# Patient Record
Sex: FEMALE | Race: WHITE | HISPANIC OR LATINO | Employment: OTHER | URBAN - METROPOLITAN AREA
[De-identification: names, ages, dates, MRNs, and addresses within clinical notes are randomized per-mention and may not be internally consistent; named-entity substitution may affect disease eponyms.]

---

## 2017-01-06 ENCOUNTER — TELEPHONE (OUTPATIENT)
Dept: TRANSPLANT | Facility: CLINIC | Age: 77
End: 2017-01-06

## 2017-01-06 DIAGNOSIS — I27.9 CHRONIC PULMONARY HEART DISEASE: ICD-10-CM

## 2017-01-06 DIAGNOSIS — Z79.899 POLYPHARMACY: Primary | ICD-10-CM

## 2017-01-06 DIAGNOSIS — R06.82 TACHYPNEA: ICD-10-CM

## 2017-01-13 ENCOUNTER — HOSPITAL ENCOUNTER (OUTPATIENT)
Dept: PULMONOLOGY | Facility: CLINIC | Age: 77
Discharge: HOME | End: 2017-01-13
Payer: MEDICARE

## 2017-01-13 ENCOUNTER — INITIAL CONSULT (OUTPATIENT)
Dept: TRANSPLANT | Facility: CLINIC | Age: 77
End: 2017-01-13
Attending: INTERNAL MEDICINE
Payer: MEDICARE

## 2017-01-13 VITALS
HEART RATE: 96 BPM | DIASTOLIC BLOOD PRESSURE: 77 MMHG | HEIGHT: 61 IN | SYSTOLIC BLOOD PRESSURE: 148 MMHG | WEIGHT: 212.94 LBS | BODY MASS INDEX: 40.2 KG/M2

## 2017-01-13 VITALS — BODY MASS INDEX: 36.99 KG/M2 | HEIGHT: 62 IN | WEIGHT: 201 LBS

## 2017-01-13 DIAGNOSIS — G62.9 PERIPHERAL POLYNEUROPATHY: ICD-10-CM

## 2017-01-13 DIAGNOSIS — Z79.01 LONG TERM (CURRENT) USE OF ANTICOAGULANTS: ICD-10-CM

## 2017-01-13 DIAGNOSIS — I27.20 PULMONARY HYPERTENSION: ICD-10-CM

## 2017-01-13 DIAGNOSIS — E66.09 NON MORBID OBESITY DUE TO EXCESS CALORIES: ICD-10-CM

## 2017-01-13 DIAGNOSIS — I10 BENIGN HYPERTENSION: ICD-10-CM

## 2017-01-13 DIAGNOSIS — I27.9 CHRONIC PULMONARY HEART DISEASE: ICD-10-CM

## 2017-01-13 DIAGNOSIS — Z79.4 LONG-TERM INSULIN USE: ICD-10-CM

## 2017-01-13 DIAGNOSIS — G47.33 OSA (OBSTRUCTIVE SLEEP APNEA): ICD-10-CM

## 2017-01-13 DIAGNOSIS — I48.20 CHRONIC ATRIAL FIBRILLATION: ICD-10-CM

## 2017-01-13 DIAGNOSIS — R06.02 SHORTNESS OF BREATH: Primary | ICD-10-CM

## 2017-01-13 PROCEDURE — 3078F DIAST BP <80 MM HG: CPT | Mod: S$GLB,,, | Performed by: INTERNAL MEDICINE

## 2017-01-13 PROCEDURE — 1159F MED LIST DOCD IN RCRD: CPT | Mod: S$GLB,,, | Performed by: INTERNAL MEDICINE

## 2017-01-13 PROCEDURE — 99999 PR PBB SHADOW E&M-EST. PATIENT-LVL III: CPT | Mod: PBBFAC,,, | Performed by: INTERNAL MEDICINE

## 2017-01-13 PROCEDURE — 1125F AMNT PAIN NOTED PAIN PRSNT: CPT | Mod: S$GLB,,, | Performed by: INTERNAL MEDICINE

## 2017-01-13 PROCEDURE — 4010F ACE/ARB THERAPY RXD/TAKEN: CPT | Mod: S$GLB,,, | Performed by: INTERNAL MEDICINE

## 2017-01-13 PROCEDURE — 1157F ADVNC CARE PLAN IN RCRD: CPT | Mod: S$GLB,,, | Performed by: INTERNAL MEDICINE

## 2017-01-13 PROCEDURE — 99499 UNLISTED E&M SERVICE: CPT | Mod: S$GLB,,, | Performed by: INTERNAL MEDICINE

## 2017-01-13 PROCEDURE — 1160F RVW MEDS BY RX/DR IN RCRD: CPT | Mod: S$GLB,,, | Performed by: INTERNAL MEDICINE

## 2017-01-13 PROCEDURE — 94620 PR PULMONARY STRESS TESTING,SIMPLE: CPT | Mod: S$GLB,,, | Performed by: INTERNAL MEDICINE

## 2017-01-13 PROCEDURE — 3077F SYST BP >= 140 MM HG: CPT | Mod: S$GLB,,, | Performed by: INTERNAL MEDICINE

## 2017-01-13 PROCEDURE — 99204 OFFICE O/P NEW MOD 45 MIN: CPT | Mod: S$GLB,,, | Performed by: INTERNAL MEDICINE

## 2017-01-13 NOTE — MR AVS SNAPSHOT
[facility]  [address]  [facility] [address]  Phone: [phone]                  Kaci Whalen   2017 3:00 PM   Initial consult    Description:  Female : 1940   Provider:  Vel Arellano Jr., MD   Department:  [facility]           Reason for Visit     Pulmonary Hypertension           Diagnoses this Visit        Comments    Shortness of breath    -  Primary     Pulmonary hypertension                To Do List           Future Appointments        Provider Department Dept Phone    2017 10:40 AM Toby Hung MD [facility] [phone]    3/28/2017 1:00 PM Driss Dixon MD [facility] - Cardiology [phone]    3/31/2017 1:00 PM Jennifer Cardoza DPM [facility] - Podiatry [phone]      Goals (5 Years of Data)              12/28/16    9/2/16    12/11/15    HEMOGLOBIN A1C < 7.0   6.9  7.1  6.9    Related Problems    Type 2 diabetes, uncontrolled, with retinopathy      [facility]     [facility] -  Assistance  Registered nurses in the [facility] provide clinical advisement, health education, appointment booking, and other advisory services.  Call for this free service at [phone].             Medications           Message regarding Medications     Verify the changes and/or additions to your medication regime listed below are the same as discussed with your clinician today.  If any of these changes or additions are incorrect, please notify your healthcare provider.        STOP taking these medications     zolpidem (AMBIEN) 5 MG Tab Take 1 tablet (5 mg total) by mouth nightly as needed.           Verify that the below list of medications is an accurate representation of the medications you are currently taking.  If none reported, the list may be blank. If incorrect, please contact your healthcare provider. Carry this list with you in case of emergency.           Current Medications      "blood sugar diagnostic (ONETOUCH ULTRA TEST) Strp 1 strip by Misc.(Non-Drug; Combo Route) route 4 (four) times daily.    clotrimazole (LOTRIMIN) 1 % cream Apply topically 2 (two) times daily.    furosemide (LASIX) 20 MG tablet Take 1 tablet (20 mg total) by mouth once daily.    insulin aspart (NOVOLOG FLEXPEN) 100 unit/mL InPn pen Take 10 units with breakfast, 15 units with lunch and 17 with supper; with additional sliding scale; max dose 75 units per day    insulin glargine (LANTUS SOLOSTAR) 100 unit/mL (3 mL) InPn pen Inject 40 Units into the skin every evening. Max 50 units nightly    losartan-hydrochlorothiazide 100-25 mg (HYZAAR) 100-25 mg per tablet Take 1 tablet by mouth once daily.    metformin (GLUCOPHAGE) 1000 MG tablet Take 1 tablet (1,000 mg total) by mouth 2 (two) times daily with meals.    metoprolol succinate (TOPROL-XL) 100 MG 24 hr tablet Take 1 tablet (100 mg total) by mouth once daily.    ONETOUCH ULTRA TEST Strp USE ONE STRIP TO CHECK GLUCOSE 4 TIMES DAILY    pen needle, diabetic (BD ULTRA-FINE SANDEEP PEN NEEDLES) 32 gauge x 5/32" Ndle Take 1 each by mouth 4 (four) times daily.    rivaroxaban (XARELTO) 20 mg Tab Take 1 tablet (20 mg total) by mouth daily with dinner or evening meal.    trazodone (DESYREL) 50 MG tablet Take 2 tablets (100 mg total) by mouth nightly as needed for Insomnia.           Clinical Reference Information           Vital Signs - Last Recorded  Most recent update: 1/13/2017  3:13 PM by Jose Ramon Barker    BP Pulse Ht Wt BMI    (!) 148/77 96 5' 1" (1.549 m) 96.6 kg (212 lb 15.4 oz) 40.24 kg/m2      Blood Pressure          Most Recent Value    BP  (!)  148/77      Allergies as of 1/13/2017     Ace Inhibitors    Fluorescein    Iodine    Pravastatin    Simvastatin      Immunizations Administered on Date of Encounter - 1/13/2017     None      Orders Placed During Today's Visit     Future Labs/Procedures Expected by Expires    Cath lab procedure  As directed 1/13/2018    "   Instructions    Hold Xarelto 2 days before the procedure

## 2017-01-14 NOTE — PROCEDURES
Kaci Whalen is a 76 y.o.  female patient, who presents for a 6 minute walk test ordered by Sol Waller MD.  The diagnosis is Exertional Dyspnea.  The patient's BMI is 36.8 kg/m2.  Predicted distance (lower limit of normal) is 216.95 meters.      Test Results:    The test was not completed.  The patient stopped 5 times for a total of 254 seconds.  The total time walked was 106 seconds.  During walking, the patient reported:  Dyspnea. The patient used a wheelchair for assistance during testing.     01/13/2017---------Distance: 97.54 meters (320 feet)     O2 Sat % Supplemental Oxygen Heart Rate Blood Pressure Lizbet Scale   Pre-exercise  (Resting) 96 % Room Air 81 bpm 152/66 mmHg 4   During Exercise 96 % Room Air 69 bpm 152/68 mmHg 7-8   Post-exercise  (Recovery) 98 % Room Air  75 bpm       Recovery Time: 194 seconds    Performing nurse/tech: JARRED Pagan      PREVIOUS STUDY:   10/12/2016---------Distance: 76.2 meters (250 feet)       O2 Sat % Supplemental Oxygen Heart Rate Blood Pressure Lizbet Scale   Pre-exercise  (Resting) 93 % Room Air 87 bpm 111/55 mmHg 0   During Exercise 91 % Room Air 99 bpm 101/61 mmHg 7-8   Post-exercise  (Recovery) 98 % Room Air  83 bpm           CLINICAL INTERPRETATION:  Six minute walk distance is 97.54 meters (320 feet) with very heavy dyspnea.  During exercise, there was no desaturation while breathing room air.  Blood pressure remained stable and Heart rate decreased significantly with walking.  Hypertension was present prior to exercise.  The patient did not report non-pulmonary symptoms during exercise.  Severe exercise impairment is likely due to cardiovascular causes and subjective symptoms.  The patient did not complete the study, walking 254 seconds of the 360 second test.  Since the previous study in October 2016, exercise capacity may be somewhat improved.  Based upon age and body mass index, exercise capacity is less than predicted.

## 2017-01-15 DIAGNOSIS — I48.20 CHRONIC ATRIAL FIBRILLATION: ICD-10-CM

## 2017-01-16 ENCOUNTER — PATIENT MESSAGE (OUTPATIENT)
Dept: FAMILY MEDICINE | Facility: CLINIC | Age: 77
End: 2017-01-16

## 2017-01-16 RX ORDER — METOPROLOL SUCCINATE 100 MG/1
TABLET, EXTENDED RELEASE ORAL
Qty: 30 TABLET | Refills: 0 | Status: SHIPPED | OUTPATIENT
Start: 2017-01-16 | End: 2017-02-20 | Stop reason: SDUPTHER

## 2017-01-17 DIAGNOSIS — I48.20 CHRONIC ATRIAL FIBRILLATION: ICD-10-CM

## 2017-01-17 DIAGNOSIS — E11.3593 PROLIFERATIVE DIABETIC RETINOPATHY OF BOTH EYES ASSOCIATED WITH TYPE 2 DIABETES MELLITUS, UNSPECIFIED PROLIFERATIVE RETINOPATHY TYPE: ICD-10-CM

## 2017-01-17 DIAGNOSIS — I10 BENIGN HYPERTENSION: ICD-10-CM

## 2017-01-17 RX ORDER — LOSARTAN POTASSIUM AND HYDROCHLOROTHIAZIDE 25; 100 MG/1; MG/1
1 TABLET ORAL DAILY
Qty: 90 TABLET | Refills: 1 | Status: SHIPPED | OUTPATIENT
Start: 2017-01-17 | End: 2017-07-17 | Stop reason: SDUPTHER

## 2017-01-17 NOTE — TELEPHONE ENCOUNTER
----- Message from Rosangela Cesar sent at 1/17/2017 10:57 AM CST -----  Contact: Liliam/Anish/[phone]  Refill:  metoprolol succinate (TOPROL-XL) 100 MG 24 hr tablet  Refill:  losartan-hydrochlorothiazide 100-25 mg (HYZAAR) 100-25 mg per tablet    Thank you.

## 2017-01-18 ENCOUNTER — TELEPHONE (OUTPATIENT)
Dept: CARDIOLOGY | Facility: CLINIC | Age: 77
End: 2017-01-18

## 2017-01-18 ENCOUNTER — TELEPHONE (OUTPATIENT)
Dept: INTERNAL MEDICINE | Facility: CLINIC | Age: 77
End: 2017-01-18

## 2017-01-18 ENCOUNTER — HOSPITAL ENCOUNTER (OUTPATIENT)
Facility: HOSPITAL | Age: 77
Discharge: HOME | End: 2017-01-18
Attending: INTERNAL MEDICINE | Admitting: INTERNAL MEDICINE
Payer: MEDICARE

## 2017-01-18 DIAGNOSIS — I27.9 CHRONIC PULMONARY HEART DISEASE: Primary | ICD-10-CM

## 2017-01-18 DIAGNOSIS — I27.29 OTHER SECONDARY PULMONARY HYPERTENSION: ICD-10-CM

## 2017-01-18 PROBLEM — I27.20 PULMONARY HYPERTENSION: Status: ACTIVE | Noted: 2017-01-18

## 2017-01-18 PROCEDURE — 25000003 PHARM REV CODE 250

## 2017-01-18 PROCEDURE — C1894 INTRO/SHEATH, NON-LASER: HCPCS

## 2017-01-18 PROCEDURE — 93451 RIGHT HEART CATH: CPT | Mod: 26,,, | Performed by: INTERNAL MEDICINE

## 2017-01-18 NOTE — H&P
ADVANCED HEART FAILURE AND TRANSPLANT  PRE-PROCEDURE NOTE    01/18/2017    HPI:     Kaci Whalen is a 76 y.o. female patient with a history of AF, HFpEF, DM, HTN, MIGUEL, obesity who presents to our procedure room for RHC to evaluate hemodynamics. The test was requested because the CT chest was suggestive of pulmonary HTN but her echo revealed normal PA pressure.      Meds:     Scheduled Meds:  PRN Meds:  Continuous Infusions:      Labs:     Recent Results (from the past 336 hour(s))   CBC auto differential    Collection Time: 01/18/17  7:30 AM   Result Value Ref Range    WBC 7.19 3.90 - 12.70 K/uL    Hemoglobin 11.1 (L) 12.0 - 16.0 g/dL    Hematocrit 34.1 (L) 37.0 - 48.5 %    Platelets 273 150 - 350 K/uL   CBC auto differential    Collection Time: 01/13/17  1:25 PM   Result Value Ref Range    WBC 6.45 3.90 - 12.70 K/uL    Hemoglobin 11.4 (L) 12.0 - 16.0 g/dL    Hematocrit 35.6 (L) 37.0 - 48.5 %    Platelets 225 150 - 350 K/uL       Recent Results (from the past 336 hour(s))   Basic metabolic panel    Collection Time: 01/18/17  7:30 AM   Result Value Ref Range    Sodium 133 (L) 136 - 145 mmol/L    Potassium 3.8 3.5 - 5.1 mmol/L    Chloride 90 (L) 95 - 110 mmol/L    CO2 33 (H) 23 - 29 mmol/L    BUN, Bld 22 8 - 23 mg/dL    Creatinine 1.1 0.5 - 1.4 mg/dL    Calcium 9.5 8.7 - 10.5 mg/dL    Anion Gap 10 8 - 16 mmol/L       Assessment & Plan:     76 y.o. female patient with a history of AF, HFpEF, DM, HTN, MIGUEL, obesity who presents to our procedure room for RHC to evaluate hemodynamics.      PLAN:  1. Will proceed with the procedure as planned.     - The risks and benefits of the procedure were discussed extensively with the patient and consent was obtained from the patient.    I will discuss with the attending physician. Attending addendum is to follow.    Signed:  Humberto Koehler MD  Cardiology Fellow, PGY-VI  Pager: [phone]  1/18/2017 8:52 AM    Attending Addendum:

## 2017-01-18 NOTE — DISCHARGE INSTRUCTIONS
AFTER THE PROCEDURE:   -DO NOT DRINK OR EAT ANYTHING UNTIL NO LONGER HOARSE   -You may remove the bandage in 24 hours and wash with soap and water.   -You may shower, but do not soak in a tub for three days.   PRECAUTIONS FOR THE NEXT 24 HOURS:   -If you need to cough, sneeze, have a bowel movement, or bear down, hold pressure over your bandage.   -Do not  anything heavier than a gallon of milk(about 5 pounds)   -Avoid excessive bending over.   SYMPTOMS TO WATCH FOR AND REPORT TO YOUR DOCTOR:   -BLEEDING: hold pressure over the site until bleeding stops. Proceed to Emergency Room by ambulance (do not drive yourself) if unable to stop bleeding. Notify your doctor.   -HEMATOMA(hard bruise under the skin): Low around the bruise if one develops. Call your doctor if it increases in size or if you have difficulty talking, swallowing, breathing or anything unusual.   SIGNS OF INFECTION:Fever (temperature over 100.5 F), pus or redness   -RASH   -CHEST PAIN OR SHORTNESS OF BREATH   You may call you coordinator in the Heart Failure/Heart Transplant/Pulmonary Hypertension Clinic at [phone] during normal business hours(Monday through Friday from 8 A.M. to 5 P.M.) After hours, call the Heart Transplant Service doctor on call at [phone].    Please call Dr. White for recommendations of diuretic dosing tomorrow as they will receive report today and Dr. Arellano will call them as well  OK to resume Xarelto tonight if no bleeding noted  No follow-up needed with our service

## 2017-01-18 NOTE — DISCHARGE SUMMARY
[facility]  Discharge Note  Short Stay    Admit Date: 1/18/2017    Discharge Date and Time: 1/18/2017    Attending Physician: No att. providers found     Discharge Provider: Vel Arellano Jr    Diagnoses:  Active Hospital Problems    Diagnosis  POA    *Pulmonary hypertension mixed group 2 and 3 [I27.2]  Yes    Long term (current) use of anticoagulants [Z79.01]  Not Applicable    Chronic atrial fibrillation [I48.2]  Yes    MIGUEL (obstructive sleep apnea) unable to afford CPAP [G47.33]  Yes      Resolved Hospital Problems    Diagnosis Date Resolved POA   No resolved problems to display.       Discharged Condition: stable    Hospital Course: Patient was admitted for an outpatient procedure and tolerated the procedure well with no complications.    Final Diagnoses: Same as principal problem.    Disposition: Home or Self Care    Follow up/Patient Instructions:    Medications:  Reconciled Home Medications:   Discharge Medication List as of 1/18/2017 10:06 AM      CONTINUE these medications which have NOT CHANGED    Details   !! blood sugar diagnostic (ONETOUCH ULTRA TEST) Strp 1 strip by Misc.(Non-Drug; Combo Route) route 4 (four) times daily., Starting 6/28/2016, Until Discontinued, Normal      clotrimazole (LOTRIMIN) 1 % cream Apply topically 2 (two) times daily., Starting 7/19/2016, Until Discontinued, Normal      furosemide (LASIX) 20 MG tablet Take 1 tablet (20 mg total) by mouth once daily., Starting 6/10/2016, Until Discontinued, Normal      insulin aspart (NOVOLOG FLEXPEN) 100 unit/mL InPn pen Take 10 units with breakfast, 15 units with lunch and 17 with supper; with additional sliding scale; max dose 75 units per day, Normal      insulin glargine (LANTUS SOLOSTAR) 100 unit/mL (3 mL) InPn pen Inject 40 Units into the skin every evening. Max 50 units nightly, Starting 12/18/2015, Until Discontinued, Print      losartan-hydrochlorothiazide 100-25 mg (HYZAAR) 100-25 mg per tablet Take 1 tablet by  "mouth once daily., Starting 1/17/2017, Until Discontinued, Normal      metformin (GLUCOPHAGE) 1000 MG tablet Take 1 tablet (1,000 mg total) by mouth 2 (two) times daily with meals., Starting 9/9/2016, Until Discontinued, Normal      metoprolol succinate (TOPROL-XL) 100 MG 24 hr tablet TAKE ONE TABLET BY MOUTH ONCE DAILY, Normal      !! ONETOUCH ULTRA TEST Strp USE ONE STRIP TO CHECK GLUCOSE 4 TIMES DAILY, Normal      pen needle, diabetic (BD ULTRA-FINE SANDEEP PEN NEEDLES) 32 gauge x 5/32" Ndle Take 1 each by mouth 4 (four) times daily., Starting 9/9/2016, Until Discontinued, Normal      rivaroxaban (XARELTO) 20 mg Tab Take 1 tablet (20 mg total) by mouth daily with dinner or evening meal., Starting 12/1/2016, Until Discontinued, Print      trazodone (DESYREL) 50 MG tablet Take 2 tablets (100 mg total) by mouth nightly as needed for Insomnia., Starting 12/28/2016, Until Thu 12/28/17, Normal       !! - Potential duplicate medications found. Please discuss with provider.        No discharge procedures on file.  Follow-up Information     Follow up with [facility].    Specialty:  Transplant    Why:  As needed    Contact information:    [address]  [facility] [address] [phone]    Additional information:    3rd floor        Follow up with Toby Hung MD. Call in 1 day.    Specialty:  Internal Medicine    Contact information:    [address]  [address]  [facility] [id number] [phone]          Follow up with Driss Dixon MD. Call in 1 day.    Specialties:  Cardiology, INTERVENTIONAL CARDIOLOGY    Contact information:    [address]  [address]  [facility] [id number] [phone]          Follow up: Dr Dixon or Abhilash will adjust diuretics and follow--she will contact them tomorrow for instructions to give time for them to review cath and for Dr. Arellano to contact them to review    Resume previous diet and activity    "

## 2017-01-18 NOTE — IP AVS SNAPSHOT
[facility]  [address]  [facility] [address]  Phone: [phone]           Patient Discharge Instructions     Our goal is to set you up for success. This packet includes information on your condition, medications, and your home care. It will help you to care for yourself so you don't get sicker and need to go back to the hospital.     Please ask your nurse if you have any questions.        There are many details to remember when preparing to leave the hospital. Here is what you will need to do:    1. Take your medicine. If you are prescribed medications, review your Medication List in the following pages. You may have new medications to  at the pharmacy and others that you'll need to stop taking. Review the instructions for how and when to take your medications. Talk with your doctor or nurses if you are unsure of what to do.     2. Go to your follow-up appointments. Specific follow-up information is listed in the following pages. Your may be contacted by a transition nurse or clinical provider about future appointments. Be sure we have all of the phone numbers to reach you, if needed. Please contact your provider's office if you are unable to make an appointment.     3. Watch for warning signs. Your doctor or nurse will give you detailed warning signs to watch for and when to call for assistance. These instructions may also include educational information about your condition. If you experience any of warning signs to your health, call your doctor.               [facility]  Unless otherwise directed by your provider, please contact [facility], our nurse care line that is available for 24/7 assistance.     [phone] (toll-free)    Registered nurses in the [facility] provide clinical advisement, health education, appointment booking, and other advisory services.                    ** Verify the list of medication(s) below is accurate and up  to date. Carry this with you in case of emergency. If your medications have changed, please notify your healthcare provider.             Medication List      CONTINUE taking these medications        Additional Info                      * blood sugar diagnostic Strp   Commonly known as:  ONETOUCH ULTRA TEST   Quantity:  400 strip   Refills:  3   Dose:  1 strip    Instructions:  1 strip by Misc.(Non-Drug; Combo Route) route 4 (four) times daily.     Begin Date    AM    Noon    PM    Bedtime       * ONETOUCH ULTRA TEST Strp   Quantity:  400 strip   Refills:  0   Generic drug:  blood sugar diagnostic    Instructions:  USE ONE STRIP TO CHECK GLUCOSE 4 TIMES DAILY     Begin Date    AM    Noon    PM    Bedtime       clotrimazole 1 % cream   Commonly known as:  LOTRIMIN   Quantity:  45 g   Refills:  3    Instructions:  Apply topically 2 (two) times daily.     Begin Date    AM    Noon    PM    Bedtime       furosemide 20 MG tablet   Commonly known as:  LASIX   Quantity:  90 tablet   Refills:  3   Dose:  20 mg    Instructions:  Take 1 tablet (20 mg total) by mouth once daily.     Begin Date    AM    Noon    PM    Bedtime       insulin aspart 100 unit/mL Inpn pen   Commonly known as:  NOVOLOG FLEXPEN   Quantity:  3 Box   Refills:  5   Comments:  Please dispense quantity sufficient    Instructions:  Take 10 units with breakfast, 15 units with lunch and 17 with supper; with additional sliding scale; max dose 75 units per day     Begin Date    AM    Noon    PM    Bedtime       insulin glargine 100 unit/mL (3 mL) Inpn pen   Commonly known as:  LANTUS SOLOSTAR   Quantity:  1 Box   Refills:  6   Dose:  40 Units   Comments:  Please dispense quantity sufficient    Instructions:  Inject 40 Units into the skin every evening. Max 50 units nightly     Begin Date    AM    Noon    PM    Bedtime       losartan-hydrochlorothiazide 100-25 mg 100-25 mg per tablet   Commonly known as:  HYZAAR   Quantity:  90 tablet   Refills:  1   Dose:  1  "tablet    Instructions:  Take 1 tablet by mouth once daily.     Begin Date    AM    Noon    PM    Bedtime       metformin 1000 MG tablet   Commonly known as:  GLUCOPHAGE   Quantity:  60 tablet   Refills:  5   Dose:  1000 mg    Instructions:  Take 1 tablet (1,000 mg total) by mouth 2 (two) times daily with meals.     Begin Date    AM    Noon    PM    Bedtime       metoprolol succinate 100 MG 24 hr tablet   Commonly known as:  TOPROL-XL   Quantity:  30 tablet   Refills:  0    Instructions:  TAKE ONE TABLET BY MOUTH ONCE DAILY     Begin Date    AM    Noon    PM    Bedtime       pen needle, diabetic 32 gauge x 5/32" Ndle   Commonly known as:  BD ULTRA-FINE SANDEEP PEN NEEDLES   Quantity:  120 each   Refills:  2   Dose:  1 each    Instructions:  Take 1 each by mouth 4 (four) times daily.     Begin Date    AM    Noon    PM    Bedtime       rivaroxaban 20 mg Tab   Commonly known as:  XARELTO   Quantity:  90 tablet   Refills:  3   Dose:  20 mg    Instructions:  Take 1 tablet (20 mg total) by mouth daily with dinner or evening meal.     Begin Date    AM    Noon    PM    Bedtime       trazodone 50 MG tablet   Commonly known as:  DESYREL   Quantity:  60 tablet   Refills:  2   Dose:  100 mg    Instructions:  Take 2 tablets (100 mg total) by mouth nightly as needed for Insomnia.     Begin Date    AM    Noon    PM    Bedtime       * Notice:  This list has 2 medication(s) that are the same as other medications prescribed for you. Read the directions carefully, and ask your doctor or other care provider to review them with you.               Please bring to all follow up appointments:    1. A copy of your discharge instructions.  2. All medicines you are currently taking in their original bottles.  3. Identification and insurance card.    Please arrive 15 minutes ahead of scheduled appointment time.    Please call 24 hours in advance if you must reschedule your appointment and/or time.        Your Scheduled Appointments     Jan 25, " 2017 10:40 AM CST   Established Patient Visit with Toby Hung MD   [facility] ([facility] [place] [place])    [address]  [place] [address]   [phone]            Mar 28, 2017  1:00 PM CDT   Established Patient Visit with Driss Dixon MD   [facility] ([place])    [address]  [place] [address] [phone]            Mar 31, 2017  1:00 PM CDT   Established Patient Visit with Jennifer Cardoza DPM   [facility] ([place])    [address]  [place] [address] [phone]              Follow-up Information     Follow up with [facility].    Specialty:  Transplant    Why:  As needed    Contact information:    [address]  [facility] [address] [phone]    Additional information:    3rd floor        Follow up with Toby Hung MD. Call in 1 day.    Specialty:  Internal Medicine    Contact information:    [address]  [address]  [place] [address] [phone]          Follow up with Driss Dixon MD. Call in 1 day.    Specialties:  Cardiology, INTERVENTIONAL CARDIOLOGY    Contact information:    [address]  [address]  [place] [id number] [phone]            Discharge Instructions       AFTER THE PROCEDURE:   -DO NOT DRINK OR EAT ANYTHING UNTIL NO LONGER HOARSE   -You may remove the bandage in 24 hours and wash with soap and water.   -You may shower, but do not soak in a tub for three days.   PRECAUTIONS FOR THE NEXT 24 HOURS:   -If you need to cough, sneeze, have a bowel movement, or bear down, hold pressure over your bandage.   -Do not  anything heavier than a gallon of milk(about 5 pounds)   -Avoid excessive bending over.   SYMPTOMS TO WATCH FOR AND REPORT TO YOUR DOCTOR:   -BLEEDING: hold pressure over the site until bleeding stops. Proceed to Emergency Room by ambulance (do not drive yourself) if unable to stop bleeding. Notify your doctor.   -HEMATOMA(hard bruise under the skin):  Low around the bruise if one develops. Call your doctor if it increases in size or if you have difficulty talking, swallowing, breathing or anything unusual.   SIGNS OF INFECTION:Fever (temperature over 100.5 F), pus or redness   -RASH   -CHEST PAIN OR SHORTNESS OF BREATH   You may call you coordinator in the Heart Failure/Heart Transplant/Pulmonary Hypertension Clinic at [phone] during normal business hours(Monday through Friday from 8 A.M. to 5 P.M.) After hours, call the Heart Transplant Service doctor on call at [phone].    Please call Dr. White for recommendations of diuretic dosing tomorrow as they will receive report today and Dr. Arellano will call them as well  OK to resume Xarelto tonight if no bleeding noted  No follow-up needed with our service      Admission Information     Date & Time Provider Department CSN    1/18/2017  8:20 AM Vel Arellano Jr., MD [facility] [id number]      Care Providers     Provider Role Specialty Primary office phone    Vel Arellano Jr., MD Attending Provider Cardiology [phone]      Recent Lab Values        9/6/2013 12/13/2013 12/17/2014 4/2/2015 6/30/2015 12/11/2015 9/2/2016 12/28/2016      8:10 AM  8:25 AM  8:58 AM  8:07 AM  8:30 AM  8:30 AM  8:28 AM 11:55 AM    A1C 7.3 (H) 7.7 (H) 7.7 (H) 7.2 (H) 7.8 (H) 6.9 (H) 7.1 (H) 6.9 (H)        7.2 (H)        Comment for A1C at  8:28 AM on 9/2/2016:  According to ADA guidelines, hemoglobin A1C <7.0% represents  optimal control in non-pregnant diabetic patients.  Different  metrics may apply to specific populations.   Standards of Medical Care in Diabetes - 2016.  For the purpose of screening for the presence of diabetes:  <5.7%     Consistent with the absence of diabetes  5.7-6.4%  Consistent with increasing risk for diabetes   (prediabetes)  >or=6.5%  Consistent with diabetes  Currently no consensus exists for use of hemoglobin A1C  for diagnosis of diabetes for  children.      Comment for A1C at 11:55 AM on 12/28/2016:  According to ADA guidelines, hemoglobin A1C <7.0% represents  optimal control in non-pregnant diabetic patients.  Different  metrics may apply to specific populations.   Standards of Medical Care in Diabetes - 2016.  For the purpose of screening for the presence of diabetes:  <5.7%     Consistent with the absence of diabetes  5.7-6.4%  Consistent with increasing risk for diabetes   (prediabetes)  >or=6.5%  Consistent with diabetes  Currently no consensus exists for use of hemoglobin A1C  for diagnosis of diabetes for children.        Allergies as of 1/18/2017        Reactions    Ace Inhibitors     Other reaction(s): cough    Fluorescein Itching    Other reaction(s): Itching    Iodine     Other reaction(s): Itching    Pravastatin Other (See Comments)    Other reaction(s): mouth tingling/numbness    Simvastatin     Other reaction(s): foot swelling      Advance Directives     An advance directive is a document which, in the event you are no longer able to make decisions for yourself, tells your healthcare team what kind of treatment you do or do not want to receive, or who you would like to make those decisions for you.  If you do not currently have an advance directive, [facility] encourages you to create one.  For more information call:  [phone]), [phone] ([phone]),  or log on to [email or web address].        Language Assistance Services     ATTENTION: Language assistance services are available, free of charge. Please call [phone].      ATENCIÓN: Si habla español, tiene a cantor disposición servicios gratuitos de asistencia lingüística. Llame al [phone].     CHÚ Ý: N?u b?n nói Ti?ng Vi?t, có các d?ch v? h? tr? ngôn ng? mi?n phí dành cho b?n. G?i s? [phone].        Chronic Kindey Disease Education             Diabetes Discharge Instructions                                   Xalelto Information     Rivaroxaban Oral  tablet  What is this medicine?  RIVAROXABAN (ri va SAL a ban) is an anticoagulant (blood thinner). It is used to treat blood clots in the lungs or in the veins. It is also used after knee or hip surgeries to prevent blood clots. It is also used to lower the chance of stroke in people with a medical condition called atrial fibrillation.  This medicine may be used for other purposes; ask your health care provider or pharmacist if you have questions.  What should I tell my health care provider before I take this medicine?  They need to know if you have any of these conditions:  · bleeding disorders  · bleeding in the brain  · blood in your stools (black or tarry stools) or if you have blood in your vomit  · history of stomach bleeding  · kidney disease  · liver disease  · low blood counts, like low white cell, platelet, or red cell counts  · recent or planned spinal or epidural procedure  · take medicines that treat or prevent blood clots  · an unusual or allergic reaction to rivaroxaban, other medicines, foods, dyes, or preservatives  · pregnant or trying to get pregnant  · breast-feeding  How should I use this medicine?  Take this medicine by mouth with a glass of water. Follow the directions on the prescription label. Take your medicine at regular intervals. Do not take it more often than directed. Do not stop taking except on your doctor's advice. Stopping this medicine may increase your risk of a blood clot. Be sure to refill your prescription before you run out of medicine.  If you are taking this medicine after hip or knee replacement surgery, take it with or without food. If you are taking this medicine for atrial fibrillation, take it with your evening meal. If you are taking this medicine to treat blood clots, take it with food at the same time each day. If you are unable to swallow your tablet, you may crush the tablet and mix it in applesauce. Then, immediately eat the applesauce. You should eat more food  right after you eat the applesauce containing the crushed tablet.  Talk to your pediatrician regarding the use of this medicine in children. Special care may be needed.  Overdosage: If you think you have taken too much of this medicine contact a poison control center or emergency room at once.  NOTE: This medicine is only for you. Do not share this medicine with others.  What if I miss a dose?  If you take your medicine once a day and miss a dose, take the missed dose as soon as you remember. If you take your medicine twice a day and miss a dose, take the missed dose immediately. In this instance, 2 tablets may be taken at the same time. The next day you should take 1 tablet twice a day as directed.  What may interact with this medicine?  · aspirin and aspirin-like medicines  · certain antibiotics like erythromycin, azithromycin, and clarithromycin  · certain medicines for fungal infections like ketoconazole and itraconazole  · certain medicines for irregular heart beat like amiodarone, quinidine, dronedarone  · certain medicines for seizures like carbamazepine, phenytoin  · certain medicines that treat or prevent blood clots like warfarin, enoxaparin, and dalteparin  · conivaptan  · diltiazem  · felodipine  · indinavir  · lopinavir; ritonavir  · NSAIDS, medicines for pain and inflammation, like ibuprofen or naproxen  · ranolazine  · rifampin  · ritonavir  · [place]'s wort  · verapamil  This list may not describe all possible interactions. Give your health care provider a list of all the medicines, herbs, non-prescription drugs, or dietary supplements you use. Also tell them if you smoke, drink alcohol, or use illegal drugs. Some items may interact with your medicine.  What should I watch for while using this medicine?  Visit your doctor or health care professional for regular checks on your progress. Your condition will be monitored carefully while you are receiving this medicine.  Notify your doctor or health  care professional and seek emergency treatment if you develop breathing problems; changes in vision; chest pain; severe, sudden headache; pain, swelling, warmth in the leg; trouble speaking; sudden numbness or weakness of the face, arm, or leg. These can be signs that your condition has gotten worse.  If you are going to have surgery, tell your doctor or health care professional that you are taking this medicine.  Tell your health care professional that you use this medicine before you have a spinal or epidural procedure. Sometimes people who take this medicine have bleeding problems around the spine when they have a spinal or epidural procedure. This bleeding is very rare. If you have a spinal or epidural procedure while on this medicine, call your health care professional immediately if you have back pain, numbness or tingling (especially in your legs and feet), muscle weakness, paralysis, or loss of bladder or bowel control.  Avoid sports and activities that might cause injury while you are using this medicine. Severe falls or injuries can cause unseen bleeding. Be careful when using sharp tools or knives. Consider using an electric razor. Take special care brushing or flossing your teeth. Report any injuries, bruising, or red spots on the skin to your doctor or health care professional.  What side effects may I notice from receiving this medicine?  Side effects that you should report to your doctor or health care professional as soon as possible:  · allergic reactions like skin rash, itching or hives, swelling of the face, lips, or tongue  · back pain  · redness, blistering, peeling or loosening of the skin, including inside the mouth  · signs and symptoms of bleeding such as bloody or black, tarry stools; red or dark-brown urine; spitting up blood or brown material that looks like coffee grounds; red spots on the skin; unusual bruising or bleeding from the eye, gums, or nose  Side effects that usually do not  require medical attention (Report these to your doctor or health care professional if they continue or are bothersome.):  · dizziness  · muscle pain  This list may not describe all possible side effects. Call your doctor for medical advice about side effects. You may report side effects to FDA at 4-710-HEQ-6129.  Where should I keep my medicine?  Keep out of the reach of children.  Store at room temperature between 15 and 30 degrees C (59 and 86 degrees F). Throw away any unused medicine after the expiration date.  NOTE: This sheet is a summary. It may not cover all possible information. If you have questions about this medicine, talk to your doctor, pharmacist, or health care provider.  NOTE:This sheet is a summary. It may not cover all possible information. If you have questions about this medicine, talk to your doctor, pharmacist, or health care provider. Copyright© 2016 [employer]               [facility] complies with applicable Federal civil rights laws and does not discriminate on the basis of race, color, national origin, age, disability, or sex.

## 2017-01-18 NOTE — PROGRESS NOTES
Patient, Kaci Whalen (MRN #[id number]), presented with a recorded BMI of 40.24 kg/m^2 consistent with the definition of morbid obesity (ICD-10 E66.01). The patient's morbid obesity was monitored, evaluated, addressed and/or treated. This addendum to the medical record is made on 01/18/2017.

## 2017-01-18 NOTE — OP NOTE
[facility] Lab Post Procedure Note    Patient tolerated the procedure well.   Group 2 PH noted  There were no complications.   Please see full report in [facility] for details.

## 2017-01-18 NOTE — IP AVS SNAPSHOT
[address]  [place] [address]  Phone: [phone]           I have received a copy of my After Visit Summary and discharge instructions from [facility].    INSTRUCTIONS RECEIVED AND UNDERSTOOD BY:                     Patient/Patient Representative: ________________________________________________________________     Date/Time: ________________________________________________________________                     Instructions Given By: ________________________________________________________________     Date/Time: ________________________________________________________________

## 2017-01-18 NOTE — TELEPHONE ENCOUNTER
----- Message from Nidia Gasca sent at 1/18/2017 11:59 AM CST -----  Contact: Dr Olman velásquez si requesting a call back. [phone]

## 2017-01-18 NOTE — PROGRESS NOTES
Subjective:    Patient ID:  Kaci Whalen is a 76 y.o. female who presents for evaluation of Pulmonary Hypertension.    HPI  This 75 yo obese WF with hx of HBP, DM, untreated MIGUEL (cannot afford CPAP), CHFpEF, permanent AF was referred for RHC to assess PA pressure by Dr. Dixon as well as left sided filling pressure.      Six Minute Walk Test:   97.6 m she stopped 5 times during 6 MWT                                           O2 sat  96-96-98%                                                           HR 81-69-75                                                               BP  152//68                                                    Lizbet   4 to 7-8    Echo        Results for orders placed or performed during the hospital encounter of 05/28/16   2D echo with color flow doppler   Result Value Ref Range    EF 65 55 - 65    Mitral Valve Regurgitation TRIVIAL     Est. PA Systolic Pressure 17.99     Pericardial Effusion NONE     Mitral Valve Mobility NORMAL     Tricuspid Valve Regurgitation TRIVIAL        EKG   AF with right axis on some but not all EKG's, low septal forces cannot exclude infarct (not on echo so feel breast artifact or size) and NST-T changes    CT CHEST 8/19/16  Impression          Mosaic perfusion in the lungs, which can be seen with constrictive bronchiolitis or chronic pulmonary embolus.       Nonspecific heterogeneous attenuation of the thoracic spine, similar to the 6/4/16 CT abdomen pelvis.    Cholelithiasis.     RHC  Requested by care team she does not have documented PH at this time    Review of Systems   Constitution: Positive for chills, fever, weakness and malaise/fatigue.   Cardiovascular: Positive for dyspnea on exertion and orthopnea. Negative for chest pain, palpitations, paroxysmal nocturnal dyspnea and syncope.   Respiratory: Positive for shortness of breath and sleep disturbances due to breathing. Negative for wheezing. Sputum production: sometimes.    Hematologic/Lymphatic:  "Does not bruise/bleed easily (on Xarelto).   Musculoskeletal: Positive for arthritis, falls (3 since Thanksgiving) and joint pain (knees stone bad).   Gastrointestinal: Negative for melena.   Genitourinary: Negative for hematuria.   Neurological: Positive for loss of balance, numbness, paresthesias and sensory change. Negative for brief paralysis, focal weakness and seizures.        Objective:    Visit Vitals    BP (!) 148/77    Pulse 96    Ht 5' 1" (1.549 m)    Wt 96.6 kg (212 lb 15.4 oz)    BMI 40.24 kg/m2         Physical Exam   Constitutional: No distress.   BP (!) 148/77  Pulse 96  Ht 5' 1" (1.549 m)  Wt 96.6 kg (212 lb 15.4 oz)  BMI 40.24 kg/m2  Obese, friendly and cooperative female in NAD but dyspneic with moving around exam room   HENT:   Head: Normocephalic and atraumatic.   Eyes: Conjunctivae are normal. Right eye exhibits no discharge. Left eye exhibits no discharge. No scleral icterus.   Neck: JVD (mid neck) present. No tracheal deviation present. No thyromegaly present.   Cardiovascular: Normal rate, regular rhythm and normal heart sounds.  Exam reveals no gallop.    No murmur heard.  Pulmonary/Chest: Breath sounds normal. She is in respiratory distress (with activity).   Abdominal: Soft. Bowel sounds are normal. She exhibits distension (obese and hard to determine liver span or fluid--she could not get onto table for exam). There is no tenderness. There is no rebound and no guarding.   Musculoskeletal: She exhibits no edema (old skin changes legs but no significant edema today) or tenderness.   Neurological: She is alert.   Skin: Skin is warm and dry. She is not diaphoretic.   Psychiatric: She has a normal mood and affect. Her behavior is normal. Thought content normal.         Lab Results   Component Value Date     (H) 01/13/2017     (L) 01/18/2017    K 3.8 01/18/2017    MG 1.8 01/13/2017    CL 90 (L) 01/18/2017    CO2 33 (H) 01/18/2017    BUN 22 01/18/2017    CREATININE 1.1 " 01/18/2017     (H) 01/18/2017    HGBA1C 6.9 (H) 12/28/2016    AST 12 01/13/2017    ALT 6 (L) 01/13/2017    ALBUMIN 3.4 (L) 01/13/2017    PROT 6.7 01/13/2017    BILITOT 0.4 01/13/2017    WBC 7.19 01/18/2017    HGB 11.1 (L) 01/18/2017    HCT 34.1 (L) 01/18/2017     01/18/2017    INR 0.9 07/29/2011    TSH 3.229 05/28/2016    CHOL 155 12/11/2015    HDL 44 12/11/2015    LDLCALC 87.4 12/11/2015    TRIG 118 12/11/2015           Assessment:       1. Shortness of breath    2. Pulmonary hypertension    3. Chronic atrial fibrillation    4. Benign hypertension    5. Long term (current) use of anticoagulants    6. Long-term insulin use    7. Peripheral polyneuropathy    8. Uncontrolled diabetes mellitus of other type with stable proliferative retinopathy, with long-term current use of insulin    9. MIGUEL (obstructive sleep apnea) unable to afford CPAP    10. Non morbid obesity due to excess calories      WHO Group: most likely mixed 2 and 3  Functional Class IV  Plan:   I agree with performance of RHC to assist her physicians in her management.  She has elevated JVP and generally edema though today just chronic skin changes.  She falls a lot but this appears related to bad knees and not orthostasis.  She may benefit from increasing diuretics but prefer to obtain RHC next week instead of empiric treatment today.  She and son are agreeable to RHC and accept risks of the procedure.  She will hold xarelto 2 days before the procedure.    Thank you for allowing me to see this nice woman in consultation.    Atul Arellano MD  [facility] [address]  Cell [phone]

## 2017-01-25 ENCOUNTER — OFFICE VISIT (OUTPATIENT)
Dept: FAMILY MEDICINE | Facility: CLINIC | Age: 77
End: 2017-01-25
Payer: MEDICARE

## 2017-01-25 VITALS
SYSTOLIC BLOOD PRESSURE: 132 MMHG | DIASTOLIC BLOOD PRESSURE: 78 MMHG | RESPIRATION RATE: 18 BRPM | HEART RATE: 80 BPM | OXYGEN SATURATION: 97 % | HEIGHT: 61 IN | WEIGHT: 209.44 LBS | TEMPERATURE: 98 F | BODY MASS INDEX: 39.54 KG/M2

## 2017-01-25 DIAGNOSIS — M62.838 MUSCLE SPASM: ICD-10-CM

## 2017-01-25 DIAGNOSIS — I50.32 CHRONIC DIASTOLIC CONGESTIVE HEART FAILURE: Primary | ICD-10-CM

## 2017-01-25 PROCEDURE — 1159F MED LIST DOCD IN RCRD: CPT | Mod: S$GLB,,, | Performed by: INTERNAL MEDICINE

## 2017-01-25 PROCEDURE — 99999 PR PBB SHADOW E&M-EST. PATIENT-LVL III: CPT | Mod: PBBFAC,,, | Performed by: INTERNAL MEDICINE

## 2017-01-25 PROCEDURE — 99214 OFFICE O/P EST MOD 30 MIN: CPT | Mod: S$GLB,,, | Performed by: INTERNAL MEDICINE

## 2017-01-25 PROCEDURE — 3078F DIAST BP <80 MM HG: CPT | Mod: S$GLB,,, | Performed by: INTERNAL MEDICINE

## 2017-01-25 PROCEDURE — 3075F SYST BP GE 130 - 139MM HG: CPT | Mod: S$GLB,,, | Performed by: INTERNAL MEDICINE

## 2017-01-25 PROCEDURE — 99499 UNLISTED E&M SERVICE: CPT | Mod: S$GLB,,, | Performed by: INTERNAL MEDICINE

## 2017-01-25 PROCEDURE — 1157F ADVNC CARE PLAN IN RCRD: CPT | Mod: S$GLB,,, | Performed by: INTERNAL MEDICINE

## 2017-01-25 PROCEDURE — 1160F RVW MEDS BY RX/DR IN RCRD: CPT | Mod: S$GLB,,, | Performed by: INTERNAL MEDICINE

## 2017-01-25 PROCEDURE — 1125F AMNT PAIN NOTED PAIN PRSNT: CPT | Mod: S$GLB,,, | Performed by: INTERNAL MEDICINE

## 2017-01-25 RX ORDER — FUROSEMIDE 40 MG/1
40 TABLET ORAL 2 TIMES DAILY
Qty: 60 TABLET | Refills: 5 | Status: SHIPPED | OUTPATIENT
Start: 2017-01-25 | End: 2017-02-27

## 2017-01-25 RX ORDER — TIZANIDINE 4 MG/1
4 TABLET ORAL EVERY 8 HOURS PRN
Qty: 30 TABLET | Refills: 0 | Status: SHIPPED | OUTPATIENT
Start: 2017-01-25 | End: 2017-02-04

## 2017-01-25 NOTE — PROGRESS NOTES
"Subjective:       Patient ID: Kaci Whalen is a 76 y.o. female.    Chief Complaint: Diabetes and Follow-up (4 week )    HPI Comments: F/u dypsnea    HPI: 75 y/o with HTN, DM diastolic dysfunction, MIGUEL (not able to afford CPAP) here for follow up after recent right heart cath. No evidence of nitro responsive pulmonary artery but did have elevated wedge pressure (PCWP 27) currently using lasix 20mg once daily denies LE edema (obese) using wheelchair outside of house on continuous O2    Review of Systems   Constitutional: Negative for activity change, appetite change, fatigue, fever and unexpected weight change.   HENT: Negative for ear pain, rhinorrhea and sore throat.    Eyes: Negative for discharge and visual disturbance.   Respiratory: Negative for chest tightness, shortness of breath and wheezing.    Cardiovascular: Negative for chest pain, palpitations and leg swelling.   Gastrointestinal: Negative for abdominal pain, constipation and diarrhea.   Endocrine: Negative for cold intolerance and heat intolerance.   Genitourinary: Negative for dysuria and hematuria.   Musculoskeletal: Negative for joint swelling and neck stiffness.   Skin: Negative for rash.   Neurological: Negative for dizziness, syncope, weakness and headaches.   Psychiatric/Behavioral: Negative for suicidal ideas.       Objective:     Vitals:    01/25/17 1008   BP: 132/78   BP Location: Left arm   Patient Position: Sitting   BP Method: Manual   Pulse: 80   Resp: 18   Temp: 98.3 °F (36.8 °C)   TempSrc: Oral   SpO2: 97%   Weight: 95 kg (209 lb 7 oz)   Height: 5' 1" (1.549 m)          Physical Exam   Constitutional: She is oriented to person, place, and time. She appears well-developed and well-nourished.   HENT:   Head: Normocephalic and atraumatic.   Eyes: Conjunctivae are normal. Pupils are equal, round, and reactive to light.   Neck: Normal range of motion.   Cardiovascular: Normal rate and regular rhythm.  Exam reveals no gallop and no " friction rub.    No murmur heard.  Pulmonary/Chest: Effort normal. She has no wheezes. She has no rales.   Decrease movement to bases bilaterally   Abdominal: Soft. Bowel sounds are normal. There is no tenderness. There is no rebound and no guarding.   Musculoskeletal: Normal range of motion. She exhibits no edema or tenderness.   Neurological: She is alert and oriented to person, place, and time. No cranial nerve deficit.   Skin: Skin is warm and dry.   Psychiatric: She has a normal mood and affect.       Assessment:       1. Chronic diastolic congestive heart failure    2. Muscle spasm        Plan:    1. With evidence by RHC of hypervolemia increase lasix to 40mg bid x 14 days then decrease to 40mg daily will see back in three week with BMP at that time    2. Prn muscle relaxer

## 2017-01-30 ENCOUNTER — TELEPHONE (OUTPATIENT)
Dept: FAMILY MEDICINE | Facility: CLINIC | Age: 77
End: 2017-01-30

## 2017-01-30 ENCOUNTER — PATIENT MESSAGE (OUTPATIENT)
Dept: FAMILY MEDICINE | Facility: CLINIC | Age: 77
End: 2017-01-30

## 2017-01-30 DIAGNOSIS — I27.20 PULMONARY HYPERTENSION: Primary | ICD-10-CM

## 2017-01-30 NOTE — TELEPHONE ENCOUNTER
Please contact patient's daughter to schedule no fasting lab for week of 2/6/17, with follow up with me  after lab work

## 2017-02-01 NOTE — TELEPHONE ENCOUNTER
Left message to [facility] [phone] to return call to clinic re: need labs prior to appointment with MD    My chart message sent re: above

## 2017-02-03 ENCOUNTER — PATIENT MESSAGE (OUTPATIENT)
Dept: FAMILY MEDICINE | Facility: CLINIC | Age: 77
End: 2017-02-03

## 2017-02-07 ENCOUNTER — PATIENT MESSAGE (OUTPATIENT)
Dept: FAMILY MEDICINE | Facility: CLINIC | Age: 77
End: 2017-02-07

## 2017-02-07 DIAGNOSIS — E11.29 TYPE 2 DIABETES MELLITUS WITH MICROALBUMINURIA, WITH LONG-TERM CURRENT USE OF INSULIN: Primary | ICD-10-CM

## 2017-02-07 DIAGNOSIS — R80.9 TYPE 2 DIABETES MELLITUS WITH MICROALBUMINURIA, WITH LONG-TERM CURRENT USE OF INSULIN: Primary | ICD-10-CM

## 2017-02-07 DIAGNOSIS — Z79.4 TYPE 2 DIABETES MELLITUS WITH MICROALBUMINURIA, WITH LONG-TERM CURRENT USE OF INSULIN: Primary | ICD-10-CM

## 2017-02-08 RX ORDER — INSULIN PUMP SYRINGE, 3 ML
EACH MISCELLANEOUS
Qty: 1 EACH | Refills: 0 | Status: SHIPPED | OUTPATIENT
Start: 2017-02-08 | End: 2018-05-09 | Stop reason: CLARIF

## 2017-02-08 NOTE — TELEPHONE ENCOUNTER
Testing supplies and meter script sent to [facility] order pharmacy electronically

## 2017-02-09 DIAGNOSIS — R80.9 TYPE 2 DIABETES MELLITUS WITH MICROALBUMINURIA, WITH LONG-TERM CURRENT USE OF INSULIN: Primary | ICD-10-CM

## 2017-02-09 DIAGNOSIS — E11.29 TYPE 2 DIABETES MELLITUS WITH MICROALBUMINURIA, WITH LONG-TERM CURRENT USE OF INSULIN: Primary | ICD-10-CM

## 2017-02-09 DIAGNOSIS — Z79.4 TYPE 2 DIABETES MELLITUS WITH MICROALBUMINURIA, WITH LONG-TERM CURRENT USE OF INSULIN: Primary | ICD-10-CM

## 2017-02-09 RX ORDER — LANCETS 28 GAUGE
1 EACH MISCELLANEOUS
Qty: 100 EACH | Refills: 2 | Status: ON HOLD | OUTPATIENT
Start: 2017-02-09 | End: 2019-11-21 | Stop reason: HOSPADM

## 2017-02-09 RX ORDER — LANCETS 28 GAUGE
EACH MISCELLANEOUS
Status: CANCELLED | OUTPATIENT
Start: 2017-02-09

## 2017-02-09 RX ORDER — ISOPROPYL ALCOHOL 70 ML/100ML
1 SWAB TOPICAL
Refills: 0 | Status: ON HOLD | COMMUNITY
Start: 2017-02-09 | End: 2019-11-21 | Stop reason: HOSPADM

## 2017-02-10 ENCOUNTER — LAB VISIT (OUTPATIENT)
Dept: LAB | Facility: HOSPITAL | Age: 77
End: 2017-02-10
Attending: INTERNAL MEDICINE
Payer: MEDICARE

## 2017-02-10 DIAGNOSIS — I27.20 PULMONARY HYPERTENSION: ICD-10-CM

## 2017-02-10 LAB
ANION GAP SERPL CALC-SCNC: 10 MMOL/L
BUN SERPL-MCNC: 30 MG/DL
CALCIUM SERPL-MCNC: 9.8 MG/DL
CHLORIDE SERPL-SCNC: 87 MMOL/L
CO2 SERPL-SCNC: 29 MMOL/L
CREAT SERPL-MCNC: 1.5 MG/DL
EST. GFR  (AFRICAN AMERICAN): 39.3 ML/MIN/1.73 M^2
EST. GFR  (NON AFRICAN AMERICAN): 34.1 ML/MIN/1.73 M^2
GLUCOSE SERPL-MCNC: 134 MG/DL
MAGNESIUM SERPL-MCNC: 2 MG/DL
POTASSIUM SERPL-SCNC: 4.1 MMOL/L
SODIUM SERPL-SCNC: 126 MMOL/L

## 2017-02-10 PROCEDURE — 36415 COLL VENOUS BLD VENIPUNCTURE: CPT | Mod: PO

## 2017-02-10 PROCEDURE — 80048 BASIC METABOLIC PNL TOTAL CA: CPT

## 2017-02-10 PROCEDURE — 83735 ASSAY OF MAGNESIUM: CPT

## 2017-02-13 ENCOUNTER — TELEPHONE (OUTPATIENT)
Dept: FAMILY MEDICINE | Facility: CLINIC | Age: 77
End: 2017-02-13

## 2017-02-13 DIAGNOSIS — K29.70 GASTRITIS, PRESENCE OF BLEEDING UNSPECIFIED, UNSPECIFIED CHRONICITY, UNSPECIFIED GASTRITIS TYPE: Primary | ICD-10-CM

## 2017-02-13 RX ORDER — PROMETHAZINE HYDROCHLORIDE 25 MG/1
25 TABLET ORAL EVERY 6 HOURS PRN
Qty: 20 TABLET | Refills: 0 | Status: ON HOLD | OUTPATIENT
Start: 2017-02-13 | End: 2017-06-08

## 2017-02-13 NOTE — TELEPHONE ENCOUNTER
Patient's daughter, Liliam, contacted regarding labs. She reports patient has been nausea with decreased PO intake over last week. Breathing did improve with higher dose lasix (now taking 40mg once daily since 2/8) creatinine elevated with low total sodium suspect some component of dehydration, encourage increase po supplement water decrease lasxi to 20mg daily when not tolerating po and add prn phenergan

## 2017-02-20 ENCOUNTER — PATIENT MESSAGE (OUTPATIENT)
Dept: FAMILY MEDICINE | Facility: CLINIC | Age: 77
End: 2017-02-20

## 2017-02-20 DIAGNOSIS — I48.20 CHRONIC ATRIAL FIBRILLATION: Primary | ICD-10-CM

## 2017-02-20 DIAGNOSIS — I48.20 CHRONIC ATRIAL FIBRILLATION: ICD-10-CM

## 2017-02-20 RX ORDER — METOPROLOL SUCCINATE 100 MG/1
TABLET, EXTENDED RELEASE ORAL
Qty: 90 TABLET | Refills: 0 | Status: SHIPPED | OUTPATIENT
Start: 2017-02-20 | End: 2017-02-20 | Stop reason: SDUPTHER

## 2017-02-20 RX ORDER — METOPROLOL SUCCINATE 100 MG/1
100 TABLET, EXTENDED RELEASE ORAL DAILY
Qty: 90 TABLET | Refills: 2 | Status: SHIPPED | OUTPATIENT
Start: 2017-02-20 | End: 2017-07-17 | Stop reason: SDUPTHER

## 2017-02-27 ENCOUNTER — LAB VISIT (OUTPATIENT)
Dept: LAB | Facility: HOSPITAL | Age: 77
End: 2017-02-27
Attending: INTERNAL MEDICINE
Payer: MEDICARE

## 2017-02-27 ENCOUNTER — OFFICE VISIT (OUTPATIENT)
Dept: FAMILY MEDICINE | Facility: CLINIC | Age: 77
End: 2017-02-27
Payer: MEDICARE

## 2017-02-27 VITALS
HEIGHT: 61 IN | WEIGHT: 198.44 LBS | DIASTOLIC BLOOD PRESSURE: 82 MMHG | RESPIRATION RATE: 18 BRPM | OXYGEN SATURATION: 95 % | HEART RATE: 80 BPM | TEMPERATURE: 98 F | SYSTOLIC BLOOD PRESSURE: 134 MMHG | BODY MASS INDEX: 37.47 KG/M2

## 2017-02-27 DIAGNOSIS — I50.32 CHRONIC DIASTOLIC HEART FAILURE: ICD-10-CM

## 2017-02-27 DIAGNOSIS — R11.0 NAUSEA: ICD-10-CM

## 2017-02-27 LAB
ANION GAP SERPL CALC-SCNC: 8 MMOL/L
BUN SERPL-MCNC: 24 MG/DL
CALCIUM SERPL-MCNC: 9.8 MG/DL
CHLORIDE SERPL-SCNC: 97 MMOL/L
CO2 SERPL-SCNC: 29 MMOL/L
CREAT SERPL-MCNC: 1.3 MG/DL
EST. GFR  (AFRICAN AMERICAN): 47 ML/MIN/1.73 M^2
EST. GFR  (NON AFRICAN AMERICAN): 41 ML/MIN/1.73 M^2
GLUCOSE SERPL-MCNC: 143 MG/DL
POTASSIUM SERPL-SCNC: 5.5 MMOL/L
SODIUM SERPL-SCNC: 134 MMOL/L

## 2017-02-27 PROCEDURE — 3075F SYST BP GE 130 - 139MM HG: CPT | Mod: S$GLB,,, | Performed by: INTERNAL MEDICINE

## 2017-02-27 PROCEDURE — 3079F DIAST BP 80-89 MM HG: CPT | Mod: S$GLB,,, | Performed by: INTERNAL MEDICINE

## 2017-02-27 PROCEDURE — 1160F RVW MEDS BY RX/DR IN RCRD: CPT | Mod: S$GLB,,, | Performed by: INTERNAL MEDICINE

## 2017-02-27 PROCEDURE — 99999 PR PBB SHADOW E&M-EST. PATIENT-LVL III: CPT | Mod: PBBFAC,,, | Performed by: INTERNAL MEDICINE

## 2017-02-27 PROCEDURE — 4010F ACE/ARB THERAPY RXD/TAKEN: CPT | Mod: S$GLB,,, | Performed by: INTERNAL MEDICINE

## 2017-02-27 PROCEDURE — 1125F AMNT PAIN NOTED PAIN PRSNT: CPT | Mod: S$GLB,,, | Performed by: INTERNAL MEDICINE

## 2017-02-27 PROCEDURE — 1157F ADVNC CARE PLAN IN RCRD: CPT | Mod: S$GLB,,, | Performed by: INTERNAL MEDICINE

## 2017-02-27 PROCEDURE — 99214 OFFICE O/P EST MOD 30 MIN: CPT | Mod: S$GLB,,, | Performed by: INTERNAL MEDICINE

## 2017-02-27 PROCEDURE — 1159F MED LIST DOCD IN RCRD: CPT | Mod: S$GLB,,, | Performed by: INTERNAL MEDICINE

## 2017-02-27 PROCEDURE — 80048 BASIC METABOLIC PNL TOTAL CA: CPT

## 2017-02-27 PROCEDURE — 99499 UNLISTED E&M SERVICE: CPT | Mod: S$GLB,,, | Performed by: INTERNAL MEDICINE

## 2017-02-27 PROCEDURE — 3044F HG A1C LEVEL LT 7.0%: CPT | Mod: S$GLB,,, | Performed by: INTERNAL MEDICINE

## 2017-02-27 PROCEDURE — 36415 COLL VENOUS BLD VENIPUNCTURE: CPT

## 2017-02-27 RX ORDER — MELATONIN 5 MG
1 TABLET, SUBLINGUAL SUBLINGUAL NIGHTLY
COMMUNITY
End: 2018-01-29

## 2017-02-27 NOTE — PROGRESS NOTES
"Subjective:       Patient ID: Kaci Whalen is a 76 y.o. female.    Chief Complaint: Muscle Pain and Follow-up (4 week )    HPI Comments: F/u chronic conditions    HPI: 77 y/o w/ diastolic HF, DM presents for follow up. Last visit lasix was increased from 20mg daily to 40mg bid x 7 days then 40mg daily after five days of bid dosing patient reported nausea and stating "everything tasted salty" repeat bmp showed creatinine up to 1.5, recommended decrease to 40mg daily. Patient stopped lasix completely three weeks ago due to nausea. Nausea has been unchanged worse after eating "feeling full" no vomitting no abdominal pain. Having once to twice daily  Smooth bowel movements. Breathing still labored with minimal exertion no LE edema     Review of Systems   Constitutional: Negative for activity change, appetite change, fatigue, fever and unexpected weight change.   HENT: Negative for ear pain, rhinorrhea and sore throat.    Eyes: Negative for discharge and visual disturbance.   Respiratory: Positive for shortness of breath. Negative for chest tightness and wheezing.    Cardiovascular: Negative for chest pain, palpitations and leg swelling.   Gastrointestinal: Positive for nausea. Negative for abdominal distention, abdominal pain, constipation, diarrhea and vomiting.   Endocrine: Negative for cold intolerance and heat intolerance.   Genitourinary: Negative for dysuria and hematuria.   Musculoskeletal: Negative for joint swelling and neck stiffness.   Skin: Negative for rash.   Neurological: Negative for dizziness, syncope, weakness and headaches.   Psychiatric/Behavioral: Negative for suicidal ideas.       Objective:     Vitals:    02/27/17 0848 02/27/17 0856   BP: 134/82    BP Location: Left arm    Patient Position: Sitting    BP Method: Manual    Pulse: 80    Resp: 18    Temp: 97.9 °F (36.6 °C)    TempSrc: Oral    SpO2: 98% 95%   Weight: 90 kg (198 lb 6.6 oz)    Height: 5' 1" (1.549 m)           Physical Exam "   Constitutional: She is oriented to person, place, and time. She appears well-developed and well-nourished.   HENT:   Head: Normocephalic and atraumatic.   Eyes: Conjunctivae are normal. Pupils are equal, round, and reactive to light.   Neck: Normal range of motion.   Cardiovascular: Normal rate and regular rhythm.  Exam reveals no gallop and no friction rub.    No murmur heard.  No le edema   Pulmonary/Chest: Effort normal and breath sounds normal. She has no wheezes. She has no rales.   Abdominal: Soft. Bowel sounds are normal. There is no tenderness. There is no rebound and no guarding.   Musculoskeletal: Normal range of motion. She exhibits no edema or tenderness.   Neurological: She is alert and oriented to person, place, and time. No cranial nerve deficit.   Skin: Skin is warm and dry.   Psychiatric: She has a normal mood and affect.       Assessment:       1. 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 Type 2 diabetes, uncontrolled, with neuropathy    3. Nausea    4. Chronic diastolic heart failure        Plan:    1/2./3. Suspect GI symptoms could be related to gastroparesis versus reflux. referal to GI for consideration of gastric emptying    4. Weight down clinically euvolemic but is off all diuretic. Check bmp today    phone call to daughter after BMP results show elevated potassium creatinine back to baseline, resume lasix 40mg daily for volume maintenance

## 2017-03-02 DIAGNOSIS — E11.9 TYPE 2 DIABETES MELLITUS WITHOUT COMPLICATION: ICD-10-CM

## 2017-03-28 ENCOUNTER — OFFICE VISIT (OUTPATIENT)
Dept: CARDIOLOGY | Facility: CLINIC | Age: 77
End: 2017-03-28
Payer: MEDICARE

## 2017-03-28 VITALS
RESPIRATION RATE: 15 BRPM | OXYGEN SATURATION: 97 % | HEIGHT: 57 IN | BODY MASS INDEX: 41.21 KG/M2 | HEART RATE: 65 BPM | WEIGHT: 191 LBS | DIASTOLIC BLOOD PRESSURE: 64 MMHG | SYSTOLIC BLOOD PRESSURE: 105 MMHG

## 2017-03-28 DIAGNOSIS — Z79.4 TYPE 2 DIABETES MELLITUS WITH MICROALBUMINURIA, WITH LONG-TERM CURRENT USE OF INSULIN: ICD-10-CM

## 2017-03-28 DIAGNOSIS — R80.9 TYPE 2 DIABETES MELLITUS WITH MICROALBUMINURIA, WITH LONG-TERM CURRENT USE OF INSULIN: ICD-10-CM

## 2017-03-28 DIAGNOSIS — I10 BENIGN HYPERTENSION: ICD-10-CM

## 2017-03-28 DIAGNOSIS — E11.29 TYPE 2 DIABETES MELLITUS WITH MICROALBUMINURIA, WITH LONG-TERM CURRENT USE OF INSULIN: ICD-10-CM

## 2017-03-28 DIAGNOSIS — I48.20 CHRONIC ATRIAL FIBRILLATION: Primary | ICD-10-CM

## 2017-03-28 DIAGNOSIS — G47.33 OSA (OBSTRUCTIVE SLEEP APNEA): ICD-10-CM

## 2017-03-28 DIAGNOSIS — E66.01 MORBID OBESITY, UNSPECIFIED OBESITY TYPE: ICD-10-CM

## 2017-03-28 PROCEDURE — 3078F DIAST BP <80 MM HG: CPT | Mod: S$GLB,,, | Performed by: INTERNAL MEDICINE

## 2017-03-28 PROCEDURE — 1160F RVW MEDS BY RX/DR IN RCRD: CPT | Mod: S$GLB,,, | Performed by: INTERNAL MEDICINE

## 2017-03-28 PROCEDURE — 1159F MED LIST DOCD IN RCRD: CPT | Mod: S$GLB,,, | Performed by: INTERNAL MEDICINE

## 2017-03-28 PROCEDURE — 3044F HG A1C LEVEL LT 7.0%: CPT | Mod: S$GLB,,, | Performed by: INTERNAL MEDICINE

## 2017-03-28 PROCEDURE — 1126F AMNT PAIN NOTED NONE PRSNT: CPT | Mod: S$GLB,,, | Performed by: INTERNAL MEDICINE

## 2017-03-28 PROCEDURE — 99999 PR PBB SHADOW E&M-EST. PATIENT-LVL III: CPT | Mod: PBBFAC,,, | Performed by: INTERNAL MEDICINE

## 2017-03-28 PROCEDURE — 1157F ADVNC CARE PLAN IN RCRD: CPT | Mod: S$GLB,,, | Performed by: INTERNAL MEDICINE

## 2017-03-28 PROCEDURE — 99214 OFFICE O/P EST MOD 30 MIN: CPT | Mod: S$GLB,,, | Performed by: INTERNAL MEDICINE

## 2017-03-28 PROCEDURE — 3074F SYST BP LT 130 MM HG: CPT | Mod: S$GLB,,, | Performed by: INTERNAL MEDICINE

## 2017-03-28 PROCEDURE — 93000 ELECTROCARDIOGRAM COMPLETE: CPT | Mod: S$GLB,,, | Performed by: INTERNAL MEDICINE

## 2017-03-28 PROCEDURE — 4010F ACE/ARB THERAPY RXD/TAKEN: CPT | Mod: S$GLB,,, | Performed by: INTERNAL MEDICINE

## 2017-03-28 NOTE — PROGRESS NOTES
"CARDIOVASCULAR PROGRESS NOTE    REASON FOR CONSULT:   Kaci Whalen is a 76 y.o. female who presents for follow up of AF, HFpEF.    PCP: Abhilash  HISTORY OF PRESENT ILLNESS:   The patient returns for follow-up.  She is accompanied by her daughter to today's visit.  The patient was generally asymptomatic status without angina or dyspnea.  She had her Lasix pulsed by her primary care physician several weeks ago, this apparently resulted in the patient developing a "salty" taste all of her food.  She since stopped taking her Lasix.  However, she is not complaining of any volume overload symptoms, or edema.  She's had no palpitations, lightheadedness, dizziness, or syncope is been no PND, orthopnea, or lower extremity edema.  She denies melena, hematuria, or claudicant symptoms.  She apparently is out of her wheelchair now walking with an assist device.  She appears to be tolerating her Xarelto anticoagulation without failure.  The patient's main complaint today appears to be related to abdominal discomfort, and evaluation with GI as planned.    Of note, the patient appears to be noncompliant with her CPAP, as well as occasionally noncompliant with her medications.  However, her daughter confirms that she's been taking her Xarelto religiously.    CARDIOVASCULAR HISTORY:   AF, CHADS 4 on Xarelto  CHF, diastolic  MIGUEL, pt noncompliant with CPAP    PAST MEDICAL HISTORY:     Past Medical History:   Diagnosis Date    Atherosclerosis of aortic arch     noted on CXR 7/1/2015    Atrial fibrillation 05/2016    CHADS 4, on Xarelto    Benign hypertension     Chronic edema     Dercum disease     Multiple painful lipomas    Glaucoma of both eyes     Hyperlipidemia with target LDL less than 100     Unable to tolerate statins    Immature cataract     Morbid obesity     Primary osteoarthritis of both knees     Proteinuria     Pulmonary hypertension mixed group 2 and 3 1/18/2017    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, uncontrolled     Unspecified vitamin D deficiency        PAST SURGICAL HISTORY:     Past Surgical History:   Procedure Laterality Date     tenotomy      flexors 2,3 L toes    CATARACT EXTRACTION      diabetic retinopathy      right    HYSTERECTOMY      knee surgery x2      Left ankle and leg surgery secondary to a fall      left arm fracture      Left cataract      PARTIAL HYSTERECTOMY      Secondary to bleeding    TOE FUSION      2,3 R       ALLERGIES AND MEDICATION:     Review of patient's allergies indicates:   Allergen Reactions    Ace inhibitors      Other reaction(s): cough      Fluorescein Itching     Other reaction(s): Itching    Iodine      Other reaction(s): Itching    Pravastatin Other (See Comments)     Other reaction(s): mouth tingling/numbness    Simvastatin      Other reaction(s): foot swelling       Previous Medications    ALCOHOL SWABS PADM    Apply 1 each topically as needed.    BLOOD GLUCOSE CONTROL, LOW (TRUE METRIX LEVEL 1) SOLN    1 Bottle by Misc.(Non-Drug; Combo Route) route once daily.    BLOOD SUGAR DIAGNOSTIC STRP    1 strip by Misc.(Non-Drug; Combo Route) route 4 (four) times daily before meals and nightly.    BLOOD-GLUCOSE METER KIT    Use as instructed    CLOTRIMAZOLE (LOTRIMIN) 1 % CREAM    Apply topically 2 (two) times daily.    INSULIN ASPART (NOVOLOG FLEXPEN) 100 UNIT/ML INPN PEN    Take 10 units with breakfast, 15 units with lunch and 17 with supper; with additional sliding scale; max dose 75 units per day    INSULIN GLARGINE (LANTUS SOLOSTAR) 100 UNIT/ML (3 ML) INPN PEN    Inject 40 Units into the skin every evening. Max 50 units nightly    LANCETS (LANCETS,THIN) 28 GAUGE MISC    1 lancet by Misc.(Non-Drug; Combo Route) route 4 (four) times daily before meals and nightly.    LOSARTAN-HYDROCHLOROTHIAZIDE 100-25 MG (HYZAAR) 100-25 MG PER TABLET    Take 1 tablet by mouth once daily.    MELATONIN 5 MG SUBL    Place under the  "tongue.    METFORMIN (GLUCOPHAGE) 1000 MG TABLET    Take 1 tablet (1,000 mg total) by mouth 2 (two) times daily with meals.    METOPROLOL SUCCINATE (TOPROL-XL) 100 MG 24 HR TABLET    Take 1 tablet (100 mg total) by mouth once daily.    PEN NEEDLE, DIABETIC (BD ULTRA-FINE SANDEEP PEN NEEDLES) 32 GAUGE X 5/32" NDLE    Take 1 each by mouth 4 (four) times daily.    PROMETHAZINE (PHENERGAN) 25 MG TABLET    Take 1 tablet (25 mg total) by mouth every 6 (six) hours as needed for Nausea.    RIVAROXABAN (XARELTO) 20 MG TAB    Take 1 tablet (20 mg total) by mouth daily with dinner or evening meal.    TRAZODONE (DESYREL) 50 MG TABLET    Take 2 tablets (100 mg total) by mouth nightly as needed for Insomnia.       SOCIAL HISTORY:     Social History     Social History    Marital status:      Spouse name: N/A    Number of children: 3    Years of education: N/A     Occupational History    housewife      Social History Main Topics    Smoking status: Never Smoker    Smokeless tobacco: Never Used    Alcohol use No    Drug use: No    Sexual activity: Yes     Partners: Male     Other Topics Concern    Not on file     Social History Narrative    One daughter is  from bone cancer.    One son  2014 after surgery for MIGUEL       FAMILY HISTORY:     Family History   Problem Relation Age of Onset    No Known Problems Daughter     No Known Problems Father     Liver cancer Mother     Bone cancer Daughter     No Known Problems Sister     No Known Problems Brother     No Known Problems Maternal Aunt     No Known Problems Maternal Uncle     No Known Problems Paternal Aunt     No Known Problems Paternal Uncle     No Known Problems Maternal Grandmother     No Known Problems Maternal Grandfather     No Known Problems Paternal Grandmother     No Known Problems Paternal Grandfather     Amblyopia Neg Hx     Blindness Neg Hx     Cancer Neg Hx     Cataracts Neg Hx     Diabetes Neg Hx     Glaucoma Neg Hx  " "   Hypertension Neg Hx     Macular degeneration Neg Hx     Retinal detachment Neg Hx     Strabismus Neg Hx     Stroke Neg Hx     Thyroid disease Neg Hx        REVIEW OF SYSTEMS:   Review of Systems   Constitutional: Negative for chills, diaphoresis and fever.   HENT: Negative for nosebleeds.    Eyes: Negative for blurred vision, double vision and photophobia.   Respiratory: Negative for hemoptysis, shortness of breath and wheezing.    Cardiovascular: Negative for chest pain, palpitations, orthopnea, claudication, leg swelling and PND.   Gastrointestinal: Positive for abdominal pain. Negative for blood in stool, heartburn, melena, nausea and vomiting.   Genitourinary: Negative for flank pain and hematuria.   Musculoskeletal: Negative for falls, myalgias and neck pain.   Skin: Negative for rash.   Neurological: Negative for dizziness, seizures, loss of consciousness, weakness and headaches.   Endo/Heme/Allergies: Negative for polydipsia. Does not bruise/bleed easily.   Psychiatric/Behavioral: Negative for depression and memory loss. The patient is not nervous/anxious.        PHYSICAL EXAM:     Vitals:    03/28/17 1250   BP: 105/64   Pulse: 65   Resp: 15    Body mass index is 41.33 kg/(m^2).  Weight: 86.6 kg (191 lb)   Height: 4' 9" (144.8 cm)     Physical Exam   Constitutional: She is oriented to person, place, and time. She appears well-developed and well-nourished. She is cooperative.  Non-toxic appearance. No distress.   HENT:   Head: Normocephalic and atraumatic.   Eyes: Conjunctivae and EOM are normal. Pupils are equal, round, and reactive to light. No scleral icterus.   Neck: Trachea normal and normal range of motion. Neck supple. Normal carotid pulses and no JVD present. Carotid bruit is not present. No rigidity. No edema present. No thyromegaly present.   Cardiovascular: Normal rate, S1 normal and S2 normal.  An irregularly irregular rhythm present. PMI is not displaced.  Exam reveals distant heart " sounds. Exam reveals no gallop and no friction rub.    No murmur heard.  Pulses:       Carotid pulses are 2+ on the right side, and 2+ on the left side.  Pulmonary/Chest: Effort normal and breath sounds normal. No respiratory distress. She has no wheezes. She has no rales. She exhibits no tenderness.   Abdominal: Soft. Bowel sounds are normal. She exhibits no distension and no mass. There is no hepatosplenomegaly. There is no tenderness.   obese   Musculoskeletal: She exhibits no edema or tenderness.   Feet:   Right Foot:   Skin Integrity: Negative for ulcer.   Left Foot:   Skin Integrity: Negative for ulcer.   Neurological: She is alert and oriented to person, place, and time. No cranial nerve deficit.   Skin: Skin is warm and dry. No rash noted. No erythema.   Psychiatric: She has a normal mood and affect. Her speech is normal and behavior is normal.   Vitals reviewed.      DATA:   EKG: (personally reviewed tracing)  3/28/17 AF VR 65    Laboratory:  CBC:    Recent Labs  Lab 12/28/16  1156 01/13/17  1325 01/18/17  0730   WHITE BLOOD CELL COUNT 9.78 6.45 7.19   HEMOGLOBIN 10.8 L 11.4 L 11.1 L   HEMATOCRIT 34.6 L 35.6 L 34.1 L   PLATELETS 257 225 273       CHEMISTRIES:    Recent Labs  Lab 01/13/17  1325 01/18/17  0730 02/10/17  0810 02/27/17  0940   GLUCOSE 144 H 141 H 134 H 143 H   SODIUM 139 133 L 126 L 134 L   POTASSIUM 4.0 3.8 4.1 5.5 H   BUN BLD 24 H 22 30 H 24 H   CREATININE 1.0 1.1 1.5 H 1.3   EGFR IF  >60.0 57.2 A 39.3 A 47 A   EGFR IF NON- 55.6 A 49.6 A 34.1 A 41 A   CALCIUM 9.7 9.5 9.8 9.8   MAGNESIUM 1.8  --  2.0  --        CARDIAC BIOMARKERS:    Recent Labs  Lab 05/28/16  1031 05/28/16  1744 05/29/16  0145   CPK  --   --  49  49   CPK MB  --   --  2.2   TROPONIN I <0.006 0.009 <0.006       COAGS:        LIPIDS/LFTS:    Recent Labs  Lab 04/02/15  0807  12/11/15  0830  06/04/16  1042 12/28/16  1155 01/13/17  1325   CHOLESTEROL 203 H  203 H  203 H  --  155  --   --   --    --    TRIGLYCERIDES 118  118  118  --  118  --   --   --   --    HDL 40  40  40  --  44  --   --   --   --    LDL CHOLESTEROL 139.4  139.4  139.4  --  87.4  --   --   --   --    NON-HDL CHOLESTEROL 163  163  163  --  111  --   --   --   --    AST 18  18  < > 19  < > 14 18 12   ALT 14  14  < > 13  < > 16 21 6 L   < > = values in this interval not displayed.    Lab Results   Component Value Date    TSH 3.229 05/28/2016         Cardiovascular Testing:  RHC 1/18/17  B. Summary/Post-Operative Diagnosis   1.   Moderately elevated right atrial pressure.   2.   Moderately elevated mean PCWP pressure.   3.   Moderately elevated mean PA pressure with normal PVR.   4.   Normal CO/CI.   5.   Systemic vascular resistance 1192.  D. Hemodynamic Results  AOPRES: 135/68 (91)  AOSAT: 100  FICKCI: 2.76  FICKCO: 5.18  PAPRES: 60/25 (37)  PASAT: 68  PVR: 1.93  PWPRES: 23/42 (27)  Unable to draw blood for saturation but confirmed on fluoroscopy  RAPRES: 16/16 (14)  RVPRES: 60/15, 15  SVR: 14.88/1192    Echo: 5/28/16     1 - Normal left ventricular systolic function (EF 60-65%).  No diagnostic regional wall motion abnormalities.     2 - Biatrial enlargement.     3 - Trivial mitral regurgitation.     4 - Trivial tricuspid regurgitation.     5 - The estimated PA systolic pressure is 18 mmHg.    ASSESSMENT:   # AF, CHADS 4, HR controlled, currently on Xarelto 20mg qd.  # DM  # HTN, controlled  # HFpEF.  Pt appears euvolemic off lasix at present.  # BMI 41, up 1 unti from last OV.  # MIGUEL, pt noncompliant with CPAP  # HLP    PLAN:   Continue strategy of rate control and anticoag   Cont med rx  RTC 6 months  Resume lasix prn weight gain/LE edema/dyspnea    Driss Dixon MD, FACC

## 2017-03-28 NOTE — MR AVS SNAPSHOT
[facility]  [address]  [place] [address]  Phone: [phone]                  Kaci Whalen   3/28/2017 1:00 PM   Office Visit    Description:  Female : 1940   Provider:  Driss Dixon MD   Department:  [facility]           Reason for Visit     Atrial Fibrillation     Follow-up                To Do List           Future Appointments        Provider Department Dept Phone    2017 2:45 PM Jennifer Cardoza DPM [facility] [phone]      Goals (5 Years of Data)              12/28/16    9/2/16    12/11/15    HEMOGLOBIN A1C < 7.0   6.9  7.1  6.9    Related Problems    Type 2 diabetes, uncontrolled, with retinopathy      [facility]     [facility] On Call Nurse Care Line -  Assistance  Registered nurses in the [facility] On Call Center provide clinical advisement, health education, appointment booking, and other advisory services.  Call for this free service at [phone].             Medications           Message regarding Medications     Verify the changes and/or additions to your medication regime listed below are the same as discussed with your clinician today.  If any of these changes or additions are incorrect, please notify your healthcare provider.             Verify that the below list of medications is an accurate representation of the medications you are currently taking.  If none reported, the list may be blank. If incorrect, please contact your healthcare provider. Carry this list with you in case of emergency.           Current Medications     alcohol swabs PadM Apply 1 each topically as needed.    blood glucose control, low (TRUE METRIX LEVEL 1) Soln 1 Bottle by Misc.(Non-Drug; Combo Route) route once daily.    blood sugar diagnostic Strp 1 strip by Misc.(Non-Drug; Combo Route) route 4 (four) times daily before meals and nightly.    blood-glucose meter kit Use as instructed    clotrimazole (LOTRIMIN) 1 % cream Apply topically 2 (two) times  "daily.    insulin aspart (NOVOLOG FLEXPEN) 100 unit/mL InPn pen Take 10 units with breakfast, 15 units with lunch and 17 with supper; with additional sliding scale; max dose 75 units per day    insulin glargine (LANTUS SOLOSTAR) 100 unit/mL (3 mL) InPn pen Inject 40 Units into the skin every evening. Max 50 units nightly    lancets (LANCETS,THIN) 28 gauge Misc 1 lancet by Misc.(Non-Drug; Combo Route) route 4 (four) times daily before meals and nightly.    losartan-hydrochlorothiazide 100-25 mg (HYZAAR) 100-25 mg per tablet Take 1 tablet by mouth once daily.    melatonin 5 mg Subl Place under the tongue.    metformin (GLUCOPHAGE) 1000 MG tablet Take 1 tablet (1,000 mg total) by mouth 2 (two) times daily with meals.    metoprolol succinate (TOPROL-XL) 100 MG 24 hr tablet Take 1 tablet (100 mg total) by mouth once daily.    pen needle, diabetic (BD ULTRA-FINE SANDEEP PEN NEEDLES) 32 gauge x 5/32" Ndle Take 1 each by mouth 4 (four) times daily.    promethazine (PHENERGAN) 25 MG tablet Take 1 tablet (25 mg total) by mouth every 6 (six) hours as needed for Nausea.    rivaroxaban (XARELTO) 20 mg Tab Take 1 tablet (20 mg total) by mouth daily with dinner or evening meal.    trazodone (DESYREL) 50 MG tablet Take 2 tablets (100 mg total) by mouth nightly as needed for Insomnia.           Clinical Reference Information           Your Vitals Were     BP Pulse Resp Height Weight SpO2    105/64 65 15 4' 9" (1.448 m) 86.6 kg (191 lb) 97%    BMI                41.33 kg/m2          Blood Pressure          Most Recent Value    BP  105/64      Allergies as of 3/28/2017     Ace Inhibitors    Fluorescein    Iodine    Pravastatin    Simvastatin      Immunizations Administered on Date of Encounter - 3/28/2017     None      Language Assistance Services     ATTENTION: Language assistance services are available, free of charge. Please call [phone].      ATENCIÓN: Si habla español, tiene a cantor disposición servicios gratuitos de asistencia " lingüística. Radha al [phone].     CULLEN Ý: N?u b?n nói Ti?ng Vi?t, có các d?ch v? h? tr? ngôn ng? mi?n phí dành cho b?n. G?i s? [phone].         [facility] complies with applicable Federal civil rights laws and does not discriminate on the basis of race, color, national origin, age, disability, or sex.

## 2017-03-31 ENCOUNTER — PATIENT MESSAGE (OUTPATIENT)
Dept: FAMILY MEDICINE | Facility: CLINIC | Age: 77
End: 2017-03-31

## 2017-03-31 DIAGNOSIS — G47.33 OSA (OBSTRUCTIVE SLEEP APNEA): Primary | ICD-10-CM

## 2017-03-31 DIAGNOSIS — R80.9 TYPE 2 DIABETES MELLITUS WITH MICROALBUMINURIA, WITH LONG-TERM CURRENT USE OF INSULIN: ICD-10-CM

## 2017-03-31 DIAGNOSIS — E11.29 TYPE 2 DIABETES MELLITUS WITH MICROALBUMINURIA, WITH LONG-TERM CURRENT USE OF INSULIN: ICD-10-CM

## 2017-03-31 DIAGNOSIS — Z79.4 TYPE 2 DIABETES MELLITUS WITH MICROALBUMINURIA, WITH LONG-TERM CURRENT USE OF INSULIN: ICD-10-CM

## 2017-04-03 RX ORDER — CALCIUM CITRATE/VITAMIN D3 200MG-6.25
TABLET ORAL
Qty: 300 STRIP | Refills: 2 | Status: SHIPPED | OUTPATIENT
Start: 2017-04-03 | End: 2017-10-04 | Stop reason: SDUPTHER

## 2017-04-03 NOTE — TELEPHONE ENCOUNTER
Spoke with Juanita at [facility] and she states that she needs order to D/C Oxygen note faxed to [phone]    Order faxed

## 2017-04-05 ENCOUNTER — TELEPHONE (OUTPATIENT)
Dept: GASTROENTEROLOGY | Facility: CLINIC | Age: 77
End: 2017-04-05

## 2017-04-05 ENCOUNTER — PATIENT MESSAGE (OUTPATIENT)
Dept: FAMILY MEDICINE | Facility: CLINIC | Age: 77
End: 2017-04-05

## 2017-04-07 ENCOUNTER — OFFICE VISIT (OUTPATIENT)
Dept: PODIATRY | Facility: CLINIC | Age: 77
End: 2017-04-07
Payer: MEDICARE

## 2017-04-07 ENCOUNTER — TELEPHONE (OUTPATIENT)
Dept: GASTROENTEROLOGY | Facility: CLINIC | Age: 77
End: 2017-04-07

## 2017-04-07 VITALS — WEIGHT: 191 LBS | BODY MASS INDEX: 41.21 KG/M2 | HEIGHT: 57 IN

## 2017-04-07 DIAGNOSIS — M21.42 PES PLANUS OF BOTH FEET: ICD-10-CM

## 2017-04-07 DIAGNOSIS — M20.41 HAMMER TOES OF BOTH FEET: ICD-10-CM

## 2017-04-07 DIAGNOSIS — M20.10 HALLUX ABDUCTO VALGUS, UNSPECIFIED LATERALITY: ICD-10-CM

## 2017-04-07 DIAGNOSIS — E11.49 TYPE II DIABETES MELLITUS WITH NEUROLOGICAL MANIFESTATIONS: Primary | ICD-10-CM

## 2017-04-07 DIAGNOSIS — B35.1 ONYCHOMYCOSIS DUE TO DERMATOPHYTE: ICD-10-CM

## 2017-04-07 DIAGNOSIS — M21.41 PES PLANUS OF BOTH FEET: ICD-10-CM

## 2017-04-07 DIAGNOSIS — M20.42 HAMMER TOES OF BOTH FEET: ICD-10-CM

## 2017-04-07 DIAGNOSIS — L84 CORN OR CALLUS: ICD-10-CM

## 2017-04-07 PROCEDURE — 11056 PARNG/CUTG B9 HYPRKR LES 2-4: CPT | Mod: Q9,S$GLB,, | Performed by: PODIATRIST

## 2017-04-07 PROCEDURE — 99499 UNLISTED E&M SERVICE: CPT | Mod: S$GLB,,, | Performed by: PODIATRIST

## 2017-04-07 PROCEDURE — 11721 DEBRIDE NAIL 6 OR MORE: CPT | Mod: 59,Q9,S$GLB, | Performed by: PODIATRIST

## 2017-04-07 PROCEDURE — 99999 PR PBB SHADOW E&M-EST. PATIENT-LVL II: CPT | Mod: PBBFAC,,, | Performed by: PODIATRIST

## 2017-04-07 NOTE — PROGRESS NOTES
Subjective:      Patient ID: Kaci Whalen is a 76 y.o. female.    Chief Complaint: Diabetes Mellitus (Pcp Dr. Hung 02/27/2017); Diabetic Foot Exam; and Nail Care    Kaci is a 76 y.o. female who presents to the clinic for evaluation and treatment of diabetic feet. Kaci has a past medical history of Atherosclerosis of aortic arch; Atrial fibrillation (05/2016); Benign hypertension; Chronic edema; Dercum disease; Glaucoma of both eyes; Hyperlipidemia with target LDL less than 100; Immature cataract; Morbid obesity; Primary osteoarthritis of both knees; Proteinuria; Pulmonary hypertension mixed group 2 and 3 (1/18/2017); Type II or unspecified type diabetes mellitus with neurological manifestations, uncontrolled; and Unspecified vitamin D deficiency. Patient relates no major problem with feet. Only complaints today consist of toenails in need of trimming. Calluses on bottom of feet are doing much better with moisturizer daily. No other complaints today. Previously saw Dr. Batista and routine trimming of these helped her symptoms. .    PCP: Toby Hung MD    Date Last Seen by PCP:   Chief Complaint   Patient presents with    Diabetes Mellitus     Pcp Dr. Hung 02/27/2017    Diabetic Foot Exam    Nail Care         Current shoe gear: Extra depth shoes    Hemoglobin A1C   Date Value Ref Range Status   12/28/2016 6.9 (H) 4.5 - 6.2 % Final     Comment:     According to ADA guidelines, hemoglobin A1C <7.0% represents  optimal control in non-pregnant diabetic patients.  Different  metrics may apply to specific populations.   Standards of Medical Care in Diabetes - 2016.  For the purpose of screening for the presence of diabetes:  <5.7%     Consistent with the absence of diabetes  5.7-6.4%  Consistent with increasing risk for diabetes   (prediabetes)  >or=6.5%  Consistent with diabetes  Currently no consensus exists for use of hemoglobin A1C  for diagnosis of diabetes for children.     09/02/2016 7.1 (H) 4.5 - 6.2  % Final     Comment:     According to ADA guidelines, hemoglobin A1C <7.0% represents  optimal control in non-pregnant diabetic patients.  Different  metrics may apply to specific populations.   Standards of Medical Care in Diabetes - 2016.  For the purpose of screening for the presence of diabetes:  <5.7%     Consistent with the absence of diabetes  5.7-6.4%  Consistent with increasing risk for diabetes   (prediabetes)  >or=6.5%  Consistent with diabetes  Currently no consensus exists for use of hemoglobin A1C  for diagnosis of diabetes for children.     12/11/2015 6.9 (H) 4.5 - 6.2 % Final           Review of Systems   Constitution: Negative for chills and fever.   Cardiovascular: Negative for chest pain, claudication and leg swelling.   Respiratory: Negative for cough and shortness of breath.    Skin: Positive for dry skin (with multiple calluses) and nail changes.   Musculoskeletal:        Pain along calluses both feet   Gastrointestinal: Negative for nausea and vomiting.   Neurological: Negative for numbness and paresthesias.   Psychiatric/Behavioral: Negative for altered mental status.           Objective:      Physical Exam  DP 1/4 bl, PT1 /4 bl  Sensation to the 10 g sensory filament is not felt consistently on the feet toes and ball (5/10)  Nails are elongated and mycotic with thickened, discolored, onycholytic and subungual debris changes X10 Nails areapproximately 2   mm thick and    4 mm long.    Callus sub 3 b/l, R hallux IPJ (3 lesions total)    Mild pitting edema, skin is atrophic, decreased digital hair, feet slightly cool, nails thickened, mild plantar rubor, Q9 modifier  Skin is intact without wounds, rash, or infection.  Web spaces are clear without maceration    Equinus noted b/l ankles with < 10 deg DF noted. MMT 5/5 in DF/PF/Inv/Ev resistance with no reproduction of pain in any direction. Passive range of motion of ankle and pedal joints is painless b/l. Semi-reducible hammertoe contractures  noted to toes 2-4 b/l-asymptomatic. HAV, mild, non trackbound noted b/l with mild medial bony prominence at 1st met head--asymptomatic.   Hypermobility noted to 1st ray b/l with near complete collapse of medial longitudinal arch b/l with loading.           Assessment:       Encounter Diagnoses   Name Primary?    Type II diabetes mellitus with neurological manifestations Yes    Hallux abducto valgus, unspecified laterality     Hammer toes of both feet     Corn or callus     Pes planus of both feet     Onychomycosis due to dermatophyte          Plan:       Kaci was seen today for diabetes mellitus, diabetic foot exam and nail care.    Diagnoses and all orders for this visit:    Type II diabetes mellitus with neurological manifestations  -     DIABETIC SHOES FOR HOME USE    Hallux abducto valgus, unspecified laterality  -     DIABETIC SHOES FOR HOME USE    Hammer toes of both feet  -     DIABETIC SHOES FOR HOME USE    Corn or callus  -     DIABETIC SHOES FOR HOME USE    Pes planus of both feet  -     DIABETIC SHOES FOR HOME USE    Onychomycosis due to dermatophyte      I counseled the patient on her conditions, their implications and medical management.        - Shoe inspection. Diabetic Foot Education. Patient reminded of the importance of good nutrition and blood sugar control to help prevent podiatric complications of diabetes. Patient instructed on proper foot hygeine. We discussed wearing proper shoe gear, daily foot inspections, never walking without protective shoe gear, never putting sharp instruments to feet, routine podiatric nail visits every 2-3 months.      With patient's permission, nails were aggressively reduced and debrided 1,2,3,4, 5 R and 1,2, 3,4,5 L and filed to their soft tissue attachment mechanically and with electric , removing all offending nail and debris. Utilizing a #15 scalpel, I trimmed the corns and calluses at the plantar 3rd met head b/l, right medial hallux IPJ (total 3  lesions)    Patient tolerated this well and no blood was drawn. Patient reports relief following the procedure.     Discussed regular and routine moisturizer to skin of both feet to help improve dry skin. Advised to apply twice daily until resolution of symptoms. Avoid between toes.     Continue with comfortable shoes.     RTC 3 months, sooner PRN

## 2017-04-07 NOTE — MR AVS SNAPSHOT
[facility]  [address] [facility]  [place] [address]  Phone: [phone]                  Kaci Whalen   2017 2:45 PM   Office Visit    Description:  Female : 1940   Provider:  Jennifer Cardoza DPM   Department:  [facility]           Reason for Visit     Diabetes Mellitus     Diabetic Foot Exam     Nail Care                To Do List           Future Appointments        Provider Department Dept Phone    4/10/2017 8:00 AM Suellen Wolf MD [facility] [phone]    2017 1:15 PM Jennifer Cardoza DPM [facility] [phone]      Goals (5 Years of Data)              12/28/16    9/2/16    12/11/15    HEMOGLOBIN A1C < 7.0   6.9  7.1  6.9    Related Problems    Type 2 diabetes, uncontrolled, with retinopathy      [facility]     [facility] -  Assistance  Unless otherwise directed by your provider, please contact [facility], our nurse care line that is available for  assistance.     Registered nurses in the [facility] provide: appointment scheduling, clinical advisement, health education, and other advisory services.  Call: [phone] (toll free)               Medications           Message regarding Medications     Verify the changes and/or additions to your medication regime listed below are the same as discussed with your clinician today.  If any of these changes or additions are incorrect, please notify your healthcare provider.             Verify that the below list of medications is an accurate representation of the medications you are currently taking.  If none reported, the list may be blank. If incorrect, please contact your healthcare provider. Carry this list with you in case of emergency.           Current Medications     alcohol swabs PadM Apply 1 each topically as needed.    blood glucose control, low (TRUE METRIX LEVEL 1) Soln 1 Bottle by Misc.(Non-Drug; Combo Route) route once daily.     "blood-glucose meter kit Use as instructed    clotrimazole (LOTRIMIN) 1 % cream Apply topically 2 (two) times daily.    insulin aspart (NOVOLOG FLEXPEN) 100 unit/mL InPn pen Take 10 units with breakfast, 15 units with lunch and 17 with supper; with additional sliding scale; max dose 75 units per day    insulin glargine (LANTUS SOLOSTAR) 100 unit/mL (3 mL) InPn pen Inject 40 Units into the skin every evening. Max 50 units nightly    lancets (LANCETS,THIN) 28 gauge Misc 1 lancet by Misc.(Non-Drug; Combo Route) route 4 (four) times daily before meals and nightly.    losartan-hydrochlorothiazide 100-25 mg (HYZAAR) 100-25 mg per tablet Take 1 tablet by mouth once daily.    melatonin 5 mg Subl Place under the tongue.    metformin (GLUCOPHAGE) 1000 MG tablet Take 1 tablet (1,000 mg total) by mouth 2 (two) times daily with meals.    metoprolol succinate (TOPROL-XL) 100 MG 24 hr tablet Take 1 tablet (100 mg total) by mouth once daily.    pen needle, diabetic (BD ULTRA-FINE SANDEEP PEN NEEDLES) 32 gauge x 5/32" Ndle Take 1 each by mouth 4 (four) times daily.    promethazine (PHENERGAN) 25 MG tablet Take 1 tablet (25 mg total) by mouth every 6 (six) hours as needed for Nausea.    rivaroxaban (XARELTO) 20 mg Tab Take 1 tablet (20 mg total) by mouth daily with dinner or evening meal.    trazodone (DESYREL) 50 MG tablet Take 2 tablets (100 mg total) by mouth nightly as needed for Insomnia.    TRUE METRIX GLUCOSE TEST STRIP Strp TEST FOUR TIMES DAILY BEFORE MEALS  AND EVERY NIGHT           Clinical Reference Information           Your Vitals Were     Height Weight BMI          4' 9" (1.448 m) 86.6 kg (191 lb) 41.33 kg/m2        Allergies as of 4/7/2017     Ace Inhibitors    Fluorescein    Iodine    Pravastatin    Simvastatin      Immunizations Administered on Date of Encounter - 4/7/2017     None      Language Assistance Services     ATTENTION: Language assistance services are available, free of charge. Please call [phone].  "     ATENCIÓN: Si habla español, tiene a cantor disposición servicios gratuitos de asistencia lingüística. Radha al [phone].     CHÚ Ý: N?u b?n nói Ti?ng Vi?t, có các d?ch v? h? tr? ngôn ng? mi?n phí dành cho b?n. G?i s? [phone].         [facility] complies with applicable Federal civil rights laws and does not discriminate on the basis of race, color, national origin, age, disability, or sex.

## 2017-04-10 ENCOUNTER — TELEPHONE (OUTPATIENT)
Dept: ENDOSCOPY | Facility: HOSPITAL | Age: 77
End: 2017-04-10

## 2017-04-10 ENCOUNTER — OFFICE VISIT (OUTPATIENT)
Dept: GASTROENTEROLOGY | Facility: CLINIC | Age: 77
End: 2017-04-10
Payer: MEDICARE

## 2017-04-10 VITALS
DIASTOLIC BLOOD PRESSURE: 60 MMHG | SYSTOLIC BLOOD PRESSURE: 123 MMHG | WEIGHT: 187.38 LBS | HEIGHT: 62 IN | BODY MASS INDEX: 34.48 KG/M2 | HEART RATE: 72 BPM

## 2017-04-10 DIAGNOSIS — R14.0 BLOATING: ICD-10-CM

## 2017-04-10 DIAGNOSIS — K59.00 CONSTIPATION, UNSPECIFIED CONSTIPATION TYPE: ICD-10-CM

## 2017-04-10 DIAGNOSIS — R11.2 NAUSEA AND VOMITING, INTRACTABILITY OF VOMITING NOT SPECIFIED, UNSPECIFIED VOMITING TYPE: ICD-10-CM

## 2017-04-10 DIAGNOSIS — R10.9 ABDOMINAL PAIN, UNSPECIFIED LOCATION: ICD-10-CM

## 2017-04-10 DIAGNOSIS — R19.7 DIARRHEA, UNSPECIFIED TYPE: ICD-10-CM

## 2017-04-10 DIAGNOSIS — R68.81 EARLY SATIETY: ICD-10-CM

## 2017-04-10 DIAGNOSIS — D64.9 ANEMIA, UNSPECIFIED TYPE: ICD-10-CM

## 2017-04-10 DIAGNOSIS — R13.10 DYSPHAGIA, UNSPECIFIED TYPE: Primary | ICD-10-CM

## 2017-04-10 PROCEDURE — 1157F ADVNC CARE PLAN IN RCRD: CPT | Mod: S$GLB,,, | Performed by: INTERNAL MEDICINE

## 2017-04-10 PROCEDURE — 99999 PR PBB SHADOW E&M-EST. PATIENT-LVL III: CPT | Mod: PBBFAC,,, | Performed by: INTERNAL MEDICINE

## 2017-04-10 PROCEDURE — 1159F MED LIST DOCD IN RCRD: CPT | Mod: S$GLB,,, | Performed by: INTERNAL MEDICINE

## 2017-04-10 PROCEDURE — 1160F RVW MEDS BY RX/DR IN RCRD: CPT | Mod: S$GLB,,, | Performed by: INTERNAL MEDICINE

## 2017-04-10 PROCEDURE — 1126F AMNT PAIN NOTED NONE PRSNT: CPT | Mod: S$GLB,,, | Performed by: INTERNAL MEDICINE

## 2017-04-10 PROCEDURE — 99205 OFFICE O/P NEW HI 60 MIN: CPT | Mod: S$GLB,,, | Performed by: INTERNAL MEDICINE

## 2017-04-10 PROCEDURE — 3078F DIAST BP <80 MM HG: CPT | Mod: S$GLB,,, | Performed by: INTERNAL MEDICINE

## 2017-04-10 PROCEDURE — 3074F SYST BP LT 130 MM HG: CPT | Mod: S$GLB,,, | Performed by: INTERNAL MEDICINE

## 2017-04-10 RX ORDER — ONDANSETRON 4 MG/1
8 TABLET, ORALLY DISINTEGRATING ORAL EVERY 8 HOURS PRN
Qty: 60 TABLET | Refills: 0 | Status: ON HOLD | OUTPATIENT
Start: 2017-04-10 | End: 2017-06-08

## 2017-04-10 RX ORDER — ONDANSETRON HYDROCHLORIDE 8 MG/1
8 TABLET, FILM COATED ORAL EVERY 8 HOURS PRN
Qty: 270 TABLET | Refills: 3 | Status: SHIPPED | OUTPATIENT
Start: 2017-04-10 | End: 2017-07-09

## 2017-04-10 NOTE — TELEPHONE ENCOUNTER
Patient has an order for an EGD and colonoscopy.  Is it ok to hold Xarelto 2 days prior to procedure?  Please advise.  Thank you.  Yany

## 2017-04-10 NOTE — PROGRESS NOTES
[facility] Consultation Note    Reason for Consult:    Chief Complaint   Patient presents with    Dysphagia    Nausea    Emesis    Abdominal Pain    Gas    Bloated    Diarrhea    Constipation    Encopresis         PCP:   Toby Hung       Referring MD:  Dr. Hung       HPI:  Kaci Whalen is a 76 y.o. female here for evaluation of the following GI problems:    Dysphagia.  Patient reports difficulty swallowing solids, no problems with liquids. Worse w bread, noodles, potatoes.  Feels that food gets lodged in cervical esophagus.    Symptoms started 5 weeks. Occur almost daily.     Nausea.  Patient 3-4 times per week. Started 4-5 weeks ago.  Improve phenergan.  Zantac helps as well.   Emesis.  2 episodes of vomiting in two months.        Early satiety, poor appetite.   Has DM diagnosed 50 years ago, started insulin 30 years ago.   No known neurological disorders.    Abdominal pain.  Patient reports LUQ/LLQ pain and pressure. Started 1-2 months ago.  Occurs twice per day.  Worse with eating. Improves with BM.   No narcotic pain medication. Takes trazadone at night.    Gas and bloating. Patient reports recent gas and bloating with abdominal distension.  Consumes milk products and artificial sugars.     Diarrhea.  Reports loose to watery stools. Arp 4.  BM 1-3 times per day.  Better when not eating.  Imodium helps.   Symptoms started few months ago.   Nocturnal symptoms: no    Fecal incontinence: yes     Constipation.  Diarrhea alternates w constipation. Many years of constipation. Previously tool magnesium.     Gi symptoms started after placed on lasix which led to nausea, followe dby all GI symptoms.     Weight loss. Lost 220ln in November to 184lb.     Denies GERD, BRBPR, melena.       Previous Studies:   Colonoscopy 10-20 years ago.  Report not available.  No EGDs.   CT 6/4/2016: Cholelithiasis.  Calcified coronary artery disease and calcified atheromatous disease the  aorta. Diverticulosis coli without diverticulitis.  Slightly heterogenous appearance of the bone marrow.      ROS:  ROS   Constitutional: No fevers, + chills, no night sweats, + weight loss  ENT: + congestion, + rhinorrhea, + chronic sinus problems  CV: No chest pain, no palpitations  Pulm: + cough, no shortness of breath  Ophtho: No blurry vision, + eye redness  GI: see HPI  Derm: + rash  Heme: no  lymphadenopathy, no bruising  MSK: + arthritis, + joint swelling, no Raynauds  : No dysuria, no frequent urination, no blood in urine  Endo: + hot or cold intolerance  Neuro: + dizziness, no syncope, no seizure  Psych: + anxiety, + depression        Medical History:   Past Medical History:   Diagnosis Date    Abdominal pain     Atherosclerosis of aortic arch     noted on CXR 7/1/2015    Atrial fibrillation 05/2016    CHADS 4, on Xarelto    Benign hypertension     Chronic constipation     Chronic diarrhea     Chronic edema     Dercum disease     Multiple painful lipomas    Dysphagia     Gas pain     Glaucoma of both eyes     Hyperlipidemia with target LDL less than 100     Unable to tolerate statins    Immature cataract     Morbid obesity     Nausea & vomiting     Primary osteoarthritis of both knees     Proteinuria     Pulmonary hypertension mixed group 2 and 3 1/18/2017    Type II or unspecified type diabetes mellitus with neurological manifestations, uncontrolled     Unspecified vitamin D deficiency         Surgical History:   Past Surgical History:   Procedure Laterality Date     tenotomy      flexors 2,3 L toes    CATARACT EXTRACTION      diabetic retinopathy      right    HYSTERECTOMY      knee surgery x2      Left ankle and leg surgery secondary to a fall      left arm fracture      Left cataract      PARTIAL HYSTERECTOMY      Secondary to bleeding    TOE FUSION      2,3 R        Family History:   Family History   Problem Relation Age of Onset    No Known Problems Daughter     No  Known Problems Father     Liver cancer Mother     Bone cancer Daughter     No Known Problems Sister     No Known Problems Brother     No Known Problems Maternal Aunt     No Known Problems Maternal Uncle     No Known Problems Paternal Aunt     No Known Problems Paternal Uncle     No Known Problems Maternal Grandmother     No Known Problems Maternal Grandfather     No Known Problems Paternal Grandmother     No Known Problems Paternal Grandfather     Amblyopia Neg Hx     Blindness Neg Hx     Cancer Neg Hx     Cataracts Neg Hx     Diabetes Neg Hx     Glaucoma Neg Hx     Hypertension Neg Hx     Macular degeneration Neg Hx     Retinal detachment Neg Hx     Strabismus Neg Hx     Stroke Neg Hx     Thyroid disease Neg Hx     Celiac disease Neg Hx     Cirrhosis Neg Hx     Colon cancer Neg Hx     Colon polyps Neg Hx     Crohn's disease Neg Hx     Cystic fibrosis Neg Hx     Esophageal cancer Neg Hx     Hemochromatosis Neg Hx     Inflammatory bowel disease Neg Hx     Irritable bowel syndrome Neg Hx     Liver disease Neg Hx     Rectal cancer Neg Hx     Stomach cancer Neg Hx     Ulcerative colitis Neg Hx     Montez's disease Neg Hx         Social History:   Social History     Social History    Marital status:      Spouse name: N/A    Number of children: 3    Years of education: N/A     Occupational History    housewife      Social History Main Topics    Smoking status: Never Smoker    Smokeless tobacco: Never Used    Alcohol use No    Drug use: No    Sexual activity: Yes     Partners: Male     Other Topics Concern    None     Social History Narrative    One daughter is  from bone cancer.    One son  2014 after surgery for MIGUEL        Review of patient's allergies indicates:   Allergen Reactions    Ace inhibitors      Other reaction(s): cough      Fluorescein Itching     Other reaction(s): Itching    Iodine      Other reaction(s): Itching    Pravastatin  "Other (See Comments)     Other reaction(s): mouth tingling/numbness    Simvastatin      Other reaction(s): foot swelling         Current Outpatient Prescriptions   Medication Sig Dispense Refill    alcohol swabs PadM Apply 1 each topically as needed.  0    blood glucose control, low (TRUE METRIX LEVEL 1) Soln 1 Bottle by Misc.(Non-Drug; Combo Route) route once daily. 1 each 2    blood-glucose meter kit Use as instructed 1 each 0    clotrimazole (LOTRIMIN) 1 % cream Apply topically 2 (two) times daily. 45 g 3    insulin aspart (NOVOLOG FLEXPEN) 100 unit/mL InPn pen Take 10 units with breakfast, 15 units with lunch and 17 with supper; with additional sliding scale; max dose 75 units per day 3 Box 5    insulin glargine (LANTUS SOLOSTAR) 100 unit/mL (3 mL) InPn pen Inject 40 Units into the skin every evening. Max 50 units nightly 1 Box 6    lancets (LANCETS,THIN) 28 gauge Misc 1 lancet by Misc.(Non-Drug; Combo Route) route 4 (four) times daily before meals and nightly. 100 each 2    losartan-hydrochlorothiazide 100-25 mg (HYZAAR) 100-25 mg per tablet Take 1 tablet by mouth once daily. 90 tablet 1    melatonin 5 mg Subl Place under the tongue.      metformin (GLUCOPHAGE) 1000 MG tablet Take 1 tablet (1,000 mg total) by mouth 2 (two) times daily with meals. 60 tablet 5    metoprolol succinate (TOPROL-XL) 100 MG 24 hr tablet Take 1 tablet (100 mg total) by mouth once daily. 90 tablet 2    pen needle, diabetic (BD ULTRA-FINE SANDEEP PEN NEEDLES) 32 gauge x 5/32" Ndle Take 1 each by mouth 4 (four) times daily. 120 each 2    promethazine (PHENERGAN) 25 MG tablet Take 1 tablet (25 mg total) by mouth every 6 (six) hours as needed for Nausea. 20 tablet 0    rivaroxaban (XARELTO) 20 mg Tab Take 1 tablet (20 mg total) by mouth daily with dinner or evening meal. 90 tablet 3    trazodone (DESYREL) 50 MG tablet Take 2 tablets (100 mg total) by mouth nightly as needed for Insomnia. 60 tablet 2    TRUE METRIX GLUCOSE TEST " "STRIP Strp TEST FOUR TIMES DAILY BEFORE MEALS  AND EVERY NIGHT 300 strip 2    ondansetron (ZOFRAN) 8 MG tablet Take 1 tablet (8 mg total) by mouth every 8 (eight) hours as needed for Nausea. 270 tablet 3    ondansetron (ZOFRAN-ODT) 4 MG TbDL Take 2 tablets (8 mg total) by mouth every 8 (eight) hours as needed. 60 tablet 0     No current facility-administered medications for this visit.         Objective Findings:  Vital Signs:  /60  Pulse 72  Ht 5' 2" (1.575 m)  Wt 85 kg (187 lb 6.3 oz)  BMI 34.27 kg/m2  Body mass index is 34.27 kg/(m^2).    Physical Exam:  General appearance: alert, cooperative, no distress  HENT: Normocephalic, atraumatic, neck symmetrical, no nasal discharge  Eyes: conjunctivae/corneas clear, PERRL, EOM's intact  Lungs: clear to auscultation bilaterally, no dullness to percussion bilaterally  Heart: regular rate and rhythm without rub; no displacement of the PMI  Abdomen: soft, non-tender; bowel sounds normoactive; no organomegaly  Extremities: extremities symmetric; no clubbing, cyanosis, or edema  Integument: Skin color, texture, turgor normal; no rashes; hair distrubution normal  Neurologic: Alert and oriented X 3, normal strength, normal coordination and gait  Psychiatric: no pressured speech; normal affect; no evidence of impaired cognition    Labs:  Lab Results   Component Value Date    WBC 7.19 01/18/2017    HGB 11.1 (L) 01/18/2017    HCT 34.1 (L) 01/18/2017    MCV 91 01/18/2017     01/18/2017     Lab Results   Component Value Date     (L) 04/10/2017    K 3.5 04/10/2017    CL 92 (L) 04/10/2017    CO2 31 (H) 04/10/2017     (H) 04/10/2017    BUN 28 (H) 04/10/2017    CREATININE 1.4 04/10/2017    CALCIUM 9.9 04/10/2017    PROT 6.9 04/10/2017    ALBUMIN 3.6 04/10/2017    BILITOT 0.6 04/10/2017    ALKPHOS 59 04/10/2017    AST 13 04/10/2017    ALT 7 (L) 04/10/2017         Assessment and Plan:  Kaci Whalen is a 76 y.o. female with:  Dysphagia to solids, no " problems with liquids.  -EGD w EoE bx and dilation, gastric and SB biopsies.  High risk procedure.     Nausea.  Emesis.  Early satiety. Poor appetite.  DM for years.    -cont phenergan prn   -Start zofran prn   -EGD   -Check GES if EGD negative   -Discussed gastroparesis diet, provided handout.      Abdominal pain.  LUQ/LLQ.   Gas and bloating   -Takes trazadone at night for sleep.  -Trial of lactose and artificial sugar elimination  -US of abd     Diarrhea and constiaption.    -colonoscopy w r/l bx   -Will consider fiber, imodium     Fecal incontinence  -Will add fiber pending above    Anemia  -iron studies     Weight loss. Lost 35 lb since November  -EGD/colon     Bipolar disorder w psychosis requiring inpt admit 6/11/2017.     Return in about 3 months (around 7/10/2017).    1. Dysphagia, unspecified type    2. Nausea and vomiting, intractability of vomiting not specified, unspecified vomiting type    3. Early satiety    4. Bloating    5. Diarrhea, unspecified type    6. Constipation, unspecified constipation type    7. Abdominal pain, unspecified location    8. Anemia, unspecified type          Order summary:  Orders Placed This Encounter    US Abdomen Complete    Iron and TIBC    Ferritin    Comprehensive metabolic panel    ondansetron (ZOFRAN-ODT) 4 MG TbDL    ondansetron (ZOFRAN) 8 MG tablet    Case request GI: ESOPHAGOGASTRODUODENOSCOPY (EGD), COLONOSCOPY         Thank you so much for allowing me to participate in the care of Kaci Wolf MD

## 2017-04-11 ENCOUNTER — TELEPHONE (OUTPATIENT)
Dept: ENDOSCOPY | Facility: HOSPITAL | Age: 77
End: 2017-04-11

## 2017-04-11 DIAGNOSIS — Z12.11 SPECIAL SCREENING FOR MALIGNANT NEOPLASMS, COLON: Primary | ICD-10-CM

## 2017-04-11 RX ORDER — POLYETHYLENE GLYCOL 3350, SODIUM SULFATE ANHYDROUS, SODIUM BICARBONATE, SODIUM CHLORIDE, POTASSIUM CHLORIDE 236; 22.74; 6.74; 5.86; 2.97 G/4L; G/4L; G/4L; G/4L; G/4L
4 POWDER, FOR SOLUTION ORAL ONCE
Qty: 4000 ML | Refills: 0 | Status: SHIPPED | OUTPATIENT
Start: 2017-04-11 | End: 2017-04-11

## 2017-04-11 NOTE — TELEPHONE ENCOUNTER
Called to schedule EGD and colonoscopy and inform of receiving approval from Dr Driss Dixon to hold Xarelto.  Spoke to daughter,Liliam, with patient's permission.  Scheduled for 5/4/17 at 8:00 am with Dr. Wolf.   Went over prep instructions and informed to hold Xarelto 2 days prior to procedure. Verbally stated understanding.  Instructions mailed to address in chart.  No further questions at this time.

## 2017-04-17 ENCOUNTER — PATIENT MESSAGE (OUTPATIENT)
Dept: FAMILY MEDICINE | Facility: CLINIC | Age: 77
End: 2017-04-17

## 2017-04-17 DIAGNOSIS — F32.A DEPRESSION, UNSPECIFIED DEPRESSION TYPE: ICD-10-CM

## 2017-04-17 NOTE — TELEPHONE ENCOUNTER
Spoke with Juanita with [employer] and she states that she does have order to D/C patient's oxygen. Order was faxed 4-3-2017    Will advised daughter of above

## 2017-04-18 RX ORDER — TRAZODONE HYDROCHLORIDE 50 MG/1
TABLET ORAL
Qty: 60 TABLET | Refills: 0 | Status: ON HOLD | OUTPATIENT
Start: 2017-04-18 | End: 2017-06-08 | Stop reason: CLARIF

## 2017-04-24 DIAGNOSIS — F32.A DEPRESSION, UNSPECIFIED DEPRESSION TYPE: ICD-10-CM

## 2017-04-25 RX ORDER — TRAZODONE HYDROCHLORIDE 100 MG/1
100 TABLET ORAL NIGHTLY PRN
Qty: 30 TABLET | Refills: 2 | Status: ON HOLD | OUTPATIENT
Start: 2017-04-25 | End: 2017-06-10 | Stop reason: HOSPADM

## 2017-04-28 ENCOUNTER — HOSPITAL ENCOUNTER (OUTPATIENT)
Dept: RADIOLOGY | Facility: HOSPITAL | Age: 77
Discharge: HOME | End: 2017-04-28
Attending: INTERNAL MEDICINE
Payer: MEDICARE

## 2017-04-28 DIAGNOSIS — R11.2 NAUSEA AND VOMITING, INTRACTABILITY OF VOMITING NOT SPECIFIED, UNSPECIFIED VOMITING TYPE: ICD-10-CM

## 2017-04-28 PROCEDURE — 76700 US EXAM ABDOM COMPLETE: CPT | Mod: TC

## 2017-04-28 PROCEDURE — 76700 US EXAM ABDOM COMPLETE: CPT | Mod: 26,,, | Performed by: RADIOLOGY

## 2017-05-03 ENCOUNTER — ANESTHESIA EVENT (OUTPATIENT)
Dept: ENDOSCOPY | Facility: HOSPITAL | Age: 77
End: 2017-05-03
Payer: MEDICARE

## 2017-05-04 ENCOUNTER — ANESTHESIA (OUTPATIENT)
Dept: ENDOSCOPY | Facility: HOSPITAL | Age: 77
End: 2017-05-04
Payer: MEDICARE

## 2017-05-04 ENCOUNTER — HOSPITAL ENCOUNTER (OUTPATIENT)
Facility: HOSPITAL | Age: 77
Discharge: HOME | End: 2017-05-04
Attending: INTERNAL MEDICINE | Admitting: INTERNAL MEDICINE
Payer: MEDICARE

## 2017-05-04 ENCOUNTER — PATIENT MESSAGE (OUTPATIENT)
Dept: FAMILY MEDICINE | Facility: CLINIC | Age: 77
End: 2017-05-04

## 2017-05-04 VITALS
SYSTOLIC BLOOD PRESSURE: 136 MMHG | TEMPERATURE: 98 F | HEIGHT: 62 IN | RESPIRATION RATE: 16 BRPM | WEIGHT: 186 LBS | BODY MASS INDEX: 34.23 KG/M2 | DIASTOLIC BLOOD PRESSURE: 74 MMHG | OXYGEN SATURATION: 99 % | HEART RATE: 76 BPM

## 2017-05-04 DIAGNOSIS — R19.7 DIARRHEA: ICD-10-CM

## 2017-05-04 DIAGNOSIS — R19.7 DIARRHEA, UNSPECIFIED TYPE: Primary | ICD-10-CM

## 2017-05-04 LAB
GLUCOSE SERPL-MCNC: 174 MG/DL (ref 70–110)
POCT GLUCOSE: 143 MG/DL (ref 70–110)
POCT GLUCOSE: 174 MG/DL (ref 70–110)

## 2017-05-04 PROCEDURE — 25000003 PHARM REV CODE 250: Performed by: INTERNAL MEDICINE

## 2017-05-04 PROCEDURE — 44364 SMALL BOWEL ENDOSCOPY: CPT | Mod: 51,,, | Performed by: INTERNAL MEDICINE

## 2017-05-04 PROCEDURE — 27201028 HC NEEDLE, SCLERO: Performed by: INTERNAL MEDICINE

## 2017-05-04 PROCEDURE — 63600175 PHARM REV CODE 636 W HCPCS: Performed by: NURSE ANESTHETIST, CERTIFIED REGISTERED

## 2017-05-04 PROCEDURE — 44361 SMALL BOWEL ENDOSCOPY/BIOPSY: CPT | Performed by: INTERNAL MEDICINE

## 2017-05-04 PROCEDURE — 45380 COLONOSCOPY AND BIOPSY: CPT | Mod: ,,, | Performed by: INTERNAL MEDICINE

## 2017-05-04 PROCEDURE — 27201042 HC RETRIEVAL NET: Performed by: INTERNAL MEDICINE

## 2017-05-04 PROCEDURE — D9220A PRA ANESTHESIA: Mod: ANES,,, | Performed by: ANESTHESIOLOGY

## 2017-05-04 PROCEDURE — 27201012 HC FORCEPS, HOT/COLD, DISP: Performed by: INTERNAL MEDICINE

## 2017-05-04 PROCEDURE — 88305 TISSUE EXAM BY PATHOLOGIST: CPT | Mod: 26,,, | Performed by: PATHOLOGY

## 2017-05-04 PROCEDURE — D9220A PRA ANESTHESIA: Mod: CRNA,,, | Performed by: NURSE ANESTHETIST, CERTIFIED REGISTERED

## 2017-05-04 PROCEDURE — 44364 SMALL BOWEL ENDOSCOPY: CPT | Performed by: INTERNAL MEDICINE

## 2017-05-04 PROCEDURE — 37000008 HC ANESTHESIA 1ST 15 MINUTES: Performed by: INTERNAL MEDICINE

## 2017-05-04 PROCEDURE — 88305 TISSUE EXAM BY PATHOLOGIST: CPT | Performed by: PATHOLOGY

## 2017-05-04 PROCEDURE — 37000009 HC ANESTHESIA EA ADD 15 MINS: Performed by: INTERNAL MEDICINE

## 2017-05-04 PROCEDURE — 27201089 HC SNARE, DISP (ANY): Performed by: INTERNAL MEDICINE

## 2017-05-04 PROCEDURE — 44361 SMALL BOWEL ENDOSCOPY/BIOPSY: CPT | Mod: 59,,, | Performed by: INTERNAL MEDICINE

## 2017-05-04 PROCEDURE — 25000003 PHARM REV CODE 250: Performed by: NURSE ANESTHETIST, CERTIFIED REGISTERED

## 2017-05-04 PROCEDURE — 45380 COLONOSCOPY AND BIOPSY: CPT | Performed by: INTERNAL MEDICINE

## 2017-05-04 RX ORDER — ONDANSETRON 2 MG/ML
INJECTION INTRAMUSCULAR; INTRAVENOUS
Status: DISCONTINUED | OUTPATIENT
Start: 2017-05-04 | End: 2017-05-04

## 2017-05-04 RX ORDER — FENTANYL CITRATE 50 UG/ML
INJECTION, SOLUTION INTRAMUSCULAR; INTRAVENOUS
Status: DISCONTINUED | OUTPATIENT
Start: 2017-05-04 | End: 2017-05-04

## 2017-05-04 RX ORDER — SODIUM CHLORIDE 9 MG/ML
INJECTION, SOLUTION INTRAVENOUS CONTINUOUS
Status: DISCONTINUED | OUTPATIENT
Start: 2017-05-04 | End: 2017-05-04 | Stop reason: HOSPADM

## 2017-05-04 RX ORDER — MIDAZOLAM HYDROCHLORIDE 1 MG/ML
INJECTION, SOLUTION INTRAMUSCULAR; INTRAVENOUS
Status: DISCONTINUED | OUTPATIENT
Start: 2017-05-04 | End: 2017-05-04

## 2017-05-04 RX ORDER — ROCURONIUM BROMIDE 10 MG/ML
INJECTION, SOLUTION INTRAVENOUS
Status: DISCONTINUED | OUTPATIENT
Start: 2017-05-04 | End: 2017-05-04

## 2017-05-04 RX ORDER — SUCCINYLCHOLINE CHLORIDE 20 MG/ML
INJECTION INTRAMUSCULAR; INTRAVENOUS
Status: DISCONTINUED | OUTPATIENT
Start: 2017-05-04 | End: 2017-05-04

## 2017-05-04 RX ORDER — PROPOFOL 10 MG/ML
VIAL (ML) INTRAVENOUS
Status: DISCONTINUED | OUTPATIENT
Start: 2017-05-04 | End: 2017-05-04

## 2017-05-04 RX ORDER — LIDOCAINE HCL/PF 100 MG/5ML
SYRINGE (ML) INTRAVENOUS
Status: DISCONTINUED | OUTPATIENT
Start: 2017-05-04 | End: 2017-05-04

## 2017-05-04 RX ADMIN — PROPOFOL 110 MG: 10 INJECTION, EMULSION INTRAVENOUS at 08:05

## 2017-05-04 RX ADMIN — PROPOFOL 30 MG: 10 INJECTION, EMULSION INTRAVENOUS at 08:05

## 2017-05-04 RX ADMIN — MIDAZOLAM HYDROCHLORIDE 1 MG: 1 INJECTION, SOLUTION INTRAMUSCULAR; INTRAVENOUS at 08:05

## 2017-05-04 RX ADMIN — FENTANYL CITRATE 50 MCG: 50 INJECTION, SOLUTION INTRAMUSCULAR; INTRAVENOUS at 08:05

## 2017-05-04 RX ADMIN — ONDANSETRON 4 MG: 2 INJECTION INTRAMUSCULAR; INTRAVENOUS at 09:05

## 2017-05-04 RX ADMIN — LIDOCAINE HYDROCHLORIDE 100 MG: 20 INJECTION, SOLUTION INTRAVENOUS at 08:05

## 2017-05-04 RX ADMIN — SUCCINYLCHOLINE CHLORIDE 160 MG: 20 INJECTION, SOLUTION INTRAMUSCULAR; INTRAVENOUS at 08:05

## 2017-05-04 RX ADMIN — SODIUM CHLORIDE: 0.9 INJECTION, SOLUTION INTRAVENOUS at 08:05

## 2017-05-04 RX ADMIN — PROPOFOL 40 MG: 10 INJECTION, EMULSION INTRAVENOUS at 08:05

## 2017-05-04 RX ADMIN — ROCURONIUM BROMIDE 10 MG: 10 INJECTION, SOLUTION INTRAVENOUS at 08:05

## 2017-05-04 NOTE — ANESTHESIA RELEASE NOTE
Anesthesia Release from PACU Note    Patient: Kaci Whalen    Procedure(s) Performed: Procedure(s) (LRB):  ESOPHAGOGASTRODUODENOSCOPY (EGD) (N/A)  COLONOSCOPY (N/A)    Anesthesia type: General    Post pain: Adequate analgesia    Post assessment: no apparent anesthetic complications    Last Vitals:   Vitals:    05/04/17 1030   BP: (!) 125/48   Pulse: 71   Resp: 18   Temp:    SpO2: 99%       Post vital signs: stable    Level of consciousness: awake    Complications: none    Airway Patency: patent    Respiratory: spontaneous    Cardiovascular: stable    Hydration: euvolemic

## 2017-05-04 NOTE — ANESTHESIA POSTPROCEDURE EVALUATION
"Anesthesia Post Evaluation    Patient: Kaci Whalen    Procedure(s) Performed: Procedure(s) (LRB):  ESOPHAGOGASTRODUODENOSCOPY (EGD) (N/A)  COLONOSCOPY (N/A)    Final Anesthesia Type: general  Patient location during evaluation: PACU  Patient participation: Yes- Able to Participate  Level of consciousness: awake and alert  Post-procedure vital signs: reviewed and stable  Pain management: adequate  Airway patency: patent  PONV status at discharge: No PONV  Anesthetic complications: no      Cardiovascular status: blood pressure returned to baseline and stable  Respiratory status: unassisted and nasal cannula  Hydration status: euvolemic  Follow-up not needed.        Visit Vitals    BP (!) 125/48    Pulse 71    Temp 36.2 °C (97.2 °F) (Oral)    Resp 18    Ht 5' 2" (1.575 m)    Wt 84.4 kg (186 lb)    SpO2 99%    Breastfeeding No    BMI 34.02 kg/m2       Pain/Juanjo Score: Pain Assessment Performed: Yes (5/4/2017 10:37 AM)  Presence of Pain: complains of pain/discomfort (5/4/2017 10:37 AM)      "

## 2017-05-04 NOTE — IP AVS SNAPSHOT
[facility]  [address]  [facility] [address]  Phone: [phone]           Patient Discharge Instructions   Our goal is to set you up for success. This packet includes information on your condition, medications, and your home care.  It will help you care for yourself to prevent having to return to the hospital.     Please ask your nurse if you have any questions.      There are many details to remember when preparing to leave the hospital. Here is what you will need to do:    1. Take your medicine. If you are prescribed medications, review your Medication List on the following pages. You may have new medications to  at the pharmacy and others that you'll need to stop taking. Review the instructions for how and when to take your medications. Talk with your doctor or nurses if you are unsure of what to do.     2. Go to your follow-up appointments. Specific follow-up information is listed in the following pages. Your may be contacted by a nurse or clinical provider about future appointments. Be sure we have all of the phone numbers to reach you. Please contact your provider's office if you are unable to make an appointment.     3. Watch for warning signs. Your doctor or nurse will give you detailed warning signs to watch for and when to call for assistance. These instructions may also include educational information about your condition. If you experience any of warning signs to your health, call your doctor.           [facility]  Unless otherwise directed by your provider, please   contact [facility], our nurse care line   that is available for 24/7 assistance.     [phone] (toll-free)     Registered nurses in the [facility]   provide: appointment scheduling, clinical advisement, health education, and other advisory services.                  ** Verify the list of medication(s) below is accurate and up to date. Carry this with you in case of  emergency. If your medications have changed, please notify your healthcare provider.             Medication List      CONTINUE taking these medications        Additional Info                      alcohol swabs Padm   Refills:  0   Dose:  1 each    Instructions:  Apply 1 each topically as needed.     Begin Date    AM    Noon    PM    Bedtime       blood glucose control, low Soln   Commonly known as:  TRUE METRIX LEVEL 1   Quantity:  1 each   Refills:  2   Dose:  1 Bottle    Instructions:  1 Bottle by Misc.(Non-Drug; Combo Route) route once daily.     Begin Date    AM    Noon    PM    Bedtime       blood-glucose meter kit   Quantity:  1 each   Refills:  0    Instructions:  Use as instructed     Begin Date    AM    Noon    PM    Bedtime       clotrimazole 1 % cream   Commonly known as:  LOTRIMIN   Quantity:  45 g   Refills:  3    Instructions:  Apply topically 2 (two) times daily.     Begin Date    AM    Noon    PM    Bedtime       insulin aspart 100 unit/mL Inpn pen   Commonly known as:  NOVOLOG FLEXPEN   Quantity:  3 Box   Refills:  5   Comments:  Please dispense quantity sufficient    Instructions:  Take 10 units with breakfast, 15 units with lunch and 17 with supper; with additional sliding scale; max dose 75 units per day     Begin Date    AM    Noon    PM    Bedtime       insulin glargine 100 unit/mL (3 mL) Inpn pen   Commonly known as:  LANTUS SOLOSTAR   Quantity:  1 Box   Refills:  6   Dose:  40 Units   Comments:  Please dispense quantity sufficient    Instructions:  Inject 40 Units into the skin every evening. Max 50 units nightly     Begin Date    AM    Noon    PM    Bedtime       lancets 28 gauge Misc   Commonly known as:  LANCETS,THIN   Quantity:  100 each   Refills:  2   Dose:  1 lancet    Instructions:  1 lancet by Misc.(Non-Drug; Combo Route) route 4 (four) times daily before meals and nightly.     Begin Date    AM    Noon    PM    Bedtime       losartan-hydrochlorothiazide 100-25 mg 100-25 mg per tablet  "  Commonly known as:  HYZAAR   Quantity:  90 tablet   Refills:  1   Dose:  1 tablet    Instructions:  Take 1 tablet by mouth once daily.     Begin Date    AM    Noon    PM    Bedtime       melatonin 5 mg Subl   Refills:  0    Instructions:  Place under the tongue.     Begin Date    AM    Noon    PM    Bedtime       metformin 1000 MG tablet   Commonly known as:  GLUCOPHAGE   Quantity:  60 tablet   Refills:  5   Dose:  1000 mg    Instructions:  Take 1 tablet (1,000 mg total) by mouth 2 (two) times daily with meals.     Begin Date    AM    Noon    PM    Bedtime       metoprolol succinate 100 MG 24 hr tablet   Commonly known as:  TOPROL-XL   Quantity:  90 tablet   Refills:  2   Dose:  100 mg    Instructions:  Take 1 tablet (100 mg total) by mouth once daily.     Begin Date    AM    Noon    PM    Bedtime       ondansetron 4 MG Tbdl   Commonly known as:  ZOFRAN-ODT   Quantity:  60 tablet   Refills:  0   Dose:  8 mg    Instructions:  Take 2 tablets (8 mg total) by mouth every 8 (eight) hours as needed.     Begin Date    AM    Noon    PM    Bedtime       ondansetron 8 MG tablet   Commonly known as:  ZOFRAN   Quantity:  270 tablet   Refills:  3   Dose:  8 mg    Instructions:  Take 1 tablet (8 mg total) by mouth every 8 (eight) hours as needed for Nausea.     Begin Date    AM    Noon    PM    Bedtime       pen needle, diabetic 32 gauge x 5/32" Ndle   Commonly known as:  BD ULTRA-FINE SANDEEP PEN NEEDLES   Quantity:  120 each   Refills:  2   Dose:  1 each    Instructions:  Take 1 each by mouth 4 (four) times daily.     Begin Date    AM    Noon    PM    Bedtime       promethazine 25 MG tablet   Commonly known as:  PHENERGAN   Quantity:  20 tablet   Refills:  0   Dose:  25 mg    Instructions:  Take 1 tablet (25 mg total) by mouth every 6 (six) hours as needed for Nausea.     Begin Date    AM    Noon    PM    Bedtime       rivaroxaban 20 mg Tab   Commonly known as:  XARELTO   Quantity:  90 tablet   Refills:  3   Dose:  20 mg    " Instructions:  Take 1 tablet (20 mg total) by mouth daily with dinner or evening meal.     Begin Date    AM    Noon    PM    Bedtime       * trazodone 50 MG tablet   Commonly known as:  DESYREL   Quantity:  60 tablet   Refills:  0    Instructions:  TAKE TWO TABLETS BY MOUTH ONCE DAILY AT  NIGHT  AS  NEEDED  FOR  INSOMNIA     Begin Date    AM    Noon    PM    Bedtime       * trazodone 100 MG tablet   Commonly known as:  DESYREL   Quantity:  30 tablet   Refills:  2   Dose:  100 mg    Instructions:  Take 1 tablet (100 mg total) by mouth nightly as needed for Insomnia.     Begin Date    AM    Noon    PM    Bedtime       TRUE METRIX GLUCOSE TEST STRIP Strp   Quantity:  300 strip   Refills:  2   Generic drug:  blood sugar diagnostic    Instructions:  TEST FOUR TIMES DAILY BEFORE MEALS  AND EVERY NIGHT     Begin Date    AM    Noon    PM    Bedtime       * Notice:  This list has 2 medication(s) that are the same as other medications prescribed for you. Read the directions carefully, and ask your doctor or other care provider to review them with you.               Please bring to all follow up appointments:    1. A copy of your discharge instructions.  2. All medicines you are currently taking in their original bottles.  3. Identification and insurance card.    Please arrive 15 minutes ahead of scheduled appointment time.    Please call 24 hours in advance if you must reschedule your appointment and/or time.        Your Scheduled Appointments     Jun 16, 2017  1:00 PM CDT   GASTROENTEROLOGY ESTABLISHED PATIENT with MD Eric [facility] - Gastroenterology ([facility] )    [address]  [place] [address]   [phone]            Jul 07, 2017  1:15 PM CDT   Established Patient Visit with Jennifer Cardoza DPM   [facility] ([facility])    [address] [facility]  [place] [address] [phone]              Follow-up Information     Schedule an appointment as soon as possible for a  visit with Suellen Wolf MD.    Specialty:  Gastroenterology    Contact information:    [address]  [facility] [address]  [phone]            Discharge Instructions         Treating Dysphagia     Talk to your healthcare provider before you take any medicines.    A medical evaluation helps your healthcare provider find the cause of your trouble swallowing, or dysphagia. Your evaluation may include a medical history and some special tests. Your provider will make a treatment plan based on the results of your evaluation. You may need to take medicines. In some cases, your provider may suggest a procedure to stretch or widen your esophagus (esophageal dilation). Or your provider may suggest surgery.  What you can do  To help control dysphagia, follow your treatment plan. Take all medicines as directed. You also can help lessen your dysphagia symptoms by being careful about what and how you eat.  Medicines  You may need medicines, such as those that:  · Reduce or neutralize stomach acids  · Control esophagus muscle spasms  · Treat an allergic disorder of the esophagus that is causing the problem  · Are injected into the esophagus to help symptoms  Esophageal dilation  Dilation is a procedure that your provider can use to widen your esophagus. It is most often done when a narrowing (stricture) of the esophagus is causing the problem. There are many ways your provider can widen your esophagus. He or she can discuss them with you.  Eating tips  · Eat slowly in a relaxed setting.  · Dont talk while you eat.  · Take small bites and chew slowly and thoroughly  · Sit in an upright position during and after meals.  · Ask your provider about any special diets that may help, such as liquid diets.  · If you have trouble swallowing solid foods, you can use a  to mash or purée them.  · Thicken liquids with milk, juice, broth, gravy, or starch to make swallowing easier.  Occupational or speech therapy  Your  healthcare provider may recommend that you have an evaluation or sessions with a speech or occupational therapist. These specialists in dysphagia may give you exercises and instructions to help you eat safely.   Date Last Reviewed: 10/1/2016  © 0643-7398 [employer]. [address] [place] [address], [place] [address]. All rights reserved. This information is not intended as a substitute for professional medical care. Always follow your healthcare professional's instructions.        Colorectal Cancer Screening    Colorectal cancer (cancer in the colon or rectum) is a leading cause of cancer deaths in the [place] But it doesnt have to be. When this cancer is found and removed early, the chances of a full recovery are very good. Because colorectal cancer rarely causes symptoms in its early stages, screening for the disease is important. Its even more crucial if you have risk factors for the disease. Learn more about colorectal cancer and its risk factors. Then talk to your healthcare provider about being screened. You could be saving your own life.  Risk factors for colorectal cancer  Your risk of having colorectal cancer increases if you:  · Are 50 years of age or older  · Have a family history or personal history of colorectal cancer or polyps  · Have a personal history of type 2 diabetes, Crohns disease, or ulcerative colitis  · Have an inherited genetic syndrome like Olvera syndrome (also known as HNPCC) or familial adenomatous polyposis (FAP)  · Are very overweight  · Are not physically active  · Smoke  · Drink a lot of alcohol  · Eat a lot of red or processed meat  The colon and rectum  Waste from food you eat enters the colon from the small intestine. As it travels through the colon, the waste (stool) loses water and becomes more solid. Intestinal muscles push it toward the sigmoid--the last section of the colon. Stool then moves into the rectum, where its stored until its ready to leave the body during a  bowel movement.  How cancer develops  Polyps are growths that form on the inner lining of the colon or rectum. Most are benign, which means they arent cancerous. But over time, some polyps can become cancer (malignant). This happens when cells in these polyps begin growing abnormally. In time, malignant cells invade more and more of the colon and rectum. The cancer may also spread to nearby organs or lymph nodes or to other parts of the body. Finding and removing polyps can help prevent cancer from ever forming.  Your screening  Screening means looking for a health problem before you have symptoms. During screening for colorectal cancer, your healthcare provider will ask about your health history, examine you, and do one or more tests.  History and exam  The history and exam involve the following:  · Health history. Your healthcare provider will ask about your health history. Mention if a family member has had colon cancer or polyps. Also mention any health problems you have had in the past.  · Digital rectal exam (NOE). During a NOE, the healthcare provider inserts a lubricated gloved finger into the rectum. The test is painless and takes less than a minute. Healthcare providers agree that this test alone is not enough to screen for colorectal cancer.  Screening test choices  Fecal occult blood test (FOBT) or fecal immunochemical test (FIT)  These tests check for occult blood in stool (blood you cant see). Hidden blood may be a sign of colon polyps or cancer. A small sample of stool is tested for blood in a laboratory. Most often, you collect this sample at home using a kit your healthcare provider gives you. Follow the instructions carefully for using this kit. You might need to avoid certain foods and medicines before the test, as directed.  Barium enema with contrast (double-contrast barium enema)  This test uses X-rays to provide images of the entire colon and rectum. The day before this test, you will need  to do a bowel prep to clean out the colon and rectum. A bowel prep is a liquid diet plus strong laxatives or enemas. You will be awake for the test, but you may be given medicine to help you relax. At the start of the test, a radiologist (a healthcare provider who specializes in imaging tests) places a soft tube into the rectum. The tube is used to fill the colon with a contrast liquid (barium) and air. This can be uncomfortable for some people. The liquid helps the colon show up clearly on the X-rays. Because the test uses X-rays, it exposes you to a small amount of radiation.  Virtual colonoscopy  This exam is also called a CT colonography. It uses a series of X-ray photographs to create a 3-D view of the colon and rectum. The day before the test, you will need to do a bowel prep to clean out your colon. Your healthcare provider will give you instructions on how to do this. During the procedure, you will lie on a table that is part of a special X-ray machine called a CT scanner. A small tube will be placed into your rectum to fill the colon and rectum with air. This can be uncomfortable for some people. Then, the table will move into the machine and pictures will be taken of your colon and rectum. A computer will combine these photos to create a 3-D picture. Because the test uses X-rays, it exposes you to a small amount of radiation.  Scope exams  Here are two types of scope exams:  · Colonoscopy. This test can be used to find and remove polyps anywhere in the colon or rectum. The day before the test, you will do a bowel prep. This is a liquid diet plus a strong laxative solution or an enema. The bowel prep will cleanse your colon. You will be given instructions for this. Just before the test, you are given a medicine to make you sleepy. Then, a long, flexible, lighted tube called a colonoscope is gently inserted into the rectum and guided through the entire colon. Images of the colon are viewed on a video screen.  Any polyps that are found are removed and sent to a lab for testing. If a polyp cant be removed, a sample of tissue is taken and the polyp might be removed later during surgery. You will need to bring someone with you to drive you home after this test.   · Sigmoidoscopy. This test is similar to colonoscopy, but focuses only on the sigmoid colon and rectum. As with colonoscopy, bowel prep must be done the day before this test. It might not need to be as complete as the bowel prep for a colonoscopy. You are awake during the procedure, but you may be given medicine to help you relax. During the test, the healthcare provider guides a thin, flexible, lighted tube called a sigmoidoscope through your rectum and lower colon. The images are displayed on a video screen. Polyps are removed, if possible, and sent to a lab for testing.  Colonoscopy is the only screening test that lets your healthcare provider see the entire colon and rectum. This test also lets your healthcare provider remove any pieces of tissue that need to be looked at by a lab. If something suspicious is found using any other tests, you will likely need a colonoscopy.     When to call your healthcare provider after a test  Call your healthcare provider if you have any of the following after any screening test:  · Bleeding  · Fever of 100.4°F (38°C) or higher, or as directed by your healthcare provider  · Abdominal pain  · Vomiting   Date Last Reviewed: 11/4/2015 © 2000-2016 [employer]. [address] [place] [address]. All rights reserved. This information is not intended as a substitute for professional medical care. Always follow your healthcare professional's instructions.        Upper GI Endoscopy     During endoscopy, a long, flexible tube is used to view the inside of your upper GI tract.      Upper GI endoscopy allows your healthcare provider to look directly into the beginning of your gastrointestinal (GI) tract. The  esophagus, stomach, and duodenum (the first part of the small intestine) make up the upper GI tract.   Before the exam  Follow these and any other instructions you are given before your endoscopy. If you dont follow the healthcare providers instructions carefully, the test may need to be canceled or done over:  · Don't eat or drink anything after midnight the night before your exam. If your exam is in the afternoon, drink only clear liquids in the morning. Don't eat or drink anything for 8 hours before the exam. In some cases, you may be able to take medicines with sips of water until 2 hours before the procedure. Speak with your healthcare provider about this.   · Bring your X-rays and any other test results you have.  · Because you will be sedated, arrange for an adult to drive you home after the exam.  · Tell your healthcare provider before the exam if you are taking any medicines or have any medical problems.  The procedure  Here is what to expect:  · You will lie on the endoscopy table. Usually patients lie on the left side.  · You will be monitored and given oxygen.  · Your throat may be numbed with a spray or gargle. You are given medicine through an intravenous (IV) line that will help you relax and remain comfortable. You may be awake or asleep during the procedure.  · The healthcare provider will put the endoscope in your mouth and down your esophagus. It is thinner than most pieces of food that you swallow. It will not affect your breathing. The medicine helps keep you from gagging.  · Air is put into your GI tract to expand it. It can make you burp.  · During the procedure, the healthcare provider can take biopsies (tissue samples), remove abnormalities, such as polyps, or treat abnormalities through a variety of devices placed through the endoscope. You will not feel this.   · The endoscope carries images of your upper GI tract to a video screen. If you are awake, you may be able to look at the  images.  · After the procedure is done, you will rest for a time. An adult must drive you home.  When to call your healthcare provider  Contact your healthcare provider if you have:  · Black or tarry stools, or blood in your stool  · Fever  · Pain in your belly that does not go away  · Nausea and vomiting, or vomiting blood   Date Last Reviewed: 7/1/2016 © 2000-2016 [employer]. [address] [place] [address], [place] [address]. All rights reserved. This information is not intended as a substitute for professional medical care. Always follow your healthcare professional's instructions.          Instructions    Discharge Summary/Instructions for after Small Bowel Enteroscopy  Patient Name: Kaci Whalen  Patient MRN: [id number]  Patient YOB: 1940  Thursday, May 04, 2017    Alona Wolf MD  1.  Do Not eat or drink anything for 1 hour.  Try sips of water first.  If   tolerated, resume your regular diet or one recommended by your physician.  2.  Do not drive, operate machinery, make critical decisions, or do   activities that require coordination or balance for 24 hours.  3.   You may experience a sore throat for 24 to 48 hours.  You may use   throat lozenges or gargle with warm salt water to relieve the discomfort.  4.  Because air was put into your stomach during the procedure, you may   experience some belching.  5.  Do not use any medication containing aspirin for 10 days.  6.  Go directly to the emergency room if you notice any of the following:   Chills and/or fever over 101   Persistent vomiting or vomiting with blood/nasal regurgitation   Severe abdominal pain, other than gas cramps   Severe chest pain   Black, tarry stools     Your doctor recommends these additional instructions:  - Discharge patient to home (ambulatory).   - Resume previous diet.   - Perform a colonoscopy today.   - Await pathology results.   - Repeat the small bowel enteroscopy in 3 months for surveillance of    "polypectomy site.    - Return to my office at appointment to be scheduled.   - Resume Xarelto (rivaroxaban) at prior dose in 3 days.  Refer to primary   physician for further adjustment of therapy.   You are being discharged to home.   Resume your previous diet.   Your physician has recommended a colonoscopy today.   We are waiting for your pathology results.   Your physician has recommended a repeat small bowel enteroscopy in three   months for surveillance.   Return to my office at appointment to be scheduled.  Discharge patient to home (ambulatory).   Resume previous diet.   Perform a colonoscopy today.   Await pathology results.   Repeat the small bowel enteroscopy in 3 months for surveillance.   Return to my office at appointment to be scheduled.  If you have any questions or problems, please call your physician.  EMERGENCY PHONE NUMBER: [phone]  LAB RESULTS: [phone]  Alona Wolf MD  5/4/2017 10:02:48 AM  This report has been verified and signed electronically.         Admission Information     Date & Time Provider Department [facility]    5/4/2017  7:01 AM Suellen Wolf MD [facility] [phone]      Care Providers     Provider Role Specialty Primary office phone    Suellen Wolf MD Attending Provider Gastroenterology [phone]    Suellen Wolf MD Surgeon  Gastroenterology [phone]      Your Vitals Were     BP Pulse Temp Resp Height Weight    144/77 69 97.2 °F (36.2 °C) (Oral) 18 5' 2" (1.575 m) 84.4 kg (186 lb)    SpO2 BMI             100% 34.02 kg/m2         Recent Lab Values        9/6/2013 12/13/2013 12/17/2014 4/2/2015 6/30/2015 12/11/2015 9/2/2016 12/28/2016      8:10 AM  8:25 AM  8:58 AM  8:07 AM  8:30 AM  8:30 AM  8:28 AM 11:55 AM    A1C 7.3 (H) 7.7 (H) 7.7 (H) 7.2 (H) 7.8 (H) 6.9 (H) 7.1 (H) 6.9 (H)        7.2 (H)        Comment for A1C at  8:28 AM on 9/2/2016:  According to ADA guidelines, hemoglobin A1C <7.0% represents  optimal control in non-pregnant " diabetic patients.  Different  metrics may apply to specific populations.   Standards of Medical Care in Diabetes - 2016.  For the purpose of screening for the presence of diabetes:  <5.7%     Consistent with the absence of diabetes  5.7-6.4%  Consistent with increasing risk for diabetes   (prediabetes)  >or=6.5%  Consistent with diabetes  Currently no consensus exists for use of hemoglobin A1C  for diagnosis of diabetes for children.      Comment for A1C at 11:55 AM on 12/28/2016:  According to ADA guidelines, hemoglobin A1C <7.0% represents  optimal control in non-pregnant diabetic patients.  Different  metrics may apply to specific populations.   Standards of Medical Care in Diabetes - 2016.  For the purpose of screening for the presence of diabetes:  <5.7%     Consistent with the absence of diabetes  5.7-6.4%  Consistent with increasing risk for diabetes   (prediabetes)  >or=6.5%  Consistent with diabetes  Currently no consensus exists for use of hemoglobin A1C  for diagnosis of diabetes for children.        Pending Labs     Order Current Status    Specimen to Pathology - Surgery Collected (05/04/17 0945)      Allergies as of 5/4/2017        Reactions    Ace Inhibitors     Other reaction(s): cough    Fluorescein Itching    Other reaction(s): Itching    Iodine     Other reaction(s): Itching    Pravastatin Other (See Comments)    Other reaction(s): mouth tingling/numbness    Simvastatin     Other reaction(s): foot swelling      Advance Directives     An advance directive is a document which, in the event you are no longer able to make decisions for yourself, tells your healthcare team what kind of treatment you do or do not want to receive, or who you would like to make those decisions for you.  If you do not currently have an advance directive, [facility] encourages you to create one.  For more information call:  [phone]), [phone] ([phone]),  or log on to [email or web address].         Language Assistance Services     ATTENTION: Language assistance services are available, free of charge. Please call [phone].      ATENCIÓN: Si habla davian, tiene a cantor disposición servicios gratuitos de asistencia lingüística. Llame al [phone].     CHÚ Ý: N?u b?n nói Ti?ng Vi?t, có các d?ch v? h? tr? ngôn ng? mi?n phí dành cho b?n. G?i s? [phone].        Diabetes Discharge Instructions                                   Pradeep Information [facility] complies with applicable Federal civil rights laws and does not discriminate on the basis of race, color, national origin, age, disability, or sex.

## 2017-05-04 NOTE — PLAN OF CARE
Discharge instructions reviewed with patient and daughter. Verbalizes understanding. Copies of instructions given to patient. Tolerating PO fluids. Denies nausea. States throat pain 3/10 and tolerable. Safety maintained.

## 2017-05-04 NOTE — INTERVAL H&P NOTE
The patient has been examined and the H&P has been reviewed:    I concur with the findings and no changes have occurred since H&P was written.    Anesthesia/Surgery risks, benefits and alternative options discussed and understood by patient/family.    Pre-Procedure H and P Addendum    Patient seen and examined.  History and exam unchanged from prior history and physical.      Procedure: EGD/colon  Indication: dysphagia, diarrhea, weight loss, abd pain  ASA Class: per anesthesiology  Airway: per anesthesia  Neck Mobility: full range of motion  Mallampatti score: per anesthesia  History of anesthesia problems: no  Family history of anesthesia problems: no  Anesthesia Plan: per anesthesia        There are no hospital problems to display for this patient.

## 2017-05-04 NOTE — PATIENT INSTRUCTIONS
Discharge Summary/Instructions for after Small Bowel Enteroscopy  Patient Name: Kaci Whalen  Patient MRN: [id number]  Patient YOB: 1940  Thursday, May 04, 2017    Alona Wolf MD  1.  Do Not eat or drink anything for 1 hour.  Try sips of water first.  If   tolerated, resume your regular diet or one recommended by your physician.  2.  Do not drive, operate machinery, make critical decisions, or do   activities that require coordination or balance for 24 hours.  3.   You may experience a sore throat for 24 to 48 hours.  You may use   throat lozenges or gargle with warm salt water to relieve the discomfort.  4.  Because air was put into your stomach during the procedure, you may   experience some belching.  5.  Do not use any medication containing aspirin for 10 days.  6.  Go directly to the emergency room if you notice any of the following:   Chills and/or fever over 101   Persistent vomiting or vomiting with blood/nasal regurgitation   Severe abdominal pain, other than gas cramps   Severe chest pain   Black, tarry stools     Your doctor recommends these additional instructions:  - Discharge patient to home (ambulatory).   - Resume previous diet.   - Perform a colonoscopy today.   - Await pathology results.   - Repeat the small bowel enteroscopy in 3 months for surveillance of   polypectomy site.    - Return to my office at appointment to be scheduled.   - Resume Xarelto (rivaroxaban) at prior dose in 3 days.  Refer to primary   physician for further adjustment of therapy.   You are being discharged to home.   Resume your previous diet.   Your physician has recommended a colonoscopy today.   We are waiting for your pathology results.   Your physician has recommended a repeat small bowel enteroscopy in three   months for surveillance.   Return to my office at appointment to be scheduled.  Discharge patient to home (ambulatory).   Resume previous diet.   Perform a colonoscopy today.   Await pathology  results.   Repeat the small bowel enteroscopy in 3 months for surveillance.   Return to my office at appointment to be scheduled.  If you have any questions or problems, please call your physician.  EMERGENCY PHONE NUMBER: [phone]  LAB RESULTS: [phone]  Alona Wolf MD  5/4/2017 10:02:48 AM  This report has been verified and signed electronically.

## 2017-05-04 NOTE — H&P (VIEW-ONLY)
[facility] Consultation Note    Reason for Consult:    Chief Complaint   Patient presents with    Dysphagia    Nausea    Emesis    Abdominal Pain    Gas    Bloated    Diarrhea    Constipation    Encopresis         PCP:   Toby Hung       Referring MD:  Dr. Hung       HPI:  Kaci Whalen is a 76 y.o. female here for evaluation of the following GI problems:    Dysphagia.  Patient reports difficulty swallowing solids, no problems with liquids. Worse w bread, noodles, potatoes.  Feels that food gets lodged in cervical esophagus.    Symptoms started 5 weeks. Occur almost daily.     Nausea.  Patient 3-4 times per week. Started 4-5 weeks ago.  Improve phenergan.  Zantac helps as well.   Emesis.  2 episodes of vomiting in two months.        Early satiety, poor appetite.   Has DM diagnosed 50 years ago, started insulin 30 years ago.   No known neurological disorders.    Abdominal pain.  Patient reports LUQ/LLQ pain and pressure. Started 1-2 months ago.  Occurs twice per day.  Worse with eating. Improves with BM.   No narcotic pain medication. Takes trazadone at night.    Gas and bloating. Patient reports recent gas and bloating with abdominal distension.  Consumes milk products and artificial sugars.     Diarrhea.  Reports loose to watery stools. [place] 4.  BM 1-3 times per day.  Better when not eating.  Imodium helps.   Symptoms started few months ago.   Nocturnal symptoms: no    Fecal incontinence: yes     Constipation.  Diarrhea alternates w constipation. Many years of constipation. Previously tool magnesium.     Gi symptoms started after placed on lasix which led to nausea, followe dby all GI symptoms.     Weight loss. Lost 220ln in November to 184lb.     Denies GERD, BRBPR, melena.       Previous Studies:   Colonoscopy 10-20 years ago.  Report not available.  No EGDs.   CT 6/4/2016: Cholelithiasis.  Calcified coronary artery disease and calcified atheromatous disease the  aorta. Diverticulosis coli without diverticulitis.  Slightly heterogenous appearance of the bone marrow.      ROS:  ROS   Constitutional: No fevers, + chills, no night sweats, + weight loss  ENT: + congestion, + rhinorrhea, + chronic sinus problems  CV: No chest pain, no palpitations  Pulm: + cough, no shortness of breath  Ophtho: No blurry vision, + eye redness  GI: see HPI  Derm: + rash  Heme: no  lymphadenopathy, no bruising  MSK: + arthritis, + joint swelling, no Raynauds  : No dysuria, no frequent urination, no blood in urine  Endo: + hot or cold intolerance  Neuro: + dizziness, no syncope, no seizure  Psych: + anxiety, + depression        Medical History:   Past Medical History:   Diagnosis Date    Abdominal pain     Atherosclerosis of aortic arch     noted on CXR 7/1/2015    Atrial fibrillation 05/2016    CHADS 4, on Xarelto    Benign hypertension     Chronic constipation     Chronic diarrhea     Chronic edema     Dercum disease     Multiple painful lipomas    Dysphagia     Gas pain     Glaucoma of both eyes     Hyperlipidemia with target LDL less than 100     Unable to tolerate statins    Immature cataract     Morbid obesity     Nausea & vomiting     Primary osteoarthritis of both knees     Proteinuria     Pulmonary hypertension mixed group 2 and 3 1/18/2017    Type II or unspecified type diabetes mellitus with neurological manifestations, uncontrolled     Unspecified vitamin D deficiency         Surgical History:   Past Surgical History:   Procedure Laterality Date     tenotomy      flexors 2,3 L toes    CATARACT EXTRACTION      diabetic retinopathy      right    HYSTERECTOMY      knee surgery x2      Left ankle and leg surgery secondary to a fall      left arm fracture      Left cataract      PARTIAL HYSTERECTOMY      Secondary to bleeding    TOE FUSION      2,3 R        Family History:   Family History   Problem Relation Age of Onset    No Known Problems Daughter     No  Known Problems Father     Liver cancer Mother     Bone cancer Daughter     No Known Problems Sister     No Known Problems Brother     No Known Problems Maternal Aunt     No Known Problems Maternal Uncle     No Known Problems Paternal Aunt     No Known Problems Paternal Uncle     No Known Problems Maternal Grandmother     No Known Problems Maternal Grandfather     No Known Problems Paternal Grandmother     No Known Problems Paternal Grandfather     Amblyopia Neg Hx     Blindness Neg Hx     Cancer Neg Hx     Cataracts Neg Hx     Diabetes Neg Hx     Glaucoma Neg Hx     Hypertension Neg Hx     Macular degeneration Neg Hx     Retinal detachment Neg Hx     Strabismus Neg Hx     Stroke Neg Hx     Thyroid disease Neg Hx     Celiac disease Neg Hx     Cirrhosis Neg Hx     Colon cancer Neg Hx     Colon polyps Neg Hx     Crohn's disease Neg Hx     Cystic fibrosis Neg Hx     Esophageal cancer Neg Hx     Hemochromatosis Neg Hx     Inflammatory bowel disease Neg Hx     Irritable bowel syndrome Neg Hx     Liver disease Neg Hx     Rectal cancer Neg Hx     Stomach cancer Neg Hx     Ulcerative colitis Neg Hx     Montez's disease Neg Hx         Social History:   Social History     Social History    Marital status:      Spouse name: N/A    Number of children: 3    Years of education: N/A     Occupational History    housewife      Social History Main Topics    Smoking status: Never Smoker    Smokeless tobacco: Never Used    Alcohol use No    Drug use: No    Sexual activity: Yes     Partners: Male     Other Topics Concern    None     Social History Narrative    One daughter is  from bone cancer.    One son  2014 after surgery for MIGUEL        Review of patient's allergies indicates:   Allergen Reactions    Ace inhibitors      Other reaction(s): cough      Fluorescein Itching     Other reaction(s): Itching    Iodine      Other reaction(s): Itching    Pravastatin  "Other (See Comments)     Other reaction(s): mouth tingling/numbness    Simvastatin      Other reaction(s): foot swelling         Current Outpatient Prescriptions   Medication Sig Dispense Refill    alcohol swabs PadM Apply 1 each topically as needed.  0    blood glucose control, low (TRUE METRIX LEVEL 1) Soln 1 Bottle by Misc.(Non-Drug; Combo Route) route once daily. 1 each 2    blood-glucose meter kit Use as instructed 1 each 0    clotrimazole (LOTRIMIN) 1 % cream Apply topically 2 (two) times daily. 45 g 3    insulin aspart (NOVOLOG FLEXPEN) 100 unit/mL InPn pen Take 10 units with breakfast, 15 units with lunch and 17 with supper; with additional sliding scale; max dose 75 units per day 3 Box 5    insulin glargine (LANTUS SOLOSTAR) 100 unit/mL (3 mL) InPn pen Inject 40 Units into the skin every evening. Max 50 units nightly 1 Box 6    lancets (LANCETS,THIN) 28 gauge Misc 1 lancet by Misc.(Non-Drug; Combo Route) route 4 (four) times daily before meals and nightly. 100 each 2    losartan-hydrochlorothiazide 100-25 mg (HYZAAR) 100-25 mg per tablet Take 1 tablet by mouth once daily. 90 tablet 1    melatonin 5 mg Subl Place under the tongue.      metformin (GLUCOPHAGE) 1000 MG tablet Take 1 tablet (1,000 mg total) by mouth 2 (two) times daily with meals. 60 tablet 5    metoprolol succinate (TOPROL-XL) 100 MG 24 hr tablet Take 1 tablet (100 mg total) by mouth once daily. 90 tablet 2    pen needle, diabetic (BD ULTRA-FINE SANDEEP PEN NEEDLES) 32 gauge x 5/32" Ndle Take 1 each by mouth 4 (four) times daily. 120 each 2    promethazine (PHENERGAN) 25 MG tablet Take 1 tablet (25 mg total) by mouth every 6 (six) hours as needed for Nausea. 20 tablet 0    rivaroxaban (XARELTO) 20 mg Tab Take 1 tablet (20 mg total) by mouth daily with dinner or evening meal. 90 tablet 3    trazodone (DESYREL) 50 MG tablet Take 2 tablets (100 mg total) by mouth nightly as needed for Insomnia. 60 tablet 2    TRUE METRIX GLUCOSE TEST " "STRIP Strp TEST FOUR TIMES DAILY BEFORE MEALS  AND EVERY NIGHT 300 strip 2    ondansetron (ZOFRAN) 8 MG tablet Take 1 tablet (8 mg total) by mouth every 8 (eight) hours as needed for Nausea. 270 tablet 3    ondansetron (ZOFRAN-ODT) 4 MG TbDL Take 2 tablets (8 mg total) by mouth every 8 (eight) hours as needed. 60 tablet 0     No current facility-administered medications for this visit.         Objective Findings:  Vital Signs:  /60  Pulse 72  Ht 5' 2" (1.575 m)  Wt 85 kg (187 lb 6.3 oz)  BMI 34.27 kg/m2  Body mass index is 34.27 kg/(m^2).    Physical Exam:  General appearance: alert, cooperative, no distress  HENT: Normocephalic, atraumatic, neck symmetrical, no nasal discharge  Eyes: conjunctivae/corneas clear, PERRL, EOM's intact  Lungs: clear to auscultation bilaterally, no dullness to percussion bilaterally  Heart: regular rate and rhythm without rub; no displacement of the PMI  Abdomen: soft, non-tender; bowel sounds normoactive; no organomegaly  Extremities: extremities symmetric; no clubbing, cyanosis, or edema  Integument: Skin color, texture, turgor normal; no rashes; hair distrubution normal  Neurologic: Alert and oriented X 3, normal strength, normal coordination and gait  Psychiatric: no pressured speech; normal affect; no evidence of impaired cognition    Labs:  Lab Results   Component Value Date    WBC 7.19 01/18/2017    HGB 11.1 (L) 01/18/2017    HCT 34.1 (L) 01/18/2017    MCV 91 01/18/2017     01/18/2017     Lab Results   Component Value Date     (L) 04/10/2017    K 3.5 04/10/2017    CL 92 (L) 04/10/2017    CO2 31 (H) 04/10/2017     (H) 04/10/2017    BUN 28 (H) 04/10/2017    CREATININE 1.4 04/10/2017    CALCIUM 9.9 04/10/2017    PROT 6.9 04/10/2017    ALBUMIN 3.6 04/10/2017    BILITOT 0.6 04/10/2017    ALKPHOS 59 04/10/2017    AST 13 04/10/2017    ALT 7 (L) 04/10/2017         Assessment and Plan:  Kaci Whalen is a 76 y.o. female with:  Dysphagia to solids, no " problems with liquids.  -EGD w EoE bx and dilation, gastric and SB biopsies.  High risk procedure.     Nausea.  Emesis.  Early satiety. Poor appetite.  DM for years.    -cont phenergan prn   -Start zofran prn   -EGD   -Check GES if EGD negative   -Discussed gastroparesis diet, provided handout.      Abdominal pain.  LUQ/LLQ.   Gas and bloating   -Takes trazadone at night for sleep.  -Trial of lactose and artificial sugar elimination  -US of abd     Diarrhea and constiaption.    -colonoscopy w r/l bx   -Will consider fiber, imodium     Fecal incontinence  -Will add fiber pending above    Anemia  -iron studies     Weight loss. Lost 35 lb since November  -EGD/colon     Return in about 3 months (around 7/10/2017).    1. Dysphagia, unspecified type    2. Nausea and vomiting, intractability of vomiting not specified, unspecified vomiting type    3. Early satiety    4. Bloating    5. Diarrhea, unspecified type    6. Constipation, unspecified constipation type    7. Abdominal pain, unspecified location    8. Anemia, unspecified type          Order summary:  Orders Placed This Encounter    US Abdomen Complete    Iron and TIBC    Ferritin    Comprehensive metabolic panel    ondansetron (ZOFRAN-ODT) 4 MG TbDL    ondansetron (ZOFRAN) 8 MG tablet    Case request GI: ESOPHAGOGASTRODUODENOSCOPY (EGD), COLONOSCOPY         Thank you so much for allowing me to participate in the care of Kaci Wolf MD

## 2017-05-04 NOTE — TRANSFER OF CARE
"Anesthesia Transfer of Care Note    Patient: Kaci Whalen    Procedure(s) Performed: Procedure(s) (LRB):  ESOPHAGOGASTRODUODENOSCOPY (EGD) (N/A)  COLONOSCOPY (N/A)    Patient location: PACU    Anesthesia Type: general    Transport from OR: Transported from OR on 6-10 L/min O2 by face mask with adequate spontaneous ventilation    Post pain: adequate analgesia    Post assessment: no apparent anesthetic complications and tolerated procedure well    Post vital signs: stable    Level of consciousness: awake, alert and oriented    Nausea/Vomiting: no nausea/vomiting    Complications: none          Last vitals:   Visit Vitals    BP (!) 144/77    Pulse 69    Temp 36.2 °C (97.2 °F) (Oral)    Resp 18    Ht 5' 2" (1.575 m)    Wt 84.4 kg (186 lb)    SpO2 100%    Breastfeeding No    BMI 34.02 kg/m2     "

## 2017-05-04 NOTE — PATIENT INSTRUCTIONS
Discharge Summary/Instructions for after Colonoscopy with   Biopsy/Polypectomy  Patient Name: Kaci Whalen  Patient MRN: [id number]  Patient YOB: 1940  Thursday, May 04, 2017    Alona Wolf MD  RESTRICTIONS ON ACTIVITY:  - Do not drive a car or operate machinery until the day after the procedure.      - The following day: return to full activity including work.  - For  3 days: No heavy lifting, straining or running.  - Diet: Eat and drink normally unless instructed otherwise.  TREATMENT FOR COMMON SIDE EFFECTS:  - Mild abdominal pain and bloating or excessive gas: rest, eat lightly and   use a heating pad.  SYMPTOMS TO WATCH FOR AND REPORT TO YOUR PHYSICIAN:  1. Severe abdominal pain.  2. Fever within 24 hours after a procedure.  3. A large amount of rectal bleeding. (A small amount of blood from the   rectum is not serious, especially if hemorrhoids are present.  4. Because air was put into your colon during the procedure, expelling large   amounts of air from your rectum is normal.  5. You may not have a bowel movement for 1-3 days because of the colonoscopy   prep.  This is normal.  6. Go directly to the emergency room if you notice any of the following:   Chills and/or fever over 101   Persistent vomiting   Severe abdominal pain, other than gas cramps   Severe chest pain   Black, tarry stools   Any bleeding - exceeding one tablespoon  Your doctor recommends these additional instructions:  If any biopsies were performed, my office will call you in 5 to 6 business   days with any results.  - Discharge patient to home (ambulatory).   - Resume previous diet.   - Continue present medications.   - Await pathology results.   - Repeat colonoscopy in 5 years for screening purposes.   - Return to my office at appointment to be scheduled.   - The findings and recommendations were discussed with the patient.   - Resume Xarelto (rivaroxaban) at prior dose in 3 days.  Refer to primary   physician for further  adjustment of therapy.   You are being discharged to home.   Resume your previous diet.   Continue your present medications.   We are waiting for your pathology results.   Your physician has recommended a repeat colonoscopy in five years for   screening purposes.   Return to my office at appointment to be scheduled.   The findings and recommendations have been discussed with you.   Resume taking Xarelto (rivaroxaban) at your prior dose in 3 days.  Follow up   with your primary physician for further adjustment of your therapy.  Discharge patient to home (ambulatory).   Resume previous diet.   Continue present medications.   Await pathology results.   Repeat colonoscopy in 5 years for screening purposes.   Return to my office at appointment to be scheduled.   The findings and recommendations were discussed with the patient.   Resume Xarelto (rivaroxaban) at prior dose in 3 days.  Refer to primary   physician for further adjustment of therapy.  If you have any questions or problems, please call your physician - Alona Wolf MD at Work:  [phone].    LAB RESULTS: [phone]  [facility], EMERGENCY ROOM PHONE NUMBER: [phone]  IF A COMPLICATION OR EMERGENCY SITUATION ARISES AND YOU ARE UNABLE TO REACH   YOUR PHYSICIAN - GO TO THE EMERGENCY ROOM.  Alona Wolf MD  5/4/2017 9:49:44 AM  This report has been verified and signed electronically.

## 2017-05-04 NOTE — DISCHARGE INSTRUCTIONS
Treating Dysphagia     Talk to your healthcare provider before you take any medicines.    A medical evaluation helps your healthcare provider find the cause of your trouble swallowing, or dysphagia. Your evaluation may include a medical history and some special tests. Your provider will make a treatment plan based on the results of your evaluation. You may need to take medicines. In some cases, your provider may suggest a procedure to stretch or widen your esophagus (esophageal dilation). Or your provider may suggest surgery.  What you can do  To help control dysphagia, follow your treatment plan. Take all medicines as directed. You also can help lessen your dysphagia symptoms by being careful about what and how you eat.  Medicines  You may need medicines, such as those that:  · Reduce or neutralize stomach acids  · Control esophagus muscle spasms  · Treat an allergic disorder of the esophagus that is causing the problem  · Are injected into the esophagus to help symptoms  Esophageal dilation  Dilation is a procedure that your provider can use to widen your esophagus. It is most often done when a narrowing (stricture) of the esophagus is causing the problem. There are many ways your provider can widen your esophagus. He or she can discuss them with you.  Eating tips  · Eat slowly in a relaxed setting.  · Dont talk while you eat.  · Take small bites and chew slowly and thoroughly  · Sit in an upright position during and after meals.  · Ask your provider about any special diets that may help, such as liquid diets.  · If you have trouble swallowing solid foods, you can use a  to mash or purée them.  · Thicken liquids with milk, juice, broth, gravy, or starch to make swallowing easier.  Occupational or speech therapy  Your healthcare provider may recommend that you have an evaluation or sessions with a speech or occupational therapist. These specialists in dysphagia may give you exercises and instructions to  help you eat safely.   Date Last Reviewed: 10/1/2016  © 3563-0848 [employer] [address] [place] [address], [place] [address]. All rights reserved. This information is not intended as a substitute for professional medical care. Always follow your healthcare professional's instructions.        Colorectal Cancer Screening    Colorectal cancer (cancer in the colon or rectum) is a leading cause of cancer deaths in the [place] But it doesnt have to be. When this cancer is found and removed early, the chances of a full recovery are very good. Because colorectal cancer rarely causes symptoms in its early stages, screening for the disease is important. Its even more crucial if you have risk factors for the disease. Learn more about colorectal cancer and its risk factors. Then talk to your healthcare provider about being screened. You could be saving your own life.  Risk factors for colorectal cancer  Your risk of having colorectal cancer increases if you:  · Are 50 years of age or older  · Have a family history or personal history of colorectal cancer or polyps  · Have a personal history of type 2 diabetes, Crohns disease, or ulcerative colitis  · Have an inherited genetic syndrome like Olvera syndrome (also known as HNPCC) or familial adenomatous polyposis (FAP)  · Are very overweight  · Are not physically active  · Smoke  · Drink a lot of alcohol  · Eat a lot of red or processed meat  The colon and rectum  Waste from food you eat enters the colon from the small intestine. As it travels through the colon, the waste (stool) loses water and becomes more solid. Intestinal muscles push it toward the sigmoid--the last section of the colon. Stool then moves into the rectum, where its stored until its ready to leave the body during a bowel movement.  How cancer develops  Polyps are growths that form on the inner lining of the colon or rectum. Most are benign, which means they arent cancerous. But over time, some polyps  can become cancer (malignant). This happens when cells in these polyps begin growing abnormally. In time, malignant cells invade more and more of the colon and rectum. The cancer may also spread to nearby organs or lymph nodes or to other parts of the body. Finding and removing polyps can help prevent cancer from ever forming.  Your screening  Screening means looking for a health problem before you have symptoms. During screening for colorectal cancer, your healthcare provider will ask about your health history, examine you, and do one or more tests.  History and exam  The history and exam involve the following:  · Health history. Your healthcare provider will ask about your health history. Mention if a family member has had colon cancer or polyps. Also mention any health problems you have had in the past.  · Digital rectal exam (NOE). During a NOE, the healthcare provider inserts a lubricated gloved finger into the rectum. The test is painless and takes less than a minute. Healthcare providers agree that this test alone is not enough to screen for colorectal cancer.  Screening test choices  Fecal occult blood test (FOBT) or fecal immunochemical test (FIT)  These tests check for occult blood in stool (blood you cant see). Hidden blood may be a sign of colon polyps or cancer. A small sample of stool is tested for blood in a laboratory. Most often, you collect this sample at home using a kit your healthcare provider gives you. Follow the instructions carefully for using this kit. You might need to avoid certain foods and medicines before the test, as directed.  Barium enema with contrast (double-contrast barium enema)  This test uses X-rays to provide images of the entire colon and rectum. The day before this test, you will need to do a bowel prep to clean out the colon and rectum. A bowel prep is a liquid diet plus strong laxatives or enemas. You will be awake for the test, but you may be given medicine to help you  relax. At the start of the test, a radiologist (a healthcare provider who specializes in imaging tests) places a soft tube into the rectum. The tube is used to fill the colon with a contrast liquid (barium) and air. This can be uncomfortable for some people. The liquid helps the colon show up clearly on the X-rays. Because the test uses X-rays, it exposes you to a small amount of radiation.  Virtual colonoscopy  This exam is also called a CT colonography. It uses a series of X-ray photographs to create a 3-D view of the colon and rectum. The day before the test, you will need to do a bowel prep to clean out your colon. Your healthcare provider will give you instructions on how to do this. During the procedure, you will lie on a table that is part of a special X-ray machine called a CT scanner. A small tube will be placed into your rectum to fill the colon and rectum with air. This can be uncomfortable for some people. Then, the table will move into the machine and pictures will be taken of your colon and rectum. A computer will combine these photos to create a 3-D picture. Because the test uses X-rays, it exposes you to a small amount of radiation.  Scope exams  Here are two types of scope exams:  · Colonoscopy. This test can be used to find and remove polyps anywhere in the colon or rectum. The day before the test, you will do a bowel prep. This is a liquid diet plus a strong laxative solution or an enema. The bowel prep will cleanse your colon. You will be given instructions for this. Just before the test, you are given a medicine to make you sleepy. Then, a long, flexible, lighted tube called a colonoscope is gently inserted into the rectum and guided through the entire colon. Images of the colon are viewed on a video screen. Any polyps that are found are removed and sent to a lab for testing. If a polyp cant be removed, a sample of tissue is taken and the polyp might be removed later during surgery. You will  need to bring someone with you to drive you home after this test.   · Sigmoidoscopy. This test is similar to colonoscopy, but focuses only on the sigmoid colon and rectum. As with colonoscopy, bowel prep must be done the day before this test. It might not need to be as complete as the bowel prep for a colonoscopy. You are awake during the procedure, but you may be given medicine to help you relax. During the test, the healthcare provider guides a thin, flexible, lighted tube called a sigmoidoscope through your rectum and lower colon. The images are displayed on a video screen. Polyps are removed, if possible, and sent to a lab for testing.  Colonoscopy is the only screening test that lets your healthcare provider see the entire colon and rectum. This test also lets your healthcare provider remove any pieces of tissue that need to be looked at by a lab. If something suspicious is found using any other tests, you will likely need a colonoscopy.     When to call your healthcare provider after a test  Call your healthcare provider if you have any of the following after any screening test:  · Bleeding  · Fever of 100.4°F (38°C) or higher, or as directed by your healthcare provider  · Abdominal pain  · Vomiting   Date Last Reviewed: 11/4/2015 © 2000-2016 [employer]. [address] [place] [address], [place] [address]. All rights reserved. This information is not intended as a substitute for professional medical care. Always follow your healthcare professional's instructions.        Upper GI Endoscopy     During endoscopy, a long, flexible tube is used to view the inside of your upper GI tract.      Upper GI endoscopy allows your healthcare provider to look directly into the beginning of your gastrointestinal (GI) tract. The esophagus, stomach, and duodenum (the first part of the small intestine) make up the upper GI tract.   Before the exam  Follow these and any other instructions you are given before your  endoscopy. If you dont follow the healthcare providers instructions carefully, the test may need to be canceled or done over:  · Don't eat or drink anything after midnight the night before your exam. If your exam is in the afternoon, drink only clear liquids in the morning. Don't eat or drink anything for 8 hours before the exam. In some cases, you may be able to take medicines with sips of water until 2 hours before the procedure. Speak with your healthcare provider about this.   · Bring your X-rays and any other test results you have.  · Because you will be sedated, arrange for an adult to drive you home after the exam.  · Tell your healthcare provider before the exam if you are taking any medicines or have any medical problems.  The procedure  Here is what to expect:  · You will lie on the endoscopy table. Usually patients lie on the left side.  · You will be monitored and given oxygen.  · Your throat may be numbed with a spray or gargle. You are given medicine through an intravenous (IV) line that will help you relax and remain comfortable. You may be awake or asleep during the procedure.  · The healthcare provider will put the endoscope in your mouth and down your esophagus. It is thinner than most pieces of food that you swallow. It will not affect your breathing. The medicine helps keep you from gagging.  · Air is put into your GI tract to expand it. It can make you burp.  · During the procedure, the healthcare provider can take biopsies (tissue samples), remove abnormalities, such as polyps, or treat abnormalities through a variety of devices placed through the endoscope. You will not feel this.   · The endoscope carries images of your upper GI tract to a video screen. If you are awake, you may be able to look at the images.  · After the procedure is done, you will rest for a time. An adult must drive you home.  When to call your healthcare provider  Contact your healthcare provider if you have:  · Black  or tarry stools, or blood in your stool  · Fever  · Pain in your belly that does not go away  · Nausea and vomiting, or vomiting blood   Date Last Reviewed: 7/1/2016  © 3265-8688 [employer]. [address] [place] [address], [place] [address]. All rights reserved. This information is not intended as a substitute for professional medical care. Always follow your healthcare professional's instructions.

## 2017-05-04 NOTE — ANESTHESIA PREPROCEDURE EVALUATION
05/04/2017  Kaci Whalen is a 76 y.o., female.    Anesthesia Evaluation    I have reviewed the Patient Summary Reports.    I have reviewed the Nursing Notes.   I have reviewed the Medications.     Review of Systems  Anesthesia Hx:  No problems with previous Anesthesia    Social:  Non-Smoker  Denies Tobacco Use.   EENT/Dental:   Eyes: Visual Impairment Eye Disease: Diabetic Retinopathy Glaucoma:      Cardiovascular:   Exercise tolerance: good Hypertension, well controlled  Functional Capacity 3.5 METS  Hypertension    Pulmonary:   Shortness of breath Sleep Apnea    Renal/:   Chronic Renal Disease (ho proteinuria) proteinuria   Musculoskeletal:  Musculoskeletal General/Symptoms:  Joint Disease:  Arthritis Old injury to left ankle with pins and screws in place   Neurological:   Neuromuscular Disease,    Endocrine:   Diabetes, type 2  Diabetes, Type 2 Diabetes , controlled by insulin, oral hypoglycemics.  Metabolic Disorders, Hyperlipoproteinemia, mixed hyperlipidemia  Dermatological:  Skin Symptoms/Problems: Abrasion Abrasion to left arm due to fall      Physical Exam  General:  Well nourished    Airway/Jaw/Neck:  Airway Findings: Mouth Opening: Normal General Airway Assessment: Adult  Mallampati: II  TM Distance: 4-6 cm  Jaw/Neck Findings:  Limited Ability to Prognath  Neck ROM: Normal ROM     Eyes/Ears/Nose:  Eyes/Ears/Nose Findings:    Dental:  Dental Findings:   Chest/Lungs:  Chest/Lungs Findings: Clear to auscultation, Normal Respiratory Rate     Heart/Vascular:  Heart Findings: Rate: Normal  Rhythm: Regular Rhythm     Abdomen:  Abdomen Findings:  Normal     Musculoskeletal:  Musculoskeletal Findings:    Skin:  Skin Findings:     Mental Status:  Mental Status Findings:  Cooperative, Alert and Oriented         Anesthesia Plan  Type of Anesthesia, risks & benefits discussed:  Anesthesia Type:   general  Patient's Preference:   Intra-op Monitoring Plan: standard ASA monitors  Intra-op Monitoring Plan Comments:   Post Op Pain Control Plan:   Post Op Pain Control Plan Comments:   Induction:   IV  Beta Blocker:  Patient is on a Beta-Blocker and has received one dose within the past 24 hours (No further documentation required).       Informed Consent: Patient understands risks and agrees with Anesthesia plan.  Questions answered. Anesthesia consent signed with patient.  ASA Score: 3     Day of Surgery Review of History & Physical:            Ready For Surgery From Anesthesia Perspective.

## 2017-05-18 ENCOUNTER — TELEPHONE (OUTPATIENT)
Dept: GASTROENTEROLOGY | Facility: CLINIC | Age: 77
End: 2017-05-18

## 2017-05-18 DIAGNOSIS — A04.8 H. PYLORI INFECTION: Primary | ICD-10-CM

## 2017-05-18 RX ORDER — AMOXICILLIN 500 MG/1
1000 CAPSULE ORAL 2 TIMES DAILY
Qty: 28 CAPSULE | Refills: 0 | Status: SHIPPED | OUTPATIENT
Start: 2017-05-18 | End: 2017-06-01

## 2017-05-18 RX ORDER — OMEPRAZOLE 40 MG/1
40 CAPSULE, DELAYED RELEASE ORAL
Qty: 28 CAPSULE | Refills: 0 | Status: ON HOLD | OUTPATIENT
Start: 2017-05-18 | End: 2017-06-08

## 2017-05-18 RX ORDER — CLARITHROMYCIN 500 MG/1
500 TABLET, FILM COATED ORAL 2 TIMES DAILY
Qty: 28 TABLET | Refills: 0 | Status: SHIPPED | OUTPATIENT
Start: 2017-05-18 | End: 2017-06-01

## 2017-05-19 NOTE — TELEPHONE ENCOUNTER
"PATH:    Please let the pt know that pathology from recent procedure shows:    One pre-cancerous polyp in colon   One benign polyp in colon   The polyp in small bowel was not pre-cancerous  H. Pylori infection    No need to repeat enteroscopy   Repeat colonoscopy not indicated due to age    H. pylori infection occurs when a type of bacteria called "H. pylori" infects a person's stomach.  Many people have H. pylori infection. Most of the time, H. pylori infection does not lead to any problems or cause any symptoms. But in some people, H. pylori infection leads to problems that can cause symptoms. These problems can include: ulcers (open sores on the lining of a person's stomach or small intestine) and stomach cancer.  These conditions can sometimes cause pain in the upper belly, bloating, nausea, or vomiting. Doctors do not know why H. pylori infection leads to problems in some people and not others.      It is important that we eliminate this infection and I would like to start     The following treatment for 14 days:  Omeprazole 40 mg twice daily  Clarithromycin 500 mg twice daily  Amoxicillin 1 g twice daily    We will need to check stool H. Pylori to assure the infection has resolved.      Follow up with Dr. Wolf next available   Sincerely,   Dr. Wolf             "

## 2017-05-21 ENCOUNTER — HOSPITAL ENCOUNTER (EMERGENCY)
Facility: OTHER | Age: 77
Discharge: HOME | End: 2017-05-21
Attending: EMERGENCY MEDICINE
Payer: MEDICARE

## 2017-05-21 VITALS
DIASTOLIC BLOOD PRESSURE: 66 MMHG | HEART RATE: 83 BPM | WEIGHT: 165 LBS | TEMPERATURE: 97 F | RESPIRATION RATE: 18 BRPM | OXYGEN SATURATION: 98 % | BODY MASS INDEX: 30.18 KG/M2 | SYSTOLIC BLOOD PRESSURE: 142 MMHG
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
## 2017-05-21 PROCEDURE — 99284 EMERGENCY DEPT VISIT MOD MDM: CPT

## 2017-05-21 PROCEDURE — 25000003 PHARM REV CODE 250: Performed by: EMERGENCY MEDICINE

## 2017-05-21 RX ORDER — TRAMADOL HYDROCHLORIDE 50 MG/1
50 TABLET ORAL EVERY 6 HOURS PRN
Qty: 12 TABLET | Refills: 0 | Status: SHIPPED | OUTPATIENT
Start: 2017-05-21 | End: 2017-05-31

## 2017-05-21 RX ORDER — TRAMADOL HYDROCHLORIDE 50 MG/1
50 TABLET ORAL
Status: COMPLETED | OUTPATIENT
Start: 2017-05-21 | End: 2017-05-21

## 2017-05-21 RX ADMIN — TRAMADOL HYDROCHLORIDE 50 MG: 50 TABLET, COATED ORAL at 05:05

## 2017-05-21 NOTE — ED PROVIDER NOTES
Encounter Date: 5/21/2017       History     Chief Complaint   Patient presents with    Chest Pain     + CP that started about an hour that started while he was in the kitchen cleaning. + sweating + HA .Took ibuprofen with no relief. patient reports SOB with the CP. pain yesterday went away.patient tearful with talking. describes as a something in there won't go down. + nausea.      Review of patient's allergies indicates:   Allergen Reactions    Ace inhibitors      Other reaction(s): cough      Fluorescein Itching     Other reaction(s): Itching    Iodine      Other reaction(s): Itching    Pravastatin Other (See Comments)     Other reaction(s): mouth tingling/numbness    Simvastatin      Other reaction(s): foot swelling       The history is provided by the patient.   Fall   The accident occurred just prior to arrival (The patient does not have chest pain she is here for a fall). The fall occurred while walking. She fell from a height of 1 to 2 ft. She landed on concrete. There was no blood loss. The point of impact was the head. The pain is present in the back and head. The pain is at a severity of 4/10. She was ambulatory at the scene. There was no entrapment after the fall. There was no drug use involved in the accident. There was no alcohol use involved in the accident. Associated symptoms include back pain and headaches.     Past Medical History:   Diagnosis Date    Abdominal pain     Atherosclerosis of aortic arch     noted on CXR 7/1/2015    Atrial fibrillation 05/2016    CHADS 4, on Xarelto    Benign hypertension     Chronic constipation     Chronic diarrhea     Chronic edema     Dercum disease     Multiple painful lipomas    Dysphagia     Gas pain     Glaucoma of both eyes     Hyperlipidemia with target LDL less than 100     Unable to tolerate statins    Immature cataract     Morbid obesity     Nausea & vomiting     Primary osteoarthritis of both knees     Proteinuria     Pulmonary  hypertension mixed group 2 and 3 1/18/2017    Type II or unspecified type diabetes mellitus with neurological manifestations, uncontrolled     Unspecified vitamin D deficiency      Past Surgical History:   Procedure Laterality Date     tenotomy      flexors 2,3 L toes    CATARACT EXTRACTION      COLONOSCOPY N/A 5/4/2017    Procedure: COLONOSCOPY;  Surgeon: Suellen Wolf MD;  Location: [facility] ([address] [place] [address]);  Service: Endoscopy;  Laterality: N/A;  2nd floor due to pulm htn, possible gastroparesis. per Dr Wolf      ok to hold Xarelto 2 days prior to procedure per Dr Driss Dixon    diabetic retinopathy      right    HYSTERECTOMY      knee surgery x2      Left ankle and leg surgery secondary to a fall      left arm fracture      Left cataract      PARTIAL HYSTERECTOMY      Secondary to bleeding    TOE FUSION      2,3 R     Family History   Problem Relation Age of Onset    No Known Problems Daughter     No Known Problems Father     Liver cancer Mother     Bone cancer Daughter     No Known Problems Sister     No Known Problems Brother     No Known Problems Maternal Aunt     No Known Problems Maternal Uncle     No Known Problems Paternal Aunt     No Known Problems Paternal Uncle     No Known Problems Maternal Grandmother     No Known Problems Maternal Grandfather     No Known Problems Paternal Grandmother     No Known Problems Paternal Grandfather     Amblyopia Neg Hx     Blindness Neg Hx     Cancer Neg Hx     Cataracts Neg Hx     Diabetes Neg Hx     Glaucoma Neg Hx     Hypertension Neg Hx     Macular degeneration Neg Hx     Retinal detachment Neg Hx     Strabismus Neg Hx     Stroke Neg Hx     Thyroid disease Neg Hx     Celiac disease Neg Hx     Cirrhosis Neg Hx     Colon cancer Neg Hx     Colon polyps Neg Hx     Crohn's disease Neg Hx     Cystic fibrosis Neg Hx     Esophageal cancer Neg Hx     Hemochromatosis Neg Hx     Inflammatory bowel disease Neg Hx     Irritable  bowel syndrome Neg Hx     Liver disease Neg Hx     Rectal cancer Neg Hx     Stomach cancer Neg Hx     Ulcerative colitis Neg Hx     Montez's disease Neg Hx      Social History   Substance Use Topics    Smoking status: Never Smoker    Smokeless tobacco: Never Used    Alcohol use No     Review of Systems   Constitutional: Negative.    Eyes: Negative.    Respiratory: Negative.    Cardiovascular: Negative.    Gastrointestinal: Negative.    Endocrine: Negative.    Genitourinary: Negative.    Musculoskeletal: Positive for back pain.   Skin: Negative.    Allergic/Immunologic: Negative.    Neurological: Positive for headaches.   Hematological: Negative.    Psychiatric/Behavioral: Negative.    All other systems reviewed and are negative.      Physical Exam     Initial Vitals   BP Pulse Resp Temp SpO2   -- -- -- -- --     Physical Exam    Nursing note and vitals reviewed.  HENT:   Head:       Musculoskeletal:        Lumbar back: She exhibits decreased range of motion.        Back:          ED Course   Procedures  Labs Reviewed - No data to display                 Imaging Results          CT Head Without Contrast (Final result)  Result time 05/21/17 17:30:08    Final result by Freeman Sweeney MD (05/21/17 17:30:08)                 Narrative:    Study Desc:   CT HEAD WITHOUT CONTRAST  Reason: DX:  W19.XXXA Unspecified fall, initial encounter;  Clinical History: Status post fall.     Comparison: None.     TECHNIQUE: CT images were obtained from the foramen magnum to the vertex without the use   of intravenous contrast on a multidetector CT. Coronal and sagittal reconstructions were   obtained.     CT radiation dose: The total exam DLP is 646.25 mGy-cm     FINDINGS:  There is mild periventricular white matter hypodensity, likely related to chronic medical   a ischemic changes.  The brain parenchyma otherwise appears unremarkable. There is no   mass effect, midline shift or edema. There are no intra-axial or  extra-axial fluid   collections, intraventricular or intraparenchymal hemorrhage. The ventricles and cisterns   are normal.     The visualized orbits and paranasal sinuses are unremarkable. The mastoid air cells are   clear.     There is soft tissue swelling and subcutaneous hematoma identified in the left posterior   parietal scalp.  No skull fractures are seen.  Incidental note is made of a 3.2 x 2.6 x 2.2 cm pedunculated osseous excrescence arising   from the outer table of the posterior occipital bone.     If there is further concern for intracranial pathology or acute stroke, MRI of the brain   may be performed for complete assessment.     IMPRESSION:  1.  Soft tissue swelling and subcutaneous hematoma in the left posterior parietal scalp.  2.  No noncontrast CT evidence of acute intracranial hemorrhage, mass effect, or midline   shift.  3.  Mild periventricular white matter hypodensity likely related to chronic microvascular   ischemic changes.  4.  3.2 x 2.6 x 2.2 cm pedunculated osseous lesion arising from the posterior occipital   bone, likely an osteoma.     SL: 24 Signed by: Freeman Sweeney MD  2017-05-21 17:30:07 [CDT]                             X-Ray Lumbar Spine Ap And Lateral (Final result)  Result time 05/21/17 17:24:28    Final result by Dashawn Jin MD (05/21/17 17:24:28)                 Narrative:    Study Desc:   XR LUMBAR SPINE AP AND LATERAL  Clinical History: Fall  Comparison: None     FINDINGS:  AP, lateral, and coned-down lateral views of the lumbar spine are performed.     There are 5 nonrib-bearing lumbar type vertebral bodies identified with mild dextro   scoliotic curvature centered around L3. No significant spondylolisthesis or evidence of   spondylolysis.     Vertebral body heights are maintained. No acute compression fracture identified.   Posterior elements, spinous processes and transverse processes are unremarkable.     Moderate degenerative disc disease throughout the  lumbar spine with slight disc space   narrowing and bulky marginal endplate osteophyte formation asymmetrically more prominent   on the left side.     The paraspinal soft tissues are unremarkable.     The visualized sacroiliac joints are unremarkable.     If there is further concern or neurological abnormalities on clinical exam, MRI or CT of   the lumbar spine may be performed for complete assessment.     IMPRESSION:  1.  No acute radiographic abnormality of the lumbar spine.  2.  Moderate multilevel degenerative disc disease of the lumbar spine.      24: Signed by: Dashawn Jin MD  2017-05-21 17:24:26[-0500]                                          ED Course     Clinical Impression:   The primary encounter diagnosis was Fall, accidental, initial encounter. A diagnosis of Fall was also pertinent to this visit.          Driss Alcantara MD  05/21/17 [id number]

## 2017-05-22 ENCOUNTER — TELEPHONE (OUTPATIENT)
Dept: FAMILY MEDICINE | Facility: CLINIC | Age: 77
End: 2017-05-22

## 2017-05-22 ENCOUNTER — PATIENT MESSAGE (OUTPATIENT)
Dept: FAMILY MEDICINE | Facility: CLINIC | Age: 77
End: 2017-05-22

## 2017-05-22 DIAGNOSIS — I48.20 CHRONIC ATRIAL FIBRILLATION: ICD-10-CM

## 2017-05-22 NOTE — TELEPHONE ENCOUNTER
----- Message from Sandyrhett Bah sent at 5/22/2017 12:12 PM CDT -----  Contact: Liliam - Daughter   Request to speak to the nurse regarding about the pt's recent visit to the ER. Please contact Liliam at [phone]. Thanks!

## 2017-05-22 NOTE — TELEPHONE ENCOUNTER
Left message to voicemail [phone] to return call to clinic re: patient's most recent ED visit

## 2017-05-23 NOTE — TELEPHONE ENCOUNTER
Spoke with patient's daughter. She states that her mother is doing well. No fractures from fall.     Patient scheduled for follow up 6-16-17    She would like to have labs 6-9-17 at [facility] lab.     Please order labs to link to lab appointment

## 2017-05-25 ENCOUNTER — TELEPHONE (OUTPATIENT)
Dept: GASTROENTEROLOGY | Facility: CLINIC | Age: 77
End: 2017-05-25

## 2017-05-25 NOTE — TELEPHONE ENCOUNTER
Spoke with patient daughter. Results was given and patient daughter expressed that she understood and that she will make sure her mom follow the instruction per medication. Appointment was already made.

## 2017-05-28 ENCOUNTER — NURSE TRIAGE (OUTPATIENT)
Dept: ADMINISTRATIVE | Facility: CLINIC | Age: 77
End: 2017-05-28

## 2017-05-28 NOTE — TELEPHONE ENCOUNTER
"Daughter called re fall last week. Seen in ED. CT done - fine. PCP appt 6/16. Pt states that she is has HA. bruise larger on head. Pt falls a lot. Pt on xaralto. On tramadol.     Reason for Disposition   [1] Bruise on head/face, chest, or abdomen AND [2]  taking Coumadin (warfarin) or other strong blood thinner, or known bleeding disorder (e.g., thrombocytopenia)    Answer Assessment - Initial Assessment Questions  1. APPEARANCE of BRUISE: "Describe the bruise."       Bruise on left back of head   2. SIZE: "How large is the bruise?"      Half dollar or size of thumb.   3. NUMBER: "How many bruises are there?"       One bruise on head -  4. LOCATION: "Where is the bruise located?"      As above   5. ONSET: "How long ago did the bruise occur?"      5/21  6. CAUSE: "Tell me how it happened."     Fell and hit tile   7. MEDICAL HISTORY: "Do you have any medical problems that can cause easy bruising or bleeding?" (e.g., leukemia, liver disease, recent chemotherapy)     No   8. MEDICATIONS : "Do you take any medications which thin the blood such as: aspirin, heparin, ibuprofen (NSAIDS), Plavix, or Coumadin?"     xaralto , on abx for stomach infection. Scopes done recently - infection found and on abx   9. OTHER SYMPTOMS: "Do you have any other symptoms?"  (e.g., weakness, dizziness, pain, fever, nosebleed, blood in urine/stool)    Nauseous, eating and drinking - good clear uop -  Not too weak to stand    Protocols used: [facility]-  rec ED per protocol. Pt refuses ED. Daughter states she will take her to ED if sx worsen & will call for appt in am. Call back with questions.     "

## 2017-05-29 ENCOUNTER — TELEPHONE (OUTPATIENT)
Dept: FAMILY MEDICINE | Facility: CLINIC | Age: 77
End: 2017-05-29

## 2017-05-29 NOTE — TELEPHONE ENCOUNTER
Spoke with patient's daughter. Follow up triage call from 5-28-17. She states that patient was not taking Tramadol for pain that was given by the ED MD because it wasn't given by Dr. Hung. And wanted to be sure that there was no interference with other medications. She convinced patient to take medication as instructed for pain. Advised to come in sooner for follow up on fall.     Patient refused and wanted to wait until already scheduled appointment 6-16-17. Advised that if patient begins to feel bad, come in for sooner visit.     Appointment scheduled 6-2-2017, if patient feels fine, she will cancel appointment

## 2017-05-30 DIAGNOSIS — E11.40 TYPE 2 DIABETES, CONTROLLED, WITH NEUROPATHY: ICD-10-CM

## 2017-05-30 RX ORDER — METFORMIN HYDROCHLORIDE 1000 MG/1
1000 TABLET ORAL
COMMUNITY
End: 2017-05-30 | Stop reason: SDUPTHER

## 2017-05-30 RX ORDER — METFORMIN HYDROCHLORIDE 1000 MG/1
TABLET ORAL
Qty: 270 TABLET | Refills: 0 | Status: ON HOLD | OUTPATIENT
Start: 2017-05-30 | End: 2017-06-10 | Stop reason: HOSPADM

## 2017-06-01 ENCOUNTER — PATIENT MESSAGE (OUTPATIENT)
Dept: FAMILY MEDICINE | Facility: CLINIC | Age: 77
End: 2017-06-01

## 2017-06-01 NOTE — TELEPHONE ENCOUNTER
Spoke with patient's daughter this morning. She had already cancelled patient's 8:20 am appointment for tomorrow. She took patient to the ED because she was being very combative. She did not know if this was related to her fall or not. 911 was called and patient was taken to the ED. Advised that I would attempt to reschedule 8:20 am appointment for tomorrow. When I attempted to reschedule,  the max spots for appointments was already filled, and I could to reschedule the 8:20 am. So, I scheduled the 2 pm appointment to give patient an appointment spot for tomorrow pending ED visit results.    The above information was e mailed to Liliam at 8:44 am.     Received return phone call from Liliam stating that she could not bring patient in at that time and to cancel the 2 pm appointment

## 2017-06-01 NOTE — TELEPHONE ENCOUNTER
----- Message from Sana Edwards sent at 6/1/2017  3:25 PM CDT -----  Contact: Daughter-Liliam  Pt's daughter states she received reminder call notifying of her of pt's apt tomorrow @ 2:00 but she was told that pt can come in for 8:00 am. Liliam can be reached @ [phone].

## 2017-06-04 ENCOUNTER — PATIENT MESSAGE (OUTPATIENT)
Dept: FAMILY MEDICINE | Facility: CLINIC | Age: 77
End: 2017-06-04

## 2017-06-05 NOTE — TELEPHONE ENCOUNTER
----- Message from Ana Slaughter sent at 6/5/2017 11:03 AM CDT -----  Contact: daughter  Pt calling to get a f/u on [facility] message. Please call Liliam to discuss to [phone].

## 2017-06-05 NOTE — TELEPHONE ENCOUNTER
Spoke with patient's daughter Liliam.     Patient not sleeping after taking Valium. Went to sleep at 2am and was back up at 5am.     Been having loose to watery stools. From 8 pm yesterday to 2 am when patient went to sleep, she had 7 stools.     Talking about  family members as if they are present.     Advised office visit    Daughter agreed    Appointment scheduled  17

## 2017-06-06 ENCOUNTER — OFFICE VISIT (OUTPATIENT)
Dept: FAMILY MEDICINE | Facility: CLINIC | Age: 77
DRG: 683 | End: 2017-06-06
Payer: MEDICARE

## 2017-06-06 ENCOUNTER — HOSPITAL ENCOUNTER (INPATIENT)
Facility: HOSPITAL | Age: 77
LOS: 4 days | Discharge: PSYCHIATRIC OR SUBSTANCE USE FACILITY | DRG: 683 | End: 2017-06-10
Attending: EMERGENCY MEDICINE | Admitting: HOSPITALIST
Payer: MEDICARE

## 2017-06-06 VITALS
SYSTOLIC BLOOD PRESSURE: 144 MMHG | BODY MASS INDEX: 29.57 KG/M2 | TEMPERATURE: 99 F | WEIGHT: 160.69 LBS | OXYGEN SATURATION: 97 % | RESPIRATION RATE: 20 BRPM | HEIGHT: 62 IN | DIASTOLIC BLOOD PRESSURE: 66 MMHG | HEART RATE: 81 BPM

## 2017-06-06 DIAGNOSIS — N17.9 AKI (ACUTE KIDNEY INJURY): Primary | ICD-10-CM

## 2017-06-06 DIAGNOSIS — R41.82 ALTERED MENTAL STATUS, UNSPECIFIED: ICD-10-CM

## 2017-06-06 DIAGNOSIS — F30.9 MANIA: ICD-10-CM

## 2017-06-06 DIAGNOSIS — F30.10 MANIC BEHAVIOR: Primary | ICD-10-CM

## 2017-06-06 DIAGNOSIS — F05 ACUTE HYPERACTIVE DELIRIUM DUE TO ANOTHER MEDICAL CONDITION: ICD-10-CM

## 2017-06-06 PROBLEM — I10 ESSENTIAL HYPERTENSION: Status: ACTIVE | Noted: 2017-06-06

## 2017-06-06 LAB
ALBUMIN SERPL BCP-MCNC: 4 G/DL
ALP SERPL-CCNC: 61 U/L
ALT SERPL W/O P-5'-P-CCNC: 12 U/L
AMPHET+METHAMPHET UR QL: NEGATIVE
ANION GAP SERPL CALC-SCNC: 13 MMOL/L
APAP SERPL-MCNC: <3 UG/ML
AST SERPL-CCNC: 25 U/L
BACTERIA #/AREA URNS AUTO: NORMAL /HPF
BARBITURATES UR QL SCN>200 NG/ML: NEGATIVE
BASOPHILS # BLD AUTO: 0.01 K/UL
BASOPHILS NFR BLD: 0.2 %
BENZODIAZ UR QL SCN>200 NG/ML: NEGATIVE
BILIRUB SERPL-MCNC: 0.9 MG/DL
BILIRUB UR QL STRIP: NEGATIVE
BUN SERPL-MCNC: 29 MG/DL
BZE UR QL SCN: NEGATIVE
CALCIUM SERPL-MCNC: 10.1 MG/DL
CANNABINOIDS UR QL SCN: NEGATIVE
CAOX CRY UR QL COMP ASSIST: NORMAL
CHLORIDE SERPL-SCNC: 94 MMOL/L
CLARITY UR REFRACT.AUTO: ABNORMAL
CO2 SERPL-SCNC: 25 MMOL/L
COLOR UR AUTO: ABNORMAL
CREAT SERPL-MCNC: 2.3 MG/DL
CREAT UR-MCNC: 50 MG/DL
DIFFERENTIAL METHOD: ABNORMAL
EOSINOPHIL # BLD AUTO: 0.1 K/UL
EOSINOPHIL NFR BLD: 1.8 %
ERYTHROCYTE [DISTWIDTH] IN BLOOD BY AUTOMATED COUNT: 13.4 %
EST. GFR  (AFRICAN AMERICAN): 23.4 ML/MIN/1.73 M^2
EST. GFR  (NON AFRICAN AMERICAN): 20.3 ML/MIN/1.73 M^2
ETHANOL SERPL-MCNC: <10 MG/DL
GLUCOSE SERPL-MCNC: 121 MG/DL
GLUCOSE UR QL STRIP: NEGATIVE
HCT VFR BLD AUTO: 32.5 %
HGB BLD-MCNC: 11.2 G/DL
HGB UR QL STRIP: NEGATIVE
KETONES UR QL STRIP: NEGATIVE
LEUKOCYTE ESTERASE UR QL STRIP: ABNORMAL
LYMPHOCYTES # BLD AUTO: 1.3 K/UL
LYMPHOCYTES NFR BLD: 19.8 %
MCH RBC QN AUTO: 30.3 PG
MCHC RBC AUTO-ENTMCNC: 34.5 %
MCV RBC AUTO: 88 FL
METHADONE UR QL SCN>300 NG/ML: NEGATIVE
MICROSCOPIC COMMENT: NORMAL
MONOCYTES # BLD AUTO: 0.5 K/UL
MONOCYTES NFR BLD: 7.9 %
NEUTROPHILS # BLD AUTO: 4.6 K/UL
NEUTROPHILS NFR BLD: 70.1 %
NITRITE UR QL STRIP: NEGATIVE
OPIATES UR QL SCN: NEGATIVE
PCP UR QL SCN>25 NG/ML: NEGATIVE
PH UR STRIP: 7 [PH] (ref 5–8)
PLATELET # BLD AUTO: 244 K/UL
PMV BLD AUTO: 8.7 FL
POCT GLUCOSE: 128 MG/DL (ref 70–110)
POTASSIUM SERPL-SCNC: 4.1 MMOL/L
PROT SERPL-MCNC: 7.1 G/DL
PROT UR QL STRIP: NEGATIVE
RBC # BLD AUTO: 3.7 M/UL
RBC #/AREA URNS AUTO: 1 /HPF (ref 0–4)
SALICYLATES SERPL-MCNC: <5 MG/DL
SODIUM SERPL-SCNC: 132 MMOL/L
SP GR UR STRIP: 1.01 (ref 1–1.03)
SQUAMOUS #/AREA URNS AUTO: 1 /HPF
TOXICOLOGY INFORMATION: NORMAL
TSH SERPL DL<=0.005 MIU/L-ACNC: 2.26 UIU/ML
URN SPEC COLLECT METH UR: ABNORMAL
UROBILINOGEN UR STRIP-ACNC: NEGATIVE EU/DL
WBC # BLD AUTO: 6.6 K/UL
WBC #/AREA URNS AUTO: 1 /HPF (ref 0–5)

## 2017-06-06 PROCEDURE — 80329 ANALGESICS NON-OPIOID 1 OR 2: CPT

## 2017-06-06 PROCEDURE — 99285 EMERGENCY DEPT VISIT HI MDM: CPT | Mod: 25

## 2017-06-06 PROCEDURE — 93005 ELECTROCARDIOGRAM TRACING: CPT

## 2017-06-06 PROCEDURE — 99999 PR PBB SHADOW E&M-EST. PATIENT-LVL III: CPT | Mod: PBBFAC,,, | Performed by: INTERNAL MEDICINE

## 2017-06-06 PROCEDURE — 80320 DRUG SCREEN QUANTALCOHOLS: CPT

## 2017-06-06 PROCEDURE — 84443 ASSAY THYROID STIM HORMONE: CPT

## 2017-06-06 PROCEDURE — 99215 OFFICE O/P EST HI 40 MIN: CPT | Mod: S$GLB,,, | Performed by: INTERNAL MEDICINE

## 2017-06-06 PROCEDURE — 11000001 HC ACUTE MED/SURG PRIVATE ROOM

## 2017-06-06 PROCEDURE — 25000003 PHARM REV CODE 250: Performed by: EMERGENCY MEDICINE

## 2017-06-06 PROCEDURE — 81003 URINALYSIS AUTO W/O SCOPE: CPT

## 2017-06-06 PROCEDURE — 99223 1ST HOSP IP/OBS HIGH 75: CPT | Mod: AI,GC,, | Performed by: HOSPITALIST

## 2017-06-06 PROCEDURE — 80053 COMPREHEN METABOLIC PANEL: CPT

## 2017-06-06 PROCEDURE — 96360 HYDRATION IV INFUSION INIT: CPT

## 2017-06-06 PROCEDURE — 85025 COMPLETE CBC W/AUTO DIFF WBC: CPT

## 2017-06-06 PROCEDURE — 81001 URINALYSIS AUTO W/SCOPE: CPT

## 2017-06-06 PROCEDURE — 80307 DRUG TEST PRSMV CHEM ANLYZR: CPT

## 2017-06-06 PROCEDURE — 80307 DRUG TEST PRSMV CHEM ANLYZR: CPT | Mod: 59

## 2017-06-06 PROCEDURE — 99285 EMERGENCY DEPT VISIT HI MDM: CPT | Mod: ,,, | Performed by: EMERGENCY MEDICINE

## 2017-06-06 PROCEDURE — 1159F MED LIST DOCD IN RCRD: CPT | Mod: S$GLB,,, | Performed by: INTERNAL MEDICINE

## 2017-06-06 PROCEDURE — 1126F AMNT PAIN NOTED NONE PRSNT: CPT | Mod: S$GLB,,, | Performed by: INTERNAL MEDICINE

## 2017-06-06 PROCEDURE — 82962 GLUCOSE BLOOD TEST: CPT

## 2017-06-06 PROCEDURE — 93010 ELECTROCARDIOGRAM REPORT: CPT | Mod: S$GLB,,, | Performed by: INTERNAL MEDICINE

## 2017-06-06 RX ORDER — INSULIN ASPART 100 [IU]/ML
10 INJECTION, SOLUTION INTRAVENOUS; SUBCUTANEOUS
Status: DISCONTINUED | OUTPATIENT
Start: 2017-06-07 | End: 2017-06-07

## 2017-06-06 RX ORDER — METOPROLOL SUCCINATE 50 MG/1
100 TABLET, EXTENDED RELEASE ORAL DAILY
Status: DISCONTINUED | OUTPATIENT
Start: 2017-06-07 | End: 2017-06-10 | Stop reason: HOSPADM

## 2017-06-06 RX ORDER — IBUPROFEN 200 MG
24 TABLET ORAL
Status: DISCONTINUED | OUTPATIENT
Start: 2017-06-06 | End: 2017-06-10 | Stop reason: HOSPADM

## 2017-06-06 RX ORDER — LOSARTAN POTASSIUM AND HYDROCHLOROTHIAZIDE 25; 100 MG/1; MG/1
1 TABLET ORAL DAILY
Status: DISCONTINUED | OUTPATIENT
Start: 2017-06-07 | End: 2017-06-06

## 2017-06-06 RX ORDER — IBUPROFEN 200 MG
16 TABLET ORAL
Status: DISCONTINUED | OUTPATIENT
Start: 2017-06-06 | End: 2017-06-10 | Stop reason: HOSPADM

## 2017-06-06 RX ORDER — FUROSEMIDE 20 MG/1
20 TABLET ORAL DAILY
Status: ON HOLD | COMMUNITY
Start: 2017-05-23 | End: 2017-06-10 | Stop reason: HOSPADM

## 2017-06-06 RX ORDER — SODIUM CHLORIDE 9 MG/ML
INJECTION, SOLUTION INTRAVENOUS CONTINUOUS
Status: DISCONTINUED | OUTPATIENT
Start: 2017-06-06 | End: 2017-06-07

## 2017-06-06 RX ORDER — ACETAMINOPHEN 325 MG/1
650 TABLET ORAL EVERY 6 HOURS PRN
Status: DISCONTINUED | OUTPATIENT
Start: 2017-06-07 | End: 2017-06-10 | Stop reason: HOSPADM

## 2017-06-06 RX ORDER — SODIUM CHLORIDE, SODIUM LACTATE, POTASSIUM CHLORIDE, CALCIUM CHLORIDE 600; 310; 30; 20 MG/100ML; MG/100ML; MG/100ML; MG/100ML
INJECTION, SOLUTION INTRAVENOUS CONTINUOUS
Status: DISCONTINUED | OUTPATIENT
Start: 2017-06-06 | End: 2017-06-06

## 2017-06-06 RX ORDER — GLUCAGON 1 MG
1 KIT INJECTION
Status: DISCONTINUED | OUTPATIENT
Start: 2017-06-06 | End: 2017-06-10 | Stop reason: HOSPADM

## 2017-06-06 RX ADMIN — SODIUM CHLORIDE, SODIUM LACTATE, POTASSIUM CHLORIDE, AND CALCIUM CHLORIDE 500 ML: .6; .31; .03; .02 INJECTION, SOLUTION INTRAVENOUS at 09:06

## 2017-06-06 NOTE — ED TRIAGE NOTES
Pt comes to ER with PEC from clinic.  She is AAOx3.  Continuous chatter and talking with flight of ideas.  Pleasant and cooperative

## 2017-06-06 NOTE — PROGRESS NOTES
Subjective:       Patient ID: Kaci Whalen is a 76 y.o. female.    Chief Complaint: Altered Mental Status    CC: not sleeping    HPI: 77 y/o w/ h/o DM, CHF MIGUEL on cpap presents with daughter for five days of unusual behavior. Daughter reports for last five days michaeltent has not been sleeping is at times combative and will fight her from assistance with bath. Frequently discussing God and speaking to  family members. Recent history is only significant for treatment with amoxicillin, clarithromycin and omeprazole for H.pylori infection (EGD biopsy proven). Daughter also reports more frequent loose stool during this time. There has been no falls during this time.  She has no prior history of psychiatric hospitalization. Does have history of depression treated in past with SSRI and most recently trazodone.       Review of Systems   Constitutional: Negative for activity change, appetite change, fatigue, fever and unexpected weight change.   HENT: Negative for ear pain, rhinorrhea and sore throat.    Eyes: Negative for discharge and visual disturbance.   Respiratory: Negative for chest tightness, shortness of breath and wheezing.    Cardiovascular: Negative for chest pain, palpitations and leg swelling.   Gastrointestinal: Negative for abdominal pain, constipation and diarrhea.   Endocrine: Negative for cold intolerance and heat intolerance.   Genitourinary: Negative for dysuria and hematuria.   Musculoskeletal: Negative for joint swelling and neck stiffness.   Skin: Negative for rash.   Neurological: Negative for dizziness, syncope, weakness and headaches.   Psychiatric/Behavioral: Positive for agitation and confusion. Negative for suicidal ideas. The patient is nervous/anxious and is hyperactive.        Objective:     Vitals:    17 1502   BP: (!) 144/66   BP Location: Left arm   Patient Position: Sitting   BP Method: Manual   Pulse: 81   Resp: 20   Temp: 98.7 °F (37.1 °C)   TempSrc: Oral   SpO2: 97%  "  Weight: 72.9 kg (160 lb 11.5 oz)   Height: 5' 2" (1.575 m)          Physical Exam   Eyes: No scleral icterus.   Cardiovascular:   Regular radial pulse no LE edema   Abdominal:   No tenderness to deep palpation of obese belly   Neurological:   No appreciable astrexisis no cogwheeling of bilateral wrists   Psychiatric:   Pressured speech switching between Swazi and english, with periods of referencing family members not in room.        Assessment:       1. Manic behavior        Plan:    discussed with transfer center to ED at [facility] for medical evaluation and psych placement. Case jj with Dr. Figueroa in ED agrees to evaluate. PEC initiated and called to  (to go with transport via ambulance)    > 20 minutes spent in coordination of care and review of medical records      "

## 2017-06-07 ENCOUNTER — PATIENT MESSAGE (OUTPATIENT)
Dept: FAMILY MEDICINE | Facility: CLINIC | Age: 77
End: 2017-06-07

## 2017-06-07 PROBLEM — N18.30 CKD (CHRONIC KIDNEY DISEASE), STAGE III: Status: ACTIVE | Noted: 2017-06-07

## 2017-06-07 PROBLEM — F30.9 MANIA: Status: ACTIVE | Noted: 2017-06-07

## 2017-06-07 LAB
ALBUMIN SERPL BCP-MCNC: 3.7 G/DL
ALBUMIN SERPL BCP-MCNC: 3.7 G/DL
ALP SERPL-CCNC: 59 U/L
ALP SERPL-CCNC: 59 U/L
ALT SERPL W/O P-5'-P-CCNC: 11 U/L
ALT SERPL W/O P-5'-P-CCNC: 11 U/L
ANION GAP SERPL CALC-SCNC: 10 MMOL/L
ANION GAP SERPL CALC-SCNC: 10 MMOL/L
AST SERPL-CCNC: 21 U/L
AST SERPL-CCNC: 21 U/L
BASOPHILS # BLD AUTO: 0.01 K/UL
BASOPHILS NFR BLD: 0.1 %
BILIRUB SERPL-MCNC: 0.8 MG/DL
BILIRUB SERPL-MCNC: 0.8 MG/DL
BUN SERPL-MCNC: 24 MG/DL
BUN SERPL-MCNC: 24 MG/DL
CALCIUM SERPL-MCNC: 9.8 MG/DL
CALCIUM SERPL-MCNC: 9.8 MG/DL
CHLORIDE SERPL-SCNC: 101 MMOL/L
CHLORIDE SERPL-SCNC: 101 MMOL/L
CO2 SERPL-SCNC: 26 MMOL/L
CO2 SERPL-SCNC: 26 MMOL/L
CREAT SERPL-MCNC: 1.8 MG/DL
CREAT SERPL-MCNC: 1.8 MG/DL
CREAT UR-MCNC: 35 MG/DL
DIFFERENTIAL METHOD: ABNORMAL
EOSINOPHIL # BLD AUTO: 0.1 K/UL
EOSINOPHIL NFR BLD: 1.7 %
ERYTHROCYTE [DISTWIDTH] IN BLOOD BY AUTOMATED COUNT: 13.5 %
EST. GFR  (AFRICAN AMERICAN): 31.5 ML/MIN/1.73 M^2
EST. GFR  (AFRICAN AMERICAN): 31.5 ML/MIN/1.73 M^2
EST. GFR  (NON AFRICAN AMERICAN): 27.3 ML/MIN/1.73 M^2
EST. GFR  (NON AFRICAN AMERICAN): 27.3 ML/MIN/1.73 M^2
ESTIMATED AVG GLUCOSE: 131 MG/DL
GLUCOSE SERPL-MCNC: 44 MG/DL
GLUCOSE SERPL-MCNC: 44 MG/DL
HBA1C MFR BLD HPLC: 6.2 %
HCT VFR BLD AUTO: 34 %
HGB BLD-MCNC: 11.6 G/DL
LYMPHOCYTES # BLD AUTO: 1.7 K/UL
LYMPHOCYTES NFR BLD: 24.9 %
MAGNESIUM SERPL-MCNC: 1.8 MG/DL
MCH RBC QN AUTO: 30.1 PG
MCHC RBC AUTO-ENTMCNC: 34.1 %
MCV RBC AUTO: 88 FL
MONOCYTES # BLD AUTO: 0.5 K/UL
MONOCYTES NFR BLD: 7 %
NEUTROPHILS # BLD AUTO: 4.6 K/UL
NEUTROPHILS NFR BLD: 66.2 %
PHOSPHATE SERPL-MCNC: 3 MG/DL
PLATELET # BLD AUTO: 260 K/UL
PMV BLD AUTO: 9.3 FL
POCT GLUCOSE: 109 MG/DL (ref 70–110)
POCT GLUCOSE: 109 MG/DL (ref 70–110)
POCT GLUCOSE: 118 MG/DL (ref 70–110)
POCT GLUCOSE: 121 MG/DL (ref 70–110)
POCT GLUCOSE: 183 MG/DL (ref 70–110)
POCT GLUCOSE: 29 MG/DL (ref 70–110)
POCT GLUCOSE: 38 MG/DL (ref 70–110)
POCT GLUCOSE: 42 MG/DL (ref 70–110)
POCT GLUCOSE: 43 MG/DL (ref 70–110)
POCT GLUCOSE: 50 MG/DL (ref 70–110)
POCT GLUCOSE: 53 MG/DL (ref 70–110)
POCT GLUCOSE: 60 MG/DL (ref 70–110)
POTASSIUM SERPL-SCNC: 3.6 MMOL/L
POTASSIUM SERPL-SCNC: 3.6 MMOL/L
PROT SERPL-MCNC: 6.6 G/DL
PROT SERPL-MCNC: 6.6 G/DL
RBC # BLD AUTO: 3.86 M/UL
SODIUM SERPL-SCNC: 137 MMOL/L
SODIUM SERPL-SCNC: 137 MMOL/L
SODIUM UR-SCNC: 84 MMOL/L
UUN UR-MCNC: 235 MG/DL
WBC # BLD AUTO: 6.88 K/UL

## 2017-06-07 PROCEDURE — 25000003 PHARM REV CODE 250: Performed by: HOSPITALIST

## 2017-06-07 PROCEDURE — 84100 ASSAY OF PHOSPHORUS: CPT

## 2017-06-07 PROCEDURE — 80053 COMPREHEN METABOLIC PANEL: CPT

## 2017-06-07 PROCEDURE — 25000003 PHARM REV CODE 250: Performed by: STUDENT IN AN ORGANIZED HEALTH CARE EDUCATION/TRAINING PROGRAM

## 2017-06-07 PROCEDURE — 86592 SYPHILIS TEST NON-TREP QUAL: CPT

## 2017-06-07 PROCEDURE — 25000003 PHARM REV CODE 250: Performed by: PSYCHIATRY & NEUROLOGY

## 2017-06-07 PROCEDURE — 83036 HEMOGLOBIN GLYCOSYLATED A1C: CPT

## 2017-06-07 PROCEDURE — 63600175 PHARM REV CODE 636 W HCPCS: Performed by: STUDENT IN AN ORGANIZED HEALTH CARE EDUCATION/TRAINING PROGRAM

## 2017-06-07 PROCEDURE — 90792 PSYCH DIAG EVAL W/MED SRVCS: CPT | Mod: ,,, | Performed by: PSYCHIATRY & NEUROLOGY

## 2017-06-07 PROCEDURE — 11000001 HC ACUTE MED/SURG PRIVATE ROOM

## 2017-06-07 PROCEDURE — 85025 COMPLETE CBC W/AUTO DIFF WBC: CPT

## 2017-06-07 PROCEDURE — 83735 ASSAY OF MAGNESIUM: CPT

## 2017-06-07 PROCEDURE — 82570 ASSAY OF URINE CREATININE: CPT

## 2017-06-07 PROCEDURE — 36415 COLL VENOUS BLD VENIPUNCTURE: CPT

## 2017-06-07 PROCEDURE — 99233 SBSQ HOSP IP/OBS HIGH 50: CPT | Mod: GC,,, | Performed by: HOSPITALIST

## 2017-06-07 PROCEDURE — 97161 PT EVAL LOW COMPLEX 20 MIN: CPT

## 2017-06-07 PROCEDURE — 84540 ASSAY OF URINE/UREA-N: CPT

## 2017-06-07 PROCEDURE — 63600175 PHARM REV CODE 636 W HCPCS: Performed by: HOSPITALIST

## 2017-06-07 PROCEDURE — 84300 ASSAY OF URINE SODIUM: CPT

## 2017-06-07 PROCEDURE — 97166 OT EVAL MOD COMPLEX 45 MIN: CPT

## 2017-06-07 PROCEDURE — 97535 SELF CARE MNGMENT TRAINING: CPT

## 2017-06-07 PROCEDURE — 86703 HIV-1/HIV-2 1 RESULT ANTBDY: CPT

## 2017-06-07 RX ORDER — SODIUM CHLORIDE 9 MG/ML
INJECTION, SOLUTION INTRAVENOUS CONTINUOUS
Status: DISCONTINUED | OUTPATIENT
Start: 2017-06-07 | End: 2017-06-10

## 2017-06-07 RX ORDER — INSULIN ASPART 100 [IU]/ML
0-5 INJECTION, SOLUTION INTRAVENOUS; SUBCUTANEOUS
Status: DISCONTINUED | OUTPATIENT
Start: 2017-06-07 | End: 2017-06-10 | Stop reason: HOSPADM

## 2017-06-07 RX ORDER — RAMELTEON 8 MG/1
8 TABLET ORAL NIGHTLY PRN
Status: DISCONTINUED | OUTPATIENT
Start: 2017-06-07 | End: 2017-06-10 | Stop reason: HOSPADM

## 2017-06-07 RX ORDER — MIDAZOLAM HYDROCHLORIDE 1 MG/ML
1 INJECTION INTRAMUSCULAR; INTRAVENOUS
Status: DISCONTINUED | OUTPATIENT
Start: 2017-06-07 | End: 2017-06-10 | Stop reason: HOSPADM

## 2017-06-07 RX ADMIN — SODIUM CHLORIDE 500 ML: 0.9 INJECTION, SOLUTION INTRAVENOUS at 08:06

## 2017-06-07 RX ADMIN — RIVAROXABAN 15 MG: 15 TABLET, FILM COATED ORAL at 05:06

## 2017-06-07 RX ADMIN — METOPROLOL SUCCINATE 100 MG: 50 TABLET, EXTENDED RELEASE ORAL at 08:06

## 2017-06-07 RX ADMIN — MIDAZOLAM HYDROCHLORIDE 1 MG: 1 INJECTION, SOLUTION INTRAMUSCULAR; INTRAVENOUS at 08:06

## 2017-06-07 RX ADMIN — VALPROATE SODIUM 250 MG: 100 INJECTION, SOLUTION INTRAVENOUS at 09:06

## 2017-06-07 RX ADMIN — Medication 24 G: at 10:06

## 2017-06-07 RX ADMIN — INSULIN DETEMIR 30 UNITS: 100 INJECTION, SOLUTION SUBCUTANEOUS at 12:06

## 2017-06-07 RX ADMIN — Medication 16 G: at 05:06

## 2017-06-07 RX ADMIN — SODIUM CHLORIDE: 0.9 INJECTION, SOLUTION INTRAVENOUS at 12:06

## 2017-06-07 RX ADMIN — Medication 24 G: at 08:06

## 2017-06-07 RX ADMIN — DEXTROSE MONOHYDRATE 12.5 G: 25 INJECTION, SOLUTION INTRAVENOUS at 10:06

## 2017-06-07 RX ADMIN — SODIUM CHLORIDE: 0.9 INJECTION, SOLUTION INTRAVENOUS at 08:06

## 2017-06-07 RX ADMIN — SODIUM CHLORIDE: 0.9 INJECTION, SOLUTION INTRAVENOUS at 02:06

## 2017-06-07 RX ADMIN — VALPROATE SODIUM 250 MG: 100 INJECTION, SOLUTION INTRAVENOUS at 03:06

## 2017-06-07 NOTE — HPI
Ms. Kaci Whalen is a 76 y.o. female w/ significant PMHx of DM, HTN, HLD, AFib on Xarelto was sent from her PCP's office to the ED under PEC due to concerns for a manic episode. Pt has not psych hx in the past and was not able to assess any acute stressors in life by talking to her but she does exhibit logorrhea and changes topics of conversation rapidly. She otherwise appears very pleasant and no aggressive behaviour noted during my ~30 mins interview. The patient denies having any cheat pain, abd pain or any other complains. In the ED she was found to have an LAUREN with increased Cr levels and the pt reports to have decreased PO intake. No family members around so most of the information was obtained from the patient.

## 2017-06-07 NOTE — H&P
HISTORY & PHYSICAL  Hospital Medicine     Team: [facility]    Patient Name: Kaci Whalen  YOB: 1940    Admit Date: 6/6/2017                   LOS: 0    PRESENTING HISTORY     Chief Complaint/Reason for Admission: LAUREN (acute kidney injury)    History of Present Illness:  Ms. Kaci Whalen is a 76 y.o. female w/ significant PMHx of DM, HTN, HLD, AFib on Xarelto was sent from her PCP's office to the ED under [facility] due to concerns for a manic episode. Pt has not psych hx in the past and was not able to assess any acute stressors in life by talking to her but she does exhibit logorrhea and changes topics of conversation rapidly. She otherwise appears very pleasant and no aggressive behaviour noted during my ~30 mins interview. The patient denies having any cheat pain, abd pain or any other complains. In the ED she was found to have an LAUREN with increased Cr levels and the pt reports to have decreased PO intake. No family members around so most of the information was obtained from the pt.     Review of Systems:  Constitutional: no fever or chills  Eyes: no visual changes  ENT: no nasal congestion or sore throat  Respiratory: no cough or shortness of breath  Cardiovascular: no chest pain or palpitations  Gastrointestinal: no nausea or vomiting, no abdominal pain or change in bowel habits  Genitourinary: no hematuria or dysuria  Musculoskeletal: no arthralgias or myalgias  Neurological: no seizures or tremors      PAST HISTORY:     Past Medical History:   Diagnosis Date    Abdominal pain     Atherosclerosis of aortic arch     noted on CXR 7/1/2015    Atrial fibrillation 05/2016    CHADS 4, on Xarelto    Benign hypertension     Chronic constipation     Chronic diarrhea     Chronic edema     Dercum disease     Multiple painful lipomas    Dysphagia     Gas pain     Glaucoma of both eyes     Hyperlipidemia with target LDL less than 100     Unable to tolerate statins    Immature cataract      Morbid obesity     Nausea & vomiting     Primary osteoarthritis of both knees     Proteinuria     Pulmonary hypertension mixed group 2 and 3 1/18/2017    Type II or unspecified type diabetes mellitus with neurological manifestations, uncontrolled     Unspecified vitamin D deficiency        Past Surgical History:   Procedure Laterality Date     tenotomy      flexors 2,3 L toes    CATARACT EXTRACTION      COLONOSCOPY N/A 5/4/2017    Procedure: COLONOSCOPY;  Surgeon: Suellen Wolf MD;  Location: [facility] ([address] [place] [address]);  Service: Endoscopy;  Laterality: N/A;  2nd floor due to pulm htn, possible gastroparesis. per Dr Wolf      ok to hold Xarelto 2 days prior to procedure per Dr Driss Dixon    diabetic retinopathy      right    HYSTERECTOMY      knee surgery x2      Left ankle and leg surgery secondary to a fall      left arm fracture      Left cataract      PARTIAL HYSTERECTOMY      Secondary to bleeding    TOE FUSION      2,3 R       Family History   Problem Relation Age of Onset    No Known Problems Daughter     No Known Problems Father     Liver cancer Mother     Bone cancer Daughter     No Known Problems Sister     No Known Problems Brother     No Known Problems Maternal Aunt     No Known Problems Maternal Uncle     No Known Problems Paternal Aunt     No Known Problems Paternal Uncle     No Known Problems Maternal Grandmother     No Known Problems Maternal Grandfather     No Known Problems Paternal Grandmother     No Known Problems Paternal Grandfather     Amblyopia Neg Hx     Blindness Neg Hx     Cancer Neg Hx     Cataracts Neg Hx     Diabetes Neg Hx     Glaucoma Neg Hx     Hypertension Neg Hx     Macular degeneration Neg Hx     Retinal detachment Neg Hx     Strabismus Neg Hx     Stroke Neg Hx     Thyroid disease Neg Hx     Celiac disease Neg Hx     Cirrhosis Neg Hx     Colon cancer Neg Hx     Colon polyps Neg Hx     Crohn's disease Neg Hx     Cystic fibrosis  Neg Hx     Esophageal cancer Neg Hx     Hemochromatosis Neg Hx     Inflammatory bowel disease Neg Hx     Irritable bowel syndrome Neg Hx     Liver disease Neg Hx     Rectal cancer Neg Hx     Stomach cancer Neg Hx     Ulcerative colitis Neg Hx     Montez's disease Neg Hx        Social History     Social History    Marital status:      Spouse name: N/A    Number of children: 3    Years of education: N/A     Occupational History    housewife      Social History Main Topics    Smoking status: Never Smoker    Smokeless tobacco: Never Used    Alcohol use No    Drug use: No    Sexual activity: Yes     Partners: Male     Other Topics Concern    Not on file     Social History Narrative    One daughter is  from bone cancer.    One son  2014 after surgery for MIGUEL       MEDICATIONS & ALLERGIES:      [START ON 2017] insulin aspart  10 Units Subcutaneous TIDWM    insulin detemir  30 Units Subcutaneous QHS    [START ON 2017] losartan-hydrochlorothiazide 100-25 mg  1 tablet Oral Daily    [START ON 2017] metoprolol succinate  100 mg Oral Daily    [START ON 2017] rivaroxaban  20 mg Oral Daily with dinner        sodium chloride 0.9%      lactated ringers         dextrose 50%, dextrose 50%, glucagon (human recombinant), glucose, glucose    No current facility-administered medications on file prior to encounter.      Current Outpatient Prescriptions on File Prior to Encounter   Medication Sig    alcohol swabs PadM Apply 1 each topically as needed.    blood glucose control, low (TRUE METRIX LEVEL 1) Soln 1 Bottle by Misc.(Non-Drug; Combo Route) route once daily.    blood-glucose meter kit Use as instructed    clotrimazole (LOTRIMIN) 1 % cream Apply topically 2 (two) times daily.    furosemide (LASIX) 20 MG tablet     insulin aspart (NOVOLOG FLEXPEN) 100 unit/mL InPn pen Take 10 units with breakfast, 15 units with lunch and 17 with supper; with additional sliding  "scale; max dose 75 units per day    insulin glargine (LANTUS SOLOSTAR) 100 unit/mL (3 mL) InPn pen Inject 40 Units into the skin every evening. Max 50 units nightly    lancets (LANCETS,THIN) 28 gauge Misc 1 lancet by Misc.(Non-Drug; Combo Route) route 4 (four) times daily before meals and nightly.    losartan-hydrochlorothiazide 100-25 mg (HYZAAR) 100-25 mg per tablet Take 1 tablet by mouth once daily.    melatonin 5 mg Subl Place under the tongue.    metformin (GLUCOPHAGE) 1000 MG tablet Take 1/2 tablet by mouth once daily with breakfast, one tablet with lunch, and one tablet with dinner.    metoprolol succinate (TOPROL-XL) 100 MG 24 hr tablet Take 1 tablet (100 mg total) by mouth once daily.    omeprazole (PRILOSEC) 40 MG capsule Take 1 capsule (40 mg total) by mouth 2 (two) times daily before meals.    ondansetron (ZOFRAN) 8 MG tablet Take 1 tablet (8 mg total) by mouth every 8 (eight) hours as needed for Nausea.    ondansetron (ZOFRAN-ODT) 4 MG TbDL Take 2 tablets (8 mg total) by mouth every 8 (eight) hours as needed.    pen needle, diabetic (BD ULTRA-FINE SANDEEP PEN NEEDLES) 32 gauge x 5/32" Ndle Take 1 each by mouth 4 (four) times daily.    polyethylene glycol (COLYTE) 240-22.72-6.72 -5.84 gram SolR     promethazine (PHENERGAN) 25 MG tablet Take 1 tablet (25 mg total) by mouth every 6 (six) hours as needed for Nausea.    rivaroxaban (XARELTO) 20 mg Tab Take 1 tablet (20 mg total) by mouth daily with dinner or evening meal.    trazodone (DESYREL) 100 MG tablet Take 1 tablet (100 mg total) by mouth nightly as needed for Insomnia.    trazodone (DESYREL) 50 MG tablet TAKE TWO TABLETS BY MOUTH ONCE DAILY AT  NIGHT  AS  NEEDED  FOR  INSOMNIA    TRUE METRIX GLUCOSE TEST STRIP Strp TEST FOUR TIMES DAILY BEFORE MEALS  AND EVERY NIGHT       Review of patient's allergies indicates:   Allergen Reactions    Ace inhibitors      Other reaction(s): cough      Fluorescein Itching     Other reaction(s): Itching "    Iodine      Other reaction(s): Itching    Pravastatin Other (See Comments)     Other reaction(s): mouth tingling/numbness    Simvastatin      Other reaction(s): foot swelling         OBJECTIVE:     Vital Signs:  Recent Results (from the past 24 hour(s))   Drug screen panel, emergency    Collection Time: 06/06/17  7:16 PM   Result Value Ref Range    Benzodiazepines Negative     Methadone metabolites Negative     Cocaine (Metab.) Negative     Opiate Scrn, Ur Negative     Barbiturate Screen, Ur Negative     Amphetamine Screen, Ur Negative     THC Negative     Phencyclidine Negative     Creatinine, Random Ur 50.0 15.0 - 325.0 mg/dL    Toxicology Information SEE COMMENT    Urinalysis, Reflex to Urine Culture    Collection Time: 06/06/17  7:16 PM   Result Value Ref Range    Specimen UA Urine, Clean Catch     Color, UA Straw Yellow, Straw, Aida    Appearance, UA Hazy (A) Clear    pH, UA 7.0 5.0 - 8.0    Specific Gravity, UA 1.010 1.005 - 1.030    Protein, UA Negative Negative    Glucose, UA Negative Negative    Ketones, UA Negative Negative    Bilirubin (UA) Negative Negative    Occult Blood UA Negative Negative    Nitrite, UA Negative Negative    Urobilinogen, UA Negative <2.0 EU/dL    Leukocytes, UA Trace (A) Negative   Urinalysis Microscopic    Collection Time: 06/06/17  7:16 PM   Result Value Ref Range    RBC, UA 1 0 - 4 /hpf    WBC, UA 1 0 - 5 /hpf    Bacteria, UA Rare None-Occ /hpf    Squam Epithel, UA 1 /hpf    Ca Oxalate Karen, UA Rare None-Moderate    Microscopic Comment SEE COMMENT    CBC auto differential    Collection Time: 06/06/17  7:43 PM   Result Value Ref Range    WBC 6.60 3.90 - 12.70 K/uL    RBC 3.70 (L) 4.00 - 5.40 M/uL    Hemoglobin 11.2 (L) 12.0 - 16.0 g/dL    Hematocrit 32.5 (L) 37.0 - 48.5 %    MCV 88 82 - 98 fL    MCH 30.3 27.0 - 31.0 pg    MCHC 34.5 32.0 - 36.0 %    RDW 13.4 11.5 - 14.5 %    Platelets 244 150 - 350 K/uL    MPV 8.7 (L) 9.2 - 12.9 fL    Gran # 4.6 1.8 - 7.7 K/uL    Lymph # 1.3  1.0 - 4.8 K/uL    Mono # 0.5 0.3 - 1.0 K/uL    Eos # 0.1 0.0 - 0.5 K/uL    Baso # 0.01 0.00 - 0.20 K/uL    Gran% 70.1 38.0 - 73.0 %    Lymph% 19.8 18.0 - 48.0 %    Mono% 7.9 4.0 - 15.0 %    Eosinophil% 1.8 0.0 - 8.0 %    Basophil% 0.2 0.0 - 1.9 %    Differential Method Automated    Comprehensive metabolic panel    Collection Time: 06/06/17  7:43 PM   Result Value Ref Range    Sodium 132 (L) 136 - 145 mmol/L    Potassium 4.1 3.5 - 5.1 mmol/L    Chloride 94 (L) 95 - 110 mmol/L    CO2 25 23 - 29 mmol/L    Glucose 121 (H) 70 - 110 mg/dL    BUN, Bld 29 (H) 8 - 23 mg/dL    Creatinine 2.3 (H) 0.5 - 1.4 mg/dL    Calcium 10.1 8.7 - 10.5 mg/dL    Total Protein 7.1 6.0 - 8.4 g/dL    Albumin 4.0 3.5 - 5.2 g/dL    Total Bilirubin 0.9 0.1 - 1.0 mg/dL    Alkaline Phosphatase 61 55 - 135 U/L    AST 25 10 - 40 U/L    ALT 12 10 - 44 U/L    Anion Gap 13 8 - 16 mmol/L    eGFR if African American 23.4 (A) >60 mL/min/1.73 m^2    eGFR if non  20.3 (A) >60 mL/min/1.73 m^2   TSH    Collection Time: 06/06/17  7:43 PM   Result Value Ref Range    TSH 2.263 0.400 - 4.000 uIU/mL   Ethanol    Collection Time: 06/06/17  7:43 PM   Result Value Ref Range    Alcohol, Medical, Serum <10 <10 mg/dL   Acetaminophen level    Collection Time: 06/06/17  7:43 PM   Result Value Ref Range    Acetaminophen (Tylenol), Serum <3.0 (L) 10.0 - 20.0 ug/mL   Salicylate level    Collection Time: 06/06/17  7:43 PM   Result Value Ref Range    Salicylate Lvl <5.0 (L) 15.0 - 30.0 mg/dL   POCT glucose    Collection Time: 06/06/17  7:43 PM   Result Value Ref Range    POCT Glucose 128 (H) 70 - 110 mg/dL        Physical Exam:  General:  Well developed, well nourished, no acute distress  HEENT:  Normocephalic, atraumatic, PERRL, EOMI, clear sclera, ears normal, throat clear without erythema or exudates  CVS:  RRR, S1 and S2 normal, no murmurs, rubs, gallops  Resp:  Lungs clear to auscultation, no wheezes, rales, rhonchi, cough  GI:  Abdomen soft, non-tender,  non-distended, normoactive bowel sounds, no masses  MSK:  No muscle atrophy, cyanosis, peripheral edema, full range of motion  Skin:  No rashes, ulcers, erythema  Neuro:  AOX2 (not oriented to date)  Psych:  Awake, alert, excessive talking, no wishes to hurt herself or others      IMAGING:   X-ray Lumbar Spine Ap And Lateral    Result Date: 5/21/2017  Study Desc:   XR LUMBAR SPINE AP AND LATERAL Clinical History: Fall Comparison: None  FINDINGS: AP, lateral, and coned-down lateral views of the lumbar spine are performed.  There are 5 nonrib-bearing lumbar type vertebral bodies identified with mild dextro scoliotic curvature centered around L3. No significant spondylolisthesis or evidence of spondylolysis.  Vertebral body heights are maintained. No acute compression fracture identified. Posterior elements, spinous processes and transverse processes are unremarkable.  Moderate degenerative disc disease throughout the lumbar spine with slight disc space narrowing and bulky marginal endplate osteophyte formation asymmetrically more prominent on the left side.  The paraspinal soft tissues are unremarkable.  The visualized sacroiliac joints are unremarkable.  If there is further concern or neurological abnormalities on clinical exam, MRI or CT of the lumbar spine may be performed for complete assessment.  IMPRESSION: 1.  No acute radiographic abnormality of the lumbar spine. 2.  Moderate multilevel degenerative disc disease of the lumbar spine.  SL 24: Signed by: Dashawn Jin MD  2017-05-21 17:24:26[-0500]    Ct Head Without Contrast    Result Date: 6/6/2017  Clinical indication: Altered mental status. Comparison: CT head 5/21/17. Technique: 5-mm axial images of the head were performed without the administration of contrast.  Sagittal and coronal reformatted images were also obtained. Findings: Generalized cerebral volume loss with compensatory enlargement of the ventricles appropriate for the patient's age. Mild  degree of patchy decreased attenuation in the supratentorial white matter compatible with chronic microvascular ischemic change. No evidence of acute major territorial infarction or hemorrhage. No abnormal extra-axial fluid collection is identified. No hydrocephalus or midline shift. Visualized paranasal sinuses demonstrate mucous retention cyst or polyp in the left maxillary sinus. The mastoid air cells are essentially clear. The osseous structures are without evidence for acute fracture. Stable pedunculated osseous lesion arising from the occipital bone. There are postsurgical changes of the bilateral orbits. The surrounding soft tissues demonstrate stable mild soft tissue swelling overlying the left parietal calvarium.     No acute intracranial abnormality detected. Senescent changes. Stable mild left parietal soft tissue swelling. Stable pedunculated occipital osseous lesion. ______________________________________ Electronically signed by resident: LISA GÓMEZ MD Date:     06/06/17 Time:    21:17 As the supervising and teaching physician, I personally reviewed the images and resident's interpretation and I agree with the findings. Electronically signed by: MARIA ANTONIA WALKER MD Date:     06/06/17 Time:    21:31     Ct Head Without Contrast    Result Date: 5/21/2017  Study Desc:   CT HEAD WITHOUT CONTRAST Reason: DX:  W19.XXXA Unspecified fall, initial encounter; Clinical History: Status post fall.  Comparison: None.  TECHNIQUE: CT images were obtained from the foramen magnum to the vertex without the use of intravenous contrast on a multidetector CT. Coronal and sagittal reconstructions were obtained.  CT radiation dose: The total exam DLP is 646.25 mGy-cm  FINDINGS: There is mild periventricular white matter hypodensity, likely related to chronic medical a ischemic changes.  The brain parenchyma otherwise appears unremarkable. There is no mass effect, midline shift or edema. There are no intra-axial or extra-axial  fluid collections, intraventricular or intraparenchymal hemorrhage. The ventricles and cisterns are normal.  The visualized orbits and paranasal sinuses are unremarkable. The mastoid air cells are clear.  There is soft tissue swelling and subcutaneous hematoma identified in the left posterior parietal scalp.  No skull fractures are seen. Incidental note is made of a 3.2 x 2.6 x 2.2 cm pedunculated osseous excrescence arising from the outer table of the posterior occipital bone.  If there is further concern for intracranial pathology or acute stroke, MRI of the brain may be performed for complete assessment.  IMPRESSION: 1.  Soft tissue swelling and subcutaneous hematoma in the left posterior parietal scalp. 2.  No noncontrast CT evidence of acute intracranial hemorrhage, mass effect, or midline shift. 3.  Mild periventricular white matter hypodensity likely related to chronic microvascular ischemic changes. 4.  3.2 x 2.6 x 2.2 cm pedunculated osseous lesion arising from the posterior occipital bone, likely an osteoma.  SL: 24 Signed by: Freeman Sweeney MD  2017-05-21 17:30:07 [CDT]         ASSESSMENT & PLAN:     Ms. Kaci Whalen is a 76 y.o. female    Current Problems List:  Active Hospital Problems    Diagnosis  POA    *LAUREN (acute kidney injury) [N17.9]  Yes    Essential hypertension [I10]  Unknown    Atrial fibrillation [I48.91]  Yes    Type 2 diabetes mellitus with microalbuminuria, with long-term current use of insulin [E11.29, R80.9, Z79.4]  Not Applicable     Chronic kidney disease calculated using CKD-EPI Creatinine 2009 Equation.        Hyperlipidemia with target LDL less than 100 [E78.5]  Yes     Unable to tolerate some statins        Resolved Hospital Problems    Diagnosis Date Resolved POA   No resolved problems to display.       Problem Assessment & Treatment Plan:  LAUREN  Most likely pre-renal from decreased PO intake as per pt  Will stop losartan-HCTZ in the setting of LAUREN   Will send out  urine lytes to check cause  Will provide IVF support  Will hold lasix    Manic Behavior  No prev psych hx, no reported life stressors or changes in meds  Does have trazodone for insomnia and phenergan in home meds  No idea to hurt herself or others  Psych consulted; apprec recs     Afib  CAD  EF 65% 1 year ago  Continue Toprol XL  Continue Xarelto    DM  Takes Aspart 10 AM/15 Afternoon/17PM with meals => will keep 10 units TIDWM at hospital   Detemir 30 units qhs (takes 40 units at home)      Diet: Renal  DVTPx: Xarelto  PUPx: None  Code: Full  Dispo: PEC/ admission for psych and LAUREN management       Jennifer Hallman MD  Internal Medicine PGY-2  #[phone]

## 2017-06-07 NOTE — SUBJECTIVE & OBJECTIVE
Interval History: NAEON; VSS. Patient less pressured this morning but had no slept at all overnight per sitter. Fluctuating speech, tangential, and multi-lingual. Hard to follow in either language. Otherwise denies any pain, able to answer questions and was otherwise in NAD. Episode of hypoglycemia this morning; patient asymptomatic. Changes to insulin regimen made.    Review of Systems   Constitutional: Negative for chills, fatigue and fever.   HENT: Negative for sinus pressure, sneezing and sore throat.    Eyes: Negative for visual disturbance.   Respiratory: Negative for cough, chest tightness, shortness of breath and wheezing.    Cardiovascular: Negative for chest pain, palpitations and leg swelling.   Gastrointestinal: Negative for abdominal distention, abdominal pain, blood in stool, constipation, diarrhea, nausea and vomiting.   Endocrine: Negative for polyuria.   Genitourinary: Negative for dysuria and hematuria.   Musculoskeletal: Negative for back pain, gait problem, neck pain and neck stiffness.   Skin: Negative for pallor and rash.   Neurological: Negative for seizures, weakness, light-headedness, numbness and headaches.   Psychiatric/Behavioral: Positive for behavioral problems and sleep disturbance. Negative for agitation, decreased concentration, hallucinations and suicidal ideas. The patient is hyperactive.      Objective:     Vital Signs (Most Recent):  Temp: 97.5 °F (36.4 °C) (06/07/17 0759)  Pulse: 80 (06/07/17 0759)  Resp: 15 (06/07/17 0759)  BP: (!) 155/72 (06/07/17 0759)  SpO2: 96 % (06/07/17 0759) Vital Signs (24h Range):  Temp:  [97.5 °F (36.4 °C)-98.7 °F (37.1 °C)] 97.5 °F (36.4 °C)  Pulse:  [70-81] 80  Resp:  [15-20] 15  SpO2:  [95 %-100 %] 96 %  BP: (138-165)/(65-72) 155/72     Weight: 72 kg (158 lb 11.7 oz)  Body mass index is 29.03 kg/m².  No intake or output data in the 24 hours ending 06/07/17 0843   Physical Exam   Constitutional: She is oriented to person, place, and time. She  appears well-developed and well-nourished. No distress.   HENT:   Head: Normocephalic and atraumatic.   Eyes: EOM are normal.   Neck: Normal range of motion. Neck supple. No JVD present.   Cardiovascular: Normal rate and regular rhythm.  Exam reveals no gallop and no friction rub.    Murmur heard.  Pulmonary/Chest: Effort normal and breath sounds normal. No respiratory distress. She has no wheezes. She has no rales.   Abdominal: Soft. Bowel sounds are normal. She exhibits no distension and no mass. There is no tenderness. There is no guarding.   Musculoskeletal: Normal range of motion. She exhibits no edema or tenderness.   Neurological: She is alert and oriented to person, place, and time.   Skin: Skin is warm and dry. No rash noted.   Psychiatric:   Minimally pressured speech; flight of ideas with tangential pattern. No suicidal or homicidal intentions.        Significant Labs:   CBC:     Recent Labs  Lab 06/06/17 1943 06/07/17  0637   WBC 6.60 6.88   HGB 11.2* 11.6*   HCT 32.5* 34.0*    260     CMP:     Recent Labs  Lab 06/06/17 1943 06/07/17  0637   * 137  137   K 4.1 3.6  3.6   CL 94* 101  101   CO2 25 26  26   * 44*  44*   BUN 29* 24*  24*   CREATININE 2.3* 1.8*  1.8*   CALCIUM 10.1 9.8  9.8   PROT 7.1 6.6  6.6   ALBUMIN 4.0 3.7  3.7   BILITOT 0.9 0.8  0.8   ALKPHOS 61 59  59   AST 25 21  21   ALT 12 11  11   ANIONGAP 13 10  10   EGFRNONAA 20.3* 27.3*  27.3*       Significant Imaging: I have reviewed all pertinent imaging results/findings within the past 24 hours.

## 2017-06-07 NOTE — PLAN OF CARE
Problem: Occupational Therapy Goal  Goal: Occupational Therapy Goal  Goals to be met by: 6-17-17    Patient will increase functional independence with ADLs by performing:    LE Dressing with Stand-by Assistance.  Grooming while standing at sink with Minimal Assistance.  Toileting from toilet with Minimal Assistance for hygiene and clothing management.   Toilet transfer to toilet with Supervision.   Pt will complete at least 2 functional tasks with min VCs and min redirection  Outcome: Ongoing (interventions implemented as appropriate)  [facility]  6/7/2017

## 2017-06-07 NOTE — PT/OT/SLP EVAL
Occupational Therapy  Evaluation    Kaci Whalen   MRN: [id number]   Admitting Diagnosis: LAUREN (acute kidney injury)    OT Date of Treatment: 06/07/17   OT Start Time: 1017  OT Stop Time: 1053  OT Total Time (min): 36 min    Billable Minutes:  Evaluation 20  Self Care/Home Management 16    Diagnosis: LAUREN (acute kidney injury)     Past Medical History:   Diagnosis Date    Abdominal pain     Atherosclerosis of aortic arch     noted on CXR 7/1/2015    Atrial fibrillation 05/2016    CHADS 4, on Xarelto    Benign hypertension     Chronic constipation     Chronic diarrhea     Chronic edema     Depression     Dercum disease     Multiple painful lipomas    Dysphagia     Gas pain     Glaucoma of both eyes     Hx of psychiatric care     previously amitriptyline, now jut on Trazodone 100mg QHS PRN insomnia    Hyperlipidemia with target LDL less than 100     Unable to tolerate statins    Immature cataract     Tasia     no symptoms prior to this presentation    Morbid obesity     Nausea & vomiting     Primary osteoarthritis of both knees     Proteinuria     Psychiatric problem     history of depression    Pulmonary hypertension mixed group 2 and 3 1/18/2017    Therapy     no history of therapy    Type II or unspecified type diabetes mellitus with neurological manifestations, uncontrolled     Unspecified vitamin D deficiency       Past Surgical History:   Procedure Laterality Date     tenotomy      flexors 2,3 L toes    CATARACT EXTRACTION      COLONOSCOPY N/A 5/4/2017    Procedure: COLONOSCOPY;  Surgeon: Suellen Wolf MD;  Location: [facility] ([address] [place] [address]);  Service: Endoscopy;  Laterality: N/A;  2nd floor due to pulm htn, possible gastroparesis. per Dr Wolf      ok to hold Xarelto 2 days prior to procedure per Dr Driss Dixon    diabetic retinopathy      right    HYSTERECTOMY      knee surgery x2      Left ankle and leg surgery secondary to a fall      left arm fracture      Left cataract    "   PARTIAL HYSTERECTOMY      Secondary to bleeding    TOE FUSION      2,3 R       Referring physician: Jennifer PERALTA  Date referred to OT: 6-6-17    General Precautions: Standard, fall (PEC, Cardiac)  Orthopedic Precautions: N/A  Braces: N/A    Do you have any cultural, spiritual, Anglican conflicts, given your current situation?: none     Patient History:  Living Environment  Lives With: spouse, other (see comments) ("family")  Living Arrangements: house  Home Accessibility: stairs within home  Home Layout: Able to live on 1st floor  Number of Stairs Within Home: 1 (to enter laundry room)  Stair Railings at Home: none  Transportation Available: family or friend will provide  Living Environment Comment: Pt is a questionable historian at this time but reports living with spouse and "family" in a [facility] with 0 BILLY and 1 step to enter into the laundry room. PTA pt states (I) with mobility and ADLs.  Equipment Currently Used at Home: other (see comments) (Pt unable to report about DME)    Prior level of function:   Bed Mobility/Transfers: independent  Grooming: independent  Bathing: independent  Upper Body Dressing: independent  Lower Body Dressing: needs assist  Toileting: independent  Home Management Skills: independent  Homemaking Responsibilities: Yes (shared with spouse and family)  Mode of Transportation: Car, Family, Friends     Dominant hand: right    Subjective:  Communicated with RN prior to session.  "i can do things by myself but my family is always helping me, they don't let me do it alone."  Chief Complaint: none  Patient/Family stated goals: return to home    Pain/Comfort  Pain Rating 1: 0/10    Objective:  Patient found with: peripheral IV, bed alarm (sitter; IV not connected)    Cognitive Exam:  Oriented to: Person, Place and Situation  Follows Commands/attention: Follows two-step commands  Communication: clear/fluent, flighty speech and thought, going off on tangents  Memory:  No Deficits " noted  Safety awareness/insight to disability: impaired  Coping skills/emotional control: Labile    Visual/perceptual:  Intact    Physical Exam:  Postural examination/scapula alignment: Rounded shoulder  Skin integrity: Visible skin intact  Edema: None noted     Sensation:   Intact    Upper Extremity Range of Motion:  Right Upper Extremity: WFL  Left Upper Extremity: WFL    Upper Extremity Strength:  Right Upper Extremity: WFL  Left Upper Extremity: WFL   Strength: WLF    Fine motor coordination: grossly intact    Gross motor coordination: Grossly WFL    Functional Mobility:  Bed Mobility:  Rolling/Turning to Left: Modified independent (using bed rails)  Scooting/Bridging: Modified Independent (using bed rails)  Supine to Sit: Modified Independent (using bed rails)  Sit to Supine: Modified Independent (using bed rails)    Transfers:  Sit <> Stand Assistance: Contact Guard Assistance  Sit <> Stand Assistive Device: No Assistive Device    Functional Ambulation: Pt ambulated aproximately 150 ft with HHA and no SOB.     Activities of Daily Living:  UE Dressing Level of Assistance: Set-up Assistance (donning gown as robe)  LE Dressing Level of Assistance: Minimum assistance (SBA  to doff socks. Min A to thread socks over distal foot. Max VCs and Redirection for task completion. Increased time.)  Grooming Position: Seated, EOB (wiping hands and washing face)   Grooming Level of Assistance:  (Set-up assistance)  Toileting Level of Assistance: Activity did not occur  Bathing Level of Assistance: Activity did not occur    Balance:   Static Sit: GOOD: Takes MODERATE challenges from all directions  Dynamic Sit: GOOD: Maintains balance through MODERATE excursions of active trunk movement  Static Stand: GOOD: Takes MODERATE challenges from all directions  Dynamic stand: FAIR: Needs CONTACT GUARD during gait    Therapeutic Activities and Exercises:  OT evaluation performed.  White board updated.  Pt educated on role of OT,  "safety with functional mobility and ADLs.  Pt requiring Max VCs, redirection, and time to complete ADL activities.    AM-PAC 6 CLICK ADL  How much help from another person does this patient currently need?  1 = Unable, Total/Dependent Assistance  2 = A lot, Maximum/Moderate Assistance  3 = A little, Minimum/Contact Guard/Supervision  4 = None, Modified [place]/Independent    Putting on and taking off regular lower body clothing? : 3  Bathing (including washing, rinsing, drying)?: 3  Toileting, which includes using toilet, bedpan, or urinal? : 3  Putting on and taking off regular upper body clothing?: 3  Taking care of personal grooming such as brushing teeth?: 3  Eating meals?: 4  Total Score: 19    AM-PAC Raw Score CMS "G-Code Modifier Level of Impairment Assistance   6 % Total / Unable   7 - 9 CM 80 - 100% Maximal Assist   10-14 CL 60 - 80% Moderate Assist   15 - 19 CK 40 - 60% Moderate Assist   20 - 22 CJ 20 - 40% Minimal Assist   23 CI 1-20% SBA / CGA   24 CH 0% Independent/ Mod I       Patient left supine with all lines intact, call button in reach and Sitter present    Assessment:  Kaci Whalen is a 76 y.o. female with a medical diagnosis of LAUREN (acute kidney injury) and presents with impaired balance, cognition, safety awareness, and functional mobility limiting pt's safety and ability to care for self. Pt open to OT eval and cooperated but required MAX VCs, time and redirection throughout evaluation to complete ADL tasks.  Pt with labile and manic behavior throughout entire session. Pt also displayed poor emotional control with multiple moments of crying. Pt would continue to benefit from skilled OT services to work on the impairments listed below in order to increase (I) with self care, decrease caregiver/family burden, and increase safety upon return to home.    Pt presented with a moderate complexity OT evaluation.  Pt required an expanded an expanded review of medical history and " occupational profile.  Pt demod 3-5 performance deficits (physical, cognitive, or psychosocial) resulting in limitations and engagement restrictions.  Clinical decision making required moderate analytical complexity with several treatment options.  Pt with possible comorbidities and required minimal to moderate modification of task/assistance with assessment.      Physical- skills refer to impairments of body structure or functions, balance, mobility; strength, endurance, FMC, GMC, sensation, dexterity, and posture.  Cognitive- skills refer to ability to attend, communicate, perceive, think, understand, problem solve, mentally sequence, learn, and remember resulting in ability to organize occupational performance in timely and safe manner.    Psychosocial- skills refer to interpersonal interactions, habits, routines, and behaviors, active use of coping strategies, and environmental adaptations to appropriately participate in everyday tasks and situations.       Rehab identified problem list/impairments: Rehab identified problem list/impairments: impaired self care skills, gait instability, impaired functional mobilty, impaired balance, impaired cognition, decreased safety awareness, impaired coordination    Rehab potential is good.    Activity tolerance: Good    Discharge recommendations: Discharge Facility/Level Of Care Needs: home health OT     Barriers to discharge: Barriers to Discharge: Other (Comment) (amount of caregiver support unknown at this time)    Equipment recommendations: none     GOALS:    Occupational Therapy Goals        Problem: Occupational Therapy Goal    Goal Priority Disciplines Outcome Interventions   Occupational Therapy Goal     OT, PT/OT Ongoing (interventions implemented as appropriate)    Description:  Goals to be met by: 6-17-17    Patient will increase functional independence with ADLs by performing:    LE Dressing with Stand-by Assistance.  Grooming while standing at sink with  Minimal Assistance.  Toileting from toilet with Minimal Assistance for hygiene and clothing management.   Toilet transfer to toilet with Supervision.   Pt will complete at least 2 functional tasks with min VCs and min redirection                    PLAN:  Patient to be seen 3 x/week to address the above listed problems via self-care/home management, therapeutic activities, therapeutic exercises  Plan of Care expires: 07/07/17  Plan of Care reviewed with: patient         [facility]  06/07/2017

## 2017-06-07 NOTE — HPI
"Per Dr. Hung, [facility] clinic: "77 y/o w/ h/o DM, CHF MIGUEL on cpap presents with daughter for five days of unusual behavior. Daughter reports for last five days vishalt has not been sleeping is at times combative and will fight her from assistance with bath. Frequently discussing God and speaking to  family members. Recent history is only significant for treatment with amoxicillin, clarithromycin and omeprazole for H.pylori infection (EGD biopsy proven). Daughter also reports more frequent loose stool during this time. There has been no falls during this time.  She has no prior history of psychiatric hospitalization. Does have history of depression treated in past with SSRI and most recently trazodone. " ~ Dr. Hung 17    Patient was PEC'd by Dr. Hung for suspected estrellita and sent to ED.    Per ED MD: "The patient is a 76 y.o. female with hx of: DM, HTN, obesity, HLD, and A-fib that presents to the ED from PCP's office under a PEC due to concern for acute estrellita. Patient has no complaints at this time and is thankful for this medical evaluation. Patient denies auditory/visual hallucinations or suicidal/homicidal ideation. She also denies any pain, headache, chest pain, shortness of breath, abdominal pain, constipation, diarrhea, urinary problems, or difficulty ambulating" ~ Dr. Dewey 17      On my interview today, patient is talkative and loud but pleasant and confused. She is oriented to place, and month, but disoriented to day of week and states that the year is . She is also unable to specify why she is in the hospital specifically. She is able to relay that she was at Dr. Hung's office and that he told her she had to go to the hospital, but she cannot specify what concern made it so urgent for her to come. She displays significant tangentiality throughout the interaction, and her history is limited, thus collateral was contacted (see below).  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 She endorses feeling confused and repeatedly makes hand gestures to signify confusion (waving them in the air). She endorses having had significant diarrhea recently, but she is unable to specify a timeline.    Collateral: Daughter (Liliam) [phone]  Ms. Ricketts is 50 years old and reports that she has never seen her mother act like this. When we spoke, she put me on speakerphone so that the patient's  could add to the conversation as well. Patient's  reports that patient has been having confusion for approximately 1 month. He states that she has been falling more often since she has been confused. They both relay that the patient had been to a GI appointment for an endoscopy and a colonoscopy. She was then treated with antibiotics, and they mention that her watery stools have been getting worse. They describe them as "mostly watery, but sometimes chunky". They endorse that the patient has been speaking about family members that have been dead as thought they are alive. Her friend, who was bed-bound  last Saturday, and when they were discussing going to the , the patient stated that she wanted her other daughter to bring her. Ms. Ricketts reminded her mother that the other daughter had passed away, and the patient did not appear to remember that at the time. The patient has been talking to herself in the last month, and when confronted about it, often attributes her behavior to praying. They mention that the patient has not been sleeping, and she has been through a sleep study in the past. After the sleep study, the doctors recommended that she wear a CPAP machine; " however, the patient has declined to do so. They state that the patient has had 2 significant episodes of depression when her 2 children , but she has never had anything like her behavior recently. They state that she has never been to see a psychiatrist, has never been hospitalized for psychiatric reasons, and her only psychiatric medications that she has been on in the past were (previously) amitriptyline and in the last month, Trazodone for sleep. They are concerned that the patient's behavior may be related to her multiple recent falls because they note that her confusion has become worse since her last fall where she hit her head ( per chart).

## 2017-06-07 NOTE — ASSESSMENT & PLAN NOTE
- admission Cr 2.3; baseline 1.3-1.4  - likely pre-renal from decreased PO intake as per patient and family  - holding losartan-HCTZ, lasix in the setting of LAUREN   - urine electrolytes ordered   - avoid NSAIDs, contrast, nephrotoxins    - renally dose medications to current GFR  - interval improvement with IVF

## 2017-06-07 NOTE — PT/OT/SLP EVAL
Physical Therapy  Evaluation/Discharge     Kaci Whalen   MRN: [id number]   Admitting Diagnosis: LAUREN (acute kidney injury)    PT Received On: 06/07/17  PT Start Time: 1018     PT Stop Time: 1046    PT Total Time (min): 28 min       Billable Minutes:  Evaluation  28 minutes    Diagnosis: LAUREN (acute kidney injury)    Past Medical History:   Diagnosis Date    Abdominal pain     Atherosclerosis of aortic arch     noted on CXR 7/1/2015    Atrial fibrillation 05/2016    CHADS 4, on Xarelto    Benign hypertension     Chronic constipation     Chronic diarrhea     Chronic edema     Depression     Dercum disease     Multiple painful lipomas    Dysphagia     Gas pain     Glaucoma of both eyes     Hx of psychiatric care     previously amitriptyline, now jut on Trazodone 100mg QHS PRN insomnia    Hyperlipidemia with target LDL less than 100     Unable to tolerate statins    Immature cataract     Tasia     no symptoms prior to this presentation    Morbid obesity     Nausea & vomiting     Primary osteoarthritis of both knees     Proteinuria     Psychiatric problem     history of depression    Pulmonary hypertension mixed group 2 and 3 1/18/2017    Therapy     no history of therapy    Type II or unspecified type diabetes mellitus with neurological manifestations, uncontrolled     Unspecified vitamin D deficiency       Past Surgical History:   Procedure Laterality Date     tenotomy      flexors 2,3 L toes    CATARACT EXTRACTION      COLONOSCOPY N/A 5/4/2017    Procedure: COLONOSCOPY;  Surgeon: Suellen Wolf MD;  Location: [facility] ([address] [place] [address]);  Service: Endoscopy;  Laterality: N/A;  2nd floor due to pulm htn, possible gastroparesis. per Dr Wolf      ok to hold Xarelto 2 days prior to procedure per Dr Driss Dixon    diabetic retinopathy      right    HYSTERECTOMY      knee surgery x2      Left ankle and leg surgery secondary to a fall      left arm fracture      Left cataract      PARTIAL  "HYSTERECTOMY      Secondary to bleeding    TOE FUSION      2,3 R       Referring physician: FABIAN Bell  Date referred to PT: 6/6/17    General Precautions: Standard, fall  Orthopedic Precautions: N/A   Braces: N/A       Do you have any cultural, spiritual, Roman Catholic conflicts, given your current situation?: None stated     Patient History:  Lives With:  ("family")  Living Arrangements: house  Living Environment Comment: Pt is questionable historian. Pt reports living with family in [facility]. Pt has TH from kitchen to laundry room. PTA, pt was (I) with functional mobility, ADLs, and driving. Unknown if patient will have family support upon DC.   Equipment Currently Used at Home:  (Unknown- patient unable to answer questions regarding equipment )    Previous Level of Function:  Ambulation Skills: independent  Transfer Skills: independent  ADL Skills: independent  Work/Leisure Activity: independent    Subjective:  Communicated with RN prior to session.  Pt agreeable to PT/OT evaluation   Chief Complaint: "I'm not crazy. Everyone thinks I am"  Patient goals: none stated     Pain/Comfort  Pain Rating 1: 0/10  Pain Rating Post-Intervention 1: 0/10      Objective:   Patient found with: bed alarm (sitter)     Cognitive Exam:  Oriented to: Person and Place    Follows Commands/attention: Inattentive, Easily distracted and Follows 25% of one-step commands  Communication: Fluctuating speech, tangential, and multi-lingual. Hard to follow in either language.  Safety awareness/insight to disability: impaired    Physical Exam:  Postural examination/scapula alignment: Rounded shoulder and Head forward    Skin integrity: Visible skin intact  Edema: None noted in BLE    Sensation:   Intact    Lower Extremity Range of Motion: Did not assess- poor command following    Lower Extremity Strength: did not assess- poor command following       Functional Mobility:  Bed Mobility:  Rolling/Turning to Left: Stand by assistance, With " side rail  Scooting/Bridging: Stand by Assistance (anterior scoot towards EOB)  Supine to Sit: Stand by Assistance, With side rail (from L SL; HOB flat)  Sit to Supine:  (Activity did not occur )    Transfers:  Sit <> Stand Assistance: Contact Guard Assistance  Sit <> Stand Assistive Device:  (HHA)    Gait:   Gait Distance: 100' with min A to CGA for balance. Pt with unsteady gait with poor safety awareness. Pt followed 0 commands for safety.   Assistance 1: Minimum assistance, Contact Guard Assistance  Gait Assistive Device: Hand held assist  Gait Pattern: reciprocal  Gait Deviation(s): decreased bryce, increased time in double stance, decreased velocity of limb motion, decreased step length, decreased stride length    Balance:   Static Sit: GOOD-: Takes MODERATE challenges from all directions but inconsistently  Dynamic Sit: FAIR+: Maintains balance through MINIMAL excursions of active trunk motion  Static Stand: FAIR: Maintains without assist but unable to take challenges  Dynamic stand: POOR: N/A    Therapeutic Activities and Exercises:  Pt educated on role of PT and POC/goals for therapy as well as safety with mobility. Pt did not verbalized understanding. Pt expressed no further concerns/questions.     AM-PAC 6 CLICK MOBILITY  How much help from another person does this patient currently need?   1 = Unable, Total/Dependent Assistance  2 = A lot, Maximum/Moderate Assistance  3 = A little, Minimum/Contact Guard/Supervision  4 = None, Modified [place]/Independent    Turning over in bed (including adjusting bedclothes, sheets and blankets)?: 3  Sitting down on and standing up from a chair with arms (e.g., wheelchair, bedside commode, etc.): 3  Moving from lying on back to sitting on the side of the bed?: 3  Moving to and from a bed to a chair (including a wheelchair)?: 3  Need to walk in hospital room?: 3  Climbing 3-5 steps with a railing?: 3  Total Score: 18     AM-PAC Raw Score CMS G-Code Modifier  Level of Impairment Assistance   6 % Total / Unable   7 - 9 CM 80 - 100% Maximal Assist   10 - 14 CL 60 - 80% Moderate Assist   15 - 19 CK 40 - 60% Moderate Assist   20 - 22 CJ 20 - 40% Minimal Assist   23 CI 1-20% SBA / CGA   24 CH 0% Independent/ Mod I     Patient left sitting EOB with OT and OT student with all lines intact, call button in reach and sitter present.    Assessment:   Kaci Whalen is a 76 y.o. female with a medical diagnosis of LAUREN (acute kidney injury) and presents with problems listed below. PT evaluation complete and no goals established secondary to patient not appropriate for acute care physical therapy services at this time. Pt is impulsive with poor command following. Pt unable to have carry-over effect for safety awareness and improve functional mobility at this time. Please re-consult if medically appropriate.     Rehab identified problem list/impairments: Rehab identified problem list/impairments: weakness, impaired endurance, impaired self care skills, impaired functional mobilty, gait instability, impaired balance, impaired cognition, decreased safety awareness, decreased coordination    Rehab potential is poor.    Activity tolerance: Poor    Discharge recommendations: Discharge Facility/Level Of Care Needs: home     Barriers to discharge: Barriers to Discharge:  (Unknown )    Equipment recommendations: Equipment Needed After Discharge: none     GOALS: No goals established     PLAN:  Discharge   Plan of Care reviewed with: patient      Virgie REAVES Fred, PT  06/07/2017

## 2017-06-07 NOTE — PLAN OF CARE
Problem: Physical Therapy Goal  Goal: Physical Therapy Goal  Outcome: Outcome(s) achieved Date Met: 06/07/17  PT evaluation complete and no goals established secondary to patient not appropriate for acute care physical therapy services at this time. Pt is impulsive with poor command following. Pt unable to have carry-over effect for safety awareness and improve functional mobility at this time. Please re-consult if medically appropriate.     Virgie Ibarra DPT, PT  6/7/2017

## 2017-06-07 NOTE — ASSESSMENT & PLAN NOTE
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 Multiple other falls recently per family (possible post-concussive syndrome? Possible diffuse axonal injury?)   - Recent triple therapy antibiotics for H. Pylori   - Recent valium administration for insomnia per telephone notes   - Recent tramadol (which has a small serotonergic component) use per chart   - Recent watery "impressive" diarrhea per family for 5 days   - LAUREN of unknown origin at the moment, resolving with IVF    Hyperactive delirium can share multiple symptoms with Tasia, including hyperreligiosity, irritability, agitation, hallucinations(tasia can also present with psychosis), racing thoughts, pressured speech and distractibility    While bipolar tasia is possible, recommend the following to help rule out organic causes for patient's change in behavior/mentation:  - Brain MRI with or without contrast for evaluation of possible underlying CNS changes  - Stool sample for C.Diff(unlikely but certainly possible given recent watery diarrhea and antibiotic use)  - RPR for r/o of neurosyphilis (which can present with these symptoms)  - Rapid HIV  - Recommend continue IVF for LAUREN, continue medical management    Regarding treatment of current symptoms  - spoke with primary team regarding medication recommendation  - Depacon 250mg BID IV, first dose now   - Depacon is the IV version of depakote, which provides mood stabilization in both hyperactive delirium and tasia.    Thank you for this consult. Will continue to follow    "

## 2017-06-07 NOTE — PROGRESS NOTES
[facility] Hospital Medicine  Progress Note    Patient Name: Kaci Whalen  MRN: [id number]  Patient Class: IP- Inpatient   Admission Date: 6/6/2017  Length of Stay: 1 days  Attending Physician: Artie Moore MD  Primary Care Provider: Toby Hung MD    [facility] Medicine Team: [facility] MED 1 Driss Hurtado MD    Subjective:     Principal Problem:LAUREN (acute kidney injury)    HPI:  Ms. Kaci Whalen is a 76 y.o. female w/ significant PMHx of DM, HTN, HLD, AFib on Xarelto was sent from her PCP's office to the ED under PEC due to concerns for a manic episode. Pt has not psych hx in the past and was not able to assess any acute stressors in life by talking to her but she does exhibit logorrhea and changes topics of conversation rapidly. She otherwise appears very pleasant and no aggressive behaviour noted during my ~30 mins interview. The patient denies having any cheat pain, abd pain or any other complains. In the ED she was found to have an LAUREN with increased Cr levels and the pt reports to have decreased PO intake. No family members around so most of the information was obtained from the patient.     Hospital Course:  6/7/17: NAEON; VSS. Patient less pressured this morning but had no slept at all overnight per sitter. Fluctuating speech, tangential, and multi-lingual. Hard to follow in either language. Otherwise denies any pain, able to answer questions and was otherwise in NAD. Episode of hypoglycemia this morning; patient asymptomatic. Changes to insulin regimen made.      Interval History: NAEON; VSS. Patient less pressured this morning but had no slept at all overnight per sitter. Fluctuating speech, tangential, and multi-lingual. Hard to follow in either language. Otherwise denies any pain, able to answer questions and was otherwise in NAD. Episode of hypoglycemia this morning; patient asymptomatic. Changes to insulin regimen made.    Review of Systems   Constitutional:  Negative for chills, fatigue and fever.   HENT: Negative for sinus pressure, sneezing and sore throat.    Eyes: Negative for visual disturbance.   Respiratory: Negative for cough, chest tightness, shortness of breath and wheezing.    Cardiovascular: Negative for chest pain, palpitations and leg swelling.   Gastrointestinal: Negative for abdominal distention, abdominal pain, blood in stool, constipation, diarrhea, nausea and vomiting.   Endocrine: Negative for polyuria.   Genitourinary: Negative for dysuria and hematuria.   Musculoskeletal: Negative for back pain, gait problem, neck pain and neck stiffness.   Skin: Negative for pallor and rash.   Neurological: Negative for seizures, weakness, light-headedness, numbness and headaches.   Psychiatric/Behavioral: Positive for behavioral problems and sleep disturbance. Negative for agitation, decreased concentration, hallucinations and suicidal ideas. The patient is hyperactive.      Objective:     Vital Signs (Most Recent):  Temp: 97.5 °F (36.4 °C) (06/07/17 0759)  Pulse: 80 (06/07/17 0759)  Resp: 15 (06/07/17 0759)  BP: (!) 155/72 (06/07/17 0759)  SpO2: 96 % (06/07/17 0759) Vital Signs (24h Range):  Temp:  [97.5 °F (36.4 °C)-98.7 °F (37.1 °C)] 97.5 °F (36.4 °C)  Pulse:  [70-81] 80  Resp:  [15-20] 15  SpO2:  [95 %-100 %] 96 %  BP: (138-165)/(65-72) 155/72     Weight: 72 kg (158 lb 11.7 oz)  Body mass index is 29.03 kg/m².  No intake or output data in the 24 hours ending 06/07/17 0843   Physical Exam   Constitutional: She is oriented to person, place, and time. She appears well-developed and well-nourished. No distress.   HENT:   Head: Normocephalic and atraumatic.   Eyes: EOM are normal.   Neck: Normal range of motion. Neck supple. No JVD present.   Cardiovascular: Normal rate and regular rhythm.  Exam reveals no gallop and no friction rub.    Murmur heard.  Pulmonary/Chest: Effort normal and breath sounds normal. No respiratory distress. She has no wheezes. She has no  rales.   Abdominal: Soft. Bowel sounds are normal. She exhibits no distension and no mass. There is no tenderness. There is no guarding.   Musculoskeletal: Normal range of motion. She exhibits no edema or tenderness.   Neurological: She is alert and oriented to person, place, and time.   Skin: Skin is warm and dry. No rash noted.   Psychiatric:   Minimally pressured speech; flight of ideas with tangential pattern. No suicidal or homicidal intentions.        Significant Labs:   CBC:     Recent Labs  Lab 06/06/17 1943 06/07/17  0637   WBC 6.60 6.88   HGB 11.2* 11.6*   HCT 32.5* 34.0*    260     CMP:     Recent Labs  Lab 06/06/17 1943 06/07/17  0637   * 137  137   K 4.1 3.6  3.6   CL 94* 101  101   CO2 25 26  26   * 44*  44*   BUN 29* 24*  24*   CREATININE 2.3* 1.8*  1.8*   CALCIUM 10.1 9.8  9.8   PROT 7.1 6.6  6.6   ALBUMIN 4.0 3.7  3.7   BILITOT 0.9 0.8  0.8   ALKPHOS 61 59  59   AST 25 21  21   ALT 12 11  11   ANIONGAP 13 10  10   EGFRNONAA 20.3* 27.3*  27.3*       Significant Imaging: I have reviewed all pertinent imaging results/findings within the past 24 hours.    Assessment/Plan:      * LAUREN (acute kidney injury)    - admission Cr 2.3; baseline 1.3-1.4  - likely pre-renal from decreased PO intake as per patient and family  - holding losartan-HCTZ, lasix in the setting of LAUREN   - urine electrolytes ordered   - avoid NSAIDs, contrast, nephrotoxins    - renally dose medications to current GFR  - interval improvement with IVF        Tasia    - no previous psychiatric history; no reported life stressors or changes in medications  - seen in clinic 6/6 and PEC'd by PCP after pressured speech, hyperactivity, and insomnia for last 5 days  - does take trazodone for insomnia and phenergan for nausea at home  - no ideations of self-harm or homicidal behavior, thoughts  - Psych consulted; appreciate recs         Essential hypertension    - normotensive on admission; now with increase in  SBP  - holding home HTN medication 2/2 LAUREN; will restart once Cr improves          Atrial fibrillation    - 2D echo 5/28/16: EF 65; PA 18; trivial TR/MR  - continue home Toprol XL  - continue home Xarelto        Type 2 diabetes mellitus with microalbuminuria, with long-term current use of insulin    - on MDI at home; Aspart 10 AM/15 Afternoon/17PM with meals; Detemir 40U QHS   - patient hypoglycemic but asmyptomatic morning of 6/7; aspart held and patient placed on low-dose SSI  - detemir ordered as 30U QHS; will reduce to 20U  - will continue to monitor blood glucose   - last HgbA1c 6.9 12/28/16; ordered repeat          VTE Risk Mitigation         Ordered     rivaroxaban tablet 15 mg  With dinner     Route:  Oral        06/07/17 0833     Medium Risk of VTE  Once      06/06/17 2247     Place sequential compression device  Until discontinued      06/06/17 2247     Place EDE hose  Until discontinued      06/06/17 2247          Driss Hurtado MD  Department of Hospital Medicine   [facility]

## 2017-06-07 NOTE — ASSESSMENT & PLAN NOTE
- normotensive on admission; now with increase in SBP  - holding home HTN medication 2/2 LAUREN; will restart once Cr improves

## 2017-06-07 NOTE — HOSPITAL COURSE
6/6/17  Patient admitted to internal medicine for LAUREN  6/7/17 Psychiatry consulted for evaluation of estrellita

## 2017-06-07 NOTE — PHYSICIAN QUERY
PT Name: Kaci Whalen  MR #: [id number]    Physician Query Form - Atrial Fibrillation Specificity     CDS/: Delores Herring               Contact information: [phone]   This form is a permanent document in the medical record.     Query Date: June 7, 2017    By submitting this query, we are merely seeking further clarification of documentation. Please utilize your independent clinical judgment when addressing the question(s) below.    The medical record contains the following:   Indicators     Supporting Clinical Findings Location in Medical Record   x Atrial Fibrillation AFib H/P 6/6    EKG results     x Medication on Xarelto  H/P 6/6    Treatment      Other         Provider, please further specify the Atrial Fibrillation diagnosis.    [ x ] Chronic  [  ] Paroxysmal  [  ] Permanent  [  ] Persistent  [  ] Other (please specify): ____________________________  [  ] Clinically Undetermined    Please document in your progress notes daily for the duration of treatment until resolved, and include in your discharge summary.

## 2017-06-07 NOTE — CONSULTS
"[facility]  Psychiatry  Consult Note    Patient Name: Kaci Whalen  MRN: [id number]   Code Status: Full Code  Admission Date: 2017  Hospital Length of Stay: 1 days  Attending Physician: Artie Moore MD  Primary Care Provider: Toby Hung MD    Current Legal Status: PEC    Patient information was obtained from patient, spouse/SO, relative(s), past medical records and ER records.   Inpatient consult to Psychiatry  Consult performed by: LUPE MARTE  Consult ordered by: LUPE CAMACHO        Subjective:     Principal Problem:LAUREN (acute kidney injury)    Chief Complaint:  Estrellita    HPI: Per Dr. Hung, [facility] clinic: "77 y/o w/ h/o DM, CHF MIGUEL on cpap presents with daughter for five days of unusual behavior. Daughter reports for last five days paitent has not been sleeping is at times combative and will fight her from assistance with bath. Frequently discussing God and speaking to  family members. Recent history is only significant for treatment with amoxicillin, clarithromycin and omeprazole for H.pylori infection (EGD biopsy proven). Daughter also reports more frequent loose stool during this time. There has been no falls during this time.  She has no prior history of psychiatric hospitalization. Does have history of depression treated in past with SSRI and most recently trazodone. " ~ Dr. Hung 17    Patient was PEC'd by Dr. Hung for suspected estrellita and sent to ED.    Per ED MD: "The patient is a 76 y.o. female with hx of: DM, HTN, obesity, HLD, and A-fib that presents to the ED from PCP's office under a PEC due to concern for acute estrlelita. Patient has no complaints at this time and is thankful for this medical evaluation. Patient denies auditory/visual hallucinations or suicidal/homicidal ideation. She also denies any pain, headache, chest pain, shortness of breath, abdominal pain, constipation, diarrhea, urinary problems, or difficulty " "ambulating" ~ Dr. Dewey 6/6/17      On my interview today, patient is talkative and loud but pleasant and confused. She is oriented to place, and month, but disoriented to day of week and states that the year is 2016. She is also unable to specify why she is in the hospital specifically. She is able to relay that she was at Dr. Hung's office and that he told her she had to go to the hospital, but she cannot specify what concern made it so urgent for her to come. She displays significant tangentiality throughout the interaction, and her history is limited, thus collateral was contacted (see below).  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 She endorses feeling confused and repeatedly makes hand gestures to signify confusion (waving them in the air). She endorses having had significant diarrhea recently, but she is unable to specify a timeline.    Collateral: Daughter (Liliam) [phone]  Ms. Ricketts is 50 years old and reports that she has never seen her mother act like this. When we spoke, she put me on speakerphone so that the patient's  could add to the conversation as well. Patient's  reports that patient has been having confusion for approximately 1 month. He states that she has been falling more often since she has been confused. They both relay that the patient had been to a GI appointment for an endoscopy and a colonoscopy. She was then treated with antibiotics, and they mention that her watery stools have been getting worse. They describe them as "mostly watery, but sometimes chunky". They endorse that the patient has been speaking about family members that have been " dead as thought they are alive. Her friend, who was bed-bound  last Saturday, and when they were discussing going to the , the patient stated that she wanted her other daughter to bring her. Ms. Ricketts reminded her mother that the other daughter had passed away, and the patient did not appear to remember that at the time. The patient has been talking to herself in the last month, and when confronted about it, often attributes her behavior to praying. They mention that the patient has not been sleeping, and she has been through a sleep study in the past. After the sleep study, the doctors recommended that she wear a CPAP machine; however, the patient has declined to do so. They state that the patient has had 2 significant episodes of depression when her 2 children , but she has never had anything like her behavior recently. They state that she has never been to see a psychiatrist, has never been hospitalized for psychiatric reasons, and her only psychiatric medications that she has been on in the past were (previously) amitriptyline and in the last month, Trazodone for sleep. They are concerned that the patient's behavior may be related to her multiple recent falls because they note that her confusion has become worse since her last fall where she hit her head ( per chart).    Hospital Course: 17  Patient admitted to internal medicine for LAUREN  17 Psychiatry consulted for evaluation of estrellita         Patient History           Medical as of 2017     Past Medical History Date Comments Source    Abdominal pain -- -- Provider    Atherosclerosis of aortic arch -- noted on CXR 2015 Provider    Atrial fibrillation 2016 CHADS 4, on Xarelto Provider    Benign hypertension -- -- Provider    Chronic constipation -- -- Provider    Chronic diarrhea -- -- Provider    Chronic edema -- -- Provider    Depression -- -- Provider    Dercum disease -- Multiple painful lipomas Provider    Dysphagia -- --  Provider    Gas pain -- -- Provider    Glaucoma of both eyes -- -- Provider    Hx of psychiatric care -- previously amitriptyline, now jut on Trazodone 100mg QHS PRN insomnia Provider    Hyperlipidemia with target LDL less than 100 -- Unable to tolerate statins Provider    Immature cataract -- -- Provider    Tasia -- no symptoms prior to this presentation Provider    Morbid obesity -- -- Provider    Nausea & vomiting -- -- Provider    Primary osteoarthritis of both knees -- -- Provider    Proteinuria -- -- Provider    Psychiatric problem -- history of depression Provider    Pulmonary hypertension mixed group 2 and 3 1/18/2017 -- Provider    Therapy -- no history of therapy Provider    Type II or unspecified type diabetes mellitus with neurological manifestations, uncontrolled -- -- Provider    Unspecified vitamin D deficiency -- -- Provider    Pertinent Negatives Date Noted Comments Source    Bipolar disorder 6/7/2017 -- Provider    History of psychiatric hospitalization 6/7/2017 -- Provider    Psychiatric exam requested by authority 6/7/2017 -- Provider    Psychosis 6/7/2017 -- Provider    Suicide attempt 6/7/2017 -- Provider            Surgical as of 6/7/2017     Past Surgical History Laterality Date Comments Source     tenotomy [Other] -- -- flexors 2,3 L toes Provider    TOE FUSION -- -- 2,3 R Provider    Left cataract [Other] -- -- -- Provider    PARTIAL HYSTERECTOMY -- -- Secondary to bleeding Provider    Left ankle and leg surgery secondary to a fall [Other] -- -- -- Provider    diabetic retinopathy [Other] -- -- right Provider    CATARACT EXTRACTION -- -- -- Provider    left arm fracture [Other] -- -- -- Provider    knee surgery x2 [Other] -- -- -- Provider    HYSTERECTOMY -- -- -- Provider    COLONOSCOPY N/A 5/4/2017 Procedure: COLONOSCOPY;  Surgeon: Suellen Wolf MD;  Location: [facility] ([address] [place] [address]);  Service: Endoscopy;  Laterality: N/A;  2nd floor due to pulm htn, possible gastroparesis. per Dr Wolf       ok to hold Xarelto 2 days prior to procedure per Dr Driss Dixon Provider            Family as of 6/7/2017     Problem Relation Name Age of Onset Comments Source    No Known Problems Daughter -- -- -- Provider    No Known Problems Father -- -- -- Provider    Liver cancer Mother -- -- -- Provider    Bone cancer Daughter -- -- -- Provider    No Known Problems Sister -- -- -- Provider    No Known Problems Brother -- -- -- Provider    No Known Problems Maternal Aunt -- -- -- Provider    No Known Problems Maternal Uncle -- -- -- Provider    No Known Problems Paternal Aunt -- -- -- Provider    No Known Problems Paternal Uncle -- -- -- Provider    No Known Problems Maternal Grandmother -- -- -- Provider    No Known Problems Maternal Grandfather -- -- -- Provider    No Known Problems Paternal Grandmother -- -- -- Provider    No Known Problems Paternal Grandfather -- -- -- Provider    Amblyopia Neg Hx -- -- -- Provider    Blindness Neg Hx -- -- -- Provider    Cancer Neg Hx -- -- -- Provider    Cataracts Neg Hx -- -- -- Provider    Diabetes Neg Hx -- -- -- Provider    Glaucoma Neg Hx -- -- -- Provider    Hypertension Neg Hx -- -- -- Provider    Macular degeneration Neg Hx -- -- -- Provider    Retinal detachment Neg Hx -- -- -- Provider    Strabismus Neg Hx -- -- -- Provider    Stroke Neg Hx -- -- -- Provider    Thyroid disease Neg Hx -- -- -- Provider    Celiac disease Neg Hx -- -- -- Provider    Cirrhosis Neg Hx -- -- -- Provider    Colon cancer Neg Hx -- -- -- Provider    Colon polyps Neg Hx -- -- -- Provider    Crohn's disease Neg Hx -- -- -- Provider    Cystic fibrosis Neg Hx -- -- -- Provider    Esophageal cancer Neg Hx -- -- -- Provider    Hemochromatosis Neg Hx -- -- -- Provider    Inflammatory bowel disease Neg Hx -- -- -- Provider    Irritable bowel syndrome Neg Hx -- -- -- Provider    Liver disease Neg Hx -- -- -- Provider    Rectal cancer Neg Hx -- -- -- Provider    Stomach cancer Neg Hx -- -- -- Provider     Ulcerative colitis Neg Hx -- -- -- Provider    Montez's disease Neg Hx -- -- -- Provider            Tobacco Use as of 2017     Smoking Status Smoking Start Date Smoking Quit Date Packs/day Years Used    Never Smoker -- -- -- --    Types Comments Smokeless Tobacco Status Smokeless Tobacco Quit Date Source    -- -- Never Used -- Provider            Alcohol Use as of 2017     Alcohol Use Drinks/Week Alcohol/Week Comments Source    No -- -- -- Provider            Drug Use as of 2017     Drug Use Types Frequency Comments Source    No -- -- -- Provider            Sexual Activity as of 2017     Sexually Active Birth Control Partners Comments Source    Yes -- Male -- Provider            Activities of Daily Living as of 2017     Activities of Daily Living Question Response Comments Source    Patient feels they ought to cut down on drinking/drug use Not Asked -- Provider    Patient annoyed by others criticizing their drinking/drug use Not Asked -- Provider    Patient has felt bad or guilty about drinking/drug use Not Asked -- Provider    Patient has had a drink/used drugs as an eye opener in the AM Not Asked -- Provider            Social Documentation as of 2017    One daughter is  from bone cancer.  One son  2014 after surgery for MIGUEL  Source: Provider           Occupational as of 2017     Occupation Employer Comments Source    housewife -- -- Provider            Socioeconomic as of 2017     Marital Status Spouse Name Number of Children Years Education Preferred Language Ethnicity Race Source     -- 3 -- English // White Provider         Additional Pertinent History Q A Comments    as of 2017 Place in Birth Order      Number of Siblings      Raised by biological parents     Childhood Trauma      Financial Status  retired teacher    Highest Level of Education      Lives with family     Lives in      Criminal History of none     Legal Involvement       Does patient have access to a firearm? No      Service      Primary Leisure Activity time with family     Spirituality      Caffeine Use         Review of patient's allergies indicates:   Allergen Reactions    Ace inhibitors      Other reaction(s): cough      Fluorescein Itching     Other reaction(s): Itching    Iodine      Other reaction(s): Itching    Pravastatin Other (See Comments)     Other reaction(s): mouth tingling/numbness    Simvastatin      Other reaction(s): foot swelling         No current facility-administered medications on file prior to encounter.      Current Outpatient Prescriptions on File Prior to Encounter   Medication Sig    alcohol swabs PadM Apply 1 each topically as needed.    blood glucose control, low (TRUE METRIX LEVEL 1) Soln 1 Bottle by Misc.(Non-Drug; Combo Route) route once daily.    blood-glucose meter kit Use as instructed    clotrimazole (LOTRIMIN) 1 % cream Apply topically 2 (two) times daily.    furosemide (LASIX) 20 MG tablet     insulin aspart (NOVOLOG FLEXPEN) 100 unit/mL InPn pen Take 10 units with breakfast, 15 units with lunch and 17 with supper; with additional sliding scale; max dose 75 units per day    insulin glargine (LANTUS SOLOSTAR) 100 unit/mL (3 mL) InPn pen Inject 40 Units into the skin every evening. Max 50 units nightly    lancets (LANCETS,THIN) 28 gauge Misc 1 lancet by Misc.(Non-Drug; Combo Route) route 4 (four) times daily before meals and nightly.    losartan-hydrochlorothiazide 100-25 mg (HYZAAR) 100-25 mg per tablet Take 1 tablet by mouth once daily.    melatonin 5 mg Subl Place under the tongue.    metformin (GLUCOPHAGE) 1000 MG tablet Take 1/2 tablet by mouth once daily with breakfast, one tablet with lunch, and one tablet with dinner.    metoprolol succinate (TOPROL-XL) 100 MG 24 hr tablet Take 1 tablet (100 mg total) by mouth once daily.    omeprazole (PRILOSEC) 40 MG capsule Take 1 capsule (40 mg total) by mouth 2 (two)  "times daily before meals.    ondansetron (ZOFRAN) 8 MG tablet Take 1 tablet (8 mg total) by mouth every 8 (eight) hours as needed for Nausea.    ondansetron (ZOFRAN-ODT) 4 MG TbDL Take 2 tablets (8 mg total) by mouth every 8 (eight) hours as needed.    pen needle, diabetic (BD ULTRA-FINE SANDEEP PEN NEEDLES) 32 gauge x 5/32" Ndle Take 1 each by mouth 4 (four) times daily.    polyethylene glycol (COLYTE) 240-22.72-6.72 -5.84 gram SolR     promethazine (PHENERGAN) 25 MG tablet Take 1 tablet (25 mg total) by mouth every 6 (six) hours as needed for Nausea.    rivaroxaban (XARELTO) 20 mg Tab Take 1 tablet (20 mg total) by mouth daily with dinner or evening meal.    trazodone (DESYREL) 100 MG tablet Take 1 tablet (100 mg total) by mouth nightly as needed for Insomnia.    trazodone (DESYREL) 50 MG tablet TAKE TWO TABLETS BY MOUTH ONCE DAILY AT  NIGHT  AS  NEEDED  FOR  INSOMNIA    TRUE METRIX GLUCOSE TEST STRIP Strp TEST FOUR TIMES DAILY BEFORE MEALS  AND EVERY NIGHT     Psychotherapeutics     None        Review of Systems   Constitutional: Negative for chills and fever.   Gastrointestinal: Positive for diarrhea.   Psychiatric/Behavioral: Positive for confusion, decreased concentration and sleep disturbance.     Objective:     Vital Signs (Most Recent):  Temp: 97.5 °F (36.4 °C) (06/07/17 0759)  Pulse: 80 (06/07/17 0759)  Resp: 15 (06/07/17 0759)  BP: (!) 155/72 (06/07/17 0759)  SpO2: 96 % (06/07/17 0759) Vital Signs (24h Range):  Temp:  [97.5 °F (36.4 °C)-98.7 °F (37.1 °C)] 97.5 °F (36.4 °C)  Pulse:  [70-81] 80  Resp:  [15-20] 15  SpO2:  [95 %-100 %] 96 %  BP: (138-165)/(65-72) 155/72     Height: 5' 2" (157.5 cm)  Weight: 72 kg (158 lb 11.7 oz)  Body mass index is 29.03 kg/m².    No intake or output data in the 24 hours ending 06/07/17 1143    Physical Exam  Appearance: no apparent distress, dressed in hospital attire  Behavior/Cooperation/Attitude: limited cooperation Speech: rapid speech, hyperverbal, loud " "volume  Mood: "good"  Affect: expansive  Thought Process: tangential  Thought Content: delusions about dead children being alive, reported hallucinations at home per family, no RIS on interview  Sensorium: awake/alert  Orientation: oriented to person/place/month. Disoriented to day of week/year/reason for hospitalization  Attention/Memory: attention impaired  Calculation/Concentration: impaired, easily distracted   Fund of Knowledge: intact to conversation  Abstraction: unable to assess  Insight: poor  Judgment: appropriate for setting  Impulse Control: limited  Reliability: limited         Significant Labs:   Last 24 Hours:   Recent Lab Results       06/07/17  0830 06/07/17  0804 06/07/17  0802 06/07/17  0637 06/07/17  0034      Benzodiazepines          Methadone metabolites          Phencyclidine          Acetaminophen (Tylenol), Serum          Albumin    3.7          3.7      Alcohol, Medical, Serum          Alkaline Phosphatase    59          59      ALT    11          11      Amphetamine Screen, Ur          Anion Gap    10          10      Appearance, UA          AST    21          21      Bacteria, UA          Barbiturate Screen, Ur          Baso #    0.01      Basophil%    0.1      Bilirubin (UA)          Total Bilirubin    0.8  Comment:  For infants and newborns, interpretation of results should be based  on gestational age, weight and in agreement with clinical  observations.  Premature Infant recommended reference ranges:  Up to 24 hours.............<8.0 mg/dL  Up to 48 hours............<12.0 mg/dL  3-5 days..................<15.0 mg/dL  6-29 days.................<15.0 mg/dL            0.8  Comment:  For infants and newborns, interpretation of results should be based  on gestational age, weight and in agreement with clinical  observations.  Premature Infant recommended reference ranges:  Up to 24 hours.............<8.0 mg/dL  Up to 48 hours............<12.0 mg/dL  3-5 days..................<15.0 " mg/dL  6-29 days.................<15.0 mg/dL        BUN, Bld    24(H)          24(H)      Ca Oxalate Karen, UA          Calcium    9.8          9.8      Chloride    101          101      CO2    26          26      Cocaine (Metab.)          Color, UA          Creatinine    1.8(H)          1.8(H)      Creatinine, Random Ur          Differential Method    Automated      eGFR if     31.5(A)          31.5(A)      eGFR if non     27.3  Comment:  Calculation used to obtain the estimated glomerular filtration  rate (eGFR) is the CKD-EPI equation. Since race is unknown   in our information system, the eGFR values for   -American and Non--American patients are given   for each creatinine result.  (A)          27.3  Comment:  Calculation used to obtain the estimated glomerular filtration  rate (eGFR) is the CKD-EPI equation. Since race is unknown   in our information system, the eGFR values for   -American and Non--American patients are given   for each creatinine result.  (A)      Eos #    0.1      Eosinophil%    1.7      Glucose    44  Comment:  *Critical value -  Results called to and read back by:FER WALLS RN(LL)          44  Comment:  *Critical value -  Results called to and read back by:FER WALLS RN(LL)      Glucose, UA          Gran #    4.6      Gran%    66.2      Hematocrit    34.0(L)      Hemoglobin    11.6(L)      Ketones, UA          Leukocytes, UA          Lymph #    1.7      Lymph%    24.9      Magnesium    1.8      MCH    30.1      MCHC    34.1      MCV    88      Microscopic Comment          Mono #    0.5      Mono%    7.0      MPV    9.3      Nitrite, UA          Occult Blood UA          Opiate Scrn, Ur          pH, UA          Phosphorus    3.0      Platelets    260      POCT Glucose 109 43(LL) 42(LL)  118(H)     Potassium    3.6          3.6      Total Protein    6.6          6.6      Protein, UA          RBC    3.86(L)      RBC, UA           RDW    13.5      Salicylate Lvl          Sodium    137          137      Specific Gravity, UA          Specimen UA          Squam Epithel, UA          Marijuana (THC) Metabolite          Toxicology Information          TSH          Urobilinogen, UA          WBC, UA          WBC    6.88                  06/06/17 1943 06/06/17 1943 06/06/17  1916      Benzodiazepines   Negative     Methadone metabolites   Negative     Phencyclidine   Negative     Acetaminophen (Tylenol), Serum  <3.0  Comment:  Toxic Levels:  Adults (4 hr post-ingestion).........>150 ug/mL  Adults (12 hr post-ingestion)........>40 ug/mL  Peds (2 hr post-ingestion, liquid)...>225 ug/mL  (L)      Albumin  4.0      Alcohol, Medical, Serum  <10      Alkaline Phosphatase  61      ALT  12      Amphetamine Screen, Ur   Negative     Anion Gap  13      Appearance, UA   Hazy(A)     AST  25      Bacteria, UA   Rare     Barbiturate Screen, Ur   Negative     Baso #  0.01      Basophil%  0.2      Bilirubin (UA)   Negative     Total Bilirubin  0.9  Comment:  For infants and newborns, interpretation of results should be based  on gestational age, weight and in agreement with clinical  observations.  Premature Infant recommended reference ranges:  Up to 24 hours.............<8.0 mg/dL  Up to 48 hours............<12.0 mg/dL  3-5 days..................<15.0 mg/dL  6-29 days.................<15.0 mg/dL        BUN, Bld  29(H)      Ca Oxalate Karen, UA   Rare     Calcium  10.1      Chloride  94(L)      CO2  25      Cocaine (Metab.)   Negative     Color, UA   Straw     Creatinine  2.3(H)      Creatinine, Random Ur   50.0  Comment:  The random urine reference ranges provided were established   for 24 hour urine collections.  No reference ranges exist for  random urine specimens.  Correlate clinically.       Differential Method  Automated      eGFR if   23.4(A)      eGFR if non   20.3  Comment:  Calculation used to obtain the estimated  glomerular filtration  rate (eGFR) is the CKD-EPI equation. Since race is unknown   in our information system, the eGFR values for   -American and Non--American patients are given   for each creatinine result.  (A)      Eos #  0.1      Eosinophil%  1.8      Glucose  121(H)      Glucose, UA   Negative     Gran #  4.6      Gran%  70.1      Hematocrit  32.5(L)      Hemoglobin  11.2(L)      Ketones, UA   Negative     Leukocytes, UA   Trace(A)     Lymph #  1.3      Lymph%  19.8      Magnesium        MCH  30.3      MCHC  34.5      MCV  88      Microscopic Comment   SEE COMMENT  Comment:  Other formed elements not mentioned in the report are not   present in the microscopic examination.        Mono #  0.5      Mono%  7.9      MPV  8.7(L)      Nitrite, UA   Negative     Occult Blood UA   Negative     Opiate Scrn, Ur   Negative     pH, UA   7.0     Phosphorus        Platelets  244      POCT Glucose 128(H)       Potassium  4.1      Total Protein  7.1      Protein, UA   Negative  Comment:  Recommend a 24 hour urine protein or a urine   protein/creatinine ratio if globulin induced proteinuria is  clinically suspected.       RBC  3.70(L)      RBC, UA   1     RDW  13.4      Salicylate Lvl  <5.0  Comment:  Toxic:  30.0 - 70.0 mg/dl  Lethal: >70.0 mg/dl  (L)      Sodium  132(L)      Specific Gravity, UA   1.010     Specimen UA   Urine, Clean Catch     Squam Epithel, UA   1     Marijuana (THC) Metabolite   Negative     Toxicology Information   SEE COMMENT  Comment:  This screen includes the following classes of drugs at the   listed cut-off:  Benzodiazepines                  200 ng/ml  Methadone                        300 ng/ml  Cocaine metabolite               300 ng/ml  Opiates                          300 ng/ml  Barbiturates                     200 ng/ml  Amphetamines                    1000 ng/ml  Marijuana metabs (THC)            50 ng/ml  Phencyclidine (PCP)               25 ng/ml  High concentrations of  "Diphenhydramine may cross-react with  Phencyclidine PCP screening immunoassay giving a false   positive result.  High concentrations of Methylenedioxymethamphetamine (MDMA aka  Ectasy) and other structurally similar compounds may cross-   react with the Amphetamine/Methamphetamine screening   immunoassay giving a false positive result.  A metabolite of the anti-HIV drug Sustiva () may cause  false positive results in the Marijuana metabolite (THC)   screening assay.  Note: This exception list includes only more common   interferants in toxicology screen testing.  Because of many   cross-reactantspositive results on toxicology drug screens   should be confirmed whenever results do not correlate with   clinical presentation.  This report is intended for use in clinical monitoring and  management of patients. It is not intended for use in   employment related drug testing.  Because of any cross-reactants, positive results on toxicology  drug screens should be confirmed whenever results do not  correlate with clinical presentation.  Presumptive positive results are unconfirmed and may be used   only for medical purposes.       TSH  2.263      Urobilinogen, UA   Negative     WBC, UA   1     WBC  6.60            Significant Imaging: CT without contast 6/6/17  "No acute intracranial abnormality detected.    Senescent changes.    Stable mild left parietal soft tissue swelling.    Stable pedunculated occipital osseous lesion." ~ Dr. Rangel    Assessment/Plan:     Tasia    Hyperactive Delirium vs Tasia  - No previous manic episodes per family   - Development of first manic episode at age 76 is rare and must be a diagnosis of exclusion.    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 Multiple other falls recently per family (possible post-concussive syndrome? Possible " "diffuse axonal injury?)   - Recent triple therapy antibiotics for H. Pylori   - Recent valium administration for insomnia per telephone notes   - Recent tramadol (which has a small serotonergic component) use per chart   - Recent watery "impressive" diarrhea per family for 5 days   - LAUREN of unknown origin at the moment, resolving with IVF    Hyperactive delirium can share multiple symptoms with Estrellita, including hyperreligiosity, irritability, agitation, hallucinations(estrellita can also present with psychosis), racing thoughts, pressured speech and distractibility    While bipolar estrellita is possible, recommend the following to help rule out organic causes for patient's change in behavior/mentation:  - Brain MRI with or without contrast for evaluation of possible underlying CNS changes  - Stool sample for C.Diff(unlikely but certainly possible given recent watery diarrhea and antibiotic use)  - RPR for r/o of neurosyphilis (which can present with these symptoms)  - Rapid HIV  - Recommend continue IVF for LAUREN, continue medical management    Regarding treatment of current symptoms  - spoke with primary team regarding medication recommendation  - Depacon 250mg BID IV, first dose now   - Depacon is the IV version of depakote, which provides mood stabilization in both hyperactive delirium and estrellita.    Thank you for this consult. Will continue to follow               Driss Alcala MD   Psychiatry  [facility]  "

## 2017-06-07 NOTE — ASSESSMENT & PLAN NOTE
- 2D echo 5/28/16: EF 65; PA 18; trivial TR/MR  - continue home Toprol XL  - continue home Xarelto

## 2017-06-07 NOTE — PLAN OF CARE
Extended Emergency Contact Information  Primary Emergency Contact: Bruno Whalen  Address: [address] [place] [address] [address] [place] [address] [facility]  Home Phone: [phone]  Mobile Phone: [phone]  Relation: Spouse  Secondary Emergency Contact: Liliam Whalen   [facility]  Home Phone: [phone]  Mobile Phone: [phone]  Relation: Daughter    Toby Hung MD  [address] / [place] [address]    Future Appointments  Date Time Provider Department Center   6/9/2017 8:00 AM LAB, [facility] [place]   6/16/2017 9:40 AM Toby Hung MD [facility] FM IM [facility] - [facility]   6/16/2017 1:00 PM Suellen Wolf MD [facility]   7/5/2017 1:00 PM CRISTINA YoungW [facility] SOCL [address]   7/7/2017 1:15 PM Jennifer Cardoza DPM [facility] POD [place]   10/10/2017 1:00 PM Driss Kenny MD [facility] PSYCH [facility]     Payor: HU[employer] MANAGED MEDICARE / Plan: HU[employer] MEDICARE HMO / Product Type: Capitation /       [facility] [address] [place] [address] [place] - [id number] ISAI LUTHER  [facility] [address]  Phone: [phone] Fax: [phone]    [facility] [address] [facility]  [address] [facility] [address]  Phone: [phone] Fax: [phone]       06/07/17 1642   Discharge Assessment   Assessment Type Discharge Planning Assessment   Confirmed/corrected address and phone number on facesheet? Yes   Assessment information obtained from? Caregiver;Patient;Medical Record   Expected Length of Stay (days) 4   Communicated expected length of stay with patient/caregiver yes   Prior to hospitilization cognitive status: Alert/Oriented   Prior to hospitalization functional status: Assistive Equipment   Current cognitive status: Inappropriate Behavior   Current Functional Status: Needs Assistance;Assistive Equipment   Arrived From home or self-care   Lives With spouse;child(gold), adult   Able to Return to Prior Arrangements yes   Is patient able to  care for self after discharge? Unable to determine at this time (comments)   Patient's perception of discharge disposition home or selfcare   Readmission Within The Last 30 Days no previous admission in last 30 days   Patient currently being followed by outpatient case management? No   Patient currently receives home health services? No   Does the patient currently use HME? Yes   Patient currently receives private duty nursing? N/A   Patient currently receives any other outside agency services? No   Equipment Currently Used at Home rollator   Do you have any problems affording any of your prescribed medications? No   Is the patient taking medications as prescribed? yes   Do you have any financial concerns preventing you from receiving the healthcare you need? No   Does the patient have transportation to healthcare appointments? Yes   Transportation Available family or friend will provide   On Dialysis? No   Does the patient receive outpatient dialysis? No   Does the patient receive services at the [facility]? No   Are there any open cases? No   Discharge Plan A Skilled Nursing Facility   Discharge Plan B Psychiatric hospital   Patient/Family In Agreement With Plan yes

## 2017-06-07 NOTE — SUBJECTIVE & OBJECTIVE
Patient History           Medical as of 6/7/2017     Past Medical History Date Comments Source    Abdominal pain -- -- Provider    Atherosclerosis of aortic arch -- noted on CXR 7/1/2015 Provider    Atrial fibrillation 05/2016 CHADS 4, on Xarelto Provider    Benign hypertension -- -- Provider    Chronic constipation -- -- Provider    Chronic diarrhea -- -- Provider    Chronic edema -- -- Provider    Depression -- -- Provider    Dercum disease -- Multiple painful lipomas Provider    Dysphagia -- -- Provider    Gas pain -- -- Provider    Glaucoma of both eyes -- -- Provider    Hx of psychiatric care -- previously amitriptyline, now jut on Trazodone 100mg QHS PRN insomnia Provider    Hyperlipidemia with target LDL less than 100 -- Unable to tolerate statins Provider    Immature cataract -- -- Provider    Tasia -- no symptoms prior to this presentation Provider    Morbid obesity -- -- Provider    Nausea & vomiting -- -- Provider    Primary osteoarthritis of both knees -- -- Provider    Proteinuria -- -- Provider    Psychiatric problem -- history of depression Provider    Pulmonary hypertension mixed group 2 and 3 1/18/2017 -- Provider    Therapy -- no history of therapy Provider    Type II or unspecified type diabetes mellitus with neurological manifestations, uncontrolled -- -- Provider    Unspecified vitamin D deficiency -- -- Provider    Pertinent Negatives Date Noted Comments Source    Bipolar disorder 6/7/2017 -- Provider    History of psychiatric hospitalization 6/7/2017 -- Provider    Psychiatric exam requested by authority 6/7/2017 -- Provider    Psychosis 6/7/2017 -- Provider    Suicide attempt 6/7/2017 -- Provider            Surgical as of 6/7/2017     Past Surgical History Laterality Date Comments Source     tenotomy [Other] -- -- flexors 2,3 L toes Provider    TOE FUSION -- -- 2,3 R Provider    Left cataract [Other] -- -- -- Provider    PARTIAL HYSTERECTOMY -- -- Secondary to bleeding Provider    Left  ankle and leg surgery secondary to a fall [Other] -- -- -- Provider    diabetic retinopathy [Other] -- -- right Provider    CATARACT EXTRACTION -- -- -- Provider    left arm fracture [Other] -- -- -- Provider    knee surgery x2 [Other] -- -- -- Provider    HYSTERECTOMY -- -- -- Provider    COLONOSCOPY N/A 5/4/2017 Procedure: COLONOSCOPY;  Surgeon: Suellen Wolf MD;  Location: [facility] ([address] [place] [address]);  Service: Endoscopy;  Laterality: N/A;  2nd floor due to pulm htn, possible gastroparesis. per Dr Wolf      ok to hold Xarelto 2 days prior to procedure per Dr Driss Dixon Provider            Family as of 6/7/2017     Problem Relation Name Age of Onset Comments Source    No Known Problems Daughter -- -- -- Provider    No Known Problems Father -- -- -- Provider    Liver cancer Mother -- -- -- Provider    Bone cancer Daughter -- -- -- Provider    No Known Problems Sister -- -- -- Provider    No Known Problems Brother -- -- -- Provider    No Known Problems Maternal Aunt -- -- -- Provider    No Known Problems Maternal Uncle -- -- -- Provider    No Known Problems Paternal Aunt -- -- -- Provider    No Known Problems Paternal Uncle -- -- -- Provider    No Known Problems Maternal Grandmother -- -- -- Provider    No Known Problems Maternal Grandfather -- -- -- Provider    No Known Problems Paternal Grandmother -- -- -- Provider    No Known Problems Paternal Grandfather -- -- -- Provider    Amblyopia Neg Hx -- -- -- Provider    Blindness Neg Hx -- -- -- Provider    Cancer Neg Hx -- -- -- Provider    Cataracts Neg Hx -- -- -- Provider    Diabetes Neg Hx -- -- -- Provider    Glaucoma Neg Hx -- -- -- Provider    Hypertension Neg Hx -- -- -- Provider    Macular degeneration Neg Hx -- -- -- Provider    Retinal detachment Neg Hx -- -- -- Provider    Strabismus Neg Hx -- -- -- Provider    Stroke Neg Hx -- -- -- Provider    Thyroid disease Neg Hx -- -- -- Provider    Celiac disease Neg Hx -- -- -- Provider    Cirrhosis Neg Hx -- --  -- Provider    Colon cancer Neg Hx -- -- -- Provider    Colon polyps Neg Hx -- -- -- Provider    Crohn's disease Neg Hx -- -- -- Provider    Cystic fibrosis Neg Hx -- -- -- Provider    Esophageal cancer Neg Hx -- -- -- Provider    Hemochromatosis Neg Hx -- -- -- Provider    Inflammatory bowel disease Neg Hx -- -- -- Provider    Irritable bowel syndrome Neg Hx -- -- -- Provider    Liver disease Neg Hx -- -- -- Provider    Rectal cancer Neg Hx -- -- -- Provider    Stomach cancer Neg Hx -- -- -- Provider    Ulcerative colitis Neg Hx -- -- -- Provider    Montez's disease Neg Hx -- -- -- Provider            Tobacco Use as of 2017     Smoking Status Smoking Start Date Smoking Quit Date Packs/day Years Used    Never Smoker -- -- -- --    Types Comments Smokeless Tobacco Status Smokeless Tobacco Quit Date Source    -- -- Never Used -- Provider            Alcohol Use as of 2017     Alcohol Use Drinks/Week Alcohol/Week Comments Source    No -- -- -- Provider            Drug Use as of 2017     Drug Use Types Frequency Comments Source    No -- -- -- Provider            Sexual Activity as of 2017     Sexually Active Birth Control Partners Comments Source    Yes -- Male -- Provider            Activities of Daily Living as of 2017     Activities of Daily Living Question Response Comments Source    Patient feels they ought to cut down on drinking/drug use Not Asked -- Provider    Patient annoyed by others criticizing their drinking/drug use Not Asked -- Provider    Patient has felt bad or guilty about drinking/drug use Not Asked -- Provider    Patient has had a drink/used drugs as an eye opener in the AM Not Asked -- Provider            Social Documentation as of 2017    One daughter is  from bone cancer.  One son  2014 after surgery for MIGUEL  Source: Provider           Occupational as of 2017     Occupation Employer Comments Source    housewife -- -- Provider            Socioeconomic as  of 6/7/2017     Marital Status Spouse Name Number of Children Years Education Preferred Language Ethnicity Race Source     -- 3 -- English // White Provider         Additional Pertinent History Q A Comments    as of 6/7/2017 Place in Birth Order      Number of Siblings      Raised by biological parents     Childhood Trauma      Financial Status  retired teacher    Highest Level of Education      Lives with family     Lives in      Criminal History of none     Legal Involvement      Does patient have access to a firearm? No      Service      Primary Leisure Activity time with family     Spirituality      Caffeine Use         Review of patient's allergies indicates:   Allergen Reactions    Ace inhibitors      Other reaction(s): cough      Fluorescein Itching     Other reaction(s): Itching    Iodine      Other reaction(s): Itching    Pravastatin Other (See Comments)     Other reaction(s): mouth tingling/numbness    Simvastatin      Other reaction(s): foot swelling         No current facility-administered medications on file prior to encounter.      Current Outpatient Prescriptions on File Prior to Encounter   Medication Sig    alcohol swabs PadM Apply 1 each topically as needed.    blood glucose control, low (TRUE METRIX LEVEL 1) Soln 1 Bottle by Misc.(Non-Drug; Combo Route) route once daily.    blood-glucose meter kit Use as instructed    clotrimazole (LOTRIMIN) 1 % cream Apply topically 2 (two) times daily.    furosemide (LASIX) 20 MG tablet     insulin aspart (NOVOLOG FLEXPEN) 100 unit/mL InPn pen Take 10 units with breakfast, 15 units with lunch and 17 with supper; with additional sliding scale; max dose 75 units per day    insulin glargine (LANTUS SOLOSTAR) 100 unit/mL (3 mL) InPn pen Inject 40 Units into the skin every evening. Max 50 units nightly    lancets (LANCETS,THIN) 28 gauge Misc 1 lancet by Misc.(Non-Drug; Combo Route) route 4 (four) times daily before  "meals and nightly.    losartan-hydrochlorothiazide 100-25 mg (HYZAAR) 100-25 mg per tablet Take 1 tablet by mouth once daily.    melatonin 5 mg Subl Place under the tongue.    metformin (GLUCOPHAGE) 1000 MG tablet Take 1/2 tablet by mouth once daily with breakfast, one tablet with lunch, and one tablet with dinner.    metoprolol succinate (TOPROL-XL) 100 MG 24 hr tablet Take 1 tablet (100 mg total) by mouth once daily.    omeprazole (PRILOSEC) 40 MG capsule Take 1 capsule (40 mg total) by mouth 2 (two) times daily before meals.    ondansetron (ZOFRAN) 8 MG tablet Take 1 tablet (8 mg total) by mouth every 8 (eight) hours as needed for Nausea.    ondansetron (ZOFRAN-ODT) 4 MG TbDL Take 2 tablets (8 mg total) by mouth every 8 (eight) hours as needed.    pen needle, diabetic (BD ULTRA-FINE SANDEEP PEN NEEDLES) 32 gauge x 5/32" Ndle Take 1 each by mouth 4 (four) times daily.    polyethylene glycol (COLYTE) 240-22.72-6.72 -5.84 gram SolR     promethazine (PHENERGAN) 25 MG tablet Take 1 tablet (25 mg total) by mouth every 6 (six) hours as needed for Nausea.    rivaroxaban (XARELTO) 20 mg Tab Take 1 tablet (20 mg total) by mouth daily with dinner or evening meal.    trazodone (DESYREL) 100 MG tablet Take 1 tablet (100 mg total) by mouth nightly as needed for Insomnia.    trazodone (DESYREL) 50 MG tablet TAKE TWO TABLETS BY MOUTH ONCE DAILY AT  NIGHT  AS  NEEDED  FOR  INSOMNIA    TRUE METRIX GLUCOSE TEST STRIP Strp TEST FOUR TIMES DAILY BEFORE MEALS  AND EVERY NIGHT     Psychotherapeutics     None        Review of Systems   Constitutional: Negative for chills and fever.   Gastrointestinal: Positive for diarrhea.   Psychiatric/Behavioral: Positive for confusion, decreased concentration and sleep disturbance.     Objective:     Vital Signs (Most Recent):  Temp: 97.5 °F (36.4 °C) (06/07/17 0759)  Pulse: 80 (06/07/17 0759)  Resp: 15 (06/07/17 0759)  BP: (!) 155/72 (06/07/17 0759)  SpO2: 96 % (06/07/17 0759) Vital " "Signs (24h Range):  Temp:  [97.5 °F (36.4 °C)-98.7 °F (37.1 °C)] 97.5 °F (36.4 °C)  Pulse:  [70-81] 80  Resp:  [15-20] 15  SpO2:  [95 %-100 %] 96 %  BP: (138-165)/(65-72) 155/72     Height: 5' 2" (157.5 cm)  Weight: 72 kg (158 lb 11.7 oz)  Body mass index is 29.03 kg/m².    No intake or output data in the 24 hours ending 06/07/17 1143    Physical Exam  Appearance: no apparent distress, dressed in hospital attire  Behavior/Cooperation/Attitude: limited cooperation Speech: rapid speech, hyperverbal, loud volume  Mood: "good"  Affect: expansive  Thought Process: tangential  Thought Content: delusions about dead children being alive, reported hallucinations at home per family, no RIS on interview  Sensorium: awake/alert  Orientation: oriented to person/place/month. Disoriented to day of week/year/reason for hospitalization  Attention/Memory: attention impaired  Calculation/Concentration: impaired, easily distracted   Fund of Knowledge: intact to conversation  Abstraction: unable to assess  Insight: poor  Judgment: appropriate for setting  Impulse Control: limited  Reliability: limited         Significant Labs:   Last 24 Hours:   Recent Lab Results       06/07/17  0830 06/07/17  0804 06/07/17  0802 06/07/17  0637 06/07/17  0034      Benzodiazepines          Methadone metabolites          Phencyclidine          Acetaminophen (Tylenol), Serum          Albumin    3.7          3.7      Alcohol, Medical, Serum          Alkaline Phosphatase    59          59      ALT    11          11      Amphetamine Screen, Ur          Anion Gap    10          10      Appearance, UA          AST    21          21      Bacteria, UA          Barbiturate Screen, Ur          Baso #    0.01      Basophil%    0.1      Bilirubin (UA)          Total Bilirubin    0.8  Comment:  For infants and newborns, interpretation of results should be based  on gestational age, weight and in agreement with clinical  observations.  Premature Infant recommended " reference ranges:  Up to 24 hours.............<8.0 mg/dL  Up to 48 hours............<12.0 mg/dL  3-5 days..................<15.0 mg/dL  6-29 days.................<15.0 mg/dL            0.8  Comment:  For infants and newborns, interpretation of results should be based  on gestational age, weight and in agreement with clinical  observations.  Premature Infant recommended reference ranges:  Up to 24 hours.............<8.0 mg/dL  Up to 48 hours............<12.0 mg/dL  3-5 days..................<15.0 mg/dL  6-29 days.................<15.0 mg/dL        BUN, Bld    24(H)          24(H)      Ca Oxalate Karen, UA          Calcium    9.8          9.8      Chloride    101          101      CO2    26          26      Cocaine (Metab.)          Color, UA          Creatinine    1.8(H)          1.8(H)      Creatinine, Random Ur          Differential Method    Automated      eGFR if     31.5(A)          31.5(A)      eGFR if non     27.3  Comment:  Calculation used to obtain the estimated glomerular filtration  rate (eGFR) is the CKD-EPI equation. Since race is unknown   in our information system, the eGFR values for   -American and Non--American patients are given   for each creatinine result.  (A)          27.3  Comment:  Calculation used to obtain the estimated glomerular filtration  rate (eGFR) is the CKD-EPI equation. Since race is unknown   in our information system, the eGFR values for   -American and Non--American patients are given   for each creatinine result.  (A)      Eos #    0.1      Eosinophil%    1.7      Glucose    44  Comment:  *Critical value -  Results called to and read back by:FER WALLS RN()          44  Comment:  *Critical value -  Results called to and read back by:FER WALLS RN()      Glucose, UA          Gran #    4.6      Gran%    66.2      Hematocrit    34.0(L)      Hemoglobin    11.6(L)      Ketones, UA          Leukocytes, UA           Lymph #    1.7      Lymph%    24.9      Magnesium    1.8      MCH    30.1      MCHC    34.1      MCV    88      Microscopic Comment          Mono #    0.5      Mono%    7.0      MPV    9.3      Nitrite, UA          Occult Blood UA          Opiate Scrn, Ur          pH, UA          Phosphorus    3.0      Platelets    260      POCT Glucose 109 43(LL) 42(LL)  118(H)     Potassium    3.6          3.6      Total Protein    6.6          6.6      Protein, UA          RBC    3.86(L)      RBC, UA          RDW    13.5      Salicylate Lvl          Sodium    137          137      Specific Gravity, UA          Specimen UA          Squam Epithel, UA          Marijuana (THC) Metabolite          Toxicology Information          TSH          Urobilinogen, UA          WBC, UA          WBC    6.88                  06/06/17 1943 06/06/17 1943 06/06/17  1916      Benzodiazepines   Negative     Methadone metabolites   Negative     Phencyclidine   Negative     Acetaminophen (Tylenol), Serum  <3.0  Comment:  Toxic Levels:  Adults (4 hr post-ingestion).........>150 ug/mL  Adults (12 hr post-ingestion)........>40 ug/mL  Peds (2 hr post-ingestion, liquid)...>225 ug/mL  (L)      Albumin  4.0      Alcohol, Medical, Serum  <10      Alkaline Phosphatase  61      ALT  12      Amphetamine Screen, Ur   Negative     Anion Gap  13      Appearance, UA   Hazy(A)     AST  25      Bacteria, UA   Rare     Barbiturate Screen, Ur   Negative     Baso #  0.01      Basophil%  0.2      Bilirubin (UA)   Negative     Total Bilirubin  0.9  Comment:  For infants and newborns, interpretation of results should be based  on gestational age, weight and in agreement with clinical  observations.  Premature Infant recommended reference ranges:  Up to 24 hours.............<8.0 mg/dL  Up to 48 hours............<12.0 mg/dL  3-5 days..................<15.0 mg/dL  6-29 days.................<15.0 mg/dL        BUN, Bld  29(H)      Ca Oxalate Karen, UA   Rare     Calcium  10.1       Chloride  94(L)      CO2  25      Cocaine (Metab.)   Negative     Color, UA   Straw     Creatinine  2.3(H)      Creatinine, Random Ur   50.0  Comment:  The random urine reference ranges provided were established   for 24 hour urine collections.  No reference ranges exist for  random urine specimens.  Correlate clinically.       Differential Method  Automated      eGFR if   23.4(A)      eGFR if non   20.3  Comment:  Calculation used to obtain the estimated glomerular filtration  rate (eGFR) is the CKD-EPI equation. Since race is unknown   in our information system, the eGFR values for   -American and Non--American patients are given   for each creatinine result.  (A)      Eos #  0.1      Eosinophil%  1.8      Glucose  121(H)      Glucose, UA   Negative     Gran #  4.6      Gran%  70.1      Hematocrit  32.5(L)      Hemoglobin  11.2(L)      Ketones, UA   Negative     Leukocytes, UA   Trace(A)     Lymph #  1.3      Lymph%  19.8      Magnesium        MCH  30.3      MCHC  34.5      MCV  88      Microscopic Comment   SEE COMMENT  Comment:  Other formed elements not mentioned in the report are not   present in the microscopic examination.        Mono #  0.5      Mono%  7.9      MPV  8.7(L)      Nitrite, UA   Negative     Occult Blood UA   Negative     Opiate Scrn, Ur   Negative     pH, UA   7.0     Phosphorus        Platelets  244      POCT Glucose 128(H)       Potassium  4.1      Total Protein  7.1      Protein, UA   Negative  Comment:  Recommend a 24 hour urine protein or a urine   protein/creatinine ratio if globulin induced proteinuria is  clinically suspected.       RBC  3.70(L)      RBC, UA   1     RDW  13.4      Salicylate Lvl  <5.0  Comment:  Toxic:  30.0 - 70.0 mg/dl  Lethal: >70.0 mg/dl  (L)      Sodium  132(L)      Specific Gravity, UA   1.010     Specimen UA   Urine, Clean Catch     Squam Epithel, UA   1     Marijuana (THC) Metabolite   Negative     Toxicology  "Information   SEE COMMENT  Comment:  This screen includes the following classes of drugs at the   listed cut-off:  Benzodiazepines                  200 ng/ml  Methadone                        300 ng/ml  Cocaine metabolite               300 ng/ml  Opiates                          300 ng/ml  Barbiturates                     200 ng/ml  Amphetamines                    1000 ng/ml  Marijuana metabs (THC)            50 ng/ml  Phencyclidine (PCP)               25 ng/ml  High concentrations of Diphenhydramine may cross-react with  Phencyclidine PCP screening immunoassay giving a false   positive result.  High concentrations of Methylenedioxymethamphetamine (MDMA aka  Ectasy) and other structurally similar compounds may cross-   react with the Amphetamine/Methamphetamine screening   immunoassay giving a false positive result.  A metabolite of the anti-HIV drug Sustiva () may cause  false positive results in the Marijuana metabolite (THC)   screening assay.  Note: This exception list includes only more common   interferants in toxicology screen testing.  Because of many   cross-reactantspositive results on toxicology drug screens   should be confirmed whenever results do not correlate with   clinical presentation.  This report is intended for use in clinical monitoring and  management of patients. It is not intended for use in   employment related drug testing.  Because of any cross-reactants, positive results on toxicology  drug screens should be confirmed whenever results do not  correlate with clinical presentation.  Presumptive positive results are unconfirmed and may be used   only for medical purposes.       TSH  2.263      Urobilinogen, UA   Negative     WBC, UA   1     WBC  6.60            Significant Imaging: CT without contast 6/6/17  "No acute intracranial abnormality detected.    Senescent changes.    Stable mild left parietal soft tissue swelling.    Stable pedunculated occipital osseous lesion." ~  " Juan Carlos

## 2017-06-07 NOTE — ASSESSMENT & PLAN NOTE
- no previous psychiatric history; no reported life stressors or changes in medications  - seen in clinic 6/6 and PEC'd by PCP after pressured speech, hyperactivity, and insomnia for last 5 days  - does take trazodone for insomnia and phenergan for nausea at home  - no ideations of self-harm or homicidal behavior, thoughts  - Psych consulted; appreciate recs

## 2017-06-07 NOTE — MEDICAL/APP STUDENT
"[facility] Hospital Medicine  Progress Note    Patient Name: Kaci Whalen  MRN: [id number]  Patient Class: IP- Inpatient   Admission Date: 6/6/2017  Length of Stay: 1 days  Attending Physician: Artie Moore MD  Primary Care Provider: Toby Hung MD    [facility] Medicine Team: [facility] 1 Ryan Quiroz    Subjective:     Principal Problem:LAUREN (acute kidney injury)    HPI: Ms. Whalen is a 75yo female with PMHx of DM, HTN, Afib treated with Xarelto, who was PEC'ed by her PCP because of concern for manic episiode. In the ED she was found to have an LAUREN, increased creatinine from baseline. Patient is a poor historian, history obtained from ED notes: family says that patient has not slept in 5 days. No family members around currently to provide history.     Interval History: Ms. Whalen is orientedx2 this morning (oriented to person and time, not place), but confused. Sitter says that patient fell asleep around 4 AM, and was fairly calm as long as the room was quiet. Pt does not have any physical complaints, ROS is negative for fever, HA, N/V, chest pain, palpitations, SOB, abdominal pain. She says that she is under a lot of stress at home, "my family is broken."     Mental Status Examination  Appearance- appears tired, appropriately dressed in hospital gown. Poor personal care, hair disheveled. Good eye contact and rapport.   Speech- Normal volume, rate rhythm, spontaneous. Pressured speech. Fluent in Azeri, good English.  Mood- "Stressed"  Affect- Full  Behavior- psychomotor agitation  Thought process- Tangential  Thought content-No SI/HI   - Perseveration- "1, 2, 3, 1, 2, 3," "My family is broken... My home is broken," "I have to keep moving, or I'll die."   Cognition- Oriented to person and time, not place. Poor concentration.  Insight- poor  Judgment- poor    Collateral History obtained from daughterLiliam  ([phone]):  Prior to admission, Ms. Whalen has had 1 hour of " sleep over the last 5 days. She has been hyperaroused most of this time, and her affect has been labile, ranging from extreme anger to happiness. Increased rate of speech and disorganized thought with flight of ideas. While her mobility has been limited since her fall, she still has psychomotor agitation, and has been restless. She has not had any HI, but has passive SI. Her behavior has prompted the family to see the PCP, who issued a PEC.    Ms. Whalen has a history of of anxiety and depression, treated with amitryptiline and trazodone. Currently she is on trazodone 100mg qHs (increased from 50mg qHs by PCP a few months ago). Also taking melatonin for sleep. On Sunday evening (6/4), daughter gave diazepam to help with sleep without success.     5/21/17- patient had fall, hit her head Taken to [place] ED.    - CT head- subcutaneous hematoma in L posterior scalp.    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 Lives at home with . 2 of her 8 children have passed away. Best friend passed away 2 weeks ago.    Review of Systems   Constitutional: Negative for activity change, appetite change, chills and fever.   Respiratory: Negative for shortness of breath.    Cardiovascular: Negative for chest pain, palpitations and leg swelling.   Gastrointestinal: Negative for abdominal pain.   Musculoskeletal: Negative.      Objective:     Vital Signs (Most Recent):  Temp: 98.4 °F (36.9 °C) (06/06/17 2300)  Pulse: 74 (06/06/17 2300)  Resp: 16 (06/06/17 2300)  BP: (!) 153/68 (06/06/17 2300)  SpO2: 100 % (06/06/17 2300) Vital Signs (24h Range):  Temp:  [98.4 °F (36.9 °C)-98.7 °F (37.1 °C)] 98.4 °F (36.9 °C)  Pulse:  [70-81] 74  Resp:  [16-20] 16  SpO2:  [95 %-100 %] 100 %  BP: (138-165)/(65-70) 153/68     Weight: 72 kg (158 lb 11.7  oz)  Body mass index is 29.03 kg/m².  No intake or output data in the 24 hours ending 06/07/17 0756   Physical Exam    Significant Labs:   CBC:   Recent Labs  Lab 06/06/17 1943 06/07/17  0637   WBC 6.60 6.88   HGB 11.2* 11.6*   HCT 32.5* 34.0*    260     CMP:   Recent Labs  Lab 06/06/17 1943   *   K 4.1   CL 94*   CO2 25   *   BUN 29*   CREATININE 2.3*   CALCIUM 10.1   PROT 7.1   ALBUMIN 4.0   BILITOT 0.9   ALKPHOS 61   AST 25   ALT 12   ANIONGAP 13   EGFRNONAA 20.3*     Imaging- CT Head, 6/6- Senescent changes present but no acute intracranial abnormalities.   EKG- Sinus rhythm, 1st degree AV block.       Assessment/Plan:     Ms. Whalen is a 76 yo female with PMHx of HTN, T2DM, Afib PEC'ed by PCP for concern of manic episode. Upon admission found to have LAUREN (CR- 2.3). Responding well to IVF, morning labs showing Cr 1.8. Patient slept 4 hours overnight. Still confused, oriented x2.      Active Diagnoses:    Diagnosis Date Noted POA    PRINCIPAL PROBLEM:  LAUREN (acute kidney injury) [N17.9] 06/06/2017 Yes    Essential hypertension [I10] 06/06/2017 Unknown    Atrial fibrillation [I48.91] 06/10/2016 Yes    Type 2 diabetes mellitus with microalbuminuria, with long-term current use of insulin [E11.29, R80.9, Z79.4] 04/10/2015 Not Applicable    Hyperlipidemia with target LDL less than 100 [E78.5]  Yes      Problems Resolved During this Admission:    Diagnosis Date Noted Date Resolved POA     VTE Risk Mitigation         Ordered     rivaroxaban tablet 20 mg  With dinner     Route:  Oral        06/06/17 2247     Medium Risk of VTE  Once      06/06/17 2247     Place sequential compression device  Until discontinued      06/06/17 2247     Place EDE hose  Until discontinued      06/06/17 2247        Plan    LAUREN  - Cr upon admission- 2.3 (baseline ~1-1.3)  - Decreased PO intake per pt's family in ED, most likely pre-renal, still waiting on urine lytes  - Home losartan-HCTZ stopped  - 500 mL of LR, 500  mL of NS received over night. Cr down to 1.8  - Continuous IV NS    Manic Episode  - Per family, pt has not slept in five days. PEC by PCP   - R/o organic causes- Utox negative, TSH normal, CT-head shows soft tissue swelling  - No Hx of bipolar disorder per daughter, but current episode suggests estrellita, also has Hx of anxiety and depression, recent stressors  - Passive SI, but no active, no HI  - Waiting for psych consult, possible transfer to IP    T2DM  - Home meds- Novolog 10U with breakfast, 15 U with lunch, 17 U with dinner... Lantus 40 U qHs  - POCT this morning 44, adjusted Lantus to 20 U qHs    A-fib, CAD  - Continuing home metoprolol and Xarelto      Ryan Quiroz MS3  Department of Hospital Medicine   [facility]

## 2017-06-07 NOTE — HOSPITAL COURSE
6/7/17: NAEON; VSS. Patient less pressured this morning but had no slept at all overnight per sitter. Fluctuating speech, tangential, and multi-lingual. Hard to follow in either language. Otherwise denies any pain, able to answer questions and was otherwise in NAD. Episode of hypoglycemia this morning; patient asymptomatic. Changes to insulin regimen made.  6/8/17: Doing better today, still lacking insight, family at the bedside. LAUREN improving. MRI brain show subcutaneous hematoma (previous fall) with no intracranial acute pathology or mass lesions.  6/9/17: NAEON; patient hypertensive overnight (still holding lasix, losartan from LAUREN). Patient still with pressured speech this morning but less Advent. LAUREN resolved s/p IVF.    6/10/17: NAEON; VSS. Patient slept 4-5 hours overnight. [facility] unit currently full; referral made for patient to be placed in geriatric psychiatry facility. Still with pressured speech this morning.

## 2017-06-07 NOTE — PHYSICIAN QUERY
PT Name: Kaci Whalen  MR #: [id number]  Physician Query Form - CKD Clarification     CDS/: Delores Herring               Contact information: [phone]  This form is a permanent document in the medical record.     Query Date: June 7, 2017    By submitting this query, we are merely seeking further clarification of documentation. Please utilize your independent clinical judgment when addressing the question(s) below.    The Medical record contains the following:     Indicators   Supporting Clinical Findings   Location in Medical Record   x CKD or Chronic Kidney (Renal) Failure / Disease Chronic kidney disease calculated using CKD-EPI Creatinine 2009 Equation.  H/P 6/6   x BUN/Creatinine                          GFR Bun 29..>24    Creatine 2.3..>1.8    GFR 20.3...>27.3 Labs 6/6...>6/7    Dehydration      Nausea / Vomiting      Dialysis / CRRT      Medication      Treatment      Other Chronic Conditions      Other       Provider, please further specify the stage of CKD.      [  ] Chronic Kidney Disease (CKD) (please specify stage* below)       [employer] Definitions  Stage Description  eGFR (mL/min)   [x  ]     III Moderately reduced kidney function 30-59   [  ]     IV Severely reduced kidney function 15-29     [  ] Other (please specify): ________________________  [  ] Clinically Undetermined    Please document in your progress notes daily for the duration of treatment until resolved and include in your discharge summary.

## 2017-06-07 NOTE — ASSESSMENT & PLAN NOTE
- on MDI at home; Aspart 10 AM/15 Afternoon/17PM with meals; Detemir 40U QHS   - patient hypoglycemic but asmyptomatic morning of 6/7; aspart held and patient placed on low-dose SSI  - detemir ordered as 30U QHS; will reduce to 20U  - will continue to monitor blood glucose   - last HgbA1c 6.9 12/28/16; ordered repeat

## 2017-06-07 NOTE — PLAN OF CARE
Pt remain free of falls/injury, VSS. Alert and orientedx4, bed in lowest position, side rails up x2. Will continue fluids and monitoring.

## 2017-06-08 LAB
ALBUMIN SERPL BCP-MCNC: 3.2 G/DL
ALP SERPL-CCNC: 54 U/L
ALT SERPL W/O P-5'-P-CCNC: 10 U/L
ANION GAP SERPL CALC-SCNC: 12 MMOL/L
AST SERPL-CCNC: 19 U/L
BILIRUB SERPL-MCNC: 0.6 MG/DL
BUN SERPL-MCNC: 19 MG/DL
CALCIUM SERPL-MCNC: 9.3 MG/DL
CHLORIDE SERPL-SCNC: 101 MMOL/L
CO2 SERPL-SCNC: 23 MMOL/L
CREAT SERPL-MCNC: 1.5 MG/DL
EST. GFR  (AFRICAN AMERICAN): 39.3 ML/MIN/1.73 M^2
EST. GFR  (NON AFRICAN AMERICAN): 34.1 ML/MIN/1.73 M^2
GLUCOSE SERPL-MCNC: 76 MG/DL
HIV 1+2 AB+HIV1 P24 AG SERPL QL IA: NEGATIVE
POCT GLUCOSE: 105 MG/DL (ref 70–110)
POCT GLUCOSE: 151 MG/DL (ref 70–110)
POCT GLUCOSE: 191 MG/DL (ref 70–110)
POCT GLUCOSE: 85 MG/DL (ref 70–110)
POCT GLUCOSE: 87 MG/DL (ref 70–110)
POCT GLUCOSE: 93 MG/DL (ref 70–110)
POTASSIUM SERPL-SCNC: 4 MMOL/L
PROT SERPL-MCNC: 6.1 G/DL
RPR SER QL: NORMAL
SODIUM SERPL-SCNC: 136 MMOL/L

## 2017-06-08 PROCEDURE — 25000003 PHARM REV CODE 250: Performed by: STUDENT IN AN ORGANIZED HEALTH CARE EDUCATION/TRAINING PROGRAM

## 2017-06-08 PROCEDURE — 99232 SBSQ HOSP IP/OBS MODERATE 35: CPT | Mod: GC,,, | Performed by: HOSPITALIST

## 2017-06-08 PROCEDURE — 25000003 PHARM REV CODE 250: Performed by: PSYCHIATRY & NEUROLOGY

## 2017-06-08 PROCEDURE — 99232 SBSQ HOSP IP/OBS MODERATE 35: CPT | Mod: ,,, | Performed by: PSYCHIATRY & NEUROLOGY

## 2017-06-08 PROCEDURE — 11000001 HC ACUTE MED/SURG PRIVATE ROOM

## 2017-06-08 PROCEDURE — 80053 COMPREHEN METABOLIC PANEL: CPT

## 2017-06-08 PROCEDURE — 25000003 PHARM REV CODE 250: Performed by: HOSPITALIST

## 2017-06-08 PROCEDURE — 36415 COLL VENOUS BLD VENIPUNCTURE: CPT

## 2017-06-08 RX ORDER — HYDROCORTISONE 1 %
CREAM (GRAM) TOPICAL DAILY PRN
COMMUNITY
End: 2017-08-22

## 2017-06-08 RX ORDER — OMEPRAZOLE 40 MG/1
40 CAPSULE, DELAYED RELEASE ORAL
COMMUNITY
End: 2017-07-26

## 2017-06-08 RX ORDER — TRAZODONE HYDROCHLORIDE 50 MG/1
100 TABLET ORAL NIGHTLY PRN
Status: DISCONTINUED | OUTPATIENT
Start: 2017-06-08 | End: 2017-06-08

## 2017-06-08 RX ADMIN — METOPROLOL SUCCINATE 100 MG: 50 TABLET, EXTENDED RELEASE ORAL at 08:06

## 2017-06-08 RX ADMIN — RAMELTEON 8 MG: 8 TABLET, FILM COATED ORAL at 10:06

## 2017-06-08 RX ADMIN — RIVAROXABAN 15 MG: 15 TABLET, FILM COATED ORAL at 04:06

## 2017-06-08 RX ADMIN — VALPROATE SODIUM 250 MG: 100 INJECTION, SOLUTION INTRAVENOUS at 08:06

## 2017-06-08 RX ADMIN — DEXTROSE 500 MG: 50 INJECTION, SOLUTION INTRAVENOUS at 10:06

## 2017-06-08 RX ADMIN — SODIUM CHLORIDE: 0.9 INJECTION, SOLUTION INTRAVENOUS at 11:06

## 2017-06-08 RX ADMIN — SODIUM CHLORIDE: 0.9 INJECTION, SOLUTION INTRAVENOUS at 10:06

## 2017-06-08 NOTE — PROGRESS NOTES
[facility] Hospital Medicine  Progress Note    Patient Name: Kaci Whalen  MRN: [id number]  Patient Class: IP- Inpatient   Admission Date: 6/6/2017  Length of Stay: 2 days  Attending Physician: Artie Moore MD  Primary Care Provider: Toby Hung MD    [facility] Medicine Team: [facility] MED 1 FABIAN Menon    Subjective:     Principal Problem:LAUREN (acute kidney injury)    HPI:  Ms. Kaci Whalen is a 76 y.o. female w/ significant PMHx of DM, HTN, HLD, AFib on Xarelto was sent from her PCP's office to the ED under PEC due to concerns for a manic episode. Pt has not psych hx in the past and was not able to assess any acute stressors in life by talking to her but she does exhibit logorrhea and changes topics of conversation rapidly. She otherwise appears very pleasant and no aggressive behaviour noted during my ~30 mins interview. The patient denies having any cheat pain, abd pain or any other complains. In the ED she was found to have an LAUREN with increased Cr levels and the pt reports to have decreased PO intake. No family members around so most of the information was obtained from the patient.     Hospital Course:  6/7/17: NAEON; VSS. Patient less pressured this morning but had no slept at all overnight per sitter. Fluctuating speech, tangential, and multi-lingual. Hard to follow in either language. Otherwise denies any pain, able to answer questions and was otherwise in NAD. Episode of hypoglycemia this morning; patient asymptomatic. Changes to insulin regimen made.  6/8: doing better today, still lacking insight, family at the bedside. LAUREN improving. MRI brain show subcutaneous hematoma with no intracranial acute pathology or mass lesions.     Interval History:     Review of Systems   Constitutional: Negative for chills, fatigue and fever.   HENT: Negative for sinus pressure, sneezing and sore throat.    Eyes: Negative for visual disturbance.   Respiratory: Negative for  cough, chest tightness, shortness of breath and wheezing.    Cardiovascular: Negative for chest pain, palpitations and leg swelling.   Gastrointestinal: Negative for abdominal distention, abdominal pain, blood in stool, constipation, diarrhea, nausea and vomiting.   Endocrine: Negative for polyuria.   Genitourinary: Negative for dysuria and hematuria.   Musculoskeletal: Negative for back pain, gait problem, neck pain and neck stiffness.   Skin: Negative for pallor and rash.   Neurological: Negative for seizures, weakness, light-headedness, numbness and headaches.   Psychiatric/Behavioral: Positive for behavioral problems and sleep disturbance. Negative for agitation, decreased concentration, hallucinations and suicidal ideas. The patient is hyperactive.      Objective:     Vital Signs (Most Recent):  Temp: 98.5 °F (36.9 °C) (06/08/17 1100)  Pulse: 67 (06/08/17 1100)  Resp: 16 (06/08/17 1100)  BP: (!) 165/72 (06/08/17 1100)  SpO2: 97 % (06/08/17 1100) Vital Signs (24h Range):  Temp:  [97.4 °F (36.3 °C)-99.2 °F (37.3 °C)] 98.5 °F (36.9 °C)  Pulse:  [50-71] 67  Resp:  [12-18] 16  SpO2:  [93 %-98 %] 97 %  BP: (146-174)/(66-88) 165/72     Weight: 72 kg (158 lb 11.7 oz)  Body mass index is 29.03 kg/m².  No intake or output data in the 24 hours ending 06/08/17 1415   Physical Exam   Constitutional: She is oriented to person, place, and time. She appears well-developed and well-nourished. No distress.   HENT:   Head: Normocephalic and atraumatic.   Eyes: EOM are normal.   Neck: Normal range of motion. Neck supple. No JVD present.   Cardiovascular: Normal rate and regular rhythm.  Exam reveals no gallop and no friction rub.    Murmur heard.  Pulmonary/Chest: Effort normal and breath sounds normal. No respiratory distress. She has no wheezes. She has no rales.   Abdominal: Soft. Bowel sounds are normal. She exhibits no distension and no mass. There is no tenderness. There is no guarding.   Musculoskeletal: Normal range of  motion. She exhibits no edema or tenderness.   Neurological: She is alert and oriented to person, place, and time.   Skin: Skin is warm and dry. No rash noted.   Psychiatric:   Minimally pressured speech; flight of ideas with tangential pattern. No suicidal or homicidal intentions.        Significant Labs:   BMP:   Recent Labs  Lab 06/07/17  0637 06/08/17  0353   GLU 44*  44* 76     137 136   K 3.6  3.6 4.0     101 101   CO2 26  26 23   BUN 24*  24* 19   CREATININE 1.8*  1.8* 1.5*   CALCIUM 9.8  9.8 9.3   MG 1.8  --      CMP:   Recent Labs  Lab 06/06/17  1943 06/07/17  0637 06/08/17  0353   * 137  137 136   K 4.1 3.6  3.6 4.0   CL 94* 101  101 101   CO2 25 26  26 23   * 44*  44* 76   BUN 29* 24*  24* 19   CREATININE 2.3* 1.8*  1.8* 1.5*   CALCIUM 10.1 9.8  9.8 9.3   PROT 7.1 6.6  6.6 6.1   ALBUMIN 4.0 3.7  3.7 3.2*   BILITOT 0.9 0.8  0.8 0.6   ALKPHOS 61 59  59 54*   AST 25 21  21 19   ALT 12 11  11 10   ANIONGAP 13 10  10 12   EGFRNONAA 20.3* 27.3*  27.3* 34.1*       Magnesium:    Recent Labs  Lab 06/07/17  0637   MG 1.8       Significant Imaging:     Narrative     Technique: Routine MRI brain performed without contrast.    Comparison: CT 06/06/2017.    Findings:    No acute infarction.  No intra-or extra-axial hemorrhage.  No hydrocephalus.  No midline shift or mass effect.  Scattered foci of T2/FLAIR signal hyperintensity noted throughout the supratentorial periventricular white matter, likely representing chronic microvascular ischemic changes.  There is mild generalized cerebral volume loss.  Intracranial T2 flow voids are present. Sellar regions is unremarkable. Cerebellar tonsils are in their expected location. Cervicomedullary junctions is normal.     Paranasal sinuses and mastoid air cells are clear.  Orbits are normal.  Evolving subcutaneous hematoma overlies the left posterior parietal calvarium.  Low signal intensity mass arises from the posterior-inferior  calvarium, corresponding with the calcified lesion noted on recent CT and likely benign.  No marrow replacement process.   Impression         No acute intracranial abnormalities.  Specifically, no acute infarction or space-occupying mass lesion.    Evolving subcutaneous hematoma within the left posterior parietal calvarium.    Chronic microvascular ischemic changes and generalized cerebral volume loss.      Electronically signed by: BURT BARRY MD  Date: 06/07/17  Time: 22:43          Assessment/Plan:      CKD (chronic kidney disease), stage III              Tasia    - no previous psychiatric history; no reported life stressors or changes in medications  - seen in clinic 6/6 and PEC'd by PCP after pressured speech, hyperactivity, and insomnia for last 5 days  - does take trazodone for insomnia and phenergan for nausea at home  - no ideations of self-harm or homicidal behavior, thoughts  - Psych consulted; appreciate recs         Essential hypertension    - normotensive on admission; now with increase in SBP  - holding home HTN medication 2/2 LAUREN; will restart once Cr improves          Chronic atrial fibrillation    - 2D echo 5/28/16: EF 65; PA 18; trivial TR/MR  - continue home Toprol XL  - continue home Xarelto        Type 2 diabetes mellitus with microalbuminuria, with long-term current use of insulin    - on MDI at home; Aspart 10 AM/15 Afternoon/17PM with meals; Detemir 40U QHS   - patient hypoglycemic but asmyptomatic morning of 6/7; aspart held and patient placed on low-dose SSI  - detemir reduced to 20U yesterday, still low glucose, will hold long acting insulin with low HgA1c  - will continue to monitor blood glucose   - last HgbA1c 6.9 12/28/16; ordered repeat        Allergic conjunctivitis              * LAUREN (acute kidney injury)    - admission Cr 2.3; baseline 1.3-1.4  - likely pre-renal from decreased PO intake as per patient and family  - holding losartan-HCTZ, lasix in the setting of LAUREN    - avoid  NSAIDs, contrast, nephrotoxins    - renally dose medications to current GFR  - interval improvement with IVF          VTE Risk Mitigation         Ordered     rivaroxaban tablet 15 mg  With dinner     Route:  Oral        06/07/17 0833     Medium Risk of VTE  Once      06/06/17 2247     Place sequential compression device  Until discontinued      06/06/17 2247     Place EDE hose  Until discontinued      06/06/17 2247          FABIAN Menon  Department of Hospital Medicine   [facility]

## 2017-06-08 NOTE — PROGRESS NOTES
Coroners office was called to verify that PEC was received. Patient's room number and time of arrival on the unit was also verified with the coroners office.

## 2017-06-08 NOTE — PROGRESS NOTES
Pt arrived to MRI on continuous tele with sitter, HR 70, pt on RA, pt alert but confused, MD ordered versed 1mg iv to be given to pt in MRI as anxiolytic, versed given & pt tolerated well, tele room notified that pt will be off of portable tele box & placed on MRI compatible monitor, will place pt back on portable tele post MRI

## 2017-06-08 NOTE — ASSESSMENT & PLAN NOTE
- on MDI at home; Aspart 10 AM/15 Afternoon/17PM with meals; Detemir 40U QHS   - patient hypoglycemic but asmyptomatic morning of 6/7; aspart held and patient placed on low-dose SSI  - detemir reduced to 20U yesterday, still low glucose, will hold long acting insulin with low HgA1c  - will continue to monitor blood glucose   - last HgbA1c 6.9 12/28/16; ordered repeat

## 2017-06-08 NOTE — SUBJECTIVE & OBJECTIVE
"Interval History: Ms. Whalen was able to sleep approximately 3 hours last night, which is an improvement from the previous night. MRI was performed without any significant abnormalities. Overnight, patient's blood glucose dropped to 38 and 29 and patient was started on D5. This mornign she is more interruptable but still talkative. Less lability of mood noted. Denies SI/HI/AVH overnight. Slight less religiosity this morning.     Psychotherapeutics     Start     Stop Route Frequency Ordered    06/08/17 1050  trazodone tablet 100 mg      -- Oral Nightly PRN 06/08/17 0950    06/07/17 1429  ramelteon tablet 8 mg      -- Oral Nightly PRN 06/07/17 1329           Review of Systems   Constitutional: Negative for chills and fever.   Psychiatric/Behavioral: Negative for suicidal ideas. The patient is not nervous/anxious.      Objective:     Vital Signs (Most Recent):  Temp: 97.6 °F (36.4 °C) (06/08/17 0759)  Pulse: 71 (06/08/17 0759)  Resp: 16 (06/08/17 0759)  BP: (!) 168/88 (06/08/17 0759)  SpO2: 97 % (06/08/17 0759) Vital Signs (24h Range):  Temp:  [97.4 °F (36.3 °C)-99.2 °F (37.3 °C)] 97.6 °F (36.4 °C)  Pulse:  [50-71] 71  Resp:  [12-18] 16  SpO2:  [93 %-98 %] 97 %  BP: (146-174)/(66-88) 168/88     Height: 5' 2" (157.5 cm)  Weight: 72 kg (158 lb 11.7 oz)  Body mass index is 29.03 kg/m².    No intake or output data in the 24 hours ending 06/08/17 1151    Physical Exam   Appearance: no apparent distress, dressed in hospital   Behavior/Cooperation/Attitude: friendly, cooperative  Speech: conversational tone/volume today. Decreased volume from yesterday. Still with rapid speech but now interruptable  Mood: "good"  Affect: less lability noted today  Thought Process: tangential, but more redirectable today  Thought Content: denies SI/HI/AVH  Sensorium: awake/alert  Orientation: oriented to person/place. Still somewhat disoriented to reason for hospitalization. Oriented to month and year.   Attention/Memory: Attention impaired, " memory: recent and remote intact  Calculation/Concentration: impaired  Fund of Knowledge: intact to conversation  Abstraction: intact to conversation  Insight: limited  Judgment: appropriate for setting  Impulse Control: limited  Reliability: limited    Significant Labs:   Last 24 Hours:   Recent Lab Results       06/08/17  0758 06/08/17  0519 06/08/17  0353 06/08/17  0309 06/08/17  0052      Albumin   3.2(L)       Alkaline Phosphatase   54(L)       ALT   10       Anion Gap   12       AST   19       Total Bilirubin   0.6  Comment:  For infants and newborns, interpretation of results should be based  on gestational age, weight and in agreement with clinical  observations.  Premature Infant recommended reference ranges:  Up to 24 hours.............<8.0 mg/dL  Up to 48 hours............<12.0 mg/dL  3-5 days..................<15.0 mg/dL  6-29 days.................<15.0 mg/dL         BUN, Bld   19       Calcium   9.3       Chloride   101       CO2   23       Creatinine   1.5(H)       eGFR if    39.3(A)       eGFR if non    34.1  Comment:  Calculation used to obtain the estimated glomerular filtration  rate (eGFR) is the CKD-EPI equation. Since race is unknown   in our information system, the eGFR values for   -American and Non--American patients are given   for each creatinine result.  (A)       Glucose   76       POCT Glucose 87 85  93 105     Potassium   4.0       Total Protein   6.1       Sodium   136                   06/07/17  2302 06/07/17  2236 06/07/17  2202 06/07/17  2200 06/07/17  1735      Albumin          Alkaline Phosphatase          ALT          Anion Gap          AST          Total Bilirubin          BUN, Bld          Calcium          Chloride          CO2          Creatinine          eGFR if           eGFR if non African American          Glucose          POCT Glucose 121(H) 53(L) 29(LL) 38(LL) 109     Potassium          Total Protein           Sodium                      06/07/17  1729 06/07/17  1216 06/07/17  1211      Albumin        Alkaline Phosphatase        ALT        Anion Gap        AST        Total Bilirubin        BUN, Bld        Calcium        Chloride        CO2        Creatinine        eGFR if         eGFR if non African American        Glucose        POCT Glucose 60(L) 183(H) 50(LL)     Potassium        Total Protein        Sodium              Significant Imaging: MRI: I have reviewed all pertinent results/findings within the past 24 hours.  No acute changes, evolving subcutaneous hematoma, chronic microvascular changes

## 2017-06-08 NOTE — SUBJECTIVE & OBJECTIVE
Interval History:     Review of Systems   Constitutional: Negative for chills, fatigue and fever.   HENT: Negative for sinus pressure, sneezing and sore throat.    Eyes: Negative for visual disturbance.   Respiratory: Negative for cough, chest tightness, shortness of breath and wheezing.    Cardiovascular: Negative for chest pain, palpitations and leg swelling.   Gastrointestinal: Negative for abdominal distention, abdominal pain, blood in stool, constipation, diarrhea, nausea and vomiting.   Endocrine: Negative for polyuria.   Genitourinary: Negative for dysuria and hematuria.   Musculoskeletal: Negative for back pain, gait problem, neck pain and neck stiffness.   Skin: Negative for pallor and rash.   Neurological: Negative for seizures, weakness, light-headedness, numbness and headaches.   Psychiatric/Behavioral: Positive for behavioral problems and sleep disturbance. Negative for agitation, decreased concentration, hallucinations and suicidal ideas. The patient is hyperactive.      Objective:     Vital Signs (Most Recent):  Temp: 98.5 °F (36.9 °C) (06/08/17 1100)  Pulse: 67 (06/08/17 1100)  Resp: 16 (06/08/17 1100)  BP: (!) 165/72 (06/08/17 1100)  SpO2: 97 % (06/08/17 1100) Vital Signs (24h Range):  Temp:  [97.4 °F (36.3 °C)-99.2 °F (37.3 °C)] 98.5 °F (36.9 °C)  Pulse:  [50-71] 67  Resp:  [12-18] 16  SpO2:  [93 %-98 %] 97 %  BP: (146-174)/(66-88) 165/72     Weight: 72 kg (158 lb 11.7 oz)  Body mass index is 29.03 kg/m².  No intake or output data in the 24 hours ending 06/08/17 1415   Physical Exam   Constitutional: She is oriented to person, place, and time. She appears well-developed and well-nourished. No distress.   HENT:   Head: Normocephalic and atraumatic.   Eyes: EOM are normal.   Neck: Normal range of motion. Neck supple. No JVD present.   Cardiovascular: Normal rate and regular rhythm.  Exam reveals no gallop and no friction rub.    Murmur heard.  Pulmonary/Chest: Effort normal and breath sounds normal.  No respiratory distress. She has no wheezes. She has no rales.   Abdominal: Soft. Bowel sounds are normal. She exhibits no distension and no mass. There is no tenderness. There is no guarding.   Musculoskeletal: Normal range of motion. She exhibits no edema or tenderness.   Neurological: She is alert and oriented to person, place, and time.   Skin: Skin is warm and dry. No rash noted.   Psychiatric:   Minimally pressured speech; flight of ideas with tangential pattern. No suicidal or homicidal intentions.        Significant Labs:   BMP:   Recent Labs  Lab 06/07/17  0637 06/08/17  0353   GLU 44*  44* 76     137 136   K 3.6  3.6 4.0     101 101   CO2 26  26 23   BUN 24*  24* 19   CREATININE 1.8*  1.8* 1.5*   CALCIUM 9.8  9.8 9.3   MG 1.8  --      CMP:   Recent Labs  Lab 06/06/17  1943 06/07/17 0637 06/08/17  0353   * 137  137 136   K 4.1 3.6  3.6 4.0   CL 94* 101  101 101   CO2 25 26  26 23   * 44*  44* 76   BUN 29* 24*  24* 19   CREATININE 2.3* 1.8*  1.8* 1.5*   CALCIUM 10.1 9.8  9.8 9.3   PROT 7.1 6.6  6.6 6.1   ALBUMIN 4.0 3.7  3.7 3.2*   BILITOT 0.9 0.8  0.8 0.6   ALKPHOS 61 59  59 54*   AST 25 21  21 19   ALT 12 11  11 10   ANIONGAP 13 10  10 12   EGFRNONAA 20.3* 27.3*  27.3* 34.1*       Magnesium:    Recent Labs  Lab 06/07/17  0637   MG 1.8       Significant Imaging:     Narrative     Technique: Routine MRI brain performed without contrast.    Comparison: CT 06/06/2017.    Findings:    No acute infarction.  No intra-or extra-axial hemorrhage.  No hydrocephalus.  No midline shift or mass effect.  Scattered foci of T2/FLAIR signal hyperintensity noted throughout the supratentorial periventricular white matter, likely representing chronic microvascular ischemic changes.  There is mild generalized cerebral volume loss.  Intracranial T2 flow voids are present. Sellar regions is unremarkable. Cerebellar tonsils are in their expected location. Cervicomedullary junctions  is normal.     Paranasal sinuses and mastoid air cells are clear.  Orbits are normal.  Evolving subcutaneous hematoma overlies the left posterior parietal calvarium.  Low signal intensity mass arises from the posterior-inferior calvarium, corresponding with the calcified lesion noted on recent CT and likely benign.  No marrow replacement process.   Impression         No acute intracranial abnormalities.  Specifically, no acute infarction or space-occupying mass lesion.    Evolving subcutaneous hematoma within the left posterior parietal calvarium.    Chronic microvascular ischemic changes and generalized cerebral volume loss.      Electronically signed by: BURT BARRY MD  Date: 06/07/17  Time: 22:43

## 2017-06-08 NOTE — PLAN OF CARE
Problem: Patient Care Overview  Goal: Plan of Care Review  Outcome: Ongoing (interventions implemented as appropriate)  Pt in room alert and talkative. Pt bg checked Q2H this shift due to multiple hypoglycemic episodes. Pt asymptomatic during episodes during PM shift. Pt slept well overnight. Pt given sedation for MRI overnight.

## 2017-06-08 NOTE — PROGRESS NOTES
"[facility]  Psychiatry  Progress Note    Patient Name: Kaci Whalen  MRN: [id number]   Code Status: Full Code  Admission Date: 2017  Hospital Length of Stay: 2 days  Expected Discharge Date: 2017  Attending Physician: Artie Moore MD  Primary Care Provider: Toby Hung MD    Current Legal Status: PEC    Patient information was obtained from patient, spouse/SO, relative(s), past medical records and ER records.     Subjective:     Principal Problem:LAUREN (acute kidney injury)    HPI: Per Dr. Hung, [facility] clinic: "77 y/o w/ h/o DM, CHF MIGUEL on cpap presents with daughter for five days of unusual behavior. Daughter reports for last five days paitent has not been sleeping is at times combative and will fight her from assistance with bath. Frequently discussing God and speaking to  family members. Recent history is only significant for treatment with amoxicillin, clarithromycin and omeprazole for H.pylori infection (EGD biopsy proven). Daughter also reports more frequent loose stool during this time. There has been no falls during this time.  She has no prior history of psychiatric hospitalization. Does have history of depression treated in past with SSRI and most recently trazodone. " ~ Dr. Hung 17    Patient was PEC'd by Dr. Hung for suspected estrellita and sent to ED.    Per ED MD: "The patient is a 76 y.o. female with hx of: DM, HTN, obesity, HLD, and A-fib that presents to the ED from PCP's office under a PEC due to concern for acute estrellita. Patient has no complaints at this time and is thankful for this medical evaluation. Patient denies auditory/visual hallucinations or suicidal/homicidal ideation. She also denies any pain, headache, chest pain, shortness of breath, abdominal pain, constipation, diarrhea, urinary problems, or difficulty ambulating" ~ Dr. Dewey 17      On my interview today, patient is talkative and loud but pleasant and confused. She is " "oriented to place, and month, but disoriented to day of week and states that the year is 2016. She is also unable to specify why she is in the hospital specifically. She is able to relay that she was at Dr. Hung's office and that he told her she had to go to the hospital, but she cannot specify what concern made it so urgent for her to come. She displays significant tangentiality throughout the interaction, and her history is limited, thus collateral was contacted (see below).  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 She endorses feeling confused and repeatedly makes hand gestures to signify confusion (waving them in the air). She endorses having had significant diarrhea recently, but she is unable to specify a timeline.    Collateral: Daughter (Liliam) [phone]  Ms. Ricketts is 50 years old and reports that she has never seen her mother act like this. When we spoke, she put me on speakerphone so that the patient's  could add to the conversation as well. Patient's  reports that patient has been having confusion for approximately 1 month. He states that she has been falling more often since she has been confused. They both relay that the patient had been to a GI appointment for an endoscopy and a colonoscopy. She was then treated with antibiotics, and they mention that her watery stools have been getting worse. They describe them as "mostly watery, but sometimes chunky". They endorse that the patient has been speaking about family members that have been dead as thought they are alive. Her friend, who was bed-bound  last Saturday, and when they were discussing going to the " , the patient stated that she wanted her other daughter to bring her. Ms. Ricketts reminded her mother that the other daughter had passed away, and the patient did not appear to remember that at the time. The patient has been talking to herself in the last month, and when confronted about it, often attributes her behavior to praying. They mention that the patient has not been sleeping, and she has been through a sleep study in the past. After the sleep study, the doctors recommended that she wear a CPAP machine; however, the patient has declined to do so. They state that the patient has had 2 significant episodes of depression when her 2 children , but she has never had anything like her behavior recently. They state that she has never been to see a psychiatrist, has never been hospitalized for psychiatric reasons, and her only psychiatric medications that she has been on in the past were (previously) amitriptyline and in the last month, Trazodone for sleep. They are concerned that the patient's behavior may be related to her multiple recent falls because they note that her confusion has become worse since her last fall where she hit her head ( per chart).    Hospital Course: 17  Patient admitted to internal medicine for LAUREN  17 Psychiatry consulted for evaluation of estrellita    Interval History: Ms. Whaeln was able to sleep approximately 3 hours last night, which is an improvement from the previous night. MRI was performed without any significant abnormalities. Overnight, patient's blood glucose dropped to 38 and 29 and patient was started on D5. This mornign she is more interruptable but still talkative. Less lability of mood noted. Denies SI/HI/AVH overnight. Slight less religiosity this morning.     Psychotherapeutics     Start     Stop Route Frequency Ordered    17 1050  trazodone tablet 100 mg      -- Oral Nightly PRN 17 0950    17 1429  ramelteon tablet 8 mg      -- Oral  "Nightly PRN 06/07/17 1329           Review of Systems   Constitutional: Negative for chills and fever.   Psychiatric/Behavioral: Negative for suicidal ideas. The patient is not nervous/anxious.      Objective:     Vital Signs (Most Recent):  Temp: 97.6 °F (36.4 °C) (06/08/17 0759)  Pulse: 71 (06/08/17 0759)  Resp: 16 (06/08/17 0759)  BP: (!) 168/88 (06/08/17 0759)  SpO2: 97 % (06/08/17 0759) Vital Signs (24h Range):  Temp:  [97.4 °F (36.3 °C)-99.2 °F (37.3 °C)] 97.6 °F (36.4 °C)  Pulse:  [50-71] 71  Resp:  [12-18] 16  SpO2:  [93 %-98 %] 97 %  BP: (146-174)/(66-88) 168/88     Height: 5' 2" (157.5 cm)  Weight: 72 kg (158 lb 11.7 oz)  Body mass index is 29.03 kg/m².    No intake or output data in the 24 hours ending 06/08/17 1151    Physical Exam   Appearance: no apparent distress, dressed in hospital   Behavior/Cooperation/Attitude: friendly, cooperative  Speech: conversational tone/volume today. Decreased volume from yesterday. Still with rapid speech but now interruptable  Mood: "good"  Affect: less lability noted today  Thought Process: tangential, but more redirectable today  Thought Content: denies SI/HI/AVH  Sensorium: awake/alert  Orientation: oriented to person/place. Still somewhat disoriented to reason for hospitalization. Oriented to month and year.   Attention/Memory: Attention impaired, memory: recent and remote intact  Calculation/Concentration: impaired  Fund of Knowledge: intact to conversation  Abstraction: intact to conversation  Insight: limited  Judgment: appropriate for setting  Impulse Control: limited  Reliability: limited    Significant Labs:   Last 24 Hours:   Recent Lab Results       06/08/17  0758 06/08/17  0519 06/08/17  0353 06/08/17  0309 06/08/17  0052      Albumin   3.2(L)       Alkaline Phosphatase   54(L)       ALT   10       Anion Gap   12       AST   19       Total Bilirubin   0.6  Comment:  For infants and newborns, interpretation of results should be based  on gestational age, " weight and in agreement with clinical  observations.  Premature Infant recommended reference ranges:  Up to 24 hours.............<8.0 mg/dL  Up to 48 hours............<12.0 mg/dL  3-5 days..................<15.0 mg/dL  6-29 days.................<15.0 mg/dL         BUN, Bld   19       Calcium   9.3       Chloride   101       CO2   23       Creatinine   1.5(H)       eGFR if    39.3(A)       eGFR if non    34.1  Comment:  Calculation used to obtain the estimated glomerular filtration  rate (eGFR) is the CKD-EPI equation. Since race is unknown   in our information system, the eGFR values for   -American and Non--American patients are given   for each creatinine result.  (A)       Glucose   76       POCT Glucose 87 85  93 105     Potassium   4.0       Total Protein   6.1       Sodium   136                   06/07/17  2302 06/07/17  2236 06/07/17  2202 06/07/17  2200 06/07/17  1735      Albumin          Alkaline Phosphatase          ALT          Anion Gap          AST          Total Bilirubin          BUN, Bld          Calcium          Chloride          CO2          Creatinine          eGFR if           eGFR if non African American          Glucose          POCT Glucose 121(H) 53(L) 29(LL) 38(LL) 109     Potassium          Total Protein          Sodium                      06/07/17  1729 06/07/17  1216 06/07/17  1211      Albumin        Alkaline Phosphatase        ALT        Anion Gap        AST        Total Bilirubin        BUN, Bld        Calcium        Chloride        CO2        Creatinine        eGFR if         eGFR if non African American        Glucose        POCT Glucose 60(L) 183(H) 50(LL)     Potassium        Total Protein        Sodium              Significant Imaging: MRI: I have reviewed all pertinent results/findings within the past 24 hours.  No acute changes, evolving subcutaneous hematoma, chronic microvascular  "changes    Assessment/Plan:     Tasia    Hyperactive Delirium vs Tasia  - No previous manic episodes per family   - Development of first manic episode at age 76 is rare and must be a diagnosis of exclusion.    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 Multiple other falls recently per family (possible post-concussive syndrome? Possible diffuse axonal injury?)   - Recent triple therapy antibiotics for H. Pylori   - Recent valium administration for insomnia per telephone notes   - Recent tramadol (which has a small serotonergic component) use per chart   - Recent watery "impressive" diarrhea per family for 5 days   - LAUREN of unknown origin at the moment, resolving with IVF    Hyperactive delirium can share multiple symptoms with Tasia, including hyperreligiosity, irritability, agitation, hallucinations(tasia can also present with psychosis), racing thoughts, pressured speech and distractibility    While bipolar tasia is possible, recommend continuing to rule out organic causes for patient's change in behavior/mentation:  - Brain MRI performed, no acute changes  - Stool sample for C.Diff(unlikely but certainly possible given recent watery diarrhea and antibiotic use). Per primary team, low suspicion.  - RPR for r/o of neurosyphilis (which can present with these symptoms) in process  - HIV in process  - Recommend continue IVF for LAUREN, resolving, continue medical management      Regarding treatment of current symptoms  - Increase Depacon to 250mg QAM, 500mg QHS   - Depacon is the IV version of depakote, which provides mood stabilization in both hyperactive delirium and tasia.    Discontinue Trazodone that was started   - Trazodone is serotonergic and can provide additional activation in patients displaying this behavior. While generally it is used as a sleep aid, in this " patient, it would likely make her behavior worse.    Will continue to follow               Need for Continued Hospitalization:   Psychiatric illness continues to pose a potential threat to life or bodily function, of self or others, thereby requiring the need for continued inpatient psychiatric hospitalization.    Anticipated Disposition: if no medical cause of altered mental status is found, patient will require psychiatric hospitalization for stabilization of symptoms.    Driss Alcala MD   Psychiatry  [facility]

## 2017-06-08 NOTE — ASSESSMENT & PLAN NOTE
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 Multiple other falls recently per family (possible post-concussive syndrome? Possible diffuse axonal injury?)   - Recent triple therapy antibiotics for H. Pylori   - Recent valium administration for insomnia per telephone notes   - Recent tramadol (which has a small serotonergic component) use per chart   - Recent watery "impressive" diarrhea per family for 5 days   - LAUREN of unknown origin at the moment, resolving with IVF    Hyperactive delirium can share multiple symptoms with Tasia, including hyperreligiosity, irritability, agitation, hallucinations(tasia can also present with psychosis), racing thoughts, pressured speech and distractibility    While bipolar tasia is possible, recommend continuing to rule out organic causes for patient's change in behavior/mentation:  - Brain MRI performed, no acute changes  - Stool sample for C.Diff(unlikely but certainly possible given recent watery diarrhea and antibiotic use). Per primary team, low suspicion.  - RPR for r/o of neurosyphilis (which can present with these symptoms) in process  - HIV in process  - Recommend continue IVF for LAUREN, resolving, continue medical management      Regarding treatment of current symptoms  - Increase Depacon to 250mg QAM, 500mg QHS   - Depacon is the IV version of depakote, which provides mood stabilization in both hyperactive delirium and tasia.    Discontinue Trazodone that was started   - Trazodone is serotonergic and can provide additional activation in patients displaying this behavior. While generally it is used as a sleep aid, in this patient, it would likely make her " behavior worse.    Will continue to follow

## 2017-06-08 NOTE — ASSESSMENT & PLAN NOTE
- admission Cr 2.3; baseline 1.3-1.4  - likely pre-renal from decreased PO intake as per patient and family  - holding losartan-HCTZ, lasix in the setting of LAUREN    - avoid NSAIDs, contrast, nephrotoxins    - renally dose medications to current GFR  - interval improvement with IVF

## 2017-06-08 NOTE — MEDICAL/APP STUDENT
[facility] Hospital Medicine  Progress Note    Patient Name: Kaci Whalen  MRN: [id number]  Patient Class: IP- Inpatient   Admission Date: 6/6/2017  Length of Stay: 2 days  Attending Physician: Artie Moore MD  Primary Care Provider: Toby Hung MD    [facility] Medicine Team: [facility] [place] [facility] MED 1 Ryan Quiroz    Subjective:     Principal Problem:LAUREN (acute kidney injury)    HPI: Ms. Whalen is a 75yo female with PMHx of DM, HTN, Afib treated with Xarelto, who was PEC'ed by her PCP because of concern for manic episiode. In the ED she was found to have an LAUREN, increased creatinine from baseline. Patient is a poor historian, history obtained from ED notes: family says that patient has not slept in 5 days. No family members around currently to provide history.     Collateral History obtained from daughter, Liliam  ([phone]):  Prior to admission, Ms. Whalen has had 1 hour of sleep over the last 5 days. She has been hyperaroused most of this time, and her affect has been labile, ranging from extreme anger to happiness. Increased rate of speech and disorganized thought with flight of ideas. While her mobility has been limited since her fall, she still has psychomotor agitation, and has been restless. She has not had any HI, but has passive SI. Her behavior has prompted the family to see the PCP, who issued a PEC.     Ms. Whalen has a history of of anxiety and depression, treated with amitryptiline and trazodone. Currently she is on trazodone 100mg qHs (increased from 50mg qHs by PCP a few months ago). Also taking melatonin for sleep. On Sunday evening (6/4), daughter gave diazepam to help with sleep without success.      5/21/17- patient had fall, hit her head Taken to [place] ED.                         - CT head- subcutaneous hematoma in L posterior scalp.     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 Lives at home with . 2 of her 8 children have passed away. Best friend passed away 2 weeks ago.    Interval History: Patient says she feels better than yesterday. Per sitter, patient received 3-4 hours of sleep overnight, but still hyperactive when awake.    MSE:  Appearance- appears less tired, appropriately dressed in hospital gown. Poor personal care, hair disheveled. Good eye contact and rapport.   Speech- Normal volume, rate rhythm, spontaneous. Pressured speech. Fluent in Irish and English.  Mood- "Good"  Affect- Full  Behavior- psychomotor agitation (but improving)  Thought process- Tangential (but improving)  Thought content-No SI/HI   Cognition- Oriented x3. Poor concentration.  Insight- improving, understands why she is in the hospital  Judgment- poor     Review of Systems   Constitutional: Negative for appetite change, chills, fatigue and fever.   Respiratory: Negative for shortness of breath.    Cardiovascular: Negative for chest pain, palpitations and leg swelling.   Gastrointestinal: Negative for abdominal distention, abdominal pain and constipation.     Objective:     Vital Signs (Most Recent):  Temp: 97.6 °F (36.4 °C) (06/08/17 0759)  Pulse: 71 (06/08/17 0759)  Resp: 16 (06/08/17 0759)  BP: (!) 168/88 (06/08/17 0759)  SpO2: 97 % (06/08/17 0759) Vital Signs (24h Range):  Temp:  [97.4 °F (36.3 °C)-99.2 °F (37.3 °C)] 97.6 °F (36.4 °C)  Pulse:  [50-75] 71  Resp:  [12-18] 16  SpO2:  [93 %-98 %] 97 %  BP: (146-174)/(66-88) 168/88     Weight: 72 kg (158 lb 11.7 oz)  Body mass index is 29.03 kg/m².  No intake or output data in the 24 hours ending 06/08/17 0908   Physical Exam    Significant Labs:   CBC:   Recent Labs  Lab 06/06/17  1943 06/07/17  0637   WBC 6.60 6.88   HGB 11.2* 11.6*   HCT 32.5* 34.0*    260     CMP:   Recent Labs  Lab 06/06/17 1943 " 06/07/17  0637 06/08/17  0353   * 137  137 136   K 4.1 3.6  3.6 4.0   CL 94* 101  101 101   CO2 25 26  26 23   * 44*  44* 76   BUN 29* 24*  24* 19   CREATININE 2.3* 1.8*  1.8* 1.5*   CALCIUM 10.1 9.8  9.8 9.3   PROT 7.1 6.6  6.6 6.1   ALBUMIN 4.0 3.7  3.7 3.2*   BILITOT 0.9 0.8  0.8 0.6   ALKPHOS 61 59  59 54*   AST 25 21  21 19   ALT 12 11  11 10   ANIONGAP 13 10  10 12   EGFRNONAA 20.3* 27.3*  27.3* 34.1*       Significant Imaging: MRI, 6/7- evolving subcutaneous hematoma    Assessment/Plan:      Active Diagnoses:    Diagnosis Date Noted POA    PRINCIPAL PROBLEM:  LAUREN (acute kidney injury) [N17.9] 06/06/2017 Yes    Tasia [F30.9] 06/07/2017 Yes    CKD (chronic kidney disease), stage III [N18.3] 06/07/2017 Yes    Essential hypertension [I10] 06/06/2017 Yes    Chronic atrial fibrillation [I48.2] 06/10/2016 Yes    Type 2 diabetes mellitus with microalbuminuria, with long-term current use of insulin [E11.29, R80.9, Z79.4] 04/10/2015 Not Applicable      Problems Resolved During this Admission:    Diagnosis Date Noted Date Resolved POA     VTE Risk Mitigation         Ordered     rivaroxaban tablet 15 mg  With dinner     Route:  Oral        06/07/17 0833     Medium Risk of VTE  Once      06/06/17 2247     Place sequential compression device  Until discontinued      06/06/17 2247     Place EDE hose  Until discontinued      06/06/17 2247      Plan     LAUREN  - Cr upon admission- 2.3 (baseline ~1-1.3), currently at 1.5  - Decreased PO intake per pt's family in ED, most likely pre-renal, still waiting on urine lytes  - Home losartan-HCTZ stopped  - Continuous IV NS     Manic Episode vs. Hyperactive delirium  - Per family, pt has not slept in five days. PEC by PCP   - R/o organic causes- Utox negative, TSH normal, CT-head shows soft tissue swelling, head MRI- evolving subcutaneous hematoma  - No Hx of bipolar disorder per daughter, very unlikely that first manic episode would present at 76-yo.  Psych suggests hyperactive delirium given recent history of head trauma, severe diarrheal illness.   - Depacon 250mg IV BID, switch trazodone from PRN to scheduled qHs to aid with sleep (consider increasing dose)       T2DM  - Home meds- Novolog 10U with breakfast, 15 U with lunch, 17 U with dinner... Lantus 40 U qHs  - Patient has just resumed eating, last POCT was 151. Resume Lantus 30 U qHs, consider restarting short-acting as well.  - Re-order POCT q2hr     A-fib, CAD  - Continuing home metoprolol and Xarelto    Ryan Quiroz  Department of Hospital Medicine   [facility]

## 2017-06-08 NOTE — NURSING
Pt hypoglycemic on QHS accucheck. Pt 38 recheck on other hand pt 29. Pt given oral glucose tabs to chew as she is able to follow commands and eat. Pt rechecked and bg 53. Pt given IV Dextrose at this time and recheck 121. Requested Q2H Accuchecks just to be sure of pt bg throughout the night. Order to hold Bedtime Detemir per Dr Raya. Monitoring. Pt has sitter to bedside and is sleeping at this time.

## 2017-06-09 PROBLEM — R82.71 ASYMPTOMATIC BACTERIURIA: Status: ACTIVE | Noted: 2017-06-09

## 2017-06-09 PROBLEM — R82.71 ASYMPTOMATIC BACTERIURIA: Status: RESOLVED | Noted: 2017-06-09 | Resolved: 2017-06-09

## 2017-06-09 PROBLEM — F05 ACUTE HYPERACTIVE DELIRIUM DUE TO ANOTHER MEDICAL CONDITION: Status: ACTIVE | Noted: 2017-06-09

## 2017-06-09 LAB
ALBUMIN SERPL BCP-MCNC: 3.1 G/DL
ALP SERPL-CCNC: 51 U/L
ALT SERPL W/O P-5'-P-CCNC: 9 U/L
ANION GAP SERPL CALC-SCNC: 9 MMOL/L
AST SERPL-CCNC: 16 U/L
BILIRUB SERPL-MCNC: 0.6 MG/DL
BUN SERPL-MCNC: 14 MG/DL
CALCIUM SERPL-MCNC: 8.5 MG/DL
CHLORIDE SERPL-SCNC: 102 MMOL/L
CO2 SERPL-SCNC: 23 MMOL/L
CREAT SERPL-MCNC: 1.3 MG/DL
EST. GFR  (AFRICAN AMERICAN): 46.7 ML/MIN/1.73 M^2
EST. GFR  (NON AFRICAN AMERICAN): 40.5 ML/MIN/1.73 M^2
GLUCOSE SERPL-MCNC: 106 MG/DL
POCT GLUCOSE: 124 MG/DL (ref 70–110)
POCT GLUCOSE: 132 MG/DL (ref 70–110)
POCT GLUCOSE: 160 MG/DL (ref 70–110)
POCT GLUCOSE: 195 MG/DL (ref 70–110)
POCT GLUCOSE: 98 MG/DL (ref 70–110)
POTASSIUM SERPL-SCNC: 4 MMOL/L
PROT SERPL-MCNC: 5.7 G/DL
SODIUM SERPL-SCNC: 134 MMOL/L

## 2017-06-09 PROCEDURE — 80053 COMPREHEN METABOLIC PANEL: CPT

## 2017-06-09 PROCEDURE — 99232 SBSQ HOSP IP/OBS MODERATE 35: CPT | Mod: ,,, | Performed by: PSYCHIATRY & NEUROLOGY

## 2017-06-09 PROCEDURE — 25000003 PHARM REV CODE 250: Performed by: HOSPITALIST

## 2017-06-09 PROCEDURE — 11000001 HC ACUTE MED/SURG PRIVATE ROOM

## 2017-06-09 PROCEDURE — 25000003 PHARM REV CODE 250: Performed by: PSYCHIATRY & NEUROLOGY

## 2017-06-09 PROCEDURE — 25000003 PHARM REV CODE 250: Performed by: STUDENT IN AN ORGANIZED HEALTH CARE EDUCATION/TRAINING PROGRAM

## 2017-06-09 PROCEDURE — 99238 HOSP IP/OBS DSCHRG MGMT 30/<: CPT | Mod: GC,,, | Performed by: HOSPITALIST

## 2017-06-09 PROCEDURE — 36415 COLL VENOUS BLD VENIPUNCTURE: CPT

## 2017-06-09 RX ADMIN — SODIUM CHLORIDE: 0.9 INJECTION, SOLUTION INTRAVENOUS at 02:06

## 2017-06-09 RX ADMIN — DEXTROSE 250 MG: 50 INJECTION, SOLUTION INTRAVENOUS at 08:06

## 2017-06-09 RX ADMIN — SODIUM CHLORIDE: 0.9 INJECTION, SOLUTION INTRAVENOUS at 04:06

## 2017-06-09 RX ADMIN — RIVAROXABAN 15 MG: 15 TABLET, FILM COATED ORAL at 04:06

## 2017-06-09 RX ADMIN — RAMELTEON 8 MG: 8 TABLET, FILM COATED ORAL at 09:06

## 2017-06-09 RX ADMIN — DEXTROSE 500 MG: 50 INJECTION, SOLUTION INTRAVENOUS at 09:06

## 2017-06-09 RX ADMIN — METOPROLOL SUCCINATE 100 MG: 50 TABLET, EXTENDED RELEASE ORAL at 08:06

## 2017-06-09 NOTE — PLAN OF CARE
Problem: Patient Care Overview  Goal: Plan of Care Review  Outcome: Ongoing (interventions implemented as appropriate)  VS and assessment performed per orders. Pt A&Ox2, disoriented to time and situation. Speech pressured with excessive talking. Received PRN ramelteon at HS. Pt slept approx 5hrs.  Blood glucose of 191 at bedtime, no coverage needed as per sliding scale. IV fluids continues. Pt free of falls or injury. Sitter remains at bedside.

## 2017-06-09 NOTE — ASSESSMENT & PLAN NOTE
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 Multiple other falls recently per family (possible post-concussive syndrome? Possible diffuse axonal injury?)   - Recent triple therapy antibiotics for H. Pylori   - Recent valium administration for insomnia per telephone notes   - Recent tramadol (which has a small serotonergic component) use per chart   - Recent watery "impressive" diarrhea per family for 5 days   - LAUREN of unknown origin at the moment, resolving with IVF    Workup for all of these factors has been negative. LAUREN is resolved and patient is medically clear. Although the CL team was notified that there would be a female bed available, it was reserved by another physician prior to rounds today. Please begin looking for Geriatric Psychiatry placement.      Regarding treatment of current symptoms  - Increase Depacon to 500mg BID   - if patient is placed, ok to change from IV Depacon to oral Depakote at same dose        Will continue to follow    "

## 2017-06-09 NOTE — PLAN OF CARE
Problem: Patient Care Overview  Goal: Plan of Care Review  Outcome: Ongoing (interventions implemented as appropriate)  VSS and afebrile. Maintained AOx4. Pt free from falls and safe.    Pt oriented throughout shift; however, hyper vocal with thoughts. Pt clear in speech. Pt had to be encouraged to rest/nap during day.    To be monitored for hyper verbalization and shifting into manic thoughts.

## 2017-06-09 NOTE — ASSESSMENT & PLAN NOTE
- no previous psychiatric history; no reported life stressors or changes in medications  - seen in clinic 6/6 and PEC'd by PCP after pressured speech, hyperactivity, and insomnia for last 5 days  - does take trazodone for insomnia and phenergan for nausea at home  - no ideations of self-harm or homicidal behavior, thoughts  - Psych consulted; appreciate recs  - MRI brain 6/7: no acute process seen; resolving hematoma from previous fall

## 2017-06-09 NOTE — PROGRESS NOTES
[facility] Hospital Medicine  Progress Note    Patient Name: Kaci Whalen  MRN: [id number]  Patient Class: IP- Inpatient   Admission Date: 6/6/2017  Length of Stay: 3 days  Attending Physician: Artie Moore MD  Primary Care Provider: Toby Hung MD    [facility] Medicine Team: [facility] 1 Driss Hurtado MD    Subjective:     Principal Problem:LAUREN (acute kidney injury)    HPI:  Ms. Kaci Whalen is a 76 y.o. female w/ significant PMHx of DM, HTN, HLD, AFib on Xarelto was sent from her PCP's office to the ED under PEC due to concerns for a manic episode. Pt has not psych hx in the past and was not able to assess any acute stressors in life by talking to her but she does exhibit logorrhea and changes topics of conversation rapidly. She otherwise appears very pleasant and no aggressive behaviour noted during my ~30 mins interview. The patient denies having any cheat pain, abd pain or any other complains. In the ED she was found to have an LAUREN with increased Cr levels and the pt reports to have decreased PO intake. No family members around so most of the information was obtained from the patient.     Hospital Course:  6/7/17: NAEON; VSS. Patient less pressured this morning but had no slept at all overnight per sitter. Fluctuating speech, tangential, and multi-lingual. Hard to follow in either language. Otherwise denies any pain, able to answer questions and was otherwise in NAD. Episode of hypoglycemia this morning; patient asymptomatic. Changes to insulin regimen made.  6/8/17: Doing better today, still lacking insight, family at the bedside. LAUREN improving. MRI brain show subcutaneous hematoma (previous fall) with no intracranial acute pathology or mass lesions.  6/9/17: NAEON; patient hypertensive overnight (still holding lasix, losartan from LAUREN). Patient still with pressured speech this morning but less Christianity. LAUREN resolved s/p IVF.      Interval History: NAEON; patient  hypertensive overnight (still holding lasix, losartan from LAUREN). Patient still with pressured speech this morning but less Restorationist. LAUREN resolved s/p IVF.       Review of Systems   Constitutional: Negative for chills, fatigue and fever.   HENT: Negative for sinus pressure, sneezing and sore throat.    Eyes: Negative for visual disturbance.   Respiratory: Negative for cough, chest tightness, shortness of breath and wheezing.    Cardiovascular: Negative for chest pain, palpitations and leg swelling.   Gastrointestinal: Negative for abdominal distention, abdominal pain, blood in stool, constipation, diarrhea, nausea and vomiting.   Endocrine: Negative for polyuria.   Genitourinary: Negative for dysuria and hematuria.   Musculoskeletal: Negative for back pain, gait problem, neck pain and neck stiffness.   Skin: Negative for pallor and rash.   Neurological: Negative for seizures, weakness, light-headedness, numbness and headaches.   Psychiatric/Behavioral: Positive for behavioral problems and sleep disturbance. Negative for agitation, decreased concentration, hallucinations and suicidal ideas. The patient is hyperactive.      Objective:     Vital Signs (Most Recent):  Temp: 98.3 °F (36.8 °C) (06/09/17 0400)  Pulse: 62 (06/09/17 0400)  Resp: 16 (06/09/17 0400)  BP: (!) 172/71 (06/09/17 0400)  SpO2: 96 % (06/09/17 0400) Vital Signs (24h Range):  Temp:  [97.6 °F (36.4 °C)-98.9 °F (37.2 °C)] 98.3 °F (36.8 °C)  Pulse:  [54-71] 62  Resp:  [16] 16  SpO2:  [93 %-97 %] 96 %  BP: (147-172)/(66-88) 172/71     Weight: 72 kg (158 lb 11.7 oz)  Body mass index is 29.03 kg/m².  No intake or output data in the 24 hours ending 06/09/17 0630   Physical Exam   Constitutional: She is oriented to person, place, and time. She appears well-developed and well-nourished. No distress.   HENT:   Head: Normocephalic and atraumatic.   Eyes: EOM are normal.   Neck: Normal range of motion. Neck supple. No JVD present.   Cardiovascular: Normal rate and  regular rhythm.  Exam reveals no gallop and no friction rub.    Murmur heard.  Pulmonary/Chest: Effort normal and breath sounds normal. No respiratory distress. She has no wheezes. She has no rales.   Abdominal: Soft. Bowel sounds are normal. She exhibits no distension and no mass. There is no tenderness. There is no guarding.   Musculoskeletal: Normal range of motion. She exhibits no edema or tenderness.   Neurological: She is alert and oriented to person, place, and time.   Skin: Skin is warm and dry. No rash noted.   Psychiatric:   Still with pressured speech but less religiosity. No suicidal or homicidal intentions.        Significant Labs:   CBC:   Recent Labs  Lab 06/07/17  0637   WBC 6.88   HGB 11.6*   HCT 34.0*        CMP:   Recent Labs  Lab 06/07/17  0637 06/08/17  0353     137 136   K 3.6  3.6 4.0     101 101   CO2 26  26 23   GLU 44*  44* 76   BUN 24*  24* 19   CREATININE 1.8*  1.8* 1.5*   CALCIUM 9.8  9.8 9.3   PROT 6.6  6.6 6.1   ALBUMIN 3.7  3.7 3.2*   BILITOT 0.8  0.8 0.6   ALKPHOS 59  59 54*   AST 21  21 19   ALT 11  11 10   ANIONGAP 10  10 12   EGFRNONAA 27.3*  27.3* 34.1*       Magnesium:    Recent Labs  Lab 06/07/17  0637   MG 1.8       Significant Imaging: I have reviewed all pertinent imaging results/findings within the past 24 hours.    Assessment/Plan:      * LAUREN (acute kidney injury)    - admission Cr 2.3; baseline 1.3-1.4  - likely pre-renal from decreased PO intake as per patient and family  - holding losartan-HCTZ, lasix in the setting of LAUREN    - avoid NSAIDs, contrast, nephrotoxins    - renally dose medications to current GFR  - interval improvement with IVF; resolved 6/9        CKD (chronic kidney disease), stage III    - baseline Cr 1.1-1.3; GFR 35-45          Tasia    - no previous psychiatric history; no reported life stressors or changes in medications  - seen in clinic 6/6 and PEC'd by PCP after pressured speech, hyperactivity, and insomnia for  last 5 days  - does take trazodone for insomnia and phenergan for nausea at home  - no ideations of self-harm or homicidal behavior, thoughts  - Psych consulted; appreciate recs  - MRI brain 6/7: no acute process seen; resolving hematoma from previous fall         Essential hypertension    - normotensive on admission; now with increase in SBP  - holding home HTN medication 2/2 LAUREN; will restart once Cr improves          Chronic atrial fibrillation    - 2D echo 5/28/16: EF 65; PA 18; trivial TR/MR  - continue home Toprol XL  - continue home Xarelto        Type 2 diabetes mellitus with microalbuminuria, with long-term current use of insulin    - on MDI at home; Aspart 10 AM/15 Afternoon/17PM with meals; Detemir 40U QHS   - patient hypoglycemic but asmyptomatic morning of 6/7; aspart held and patient placed on low-dose SSI  - detemir reduced to 20U yesterday, still low glucose, will hold long acting insulin with low HgbA1c  - will continue to monitor blood glucose; no further hypoglycemic events   - last HgbA1c 6.9 12/28/16; repeat 6.2 6/7/17     Asymptomatic bacteruria   - UA 6/6: negative leukocytes, nitrites, rare bacteria, 1 WBC  - UCx 6/4 (obtained from clinic prior to admission): E. Coli >100k; pan-sensitive  - will defer antibiotic treatment at this time; patient asymptomatic and improving without treatment      VTE Risk Mitigation         Ordered     rivaroxaban tablet 15 mg  With dinner     Route:  Oral        06/07/17 0833     Medium Risk of VTE  Once      06/06/17 2247     Place sequential compression device  Until discontinued      06/06/17 2247     Place EDE hose  Until discontinued      06/06/17 2247          Driss uHrtado MD  Department of Hospital Medicine   [facility]

## 2017-06-09 NOTE — PLAN OF CARE
06/09/17 1427   Discharge Reassessment   Assessment Type Discharge Planning Reassessment   Can the patient answer the patient profile reliably? Yes, cognitively intact   How does the patient rate their overall health at the present time? Fair   Describe the patient's ability to walk at the present time. Minor restrictions or changes   How often would a person be available to care for the patient? Often   Number of comorbid conditions (as recorded on the chart) Five or more   During the past month, has the patient often been bothered by feeling down, depressed or hopeless? Yes   During the past month, has the patient often been bothered by little interest or pleasure in doing things? Yes   Discharge plan remains the same: No   Provided patient/caregiver education on the expected discharge date and the discharge plan Yes   Discharge Plan A Psychiatric hospital   Discharge Plan B Skilled Nursing Facility   Change in patient condition or support system No   Patient choice form signed by patient/caregiver N/A   Explained to the the patient/caregiver why the discharge planned changed: Yes   Involved the patient/caregiver in establishing a new discharge plan: Yes

## 2017-06-09 NOTE — PROGRESS NOTES
"[facility]  Psychiatry  Progress Note    Patient Name: Kaci Whalen  MRN: [id number]   Code Status: Full Code  Admission Date: 2017  Hospital Length of Stay: 3 days  Expected Discharge Date: 2017  Attending Physician: Artie Moore MD  Primary Care Provider: Toby Hung MD    Current Legal Status: [facility]    Patient information was obtained from patient, spouse/SO, relative(s), past medical records and ER records.     Subjective:     Principal Problem:LAUREN (acute kidney injury)    HPI: Per Dr. Hung, [facility] clinic: "77 y/o w/ h/o DM, CHF MIGUEL on cpap presents with daughter for five days of unusual behavior. Daughter reports for last five days paitent has not been sleeping is at times combative and will fight her from assistance with bath. Frequently discussing God and speaking to  family members. Recent history is only significant for treatment with amoxicillin, clarithromycin and omeprazole for H.pylori infection (EGD biopsy proven). Daughter also reports more frequent loose stool during this time. There has been no falls during this time.  She has no prior history of psychiatric hospitalization. Does have history of depression treated in past with SSRI and most recently trazodone. " ~ Dr. Hung 17    Patient was PEC'd by Dr. Hung for suspected estrellita and sent to ED.    Per ED MD: "The patient is a 76 y.o. female with hx of: DM, HTN, obesity, HLD, and A-fib that presents to the ED from PCP's office under a PEC due to concern for acute estrellita. Patient has no complaints at this time and is thankful for this medical evaluation. Patient denies auditory/visual hallucinations or suicidal/homicidal ideation. She also denies any pain, headache, chest pain, shortness of breath, abdominal pain, constipation, diarrhea, urinary problems, or difficulty ambulating" ~ Dr. Dewey 17      On my interview today, patient is talkative and loud but pleasant and confused. She is " "oriented to place, and month, but disoriented to day of week and states that the year is 2016. She is also unable to specify why she is in the hospital specifically. She is able to relay that she was at Dr. Hung's office and that he told her she had to go to the hospital, but she cannot specify what concern made it so urgent for her to come. She displays significant tangentiality throughout the interaction, and her history is limited, thus collateral was contacted (see below).  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 She endorses feeling confused and repeatedly makes hand gestures to signify confusion (waving them in the air). She endorses having had significant diarrhea recently, but she is unable to specify a timeline.    Collateral: Daughter (Liliam) [phone]  Ms. Ricketts is 50 years old and reports that she has never seen her mother act like this. When we spoke, she put me on speakerphone so that the patient's  could add to the conversation as well. Patient's  reports that patient has been having confusion for approximately 1 month. He states that she has been falling more often since she has been confused. They both relay that the patient had been to a GI appointment for an endoscopy and a colonoscopy. She was then treated with antibiotics, and they mention that her watery stools have been getting worse. They describe them as "mostly watery, but sometimes chunky". They endorse that the patient has been speaking about family members that have been dead as thought they are alive. Her friend, who was bed-bound  last Saturday, and when they were discussing going to the " , the patient stated that she wanted her other daughter to bring her. Ms. Ricketts reminded her mother that the other daughter had passed away, and the patient did not appear to remember that at the time. The patient has been talking to herself in the last month, and when confronted about it, often attributes her behavior to praying. They mention that the patient has not been sleeping, and she has been through a sleep study in the past. After the sleep study, the doctors recommended that she wear a CPAP machine; however, the patient has declined to do so. They state that the patient has had 2 significant episodes of depression when her 2 children , but she has never had anything like her behavior recently. They state that she has never been to see a psychiatrist, has never been hospitalized for psychiatric reasons, and her only psychiatric medications that she has been on in the past were (previously) amitriptyline and in the last month, Trazodone for sleep. They are concerned that the patient's behavior may be related to her multiple recent falls because they note that her confusion has become worse since her last fall where she hit her head ( per chart).    Hospital Course: 17  Patient admitted to internal medicine for LAUREN  17 Psychiatry consulted for evaluation of estrellita    Interval History: This morning, Ms. Whalen remains talkative but is interruptable. She is pleasant and speaks about how well that she slept last night, which was only 3 hours. She continues to display a labile affect, with tearfulness when speaking about her breakfast and going on tangents about family.     Psychotherapeutics     Start     Stop Route Frequency Ordered    17 1429  ramelteon tablet 8 mg      -- Oral Nightly PRN 17 1329           Review of Systems   Constitutional: Positive for activity change. Negative for chills and fever.   Gastrointestinal: Negative for nausea and vomiting.  "  Psychiatric/Behavioral: Positive for decreased concentration and sleep disturbance. Negative for agitation, behavioral problems, dysphoric mood, self-injury and suicidal ideas.     Objective:     Vital Signs (Most Recent):  Temp: 98.4 °F (36.9 °C) (06/09/17 1157)  Pulse: 66 (06/09/17 1157)  Resp: 17 (06/09/17 1157)  BP: 127/62 (06/09/17 1157)  SpO2: (!) 94 % (06/09/17 1157) Vital Signs (24h Range):  Temp:  [98.2 °F (36.8 °C)-98.9 °F (37.2 °C)] 98.4 °F (36.9 °C)  Pulse:  [54-68] 66  Resp:  [16-17] 17  SpO2:  [93 %-96 %] 94 %  BP: (127-183)/(62-72) 127/62     Height: 5' 2" (157.5 cm)  Weight: 72 kg (158 lb 11.7 oz)  Body mass index is 29.03 kg/m².    No intake or output data in the 24 hours ending 06/09/17 1459    Physical Exam  Appearance: no apparent distress, dressed in hospital attire  Behavior/Cooperation/Attitude: calm, cooperative, engaged  Speech: rapid speech, talkative, but less pressured  Mood: "good"  Affect: labile, but improved from initial presentation  Thought Process: tangential  Thought Content: denies SI/HI/AVH  Sensorium: awake/alert  Orientation: oriented to person/place/month and year  Attention/Memory: recent impaired and remote intact  Calculation/Concentration: impaired to some degree  Fund of Knowledge: intact to conversation  Abstraction: intact to conversation  Insight: limited  Judgment: appropriate for setting  Impulse Control: fair  Reliability: limited    Significant Labs:   Last 24 Hours:   Recent Lab Results       06/09/17  1226 06/09/17  0852 06/09/17  0442 06/08/17  2219 06/08/17  1646      Albumin   3.1(L)       Alkaline Phosphatase   51(L)       ALT   9(L)       Anion Gap   9       AST   16       Total Bilirubin   0.6  Comment:  For infants and newborns, interpretation of results should be based  on gestational age, weight and in agreement with clinical  observations.  Premature Infant recommended reference ranges:  Up to 24 hours.............<8.0 mg/dL  Up to 48 " "hours............<12.0 mg/dL  3-5 days..................<15.0 mg/dL  6-29 days.................<15.0 mg/dL         BUN, Bld   14       Calcium   8.5(L)       Chloride   102       CO2   23       Creatinine   1.3       eGFR if    46.7(A)       eGFR if non    40.5  Comment:  Calculation used to obtain the estimated glomerular filtration  rate (eGFR) is the CKD-EPI equation. Since race is unknown   in our information system, the eGFR values for   -American and Non--American patients are given   for each creatinine result.  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 Multiple other falls recently per family (possible post-concussive syndrome? Possible diffuse axonal injury?)   - Recent triple therapy antibiotics for H. Pylori   - Recent valium administration for insomnia per telephone notes   - Recent tramadol (which has a small serotonergic component) use per chart   - Recent watery "impressive" diarrhea per family for 5 days   - LAUREN of unknown origin at the moment, resolving with IVF    Workup for all of these factors has been negative. LAUREN is resolved and patient is medically clear. Although the CL team was notified that there would be a female bed available, it was reserved by another physician prior to rounds today. Please begin looking for Geriatric Psychiatry placement.      Regarding treatment of current symptoms  - Increase Depacon to 500mg BID   - if patient is placed, ok to change from IV Depacon to oral Depakote at same " dose        Will continue to follow               Need for Continued Hospitalization:   Psychiatric illness continues to pose a potential threat to life or bodily function, of self or others, thereby requiring the need for continued inpatient psychiatric hospitalization.    Anticipated Disposition: Psychiatric Hospital    Driss Alcala MD   Psychiatry  [facility]

## 2017-06-09 NOTE — PHARMACY MED REC
"[facility] Medication Reconciliation  Template    Patient was admitted on 6/6/2017 for LAUREN (acute kidney injury).    Patient's prior to admission medication regimen was as follows:  Prescriptions Prior to Admission   Medication Sig Dispense Refill Last Dose    alcohol swabs PadM Apply 1 each topically as needed.  0 Taking    blood glucose control, low (TRUE METRIX LEVEL 1) Soln 1 Bottle by Misc.(Non-Drug; Combo Route) route once daily. 1 each 2 Taking    blood-glucose meter kit Use as instructed 1 each 0 Taking    furosemide (LASIX) 20 MG tablet Take 20 mg by mouth once daily.    Taking    hydrocortisone 1 % cream Apply topically daily as needed (for rash).       insulin aspart (NOVOLOG FLEXPEN) 100 unit/mL InPn pen Take 10 units with breakfast, 15 units with lunch and 17 with supper; with additional sliding scale; max dose 75 units per day 3 Box 5 Taking    insulin glargine (LANTUS SOLOSTAR) 100 unit/mL (3 mL) InPn pen Inject 40 Units into the skin every evening. Max 50 units nightly 1 Box 6 Taking    lancets (LANCETS,THIN) 28 gauge Misc 1 lancet by Misc.(Non-Drug; Combo Route) route 4 (four) times daily before meals and nightly. 100 each 2 Taking    losartan-hydrochlorothiazide 100-25 mg (HYZAAR) 100-25 mg per tablet Take 1 tablet by mouth once daily. 90 tablet 1 Taking    melatonin 5 mg Subl Place 1 tablet under the tongue every evening.    Taking    metformin (GLUCOPHAGE) 1000 MG tablet Take 1/2 tablet by mouth once daily with breakfast, one tablet with lunch, and one tablet with dinner. 270 tablet 0 Taking    metoprolol succinate (TOPROL-XL) 100 MG 24 hr tablet Take 1 tablet (100 mg total) by mouth once daily. 90 tablet 2 Taking    omeprazole (PRILOSEC) 40 MG capsule Take 40 mg by mouth 2 (two) times daily before meals.       pen needle, diabetic (BD ULTRA-FINE SANDEEP PEN NEEDLES) 32 gauge x 5/32" Ndle Take 1 each by mouth 4 (four) times daily. 120 each 2 Taking    rivaroxaban (XARELTO) 20 mg " "Tab Take 1 tablet (20 mg total) by mouth daily with dinner or evening meal. 90 tablet 3 Taking    trazodone (DESYREL) 100 MG tablet Take 1 tablet (100 mg total) by mouth nightly as needed for Insomnia. 30 tablet 2 Taking    TRUE METRIX GLUCOSE TEST STRIP Strp TEST FOUR TIMES DAILY BEFORE MEALS  AND EVERY NIGHT 300 strip 2 Taking    ondansetron (ZOFRAN) 8 MG tablet Take 1 tablet (8 mg total) by mouth every 8 (eight) hours as needed for Nausea. 270 tablet 3 Taking     Please add appropriate    SmartPhrase below:  Admission Medication Reconciliation - Pharmacy Consult Note    The home medication history was taken by Ofelia Millan, pharmacy technician.  Based on information gathered and subsequent review by the clinical pharmacist, the items below may need attention.     You may go to "Admission" then "Reconcile Home Medications" tabs to review and/or act upon these items. Based on information gathered and subsequent review by the clinical pharmacist, the items below may need attention.    Potentially problematic discrepancies with current MAR  o Patient IS taking the following which was not ordered upon admit  o Furosemide 20 mg QD  o Novolog 10 Units with breakfast, 15 Units with lunch, 17 Units with dinner  o Lantus 40 Units QPM  o Hyzaar 100/25 mg QD  o Metformin 500 mg TID (held)  o Prolisec 40 mg BID   o Zofran 8 mg QD PRN   o Trazodone 100 mg QD PRN insomnia     Please address this information as you see fit.  Feel free to contact us if you have any questions or require assistance.    Candice Sharma  EXT [phone]      "

## 2017-06-09 NOTE — SUBJECTIVE & OBJECTIVE
Interval History: NAEON; patient hypertensive overnight (still holding lasix, losartan from LAUREN). Patient still with pressured speech this morning but less Christianity. LAUREN resolved s/p IVF.       Review of Systems   Constitutional: Negative for chills, fatigue and fever.   HENT: Negative for sinus pressure, sneezing and sore throat.    Eyes: Negative for visual disturbance.   Respiratory: Negative for cough, chest tightness, shortness of breath and wheezing.    Cardiovascular: Negative for chest pain, palpitations and leg swelling.   Gastrointestinal: Negative for abdominal distention, abdominal pain, blood in stool, constipation, diarrhea, nausea and vomiting.   Endocrine: Negative for polyuria.   Genitourinary: Negative for dysuria and hematuria.   Musculoskeletal: Negative for back pain, gait problem, neck pain and neck stiffness.   Skin: Negative for pallor and rash.   Neurological: Negative for seizures, weakness, light-headedness, numbness and headaches.   Psychiatric/Behavioral: Positive for behavioral problems and sleep disturbance. Negative for agitation, decreased concentration, hallucinations and suicidal ideas. The patient is hyperactive.      Objective:     Vital Signs (Most Recent):  Temp: 98.3 °F (36.8 °C) (06/09/17 0400)  Pulse: 62 (06/09/17 0400)  Resp: 16 (06/09/17 0400)  BP: (!) 172/71 (06/09/17 0400)  SpO2: 96 % (06/09/17 0400) Vital Signs (24h Range):  Temp:  [97.6 °F (36.4 °C)-98.9 °F (37.2 °C)] 98.3 °F (36.8 °C)  Pulse:  [54-71] 62  Resp:  [16] 16  SpO2:  [93 %-97 %] 96 %  BP: (147-172)/(66-88) 172/71     Weight: 72 kg (158 lb 11.7 oz)  Body mass index is 29.03 kg/m².  No intake or output data in the 24 hours ending 06/09/17 0630   Physical Exam   Constitutional: She is oriented to person, place, and time. She appears well-developed and well-nourished. No distress.   HENT:   Head: Normocephalic and atraumatic.   Eyes: EOM are normal.   Neck: Normal range of motion. Neck supple. No JVD present.    Cardiovascular: Normal rate and regular rhythm.  Exam reveals no gallop and no friction rub.    Murmur heard.  Pulmonary/Chest: Effort normal and breath sounds normal. No respiratory distress. She has no wheezes. She has no rales.   Abdominal: Soft. Bowel sounds are normal. She exhibits no distension and no mass. There is no tenderness. There is no guarding.   Musculoskeletal: Normal range of motion. She exhibits no edema or tenderness.   Neurological: She is alert and oriented to person, place, and time.   Skin: Skin is warm and dry. No rash noted.   Psychiatric:   Still with pressured speech but less religiosity. No suicidal or homicidal intentions.        Significant Labs:   CBC:   Recent Labs  Lab 06/07/17  0637   WBC 6.88   HGB 11.6*   HCT 34.0*        CMP:   Recent Labs  Lab 06/07/17  0637 06/08/17  0353     137 136   K 3.6  3.6 4.0     101 101   CO2 26  26 23   GLU 44*  44* 76   BUN 24*  24* 19   CREATININE 1.8*  1.8* 1.5*   CALCIUM 9.8  9.8 9.3   PROT 6.6  6.6 6.1   ALBUMIN 3.7  3.7 3.2*   BILITOT 0.8  0.8 0.6   ALKPHOS 59  59 54*   AST 21  21 19   ALT 11  11 10   ANIONGAP 10  10 12   EGFRNONAA 27.3*  27.3* 34.1*       Magnesium:    Recent Labs  Lab 06/07/17  0637   MG 1.8       Significant Imaging: I have reviewed all pertinent imaging results/findings within the past 24 hours.

## 2017-06-09 NOTE — SUBJECTIVE & OBJECTIVE
"Interval History: This morning, Ms. Whalen remains talkative but is interruptable. She is pleasant and speaks about how well that she slept last night, which was only 3 hours. She continues to display a labile affect, with tearfulness when speaking about her breakfast and going on tangents about family.     Psychotherapeutics     Start     Stop Route Frequency Ordered    06/07/17 1429  ramelteon tablet 8 mg      -- Oral Nightly PRN 06/07/17 1329           Review of Systems   Constitutional: Positive for activity change. Negative for chills and fever.   Gastrointestinal: Negative for nausea and vomiting.   Psychiatric/Behavioral: Positive for decreased concentration and sleep disturbance. Negative for agitation, behavioral problems, dysphoric mood, self-injury and suicidal ideas.     Objective:     Vital Signs (Most Recent):  Temp: 98.4 °F (36.9 °C) (06/09/17 1157)  Pulse: 66 (06/09/17 1157)  Resp: 17 (06/09/17 1157)  BP: 127/62 (06/09/17 1157)  SpO2: (!) 94 % (06/09/17 1157) Vital Signs (24h Range):  Temp:  [98.2 °F (36.8 °C)-98.9 °F (37.2 °C)] 98.4 °F (36.9 °C)  Pulse:  [54-68] 66  Resp:  [16-17] 17  SpO2:  [93 %-96 %] 94 %  BP: (127-183)/(62-72) 127/62     Height: 5' 2" (157.5 cm)  Weight: 72 kg (158 lb 11.7 oz)  Body mass index is 29.03 kg/m².    No intake or output data in the 24 hours ending 06/09/17 1459    Physical Exam  Appearance: no apparent distress, dressed in hospital attire  Behavior/Cooperation/Attitude: calm, cooperative, engaged  Speech: rapid speech, talkative, but less pressured  Mood: "good"  Affect: labile, but improved from initial presentation  Thought Process: tangential  Thought Content: denies SI/HI/AVH  Sensorium: awake/alert  Orientation: oriented to person/place/month and year  Attention/Memory: recent impaired and remote intact  Calculation/Concentration: impaired to some degree  Fund of Knowledge: intact to conversation  Abstraction: intact to conversation  Insight: " limited  Judgment: appropriate for setting  Impulse Control: fair  Reliability: limited    Significant Labs:   Last 24 Hours:   Recent Lab Results       06/09/17  1226 06/09/17  0852 06/09/17  0442 06/08/17  2219 06/08/17  1646      Albumin   3.1(L)       Alkaline Phosphatase   51(L)       ALT   9(L)       Anion Gap   9       AST   16       Total Bilirubin   0.6  Comment:  For infants and newborns, interpretation of results should be based  on gestational age, weight and in agreement with clinical  observations.  Premature Infant recommended reference ranges:  Up to 24 hours.............<8.0 mg/dL  Up to 48 hours............<12.0 mg/dL  3-5 days..................<15.0 mg/dL  6-29 days.................<15.0 mg/dL         BUN, Bld   14       Calcium   8.5(L)       Chloride   102       CO2   23       Creatinine   1.3       eGFR if    46.7(A)       eGFR if non    40.5  Comment:  Calculation used to obtain the estimated glomerular filtration  rate (eGFR) is the CKD-EPI equation. Since race is unknown   in our information system, the eGFR values for   -American and Non--American patients are given   for each creatinine result.  (A)       Glucose   106       POCT Glucose 124(H) 98  191(H) 160(H)     Potassium   4.0       Total Protein   5.7(L)       Sodium   134(L)                       Significant Imaging: None

## 2017-06-09 NOTE — ASSESSMENT & PLAN NOTE
- UA 6/6: negative leukocytes, nitrites, rare bacteria, 1 WBC  - UCx 6/4 (obtained from clinic prior to admission): E. Coli >100k; pan-sensitive  - will defer antibiotic treatment at this time; patient asymptomatic and improving without treatment

## 2017-06-09 NOTE — ASSESSMENT & PLAN NOTE
- on MDI at home; Aspart 10 AM/15 Afternoon/17PM with meals; Detemir 40U QHS   - patient hypoglycemic but asmyptomatic morning of 6/7; aspart held and patient placed on low-dose SSI  - detemir reduced to 20U yesterday, still low glucose, will hold long acting insulin with low HgbA1c  - will continue to monitor blood glucose; no further hypoglycemic events   - last HgbA1c 6.9 12/28/16; repeat 6.2 6/7/17

## 2017-06-09 NOTE — ASSESSMENT & PLAN NOTE
- admission Cr 2.3; baseline 1.3-1.4  - likely pre-renal from decreased PO intake as per patient and family  - holding losartan-HCTZ, lasix in the setting of LAUREN    - avoid NSAIDs, contrast, nephrotoxins    - renally dose medications to current GFR  - interval improvement with IVF; resolved 6/9

## 2017-06-10 VITALS
DIASTOLIC BLOOD PRESSURE: 67 MMHG | BODY MASS INDEX: 29.21 KG/M2 | SYSTOLIC BLOOD PRESSURE: 163 MMHG | HEART RATE: 59 BPM | OXYGEN SATURATION: 94 % | HEIGHT: 62 IN | TEMPERATURE: 98 F | RESPIRATION RATE: 18 BRPM | WEIGHT: 158.75 LBS

## 2017-06-10 LAB
ALBUMIN SERPL BCP-MCNC: 2.9 G/DL
ALP SERPL-CCNC: 50 U/L
ALT SERPL W/O P-5'-P-CCNC: 7 U/L
ANION GAP SERPL CALC-SCNC: 7 MMOL/L
AST SERPL-CCNC: 13 U/L
BILIRUB SERPL-MCNC: 0.4 MG/DL
BUN SERPL-MCNC: 15 MG/DL
CALCIUM SERPL-MCNC: 9 MG/DL
CHLORIDE SERPL-SCNC: 105 MMOL/L
CO2 SERPL-SCNC: 25 MMOL/L
CREAT SERPL-MCNC: 1.2 MG/DL
EST. GFR  (AFRICAN AMERICAN): 51.4 ML/MIN/1.73 M^2
EST. GFR  (NON AFRICAN AMERICAN): 44.6 ML/MIN/1.73 M^2
GLUCOSE SERPL-MCNC: 124 MG/DL
POCT GLUCOSE: 139 MG/DL (ref 70–110)
POCT GLUCOSE: 167 MG/DL (ref 70–110)
POTASSIUM SERPL-SCNC: 4.3 MMOL/L
PROT SERPL-MCNC: 5.5 G/DL
SODIUM SERPL-SCNC: 137 MMOL/L

## 2017-06-10 PROCEDURE — 25000003 PHARM REV CODE 250: Performed by: HOSPITALIST

## 2017-06-10 PROCEDURE — 36415 COLL VENOUS BLD VENIPUNCTURE: CPT

## 2017-06-10 PROCEDURE — 80053 COMPREHEN METABOLIC PANEL: CPT

## 2017-06-10 PROCEDURE — 25000003 PHARM REV CODE 250: Performed by: STUDENT IN AN ORGANIZED HEALTH CARE EDUCATION/TRAINING PROGRAM

## 2017-06-10 PROCEDURE — 99238 HOSP IP/OBS DSCHRG MGMT 30/<: CPT | Mod: GC,,, | Performed by: HOSPITALIST

## 2017-06-10 RX ORDER — DIVALPROEX SODIUM 250 MG/1
500 TABLET, DELAYED RELEASE ORAL EVERY 12 HOURS
Status: DISCONTINUED | OUTPATIENT
Start: 2017-06-10 | End: 2017-06-10 | Stop reason: HOSPADM

## 2017-06-10 RX ORDER — INSULIN ASPART 100 [IU]/ML
0-5 INJECTION, SOLUTION INTRAVENOUS; SUBCUTANEOUS
Refills: 0
Start: 2017-06-10 | End: 2017-06-26 | Stop reason: ALTCHOICE

## 2017-06-10 RX ORDER — LOSARTAN POTASSIUM AND HYDROCHLOROTHIAZIDE 25; 100 MG/1; MG/1
1 TABLET ORAL DAILY
Status: DISCONTINUED | OUTPATIENT
Start: 2017-06-10 | End: 2017-06-10 | Stop reason: HOSPADM

## 2017-06-10 RX ORDER — DIVALPROEX SODIUM 500 MG/1
500 TABLET, DELAYED RELEASE ORAL EVERY 12 HOURS
Start: 2017-06-10 | End: 2017-07-19 | Stop reason: ALTCHOICE

## 2017-06-10 RX ADMIN — SODIUM CHLORIDE: 0.9 INJECTION, SOLUTION INTRAVENOUS at 03:06

## 2017-06-10 RX ADMIN — LOSARTAN POTASSIUM AND HYDROCHLOROTHIAZIDE 1 TABLET: 25; 100 TABLET ORAL at 10:06

## 2017-06-10 RX ADMIN — DIVALPROEX SODIUM 500 MG: 250 TABLET, DELAYED RELEASE ORAL at 10:06

## 2017-06-10 RX ADMIN — METOPROLOL SUCCINATE 100 MG: 50 TABLET, EXTENDED RELEASE ORAL at 10:06

## 2017-06-10 NOTE — SUBJECTIVE & OBJECTIVE
Interval History: NAEON; VSS. Patient slept 4-5 hours overnight. [facility] inpatient psychiatric unit currently full; referral made for patient to be placed in geriatric psychiatry facility. Still with pressured speech this morning.     Review of Systems   Constitutional: Negative for chills, fatigue and fever.   HENT: Negative for sinus pressure, sneezing and sore throat.    Eyes: Negative for visual disturbance.   Respiratory: Negative for cough, chest tightness, shortness of breath and wheezing.    Cardiovascular: Negative for chest pain, palpitations and leg swelling.   Gastrointestinal: Negative for abdominal distention, abdominal pain, blood in stool, constipation, diarrhea, nausea and vomiting.   Endocrine: Negative for polyuria.   Genitourinary: Negative for dysuria and hematuria.   Musculoskeletal: Negative for back pain, gait problem, neck pain and neck stiffness.   Skin: Negative for pallor and rash.   Neurological: Negative for seizures, weakness, light-headedness, numbness and headaches.   Psychiatric/Behavioral: Positive for behavioral problems and sleep disturbance. Negative for agitation, decreased concentration, hallucinations and suicidal ideas. The patient is hyperactive.      Objective:     Vital Signs (Most Recent):  Temp: 98 °F (36.7 °C) (06/10/17 0400)  Pulse: 73 (06/10/17 0400)  Resp: 18 (06/10/17 0400)  BP: (!) 169/77 (06/10/17 0400)  SpO2: (!) 94 % (06/10/17 0400) Vital Signs (24h Range):  Temp:  [97.9 °F (36.6 °C)-98.4 °F (36.9 °C)] 98 °F (36.7 °C)  Pulse:  [56-75] 73  Resp:  [17-18] 18  SpO2:  [94 %-97 %] 94 %  BP: (127-183)/(40-77) 169/77     Weight: 72 kg (158 lb 11.7 oz)  Body mass index is 29.03 kg/m².  No intake or output data in the 24 hours ending 06/10/17 0631   Physical Exam   Constitutional: She is oriented to person, place, and time. She appears well-developed and well-nourished. No distress.   HENT:   Head: Normocephalic and atraumatic.   Eyes: EOM are normal.   Neck: Normal  range of motion. Neck supple. No JVD present.   Cardiovascular: Normal rate and regular rhythm.  Exam reveals no gallop and no friction rub.    Murmur heard.  Pulmonary/Chest: Effort normal and breath sounds normal. No respiratory distress. She has no wheezes. She has no rales.   Abdominal: Soft. Bowel sounds are normal. She exhibits no distension and no mass. There is no tenderness. There is no guarding.   Musculoskeletal: Normal range of motion. She exhibits no edema or tenderness.   Neurological: She is alert and oriented to person, place, and time.   Skin: Skin is warm and dry. No rash noted.   Psychiatric:   Still with pressured speech but less religiosity. No suicidal or homicidal intentions.        Significant Labs:   CBC: No results for input(s): WBC, HGB, HCT, PLT in the last 48 hours.  CMP:   Recent Labs  Lab 06/09/17  0442 06/10/17  0425   * 137   K 4.0 4.3    105   CO2 23 25    124*   BUN 14 15   CREATININE 1.3 1.2   CALCIUM 8.5* 9.0   PROT 5.7* 5.5*   ALBUMIN 3.1* 2.9*   BILITOT 0.6 0.4   ALKPHOS 51* 50*   AST 16 13   ALT 9* 7*   ANIONGAP 9 7*   EGFRNONAA 40.5* 44.6*       Magnesium:  No results for input(s): MG in the last 48 hours.     Significant Imaging: I have reviewed all pertinent imaging results/findings within the past 24 hours.

## 2017-06-10 NOTE — PLAN OF CARE
Problem: Patient Care Overview  Goal: Plan of Care Review  Outcome: Outcome(s) achieved Date Met: 06/10/17  Pt to be D/C to [facility] behavioral health facility. Transport via hired company.    AVS to be provided c/ pertinent F/U info indicated.    VSS and afebrile. Pt safe and free from falls. Maintained AOx4.    Family at BS.

## 2017-06-10 NOTE — NURSING
Pt back to room: could not fit in provided transport. New ticket issued for larger vehicle. CM notified, as well as NICHOLE Ballesteros [facility].

## 2017-06-10 NOTE — MEDICAL/APP STUDENT
[facility] Hospital Medicine  Progress Note    Patient Name: Kaci Whalen  MRN: [id number]  Patient Class: IP- Inpatient   Admission Date: 6/6/2017  Length of Stay: 4 days  Attending Physician: Artie Moore MD  Primary Care Provider: Toby Hung MD    [facility] Medicine Team: [facility] HOSP MED 1 Ryan Quiroz    Subjective:     Principal Problem:LAUREN (acute kidney injury)    HPI: HPI: Ms. Whalen is a 77yo female with PMHx of DM, HTN, Afib treated with Xarelto, who was PEC'ed by her PCP because of concern for manic episiode. In the ED she was found to have an LAUREN, increased creatinine from baseline. Patient is a poor historian, history obtained from ED notes: family says that patient has not slept in 5 days. No family members around currently to provide history.      Collateral History obtained from daughter, Liliam  ([phone]):  Prior to admission, Ms. Whalen has had 1 hour of sleep over the last 5 days. She has been hyperaroused most of this time, and her affect has been labile, ranging from extreme anger to happiness. Increased rate of speech and disorganized thought with flight of ideas. While her mobility has been limited since her fall, she still has psychomotor agitation, and has been restless. She has not had any HI, but has passive SI. Her behavior has prompted the family to see the PCP, who issued a PEC.     Ms. Whalen has a history of of anxiety and depression, treated with amitryptiline and trazodone. Currently she is on trazodone 100mg qHs (increased from 50mg qHs by PCP a few months ago). Also taking melatonin for sleep. On Sunday evening (6/4), daughter gave diazepam to help with sleep without success.      5/21/17- patient had fall, hit her head Taken to [facility] ED.                         - CT head- subcutaneous hematoma in L posterior scalp.     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 Lives at home with . 2 of her 8 children have passed away. Best friend passed away 2 weeks ago.    Interval History: No acute events overnight. Per sitter, patient slept 4-5 hours.     MSE:  Appearance- Appropriate, dressed in hospital gown.good eye contact, rapport   Speech- still pressured, but rate of speech has decreased.  Mood- "good"  Affect- still labile, ranging from content to tearful about family.   Thought process- circumstantial  Thought content- No SI/HI  Cognition- oriented X3  Insight- intact  Judgment- poor      Review of Systems   Constitutional: Negative for activity change, appetite change, chills, fatigue and fever.   Respiratory: Negative for shortness of breath.    Cardiovascular: Negative for chest pain, palpitations and leg swelling.   Gastrointestinal: Negative for abdominal distention and abdominal pain.     Objective:     Vital Signs (Most Recent):  Temp: 98 °F (36.7 °C) (06/10/17 0400)  Pulse: 73 (06/10/17 0400)  Resp: 18 (06/10/17 0400)  BP: (!) 169/77 (06/10/17 0400)  SpO2: (!) 94 % (06/10/17 0400) Vital Signs (24h Range):  Temp:  [97.9 °F (36.6 °C)-98.4 °F (36.9 °C)] 98 °F (36.7 °C)  Pulse:  [56-75] 73  Resp:  [17-18] 18  SpO2:  [94 %-97 %] 94 %  BP: (127-183)/(40-77) 169/77     Weight: 72 kg (158 lb 11.7 oz)  Body mass index is 29.03 kg/m².  No intake or output data in the 24 hours ending 06/10/17 0704     Physical Exam     Assessment/Plan:      Active Diagnoses:    Diagnosis Date Noted POA    PRINCIPAL PROBLEM:  LAUREN (acute kidney injury) [N17.9] 06/06/2017 Yes    Asymptomatic bacteriuria [R82.71] 06/09/2017 No    Acute hyperactive delirium due to another medical condition [F05] 06/09/2017 Yes    Tasia [F30.9] 06/07/2017 Yes    CKD (chronic kidney disease), stage III [N18.3] 06/07/2017 Yes    Essential hypertension [I10] 06/06/2017 Yes    " Chronic atrial fibrillation [I48.2] 06/10/2016 Yes    Type 2 diabetes mellitus with microalbuminuria, with long-term current use of insulin [E11.29, R80.9, Z79.4] 04/10/2015 Not Applicable      Problems Resolved During this Admission:    Diagnosis Date Noted Date Resolved POA    Asymptomatic bacteriuria [R82.71] 06/09/2017 06/09/2017 Yes     VTE Risk Mitigation         Ordered     rivaroxaban tablet 15 mg  With dinner     Route:  Oral        06/07/17 0833     Medium Risk of VTE  Once      06/06/17 2247     Place sequential compression device  Until discontinued      06/06/17 2247     Place EDE hose  Until discontinued      06/06/17 2247        LAUREN  - Cr has returned to baseline (1.3, down from 2.3)  - Continuing losartan-HCTZ, Cr has returned to baseline  - Continuous IV NS     Manic Episode vs. Hyperactive delirium  - Pt currently is CEC, waiting for IP psych bed  - No Hx of bipolar disorder per daughter, very unlikely that first manic episode would present at 76-yo. Psych suggests hyperactive delirium given recent history of head trauma, severe diarrheal illness.   - Organic causes ruled out by psych work up  - Depacon increased to IV 500mg BID        T2DM  - Home meds- Novolog 10U with breakfast, 15 U with lunch, 17 U with dinner... Lantus 40 U qHs  - Holding home meds temporarily as glucose control is good (morning glucose- 124)     A-fib, CAD  - Continuing home metoprolol and Xarelto    Dispo: Cleared medically, but CEC- waiting on IP psych bed to open  Ryan Quiroz  Department of Hospital Medicine   [facility]

## 2017-06-10 NOTE — PLAN OF CARE
Problem: Patient Care Overview  Goal: Plan of Care Review  Outcome: Ongoing (interventions implemented as appropriate)  VS and assessment performed per orders. Pt A&Ox2, disoriented to time and situation. Pt remains talkative/rapid speech.  Received PRN ramelteon at HS. Pt slept approx 4.5hrs.  Blood glucose of 132 at bedtime, no coverage needed as per sliding scale. IV fluids continues. Pt free of falls or injury. Sitter remains at bedside.

## 2017-06-10 NOTE — PLAN OF CARE
[facility]    FACILITY TRANSFER ORDERS      Patient Name: Kaci Whalen  YOB: 1940    PCP: Toby Hung MD   PCP Address: [facility] [address]  PCP Phone Number: [phone]  PCP Fax: [phone]    Encounter Date: 06/10/2017    Admit to: Geriatric Behavior Health Facility    Vital Signs:  Routine    Diagnoses:   Active Hospital Problems    Diagnosis  POA    *LAUREN (acute kidney injury) [N17.9]  Yes     Priority: 1 - High    Tasia [F30.9]  Yes     Priority: 2     Asymptomatic bacteriuria [R82.71]  No    Acute hyperactive delirium due to another medical condition [F05]  Yes    CKD (chronic kidney disease), stage III [N18.3]  Yes    Essential hypertension [I10]  Yes    Chronic atrial fibrillation [I48.2]  Yes    Type 2 diabetes mellitus with microalbuminuria, with long-term current use of insulin [E11.29, R80.9, Z79.4]  Not Applicable     Chronic kidney disease calculated using CKD-EPI Creatinine 2009 Equation.          Resolved Hospital Problems    Diagnosis Date Resolved POA    Asymptomatic bacteriuria [R82.71] 06/09/2017 Yes       Allergies:  Review of patient's allergies indicates:   Allergen Reactions    Ace inhibitors      Other reaction(s): cough      Fluorescein Itching     Other reaction(s): Itching    Iodine      Other reaction(s): Itching    Pravastatin Other (See Comments)     Other reaction(s): mouth tingling/numbness    Simvastatin      Other reaction(s): foot swelling         Diet: renal diet    Activities: Activity as tolerated    Nursing: Vitals per protocol     Labs: CBC and BMP Once     CONSULTS:     to evaluate for community resources/long-range planning.    MISCELLANEOUS CARE:  Diabetes Care:   SN to perform and educate Diabetic management with blood glucose monitoring:    WOUND CARE ORDERS  None    Medications: Review discharge medications with patient and family and provide education.      Current Discharge Medication  List      START taking these medications    Details   divalproex (DEPAKOTE) 500 MG TbEC Take 1 tablet (500 mg total) by mouth every 12 (twelve) hours.         CONTINUE these medications which have CHANGED    Details   insulin aspart (NOVOLOG) 100 unit/mL InPn pen Inject 0-5 Units into the skin before meals and at bedtime as needed (Hyperglycemia).  Refills: 0         CONTINUE these medications which have NOT CHANGED    Details   alcohol swabs PadM Apply 1 each topically as needed.  Refills: 0    Associated Diagnoses: Type 2 diabetes mellitus with microalbuminuria, with long-term current use of insulin      blood glucose control, low (TRUE METRIX LEVEL 1) Soln 1 Bottle by Misc.(Non-Drug; Combo Route) route once daily.  Qty: 1 each, Refills: 2    Associated Diagnoses: Type 2 diabetes mellitus with microalbuminuria, with long-term current use of insulin      blood-glucose meter kit Use as instructed  Qty: 1 each, Refills: 0    Associated Diagnoses: Type 2 diabetes mellitus with microalbuminuria, with long-term current use of insulin      hydrocortisone 1 % cream Apply topically daily as needed (for rash).      lancets (LANCETS,THIN) 28 gauge Misc 1 lancet by Misc.(Non-Drug; Combo Route) route 4 (four) times daily before meals and nightly.  Qty: 100 each, Refills: 2    Associated Diagnoses: Type 2 diabetes mellitus with microalbuminuria, with long-term current use of insulin      losartan-hydrochlorothiazide 100-25 mg (HYZAAR) 100-25 mg per tablet Take 1 tablet by mouth once daily.  Qty: 90 tablet, Refills: 1    Associated Diagnoses: Benign hypertension; Proliferative diabetic retinopathy of both eyes associated with type 2 diabetes mellitus, unspecified proliferative retinopathy type      melatonin 5 mg Subl Place 1 tablet under the tongue every evening.       metoprolol succinate (TOPROL-XL) 100 MG 24 hr tablet Take 1 tablet (100 mg total) by mouth once daily.  Qty: 90 tablet, Refills: 2    Associated Diagnoses:  "Chronic atrial fibrillation      omeprazole (PRILOSEC) 40 MG capsule Take 40 mg by mouth 2 (two) times daily before meals.      pen needle, diabetic (BD ULTRA-FINE SANDEEP PEN NEEDLES) 32 gauge x 5/32" Ndle Take 1 each by mouth 4 (four) times daily.  Qty: 120 each, Refills: 2    Associated Diagnoses: Type 2 diabetes, controlled, with neuropathy      rivaroxaban (XARELTO) 20 mg Tab Take 1 tablet (20 mg total) by mouth daily with dinner or evening meal.  Qty: 90 tablet, Refills: 3      TRUE METRIX GLUCOSE TEST STRIP Strp TEST FOUR TIMES DAILY BEFORE MEALS  AND EVERY NIGHT  Qty: 300 strip, Refills: 2    Associated Diagnoses: Type 2 diabetes mellitus with microalbuminuria, with long-term current use of insulin      ondansetron (ZOFRAN) 8 MG tablet Take 1 tablet (8 mg total) by mouth every 8 (eight) hours as needed for Nausea.  Qty: 270 tablet, Refills: 3    Associated Diagnoses: Nausea and vomiting, intractability of vomiting not specified, unspecified vomiting type         STOP taking these medications       furosemide (LASIX) 20 MG tablet Comments:   Reason for Stopping:         insulin glargine (LANTUS SOLOSTAR) 100 unit/mL (3 mL) InPn pen Comments:   Reason for Stopping:         metformin (GLUCOPHAGE) 1000 MG tablet Comments:   Reason for Stopping:         trazodone (DESYREL) 100 MG tablet Comments:   Reason for Stopping:                    _________________________________  Driss Hurtado MD  06/10/2017          "

## 2017-06-10 NOTE — ASSESSMENT & PLAN NOTE
- on MDI at home; Aspart 10 AM/15 Afternoon/17PM with meals; Detemir 40U QHS   - patient hypoglycemic but asmyptomatic morning of 6/7; aspart held and patient placed on low-dose SSI  - detemir reduced to 20U 6/7; still low glucose->will hold long acting insulin with low HgbA1c  - will continue to monitor blood glucose; no further hypoglycemic events   - last HgbA1c 6.9 12/28/16; repeat 6.2 6/7/17

## 2017-06-10 NOTE — PLAN OF CARE
CM asked by Dr. Hurtado to place pt in [facility] as she has been medically stable since yesterday for placement. Advised will call for d/c orders when have an accepting facility.     0800- SW SHANE Rodriguez has placed a call into [facility],  for [facility] to see if they have a bed for this pt in any of their facilities, awaiting a reply.     0850- Spoke to Heydi at [facility] ([phone]) confirmed they do accept sunil pt's in this age range, and she stated I can fax the orders to [phone], which I did with fax confirmation e-mail returned.     0900- Spoke to Hugo with intake at [facility] ([phone]), confirmed they accept pt's in this age bracket, and faxed the referral to [phone] as per his request. Fax confirmation e-mail received.     1100- SW SHANE Rodriguez recv'd a msg from [facility] with [facility] stating she has a bed for this pt. Referral faxed to [facility], awaiting to see if they can accept.     1200- Recv'd a call from [facility] with [facility], she stated they can accept the pt at their [place] location. She stated the address is [address] [place] [address], and report can be called to [phone].     1210- Spoke to [facility], asked they please d/c the pt so I can arrange tx for the pt.     1215- Spoke to the pt's nurse Alf, advise him of the accepting facility and the number to call report. Advised I would arrange tx and notify the family.     1220- Spoke with the pt, her 3 dgts, and  at bedside. Advised them of the name, address, and contact number to the accepting facility, [facility], and encouraged them to call them with questions pertaining to pt phone calls, visiting hours, and allowed personal items that they may bring the pt. Per pt's dgt the pt can stand and transfer into a car with assistance. She will need a w/c to get to and from the car.     1225- Contacted \[facility]\"" car service ([phone]) advised I have a CEC'd pt needing secure tx to [facility] in [place]. Dispatcher advised  "she will "page them" and have someone call me back.     1238- Recv'd a call from [facility] with SHREYA, she stated she would have a  here within the hour.     1338- Informed by pt's nurse Alf pt was p/u'd by SHREYA, taken down to the secure car and she was too large to fit in the sedan. Pt was returned to her room on the 11th floor. Advised I will have to arrange stretcher transport since [facility] will NOT tx a PEC/CEC'd pt in a W/C van.     1340- Called [facility] dept spoke to Carmen, advised her I needed a price quote from [facility] [place] to [facility] in [place]. She stated the O rate for stretcher tx would be $302.90 with a quote # [id number].     1352- MSG sent to  Director Clara Moreira for approval of the above.     1420- MSG recv'd back from  director stating transport has been approved per PALLAVI Herrmann.     1425- Spoke with Julia in [facility] dept, advised her the transport has been approved, the pt is ready for p/u. She stated she would have someone here within the hour. Pt's nurse Alf aware.   "

## 2017-06-10 NOTE — ASSESSMENT & PLAN NOTE
- no previous psychiatric history; no reported life stressors or changes in medications  - seen in clinic 6/6 and PEC'd by PCP after pressured speech, hyperactivity, and insomnia for last 5 days  - does take trazodone for insomnia and phenergan for nausea at home  - no ideations of self-harm or homicidal behavior, thoughts  - Psych consulted; appreciate recs  - MRI brain 6/7: no acute process seen; resolving hematoma from previous fall   - ruled out medical causes of estrellita  - patient on 500 mg BID valproate; all SSRI/SNRI held  - Social Work consulted to place patient in geriatric psychiatry facility 6/9   - patient is medically cleared; awaiting inpatient psychiatric placement

## 2017-06-10 NOTE — MEDICAL/APP STUDENT
"6/10/2017 9:14 AM   Kaci Whalen   1940   [id number]        Psychiatry Progress Note     SUBJECTIVE:   Patient seen and examined at bedside. When entering the room the patient was quietly watching television. She was awake and alert during the entire interview. Today the patient is talkative and loud but pleasant. She is oriented to person, place, year, month, and date. She has significant tangentiality throughout the interview. She states that she can sleep well in the hospital because "at [facility] I feel safe". Per the notes she slept for 4.5 hours last night. The patient states she is eating but that it takes her hours to finish one plate.        Current Medications:   Scheduled Meds:    divalproex  500 mg Oral Q12H    losartan-hydrochlorothiazide 100-25 mg  1 tablet Oral Daily    metoprolol succinate  100 mg Oral Daily    rivaroxaban  15 mg Oral Daily with dinner      PRN Meds: acetaminophen, dextrose 50%, dextrose 50%, glucagon (human recombinant), glucose, glucose, insulin aspart, midazolam (PF), ramelteon   Psychotherapeutics     Start     Stop Route Frequency Ordered    06/07/17 1429  ramelteon tablet 8 mg      -- Oral Nightly PRN 06/07/17 1329          Allergies:   Review of patient's allergies indicates:   Allergen Reactions    Ace inhibitors      Other reaction(s): cough      Fluorescein Itching     Other reaction(s): Itching    Iodine      Other reaction(s): Itching    Pravastatin Other (See Comments)     Other reaction(s): mouth tingling/numbness    Simvastatin      Other reaction(s): foot swelling          OBJECTIVE:   Vitals   Vitals:    06/10/17 0900   BP:    Pulse: 66   Resp:    Temp:         Labs/Imaging/Studies:   Recent Results (from the past 36 hour(s))   POCT glucose    Collection Time: 06/08/17 10:19 PM   Result Value Ref Range    POCT Glucose 191 (H) 70 - 110 mg/dL   Comprehensive metabolic panel    Collection Time: 06/09/17  4:42 AM   Result Value Ref Range    Sodium 134 (L) " 136 - 145 mmol/L    Potassium 4.0 3.5 - 5.1 mmol/L    Chloride 102 95 - 110 mmol/L    CO2 23 23 - 29 mmol/L    Glucose 106 70 - 110 mg/dL    BUN, Bld 14 8 - 23 mg/dL    Creatinine 1.3 0.5 - 1.4 mg/dL    Calcium 8.5 (L) 8.7 - 10.5 mg/dL    Total Protein 5.7 (L) 6.0 - 8.4 g/dL    Albumin 3.1 (L) 3.5 - 5.2 g/dL    Total Bilirubin 0.6 0.1 - 1.0 mg/dL    Alkaline Phosphatase 51 (L) 55 - 135 U/L    AST 16 10 - 40 U/L    ALT 9 (L) 10 - 44 U/L    Anion Gap 9 8 - 16 mmol/L    eGFR if African American 46.7 (A) >60 mL/min/1.73 m^2    eGFR if non African American 40.5 (A) >60 mL/min/1.73 m^2   POCT glucose    Collection Time: 06/09/17  8:52 AM   Result Value Ref Range    POCT Glucose 98 70 - 110 mg/dL   POCT glucose    Collection Time: 06/09/17 12:26 PM   Result Value Ref Range    POCT Glucose 124 (H) 70 - 110 mg/dL   POCT glucose    Collection Time: 06/09/17  5:25 PM   Result Value Ref Range    POCT Glucose 195 (H) 70 - 110 mg/dL   POCT glucose    Collection Time: 06/09/17  9:55 PM   Result Value Ref Range    POCT Glucose 132 (H) 70 - 110 mg/dL   Comprehensive metabolic panel    Collection Time: 06/10/17  4:25 AM   Result Value Ref Range    Sodium 137 136 - 145 mmol/L    Potassium 4.3 3.5 - 5.1 mmol/L    Chloride 105 95 - 110 mmol/L    CO2 25 23 - 29 mmol/L    Glucose 124 (H) 70 - 110 mg/dL    BUN, Bld 15 8 - 23 mg/dL    Creatinine 1.2 0.5 - 1.4 mg/dL    Calcium 9.0 8.7 - 10.5 mg/dL    Total Protein 5.5 (L) 6.0 - 8.4 g/dL    Albumin 2.9 (L) 3.5 - 5.2 g/dL    Total Bilirubin 0.4 0.1 - 1.0 mg/dL    Alkaline Phosphatase 50 (L) 55 - 135 U/L    AST 13 10 - 40 U/L    ALT 7 (L) 10 - 44 U/L    Anion Gap 7 (L) 8 - 16 mmol/L    eGFR if African American 51.4 (A) >60 mL/min/1.73 m^2    eGFR if non  44.6 (A) >60 mL/min/1.73 m^2        Mental Status Exam:   Arousal: awake and alert  Appearance: no apparent distress, dressed in hospital attire  Sensorium/Orientation: oriented to person, place, month, year, day  Grooming:  "ungroomed  Behavior/Cooperation: calm, cooperative, engaged   Psychomotor: fidgety, used hands while talking  Speech: rapid speech, talkative, less pressured   Language: intact  Mood: "good"   Affect: labile, but improved from the last    Thought Process: tangential   Thought Content: denies SI/HI/AVH  Attention/Concentration: impaired to some degree  Memory: remote intact  Insight: limites   Judgment: appropriate for setting    ASSESSMENT/PLAN:     Tasia     Hyperactive Delirium vs Tasia  - No previous manic episodes per family     Workup for all medical factors has been negative. LAUREN is resolved and patient is medically clear. Although the CL team was notified that there would be a female bed available, it was reserved by another physician prior to rounds today. Please begin looking for Geriatric Psychiatry placement.    Treatment of current symptoms  - Increase Depacon to 500mg BID  - if patient is placed, ok to change from IV Depacon to oral Depakote at same dose    Need for Continued Hospitalization:   Psychiatric illness continues to pose a potential threat to life or bodily function, of self or others, thereby requiring the need for continued inpatient psychiatric hospitalization.     Anticipated Disposition: [facility], [facility]  6/10/2017  "

## 2017-06-10 NOTE — ASSESSMENT & PLAN NOTE
- no previous psychiatric history; no reported life stressors or changes in medications  - seen in clinic 6/6 and PEC'd by PCP after pressured speech, hyperactivity, and insomnia for last 5 days  - does take trazodone for insomnia and phenergan for nausea at home  - no ideations of self-harm or homicidal behavior, thoughts  - Psych consulted; appreciate recs  - MRI brain 6/7: no acute process seen; resolving hematoma from previous fall   - ruled out medical causes of estrellita  - patient on 500 mg BID valproate; all SSRI/SNRI held  - Social Work consulted to place patient in geriatric psychiatry facility 6/9

## 2017-06-10 NOTE — ASSESSMENT & PLAN NOTE
- normotensive on admission; now with increase in SBP  - initially holding home HTN medication 2/2 LAUREN  - restarted losartan-hydrochlorothiazide 100-25 mg QD 6/10

## 2017-06-10 NOTE — PROGRESS NOTES
[facility] Hospital Medicine  Progress Note    Patient Name: Kaci Whalen  MRN: [id number]  Patient Class: IP- Inpatient   Admission Date: 6/6/2017  Length of Stay: 4 days  Attending Physician: Artie Moore MD  Primary Care Provider: Toby Hung MD    [facility] Medicine Team: [facility] MED 1 Driss Hurtado MD    Subjective:     Principal Problem:LAUREN (acute kidney injury)    HPI:  Ms. Kaci Whalen is a 76 y.o. female w/ significant PMHx of DM, HTN, HLD, AFib on Xarelto was sent from her PCP's office to the ED under PEC due to concerns for a manic episode. Pt has not psych hx in the past and was not able to assess any acute stressors in life by talking to her but she does exhibit logorrhea and changes topics of conversation rapidly. She otherwise appears very pleasant and no aggressive behaviour noted during my ~30 mins interview. The patient denies having any cheat pain, abd pain or any other complains. In the ED she was found to have an LAUREN with increased Cr levels and the pt reports to have decreased PO intake. No family members around so most of the information was obtained from the patient.     Hospital Course:  6/7/17: NAEON; VSS. Patient less pressured this morning but had no slept at all overnight per sitter. Fluctuating speech, tangential, and multi-lingual. Hard to follow in either language. Otherwise denies any pain, able to answer questions and was otherwise in NAD. Episode of hypoglycemia this morning; patient asymptomatic. Changes to insulin regimen made.  6/8/17: Doing better today, still lacking insight, family at the bedside. LAUREN improving. MRI brain show subcutaneous hematoma (previous fall) with no intracranial acute pathology or mass lesions.  6/9/17: NAEON; patient hypertensive overnight (still holding lasix, losartan from LAUREN). Patient still with pressured speech this morning but less Hindu. LAUREN resolved s/p IVF.    6/10/17: NAEON; VSS. Patient slept 4-5  hours overnight. [facility] inpatient psychiatric unit currently full; referral made for patient to be placed in geriatric psychiatry facility. Still with pressured speech this morning.     Interval History: NAEON; VSS. Patient slept 4-5 hours overnight. [facility] inpatient psychiatric unit currently full; referral made for patient to be placed in geriatric psychiatry facility. Still with pressured speech this morning.     Review of Systems   Constitutional: Negative for chills, fatigue and fever.   HENT: Negative for sinus pressure, sneezing and sore throat.    Eyes: Negative for visual disturbance.   Respiratory: Negative for cough, chest tightness, shortness of breath and wheezing.    Cardiovascular: Negative for chest pain, palpitations and leg swelling.   Gastrointestinal: Negative for abdominal distention, abdominal pain, blood in stool, constipation, diarrhea, nausea and vomiting.   Endocrine: Negative for polyuria.   Genitourinary: Negative for dysuria and hematuria.   Musculoskeletal: Negative for back pain, gait problem, neck pain and neck stiffness.   Skin: Negative for pallor and rash.   Neurological: Negative for seizures, weakness, light-headedness, numbness and headaches.   Psychiatric/Behavioral: Positive for behavioral problems and sleep disturbance. Negative for agitation, decreased concentration, hallucinations and suicidal ideas. The patient is hyperactive.      Objective:     Vital Signs (Most Recent):  Temp: 98 °F (36.7 °C) (06/10/17 0400)  Pulse: 73 (06/10/17 0400)  Resp: 18 (06/10/17 0400)  BP: (!) 169/77 (06/10/17 0400)  SpO2: (!) 94 % (06/10/17 0400) Vital Signs (24h Range):  Temp:  [97.9 °F (36.6 °C)-98.4 °F (36.9 °C)] 98 °F (36.7 °C)  Pulse:  [56-75] 73  Resp:  [17-18] 18  SpO2:  [94 %-97 %] 94 %  BP: (127-183)/(40-77) 169/77     Weight: 72 kg (158 lb 11.7 oz)  Body mass index is 29.03 kg/m².  No intake or output data in the 24 hours ending 06/10/17 0631   Physical Exam   Constitutional: She  is oriented to person, place, and time. She appears well-developed and well-nourished. No distress.   HENT:   Head: Normocephalic and atraumatic.   Eyes: EOM are normal.   Neck: Normal range of motion. Neck supple. No JVD present.   Cardiovascular: Normal rate and regular rhythm.  Exam reveals no gallop and no friction rub.    Murmur heard.  Pulmonary/Chest: Effort normal and breath sounds normal. No respiratory distress. She has no wheezes. She has no rales.   Abdominal: Soft. Bowel sounds are normal. She exhibits no distension and no mass. There is no tenderness. There is no guarding.   Musculoskeletal: Normal range of motion. She exhibits no edema or tenderness.   Neurological: She is alert and oriented to person, place, and time.   Skin: Skin is warm and dry. No rash noted.   Psychiatric:   Still with pressured speech but less religiosity. No suicidal or homicidal intentions.        Significant Labs:   CBC: No results for input(s): WBC, HGB, HCT, PLT in the last 48 hours.  CMP:   Recent Labs  Lab 06/09/17  0442 06/10/17  0425   * 137   K 4.0 4.3    105   CO2 23 25    124*   BUN 14 15   CREATININE 1.3 1.2   CALCIUM 8.5* 9.0   PROT 5.7* 5.5*   ALBUMIN 3.1* 2.9*   BILITOT 0.6 0.4   ALKPHOS 51* 50*   AST 16 13   ALT 9* 7*   ANIONGAP 9 7*   EGFRNONAA 40.5* 44.6*       Magnesium:  No results for input(s): MG in the last 48 hours.     Significant Imaging: I have reviewed all pertinent imaging results/findings within the past 24 hours.    Assessment/Plan:      * LAUREN (acute kidney injury)    - admission Cr 2.3; baseline 1.3-1.4  - likely pre-renal from decreased PO intake as per patient and family  - holding losartan-HCTZ, lasix in the setting of LAUREN    - avoid NSAIDs, contrast, nephrotoxins    - renally dose medications to current GFR  - interval improvement with IVF; resolved 6/9        Tasia    - no previous psychiatric history; no reported life stressors or changes in medications  - seen in clinic  6/6 and PEC'd by PCP after pressured speech, hyperactivity, and insomnia for last 5 days  - does take trazodone for insomnia and phenergan for nausea at home  - no ideations of self-harm or homicidal behavior, thoughts  - Psych consulted; appreciate recs  - MRI brain 6/7: no acute process seen; resolving hematoma from previous fall   - ruled out medical causes of estrellita  - patient on 500 mg BID valproate; all SSRI/SNRI held  - Social Work consulted to place patient in geriatric psychiatry facility 6/ - Patient is medically cleared; awaiting psychiatry placement         Acute hyperactive delirium due to another medical condition    - per Psychiatry this may be more likely than new onset estrellita  - see management under estrellita          Asymptomatic bacteriuria    - UA 6/6: negative leukocytes, nitrites, rare bacteria, 1 WBC  - UCx 6/4 (obtained from clinic prior to admission): E. Coli >100k; pan-sensitive  - will defer antibiotic treatment at this time; patient asymptomatic and improving without treatment           CKD (chronic kidney disease), stage III    - baseline Cr 1.1-1.3; GFR 35-45          Essential hypertension    - normotensive on admission; now with increase in SBP  - initially holding home HTN medication 2/2 LAUREN  - restarted losartan-hydrochlorothiazide 100-25 mg QD 6/10          Chronic atrial fibrillation    - 2D echo 5/28/16: EF 65; PA 18; trivial TR/MR  - continue home Toprol XL  - continue home Xarelto        Type 2 diabetes mellitus with microalbuminuria, with long-term current use of insulin    - on MDI at home; Aspart 10 AM/15 Afternoon/17PM with meals; Detemir 40U QHS   - patient hypoglycemic but asmyptomatic morning of 6/7; aspart held and patient placed on low-dose SSI  - detemir reduced to 20U 6/7; still low glucose->will hold long acting insulin with low HgbA1c  - will continue to monitor blood glucose; no further hypoglycemic events   - last HgbA1c 6.9 12/28/16; repeat 6.2 6/7/17          VTE  Risk Mitigation         Ordered     rivaroxaban tablet 15 mg  With dinner     Route:  Oral        06/07/17 0833     Medium Risk of VTE  Once      06/06/17 2247     Place sequential compression device  Until discontinued      06/06/17 2247     Place EDE hose  Until discontinued      06/06/17 2247          Driss Hurtado MD  Department of Hospital Medicine   [facility]

## 2017-06-10 NOTE — ASSESSMENT & PLAN NOTE
- per Psychiatry this may be more likely than new onset estrellita  - see management under estrellita

## 2017-06-10 NOTE — DISCHARGE SUMMARY
DISCHARGE SUMMARY  Hospital Medicine    [place]: [facility]    Patient Name: Kaci Whalen  YOB: 1940    Admit Date: 6/6/2017    Discharge Date: 06/10/2017    Discharge Attending Physician: Artie Moore MD     Resident on Service: Dr. Driss Hurtado     Chief Complaint: LAUREN    Princilpal Diagnoses:  Active Hospital Problems    Diagnosis  POA    *LAUREN (acute kidney injury) [N17.9]  Yes     Priority: 1 - High    Tasia [F30.9]  Yes     Priority: 2     Asymptomatic bacteriuria [R82.71]  No    Acute hyperactive delirium due to another medical condition [F05]  Yes    CKD (chronic kidney disease), stage III [N18.3]  Yes    Essential hypertension [I10]  Yes    Chronic atrial fibrillation [I48.2]  Yes    Type 2 diabetes mellitus with microalbuminuria, with long-term current use of insulin [E11.29, R80.9, Z79.4]  Not Applicable     Chronic kidney disease calculated using CKD-EPI Creatinine 2009 Equation.          Resolved Hospital Problems    Diagnosis Date Resolved POA    Asymptomatic bacteriuria [R82.71] 06/09/2017 Yes       Discharged Condition: Admit problems have stabilized       HOSPITAL COURSE:      Initial Presentation:    Ms. Kaci Whalen is a 76 y.o. female w/ significant PMHx of DM, HTN, HLD, AFib on Xarelto was sent from her PCP's office to the ED under PEC due to concerns for a manic episode. Pt has not psych hx in the past and was not able to assess any acute stressors in life by talking to her but she does exhibit logorrhea and changes topics of conversation rapidly. She otherwise appears very pleasant and no aggressive behaviour noted during my ~30 mins interview. The patient denies having any cheat pain, abd pain or any other complains. In the ED she was found to have an LAUREN with increased Cr levels and the pt reports to have decreased PO intake. No family members around so most of the information was obtained from the patient.     Course of Principle Problem for  Admission:    Patient on admission had a Cr 2.3; baseline appears to be 1.3-1.4 per chart review. It was thought to be likely pre-renal from decreased PO intake as per patient and family. Her home medication of losartan-HCTZ and lasix were initially held in the setting of LAUREN. During her admission we avoided NSAIDs, contrast, and nephrotoxins while renally dosing medications to current GFR. Patient had interval improvement with IVF over several days and her LAUREN resolved on 6/. Her losartan-HCTZ was restarted at time of discharge to a Geriatric Behavioral Facility to continue care for her tasia/acute delirium as she had been medically cleared by her primary team.    Other Medical Problems Addressed in the Hospital:    Tasia     - no previous psychiatric history; no reported life stressors or changes in medications  - seen in clinic 6/6 and PEC'd by PCP after pressured speech, hyperactivity, and insomnia for last 5 days  - does take trazodone for insomnia and phenergan for nausea at home  - no ideations of self-harm or homicidal behavior, thoughts  - Psych consulted; appreciate recs  - MRI brain 6/7: no acute process seen; resolving hematoma from previous fall   - ruled out medical causes of tasia  - patient on 500 mg BID valproate; all SSRI/SNRI held  - Social Work consulted to place patient in geriatric psychiatry facility 6/  - Patient is medically cleared; awaiting psychiatry placement        Acute hyperactive delirium due to another medical condition     - per Psychiatry this may be more likely than new onset tasia  - see management under tasia          Asymptomatic bacteriuria     - UA 6/6: negative leukocytes, nitrites, rare bacteria, 1 WBC  - UCx 6/4 (obtained from clinic prior to admission): E. Coli >100k; pan-sensitive  - will defer antibiotic treatment at this time; patient asymptomatic and improving without treatment           CKD (chronic kidney disease), stage III     - baseline Cr 1.1-1.3; GFR 35-45           Essential hypertension     - normotensive on admission; now with increase in SBP  - initially holding home HTN medication 2/2 LAUREN  - restarted losartan-hydrochlorothiazide 100-25 mg QD 6/10          Chronic atrial fibrillation     - 2D echo 5/28/16: EF 65; PA 18; trivial TR/MR  - continue home Toprol XL  - continue home Xarelto       Type 2 diabetes mellitus with microalbuminuria, with long-term current use of insulin     - on MDI at home; Aspart 10 AM/15 Afternoon/17PM with meals; Detemir 40U QHS   - patient hypoglycemic but asmyptomatic morning of 6/7; aspart held and patient placed on low-dose SSI  - detemir reduced to 20U 6/7; still low glucose->will hold long acting insulin with low HgbA1c  - will continue to monitor blood glucose; no further hypoglycemic events   - last HgbA1c 6.9 12/28/16; repeat 6.2 6/7/17     CONSULTS: Psychiatry     Last CBC/BMP/HgbA1c (if applicable):  Recent Results (from the past 336 hour(s))   CBC with Automated Differential    Collection Time: 06/07/17  6:37 AM   Result Value Ref Range    WBC 6.88 3.90 - 12.70 K/uL    Hemoglobin 11.6 (L) 12.0 - 16.0 g/dL    Hematocrit 34.0 (L) 37.0 - 48.5 %    Platelets 260 150 - 350 K/uL   CBC auto differential    Collection Time: 06/06/17  7:43 PM   Result Value Ref Range    WBC 6.60 3.90 - 12.70 K/uL    Hemoglobin 11.2 (L) 12.0 - 16.0 g/dL    Hematocrit 32.5 (L) 37.0 - 48.5 %    Platelets 244 150 - 350 K/uL     No results found for this or any previous visit (from the past 336 hour(s)).  Lab Results   Component Value Date    HGBA1C 6.2 06/07/2017       Other Pertinent Lab Findings:  UA 6/6: negative leukocytes, nitrites, rare bacteria, 1 WBC; UCx 6/4 (obtained from clinic prior to admission): E. Coli >100k; pan-sensitive    Pertinent/Significant Diagnostic Studies:  CT head negative for acute process; stable hematoma     Special Treatments/Procedures: N/A    Disposition:  Geriatric Behavior Health Facility      Future Scheduled  "Appointments:  Future Appointments  Date Time Provider Department Center   6/16/2017 9:40 AM Toby Hung MD [facility] FM IM [place] - B   6/16/2017 1:00 PM Suellen Wolf MD [facility] GASTRO [facility]   7/5/2017 1:00 PM Dafne Gates, CRISTINAW [facility] SOCL [facility]   7/7/2017 1:15 PM Jennifer Cardoza DPM [facility] POD [place]   10/10/2017 1:00 PM Driss Kenny MD [facility] PSYCH [facility]         Discharge Medication List:     Medication List      START taking these medications    divalproex 500 MG Tbec  Commonly known as:  DEPAKOTE  Take 1 tablet (500 mg total) by mouth every 12 (twelve) hours.        CHANGE how you take these medications    insulin aspart 100 unit/mL Inpn pen  Commonly known as:  NovoLOG  Inject 0-5 Units into the skin before meals and at bedtime as needed (Hyperglycemia).  What changed:   how much to take   how to take this   when to take this   reasons to take this   additional instructions        CONTINUE taking these medications    alcohol swabs Padm  Apply 1 each topically as needed.     blood glucose control, low Soln  Commonly known as:  TRUE METRIX LEVEL 1  1 Bottle by Misc.(Non-Drug; Combo Route) route once daily.     blood-glucose meter kit  Use as instructed     hydrocortisone 1 % cream     lancets 28 gauge Misc  Commonly known as:  LANCETS,THIN  1 lancet by Misc.(Non-Drug; Combo Route) route 4 (four) times daily before meals and nightly.     losartan-hydrochlorothiazide 100-25 mg 100-25 mg per tablet  Commonly known as:  HYZAAR  Take 1 tablet by mouth once daily.     melatonin 5 mg Subl     metoprolol succinate 100 MG 24 hr tablet  Commonly known as:  TOPROL-XL  Take 1 tablet (100 mg total) by mouth once daily.     omeprazole 40 MG capsule  Commonly known as:  PRILOSEC     ondansetron 8 MG tablet  Commonly known as:  ZOFRAN  Take 1 tablet (8 mg total) by mouth every 8 (eight) hours as needed for Nausea.     pen needle, diabetic 32 gauge x 5/32" Ndle  Commonly known as:  BD ULTRA-FINE SANDEEP PEN " NEEDLES  Take 1 each by mouth 4 (four) times daily.     rivaroxaban 20 mg Tab  Commonly known as:  XARELTO  Take 1 tablet (20 mg total) by mouth daily with dinner or evening meal.     TRUE METRIX GLUCOSE TEST STRIP Strp  Generic drug:  blood sugar diagnostic  TEST FOUR TIMES DAILY BEFORE MEALS  AND EVERY NIGHT        STOP taking these medications    furosemide 20 MG tablet  Commonly known as:  LASIX     insulin glargine 100 unit/mL (3 mL) Inpn pen  Commonly known as:  LANTUS SOLOSTAR     metformin 1000 MG tablet  Commonly known as:  GLUCOPHAGE     trazodone 100 MG tablet  Commonly known as:  DESYREL           Where to Get Your Medications      Information about where to get these medications is not yet available    Ask your nurse or doctor about these medications   divalproex 500 MG Tbec   insulin aspart 100 unit/mL Inpn pen         Patient Instructions:  No discharge procedures on file.    At the time of discharge patient was told to take all medications as prescribed, to keep all followup appointments, and to call their primary care physician or return to the emergency room if they have any worsening or concerning symptoms.    Signing Physician:  Driss Hurtado MD

## 2017-06-21 ENCOUNTER — PATIENT MESSAGE (OUTPATIENT)
Dept: FAMILY MEDICINE | Facility: CLINIC | Age: 77
End: 2017-06-21

## 2017-06-22 ENCOUNTER — PATIENT MESSAGE (OUTPATIENT)
Dept: FAMILY MEDICINE | Facility: CLINIC | Age: 77
End: 2017-06-22

## 2017-06-23 NOTE — TELEPHONE ENCOUNTER
----- Message from Sana Edwards sent at 6/23/2017 10:56 AM CDT -----  Contact: Daughter-Liliam  Pt's daughter called regarding urgent apt on Monday for pt per . Liliam can be reached @ [phone]

## 2017-06-26 ENCOUNTER — OFFICE VISIT (OUTPATIENT)
Dept: FAMILY MEDICINE | Facility: CLINIC | Age: 77
End: 2017-06-26
Payer: MEDICARE

## 2017-06-26 VITALS
WEIGHT: 186.31 LBS | DIASTOLIC BLOOD PRESSURE: 72 MMHG | BODY MASS INDEX: 35.18 KG/M2 | HEART RATE: 56 BPM | HEIGHT: 61 IN | RESPIRATION RATE: 18 BRPM | SYSTOLIC BLOOD PRESSURE: 122 MMHG | OXYGEN SATURATION: 98 % | TEMPERATURE: 98 F

## 2017-06-26 DIAGNOSIS — F32.9 REACTIVE DEPRESSION: ICD-10-CM

## 2017-06-26 PROCEDURE — 1126F AMNT PAIN NOTED NONE PRSNT: CPT | Mod: S$GLB,,, | Performed by: INTERNAL MEDICINE

## 2017-06-26 PROCEDURE — 1159F MED LIST DOCD IN RCRD: CPT | Mod: S$GLB,,, | Performed by: INTERNAL MEDICINE

## 2017-06-26 PROCEDURE — 3044F HG A1C LEVEL LT 7.0%: CPT | Mod: S$GLB,,, | Performed by: INTERNAL MEDICINE

## 2017-06-26 PROCEDURE — 4010F ACE/ARB THERAPY RXD/TAKEN: CPT | Mod: S$GLB,,, | Performed by: INTERNAL MEDICINE

## 2017-06-26 PROCEDURE — 99999 PR PBB SHADOW E&M-EST. PATIENT-LVL III: CPT | Mod: PBBFAC,,, | Performed by: INTERNAL MEDICINE

## 2017-06-26 PROCEDURE — 99499 UNLISTED E&M SERVICE: CPT | Mod: S$GLB,,, | Performed by: INTERNAL MEDICINE

## 2017-06-26 PROCEDURE — 99214 OFFICE O/P EST MOD 30 MIN: CPT | Mod: S$GLB,,, | Performed by: INTERNAL MEDICINE

## 2017-06-26 RX ORDER — METFORMIN HYDROCHLORIDE 500 MG/1
500 TABLET ORAL 2 TIMES DAILY WITH MEALS
Qty: 180 TABLET | Refills: 3 | Status: SHIPPED | OUTPATIENT
Start: 2017-06-26 | End: 2017-08-29 | Stop reason: SDUPTHER

## 2017-06-26 RX ORDER — FLUCONAZOLE 50 MG/1
100 TABLET ORAL DAILY
COMMUNITY
End: 2017-07-26

## 2017-06-26 RX ORDER — LOSARTAN POTASSIUM 50 MG/1
50 TABLET ORAL DAILY
COMMUNITY
End: 2017-07-18 | Stop reason: SDUPTHER

## 2017-06-26 RX ORDER — QUETIAPINE FUMARATE 25 MG/1
50 TABLET, FILM COATED ORAL DAILY
COMMUNITY
End: 2017-07-19 | Stop reason: ALTCHOICE

## 2017-06-26 RX ORDER — FLUOXETINE HYDROCHLORIDE 40 MG/1
40 CAPSULE ORAL DAILY
COMMUNITY
End: 2017-07-19

## 2017-06-26 NOTE — PROGRESS NOTES
"Subjective:       Patient ID: Kaci Whalen is a 76 y.o. female.    Chief Complaint: Hospital Follow Up (Rash )    F/u DM and recent hospitalization    HPI: 75 y/o w/ DM, afib MIGUEL depression seen in clinic three weeks ago with worsening estrellita admitted to [facility] main campus with LAUREN followed by psych and after discharge spent one week in [facility]. Presents today with her daughter Liliam, reports significant improvement in symptoms over the last week. Has slept full 6 hours last two nights speech is not as rapid and she is able to attend to long conversations without distraction. At discharge was on short acting insulin sliding scale (metformin was held in setting of lauren, repeat creatinine 6/12 at [facility] 0.9).       Review of Systems   Constitutional: Negative for activity change, appetite change, fatigue, fever and unexpected weight change.   HENT: Negative for ear pain, rhinorrhea and sore throat.    Eyes: Negative for discharge and visual disturbance.   Respiratory: Negative for chest tightness, shortness of breath and wheezing.    Cardiovascular: Negative for chest pain, palpitations and leg swelling.   Gastrointestinal: Negative for abdominal pain, constipation and diarrhea.   Endocrine: Negative for cold intolerance and heat intolerance.   Genitourinary: Negative for dysuria and hematuria.   Musculoskeletal: Negative for joint swelling and neck stiffness.   Skin: Negative for rash.   Neurological: Negative for dizziness, syncope, weakness and headaches.   Psychiatric/Behavioral: Negative for suicidal ideas.       Objective:     Vitals:    06/26/17 1132   BP: 122/72   BP Location: Right arm   Patient Position: Sitting   BP Method: Manual   Pulse: (!) 56   Resp: 18   Temp: 98.1 °F (36.7 °C)   TempSrc: Oral   SpO2: 98%   Weight: 84.5 kg (186 lb 4.6 oz)   Height: 5' 1" (1.549 m)          Physical Exam   Constitutional: She is oriented to person, place, and time. She appears well-developed and " well-nourished.   HENT:   Head: Normocephalic and atraumatic.   Eyes: Conjunctivae are normal. Pupils are equal, round, and reactive to light.   Neck: Normal range of motion.   Cardiovascular: Normal rate and regular rhythm.  Exam reveals no gallop and no friction rub.    No murmur heard.  Pulmonary/Chest: Effort normal and breath sounds normal. She has no wheezes. She has no rales.   Abdominal: Soft. Bowel sounds are normal. There is no tenderness. There is no rebound and no guarding.   Musculoskeletal: Normal range of motion. She exhibits no edema or tenderness.   Neurological: She is alert and oriented to person, place, and time. No cranial nerve deficit.   Skin: Skin is warm and dry.   Psychiatric: She has a normal mood and affect.   Fluent speech (in english only) normal thought process and content answering questions appropriately       Assessment:       1. 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 Reactive depression        Plan:    1. Resume metformin given low a1c and reported hypoglycemia in hospital will hold insulin therapy monitor a.m. Fasting glucose daughter to contact me if consistently > 180    2. On fluoxetine depakote and seroquel significnatly improved from prior visit, to follow up with psych next week. Continue current meidcations

## 2017-07-05 ENCOUNTER — OFFICE VISIT (OUTPATIENT)
Dept: PSYCHIATRY | Facility: CLINIC | Age: 77
End: 2017-07-05
Payer: MEDICARE

## 2017-07-05 DIAGNOSIS — F06.33 MOOD DISORDER WITH MANIC FEATURES DUE TO GENERAL MEDICAL CONDITION: Primary | ICD-10-CM

## 2017-07-05 PROCEDURE — 90791 PSYCH DIAGNOSTIC EVALUATION: CPT | Mod: S$GLB,,, | Performed by: SOCIAL WORKER

## 2017-07-05 NOTE — PROGRESS NOTES
"Psychiatry Initial Visit (PhD/LCSW)  Diagnostic Interview - CPT 68253    Date: 2017    Site: [facility]    Referral source: self referral    Clinical status of patient: Outpatient    Kaci Whalen, a 76 y.o. female, for initial evaluation visit.  Met with patient and daughter.    Chief complaint/reason for encounter: psychosis and altered mental status after recent fall     History of present illness: Patient presents today accompanied by daughter, Liliam.  Patient had a fall approximately one month ago, and was subsequently hospitalized at [facility], due to altered mental status, psychosis and manic behavior.  Patient denies any previous psych hx, and daughter confirms this.  Daughter explains she was surprised to hear mother may have bipolar disorder, when she has not exhibited any symptoms previously.  Informed daughter and patient if there were no symptoms previously, this is most likely not bipolar disorder.  Daughter says PCP thought patient's behavior was congruent with concussion.  Patient had been sleeping during the day and staying awake during the night prior to fall.  After falling at [facility], she hit head and now has hematoma (is still visible on back of head) and tailbone contusion.  Patient describes she was trying to maneuver walker and door.  Daughter says patient "began to lose touch with reality and was speaking with  children."  Patient has 8 children - 5 were from 's previous relationship and patient raised them as her own.  Two of the children are now  - daughter  in , due to caner and son  in , due to allergic reaction.  Patient is described as very "outgoing and gregarious."  During this past episode, police had to be called to patient's home, because she would not let daughter in the house.  Daughter has never known patient to behave in this way, and does not recall her suffering from any depressive episodes.  Daughter " recalls patient was prescribed Trazodone after son's death, and was previously on Elavil for neuropathy.  Patient acknowledges she worries excessively since she has gotten older - is now dependent on family and does not want to be a burden.  She notes she did not worry when she was younger - could handle multiple responsibilities without feeling stressed.  Patient has follow up with Dr. Garner later this month.         Pain: 5    Symptoms:   · Mood: denied currently, but has experienced insomnia, fatigue, poor concentration, ?estrellita and social isolation  · Anxiety: decreased memory, excessive anxiety/worry and restlessness/keyed up  · Substance abuse: denied  · Cognitive functioning: denied  · Health behaviors: recent  fall with tailbone contusion and hematoma; A-Fib; Type 2 Diabetes; sleep apnea; chronic kidney disease; hypertension; neuropathy; glaucoma     Psychiatric history: prior inpatient treatment and psychotropic management by PCP; denies hx counseling; denies hx SA and SI - past and present; denies access to firearms    Medical history: recent fall with tailbone contusion and hematoma; A-Fib; Type 2 Diabetes; sleep apnea; chronic kidney disease; hypertension     Family history of psychiatric illness: none    Social history (marriage, employment, etc.): Currently lives with  of 52 years.  Patient has 8 children (6 daughters and 2 sons) - 5 were from 's previous relationship.  Patient raised these children as her own.  Patient has 17 grandchildren.  Patient used to work as a teacher - elementary school.  Patient is originally from [place].  She moved to [place] at age 23 years old.  Neither patient, nor her  drive anymore.  Patient describes good family support.       Substance use:   Alcohol: none   Drugs: none   Tobacco: none   Caffeine: drinks 1 cup coffee per day     Current medications and drug reactions (include OTC, herbal): see medication list; depakote, prozac, seroquel  "    Strengths and liabilities: Strength: Patient accepts guidance/feedback, Strength: Patient is expressive/articulate., Strength: Patient is intelligent., Strength: Patient is motivated for change., Strength: Patient has positive support network., Liability: Patient is dependent., Liability: Patient with health issues., Liability: Patient lacks coping skills.    Current Evaluation:     Mental Status Exam:  General Appearance:  age appropriate, well nourished, casually dressed, seated in wheelchair   Speech: normal tone, normal rate, normal pitch, normal volume      Level of Cooperation: cooperative      Thought Processes: normal and logical   Mood: euthymic      Thought Content: normal, no suicidality, no homicidality, delusions, or paranoia   Affect: congruent and appropriate   Orientation: Oriented x3   Memory: recent >  intact, remote >  intact   Attention Span & Concentration: unable to spell "WORLD" backwards   Fund of General Knowledge: intact and appropriate to age and level of education   Abstract Reasoning: not assessed   Judgment & Insight: fair     Language  intact     Diagnostic Impression - Plan:       ICD-10-CM ICD-9-CM   1. 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

## 2017-07-07 ENCOUNTER — OFFICE VISIT (OUTPATIENT)
Dept: PODIATRY | Facility: CLINIC | Age: 77
End: 2017-07-07
Payer: MEDICARE

## 2017-07-07 VITALS — HEIGHT: 61 IN | BODY MASS INDEX: 35.12 KG/M2 | WEIGHT: 186 LBS

## 2017-07-07 DIAGNOSIS — M20.10 HALLUX ABDUCTO VALGUS, UNSPECIFIED LATERALITY: ICD-10-CM

## 2017-07-07 DIAGNOSIS — R60.0 BILATERAL LOWER EXTREMITY EDEMA: ICD-10-CM

## 2017-07-07 DIAGNOSIS — M20.41 HAMMER TOES OF BOTH FEET: ICD-10-CM

## 2017-07-07 DIAGNOSIS — L84 CORN OR CALLUS: ICD-10-CM

## 2017-07-07 DIAGNOSIS — B35.1 ONYCHOMYCOSIS DUE TO DERMATOPHYTE: ICD-10-CM

## 2017-07-07 DIAGNOSIS — M20.42 HAMMER TOES OF BOTH FEET: ICD-10-CM

## 2017-07-07 DIAGNOSIS — M21.41 PES PLANUS OF BOTH FEET: ICD-10-CM

## 2017-07-07 DIAGNOSIS — L85.3 XEROSIS CUTIS: ICD-10-CM

## 2017-07-07 DIAGNOSIS — M21.42 PES PLANUS OF BOTH FEET: ICD-10-CM

## 2017-07-07 PROCEDURE — 99499 UNLISTED E&M SERVICE: CPT | Mod: S$GLB,,, | Performed by: PODIATRIST

## 2017-07-07 PROCEDURE — 11721 DEBRIDE NAIL 6 OR MORE: CPT | Mod: 59,Q9,S$GLB, | Performed by: PODIATRIST

## 2017-07-07 PROCEDURE — 11056 PARNG/CUTG B9 HYPRKR LES 2-4: CPT | Mod: Q9,S$GLB,, | Performed by: PODIATRIST

## 2017-07-07 PROCEDURE — 99999 PR PBB SHADOW E&M-EST. PATIENT-LVL II: CPT | Mod: PBBFAC,,, | Performed by: PODIATRIST

## 2017-07-07 NOTE — PROGRESS NOTES
Subjective:      Patient ID: Kaci Whalen is a 76 y.o. female.    Chief Complaint: Diabetes Mellitus (Pcp Dr. Hung 06/26/2017); Diabetic Foot Exam; and Nail Problem    Kaci is a 76 y.o. female who presents to the clinic for evaluation and treatment of diabetic feet. Kaci has a past medical history of Abdominal pain; Atherosclerosis of aortic arch; Atrial fibrillation (05/2016); Benign hypertension; Chronic constipation; Chronic diarrhea; Chronic edema; Depression; Dercum disease; Dysphagia; Gas pain; Glaucoma of both eyes; psychiatric care; Hyperlipidemia with target LDL less than 100; Immature cataract; Tasia; Morbid obesity; Nausea & vomiting; Primary osteoarthritis of both knees; Proteinuria; Psychiatric problem; Pulmonary hypertension mixed group 2 and 3 (1/18/2017); Therapy; Type II or unspecified type diabetes mellitus with neurological manifestations, uncontrolled; and Unspecified vitamin D deficiency. Patient relates no major problem with feet. Only complaints today consist of toenails in need of trimming. Calluses on bottom of feet are doing much better with moisturizer daily. No other complaints today. Was recently hospitalized for frequent falls and altered mental state. Discharged about a week ago.     PCP: Toby Hung MD    Date Last Seen by PCP:   Chief Complaint   Patient presents with    Diabetes Mellitus     Pcp Dr. Hung 06/26/2017    Diabetic Foot Exam    Nail Problem         Current shoe gear: Extra depth shoes    Hemoglobin A1C   Date Value Ref Range Status   06/07/2017 6.2 4.5 - 6.2 % Final     Comment:     According to ADA guidelines, hemoglobin A1C <7.0% represents  optimal control in non-pregnant diabetic patients.  Different  metrics may apply to specific populations.   Standards of Medical Care in Diabetes - 2016.  For the purpose of screening for the presence of diabetes:  <5.7%     Consistent with the absence of diabetes  5.7-6.4%  Consistent with increasing risk for  diabetes   (prediabetes)  >or=6.5%  Consistent with diabetes  Currently no consensus exists for use of hemoglobin A1C  for diagnosis of diabetes for children.     12/28/2016 6.9 (H) 4.5 - 6.2 % Final     Comment:     According to ADA guidelines, hemoglobin A1C <7.0% represents  optimal control in non-pregnant diabetic patients.  Different  metrics may apply to specific populations.   Standards of Medical Care in Diabetes - 2016.  For the purpose of screening for the presence of diabetes:  <5.7%     Consistent with the absence of diabetes  5.7-6.4%  Consistent with increasing risk for diabetes   (prediabetes)  >or=6.5%  Consistent with diabetes  Currently no consensus exists for use of hemoglobin A1C  for diagnosis of diabetes for children.     09/02/2016 7.1 (H) 4.5 - 6.2 % Final     Comment:     According to ADA guidelines, hemoglobin A1C <7.0% represents  optimal control in non-pregnant diabetic patients.  Different  metrics may apply to specific populations.   Standards of Medical Care in Diabetes - 2016.  For the purpose of screening for the presence of diabetes:  <5.7%     Consistent with the absence of diabetes  5.7-6.4%  Consistent with increasing risk for diabetes   (prediabetes)  >or=6.5%  Consistent with diabetes  Currently no consensus exists for use of hemoglobin A1C  for diagnosis of diabetes for children.         Past Medical History:   Diagnosis Date    Abdominal pain     Atherosclerosis of aortic arch     noted on CXR 7/1/2015    Atrial fibrillation 05/2016    CHADS 4, on Xarelto    Benign hypertension     Chronic constipation     Chronic diarrhea     Chronic edema     Depression     Dercum disease     Multiple painful lipomas    Dysphagia     Gas pain     Glaucoma of both eyes     Hx of psychiatric care     previously amitriptyline, now jut on Trazodone 100mg QHS PRN insomnia    Hyperlipidemia with target LDL less than 100     Unable to tolerate statins    Immature cataract     Tasia      no symptoms prior to this presentation    Morbid obesity     Nausea & vomiting     Primary osteoarthritis of both knees     Proteinuria     Psychiatric problem     history of depression    Pulmonary hypertension mixed group 2 and 3 1/18/2017    Therapy     no history of therapy    Type II or unspecified type diabetes mellitus with neurological manifestations, uncontrolled     Unspecified vitamin D deficiency        Past Surgical History:   Procedure Laterality Date     tenotomy      flexors 2,3 L toes    CATARACT EXTRACTION      COLONOSCOPY N/A 5/4/2017    Procedure: COLONOSCOPY;  Surgeon: Suellen Wolf MD;  Location: [facility] ([address] [place] [address]);  Service: Endoscopy;  Laterality: N/A;  2nd floor due to pulm htn, possible gastroparesis. per Dr Wolf      ok to hold Xarelto 2 days prior to procedure per Dr Driss Dixon    diabetic retinopathy      right    HYSTERECTOMY      knee surgery x2      Left ankle and leg surgery secondary to a fall      left arm fracture      Left cataract      PARTIAL HYSTERECTOMY      Secondary to bleeding    TOE FUSION      2,3 R       Family History   Problem Relation Age of Onset    No Known Problems Daughter     No Known Problems Father     Liver cancer Mother     Bone cancer Daughter     No Known Problems Sister     No Known Problems Brother     No Known Problems Maternal Aunt     No Known Problems Maternal Uncle     No Known Problems Paternal Aunt     No Known Problems Paternal Uncle     No Known Problems Maternal Grandmother     No Known Problems Maternal Grandfather     No Known Problems Paternal Grandmother     No Known Problems Paternal Grandfather     Amblyopia Neg Hx     Blindness Neg Hx     Cancer Neg Hx     Cataracts Neg Hx     Diabetes Neg Hx     Glaucoma Neg Hx     Hypertension Neg Hx     Macular degeneration Neg Hx     Retinal detachment Neg Hx     Strabismus Neg Hx     Stroke Neg Hx     Thyroid disease Neg Hx     Celiac  disease Neg Hx     Cirrhosis Neg Hx     Colon cancer Neg Hx     Colon polyps Neg Hx     Crohn's disease Neg Hx     Cystic fibrosis Neg Hx     Esophageal cancer Neg Hx     Hemochromatosis Neg Hx     Inflammatory bowel disease Neg Hx     Irritable bowel syndrome Neg Hx     Liver disease Neg Hx     Rectal cancer Neg Hx     Stomach cancer Neg Hx     Ulcerative colitis Neg Hx     Montez's disease Neg Hx        Social History     Social History    Marital status:      Spouse name: N/A    Number of children: 3    Years of education: N/A     Occupational History    housewife      Social History Main Topics    Smoking status: Never Smoker    Smokeless tobacco: Never Used    Alcohol use No    Drug use: No    Sexual activity: Yes     Partners: Male     Other Topics Concern    Not on file     Social History Narrative    One daughter is  from bone cancer.    One son  2014 after surgery for MIGUEL       Current Outpatient Prescriptions   Medication Sig Dispense Refill    alcohol swabs PadM Apply 1 each topically as needed.  0    blood glucose control, low (TRUE METRIX LEVEL 1) Soln 1 Bottle by Misc.(Non-Drug; Combo Route) route once daily. 1 each 2    blood-glucose meter kit Use as instructed 1 each 0    divalproex (DEPAKOTE) 500 MG TbEC Take 1 tablet (500 mg total) by mouth every 12 (twelve) hours.      fluconazole (DIFLUCAN) 50 MG Tab Take 100 mg by mouth once daily.      fluoxetine (PROZAC) 40 MG capsule Take 40 mg by mouth once daily.      hydrocortisone 1 % cream Apply topically daily as needed (for rash).      lancets (LANCETS,THIN) 28 gauge Misc 1 lancet by Misc.(Non-Drug; Combo Route) route 4 (four) times daily before meals and nightly. 100 each 2    losartan (COZAAR) 50 MG tablet Take 50 mg by mouth once daily.      losartan-hydrochlorothiazide 100-25 mg (HYZAAR) 100-25 mg per tablet Take 1 tablet by mouth once daily. 90 tablet 1    melatonin 5 mg Subl Place 1  "tablet under the tongue every evening.       metformin (GLUCOPHAGE) 500 MG tablet Take 1 tablet (500 mg total) by mouth 2 (two) times daily with meals. 180 tablet 3    metoprolol succinate (TOPROL-XL) 100 MG 24 hr tablet Take 1 tablet (100 mg total) by mouth once daily. 90 tablet 2    omeprazole (PRILOSEC) 40 MG capsule Take 40 mg by mouth 2 (two) times daily before meals.      ondansetron (ZOFRAN) 8 MG tablet Take 1 tablet (8 mg total) by mouth every 8 (eight) hours as needed for Nausea. 270 tablet 3    pen needle, diabetic (BD ULTRA-FINE SANDEEP PEN NEEDLES) 32 gauge x 5/32" Ndle Take 1 each by mouth 4 (four) times daily. 120 each 2    quetiapine (SEROQUEL) 25 MG Tab Take 50 mg by mouth once daily.      rivaroxaban (XARELTO) 20 mg Tab Take 1 tablet (20 mg total) by mouth daily with dinner or evening meal. 90 tablet 3    TRUE METRIX GLUCOSE TEST STRIP Strp TEST FOUR TIMES DAILY BEFORE MEALS  AND EVERY NIGHT 300 strip 2     No current facility-administered medications for this visit.        Review of patient's allergies indicates:   Allergen Reactions    Ace inhibitors      Other reaction(s): cough      Fluorescein Itching     Other reaction(s): Itching    Iodine      Other reaction(s): Itching    Pravastatin Other (See Comments)     Other reaction(s): mouth tingling/numbness    Simvastatin      Other reaction(s): foot swelling           Review of Systems   Constitution: Negative for chills and fever.   Cardiovascular: Negative for chest pain, claudication and leg swelling.   Respiratory: Negative for cough and shortness of breath.    Skin: Positive for dry skin (with multiple calluses) and nail changes.   Musculoskeletal:        Pain along calluses both feet   Gastrointestinal: Negative for nausea and vomiting.   Neurological: Negative for numbness and paresthesias.   Psychiatric/Behavioral: Negative for altered mental status.           Objective:      Physical Exam  DP 1/4 bl, PT1 /4 bl  Sensation to the " 10 g sensory filament is not felt consistently on the feet toes and ball (5/10)  Nails are elongated and mycotic with thickened, discolored, onycholytic and subungual debris changes X10 Nails areapproximately 2   mm thick and    4 mm long.    Callus sub 3 b/l, R hallux IPJ (3 lesions total)    Mild pitting edema, skin is atrophic, decreased digital hair, feet slightly cool, nails thickened, mild plantar rubor, Q9 modifier  Skin is intact without wounds, rash, or infection.  Web spaces are clear without maceration    Equinus noted b/l ankles with < 10 deg DF noted. MMT 5/5 in DF/PF/Inv/Ev resistance with no reproduction of pain in any direction. Passive range of motion of ankle and pedal joints is painless b/l. Semi-reducible hammertoe contractures noted to toes 2-4 b/l-asymptomatic. HAV, mild, non trackbound noted b/l with mild medial bony prominence at 1st met head--asymptomatic.   Hypermobility noted to 1st ray b/l with near complete collapse of medial longitudinal arch b/l with loading.           Assessment:       Encounter Diagnoses   Name Primary?    Type 2 diabetes, uncontrolled, with neuropathy Yes    Bilateral lower extremity edema     Onychomycosis due to dermatophyte     Hallux abducto valgus, unspecified laterality     Hammer toes of both feet     Corn or callus     Xerosis cutis     Pes planus of both feet          Plan:       Kaci was seen today for diabetes mellitus, diabetic foot exam and nail problem.    Diagnoses and all orders for this visit:    Type 2 diabetes, uncontrolled, with neuropathy  -     DIABETIC SHOES FOR HOME USE    Bilateral lower extremity edema  -     DIABETIC SHOES FOR HOME USE    Onychomycosis due to dermatophyte    Hallux abducto valgus, unspecified laterality  -     DIABETIC SHOES FOR HOME USE    Hammer toes of both feet  -     DIABETIC SHOES FOR HOME USE    Corn or callus  -     DIABETIC SHOES FOR HOME USE    Xerosis cutis  -     DIABETIC SHOES FOR HOME USE    Pes  planus of both feet  -     DIABETIC SHOES FOR HOME USE      I counseled the patient on her conditions, their implications and medical management.        - Shoe inspection. Diabetic Foot Education. Patient reminded of the importance of good nutrition and blood sugar control to help prevent podiatric complications of diabetes. Patient instructed on proper foot hygeine. We discussed wearing proper shoe gear, daily foot inspections, never walking without protective shoe gear, never putting sharp instruments to feet, routine podiatric nail visits every 2-3 months.      With patient's permission, nails were aggressively reduced and debrided 1,2,3,4, 5 R and 1,2, 3,4,5 L and filed to their soft tissue attachment mechanically and with electric , removing all offending nail and debris. Utilizing a #15 scalpel, I trimmed the corns and calluses at the plantar 3rd met head b/l, right medial hallux IPJ (total 3 lesions)    Patient tolerated this well and no blood was drawn. Patient reports relief following the procedure.     Discussed regular and routine moisturizer to skin of both feet to help improve dry skin. Advised to apply twice daily until resolution of symptoms. Avoid between toes.     Rx diabetic shoes with custom molded inserts to be worn at all times while ambulating. Prescription provided with list of local retailers.     Discussed the use of compression stockings b/l to help control edema. Tubigrip applied today. Discussed proper and consistent elevation of lower extremities, above the level of the heart, while at rest, to help control/improve edema.     RTC 3 months, sooner PRN

## 2017-07-09 PROBLEM — F06.33 MOOD DISORDER WITH MANIC FEATURES DUE TO GENERAL MEDICAL CONDITION: Status: ACTIVE | Noted: 2017-07-09

## 2017-07-13 ENCOUNTER — PATIENT MESSAGE (OUTPATIENT)
Dept: PODIATRY | Facility: CLINIC | Age: 77
End: 2017-07-13

## 2017-07-17 ENCOUNTER — PATIENT MESSAGE (OUTPATIENT)
Dept: FAMILY MEDICINE | Facility: CLINIC | Age: 77
End: 2017-07-17

## 2017-07-17 DIAGNOSIS — I10 BENIGN HYPERTENSION: ICD-10-CM

## 2017-07-17 DIAGNOSIS — E11.3593 PROLIFERATIVE DIABETIC RETINOPATHY OF BOTH EYES ASSOCIATED WITH TYPE 2 DIABETES MELLITUS, UNSPECIFIED PROLIFERATIVE RETINOPATHY TYPE: ICD-10-CM

## 2017-07-17 DIAGNOSIS — I48.20 CHRONIC ATRIAL FIBRILLATION: ICD-10-CM

## 2017-07-17 RX ORDER — QUETIAPINE FUMARATE 25 MG/1
50 TABLET, FILM COATED ORAL DAILY
Status: CANCELLED | OUTPATIENT
Start: 2017-07-17

## 2017-07-17 RX ORDER — DIVALPROEX SODIUM 500 MG/1
500 TABLET, DELAYED RELEASE ORAL EVERY 12 HOURS
Status: CANCELLED
Start: 2017-07-17 | End: 2018-07-17

## 2017-07-17 RX ORDER — FLUOXETINE HYDROCHLORIDE 40 MG/1
40 CAPSULE ORAL DAILY
Status: CANCELLED | OUTPATIENT
Start: 2017-07-17

## 2017-07-18 RX ORDER — METOPROLOL SUCCINATE 100 MG/1
100 TABLET, EXTENDED RELEASE ORAL DAILY
Qty: 90 TABLET | Refills: 2 | Status: SHIPPED | OUTPATIENT
Start: 2017-07-18 | End: 2017-07-26 | Stop reason: DRUGHIGH

## 2017-07-18 RX ORDER — LOSARTAN POTASSIUM AND HYDROCHLOROTHIAZIDE 25; 100 MG/1; MG/1
1 TABLET ORAL DAILY
Qty: 90 TABLET | Refills: 2 | Status: SHIPPED | OUTPATIENT
Start: 2017-07-18 | End: 2017-07-26

## 2017-07-19 ENCOUNTER — OFFICE VISIT (OUTPATIENT)
Dept: PSYCHIATRY | Facility: CLINIC | Age: 77
End: 2017-07-19
Payer: MEDICARE

## 2017-07-19 VITALS
BODY MASS INDEX: 34.36 KG/M2 | WEIGHT: 182 LBS | DIASTOLIC BLOOD PRESSURE: 58 MMHG | HEART RATE: 69 BPM | HEIGHT: 61 IN | SYSTOLIC BLOOD PRESSURE: 128 MMHG

## 2017-07-19 DIAGNOSIS — R41.0 DELIRIUM, DRUG-INDUCED: Primary | ICD-10-CM

## 2017-07-19 DIAGNOSIS — T50.905A DELIRIUM, DRUG-INDUCED: Primary | ICD-10-CM

## 2017-07-19 PROCEDURE — 1159F MED LIST DOCD IN RCRD: CPT | Mod: S$GLB,,, | Performed by: PSYCHIATRY & NEUROLOGY

## 2017-07-19 PROCEDURE — 99215 OFFICE O/P EST HI 40 MIN: CPT | Mod: S$GLB,,, | Performed by: PSYCHIATRY & NEUROLOGY

## 2017-07-19 PROCEDURE — 99999 PR PBB SHADOW E&M-EST. PATIENT-LVL III: CPT | Mod: PBBFAC,,, | Performed by: PSYCHIATRY & NEUROLOGY

## 2017-07-19 RX ORDER — DIVALPROEX SODIUM 500 MG/1
500 TABLET, FILM COATED, EXTENDED RELEASE ORAL NIGHTLY
Qty: 7 TABLET | Refills: 0 | Status: SHIPPED | OUTPATIENT
Start: 2017-07-19 | End: 2017-08-22

## 2017-07-19 NOTE — PROGRESS NOTES
Outpatient Psychiatry Initial Visit (MD/NP)    2017    Kaci Whalen, a 76 y.o. female, presenting for initial evaluation visit. Met with patient and daughter.    Reason for Encounter: Consult from Dafne Gates. Patient complains of   Chief Complaint   Patient presents with    Disorganized Behavior    Hallucinations    Delusional   .    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  Ms. Whalen and her daughter report that Pt had a close friend die on May 20, 2017.  8 days later she fell backwards at a local restaurant, striking her head.  She was taken to an [facility] ED and thoroughly evaluated, but CT was negative except for the external bruise.  Pt was given tramadol for pain.  Over the next week, her daughter reports that Pt became confused, agitated, with dakota-insomnia, pressured speech, persecutory delusions, and a/v hallucinations of  relatives.  She was taken to the ED and transferred to [facility] where she was treated for estrellita.  Tramadol was stopped after only 5 doses.  Pt's symptoms cleared slowly and she returned home.    On exam today she was alert, fully oriented, smiling, and personable with good memory, good concentration, and an absence of depression, anxiety, estrellita, hypomania, hallucinations, delusions, or any suicidal or violent thoughts.  She was in a wheelchair for safety.  She denied any distress or discomfort.  Sleep and appetite are normal.  She recognizes the dangers of falls.    Past Psychiatric History:  Pt denies any history of trauma or abuse.  She has no history of psychiatric illness prior to the present illness.  She was prescribed Elavil for neuropathy and pain several years ago but it was stopped in .  Pt was subsequently prescribed  "trazodone 25 mg qHS for sleep.  She has had only the one psychiatric hospitalization.  She denies she has ever been suicidal or violent.  She has never had ECT.    Review Of Systems:     GENERAL:  Recent desired wt loss  SKIN:  No rashes or lacerations  HEAD:  Recent head trauma  EYES:  No exophthalmos, jaundice or blindness  EARS:  No dizziness, tinnitus or hearing loss  NOSE:  No changes in smell  MOUTH & THROAT:  No dyskinetic movements or obvious goiter  CHEST:  No shortness of breath, hyperventilation or cough  CARDIOVASCULAR:  No tachycardia or chest pain  ABDOMEN:  Hiatal hernia.  URINARY:  Some recent incontinence.  ENDOCRINE:  No polydipsia, polyuria  MUSCULOSKELETAL:  No pain or stiffness of the joints  NEUROLOGIC:  Diabetic neuropathy and recent delirium.      Current Evaluation:     Nutritional Screening: Considering the patient's height and weight, medications, medical history and preferences, should a referral be made to the dietitian? no    Constitutional  Vitals:  Most recent vital signs, dated less than 90 days prior to this appointment, were reviewed.  Vitals:    07/19/17 1442   BP: (!) 128/58   Pulse: 69   Weight: 82.6 kg (182 lb)   Height: 5' 1" (1.549 m)        General:  overweight, seated in wheelchair     Musculoskeletal  Muscle Strength/Tone:  no rigidity, no dyskinesia, no dystonia, no tremor   Gait & Station:  in wheelchair     Psychiatric  Speech:  no latency; no press, spontaneous, accented   Mood & Affect:  euthymic  congruent and appropriate   Thought Process:  goal-directed, logical   Associations:  intact   Thought Content:  normal, no suicidality, no homicidality, delusions, or paranoia   Insight:  has awareness of illness   Judgement: behavior is adequate to circumstances   Orientation:  grossly intact   Memory: intact for content of interview   Language: grossly intact   Attention Span & Concentration:  able to focus   Fund of Knowledge:  intact and appropriate to age and level of " "education       Relevant Elements of Neurological Exam: uses a walker, uses a wheelchair    Functioning in Relationships:  Spouse/partner: Supportive.  Peers: Friends and family.  Employers: Retired.    Laboratory Data  No visits with results within 1 Month(s) from this visit.   Latest known visit with results is:   Admission on 06/06/2017, Discharged on 06/10/2017   No results displayed because visit has over 200 results.            Medications  Outpatient Encounter Prescriptions as of 7/19/2017   Medication Sig Dispense Refill    alcohol swabs PadM Apply 1 each topically as needed.  0    blood glucose control, low (TRUE METRIX LEVEL 1) Soln 1 Bottle by Misc.(Non-Drug; Combo Route) route once daily. 1 each 2    blood-glucose meter kit Use as instructed 1 each 0    divalproex ER (DEPAKOTE) 500 MG Tb24 Take 1 tablet (500 mg total) by mouth every evening. 7 tablet 0    fluconazole (DIFLUCAN) 50 MG Tab Take 100 mg by mouth once daily.      hydrocortisone 1 % cream Apply topically daily as needed (for rash).      lancets (LANCETS,THIN) 28 gauge Misc 1 lancet by Misc.(Non-Drug; Combo Route) route 4 (four) times daily before meals and nightly. 100 each 2    losartan-hydrochlorothiazide 100-25 mg (HYZAAR) 100-25 mg per tablet Take 1 tablet by mouth once daily. 90 tablet 2    melatonin 5 mg Subl Place 1 tablet under the tongue every evening.       metformin (GLUCOPHAGE) 500 MG tablet Take 1 tablet (500 mg total) by mouth 2 (two) times daily with meals. 180 tablet 3    metoprolol succinate (TOPROL-XL) 100 MG 24 hr tablet Take 1 tablet (100 mg total) by mouth once daily. 90 tablet 2    omeprazole (PRILOSEC) 40 MG capsule Take 40 mg by mouth 2 (two) times daily before meals.      pen needle, diabetic (BD ULTRA-FINE SANDEEP PEN NEEDLES) 32 gauge x 5/32" Ndle Take 1 each by mouth 4 (four) times daily. 120 each 2    rivaroxaban (XARELTO) 20 mg Tab Take 1 tablet (20 mg total) by mouth daily with dinner or evening meal. " 90 tablet 3    TRUE METRIX GLUCOSE TEST STRIP Strp TEST FOUR TIMES DAILY BEFORE MEALS  AND EVERY NIGHT 300 strip 2    [DISCONTINUED] divalproex (DEPAKOTE) 500 MG TbEC Take 1 tablet (500 mg total) by mouth every 12 (twelve) hours.      [DISCONTINUED] fluoxetine (PROZAC) 40 MG capsule Take 40 mg by mouth once daily.      [DISCONTINUED] quetiapine (SEROQUEL) 25 MG Tab Take 50 mg by mouth once daily.       No facility-administered encounter medications on file as of 7/19/2017.            Assessment - Diagnosis - Goals:     Impression:  Pt experienced a drug-induced delirium following blunt head trauma 6 weeks ago.  Symptoms are now resolved.      ICD-10-CM ICD-9-CM   1. Delirium, drug-induced F19.921 292.81     E980.5       Strengths and Liabilities: Strength: Patient accepts guidance/feedback, Strength: Patient is expressive/articulate., Strength: Patient is intelligent., Strength: Patient is motivated for change., Strength: Patient is physically healthy., Strength: Patient has positive support network., Strength: Patient is stable.    Treatment Goals:  Specify outcomes written in observable, behavioral terms:   Prevention of recurrence.    Treatment Plan/Recommendations:   · Medication Management: Stop Prozac and Seroquel.  Taper off Depakote over 7 days.   · Origins of delirium in probable tramadol interaction were explained and discussed.  Tramadol was added to allergy list.  Pt will avoid it in the future.  · Fall risks discussed.  · Pt or family will call if symptoms return or other problems arise.      Return to Clinic: as needed    Counseling time: 50 min.  Total time: 60 min.    Consulting clinician was informed of the encounter and consult note.

## 2017-07-19 NOTE — PATIENT INSTRUCTIONS
Stop Prozac and Seroquel.    Taper off Depakote over 7 days as directed.    Avoid tramadol.    Call if problems arise.  Go to ER if in crisis.

## 2017-07-26 ENCOUNTER — OFFICE VISIT (OUTPATIENT)
Dept: FAMILY MEDICINE | Facility: CLINIC | Age: 77
End: 2017-07-26
Payer: MEDICARE

## 2017-07-26 VITALS
TEMPERATURE: 98 F | OXYGEN SATURATION: 95 % | OXYGEN SATURATION: 95 % | DIASTOLIC BLOOD PRESSURE: 60 MMHG | SYSTOLIC BLOOD PRESSURE: 122 MMHG | SYSTOLIC BLOOD PRESSURE: 122 MMHG | BODY MASS INDEX: 33.71 KG/M2 | BODY MASS INDEX: 33.71 KG/M2 | WEIGHT: 178.56 LBS | HEIGHT: 61 IN | HEART RATE: 60 BPM | RESPIRATION RATE: 16 BRPM | TEMPERATURE: 98 F | HEIGHT: 61 IN | DIASTOLIC BLOOD PRESSURE: 60 MMHG | WEIGHT: 178.56 LBS | RESPIRATION RATE: 16 BRPM | HEART RATE: 60 BPM

## 2017-07-26 DIAGNOSIS — E78.5 HYPERLIPIDEMIA WITH TARGET LDL LESS THAN 100: ICD-10-CM

## 2017-07-26 DIAGNOSIS — I10 ESSENTIAL HYPERTENSION: ICD-10-CM

## 2017-07-26 DIAGNOSIS — I27.20 PULMONARY HYPERTENSION: ICD-10-CM

## 2017-07-26 DIAGNOSIS — G47.33 OSA (OBSTRUCTIVE SLEEP APNEA): ICD-10-CM

## 2017-07-26 DIAGNOSIS — F30.9 MANIA: ICD-10-CM

## 2017-07-26 DIAGNOSIS — H40.9 GLAUCOMA OF BOTH EYES, UNSPECIFIED GLAUCOMA TYPE: ICD-10-CM

## 2017-07-26 DIAGNOSIS — F06.33 MOOD DISORDER WITH MANIC FEATURES DUE TO GENERAL MEDICAL CONDITION: ICD-10-CM

## 2017-07-26 DIAGNOSIS — I48.0 PAROXYSMAL ATRIAL FIBRILLATION: ICD-10-CM

## 2017-07-26 DIAGNOSIS — I10 HYPERTENSION, ESSENTIAL: ICD-10-CM

## 2017-07-26 DIAGNOSIS — G62.9 PERIPHERAL POLYNEUROPATHY: ICD-10-CM

## 2017-07-26 DIAGNOSIS — M17.0 PRIMARY OSTEOARTHRITIS OF BOTH KNEES: ICD-10-CM

## 2017-07-26 DIAGNOSIS — E88.2 DERCUM DISEASE: ICD-10-CM

## 2017-07-26 DIAGNOSIS — I27.9 CHRONIC PULMONARY HEART DISEASE: ICD-10-CM

## 2017-07-26 DIAGNOSIS — N17.9 AKI (ACUTE KIDNEY INJURY): Primary | ICD-10-CM

## 2017-07-26 DIAGNOSIS — I70.0 ATHEROSCLEROSIS OF AORTIC ARCH: ICD-10-CM

## 2017-07-26 DIAGNOSIS — I48.20 CHRONIC ATRIAL FIBRILLATION: ICD-10-CM

## 2017-07-26 DIAGNOSIS — N18.30 CKD (CHRONIC KIDNEY DISEASE), STAGE III: ICD-10-CM

## 2017-07-26 DIAGNOSIS — Z00.00 ENCOUNTER FOR PREVENTIVE HEALTH EXAMINATION: ICD-10-CM

## 2017-07-26 PROBLEM — F05 ACUTE HYPERACTIVE DELIRIUM DUE TO ANOTHER MEDICAL CONDITION: Status: RESOLVED | Noted: 2017-06-09 | Resolved: 2017-07-26

## 2017-07-26 PROBLEM — R19.7 DIARRHEA: Status: RESOLVED | Noted: 2017-05-04 | Resolved: 2017-07-26

## 2017-07-26 PROCEDURE — 1159F MED LIST DOCD IN RCRD: CPT | Mod: S$GLB,,, | Performed by: INTERNAL MEDICINE

## 2017-07-26 PROCEDURE — 3044F HG A1C LEVEL LT 7.0%: CPT | Mod: S$GLB,,, | Performed by: INTERNAL MEDICINE

## 2017-07-26 PROCEDURE — 99499 UNLISTED E&M SERVICE: CPT | Mod: S$GLB,,, | Performed by: NURSE PRACTITIONER

## 2017-07-26 PROCEDURE — 99214 OFFICE O/P EST MOD 30 MIN: CPT | Mod: S$GLB,,, | Performed by: INTERNAL MEDICINE

## 2017-07-26 PROCEDURE — 99999 PR PBB SHADOW E&M-EST. PATIENT-LVL IV: CPT | Mod: PBBFAC,,, | Performed by: NURSE PRACTITIONER

## 2017-07-26 PROCEDURE — 4010F ACE/ARB THERAPY RXD/TAKEN: CPT | Mod: S$GLB,,, | Performed by: INTERNAL MEDICINE

## 2017-07-26 PROCEDURE — 99999 PR PBB SHADOW E&M-EST. PATIENT-LVL IV: CPT | Mod: PBBFAC,,, | Performed by: INTERNAL MEDICINE

## 2017-07-26 PROCEDURE — G0439 PPPS, SUBSEQ VISIT: HCPCS | Mod: S$GLB,,, | Performed by: NURSE PRACTITIONER

## 2017-07-26 PROCEDURE — 1126F AMNT PAIN NOTED NONE PRSNT: CPT | Mod: S$GLB,,, | Performed by: INTERNAL MEDICINE

## 2017-07-26 PROCEDURE — 99499 UNLISTED E&M SERVICE: CPT | Mod: S$GLB,,, | Performed by: INTERNAL MEDICINE

## 2017-07-26 RX ORDER — NYSTATIN AND TRIAMCINOLONE ACETONIDE 100000; 1 [USP'U]/G; MG/G
CREAM TOPICAL
COMMUNITY
Start: 2017-06-21 | End: 2017-08-22

## 2017-07-26 RX ORDER — HYDROCHLOROTHIAZIDE 25 MG/1
TABLET ORAL
COMMUNITY
Start: 2017-06-21 | End: 2017-07-26

## 2017-07-26 RX ORDER — METOPROLOL SUCCINATE 25 MG/1
25 TABLET, EXTENDED RELEASE ORAL DAILY
Qty: 90 TABLET | Refills: 2 | Status: SHIPPED | OUTPATIENT
Start: 2017-07-26 | End: 2018-04-09 | Stop reason: SDUPTHER

## 2017-07-26 RX ORDER — QUETIAPINE FUMARATE 50 MG/1
TABLET, FILM COATED ORAL
COMMUNITY
Start: 2017-06-21 | End: 2017-07-26

## 2017-07-26 RX ORDER — HYDROCHLOROTHIAZIDE 25 MG/1
25 TABLET ORAL DAILY
Qty: 90 TABLET | Refills: 3 | Status: SHIPPED | OUTPATIENT
Start: 2017-07-26 | End: 2018-07-09 | Stop reason: SDUPTHER

## 2017-07-26 RX ORDER — METOPROLOL SUCCINATE 25 MG/1
TABLET, EXTENDED RELEASE ORAL
COMMUNITY
Start: 2017-06-21 | End: 2017-07-26

## 2017-07-26 RX ORDER — FLUCONAZOLE 100 MG/1
TABLET ORAL
COMMUNITY
Start: 2017-06-21 | End: 2017-07-26

## 2017-07-26 RX ORDER — LOSARTAN POTASSIUM 50 MG/1
50 TABLET ORAL DAILY
Qty: 90 TABLET | Refills: 3 | Status: SHIPPED | OUTPATIENT
Start: 2017-07-26 | End: 2018-04-13 | Stop reason: SDUPTHER

## 2017-07-26 NOTE — PROGRESS NOTES
"Subjective:       Patient ID: Kaci Whalen is a 76 y.o. female.    Chief Complaint: Diabetes and Follow-up    F/u chronic conditions    HPI: 75 y/o seen today in follow up with her daughter. She is significantly improved. She has stopped seroquel and is weaning of depakote. Daughter reports mood and behavior back to baseline. Blood glucose have been better of late as well no bleeding feels well denies pain no further falls      Review of Systems   Constitutional: Negative for activity change, appetite change, fatigue, fever and unexpected weight change.   HENT: Negative for ear pain, rhinorrhea and sore throat.    Eyes: Negative for discharge and visual disturbance.   Respiratory: Negative for chest tightness, shortness of breath and wheezing.    Cardiovascular: Negative for chest pain, palpitations and leg swelling.   Gastrointestinal: Negative for abdominal pain, constipation and diarrhea.   Endocrine: Negative for cold intolerance and heat intolerance.   Genitourinary: Negative for dysuria and hematuria.   Musculoskeletal: Negative for joint swelling and neck stiffness.   Skin: Negative for rash.   Neurological: Negative for dizziness, syncope, weakness and headaches.   Psychiatric/Behavioral: Negative for suicidal ideas.       Objective:     Vitals:    07/26/17 1157   BP: 122/60   BP Location: Left arm   Patient Position: Sitting   BP Method: Manual   Pulse: 60   Resp: 16   Temp: 98.1 °F (36.7 °C)   TempSrc: Oral   SpO2: 95%   Weight: 81 kg (178 lb 9.2 oz)   Height: 5' 1" (1.549 m)          Physical Exam   Constitutional: She is oriented to person, place, and time. She appears well-developed and well-nourished. No distress.   HENT:   Head: Normocephalic and atraumatic.   Eyes: Conjunctivae are normal. No scleral icterus.   Neck: Normal range of motion.   Cardiovascular:   No LE edema   Neurological: She is alert and oriented to person, place, and time. No cranial nerve deficit. She exhibits normal muscle " tone. Coordination normal.   Skin: Skin is warm and dry. Capillary refill takes less than 2 seconds. No rash noted. She is not diaphoretic.       Assessment:       1. 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 Paroxysmal atrial fibrillation    3. Hypertension, essential        Plan:    1. Improving continue current metformin and basal insulin plan to repeat labs at follow up in three months    2. Rate controlle don NOAC no bleeding continue    3. At goal continue current medications

## 2017-07-26 NOTE — PATIENT INSTRUCTIONS
Counseling and Referral of Other Preventative  (Italic type indicates deductible and co-insurance are waived)    Patient Name: Kaci Whalen  Today's Date: 7/26/2017      SERVICE LIMITATIONS RECOMMENDATION    Vaccines    · Pneumococcal (once after 65)    · Influenza (annually)    · Hepatitis B (if medium/high risk)    · Prevnar 13      Hepatitis B medium/high risk factors:       - End-stage renal disease       - Hemophiliacs who received Factor VII or         IX concentrates       - Clients of institutions for the mentally             retarded       - Persons who live in the same house as          a HepB carrier       - Homosexual men       - Illicit injectable drug abusers     Pneumococcal: N/A     Influenza: Done, repeat in one year     Hepatitis B: N/A     Prevnar 13: Done, no repeat necessary    Mammogram (biennial age 50-74)  Annually (age 40 or over)  Done this year, repeat every year    Pap (up to age 70 and after 70 if unknown history or abnormal study last 10 years)    N/A     The USPSTF recommends against screening for cervical cancer in women older than age 65 years who have had adequate prior screening and are not otherwise at high risk for cervical cancer.      Colorectal cancer screening (to age 75)    · Fecal occult blood test (annual)  · Flexible sigmoidoscopy (5y)  · Screening colonoscopy (10y)  · Barium enema   N/A    Diabetes self-management training (no USPSTF recommendations)  Requires referral by treating physician for patient with diabetes or renal disease. 10 hours of initial DSMT sessions of no less than 30 minutes each in a continuous 12-month period. 2 hours of follow-up DSMT in subsequent years.  Done this year, repeat every year    Bone mass measurements (age 65 & older, biennial)  Requires diagnosis related to osteoporosis or estrogen deficiency. Biennial benefit unless patient has history of long-term glucocorticoid  Done this year, repeat every year    Glaucoma screening (no USPSTF  recommendation)  Diabetes mellitus, family history   , age 50 or over    American, age 65 or over  Done this year, repeat every year    Medical nutrition therapy for diabetes or renal disease (no recommended schedule)  Requires referral by treating physician for patient with diabetes or renal disease or kidney transplant within the past 3 years.  Can be provided in same year as diabetes self-management training (DSMT), and CMS recommends medical nutrition therapy take place after DSMT. Up to 3 hours for initial year and 2 hours in subsequent years.  Done this year, repeat every year    Cardiovascular screening blood tests (every 5 years)  · Fasting lipid panel  Order as a panel if possible  Done this year, repeat every year    Diabetes screening tests (at least every 3 years, Medicare covers annually or at 6-month intervals for prediabetic patients)  · Fasting blood sugar (FBS) or glucose tolerance test (GTT)  Patient must be diagnosed with one of the following:       - Hypertension       - Dyslipidemia       - Obesity (BMI 30kg/m2)       - Previous elevated impaired FBS or GTT       ... or any two of the following:       - Overweight (BMI 25 but <30)       - Family history of diabetes       - Age 65 or older       - History of gestational diabetes or birth of baby weighing more than 9 pounds  Done this year, repeat every year    Abdominal aortic aneurysm screening (once)  · Sonogram   Limited to patients who meet one of the following criteria:       - Men who are 65-75 years old and have smoked more than 100 cigarette in their lifetime       - Anyone with a family history of abdominal aortic aneurysm       - Anyone recommended for screening by the USPSTF  N/A    HIV screening (annually for increased risk patients)  · HIV-1 and HIV-2 by EIA, or SHARON, rapid antibody test or oral mucosa transudate  Patients must be at increased risk for HIV infection per USPSTF guidelines or pregnant. Tests  covered annually for patient at increased risk or as requested by the patient. Pregnant patients may receive up to 3 tests during pregnancy.  Risks discussed, screening is not recommended    Smoking cessation counseling (up to 8 sessions per year)  Patients must be asymptomatic of tobacco-related conditions to receive as a preventative service.  Non-smoker    Subsequent annual wellness visit  At least 12 months since last AWV  Return in one year     The following information is provided to all patients.  This information is to help you find resources for any of the problems found today that may be affecting your health:                Living healthy guide: www.[facility].[place].[facility]      Understanding Diabetes: www.diabetes.org      Eating healthy: www.cdc.gov/healthyweight      CDC home safety checklist: www.cdc.gov/steadi/patient.html      Agency on Aging: www.goea.[place].gov      Alcoholics anonymous (AA): www.aa.org      Physical Activity: www.[place].nih.gov/lu2hhvk      Tobacco use: www.quitwithusla.org

## 2017-07-26 NOTE — PROGRESS NOTES
Subjective:       Patient ID: Kaci Whalen is a 76 y.o. female.    Chief Complaint: HRA    HPI  Ms Whalen is here for her annual Medicare wellness visit, including a comprehensive review of her entire medical record that is available.  She feels well today  Review of Systems   Constitutional: Negative for fever.   Respiratory: Negative.    Cardiovascular: Negative.        Objective:      Physical Exam   Constitutional: She is oriented to person, place, and time. She appears well-developed and well-nourished. She does not appear ill. No distress.   Elderly appearing female in a wheelchair   HENT:   Head: Normocephalic and atraumatic.   Cardiovascular: Normal rate.    Pulmonary/Chest: Effort normal. No respiratory distress.   Musculoskeletal: Normal range of motion. She exhibits no edema.   Neurological: She is alert and oriented to person, place, and time.   Skin: Skin is warm and dry.   Psychiatric: She has a normal mood and affect. Her behavior is normal.   Vitals reviewed.      Assessment:       1. LAUREN (acute kidney injury)    2. Atherosclerosis of aortic arch    3. Chronic atrial fibrillation    4. CKD (chronic kidney disease), stage III    5. Chronic pulmonary heart disease    6. Dercum disease    7. Glaucoma of both eyes, unspecified glaucoma type    8. Hyperlipidemia with target LDL less than 100    9. Essential hypertension    10. Tasia    11. 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 MIGUEL (obstructive sleep apnea) unable to afford CPAP    13. Peripheral polyneuropathy    14. Pulmonary hypertension mixed group 2 and 3    15. Primary osteoarthritis of both knees    16. Encounter for preventive health examination        Plan:       LAUREN (acute kidney injury)  Comments:  continue to monitor    Atherosclerosis of aortic arch  Comments:  continue to monitor, continue xeralto    Chronic atrial fibrillation  Comments:  continue to monitor, continue xeralto and toprol    CKD  (chronic kidney disease), stage III  Comments:  continue to monitor    Chronic pulmonary heart disease  Comments:  continue to monitor    Dercum disease  Comments:  continue to monitor    Glaucoma of both eyes, unspecified glaucoma type    Hyperlipidemia with target LDL less than 100  Comments:  continue to monitor    Essential hypertension  Comments:  continue to monitor, continue hyzaar    Tasia  Comments:  continue to monitor, continue depakote    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

## 2017-07-27 ENCOUNTER — PATIENT MESSAGE (OUTPATIENT)
Dept: FAMILY MEDICINE | Facility: CLINIC | Age: 77
End: 2017-07-27

## 2017-07-27 ENCOUNTER — TELEPHONE (OUTPATIENT)
Dept: FAMILY MEDICINE | Facility: CLINIC | Age: 77
End: 2017-07-27

## 2017-07-27 DIAGNOSIS — G47.00 INSOMNIA, UNSPECIFIED TYPE: Primary | ICD-10-CM

## 2017-07-28 ENCOUNTER — PATIENT MESSAGE (OUTPATIENT)
Dept: FAMILY MEDICINE | Facility: CLINIC | Age: 77
End: 2017-07-28

## 2017-07-28 RX ORDER — TRAZODONE HYDROCHLORIDE 50 MG/1
50 TABLET ORAL NIGHTLY
Qty: 30 TABLET | Refills: 1 | Status: SHIPPED | OUTPATIENT
Start: 2017-07-28 | End: 2017-08-07 | Stop reason: SDUPTHER

## 2017-08-07 ENCOUNTER — PATIENT MESSAGE (OUTPATIENT)
Dept: FAMILY MEDICINE | Facility: CLINIC | Age: 77
End: 2017-08-07

## 2017-08-07 DIAGNOSIS — G47.00 INSOMNIA, UNSPECIFIED TYPE: ICD-10-CM

## 2017-08-07 RX ORDER — TRAZODONE HYDROCHLORIDE 50 MG/1
50 TABLET ORAL NIGHTLY
Qty: 30 TABLET | Refills: 1 | Status: SHIPPED | OUTPATIENT
Start: 2017-08-07 | End: 2017-10-06

## 2017-08-21 NOTE — ED PROVIDER NOTES
"Encounter Date: 6/6/2017    SCRIBE #1 NOTE: I, Camelia Blanco, am scribing for, and in the presence of,  Dr. Dewey . I have scribed the entire note.       History     Chief Complaint   Patient presents with    Psychiatric Evaluation     sent with PEC from [facility].  PEC'd by Dr. Hung for "bizarre behavior"     Review of patient's allergies indicates:   Allergen Reactions    Ace inhibitors      Other reaction(s): cough      Fluorescein Itching     Other reaction(s): Itching    Iodine      Other reaction(s): Itching    Pravastatin Other (See Comments)     Other reaction(s): mouth tingling/numbness    Simvastatin      Other reaction(s): foot swelling       Time patient was seen by the provider: 6:12 PM      The patient is a 76 y.o. female with hx of: DM, HTN, obesity, HLD, and A-fib that presents to the ED from PCP's office under a PEC due to concern for acute tasia. Patient has no complaints at this time and is thankful for this medical evaluation. Patient denies auditory/visual hallucinations or suicidal/homicidal ideation. She also denies any pain, headache, chest pain, shortness of breath, abdominal pain, constipation, diarrhea, urinary problems, or difficulty ambulating.             Past Medical History:   Diagnosis Date    Abdominal pain     Atherosclerosis of aortic arch     noted on CXR 7/1/2015    Atrial fibrillation 05/2016    CHADS 4, on Xarelto    Benign hypertension     Chronic constipation     Chronic diarrhea     Chronic edema     Depression     Dercum disease     Multiple painful lipomas    Dysphagia     Gas pain     Glaucoma of both eyes     Hx of psychiatric care     previously amitriptyline, now jut on Trazodone 100mg QHS PRN insomnia    Hyperlipidemia with target LDL less than 100     Unable to tolerate statins    Immature cataract     Tasia     no symptoms prior to this presentation    Morbid obesity     Nausea & vomiting     Primary osteoarthritis of both knees  " February 10, 2018      Loni Wyattdeanne  [address] [facility] [address]    Dear ,      At [facility], your health and wellness is our primary concern. That is why we are following up on an abnormal pap from 08/21/17, which was reported as ASC-US and positive for high risk HPV. Your provider had recommended that you have a Colposcopy completed by 11/21/17. Our records do not show that this has been done.      It is important to complete the follow up that your provider has suggested for you to ensure that there are no worsening changes which may, over time, develop into cancer.      If you have chosen not to do the recommended colposcopy, please contact our office at [phone] to schedule an appointment for a repeat PAP smear and HPV test at your earliest convenience.    If you have completed the tests outside of [facility], please have the results forwarded to our office. We will update the chart for your primary Physician to review before your next annual physical.     Thank you for choosing [facility]!    Sincerely,      Joel Mayo DO/angelica        Proteinuria     Psychiatric problem     history of depression    Pulmonary hypertension mixed group 2 and 3 1/18/2017    Therapy     no history of therapy    Type II or unspecified type diabetes mellitus with neurological manifestations, uncontrolled     Unspecified vitamin D deficiency      Past Surgical History:   Procedure Laterality Date     tenotomy      flexors 2,3 L toes    CATARACT EXTRACTION      COLONOSCOPY N/A 5/4/2017    Procedure: COLONOSCOPY;  Surgeon: Suellen Wolf MD;  Location: [facility] ([address] [place] [address]);  Service: Endoscopy;  Laterality: N/A;  2nd floor due to pulm htn, possible gastroparesis. per Dr Wolf      ok to hold Xarelto 2 days prior to procedure per Dr Driss Dixon    diabetic retinopathy      right    HYSTERECTOMY      knee surgery x2      Left ankle and leg surgery secondary to a fall      left arm fracture      Left cataract      PARTIAL HYSTERECTOMY      Secondary to bleeding    TOE FUSION      2,3 R     Family History   Problem Relation Age of Onset    No Known Problems Daughter     No Known Problems Father     Liver cancer Mother     Bone cancer Daughter     No Known Problems Sister     No Known Problems Brother     No Known Problems Maternal Aunt     No Known Problems Maternal Uncle     No Known Problems Paternal Aunt     No Known Problems Paternal Uncle     No Known Problems Maternal Grandmother     No Known Problems Maternal Grandfather     No Known Problems Paternal Grandmother     No Known Problems Paternal Grandfather     Amblyopia Neg Hx     Blindness Neg Hx     Cancer Neg Hx     Cataracts Neg Hx     Diabetes Neg Hx     Glaucoma Neg Hx     Hypertension Neg Hx     Macular degeneration Neg Hx     Retinal detachment Neg Hx     Strabismus Neg Hx     Stroke Neg Hx     Thyroid disease Neg Hx     Celiac disease Neg Hx     Cirrhosis Neg Hx     Colon cancer Neg Hx     Colon polyps Neg Hx     Crohn's disease Neg Hx     Cystic fibrosis  Neg Hx     Esophageal cancer Neg Hx     Hemochromatosis Neg Hx     Inflammatory bowel disease Neg Hx     Irritable bowel syndrome Neg Hx     Liver disease Neg Hx     Rectal cancer Neg Hx     Stomach cancer Neg Hx     Ulcerative colitis Neg Hx     Montez's disease Neg Hx      Social History   Substance Use Topics    Smoking status: Never Smoker    Smokeless tobacco: Never Used    Alcohol use No     Review of Systems   HENT: Negative for sore throat.    Eyes: Negative for visual disturbance.   Respiratory: Negative for shortness of breath.    Cardiovascular: Negative for chest pain.   Gastrointestinal: Negative for abdominal pain, constipation and diarrhea.   Genitourinary: Negative for difficulty urinating.   Musculoskeletal: Negative for back pain, gait problem and neck pain.   Skin: Negative for wound.   Neurological: Negative for headaches.   Psychiatric/Behavioral: Negative for hallucinations and suicidal ideas.        Negative for homicidal ideas         Physical Exam     Initial Vitals [06/06/17 1753]   BP Pulse Resp Temp SpO2   138/70 70 20 98.4 °F (36.9 °C) 99 %     Physical Exam    Constitutional: She appears well-developed and well-nourished. She is not diaphoretic. No distress.   HENT:   Head: Normocephalic and atraumatic.   Eyes: Conjunctivae and EOM are normal. Pupils are equal, round, and reactive to light.   Neck: Normal range of motion. Neck supple.   Cardiovascular: Normal rate, regular rhythm, normal heart sounds and intact distal pulses.   Pulmonary/Chest: Breath sounds normal. No respiratory distress. She has no wheezes. She has no rales.   Abdominal: Soft. Bowel sounds are normal. There is no tenderness.   Musculoskeletal: Normal range of motion.   Neurological: She is alert and oriented to person, place, and time. Gait normal.   Skin: Skin is warm and dry.   Psychiatric:   Poor insight. Pressured speech. No obvious response to internal stimuli. Full affect. Appears manic.          ED  Course   Procedures  Labs Reviewed   CBC W/ AUTO DIFFERENTIAL - Abnormal; Notable for the following:        Result Value    RBC 3.70 (*)     Hemoglobin 11.2 (*)     Hematocrit 32.5 (*)     MPV 8.7 (*)     All other components within normal limits   COMPREHENSIVE METABOLIC PANEL - Abnormal; Notable for the following:     Sodium 132 (*)     Chloride 94 (*)     Glucose 121 (*)     BUN, Bld 29 (*)     Creatinine 2.3 (*)     eGFR if  23.4 (*)     eGFR if non  20.3 (*)     All other components within normal limits   ACETAMINOPHEN LEVEL - Abnormal; Notable for the following:     Acetaminophen (Tylenol), Serum <3.0 (*)     All other components within normal limits   URINALYSIS, REFLEX TO URINE CULTURE - Abnormal; Notable for the following:     Appearance, UA Hazy (*)     Leukocytes, UA Trace (*)     All other components within normal limits   SALICYLATE LEVEL - Abnormal; Notable for the following:     Salicylate Lvl <5.0 (*)     All other components within normal limits   POCT GLUCOSE - Abnormal; Notable for the following:     POCT Glucose 128 (*)     All other components within normal limits   TSH   DRUG SCREEN PANEL, URINE EMERGENCY   ALCOHOL,MEDICAL (ETHANOL)   URINALYSIS MICROSCOPIC     EKG Readings: (Independently Interpreted)   Rhythm: Normal Sinus Rhythm. Heart Rate: 73. Conduction: 1st Degree AV Block.   Poor R wave progression, no obvious acute ischemic changes        X-Rays:   Independently Interpreted Readings:   Head CT: No acute abnormality      Medical Decision Making:   History:   Old Medical Records: I decided to obtain old medical records.  Initial Assessment:   This is an emergent evaluation. Patient has no documented psychiatric history. According to her PCP, she had a person closer to her pass away approximately 5 days ago. Since then, her daughter reports that she has had pressured speech, has been hyper Druze, has not been sleeping, and has been intermittently  combative. I will work the patient up for an organic etiology by ordering labs, tox screen, urinalysis, head CT, and a screening EKG. The patient is already under a PEC from her PCP. If no organic etiology is found, psychiatry will be consulted to evaluate.   Independently Interpreted Test(s):   I have ordered and independently interpreted X-rays - see prior notes.  I have ordered and independently interpreted EKG Reading(s) - see prior notes  Clinical Tests:   Lab Tests: Ordered and Reviewed  Radiological Study: Reviewed and Ordered  Medical Tests: Ordered and Reviewed  Other:   I have discussed this case with another health care provider.            Scribe Attestation:   Scribe #1: I performed the above scribed service and the documentation accurately describes the services I performed. I attest to the accuracy of the note.    Attending Attestation:           Physician Attestation for Scribe:  Physician Attestation Statement for Scribe #1: I, Dr. Dewey, reviewed documentation, as scribed by Camelia Blanco in my presence, and it is both accurate and complete.         Attending ED Notes:   18:09   At 16:20, I discussed the patient's case with the referring physician, Dr. Hung with [facility], who filled out a PEC and sent the patient to our facility per patient and family request. He feels the patient has acute estrellita and will require inpatient psychiatric treatment.     6:11 PM   The patient has pressured speech which I believe is due to acute estrellita. She is fluctuating between English and [place]. Per her family practitioner, she normally speaks only in English to him. However, today, she has been utilizing [place] more frequently.     6:44 PM  The patient's family has arrived.  The  states that the patient has really not slept in 5 days.  The daughter states that she has been hyper Pentecostalism and also will not allow people to help her with bathing or cleaning herself after accidents.  The  patient's best friend passed away a week and a half ago.  She has also had multiple chronic medical issues which are causing her significant stress.  The family believes that her behavior is due to a stress response.    9:13 PM   The patient's GFR has decreased by nearly 50% when compared to 2 months ago. However, the BUN is only 29. I do not believe the patient's altered mental status is due to uremia. However, with no previous psychiatric history and what appears to be a manic episode with altered mental status, I feel the patient will require admission. Hospital medicine will be called. Head CT pending. IV fluids have been ordered.     9:29 PM  I discussed this case with the hospitalist, Dr. Orr.  He has agreed to evaluate and admit the patient.          ED Course     Clinical Impression:   The primary encounter diagnosis was LAUREN (acute kidney injury). Diagnoses of Altered mental status, unspecified, Acute hyperactive delirium due to another medical condition, and Tasia were also pertinent to this visit.    Disposition:   Disposition: Admitted       Cisco Dewey MD  06/23/17 1116

## 2017-08-22 ENCOUNTER — OFFICE VISIT (OUTPATIENT)
Dept: GASTROENTEROLOGY | Facility: CLINIC | Age: 77
End: 2017-08-22
Payer: MEDICARE

## 2017-08-22 ENCOUNTER — LAB VISIT (OUTPATIENT)
Dept: LAB | Facility: HOSPITAL | Age: 77
End: 2017-08-22
Attending: INTERNAL MEDICINE
Payer: MEDICARE

## 2017-08-22 VITALS
HEIGHT: 63 IN | BODY MASS INDEX: 31.84 KG/M2 | HEART RATE: 57 BPM | WEIGHT: 179.69 LBS | DIASTOLIC BLOOD PRESSURE: 55 MMHG | SYSTOLIC BLOOD PRESSURE: 142 MMHG

## 2017-08-22 DIAGNOSIS — K80.21 CALCULUS OF GALLBLADDER WITH BILIARY OBSTRUCTION BUT WITHOUT CHOLECYSTITIS: ICD-10-CM

## 2017-08-22 DIAGNOSIS — R10.9 ABDOMINAL PAIN, UNSPECIFIED LOCATION: ICD-10-CM

## 2017-08-22 DIAGNOSIS — A04.8 H. PYLORI INFECTION: ICD-10-CM

## 2017-08-22 DIAGNOSIS — K59.00 CONSTIPATION, UNSPECIFIED CONSTIPATION TYPE: Primary | ICD-10-CM

## 2017-08-22 DIAGNOSIS — R19.7 DIARRHEA, UNSPECIFIED TYPE: ICD-10-CM

## 2017-08-22 LAB — IGA SERPL-MCNC: 131 MG/DL

## 2017-08-22 PROCEDURE — 99999 PR PBB SHADOW E&M-EST. PATIENT-LVL III: CPT | Mod: PBBFAC,,, | Performed by: INTERNAL MEDICINE

## 2017-08-22 PROCEDURE — 3008F BODY MASS INDEX DOCD: CPT | Mod: S$GLB,,, | Performed by: INTERNAL MEDICINE

## 2017-08-22 PROCEDURE — 83516 IMMUNOASSAY NONANTIBODY: CPT

## 2017-08-22 PROCEDURE — 36415 COLL VENOUS BLD VENIPUNCTURE: CPT

## 2017-08-22 PROCEDURE — 82784 ASSAY IGA/IGD/IGG/IGM EACH: CPT

## 2017-08-22 PROCEDURE — 3078F DIAST BP <80 MM HG: CPT | Mod: S$GLB,,, | Performed by: INTERNAL MEDICINE

## 2017-08-22 PROCEDURE — 99499 UNLISTED E&M SERVICE: CPT | Mod: S$GLB,,, | Performed by: INTERNAL MEDICINE

## 2017-08-22 PROCEDURE — 3077F SYST BP >= 140 MM HG: CPT | Mod: S$GLB,,, | Performed by: INTERNAL MEDICINE

## 2017-08-22 PROCEDURE — 1159F MED LIST DOCD IN RCRD: CPT | Mod: S$GLB,,, | Performed by: INTERNAL MEDICINE

## 2017-08-22 PROCEDURE — 99214 OFFICE O/P EST MOD 30 MIN: CPT | Mod: S$GLB,,, | Performed by: INTERNAL MEDICINE

## 2017-08-22 NOTE — PROGRESS NOTES
[facility] Established Patient Note        Reason for Consult:    Chief Complaint   Patient presents with    Other     h. pylori    Constipation    Other     abnormal pathology     Anemia    Weight Loss       No flowsheet data found.    PCP:   Toby Hung       Referring MD:  No referring provider defined for this encounter.    HPI:  Kaci Whalen is a 76 y.o. female here for evaluation of the following GI problems:    Dysphagia.  Resolved.    H. Pylori infection s/p triple therapy with improvement in GI symptoms.     Nausea. Emesis. Improved after h. Pylori treatment.   Has DM diagnosed 50 years ago, started insulin 30 years ago. Well controlled w metformin and weight loss.     Abdominal pain. Resolved w h. Pylori infection.     Gas and bloating. Much improved.      Diarrhea. Resolved.    Constipation. Still w intermittent constipation. Has Bm most days.  Gets hard stools every three days. Increased fiber, water. Dulcolax prn.     Weight loss. Now stable.      Denies GERD, BRBPR, melena.         Previous Studies:   Enteroscopy 5/4/17: Bleb in esoph. Nl esoph (-EoE). Gastric erythema (Hp+). 4mm submucosal nodule in antrum.  Polyps in duodenum (peptic duodenitis).  Nl duodenum (inceased lymphocytes D0/D2)  Colonoscopy 5/4/2017: Adeq to TI. Nl TI. 55 TA in asc. 7mm hp in sigm. TICS. Nl colon (R/l bx -).    US: cholelithiasis, kidney cysts   Colonoscopy 10-20 years ago.  Report not available.  No EGDs.   CT 6/4/2016: Cholelithiasis.  Calcified coronary artery disease and calcified atheromatous disease the aorta. Diverticulosis coli without diverticulitis.  Slightly heterogenous appearance of the bone marrow.      ROS:  ROS   Constitutional: No fevers, no chills, no night sweats, + weight loss  ENT: No congestion, + rhinorrhea, + chronic sinus problems  CV: No chest pain, no palpitations  Pulm: + cough, no shortness of breath  Ophtho: No blurry  vision, no eye redness  GI: see HPI  Derm: No rash  Heme: No lymphadenopathy, no bruising  MSK: No arthritis, no joint swelling, no Raynauds  : No dysuria, no frequent urination, no blood in urine  Endo: No hot or cold intolerance  Neuro: No dizziness, no syncope, no seizure  Psych: No anxiety, no depression    Medical History:   Past Medical History:   Diagnosis Date    Abdominal pain     Anxiety     Atherosclerosis of aortic arch     noted on CXR 7/1/2015    Atrial fibrillation 05/2016    CHADS 4, on Xarelto    Benign hypertension     Chronic constipation     Chronic diarrhea     Chronic edema     Depression     Dercum disease     Multiple painful lipomas    Dysphagia     Gas pain     Glaucoma of both eyes     Hx of psychiatric care     previously amitriptyline, now jut on Trazodone 100mg QHS PRN insomnia    Hyperlipidemia with target LDL less than 100     Unable to tolerate statins    Immature cataract     Tasia     no symptoms prior to this presentation    Morbid obesity     Nausea & vomiting     Neuropathy     Polyneuropathy     Primary osteoarthritis of both knees     Proteinuria     Psychiatric problem     history of depression    Pulmonary hypertension mixed group 2 and 3 1/18/2017    Sleep apnea     Therapy     no history of therapy    Type II or unspecified type diabetes mellitus with neurological manifestations, uncontrolled     Unspecified vitamin D deficiency         Surgical History:   Past Surgical History:   Procedure Laterality Date     tenotomy      flexors 2,3 L toes    CATARACT EXTRACTION      COLONOSCOPY N/A 5/4/2017    Procedure: COLONOSCOPY;  Surgeon: Suellen Wolf MD;  Location: [facility] ([address] [place] [address]);  Service: Endoscopy;  Laterality: N/A;  2nd floor due to pulm htn, possible gastroparesis. per Dr Wolf      ok to hold Xarelto 2 days prior to procedure per Dr Driss Dixon    diabetic retinopathy      right    HYSTERECTOMY      knee surgery x2       Left ankle and leg surgery secondary to a fall      left arm fracture      Left cataract      PARTIAL HYSTERECTOMY      Secondary to bleeding    TOE FUSION      2,3 R        Family History:   Family History   Problem Relation Age of Onset    No Known Problems Daughter     No Known Problems Father     Liver cancer Mother     Bone cancer Daughter     No Known Problems Sister     No Known Problems Brother     No Known Problems Maternal Aunt     No Known Problems Maternal Uncle     No Known Problems Paternal Aunt     No Known Problems Paternal Uncle     No Known Problems Maternal Grandmother     No Known Problems Maternal Grandfather     No Known Problems Paternal Grandmother     No Known Problems Paternal Grandfather     Amblyopia Neg Hx     Blindness Neg Hx     Cancer Neg Hx     Cataracts Neg Hx     Diabetes Neg Hx     Glaucoma Neg Hx     Hypertension Neg Hx     Macular degeneration Neg Hx     Retinal detachment Neg Hx     Strabismus Neg Hx     Stroke Neg Hx     Thyroid disease Neg Hx     Celiac disease Neg Hx     Cirrhosis Neg Hx     Colon cancer Neg Hx     Colon polyps Neg Hx     Crohn's disease Neg Hx     Cystic fibrosis Neg Hx     Esophageal cancer Neg Hx     Hemochromatosis Neg Hx     Inflammatory bowel disease Neg Hx     Irritable bowel syndrome Neg Hx     Liver disease Neg Hx     Rectal cancer Neg Hx     Stomach cancer Neg Hx     Ulcerative colitis Neg Hx     Montez's disease Neg Hx     Alcohol abuse Neg Hx     Bipolar disorder Neg Hx     Dementia Neg Hx     Depression Neg Hx     Drug abuse Neg Hx     Suicide Neg Hx     Schizophrenia Neg Hx         Social History:   Social History     Social History    Marital status:      Spouse name: N/A    Number of children: 3    Years of education: 14     Occupational History    housewife      Social History Main Topics    Smoking status: Never Smoker    Smokeless tobacco: Never Used    Alcohol use No     Drug use: No    Sexual activity: Yes     Partners: Male     Other Topics Concern    None     Social History Narrative    Pt was the youngest of 3 boys and 2 girls.  Her father  when she was 3 months old.  She was raised by her mother, m, and an uncle.  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  They had 2 daughters and 1 son together, plus 5 stepchildren by her .  They live together in [place] in their own home, with no pets.  Hobbies include gardening and crafts.  Pt attends a variety of organized Tenriism services.            One daughter is  from bone cancer.    One son  2014 after surgery for MIGUEL        Review of patient's allergies indicates:   Allergen Reactions    Tramadol Other (See Comments)     Interaction-induced delirium with s/s of estrellita.    Ace inhibitors      Other reaction(s): cough      Fluorescein Itching     Other reaction(s): Itching    Iodine      Other reaction(s): Itching    Pravastatin Other (See Comments)     Other reaction(s): mouth tingling/numbness    Simvastatin      Other reaction(s): foot swelling         Current Outpatient Prescriptions   Medication Sig Dispense Refill    alcohol swabs PadM Apply 1 each topically as needed.  0    blood glucose control, low (TRUE METRIX LEVEL 1) Soln 1 Bottle by Misc.(Non-Drug; Combo Route) route once daily. 1 each 2    blood-glucose meter kit Use as instructed 1 each 0    hydrochlorothiazide (HYDRODIURIL) 25 MG tablet Take 1 tablet (25 mg total) by mouth once daily. 90 tablet 3    lancets (LANCETS,THIN) 28 gauge Misc 1 lancet by Misc.(Non-Drug; Combo Route) route 4 (four) times daily before meals and nightly. 100 each 2    losartan (COZAAR) 50 MG tablet Take 1 tablet (50 mg total) by mouth once daily. 90 tablet 3    melatonin 5 mg Subl Place 1 tablet under the tongue every evening.       metformin (GLUCOPHAGE) 500 MG tablet Take 1 tablet  "(500 mg total) by mouth 2 (two) times daily with meals. 180 tablet 3    metoprolol succinate (TOPROL-XL) 25 MG 24 hr tablet Take 1 tablet (25 mg total) by mouth once daily. 90 tablet 2    pen needle, diabetic (BD ULTRA-FINE SANDEEP PEN NEEDLES) 32 gauge x 5/32" Ndle Take 1 each by mouth 4 (four) times daily. 120 each 2    rivaroxaban (XARELTO) 20 mg Tab Take 1 tablet (20 mg total) by mouth daily with dinner or evening meal. 90 tablet 3    trazodone (DESYREL) 50 MG tablet Take 1 tablet (50 mg total) by mouth every evening. 30 tablet 1    TRUE METRIX GLUCOSE TEST STRIP Strp TEST FOUR TIMES DAILY BEFORE MEALS  AND EVERY NIGHT 300 strip 2    psyllium (METAMUCIL) packet Take 1 packet by mouth once daily.  12     No current facility-administered medications for this visit.         Objective Findings:  Vital Signs:  BP (!) 142/55   Pulse (!) 57   Ht 5' 3" (1.6 m)   Wt 81.5 kg (179 lb 10.8 oz)   BMI 31.83 kg/m²   Body mass index is 31.83 kg/m².    Physical Exam:  General appearance: alert, cooperative, no distress  HENT: Normocephalic, atraumatic, neck symmetrical, no nasal discharge  Eyes: conjunctivae/corneas clear, PERRL, EOM's intact  Lungs: clear to auscultation bilaterally, no dullness to percussion bilaterally  Heart: regular rate and rhythm without rub; no displacement of the PMI  Abdomen: soft, non-tender; bowel sounds normoactive; no organomegaly  Extremities: extremities symmetric; no clubbing, cyanosis, or edema  Integument: Skin color, texture, turgor normal; no rashes; hair distrubution normal  Neurologic: Alert and oriented X 3, normal strength, normal coordination and gait  Psychiatric: no pressured speech; normal affect; no evidence of impaired cognition    Labs:  Lab Results   Component Value Date    WBC 6.88 06/07/2017    HGB 11.6 (L) 06/07/2017    HCT 34.0 (L) 06/07/2017    MCV 88 06/07/2017     06/07/2017     Lab Results   Component Value Date     06/10/2017    K 4.3 06/10/2017    CL " 105 06/10/2017    CO2 25 06/10/2017     (H) 06/10/2017    BUN 15 06/10/2017    CREATININE 1.2 06/10/2017    CALCIUM 9.0 06/10/2017    PROT 5.5 (L) 06/10/2017    ALBUMIN 2.9 (L) 06/10/2017    BILITOT 0.4 06/10/2017    ALKPHOS 50 (L) 06/10/2017    AST 13 06/10/2017    ALT 7 (L) 06/10/2017         Assessment and Plan:  Kaci Whalen is a 76 y.o. female with:    Dysphagia to solids, no problems with liquids. Normal EGD. No EoE.  Resolved after EGD.     H. Pylori infection s/p triple therapy   -Check stool H. pylori      Nausea.  Emesis.  Early satiety. Poor appetite. DM for years.  Improved w treatment of H. Pylori and dietary modifications.   -Zofran prn   -Check GES if symptoms get worse     Abdominal pain.  LUQ/LLQ.   Gas and bloating.  Resolved.     Cholelithiasis. No symptoms of biliary colic.     Diarrhea.  Resolved. Increased lymphocytes in small bowel.  No villous blunting.   -Check TTG/IGA    Constiaption.  -Start psyllium daily   -Miralax and dulcolax prn       Anemia.  Ferritin 86.  EGD w gastritis/duodenitis. Colon negative.  Possibly related to h. Pylori infection    Weight loss. Now stable.     5mm TA in asc colon   -No further indication for screening due to age and co morbidities    Return if symptoms worsen or fail to improve.    1. Constipation, unspecified constipation type    2. H. pylori infection    3. Abdominal pain, unspecified location    4. Calculus of gallbladder with biliary obstruction but without cholecystitis    5. Diarrhea, unspecified type          Order summary:  Orders Placed This Encounter    H. pylori antigen, stool    Tissue transglutaminase, IgA    IgA    psyllium (METAMUCIL) packet         Thank you so much for allowing me to participate in the care of Kaci Whalen            Suellen Wolf MD

## 2017-08-24 LAB — TTG IGA SER IA-ACNC: 6 UNITS

## 2017-08-26 ENCOUNTER — LAB VISIT (OUTPATIENT)
Dept: LAB | Facility: HOSPITAL | Age: 77
End: 2017-08-26
Attending: INTERNAL MEDICINE
Payer: MEDICARE

## 2017-08-26 DIAGNOSIS — A04.8 H. PYLORI INFECTION: ICD-10-CM

## 2017-08-26 PROCEDURE — 87338 HPYLORI STOOL AG IA: CPT

## 2017-08-29 ENCOUNTER — PATIENT MESSAGE (OUTPATIENT)
Dept: FAMILY MEDICINE | Facility: CLINIC | Age: 77
End: 2017-08-29

## 2017-08-29 DIAGNOSIS — Z79.4 TYPE 2 DIABETES MELLITUS WITH MICROALBUMINURIA, WITH LONG-TERM CURRENT USE OF INSULIN: Primary | ICD-10-CM

## 2017-08-29 DIAGNOSIS — E11.29 TYPE 2 DIABETES MELLITUS WITH MICROALBUMINURIA, WITH LONG-TERM CURRENT USE OF INSULIN: Primary | ICD-10-CM

## 2017-08-29 DIAGNOSIS — M17.0 PRIMARY OSTEOARTHRITIS OF BOTH KNEES: ICD-10-CM

## 2017-08-29 DIAGNOSIS — R80.9 TYPE 2 DIABETES MELLITUS WITH MICROALBUMINURIA, WITH LONG-TERM CURRENT USE OF INSULIN: Primary | ICD-10-CM

## 2017-08-29 RX ORDER — METFORMIN HYDROCHLORIDE 500 MG/1
500 TABLET ORAL 3 TIMES DAILY
Qty: 270 TABLET | Refills: 3 | Status: SHIPPED | OUTPATIENT
Start: 2017-08-29 | End: 2018-11-21 | Stop reason: SDUPTHER

## 2017-08-29 NOTE — TELEPHONE ENCOUNTER
Spoke with [place]- Pharmacist with [employer] [facility], states order for walker can be faxed to an medical supply store or local pharmacy.     Orders has been faxed to [facility] at [phone]

## 2017-08-30 LAB — H PYLORI AG STL QL: NOT DETECTED

## 2017-08-31 ENCOUNTER — PATIENT MESSAGE (OUTPATIENT)
Dept: FAMILY MEDICINE | Facility: CLINIC | Age: 77
End: 2017-08-31

## 2017-09-13 ENCOUNTER — PATIENT MESSAGE (OUTPATIENT)
Dept: FAMILY MEDICINE | Facility: CLINIC | Age: 77
End: 2017-09-13

## 2017-09-13 DIAGNOSIS — R26.81 GAIT INSTABILITY: ICD-10-CM

## 2017-09-13 DIAGNOSIS — M17.0 PRIMARY OSTEOARTHRITIS OF BOTH KNEES: Primary | ICD-10-CM

## 2017-09-17 NOTE — TELEPHONE ENCOUNTER
Good afternoon Mrs Whalen. Your script for the Rollator has been faxed to [facility]. If you need to contact them the number is [phone].

## 2017-10-04 DIAGNOSIS — R80.9 TYPE 2 DIABETES MELLITUS WITH MICROALBUMINURIA, WITH LONG-TERM CURRENT USE OF INSULIN: ICD-10-CM

## 2017-10-04 DIAGNOSIS — E11.29 TYPE 2 DIABETES MELLITUS WITH MICROALBUMINURIA, WITH LONG-TERM CURRENT USE OF INSULIN: ICD-10-CM

## 2017-10-04 DIAGNOSIS — Z79.4 TYPE 2 DIABETES MELLITUS WITH MICROALBUMINURIA, WITH LONG-TERM CURRENT USE OF INSULIN: ICD-10-CM

## 2017-10-04 RX ORDER — CALCIUM CITRATE/VITAMIN D3 200MG-6.25
TABLET ORAL
Qty: 400 STRIP | Refills: 2 | Status: SHIPPED | OUTPATIENT
Start: 2017-10-04 | End: 2018-05-16 | Stop reason: SDUPTHER

## 2017-10-06 ENCOUNTER — OFFICE VISIT (OUTPATIENT)
Dept: CARDIOLOGY | Facility: CLINIC | Age: 77
End: 2017-10-06
Payer: MEDICARE

## 2017-10-06 VITALS
DIASTOLIC BLOOD PRESSURE: 63 MMHG | BODY MASS INDEX: 32.23 KG/M2 | HEIGHT: 63 IN | HEART RATE: 55 BPM | RESPIRATION RATE: 15 BRPM | WEIGHT: 181.88 LBS | SYSTOLIC BLOOD PRESSURE: 162 MMHG | OXYGEN SATURATION: 97 %

## 2017-10-06 DIAGNOSIS — N18.30 CKD (CHRONIC KIDNEY DISEASE), STAGE III: ICD-10-CM

## 2017-10-06 DIAGNOSIS — I10 ESSENTIAL HYPERTENSION: ICD-10-CM

## 2017-10-06 DIAGNOSIS — R80.9 TYPE 2 DIABETES MELLITUS WITH MICROALBUMINURIA, WITH LONG-TERM CURRENT USE OF INSULIN: ICD-10-CM

## 2017-10-06 DIAGNOSIS — Z79.4 TYPE 2 DIABETES MELLITUS WITH MICROALBUMINURIA, WITH LONG-TERM CURRENT USE OF INSULIN: ICD-10-CM

## 2017-10-06 DIAGNOSIS — E66.9 NON MORBID OBESITY: ICD-10-CM

## 2017-10-06 DIAGNOSIS — I48.20 CHRONIC ATRIAL FIBRILLATION: Primary | ICD-10-CM

## 2017-10-06 DIAGNOSIS — E11.29 TYPE 2 DIABETES MELLITUS WITH MICROALBUMINURIA, WITH LONG-TERM CURRENT USE OF INSULIN: ICD-10-CM

## 2017-10-06 PROCEDURE — 99214 OFFICE O/P EST MOD 30 MIN: CPT | Mod: S$GLB,,, | Performed by: INTERNAL MEDICINE

## 2017-10-06 PROCEDURE — 99499 UNLISTED E&M SERVICE: CPT | Mod: S$GLB,,, | Performed by: INTERNAL MEDICINE

## 2017-10-06 PROCEDURE — 99999 PR PBB SHADOW E&M-EST. PATIENT-LVL III: CPT | Mod: PBBFAC,,, | Performed by: INTERNAL MEDICINE

## 2017-10-06 NOTE — PROGRESS NOTES
CARDIOVASCULAR PROGRESS NOTE    REASON FOR CONSULT:   Kaci Whalen is a 77 y.o. female who presents for follow up of AF, HFpEF.    PCP: Abhilash  HISTORY OF PRESENT ILLNESS:   The patient returns for follow-up.  She is accompanied by her daughter to today's visit.  The patient was generally asymptomatic status without angina or dyspnea.  She's had no palpitations, lightheadedness, dizziness, or syncope is been no PND, orthopnea, or lower extremity edema.  She denies melena, hematuria, or claudicant symptoms.   She appears to be tolerating her Xarelto anticoagulation without failure.  In the interim since her last office visit, the patient was hospitalized for a fall and associated acute renal failure.  She seems to have recovered nicely from this problem.    CARDIOVASCULAR HISTORY:   AF, CHADS 4 on Xarelto  CHF, diastolic  MIGUEL, pt noncompliant with CPAP    PAST MEDICAL HISTORY:     Past Medical History:   Diagnosis Date    Abdominal pain     Anxiety     Atherosclerosis of aortic arch     noted on CXR 7/1/2015    Atrial fibrillation 05/2016    CHADS 4, on Xarelto    Benign hypertension     Chronic constipation     Chronic diarrhea     Chronic edema     Depression     Dercum disease     Multiple painful lipomas    Dysphagia     Gas pain     Glaucoma of both eyes     Hx of psychiatric care     previously amitriptyline, now jut on Trazodone 100mg QHS PRN insomnia    Hyperlipidemia with target LDL less than 100     Unable to tolerate statins    Immature cataract     Tasia     no symptoms prior to this presentation    Morbid obesity     Nausea & vomiting     Neuropathy     Polyneuropathy     Primary osteoarthritis of both knees     Proteinuria     Psychiatric problem     history of depression    Pulmonary hypertension mixed group 2 and 3 1/18/2017    Sleep apnea     Therapy     no history of therapy    Type II or unspecified type diabetes mellitus with neurological manifestations,  uncontrolled(250.62)     Unspecified vitamin D deficiency        PAST SURGICAL HISTORY:     Past Surgical History:   Procedure Laterality Date     tenotomy      flexors 2,3 L toes    CATARACT EXTRACTION      COLONOSCOPY N/A 5/4/2017    Procedure: COLONOSCOPY;  Surgeon: Suellen Wolf MD;  Location: [facility] ([address] [place] [address]);  Service: Endoscopy;  Laterality: N/A;  2nd floor due to pulm htn, possible gastroparesis. per Dr Wolf      ok to hold Xarelto 2 days prior to procedure per Dr Driss Dixon    diabetic retinopathy      right    HYSTERECTOMY      knee surgery x2      Left ankle and leg surgery secondary to a fall      left arm fracture      Left cataract      PARTIAL HYSTERECTOMY      Secondary to bleeding    TOE FUSION      2,3 R       ALLERGIES AND MEDICATION:     Review of patient's allergies indicates:   Allergen Reactions    Ace inhibitors      Other reaction(s): cough      Fluorescein Itching     Other reaction(s): Itching    Iodine      Other reaction(s): Itching    Pravastatin Other (See Comments)     Other reaction(s): mouth tingling/numbness    Simvastatin      Other reaction(s): foot swelling       Previous Medications    ALCOHOL SWABS PADM    Apply 1 each topically as needed.    BLOOD GLUCOSE CONTROL, LOW (TRUE METRIX LEVEL 1) SOLN    1 Bottle by Misc.(Non-Drug; Combo Route) route once daily.    BLOOD-GLUCOSE METER KIT    Use as instructed    HYDROCHLOROTHIAZIDE (HYDRODIURIL) 25 MG TABLET    Take 1 tablet (25 mg total) by mouth once daily.    LANCETS (LANCETS,THIN) 28 GAUGE MISC    1 lancet by Misc.(Non-Drug; Combo Route) route 4 (four) times daily before meals and nightly.    LOSARTAN (COZAAR) 50 MG TABLET    Take 1 tablet (50 mg total) by mouth once daily.    MELATONIN 5 MG SUBL    Place 1 tablet under the tongue every evening.     METFORMIN (GLUCOPHAGE) 500 MG TABLET    Take 1 tablet (500 mg total) by mouth 3 (three) times daily.    METOPROLOL SUCCINATE (TOPROL-XL) 25 MG 24 HR  "TABLET    Take 1 tablet (25 mg total) by mouth once daily.    PEN NEEDLE, DIABETIC (BD ULTRA-FINE SANDEEP PEN NEEDLES) 32 GAUGE X 5/32" NDLE    Take 1 each by mouth 4 (four) times daily.    PSYLLIUM (METAMUCIL) PACKET    Take 1 packet by mouth once daily.    RIVAROXABAN (XARELTO) 20 MG TAB    Take 1 tablet (20 mg total) by mouth daily with dinner or evening meal.    TRUE METRIX GLUCOSE TEST STRIP STRP    TEST FOUR TIMES DAILY BEFORE MEALS  AND EVERY NIGHT       SOCIAL HISTORY:     Social History     Social History    Marital status:      Spouse name: N/A    Number of children: 3    Years of education: 14     Occupational History    housewife      Social History Main Topics    Smoking status: Never Smoker    Smokeless tobacco: Never Used    Alcohol use No    Drug use: No    Sexual activity: Yes     Partners: Male     Other Topics Concern    Not on file     Social History Narrative    Pt was the youngest of 3 boys and 2 girls.  Her father  when she was 3 months old.  She was raised by her mother, mgm, and an uncle.  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  They had 2 daughters and 1 son together, plus 5 stepchildren by her .  They live together in [place] in their own home, with no pets.  Hobbies include gardening and crafts.  Pt attends a variety of organized Yazidism services.            One daughter is  from bone cancer.    One son  2014 after surgery for MIGUEL       FAMILY HISTORY:     Family History   Problem Relation Age of Onset    No Known Problems Daughter     No Known Problems Father     Liver cancer Mother     Bone cancer Daughter     No Known Problems Sister     No Known Problems Brother     No Known Problems Maternal Aunt     No Known Problems Maternal Uncle     No Known Problems Paternal Aunt     No Known Problems Paternal Uncle     No Known Problems Maternal Grandmother     No Known " Problems Maternal Grandfather     No Known Problems Paternal Grandmother     No Known Problems Paternal Grandfather     Amblyopia Neg Hx     Blindness Neg Hx     Cancer Neg Hx     Cataracts Neg Hx     Diabetes Neg Hx     Glaucoma Neg Hx     Hypertension Neg Hx     Macular degeneration Neg Hx     Retinal detachment Neg Hx     Strabismus Neg Hx     Stroke Neg Hx     Thyroid disease Neg Hx     Celiac disease Neg Hx     Cirrhosis Neg Hx     Colon cancer Neg Hx     Colon polyps Neg Hx     Crohn's disease Neg Hx     Cystic fibrosis Neg Hx     Esophageal cancer Neg Hx     Hemochromatosis Neg Hx     Inflammatory bowel disease Neg Hx     Irritable bowel syndrome Neg Hx     Liver disease Neg Hx     Rectal cancer Neg Hx     Stomach cancer Neg Hx     Ulcerative colitis Neg Hx     Montez's disease Neg Hx     Alcohol abuse Neg Hx     Bipolar disorder Neg Hx     Dementia Neg Hx     Depression Neg Hx     Drug abuse Neg Hx     Suicide Neg Hx     Schizophrenia Neg Hx        REVIEW OF SYSTEMS:   Review of Systems   Constitutional: Negative for chills, diaphoresis and fever.   HENT: Negative for nosebleeds.    Eyes: Negative for blurred vision, double vision and photophobia.   Respiratory: Negative for hemoptysis, shortness of breath and wheezing.    Cardiovascular: Negative for chest pain, palpitations, orthopnea, claudication, leg swelling and PND.   Gastrointestinal: Negative for abdominal pain, blood in stool, heartburn, melena, nausea and vomiting.   Genitourinary: Negative for flank pain and hematuria.   Musculoskeletal: Negative for falls, myalgias and neck pain.   Skin: Negative for rash.   Neurological: Negative for dizziness, seizures, loss of consciousness, weakness and headaches.   Endo/Heme/Allergies: Negative for polydipsia. Does not bruise/bleed easily.   Psychiatric/Behavioral: Negative for depression and memory loss. The patient is not nervous/anxious.        PHYSICAL EXAM:  "    Vitals:    10/06/17 1440   BP: (!) 162/63   Pulse: (!) 55   Resp: 15    Body mass index is 32.22 kg/m².  Weight: 82.5 kg (181 lb 14.1 oz)   Height: 5' 3" (160 cm)     Physical Exam   Constitutional: She is oriented to person, place, and time. She appears well-developed and well-nourished. She is cooperative.  Non-toxic appearance. No distress.   HENT:   Head: Normocephalic and atraumatic.   Eyes: Conjunctivae and EOM are normal. Pupils are equal, round, and reactive to light. No scleral icterus.   Neck: Trachea normal and normal range of motion. Neck supple. Normal carotid pulses and no JVD present. Carotid bruit is not present. No neck rigidity. No edema present. No thyromegaly present.   Cardiovascular: Normal rate, S1 normal and S2 normal.  An irregularly irregular rhythm present. PMI is not displaced.  Exam reveals distant heart sounds. Exam reveals no gallop and no friction rub.    No murmur heard.  Pulses:       Carotid pulses are 2+ on the right side, and 2+ on the left side.  Pulmonary/Chest: Effort normal and breath sounds normal. No respiratory distress. She has no wheezes. She has no rales. She exhibits no tenderness.   Abdominal: Soft. Bowel sounds are normal. She exhibits no distension and no mass. There is no hepatosplenomegaly. There is no tenderness.   obese   Musculoskeletal: She exhibits no edema or tenderness.   Feet:   Right Foot:   Skin Integrity: Negative for ulcer.   Left Foot:   Skin Integrity: Negative for ulcer.   Neurological: She is alert and oriented to person, place, and time. No cranial nerve deficit.   Skin: Skin is warm and dry. No rash noted. No erythema.   Psychiatric: She has a normal mood and affect. Her speech is normal and behavior is normal.   Vitals reviewed.      DATA:   EKG: (personally reviewed tracing)  3/28/17 AF VR 65    Laboratory:  CBC:    Recent Labs  Lab 01/18/17  0730 06/06/17  1943 06/07/17  0637   WHITE BLOOD CELL COUNT 7.19 6.60 6.88   HEMOGLOBIN 11.1 L 11.2 " L 11.6 L   HEMATOCRIT 34.1 L 32.5 L 34.0 L   PLATELETS 273 244 260       CHEMISTRIES:    Recent Labs  Lab 01/13/17  1325  02/10/17  0810  06/07/17  0637 06/08/17  0353 06/09/17  0442 06/10/17  0425   GLUCOSE 144 H  < > 134 H  < > 44 LL  44 LL 76 106 124 H   SODIUM 139  < > 126 L  < > 137  137 136 134 L 137   POTASSIUM 4.0  < > 4.1  < > 3.6  3.6 4.0 4.0 4.3   BUN BLD 24 H  < > 30 H  < > 24 H  24 H 19 14 15   CREATININE 1.0  < > 1.5 H  < > 1.8 H  1.8 H 1.5 H 1.3 1.2   EGFR IF  >60.0  < > 39.3 A  < > 31.5 A  31.5 A 39.3 A 46.7 A 51.4 A   EGFR IF NON- 55.6 A  < > 34.1 A  < > 27.3 A  27.3 A 34.1 A 40.5 A 44.6 A   CALCIUM 9.7  < > 9.8  < > 9.8  9.8 9.3 8.5 L 9.0   MAGNESIUM 1.8  --  2.0  --  1.8  --   --   --    < > = values in this interval not displayed.    CARDIAC BIOMARKERS:    Recent Labs  Lab 05/28/16  1031 05/28/16  1744 05/29/16  0145   CPK  --   --  49  49   CPK MB  --   --  2.2   TROPONIN I <0.006 0.009 <0.006       COAGS:        LIPIDS/LFTS:    Recent Labs  Lab 04/02/15  0807  12/11/15  0830  06/08/17  0353 06/09/17  0442 06/10/17  0425   CHOLESTEROL 203 H  203 H  203 H  --  155  --   --   --   --    TRIGLYCERIDES 118  118  118  --  118  --   --   --   --    HDL 40  40  40  --  44  --   --   --   --    LDL CHOLESTEROL 139.4  139.4  139.4  --  87.4  --   --   --   --    NON-HDL CHOLESTEROL 163  163  163  --  111  --   --   --   --    AST 18  18  < > 19  < > 19 16 13   ALT 14  14  < > 13  < > 10 9 L 7 L   < > = values in this interval not displayed.    Lab Results   Component Value Date    TSH 2.263 06/06/2017         Cardiovascular Testing:  [facility] 1/18/17  B. Summary/Post-Operative Diagnosis   1.   Moderately elevated right atrial pressure.   2.   Moderately elevated mean PCWP pressure.   3.   Moderately elevated mean PA pressure with normal PVR.   4.   Normal CO/CI.   5.   Systemic vascular resistance 1192.  D. Hemodynamic Results  AOPRES: 135/68 (91)  AOSAT:  100  FICKCI: 2.76  FICKCO: 5.18  PAPRES: 60/25 (37)  PASAT: 68  PVR: 1.93  PWPRES: 23/42 (27)  Unable to draw blood for saturation but confirmed on fluoroscopy  RAPRES: 16/16 (14)  RVPRES: 60/15, 15  SVR: 14.88/1192    Echo: 5/28/16     1 - Normal left ventricular systolic function (EF 60-65%).  No diagnostic regional wall motion abnormalities.     2 - Biatrial enlargement.     3 - Trivial mitral regurgitation.     4 - Trivial tricuspid regurgitation.     5 - The estimated PA systolic pressure is 18 mmHg.    ASSESSMENT:   # AF, CHADS 4, HR controlled, currently on Xarelto 20mg qd.  # DM  # HTN, controlled  # HFpEF.  Pt appears euvolemic off lasix at present.  # BMI 32.  # MIGUEL, pt noncompliant with CPAP  # HLP, multiple prior statin intolerances noted    PLAN:   Continue strategy of rate control and anticoag   Cont med rx  RTC 6 months  Resume lasix prn weight gain/LE edema/dyspnea    Driss Dixon MD, FACC

## 2017-10-30 ENCOUNTER — OFFICE VISIT (OUTPATIENT)
Dept: FAMILY MEDICINE | Facility: CLINIC | Age: 77
End: 2017-10-30
Payer: MEDICARE

## 2017-10-30 VITALS
BODY MASS INDEX: 35.13 KG/M2 | OXYGEN SATURATION: 96 % | DIASTOLIC BLOOD PRESSURE: 60 MMHG | WEIGHT: 186.06 LBS | RESPIRATION RATE: 16 BRPM | HEIGHT: 61 IN | TEMPERATURE: 98 F | SYSTOLIC BLOOD PRESSURE: 140 MMHG | HEART RATE: 72 BPM

## 2017-10-30 DIAGNOSIS — R80.9 TYPE 2 DIABETES MELLITUS WITH MICROALBUMINURIA, WITH LONG-TERM CURRENT USE OF INSULIN: Primary | ICD-10-CM

## 2017-10-30 DIAGNOSIS — L21.9 SEBORRHEIC DERMATITIS OF SCALP: ICD-10-CM

## 2017-10-30 DIAGNOSIS — E11.29 TYPE 2 DIABETES MELLITUS WITH MICROALBUMINURIA, WITH LONG-TERM CURRENT USE OF INSULIN: Primary | ICD-10-CM

## 2017-10-30 DIAGNOSIS — Z23 NEED FOR PROPHYLACTIC VACCINATION AND INOCULATION AGAINST INFLUENZA: ICD-10-CM

## 2017-10-30 DIAGNOSIS — K21.9 GASTROESOPHAGEAL REFLUX DISEASE, ESOPHAGITIS PRESENCE NOT SPECIFIED: ICD-10-CM

## 2017-10-30 DIAGNOSIS — I48.20 CHRONIC ATRIAL FIBRILLATION: ICD-10-CM

## 2017-10-30 DIAGNOSIS — Z79.4 TYPE 2 DIABETES MELLITUS WITH MICROALBUMINURIA, WITH LONG-TERM CURRENT USE OF INSULIN: Primary | ICD-10-CM

## 2017-10-30 DIAGNOSIS — G47.33 OSA (OBSTRUCTIVE SLEEP APNEA): ICD-10-CM

## 2017-10-30 PROCEDURE — G0008 ADMIN INFLUENZA VIRUS VAC: HCPCS | Mod: S$GLB,,, | Performed by: INTERNAL MEDICINE

## 2017-10-30 PROCEDURE — 99214 OFFICE O/P EST MOD 30 MIN: CPT | Mod: S$GLB,,, | Performed by: INTERNAL MEDICINE

## 2017-10-30 PROCEDURE — 90662 IIV NO PRSV INCREASED AG IM: CPT | Mod: S$GLB,,, | Performed by: INTERNAL MEDICINE

## 2017-10-30 PROCEDURE — 99999 PR PBB SHADOW E&M-EST. PATIENT-LVL III: CPT | Mod: PBBFAC,,, | Performed by: INTERNAL MEDICINE

## 2017-10-30 PROCEDURE — 99499 UNLISTED E&M SERVICE: CPT | Mod: S$GLB,,, | Performed by: INTERNAL MEDICINE

## 2017-10-30 RX ORDER — KETOCONAZOLE 20 MG/ML
SHAMPOO, SUSPENSION TOPICAL
Qty: 120 ML | Refills: 1 | Status: SHIPPED | OUTPATIENT
Start: 2017-10-30 | End: 2019-04-04

## 2017-10-30 RX ORDER — TRAZODONE HYDROCHLORIDE 50 MG/1
TABLET ORAL
COMMUNITY
Start: 2017-08-07 | End: 2018-01-29

## 2017-10-30 RX ORDER — ACETAMINOPHEN, DIPHENHYDRAMINE HCL, PHENYLEPHRINE HCL 325; 25; 5 MG/1; MG/1; MG/1
TABLET ORAL
Status: ON HOLD | COMMUNITY
End: 2019-11-21 | Stop reason: HOSPADM

## 2017-10-30 NOTE — PROGRESS NOTES
"Subjective:       Patient ID: Kaci Whalen is a 77 y.o. female.    Chief Complaint: Diabetes and Follow-up    F/u chronic conditions    HPI: 76 y/o w/ DM, HTN Afib presents with daughter for scheduled follow up. Was admitted for brief manic period thought to be related to tramadol four months ago. She is doing well. Still uisng wheelchair outside of home and rollator inside house. No syncope. Does feel legs are "weak" when standing for prolonged periods. Sleep good. Her primary complaint is decrease hair growth occasional itchign scalp. She also reports some cough after eating or when lying down no dysphagia. Had treatment earlier this year for h pylori follow up stool antigen was  negative      Review of Systems   Constitutional: Negative for activity change, appetite change, fatigue, fever and unexpected weight change.   HENT: Negative for ear pain, rhinorrhea and sore throat.    Eyes: Negative for discharge and visual disturbance.   Respiratory: Negative for chest tightness, shortness of breath and wheezing.    Cardiovascular: Negative for chest pain, palpitations and leg swelling.   Gastrointestinal: Negative for abdominal pain, constipation and diarrhea.   Endocrine: Negative for cold intolerance and heat intolerance.   Genitourinary: Negative for dysuria and hematuria.   Musculoskeletal: Negative for joint swelling and neck stiffness.   Skin: Negative for rash.   Neurological: Negative for dizziness, syncope, weakness and headaches.   Psychiatric/Behavioral: Negative for suicidal ideas.       Objective:     Vitals:    10/30/17 1536   BP: (!) 140/60   BP Location: Left arm   Patient Position: Sitting   BP Method: Medium (Manual)   Pulse: 72   Resp: 16   Temp: 97.7 °F (36.5 °C)   TempSrc: Oral   SpO2: 96%   Weight: 84.4 kg (186 lb 1.1 oz)   Height: 5' 1" (1.549 m)          Physical Exam   Constitutional: She is oriented to person, place, and time. She appears well-developed and well-nourished.   Obese " sitting in wheelchair full affect, answers questions appropriately   HENT:   Head: Normocephalic and atraumatic.   Eyes: Conjunctivae are normal. Pupils are equal, round, and reactive to light.   Neck: Normal range of motion.   Cardiovascular: Normal rate and regular rhythm.  Exam reveals no gallop and no friction rub.    No murmur heard.  Pulmonary/Chest: Effort normal and breath sounds normal. She has no wheezes. She has no rales.   Abdominal: Soft. Bowel sounds are normal. There is no tenderness. There is no rebound and no guarding.   Musculoskeletal: Normal range of motion. She exhibits no edema or tenderness.   Neurological: She is alert and oriented to person, place, and time. No cranial nerve deficit.   Skin: Skin is warm and dry.   Scalp shows areas of flaking at follicle with minimal erythema of occiput no ulceration   Psychiatric: She has a normal mood and affect.       Assessment:       1. Type 2 diabetes mellitus with microalbuminuria, with long-term current use of insulin    2. MIGUEL (obstructive sleep apnea) unable to afford CPAP    3. Chronic atrial fibrillation    4. Seborrheic dermatitis of scalp    5. Gastroesophageal reflux disease, esophagitis presence not specified    6. Need for prophylactic vaccination and inoculation against influenza        Plan:    1. Well controlled based on home reading scontinue metformin and sliding scale insulin    2. Intermittent use of cpap encourage compliance    3. Rate controlled on noac no evidence of bleeding continue    4. Topical ketoconazole    5. h2 blocker nightly no alarm features    6. Flu vaccine today

## 2017-11-08 ENCOUNTER — OFFICE VISIT (OUTPATIENT)
Dept: PODIATRY | Facility: CLINIC | Age: 77
End: 2017-11-08
Payer: MEDICARE

## 2017-11-08 VITALS — HEIGHT: 61 IN | WEIGHT: 186 LBS | BODY MASS INDEX: 35.12 KG/M2

## 2017-11-08 DIAGNOSIS — E11.49 TYPE II DIABETES MELLITUS WITH NEUROLOGICAL MANIFESTATIONS: Primary | ICD-10-CM

## 2017-11-08 DIAGNOSIS — B35.1 ONYCHOMYCOSIS DUE TO DERMATOPHYTE: ICD-10-CM

## 2017-11-08 DIAGNOSIS — R60.0 BILATERAL LOWER EXTREMITY EDEMA: ICD-10-CM

## 2017-11-08 DIAGNOSIS — R20.0 NUMBNESS OF TOES: ICD-10-CM

## 2017-11-08 DIAGNOSIS — L85.3 XEROSIS CUTIS: ICD-10-CM

## 2017-11-08 DIAGNOSIS — L84 CORN OR CALLUS: ICD-10-CM

## 2017-11-08 PROCEDURE — 99999 PR PBB SHADOW E&M-EST. PATIENT-LVL III: CPT | Mod: PBBFAC,,, | Performed by: PODIATRIST

## 2017-11-08 PROCEDURE — 11057 PARNG/CUTG B9 HYPRKR LES >4: CPT | Mod: Q9,S$GLB,, | Performed by: PODIATRIST

## 2017-11-08 PROCEDURE — 99499 UNLISTED E&M SERVICE: CPT | Mod: S$GLB,,, | Performed by: PODIATRIST

## 2017-11-08 PROCEDURE — 11721 DEBRIDE NAIL 6 OR MORE: CPT | Mod: 59,Q9,S$GLB, | Performed by: PODIATRIST

## 2017-11-10 NOTE — PROGRESS NOTES
Subjective:      Patient ID: Kaci Whalen is a 77 y.o. female.    Chief Complaint: Diabetic Foot Exam (Pcp Dr. Hung 10/30/2017); Diabetes Mellitus; and Nail Care    Kaci is a 77 y.o. female who presents to the clinic for evaluation and treatment of diabetic feet. Kaci has a past medical history of Abdominal pain; Anxiety; Atherosclerosis of aortic arch; Atrial fibrillation (05/2016); Benign hypertension; Chronic constipation; Chronic diarrhea; Chronic edema; Depression; Dercum disease; Dysphagia; Gas pain; Glaucoma of both eyes; psychiatric care; Hyperlipidemia with target LDL less than 100; Immature cataract; Tasia; Morbid obesity; Nausea & vomiting; Neuropathy; Polyneuropathy; Primary osteoarthritis of both knees; Proteinuria; Psychiatric problem; Pulmonary hypertension mixed group 2 and 3 (1/18/2017); Sleep apnea; Therapy; Type II or unspecified type diabetes mellitus with neurological manifestations, uncontrolled(250.62); and Unspecified vitamin D deficiency. Patient relates no major problem with feet. Only complaints today consist of toenails in need of trimming. Calluses on bottom of feet are doing much better with moisturizer daily but still present.  No other complaints today. Previous trimming helped. Wearing DM shoes which help.     PCP: Toby Hung MD    Date Last Seen by PCP:   Chief Complaint   Patient presents with    Diabetic Foot Exam     Pcp Dr. Hung 10/30/2017    Diabetes Mellitus    Nail Care         Current shoe gear: Extra depth shoes    Hemoglobin A1C   Date Value Ref Range Status   06/07/2017 6.2 4.5 - 6.2 % Final     Comment:     According to ADA guidelines, hemoglobin A1C <7.0% represents  optimal control in non-pregnant diabetic patients.  Different  metrics may apply to specific populations.   Standards of Medical Care in Diabetes - 2016.  For the purpose of screening for the presence of diabetes:  <5.7%     Consistent with the absence of diabetes  5.7-6.4%  Consistent  with increasing risk for diabetes   (prediabetes)  >or=6.5%  Consistent with diabetes  Currently no consensus exists for use of hemoglobin A1C  for diagnosis of diabetes for children.     12/28/2016 6.9 (H) 4.5 - 6.2 % Final     Comment:     According to ADA guidelines, hemoglobin A1C <7.0% represents  optimal control in non-pregnant diabetic patients.  Different  metrics may apply to specific populations.   Standards of Medical Care in Diabetes - 2016.  For the purpose of screening for the presence of diabetes:  <5.7%     Consistent with the absence of diabetes  5.7-6.4%  Consistent with increasing risk for diabetes   (prediabetes)  >or=6.5%  Consistent with diabetes  Currently no consensus exists for use of hemoglobin A1C  for diagnosis of diabetes for children.     09/02/2016 7.1 (H) 4.5 - 6.2 % Final     Comment:     According to ADA guidelines, hemoglobin A1C <7.0% represents  optimal control in non-pregnant diabetic patients.  Different  metrics may apply to specific populations.   Standards of Medical Care in Diabetes - 2016.  For the purpose of screening for the presence of diabetes:  <5.7%     Consistent with the absence of diabetes  5.7-6.4%  Consistent with increasing risk for diabetes   (prediabetes)  >or=6.5%  Consistent with diabetes  Currently no consensus exists for use of hemoglobin A1C  for diagnosis of diabetes for children.         Past Medical History:   Diagnosis Date    Abdominal pain     Anxiety     Atherosclerosis of aortic arch     noted on CXR 7/1/2015    Atrial fibrillation 05/2016    CHADS 4, on Xarelto    Benign hypertension     Chronic constipation     Chronic diarrhea     Chronic edema     Depression     Dercum disease     Multiple painful lipomas    Dysphagia     Gas pain     Glaucoma of both eyes     Hx of psychiatric care     previously amitriptyline, now jut on Trazodone 100mg QHS PRN insomnia    Hyperlipidemia with target LDL less than 100     Unable to tolerate  statins    Immature cataract     Tasia     no symptoms prior to this presentation    Morbid obesity     Nausea & vomiting     Neuropathy     Polyneuropathy     Primary osteoarthritis of both knees     Proteinuria     Psychiatric problem     history of depression    Pulmonary hypertension mixed group 2 and 3 1/18/2017    Sleep apnea     Therapy     no history of therapy    Type II or unspecified type diabetes mellitus with neurological manifestations, uncontrolled(250.62)     Unspecified vitamin D deficiency        Past Surgical History:   Procedure Laterality Date     tenotomy      flexors 2,3 L toes    CATARACT EXTRACTION      COLONOSCOPY N/A 5/4/2017    Procedure: COLONOSCOPY;  Surgeon: Suellen Wolf MD;  Location: [facility] (2ND FLR);  Service: Endoscopy;  Laterality: N/A;  2nd floor due to pulm htn, possible gastroparesis. per Dr Wolf      ok to hold Xarelto 2 days prior to procedure per Dr Driss Dixon    diabetic retinopathy      right    HYSTERECTOMY      knee surgery x2      Left ankle and leg surgery secondary to a fall      left arm fracture      Left cataract      PARTIAL HYSTERECTOMY      Secondary to bleeding    TOE FUSION      2,3 R       Family History   Problem Relation Age of Onset    No Known Problems Daughter     No Known Problems Father     Liver cancer Mother     Bone cancer Daughter     No Known Problems Sister     No Known Problems Brother     No Known Problems Maternal Aunt     No Known Problems Maternal Uncle     No Known Problems Paternal Aunt     No Known Problems Paternal Uncle     No Known Problems Maternal Grandmother     No Known Problems Maternal Grandfather     No Known Problems Paternal Grandmother     No Known Problems Paternal Grandfather     Amblyopia Neg Hx     Blindness Neg Hx     Cancer Neg Hx     Cataracts Neg Hx     Diabetes Neg Hx     Glaucoma Neg Hx     Hypertension Neg Hx     Macular degeneration Neg Hx     Retinal  detachment Neg Hx     Strabismus Neg Hx     Stroke Neg Hx     Thyroid disease Neg Hx     Celiac disease Neg Hx     Cirrhosis Neg Hx     Colon cancer Neg Hx     Colon polyps Neg Hx     Crohn's disease Neg Hx     Cystic fibrosis Neg Hx     Esophageal cancer Neg Hx     Hemochromatosis Neg Hx     Inflammatory bowel disease Neg Hx     Irritable bowel syndrome Neg Hx     Liver disease Neg Hx     Rectal cancer Neg Hx     Stomach cancer Neg Hx     Ulcerative colitis Neg Hx     Montez's disease Neg Hx     Alcohol abuse Neg Hx     Bipolar disorder Neg Hx     Dementia Neg Hx     Depression Neg Hx     Drug abuse Neg Hx     Suicide Neg Hx     Schizophrenia Neg Hx        Social History     Social History    Marital status:      Spouse name: N/A    Number of children: 3    Years of education: 14     Occupational History    housewife      Social History Main Topics    Smoking status: Never Smoker    Smokeless tobacco: Never Used    Alcohol use No    Drug use: No    Sexual activity: Yes     Partners: Male     Other Topics Concern    None     Social History Narrative    Pt was the youngest of 3 boys and 2 girls.  Her father  when she was 3 months old.  She was raised by her mother, m, and an uncle.  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  They had 2 daughters and 1 son together, plus 5 stepchildren by her .  They live together in [place] in their own home, with no pets.  Hobbies include gardening and crafts.  Pt attends a variety of organized [employer] services.            One daughter is  from bone cancer.    One son  2014 after surgery for MIGUEL       Current Outpatient Prescriptions   Medication Sig Dispense Refill    alcohol swabs PadM Apply 1 each topically as needed.  0    blood glucose control, low (TRUE METRIX LEVEL 1) Soln 1 Bottle by Misc.(Non-Drug; Combo Route) route once daily. 1  "each 2    blood-glucose meter kit Use as instructed 1 each 0    hydrochlorothiazide (HYDRODIURIL) 25 MG tablet Take 1 tablet (25 mg total) by mouth once daily. 90 tablet 3    ketoconazole (NIZORAL) 2 % shampoo Apply topically twice a week. Leave on scalp for five minutes then rinse 120 mL 1    lancets (LANCETS,THIN) 28 gauge Misc 1 lancet by Misc.(Non-Drug; Combo Route) route 4 (four) times daily before meals and nightly. 100 each 2    losartan (COZAAR) 50 MG tablet Take 1 tablet (50 mg total) by mouth once daily. 90 tablet 3    melatonin 10 mg Tab Take by mouth. 2 tabs at night      melatonin 5 mg Subl Place 1 tablet under the tongue every evening.       metformin (GLUCOPHAGE) 500 MG tablet Take 1 tablet (500 mg total) by mouth 3 (three) times daily. 270 tablet 3    metoprolol succinate (TOPROL-XL) 25 MG 24 hr tablet Take 1 tablet (25 mg total) by mouth once daily. 90 tablet 2    pen needle, diabetic (BD ULTRA-FINE SANDEEP PEN NEEDLES) 32 gauge x 5/32" Ndle Take 1 each by mouth 4 (four) times daily. 120 each 2    psyllium (METAMUCIL) packet Take 1 packet by mouth once daily.  12    ranitidine (ZANTAC) 300 MG capsule Take 1 capsule (300 mg total) by mouth every evening. 90 capsule 2    rivaroxaban (XARELTO) 20 mg Tab Take 1 tablet (20 mg total) by mouth daily with dinner or evening meal. 90 tablet 3    traZODone (DESYREL) 50 MG tablet       TRUE METRIX GLUCOSE TEST STRIP Strp TEST FOUR TIMES DAILY BEFORE MEALS  AND EVERY NIGHT 400 strip 2     No current facility-administered medications for this visit.        Review of patient's allergies indicates:   Allergen Reactions    Ace inhibitors      Other reaction(s): cough      Fluorescein Itching     Other reaction(s): Itching    Iodine      Other reaction(s): Itching    Pravastatin Other (See Comments)     Other reaction(s): mouth tingling/numbness    Simvastatin      Other reaction(s): foot swelling           Review of Systems   Constitution: Negative " for chills and fever.   Cardiovascular: Positive for leg swelling. Negative for chest pain and claudication.   Respiratory: Negative for cough and shortness of breath.    Skin: Positive for dry skin (with multiple calluses) and nail changes.   Musculoskeletal:        Pain along calluses both feet   Gastrointestinal: Negative for nausea and vomiting.   Neurological: Positive for numbness. Negative for paresthesias.   Psychiatric/Behavioral: Negative for altered mental status.           Objective:      Physical Exam  DP 1/4 bl, PT1 /4 bl  Sensation to the 10 g sensory filament is not felt consistently on the feet toes and ball (5/10)  Nails are elongated and mycotic with thickened, discolored, onycholytic and subungual debris changes X10 Nails areapproximately 2   mm thick and    4 mm long.    Callus sub 3 b/l, R hallux IPJ plantar and lateral aspect of b/l 5th toes adjacent to nails (5 lesions total)    Mild pitting edema, skin is atrophic, decreased digital hair, feet slightly cool, nails thickened, mild plantar rubor, Q9 modifier  Skin is intact without wounds, rash, or infection.  Web spaces are clear without maceration    Equinus noted b/l ankles with < 10 deg DF noted. MMT 5/5 in DF/PF/Inv/Ev resistance with no reproduction of pain in any direction. Passive range of motion of ankle and pedal joints is painless b/l. Semi-reducible hammertoe contractures noted to toes 2-4 b/l-asymptomatic. HAV, m  Subjective:      Patient ID: Kaci Whalen is a 77 y.o. female.    Chief Complaint: Diabetic Foot Exam (Pcp Dr. Hung 10/30/2017); Diabetes Mellitus; and Nail Care    Kaci is a 77 y.o. female who presents to the clinic for evaluation and treatment of diabetic feet. Kaci has a past medical history of Abdominal pain; Anxiety; Atherosclerosis of aortic arch; Atrial fibrillation (05/2016); Benign hypertension; Chronic constipation; Chronic diarrhea; Chronic edema; Depression; Dercum disease; Dysphagia; Gas pain;  Glaucoma of both eyes; psychiatric care; Hyperlipidemia with target LDL less than 100; Immature cataract; Tasia; Morbid obesity; Nausea & vomiting; Neuropathy; Polyneuropathy; Primary osteoarthritis of both knees; Proteinuria; Psychiatric problem; Pulmonary hypertension mixed group 2 and 3 (1/18/2017); Sleep apnea; Therapy; Type II or unspecified type diabetes mellitus with neurological manifestations, uncontrolled(250.62); and Unspecified vitamin D deficiency. Patient relates no major problem with feet. Only complaints today consist of toenails in need of trimming. Calluses on bottom of feet are doing much better with moisturizer daily. No other complaints today. Was recently hospitalized for frequent falls and altered mental state. Discharged about a week ago.     PCP: Toby Hung MD    Date Last Seen by PCP:   Chief Complaint   Patient presents with    Diabetic Foot Exam     Pcp Dr. Hung 10/30/2017    Diabetes Mellitus    Nail Care         Current shoe gear: Extra depth shoes    Hemoglobin A1C   Date Value Ref Range Status   06/07/2017 6.2 4.5 - 6.2 % Final     Comment:     According to ADA guidelines, hemoglobin A1C <7.0% represents  optimal control in non-pregnant diabetic patients.  Different  metrics may apply to specific populations.   Standards of Medical Care in Diabetes - 2016.  For the purpose of screening for the presence of diabetes:  <5.7%     Consistent with the absence of diabetes  5.7-6.4%  Consistent with increasing risk for diabetes   (prediabetes)  >or=6.5%  Consistent with diabetes  Currently no consensus exists for use of hemoglobin A1C  for diagnosis of diabetes for children.     12/28/2016 6.9 (H) 4.5 - 6.2 % Final     Comment:     According to ADA guidelines, hemoglobin A1C <7.0% represents  optimal control in non-pregnant diabetic patients.  Different  metrics may apply to specific populations.   Standards of Medical Care in Diabetes - 2016.  For the purpose of screening for the  presence of diabetes:  <5.7%     Consistent with the absence of diabetes  5.7-6.4%  Consistent with increasing risk for diabetes   (prediabetes)  >or=6.5%  Consistent with diabetes  Currently no consensus exists for use of hemoglobin A1C  for diagnosis of diabetes for children.     09/02/2016 7.1 (H) 4.5 - 6.2 % Final     Comment:     According to ADA guidelines, hemoglobin A1C <7.0% represents  optimal control in non-pregnant diabetic patients.  Different  metrics may apply to specific populations.   Standards of Medical Care in Diabetes - 2016.  For the purpose of screening for the presence of diabetes:  <5.7%     Consistent with the absence of diabetes  5.7-6.4%  Consistent with increasing risk for diabetes   (prediabetes)  >or=6.5%  Consistent with diabetes  Currently no consensus exists for use of hemoglobin A1C  for diagnosis of diabetes for children.         Past Medical History:   Diagnosis Date    Abdominal pain     Anxiety     Atherosclerosis of aortic arch     noted on CXR 7/1/2015    Atrial fibrillation 05/2016    CHADS 4, on Xarelto    Benign hypertension     Chronic constipation     Chronic diarrhea     Chronic edema     Depression     Dercum disease     Multiple painful lipomas    Dysphagia     Gas pain     Glaucoma of both eyes     Hx of psychiatric care     previously amitriptyline, now jut on Trazodone 100mg QHS PRN insomnia    Hyperlipidemia with target LDL less than 100     Unable to tolerate statins    Immature cataract     Tasia     no symptoms prior to this presentation    Morbid obesity     Nausea & vomiting     Neuropathy     Polyneuropathy     Primary osteoarthritis of both knees     Proteinuria     Psychiatric problem     history of depression    Pulmonary hypertension mixed group 2 and 3 1/18/2017    Sleep apnea     Therapy     no history of therapy    Type II or unspecified type diabetes mellitus with neurological manifestations, uncontrolled(250.62)      Unspecified vitamin D deficiency        Past Surgical History:   Procedure Laterality Date     tenotomy      flexors 2,3 L toes    CATARACT EXTRACTION      COLONOSCOPY N/A 5/4/2017    Procedure: COLONOSCOPY;  Surgeon: Suellen Wolf MD;  Location: [facility] ([address] [place] [address]);  Service: Endoscopy;  Laterality: N/A;  2nd floor due to pulm htn, possible gastroparesis. per Dr Wolf      ok to hold Xarelto 2 days prior to procedure per Dr Driss Dixon    diabetic retinopathy      right    HYSTERECTOMY      knee surgery x2      Left ankle and leg surgery secondary to a fall      left arm fracture      Left cataract      PARTIAL HYSTERECTOMY      Secondary to bleeding    TOE FUSION      2,3 R       Family History   Problem Relation Age of Onset    No Known Problems Daughter     No Known Problems Father     Liver cancer Mother     Bone cancer Daughter     No Known Problems Sister     No Known Problems Brother     No Known Problems Maternal Aunt     No Known Problems Maternal Uncle     No Known Problems Paternal Aunt     No Known Problems Paternal Uncle     No Known Problems Maternal Grandmother     No Known Problems Maternal Grandfather     No Known Problems Paternal Grandmother     No Known Problems Paternal Grandfather     Amblyopia Neg Hx     Blindness Neg Hx     Cancer Neg Hx     Cataracts Neg Hx     Diabetes Neg Hx     Glaucoma Neg Hx     Hypertension Neg Hx     Macular degeneration Neg Hx     Retinal detachment Neg Hx     Strabismus Neg Hx     Stroke Neg Hx     Thyroid disease Neg Hx     Celiac disease Neg Hx     Cirrhosis Neg Hx     Colon cancer Neg Hx     Colon polyps Neg Hx     Crohn's disease Neg Hx     Cystic fibrosis Neg Hx     Esophageal cancer Neg Hx     Hemochromatosis Neg Hx     Inflammatory bowel disease Neg Hx     Irritable bowel syndrome Neg Hx     Liver disease Neg Hx     Rectal cancer Neg Hx     Stomach cancer Neg Hx     Ulcerative colitis Neg Hx      Montez's disease Neg Hx     Alcohol abuse Neg Hx     Bipolar disorder Neg Hx     Dementia Neg Hx     Depression Neg Hx     Drug abuse Neg Hx     Suicide Neg Hx     Schizophrenia Neg Hx        Social History     Social History    Marital status:      Spouse name: N/A    Number of children: 3    Years of education: 14     Occupational History    housewife      Social History Main Topics    Smoking status: Never Smoker    Smokeless tobacco: Never Used    Alcohol use No    Drug use: No    Sexual activity: Yes     Partners: Male     Other Topics Concern    None     Social History Narrative    Pt was the youngest of 3 boys and 2 girls.  Her father  when she was 3 months old.  She was raised by her mother, m, and an uncle.  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  They had 2 daughters and 1 son together, plus 5 stepchildren by her .  They live together in [place] in their own home, with no pets.  Hobbies include gardening and crafts.  Pt attends a variety of organized [employer] services.            One daughter is  from bone cancer.    One son  2014 after surgery for MIGUEL       Current Outpatient Prescriptions   Medication Sig Dispense Refill    alcohol swabs PadM Apply 1 each topically as needed.  0    blood glucose control, low (TRUE METRIX LEVEL 1) Soln 1 Bottle by Misc.(Non-Drug; Combo Route) route once daily. 1 each 2    blood-glucose meter kit Use as instructed 1 each 0    hydrochlorothiazide (HYDRODIURIL) 25 MG tablet Take 1 tablet (25 mg total) by mouth once daily. 90 tablet 3    ketoconazole (NIZORAL) 2 % shampoo Apply topically twice a week. Leave on scalp for five minutes then rinse 120 mL 1    lancets (LANCETS,THIN) 28 gauge Misc 1 lancet by Misc.(Non-Drug; Combo Route) route 4 (four) times daily before meals and nightly. 100 each 2    losartan (COZAAR) 50 MG tablet Take 1 tablet (50  "mg total) by mouth once daily. 90 tablet 3    melatonin 10 mg Tab Take by mouth. 2 tabs at night      melatonin 5 mg Subl Place 1 tablet under the tongue every evening.       metformin (GLUCOPHAGE) 500 MG tablet Take 1 tablet (500 mg total) by mouth 3 (three) times daily. 270 tablet 3    metoprolol succinate (TOPROL-XL) 25 MG 24 hr tablet Take 1 tablet (25 mg total) by mouth once daily. 90 tablet 2    pen needle, diabetic (BD ULTRA-FINE SANDEEP PEN NEEDLES) 32 gauge x 5/32" Ndle Take 1 each by mouth 4 (four) times daily. 120 each 2    psyllium (METAMUCIL) packet Take 1 packet by mouth once daily.  12    ranitidine (ZANTAC) 300 MG capsule Take 1 capsule (300 mg total) by mouth every evening. 90 capsule 2    rivaroxaban (XARELTO) 20 mg Tab Take 1 tablet (20 mg total) by mouth daily with dinner or evening meal. 90 tablet 3    traZODone (DESYREL) 50 MG tablet       TRUE METRIX GLUCOSE TEST STRIP Strp TEST FOUR TIMES DAILY BEFORE MEALS  AND EVERY NIGHT 400 strip 2     No current facility-administered medications for this visit.        Review of patient's allergies indicates:   Allergen Reactions    Ace inhibitors      Other reaction(s): cough      Fluorescein Itching     Other reaction(s): Itching    Iodine      Other reaction(s): Itching    Pravastatin Other (See Comments)     Other reaction(s): mouth tingling/numbness    Simvastatin      Other reaction(s): foot swelling           Review of Systems   Constitution: Negative for chills and fever.   Cardiovascular: Negative for chest pain, claudication and leg swelling.   Respiratory: Negative for cough and shortness of breath.    Skin: Positive for dry skin (with multiple calluses) and nail changes.   Musculoskeletal:        Pain along calluses both feet   Gastrointestinal: Negative for nausea and vomiting.   Neurological: Negative for numbness and paresthesias.   Psychiatric/Behavioral: Negative for altered mental status.           Objective:      Physical " Exam  DP 1/4 bl, PT1 /4 bl  Sensation to the 10 g sensory filament is not felt consistently on the feet toes and ball (5/10)  Nails are elongated and mycotic with thickened, discolored, onycholytic and subungual debris changes X10 Nails areapproximately 2   mm thick and    4 mm long.    Callus sub 3 b/l, R hallux IPJ (3 lesions total)    Mild pitting edema, skin is atrophic, decreased digital hair, feet slightly cool, nails thickened, mild plantar rubor, Q9 modifier  Skin is intact without wounds, rash, or infection.  Web spaces are clear without maceration    Equinus noted b/l ankles with < 10 deg DF noted. MMT 5/5 in DF/PF/Inv/Ev resistance with no reproduction of pain in any direction. Passive range of motion of ankle and pedal joints is painless b/l. Semi-reducible hammertoe contractures noted to toes 2-4 b/l-asymptomatic. HAV, mild, non trackbound noted b/l with mild medial bony prominence at 1st met head--asymptomatic.   Hypermobility noted to 1st ray b/l with near complete collapse of medial longitudinal arch b/l with loading.       Assessment:       Encounter Diagnoses   Name Primary?    Type II diabetes mellitus with neurological manifestations Yes    Bilateral lower extremity edema     Numbness of toes     Corn or callus     Xerosis cutis     Onychomycosis due to dermatophyte          Plan:       Kaci was seen today for diabetic foot exam, diabetes mellitus and nail care.    Diagnoses and all orders for this visit:    Type II diabetes mellitus with neurological manifestations    Bilateral lower extremity edema    Numbness of toes    Corn or callus    Xerosis cutis    Onychomycosis due to dermatophyte      I counseled the patient on her conditions, their implications and medical management.        - Shoe inspection. Diabetic Foot Education. Patient reminded of the importance of good nutrition and blood sugar control to help prevent podiatric complications of diabetes. Patient instructed on proper  foot hygeine. We discussed wearing proper shoe gear, daily foot inspections, never walking without protective shoe gear, never putting sharp instruments to feet, routine podiatric nail visits every 2-3 months.      With patient's permission, nails were aggressively reduced and debrided 1,2,3,4, 5 R and 1,2, 3,4,5 L and filed to their soft tissue attachment mechanically and with electric , removing all offending nail and debris. Utilizing a #15 scalpel, I trimmed the corns and calluses at the plantar 3rd met head b/l, right medial hallux IPJ, lateral 5th toe b/l (total 5 lesions)    Patient tolerated this well and no blood was drawn. Patient reports relief following the procedure.     Discussed regular and routine moisturizer to skin of both feet to help improve dry skin. Advised to apply twice daily until resolution of symptoms. Avoid between toes.     Rx diabetic shoes with custom molded inserts to be worn at all times while ambulating. Continue     Discussed the use of compression stockings b/l to help control edema. Tubigrip applied today. Discussed proper and consistent elevation of lower extremities, above the level of the heart, while at rest, to help control/improve edema.     RTC 3 months, sooner PRN

## 2017-11-16 ENCOUNTER — LAB VISIT (OUTPATIENT)
Dept: LAB | Facility: HOSPITAL | Age: 77
End: 2017-11-16
Attending: INTERNAL MEDICINE
Payer: MEDICARE

## 2017-11-16 DIAGNOSIS — I48.20 CHRONIC ATRIAL FIBRILLATION: ICD-10-CM

## 2017-11-16 DIAGNOSIS — E11.9 TYPE 2 DIABETES MELLITUS WITHOUT COMPLICATION: ICD-10-CM

## 2017-11-16 LAB
ALBUMIN SERPL BCP-MCNC: 3.4 G/DL
ALP SERPL-CCNC: 69 U/L
ALT SERPL W/O P-5'-P-CCNC: 9 U/L
ANION GAP SERPL CALC-SCNC: 5 MMOL/L
AST SERPL-CCNC: 14 U/L
BASOPHILS # BLD AUTO: 0.04 K/UL
BASOPHILS NFR BLD: 0.9 %
BILIRUB SERPL-MCNC: 0.3 MG/DL
BUN SERPL-MCNC: 27 MG/DL
CALCIUM SERPL-MCNC: 9.7 MG/DL
CHLORIDE SERPL-SCNC: 99 MMOL/L
CHOLEST SERPL-MCNC: 189 MG/DL
CHOLEST SERPL-MCNC: 189 MG/DL
CHOLEST/HDLC SERPL: 3.7 {RATIO}
CHOLEST/HDLC SERPL: 3.7 {RATIO}
CO2 SERPL-SCNC: 32 MMOL/L
CREAT SERPL-MCNC: 1.3 MG/DL
DIFFERENTIAL METHOD: ABNORMAL
EOSINOPHIL # BLD AUTO: 0.2 K/UL
EOSINOPHIL NFR BLD: 3.7 %
ERYTHROCYTE [DISTWIDTH] IN BLOOD BY AUTOMATED COUNT: 12.6 %
EST. GFR  (AFRICAN AMERICAN): 46.4 ML/MIN/1.73 M^2
EST. GFR  (NON AFRICAN AMERICAN): 40.2 ML/MIN/1.73 M^2
ESTIMATED AVG GLUCOSE: 140 MG/DL
GLUCOSE SERPL-MCNC: 145 MG/DL
HBA1C MFR BLD HPLC: 6.5 %
HCT VFR BLD AUTO: 32.6 %
HDLC SERPL-MCNC: 51 MG/DL
HDLC SERPL-MCNC: 51 MG/DL
HDLC SERPL: 27 %
HDLC SERPL: 27 %
HGB BLD-MCNC: 10.7 G/DL
IMM GRANULOCYTES # BLD AUTO: 0.01 K/UL
IMM GRANULOCYTES NFR BLD AUTO: 0.2 %
LDLC SERPL CALC-MCNC: 113.4 MG/DL
LDLC SERPL CALC-MCNC: 113.4 MG/DL
LYMPHOCYTES # BLD AUTO: 1.1 K/UL
LYMPHOCYTES NFR BLD: 24.6 %
MCH RBC QN AUTO: 30.1 PG
MCHC RBC AUTO-ENTMCNC: 32.8 G/DL
MCV RBC AUTO: 92 FL
MONOCYTES # BLD AUTO: 0.4 K/UL
MONOCYTES NFR BLD: 9.4 %
NEUTROPHILS # BLD AUTO: 2.8 K/UL
NEUTROPHILS NFR BLD: 61.2 %
NONHDLC SERPL-MCNC: 138 MG/DL
NONHDLC SERPL-MCNC: 138 MG/DL
NRBC BLD-RTO: 0 /100 WBC
PLATELET # BLD AUTO: 245 K/UL
PMV BLD AUTO: 10.2 FL
POTASSIUM SERPL-SCNC: 4.3 MMOL/L
PROT SERPL-MCNC: 6.7 G/DL
RBC # BLD AUTO: 3.55 M/UL
SODIUM SERPL-SCNC: 136 MMOL/L
TRIGL SERPL-MCNC: 123 MG/DL
TRIGL SERPL-MCNC: 123 MG/DL
WBC # BLD AUTO: 4.59 K/UL

## 2017-11-16 PROCEDURE — 83036 HEMOGLOBIN GLYCOSYLATED A1C: CPT

## 2017-11-16 PROCEDURE — 80061 LIPID PANEL: CPT

## 2017-11-16 PROCEDURE — 36415 COLL VENOUS BLD VENIPUNCTURE: CPT | Mod: PO

## 2017-11-16 PROCEDURE — 80053 COMPREHEN METABOLIC PANEL: CPT

## 2017-11-16 PROCEDURE — 85025 COMPLETE CBC W/AUTO DIFF WBC: CPT

## 2017-12-20 RX ORDER — RIVAROXABAN 20 MG/1
TABLET, FILM COATED ORAL
Qty: 90 TABLET | Refills: 3 | Status: SHIPPED | OUTPATIENT
Start: 2017-12-20 | End: 2017-12-21 | Stop reason: SDUPTHER

## 2017-12-21 NOTE — TELEPHONE ENCOUNTER
----- Message from Chivo Apodaca sent at 12/20/2017  4:37 PM CST -----  Contact: alla /Liliam [phone]  Calling to speak with nurse regarding high blood pressure medication. [facility] on [address] states refill exp. Pt out of medication

## 2017-12-21 NOTE — TELEPHONE ENCOUNTER
----- Message from Chivo Apodaca sent at 12/20/2017  4:37 PM CST -----  Contact: alla /Liliam [phone]  Calling to speak with nurse regarding high blood pressure medication. [facility] on [address] states refill exp. Pt out of medication

## 2018-01-29 ENCOUNTER — OFFICE VISIT (OUTPATIENT)
Dept: FAMILY MEDICINE | Facility: CLINIC | Age: 78
End: 2018-01-29
Payer: MEDICARE

## 2018-01-29 VITALS
HEIGHT: 61 IN | TEMPERATURE: 98 F | OXYGEN SATURATION: 98 % | DIASTOLIC BLOOD PRESSURE: 64 MMHG | SYSTOLIC BLOOD PRESSURE: 120 MMHG | HEART RATE: 62 BPM | RESPIRATION RATE: 16 BRPM

## 2018-01-29 DIAGNOSIS — Z79.4 TYPE 2 DIABETES MELLITUS WITH MILD NONPROLIFERATIVE RETINOPATHY WITHOUT MACULAR EDEMA, WITH LONG-TERM CURRENT USE OF INSULIN, UNSPECIFIED LATERALITY: ICD-10-CM

## 2018-01-29 DIAGNOSIS — E66.01 MORBID OBESITY: ICD-10-CM

## 2018-01-29 DIAGNOSIS — I48.20 CHRONIC ATRIAL FIBRILLATION: ICD-10-CM

## 2018-01-29 DIAGNOSIS — E11.3299 TYPE 2 DIABETES MELLITUS WITH MILD NONPROLIFERATIVE RETINOPATHY WITHOUT MACULAR EDEMA, WITH LONG-TERM CURRENT USE OF INSULIN, UNSPECIFIED LATERALITY: ICD-10-CM

## 2018-01-29 DIAGNOSIS — E11.42 TYPE 2 DIABETES MELLITUS WITH DIABETIC POLYNEUROPATHY, WITH LONG-TERM CURRENT USE OF INSULIN: Primary | ICD-10-CM

## 2018-01-29 DIAGNOSIS — Z79.4 TYPE 2 DIABETES MELLITUS WITH DIABETIC POLYNEUROPATHY, WITH LONG-TERM CURRENT USE OF INSULIN: Primary | ICD-10-CM

## 2018-01-29 PROCEDURE — 99999 PR PBB SHADOW E&M-EST. PATIENT-LVL III: CPT | Mod: PBBFAC,,, | Performed by: INTERNAL MEDICINE

## 2018-01-29 PROCEDURE — 99214 OFFICE O/P EST MOD 30 MIN: CPT | Mod: S$GLB,,, | Performed by: INTERNAL MEDICINE

## 2018-01-29 PROCEDURE — 99499 UNLISTED E&M SERVICE: CPT | Mod: S$GLB,,, | Performed by: INTERNAL MEDICINE

## 2018-01-29 RX ORDER — INSULIN ASPART 100 [IU]/ML
INJECTION, SUSPENSION SUBCUTANEOUS
COMMUNITY
End: 2018-04-30 | Stop reason: SDUPTHER

## 2018-01-29 NOTE — PROGRESS NOTES
"Subjective:       Patient ID: Kaci Whalen is a 77 y.o. female.    Chief Complaint: Diabetes (3 month followup)    F/u chronic conditions    HPI: 76 y/o w/ afib on NOAC, MIGUEL (not using CPAP) HTN and DM presents with daughter for scheduled follow up. Feels well blood glucose a.m. Fasting range  no hypoglycemia. Only using insulin if blood glucose > 130,. She has history of retinopathy is overdue for DFE      Review of Systems   Constitutional: Negative for activity change, appetite change, fatigue, fever and unexpected weight change.   HENT: Negative for ear pain, rhinorrhea and sore throat.    Eyes: Negative for discharge and visual disturbance.   Respiratory: Negative for chest tightness, shortness of breath and wheezing.    Cardiovascular: Negative for chest pain, palpitations and leg swelling.   Gastrointestinal: Negative for abdominal pain, constipation and diarrhea.   Endocrine: Negative for cold intolerance and heat intolerance.   Genitourinary: Negative for dysuria and hematuria.   Musculoskeletal: Negative for joint swelling and neck stiffness.   Skin: Negative for rash.   Neurological: Negative for dizziness, syncope, weakness and headaches.   Psychiatric/Behavioral: Negative for suicidal ideas.       Objective:     Vitals:    01/29/18 1009   BP: 120/64   Pulse: 62   Resp: 16   Temp: 98.1 °F (36.7 °C)   TempSrc: Oral   SpO2: 98%   Height: 5' 1" (1.549 m)          Physical Exam   Constitutional: She is oriented to person, place, and time. She appears well-developed and well-nourished.   HENT:   Head: Normocephalic and atraumatic.   Eyes: Conjunctivae are normal.   Neck: Normal range of motion.   Cardiovascular: Normal rate.  Exam reveals no gallop and no friction rub.    No murmur heard.  No LE edema. Irregularly irregular rate in 60's   Pulmonary/Chest: Effort normal and breath sounds normal. She has no wheezes. She has no rales.   Abdominal: Soft. Bowel sounds are normal. There is no tenderness. " There is no rebound and no guarding.   Musculoskeletal: Normal range of motion. She exhibits no edema or tenderness.   Neurological: She is alert and oriented to person, place, and time. No cranial nerve deficit.   Skin: Skin is warm and dry.   Psychiatric: She has a normal mood and affect.       Assessment:       1. Type 2 diabetes mellitus with diabetic polyneuropathy, with long-term current use of insulin    2. Type 2 diabetes mellitus with mild nonproliferative retinopathy without macular edema, with long-term current use of insulin, unspecified laterality    3. Chronic atrial fibrillation    4. Morbid obesity        Plan:        1/2. Glucose at goal based on home readings intolerant of all statins, BP at goal referal for DFE . Will plan repeat a1c at follow up in three months    3. Rate controlled on noac, continue follow up with cards    4. The patient is asked to make an attempt to improve diet and exercise patterns to aid in medical management of this problem.

## 2018-02-07 ENCOUNTER — OFFICE VISIT (OUTPATIENT)
Dept: PODIATRY | Facility: CLINIC | Age: 78
End: 2018-02-07
Payer: MEDICARE

## 2018-02-07 VITALS
SYSTOLIC BLOOD PRESSURE: 146 MMHG | WEIGHT: 186.06 LBS | HEIGHT: 61 IN | BODY MASS INDEX: 35.13 KG/M2 | DIASTOLIC BLOOD PRESSURE: 70 MMHG

## 2018-02-07 DIAGNOSIS — B35.1 ONYCHOMYCOSIS DUE TO DERMATOPHYTE: ICD-10-CM

## 2018-02-07 DIAGNOSIS — L84 CORN OR CALLUS: ICD-10-CM

## 2018-02-07 DIAGNOSIS — R60.0 BILATERAL LOWER EXTREMITY EDEMA: ICD-10-CM

## 2018-02-07 DIAGNOSIS — L85.3 XEROSIS CUTIS: ICD-10-CM

## 2018-02-07 DIAGNOSIS — E11.49 TYPE II DIABETES MELLITUS WITH NEUROLOGICAL MANIFESTATIONS: Primary | ICD-10-CM

## 2018-02-07 DIAGNOSIS — R20.0 NUMBNESS OF TOES: ICD-10-CM

## 2018-02-07 PROCEDURE — 11057 PARNG/CUTG B9 HYPRKR LES >4: CPT | Mod: Q9,S$GLB,, | Performed by: PODIATRIST

## 2018-02-07 PROCEDURE — 99499 UNLISTED E&M SERVICE: CPT | Mod: S$GLB,,, | Performed by: PODIATRIST

## 2018-02-07 PROCEDURE — 99999 PR PBB SHADOW E&M-EST. PATIENT-LVL III: CPT | Mod: PBBFAC,,, | Performed by: PODIATRIST

## 2018-02-07 PROCEDURE — 11721 DEBRIDE NAIL 6 OR MORE: CPT | Mod: 59,Q9,S$GLB, | Performed by: PODIATRIST

## 2018-02-07 NOTE — PROGRESS NOTES
Subjective:      Patient ID: Kaci Whalen is a 77 y.o. female.    Chief Complaint: Diabetes Mellitus (pcp Abhilash/ 1-29-18); Diabetic Foot Exam; and Nail Care    Kaci is a 77 y.o. female who presents to the clinic for evaluation and treatment of diabetic feet. Kaci has a past medical history of Abdominal pain; Anxiety; Atherosclerosis of aortic arch; Atrial fibrillation (05/2016); Benign hypertension; Chronic constipation; Chronic diarrhea; Chronic edema; Depression; Dercum disease; Dysphagia; Gas pain; Glaucoma of both eyes; psychiatric care; Hyperlipidemia with target LDL less than 100; Immature cataract; Tasia; Morbid obesity; Nausea & vomiting; Neuropathy; Polyneuropathy; Primary osteoarthritis of both knees; Proteinuria; Psychiatric problem; Pulmonary hypertension mixed group 2 and 3 (1/18/2017); Sleep apnea; Therapy; Type II or unspecified type diabetes mellitus with neurological manifestations, uncontrolled(250.62); and Unspecified vitamin D deficiency. Patient relates no major problem with feet. Only complaints today consist of toenails in need of trimming. Also has multiple calluses on bottom of feet which are doing much better with moisturizer daily but still present.  No other complaints today. Routine trimming helps. Wearing DM shoes which help.     PCP: Toby Hung MD    Date Last Seen by PCP:   Chief Complaint   Patient presents with    Diabetes Mellitus     pcp Abhilash/ 1-29-18    Diabetic Foot Exam    Nail Care         Current shoe gear: Extra depth shoes    Hemoglobin A1C   Date Value Ref Range Status   11/16/2017 6.5 (H) 4.0 - 5.6 % Final     Comment:     According to ADA guidelines, hemoglobin A1c <7.0% represents  optimal control in non-pregnant diabetic patients. Different  metrics may apply to specific patient populations.   Standards of Medical Care in Diabetes-2016.  For the purpose of screening for the presence of diabetes:  <5.7%     Consistent with the absence of  diabetes  5.7-6.4%  Consistent with increasing risk for diabetes   (prediabetes)  >or=6.5%  Consistent with diabetes  Currently, no consensus exists for use of hemoglobin A1c  for diagnosis of diabetes for children.  This Hemoglobin A1c assay has significant interference with fetal   hemoglobin   (HbF). The results are invalid for patients with abnormal amounts of   HbF,   including those with known Hereditary Persistence   of Fetal Hemoglobin. Heterozygous hemoglobin variants (HbAS, HbAC,   HbAD, HbAE, HbA2) do not significantly interfere with this assay;   however, presence of multiple variants in a sample may impact the %   interference.     06/07/2017 6.2 4.5 - 6.2 % Final     Comment:     According to ADA guidelines, hemoglobin A1C <7.0% represents  optimal control in non-pregnant diabetic patients.  Different  metrics may apply to specific populations.   Standards of Medical Care in Diabetes - 2016.  For the purpose of screening for the presence of diabetes:  <5.7%     Consistent with the absence of diabetes  5.7-6.4%  Consistent with increasing risk for diabetes   (prediabetes)  >or=6.5%  Consistent with diabetes  Currently no consensus exists for use of hemoglobin A1C  for diagnosis of diabetes for children.     12/28/2016 6.9 (H) 4.5 - 6.2 % Final     Comment:     According to ADA guidelines, hemoglobin A1C <7.0% represents  optimal control in non-pregnant diabetic patients.  Different  metrics may apply to specific populations.   Standards of Medical Care in Diabetes - 2016.  For the purpose of screening for the presence of diabetes:  <5.7%     Consistent with the absence of diabetes  5.7-6.4%  Consistent with increasing risk for diabetes   (prediabetes)  >or=6.5%  Consistent with diabetes  Currently no consensus exists for use of hemoglobin A1C  for diagnosis of diabetes for children.         Past Medical History:   Diagnosis Date    Abdominal pain     Anxiety     Atherosclerosis of aortic arch      noted on CXR 7/1/2015    Atrial fibrillation 05/2016    CHADS 4, on Xarelto    Benign hypertension     Chronic constipation     Chronic diarrhea     Chronic edema     Depression     Dercum disease     Multiple painful lipomas    Dysphagia     Gas pain     Glaucoma of both eyes     Hx of psychiatric care     previously amitriptyline, now jut on Trazodone 100mg QHS PRN insomnia    Hyperlipidemia with target LDL less than 100     Unable to tolerate statins    Immature cataract     Tasia     no symptoms prior to this presentation    Morbid obesity     Nausea & vomiting     Neuropathy     Polyneuropathy     Primary osteoarthritis of both knees     Proteinuria     Psychiatric problem     history of depression    Pulmonary hypertension mixed group 2 and 3 1/18/2017    Sleep apnea     Therapy     no history of therapy    Type II or unspecified type diabetes mellitus with neurological manifestations, uncontrolled(250.62)     Unspecified vitamin D deficiency        Past Surgical History:   Procedure Laterality Date     tenotomy      flexors 2,3 L toes    CATARACT EXTRACTION      COLONOSCOPY N/A 5/4/2017    Procedure: COLONOSCOPY;  Surgeon: Suellen Wolf MD;  Location: [facility] ([address] [place] [address]);  Service: Endoscopy;  Laterality: N/A;  2nd floor due to pulm htn, possible gastroparesis. per Dr Wolf      ok to hold Xarelto 2 days prior to procedure per Dr Driss Dixon    diabetic retinopathy      right    HYSTERECTOMY      knee surgery x2      Left ankle and leg surgery secondary to a fall      left arm fracture      Left cataract      PARTIAL HYSTERECTOMY      Secondary to bleeding    TOE FUSION      2,3 R       Family History   Problem Relation Age of Onset    No Known Problems Daughter     No Known Problems Father     Liver cancer Mother     Bone cancer Daughter     No Known Problems Sister     No Known Problems Brother     No Known Problems Maternal Aunt     No Known Problems  Maternal Uncle     No Known Problems Paternal Aunt     No Known Problems Paternal Uncle     No Known Problems Maternal Grandmother     No Known Problems Maternal Grandfather     No Known Problems Paternal Grandmother     No Known Problems Paternal Grandfather     Amblyopia Neg Hx     Blindness Neg Hx     Cancer Neg Hx     Cataracts Neg Hx     Diabetes Neg Hx     Glaucoma Neg Hx     Hypertension Neg Hx     Macular degeneration Neg Hx     Retinal detachment Neg Hx     Strabismus Neg Hx     Stroke Neg Hx     Thyroid disease Neg Hx     Celiac disease Neg Hx     Cirrhosis Neg Hx     Colon cancer Neg Hx     Colon polyps Neg Hx     Crohn's disease Neg Hx     Cystic fibrosis Neg Hx     Esophageal cancer Neg Hx     Hemochromatosis Neg Hx     Inflammatory bowel disease Neg Hx     Irritable bowel syndrome Neg Hx     Liver disease Neg Hx     Rectal cancer Neg Hx     Stomach cancer Neg Hx     Ulcerative colitis Neg Hx     Montez's disease Neg Hx     Alcohol abuse Neg Hx     Bipolar disorder Neg Hx     Dementia Neg Hx     Depression Neg Hx     Drug abuse Neg Hx     Suicide Neg Hx     Schizophrenia Neg Hx        Social History     Social History    Marital status:      Spouse name: N/A    Number of children: 3    Years of education: 14     Occupational History    housewife      Social History Main Topics    Smoking status: Never Smoker    Smokeless tobacco: Never Used    Alcohol use No    Drug use: No    Sexual activity: Yes     Partners: Male     Other Topics Concern    None     Social History Narrative    Pt was the youngest of 3 boys and 2 girls.  Her father  when she was 3 months old.  She was raised by her mother, mgm, and an uncle.  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  They had 2 daughters and 1 son together, plus 5 stepchildren by her .  They live together in [place] in their own home,  "with no pets.  Hobbies include gardening and crafts.  Pt attends a variety of organized Vood[employer] services.            One daughter is  from bone cancer.    One son  2014 after surgery for MIGUEL       Current Outpatient Prescriptions   Medication Sig Dispense Refill    alcohol swabs PadM Apply 1 each topically as needed.  0    blood glucose control, low (TRUE METRIX LEVEL 1) Soln 1 Bottle by Misc.(Non-Drug; Combo Route) route once daily. 1 each 2    blood-glucose meter kit Use as instructed 1 each 0    hydrochlorothiazide (HYDRODIURIL) 25 MG tablet Take 1 tablet (25 mg total) by mouth once daily. 90 tablet 3    insulin aspart protamine-insulin aspart (NOVOLOG 70/30) 100 unit/mL (70-30) InPn pen Inject into the skin 2 (two) times daily before meals. Sliding scale      ketoconazole (NIZORAL) 2 % shampoo Apply topically twice a week. Leave on scalp for five minutes then rinse 120 mL 1    lancets (LANCETS,THIN) 28 gauge Misc 1 lancet by Misc.(Non-Drug; Combo Route) route 4 (four) times daily before meals and nightly. 100 each 2    losartan (COZAAR) 50 MG tablet Take 1 tablet (50 mg total) by mouth once daily. 90 tablet 3    magnesium chloride (SLOW-MAG) 71.5 mg TbEC Take 2 tablets by mouth once.      melatonin 10 mg Tab Take by mouth. 2 tabs at night      metformin (GLUCOPHAGE) 500 MG tablet Take 1 tablet (500 mg total) by mouth 3 (three) times daily. 270 tablet 3    metoprolol succinate (TOPROL-XL) 25 MG 24 hr tablet Take 1 tablet (25 mg total) by mouth once daily. 90 tablet 2    pen needle, diabetic (BD ULTRA-FINE SANDEEP PEN NEEDLES) 32 gauge x 5/32" Ndle Take 1 each by mouth 4 (four) times daily. 120 each 2    psyllium (METAMUCIL) packet Take 1 packet by mouth once daily.  12    ranitidine (ZANTAC) 300 MG capsule Take 1 capsule (300 mg total) by mouth every evening. 90 capsule 2    rivaroxaban (XARELTO) 20 mg Tab Take 1 tablet (20 mg total) by mouth once daily. 90 tablet 3    TRUE " METRIX GLUCOSE TEST STRIP Strp TEST FOUR TIMES DAILY BEFORE MEALS  AND EVERY NIGHT 400 strip 2     No current facility-administered medications for this visit.        Review of patient's allergies indicates:   Allergen Reactions    Ace inhibitors      Other reaction(s): cough      Fluorescein Itching     Other reaction(s): Itching    Iodine      Other reaction(s): Itching    Pravastatin Other (See Comments)     Other reaction(s): mouth tingling/numbness    Simvastatin      Other reaction(s): foot swelling           Review of Systems   Constitution: Negative for chills and fever.   Cardiovascular: Positive for leg swelling. Negative for chest pain and claudication.   Respiratory: Negative for cough and shortness of breath.    Skin: Positive for dry skin (with multiple calluses) and nail changes.   Musculoskeletal:        Pain along calluses both feet   Gastrointestinal: Negative for nausea and vomiting.   Neurological: Positive for numbness. Negative for paresthesias.   Psychiatric/Behavioral: Negative for altered mental status.           Objective:      Physical Exam     Vascular  DP 1/4 bl, PT1 /4 bl  Mild pitting edema, skin is atrophic, decreased digital hair, feet slightly cool, nails thickened, mild plantar rubor    Neurological:  Sensation to the 10 g sensory filament is not felt consistently on the feet toes and ball (5/10)    Dermatologic:  Nails are elongated and mycotic with thickened, discolored, onycholytic and subungual debris changes X10 Nails areapproximately 2   mm thick and    4 mm long. Skin is intact without wounds, rash, or infection.  Web spaces are clear without maceration    Callus sub 3 b/l, R hallux IPJ plantar and lateral aspect of b/l 5th toes adjacent to nails (5 lesions total)    Musculoskeletal:   Equinus noted b/l ankles with < 10 deg DF noted. MMT 5/5 in DF/PF/Inv/Ev resistance with no reproduction of pain in any direction. Passive range of motion of ankle and pedal joints is  painless b/l. Semi-reducible hammertoe contractures noted to toes 2-4 b/l-asymptomatic. HAV, mild, non trackbound noted b/l with mild medial bony prominence at 1st met head--asymptomatic.   Hypermobility noted to 1st ray b/l with near complete collapse of medial longitudinal arch b/l with loading.       Assessment:       Encounter Diagnoses   Name Primary?    Type II diabetes mellitus with neurological manifestations Yes    Bilateral lower extremity edema     Corn or callus     Onychomycosis due to dermatophyte     Numbness of toes     Xerosis cutis          Plan:       Kaci was seen today for diabetes mellitus, diabetic foot exam and nail care.    Diagnoses and all orders for this visit:    Type II diabetes mellitus with neurological manifestations    Bilateral lower extremity edema    Corn or callus    Onychomycosis due to dermatophyte    Numbness of toes    Xerosis cutis      I counseled the patient on her conditions, their implications and medical management.        - Shoe inspection. Diabetic Foot Education. Patient reminded of the importance of good nutrition and blood sugar control to help prevent podiatric complications of diabetes. Patient instructed on proper foot hygeine. We discussed wearing proper shoe gear, daily foot inspections, never walking without protective shoe gear, never putting sharp instruments to feet, routine podiatric nail visits every 2-3 months.      With patient's permission, nails were aggressively reduced and debrided 1,2,3,4, 5 R and 1,2, 3,4,5 L and filed to their soft tissue attachment mechanically and with electric , removing all offending nail and debris. Utilizing a #15 scalpel, I trimmed the corns and calluses at the plantar 3rd met head b/l, right medial hallux IPJ, lateral 5th toe b/l (total 5 lesions)    Patient tolerated this well and no blood was drawn. Patient reports relief following the procedure.     Discussed continuation of regular and routine  moisturizer to skin of both feet to help improve dry skin. Advised to apply twice daily until resolution of symptoms. Avoid between toes.     Rx diabetic shoes with custom molded inserts to be worn at all times while ambulating. Continue     Discussed the use of compression stockings b/l to help control edema. Tubigrip applied today. Discussed proper and consistent elevation of lower extremities, above the level of the heart, while at rest, to help control/improve edema.     RTC 3 months, sooner PRN

## 2018-03-01 ENCOUNTER — OFFICE VISIT (OUTPATIENT)
Dept: OPHTHALMOLOGY | Facility: CLINIC | Age: 78
End: 2018-03-01
Payer: MEDICARE

## 2018-03-01 DIAGNOSIS — H35.372 EPIRETINAL MEMBRANE, LEFT EYE: ICD-10-CM

## 2018-03-01 DIAGNOSIS — H35.033 HYPERTENSIVE RETINOPATHY OF BOTH EYES: ICD-10-CM

## 2018-03-01 DIAGNOSIS — E11.3513 CONTROLLED TYPE 2 DIABETES MELLITUS WITH BOTH EYES AFFECTED BY PROLIFERATIVE RETINOPATHY AND MACULAR EDEMA, WITH LONG-TERM CURRENT USE OF INSULIN: Primary | ICD-10-CM

## 2018-03-01 DIAGNOSIS — Z79.4 CONTROLLED TYPE 2 DIABETES MELLITUS WITH BOTH EYES AFFECTED BY PROLIFERATIVE RETINOPATHY AND MACULAR EDEMA, WITH LONG-TERM CURRENT USE OF INSULIN: Primary | ICD-10-CM

## 2018-03-01 DIAGNOSIS — E11.311 DIABETIC RETINAL EDEMA: ICD-10-CM

## 2018-03-01 DIAGNOSIS — H35.372 LEFT EPIRETINAL MEMBRANE: ICD-10-CM

## 2018-03-01 PROCEDURE — 99999 PR PBB SHADOW E&M-EST. PATIENT-LVL II: CPT | Mod: PBBFAC,,, | Performed by: OPHTHALMOLOGY

## 2018-03-01 PROCEDURE — 92014 COMPRE OPH EXAM EST PT 1/>: CPT | Mod: S$GLB,,, | Performed by: OPHTHALMOLOGY

## 2018-03-01 PROCEDURE — 92134 CPTRZ OPH DX IMG PST SGM RTA: CPT | Mod: S$GLB,,, | Performed by: OPHTHALMOLOGY

## 2018-03-01 PROCEDURE — 92226 PR SPECIAL EYE EXAM, SUBSEQUENT: CPT | Mod: 50,S$GLB,, | Performed by: OPHTHALMOLOGY

## 2018-03-01 PROCEDURE — 99499 UNLISTED E&M SERVICE: CPT | Mod: S$GLB,,, | Performed by: OPHTHALMOLOGY

## 2018-03-01 NOTE — LETTER
March 1, 2018      Toby Hung MD  [address]  [place] [address]           [facility]  [address] [facility] [address]  Phone: [phone]  Fax: [phone]          Patient: Kaci Whalen   MR Number: [id number]   YOB: 1940   Date of Visit: 3/1/2018       Dear Dr. Toby Hung:    Thank you for referring Kaci Whalen to me for evaluation. Attached you will find relevant portions of my assessment and plan of care.    If you have questions, please do not hesitate to call me. I look forward to following Kaci Whalen along with you.    Sincerely,    JERAMIE Briceño MD    Enclosure  CC:  No Recipients    If you would like to receive this communication electronically, please contact [email or web address] or [phone] to request more information on [facility] Link access.    For providers and/or their staff who would like to refer a patient to [facility], please contact us through our one-stop-shop provider referral line, [facility], at [phone].    If you feel you have received this communication in error or would no longer like to receive these types of communications, please e-mail [email or web address]

## 2018-03-01 NOTE — PROGRESS NOTES
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 BS under control. She states in June 2017 she had a bad fall. She   hit her head and had concussion and amnesia.     LYR=233 this am, 78 lunchtime  A!c=6.5 (11/16/17)    OCT - some residual ERM OS, but minimal traction      A/P    1. PDR OU  -s/p PRP OU  T2 controlled on insulin    2. CSME OS  -s/p Focal    3. ERM     4. HTN Ret OU    5. PVD OD    6. Allergic Conj  - Pataday - improved    7. Afib  Xarelto         BS/BP/chol control    12 months OCT

## 2018-04-09 DIAGNOSIS — I48.0 PAROXYSMAL ATRIAL FIBRILLATION: ICD-10-CM

## 2018-04-10 ENCOUNTER — PES CALL (OUTPATIENT)
Dept: ADMINISTRATIVE | Facility: CLINIC | Age: 78
End: 2018-04-10

## 2018-04-10 RX ORDER — METOPROLOL SUCCINATE 25 MG/1
TABLET, EXTENDED RELEASE ORAL
Qty: 90 TABLET | Refills: 2 | Status: SHIPPED | OUTPATIENT
Start: 2018-04-10 | End: 2018-12-31 | Stop reason: SDUPTHER

## 2018-04-12 ENCOUNTER — PATIENT MESSAGE (OUTPATIENT)
Dept: FAMILY MEDICINE | Facility: CLINIC | Age: 78
End: 2018-04-12

## 2018-04-13 ENCOUNTER — OFFICE VISIT (OUTPATIENT)
Dept: CARDIOLOGY | Facility: CLINIC | Age: 78
End: 2018-04-13
Payer: MEDICARE

## 2018-04-13 VITALS
OXYGEN SATURATION: 95 % | WEIGHT: 194 LBS | SYSTOLIC BLOOD PRESSURE: 147 MMHG | DIASTOLIC BLOOD PRESSURE: 64 MMHG | HEIGHT: 61 IN | HEART RATE: 65 BPM | BODY MASS INDEX: 36.63 KG/M2 | RESPIRATION RATE: 15 BRPM

## 2018-04-13 DIAGNOSIS — Z79.4 CONTROLLED TYPE 2 DIABETES MELLITUS WITH BOTH EYES AFFECTED BY PROLIFERATIVE RETINOPATHY AND MACULAR EDEMA, WITH LONG-TERM CURRENT USE OF INSULIN: ICD-10-CM

## 2018-04-13 DIAGNOSIS — E11.29 TYPE 2 DIABETES MELLITUS WITH MICROALBUMINURIA, WITH LONG-TERM CURRENT USE OF INSULIN: ICD-10-CM

## 2018-04-13 DIAGNOSIS — I48.0 PAROXYSMAL ATRIAL FIBRILLATION: ICD-10-CM

## 2018-04-13 DIAGNOSIS — G47.33 OSA (OBSTRUCTIVE SLEEP APNEA): ICD-10-CM

## 2018-04-13 DIAGNOSIS — Z79.01 LONG TERM (CURRENT) USE OF ANTICOAGULANTS: ICD-10-CM

## 2018-04-13 DIAGNOSIS — Z79.4 TYPE 2 DIABETES MELLITUS WITH MICROALBUMINURIA, WITH LONG-TERM CURRENT USE OF INSULIN: ICD-10-CM

## 2018-04-13 DIAGNOSIS — R80.9 TYPE 2 DIABETES MELLITUS WITH MICROALBUMINURIA, WITH LONG-TERM CURRENT USE OF INSULIN: ICD-10-CM

## 2018-04-13 DIAGNOSIS — N18.30 CKD (CHRONIC KIDNEY DISEASE), STAGE III: ICD-10-CM

## 2018-04-13 DIAGNOSIS — E66.9 NON MORBID OBESITY: ICD-10-CM

## 2018-04-13 DIAGNOSIS — I10 HYPERTENSION, ESSENTIAL: ICD-10-CM

## 2018-04-13 DIAGNOSIS — E11.3513 CONTROLLED TYPE 2 DIABETES MELLITUS WITH BOTH EYES AFFECTED BY PROLIFERATIVE RETINOPATHY AND MACULAR EDEMA, WITH LONG-TERM CURRENT USE OF INSULIN: ICD-10-CM

## 2018-04-13 DIAGNOSIS — I10 ESSENTIAL HYPERTENSION: Primary | ICD-10-CM

## 2018-04-13 DIAGNOSIS — E78.5 HYPERLIPIDEMIA WITH TARGET LDL LESS THAN 100: ICD-10-CM

## 2018-04-13 PROCEDURE — 3078F DIAST BP <80 MM HG: CPT | Mod: CPTII,S$GLB,, | Performed by: INTERNAL MEDICINE

## 2018-04-13 PROCEDURE — 99999 PR PBB SHADOW E&M-EST. PATIENT-LVL III: CPT | Mod: PBBFAC,,, | Performed by: INTERNAL MEDICINE

## 2018-04-13 PROCEDURE — 99499 UNLISTED E&M SERVICE: CPT | Mod: S$PBB,,, | Performed by: INTERNAL MEDICINE

## 2018-04-13 PROCEDURE — 3044F HG A1C LEVEL LT 7.0%: CPT | Mod: CPTII,S$GLB,, | Performed by: INTERNAL MEDICINE

## 2018-04-13 PROCEDURE — 99214 OFFICE O/P EST MOD 30 MIN: CPT | Mod: S$GLB,,, | Performed by: INTERNAL MEDICINE

## 2018-04-13 PROCEDURE — 3077F SYST BP >= 140 MM HG: CPT | Mod: CPTII,S$GLB,, | Performed by: INTERNAL MEDICINE

## 2018-04-13 PROCEDURE — 93000 ELECTROCARDIOGRAM COMPLETE: CPT | Mod: S$GLB,,, | Performed by: INTERNAL MEDICINE

## 2018-04-13 RX ORDER — LOSARTAN POTASSIUM 100 MG/1
100 TABLET ORAL DAILY
Qty: 90 TABLET | Refills: 3 | Status: SHIPPED | OUTPATIENT
Start: 2018-04-13 | End: 2019-05-22 | Stop reason: SDUPTHER

## 2018-04-13 NOTE — PROGRESS NOTES
CARDIOVASCULAR PROGRESS NOTE    REASON FOR CONSULT:   Kaci Whalen is a 77 y.o. female who presents for follow up of AF, HFpEF.    PCP: Abhilash  HISTORY OF PRESENT ILLNESS:   The patient returns for follow-up.  She is accompanied by her daughter to today's visit.  She reports generally a symptom medic status without angina or dyspnea.  There's been no palpitations, lightheadedness, dizziness, or syncope.  The patient did recently have a non-syncopal fall.  She apparently was working in her closet when this happened.  She otherwise seems to be doing well from an ambulatory status with use of a walker.  There is otherwise no PND, orthopnea, or lower extremity edema.  She denies melena, hematuria, or claudicant symptoms.    The patient's daughter is concerned that the patient's balance is precluding her ability to ambulate.  I've strongly encouraged him to continue maintaining an ambulatory status with the use of a walker in order to aid with the patient's balance.  I've also suggested that they follow up with the patient's primary care physician to consider physical therapy referral.    CARDIOVASCULAR HISTORY:   PAF, CHADS 4 on Xarelto  CHF, diastolic  MIGUEL, pt noncompliant with CPAP    PAST MEDICAL HISTORY:     Past Medical History:   Diagnosis Date    Abdominal pain     Anxiety     Atherosclerosis of aortic arch     noted on CXR 7/1/2015    Atrial fibrillation 05/2016    CHADS 4, on Xarelto    Benign hypertension     Chronic constipation     Chronic diarrhea     Chronic edema     Depression     Dercum disease     Multiple painful lipomas    Dysphagia     Gas pain     Glaucoma of both eyes     Hx of psychiatric care     previously amitriptyline, now jut on Trazodone 100mg QHS PRN insomnia    Hyperlipidemia with target LDL less than 100     Unable to tolerate statins    Immature cataract     Tasia     no symptoms prior to this presentation    Morbid obesity     Nausea & vomiting     Neuropathy      Polyneuropathy     Primary osteoarthritis of both knees     Proteinuria     Psychiatric problem     history of depression    Pulmonary hypertension mixed group 2 and 3 1/18/2017    Sleep apnea     Therapy     no history of therapy    Type II or unspecified type diabetes mellitus with neurological manifestations, uncontrolled(250.62)     Unspecified vitamin D deficiency        PAST SURGICAL HISTORY:     Past Surgical History:   Procedure Laterality Date     tenotomy      flexors 2,3 L toes    CATARACT EXTRACTION      COLONOSCOPY N/A 5/4/2017    Procedure: COLONOSCOPY;  Surgeon: Suellen Wolf MD;  Location: [facility] ([address] [place] [address]);  Service: Endoscopy;  Laterality: N/A;  2nd floor due to pulm htn, possible gastroparesis. per Dr Wolf      ok to hold Xarelto 2 days prior to procedure per Dr Driss Dixon    diabetic retinopathy      right    HYSTERECTOMY      knee surgery x2      Left ankle and leg surgery secondary to a fall      left arm fracture      Left cataract      PARTIAL HYSTERECTOMY      Secondary to bleeding    TOE FUSION      2,3 R       ALLERGIES AND MEDICATION:     Review of patient's allergies indicates:   Allergen Reactions    Ace inhibitors      Other reaction(s): cough      Fluorescein Itching     Other reaction(s): Itching    Iodine      Other reaction(s): Itching    Pravastatin Other (See Comments)     Other reaction(s): mouth tingling/numbness    Simvastatin      Other reaction(s): foot swelling       Previous Medications    ALCOHOL SWABS PADM    Apply 1 each topically as needed.    BLOOD GLUCOSE CONTROL, LOW (TRUE METRIX LEVEL 1) SOLN    1 Bottle by Misc.(Non-Drug; Combo Route) route once daily.    BLOOD-GLUCOSE METER KIT    Use as instructed    HYDROCHLOROTHIAZIDE (HYDRODIURIL) 25 MG TABLET    Take 1 tablet (25 mg total) by mouth once daily.    INSULIN ASPART PROTAMINE-INSULIN ASPART (NOVOLOG 70/30) 100 UNIT/ML (70-30) INPN PEN    Inject into the skin 2 (two) times  "daily before meals. Sliding scale    KETOCONAZOLE (NIZORAL) 2 % SHAMPOO    Apply topically twice a week. Leave on scalp for five minutes then rinse    LANCETS (LANCETS,THIN) 28 GAUGE MISC    1 lancet by Misc.(Non-Drug; Combo Route) route 4 (four) times daily before meals and nightly.    LOSARTAN (COZAAR) 50 MG TABLET    Take 1 tablet (50 mg total) by mouth once daily.    MAGNESIUM CHLORIDE (SLOW-MAG) 71.5 MG TBEC    Take 2 tablets by mouth once.    MELATONIN 10 MG TAB    Take by mouth. 2 tabs at night    METFORMIN (GLUCOPHAGE) 500 MG TABLET    Take 1 tablet (500 mg total) by mouth 3 (three) times daily.    METOPROLOL SUCCINATE (TOPROL-XL) 25 MG 24 HR TABLET    TAKE ONE TABLET BY MOUTH ONCE DAILY    PEN NEEDLE, DIABETIC (BD ULTRA-FINE SANDEEP PEN NEEDLES) 32 GAUGE X 5/32" NDLE    Take 1 each by mouth 4 (four) times daily.    PSYLLIUM (METAMUCIL) PACKET    Take 1 packet by mouth once daily.    RANITIDINE (ZANTAC) 300 MG CAPSULE    Take 1 capsule (300 mg total) by mouth every evening.    RIVAROXABAN (XARELTO) 20 MG TAB    Take 1 tablet (20 mg total) by mouth once daily.    TRUE METRIX GLUCOSE TEST STRIP STRP    TEST FOUR TIMES DAILY BEFORE MEALS  AND EVERY NIGHT       SOCIAL HISTORY:     Social History     Social History    Marital status:      Spouse name: N/A    Number of children: 3    Years of education: 14     Occupational History    housewife      Social History Main Topics    Smoking status: Never Smoker    Smokeless tobacco: Never Used    Alcohol use No    Drug use: No    Sexual activity: Yes     Partners: Male     Other Topics Concern    Not on file     Social History Narrative    Pt was the youngest of 3 boys and 2 girls.  Her father  when she was 3 months old.  She was raised by her mother, mgm, and an uncle.  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  They had 2 daughters and 1 son together, plus 5 stepchildren by her " .  They live together in [place] in their own home, with no pets.  Hobbies include gardening and crafts.  Pt attends a variety of organized Confucianism services.            One daughter is  from bone cancer.    One son  2014 after surgery for MIGUEL       FAMILY HISTORY:     Family History   Problem Relation Age of Onset    No Known Problems Daughter     No Known Problems Father     Liver cancer Mother     Bone cancer Daughter     No Known Problems Sister     No Known Problems Brother     No Known Problems Maternal Aunt     No Known Problems Maternal Uncle     No Known Problems Paternal Aunt     No Known Problems Paternal Uncle     No Known Problems Maternal Grandmother     No Known Problems Maternal Grandfather     No Known Problems Paternal Grandmother     No Known Problems Paternal Grandfather     Amblyopia Neg Hx     Blindness Neg Hx     Cancer Neg Hx     Cataracts Neg Hx     Diabetes Neg Hx     Glaucoma Neg Hx     Hypertension Neg Hx     Macular degeneration Neg Hx     Retinal detachment Neg Hx     Strabismus Neg Hx     Stroke Neg Hx     Thyroid disease Neg Hx     Celiac disease Neg Hx     Cirrhosis Neg Hx     Colon cancer Neg Hx     Colon polyps Neg Hx     Crohn's disease Neg Hx     Cystic fibrosis Neg Hx     Esophageal cancer Neg Hx     Hemochromatosis Neg Hx     Inflammatory bowel disease Neg Hx     Irritable bowel syndrome Neg Hx     Liver disease Neg Hx     Rectal cancer Neg Hx     Stomach cancer Neg Hx     Ulcerative colitis Neg Hx     Montez's disease Neg Hx     Alcohol abuse Neg Hx     Bipolar disorder Neg Hx     Dementia Neg Hx     Depression Neg Hx     Drug abuse Neg Hx     Suicide Neg Hx     Schizophrenia Neg Hx        REVIEW OF SYSTEMS:   Review of Systems   Constitutional: Negative for chills, diaphoresis and fever.   HENT: Negative for nosebleeds.    Eyes: Negative for blurred vision, double vision and photophobia.   Respiratory:  "Negative for hemoptysis, shortness of breath and wheezing.    Cardiovascular: Negative for chest pain, palpitations, orthopnea, claudication, leg swelling and PND.   Gastrointestinal: Negative for abdominal pain, blood in stool, heartburn, melena, nausea and vomiting.   Genitourinary: Negative for flank pain and hematuria.   Musculoskeletal: Negative for falls, myalgias and neck pain.   Skin: Negative for rash.   Neurological: Negative for dizziness, seizures, loss of consciousness, weakness and headaches.   Endo/Heme/Allergies: Negative for polydipsia. Does not bruise/bleed easily.   Psychiatric/Behavioral: Negative for depression and memory loss. The patient is not nervous/anxious.        PHYSICAL EXAM:     Vitals:    04/13/18 1309   BP: (!) 147/64   Pulse: 65   Resp: 15    Body mass index is 36.66 kg/m².  Weight: 88 kg (194 lb)   Height: 5' 1" (154.9 cm)     Physical Exam   Constitutional: She is oriented to person, place, and time. She appears well-developed and well-nourished. She is cooperative.  Non-toxic appearance. No distress.   HENT:   Head: Normocephalic and atraumatic.   Eyes: Conjunctivae and EOM are normal. Pupils are equal, round, and reactive to light. No scleral icterus.   Neck: Trachea normal and normal range of motion. Neck supple. Normal carotid pulses and no JVD present. Carotid bruit is not present. No neck rigidity. No edema present. No thyromegaly present.   Cardiovascular: Normal rate, regular rhythm, S1 normal and S2 normal.  PMI is not displaced.  Exam reveals distant heart sounds. Exam reveals no gallop and no friction rub.    No murmur heard.  Pulses:       Carotid pulses are 2+ on the right side, and 2+ on the left side.  Pulmonary/Chest: Effort normal and breath sounds normal. No respiratory distress. She has no wheezes. She has no rales. She exhibits no tenderness.   Abdominal: Soft. Bowel sounds are normal. She exhibits no distension and no mass. There is no hepatosplenomegaly. " There is no tenderness.   obese   Musculoskeletal: She exhibits no edema or tenderness.   Feet:   Right Foot:   Skin Integrity: Negative for ulcer.   Left Foot:   Skin Integrity: Negative for ulcer.   Neurological: She is alert and oriented to person, place, and time. No cranial nerve deficit.   Skin: Skin is warm and dry. No rash noted. No erythema.   Psychiatric: She has a normal mood and affect. Her speech is normal and behavior is normal.   Vitals reviewed.      DATA:   EKG: (personally reviewed tracing)  4/13/18 SR 64, PRWP    Laboratory:  CBC:    Recent Labs  Lab 06/06/17  1943 06/07/17  0637 11/16/17  0833   WHITE BLOOD CELL COUNT 6.60 6.88 4.59   HEMOGLOBIN 11.2 L 11.6 L 10.7 L   HEMATOCRIT 32.5 L 34.0 L 32.6 L   PLATELETS 244 260 245       CHEMISTRIES:    Recent Labs  Lab 01/13/17  1325  02/10/17  0810  06/07/17  0637  06/09/17  0442 06/10/17  0425 11/16/17  0833   GLUCOSE 144 H  < > 134 H  < > 44 LL  44 LL  < > 106 124 H 145 H   SODIUM 139  < > 126 L  < > 137  137  < > 134 L 137 136   POTASSIUM 4.0  < > 4.1  < > 3.6  3.6  < > 4.0 4.3 4.3   BUN BLD 24 H  < > 30 H  < > 24 H  24 H  < > 14 15 27 H   CREATININE 1.0  < > 1.5 H  < > 1.8 H  1.8 H  < > 1.3 1.2 1.3   EGFR IF  >60.0  < > 39.3 A  < > 31.5 A  31.5 A  < > 46.7 A 51.4 A 46.4 A   EGFR IF NON- 55.6 A  < > 34.1 A  < > 27.3 A  27.3 A  < > 40.5 A 44.6 A 40.2 A   CALCIUM 9.7  < > 9.8  < > 9.8  9.8  < > 8.5 L 9.0 9.7   MAGNESIUM 1.8  --  2.0  --  1.8  --   --   --   --    < > = values in this interval not displayed.    CARDIAC BIOMARKERS:    Recent Labs  Lab 05/28/16  1031 05/28/16  1744 05/29/16  0145   CPK  --   --  49  49   CPK MB  --   --  2.2   TROPONIN I <0.006 0.009 <0.006       COAGS:        LIPIDS/LFTS:    Recent Labs  Lab 12/11/15  0830  06/09/17  0442 06/10/17  0425 11/16/17  0833   CHOLESTEROL 155  --   --   --  189  189   TRIGLYCERIDES 118  --   --   --  123  123   HDL 44  --   --   --  51  51   LDL  CHOLESTEROL 87.4  --   --   --  113.4  113.4   NON-HDL CHOLESTEROL 111  --   --   --  138  138   AST 19  < > 16 13 14   ALT 13  < > 9 L 7 L 9 L   < > = values in this interval not displayed.    Lab Results   Component Value Date    TSH 2.263 06/06/2017         Cardiovascular Testing:  [facility] 1/18/17  B. Summary/Post-Operative Diagnosis   1.   Moderately elevated right atrial pressure.   2.   Moderately elevated mean PCWP pressure.   3.   Moderately elevated mean PA pressure with normal PVR.   4.   Normal CO/CI.   5.   Systemic vascular resistance 1192.  D. Hemodynamic Results  AOPRES: 135/68 (91)  AOSAT: 100  FICKCI: 2.76  FICKCO: 5.18  PAPRES: 60/25 (37)  PASAT: 68  PVR: 1.93  PWPRES: 23/42 (27)  Unable to draw blood for saturation but confirmed on fluoroscopy  RAPRES: 16/16 (14)  RVPRES: 60/15, 15  SVR: 14.88/1192    Echo: 5/28/16     1 - Normal left ventricular systolic function (EF 60-65%).  No diagnostic regional wall motion abnormalities.     2 - Biatrial enlargement.     3 - Trivial mitral regurgitation.     4 - Trivial tricuspid regurgitation.     5 - The estimated PA systolic pressure is 18 mmHg.    ASSESSMENT:   # PAF, CHADS 4, currently in SR on Xarelto 20mg qd.  # DM  # HTN, mildly uncontrolled  # HFpEF.  Pt appears euvolemic off lasix at present.  # BMI 37, up 5 units vs last OV.  # MIGUEL, pt noncompliant with CPAP  # HLP, multiple prior statin intolerances noted    PLAN:   Continue strategy of rate control and anticoag   Inc losartan 100mg qd  Check BMP 2 weeks  Diet/exercise/weight loss  I've encouraged patient to maintain her ambulatory status.  The patient's daughter has concerns about her balance, and I suggested a follow-up with her primary care physician to consider referral to physical therapy.  RTC 1 month  Resume lasix prn weight gain/LE edema/dyspnea    Driss Dixon MD, FACC

## 2018-04-26 ENCOUNTER — LAB VISIT (OUTPATIENT)
Dept: LAB | Facility: HOSPITAL | Age: 78
End: 2018-04-26
Attending: INTERNAL MEDICINE
Payer: MEDICARE

## 2018-04-26 DIAGNOSIS — Z79.4 TYPE 2 DIABETES MELLITUS WITH DIABETIC POLYNEUROPATHY, WITH LONG-TERM CURRENT USE OF INSULIN: ICD-10-CM

## 2018-04-26 DIAGNOSIS — E11.3299 TYPE 2 DIABETES MELLITUS WITH MILD NONPROLIFERATIVE RETINOPATHY WITHOUT MACULAR EDEMA, WITH LONG-TERM CURRENT USE OF INSULIN, UNSPECIFIED LATERALITY: ICD-10-CM

## 2018-04-26 DIAGNOSIS — E11.42 TYPE 2 DIABETES MELLITUS WITH DIABETIC POLYNEUROPATHY, WITH LONG-TERM CURRENT USE OF INSULIN: ICD-10-CM

## 2018-04-26 DIAGNOSIS — Z79.4 TYPE 2 DIABETES MELLITUS WITH MILD NONPROLIFERATIVE RETINOPATHY WITHOUT MACULAR EDEMA, WITH LONG-TERM CURRENT USE OF INSULIN, UNSPECIFIED LATERALITY: ICD-10-CM

## 2018-04-26 LAB
ALBUMIN SERPL BCP-MCNC: 3.5 G/DL
ALP SERPL-CCNC: 63 U/L
ALT SERPL W/O P-5'-P-CCNC: 9 U/L
ANION GAP SERPL CALC-SCNC: 6 MMOL/L
AST SERPL-CCNC: 15 U/L
BASOPHILS # BLD AUTO: 0.02 K/UL
BASOPHILS NFR BLD: 0.3 %
BILIRUB SERPL-MCNC: 0.3 MG/DL
BUN SERPL-MCNC: 31 MG/DL
CALCIUM SERPL-MCNC: 9.8 MG/DL
CHLORIDE SERPL-SCNC: 100 MMOL/L
CO2 SERPL-SCNC: 31 MMOL/L
CREAT SERPL-MCNC: 1.4 MG/DL
DIFFERENTIAL METHOD: ABNORMAL
EOSINOPHIL # BLD AUTO: 0.2 K/UL
EOSINOPHIL NFR BLD: 3.4 %
ERYTHROCYTE [DISTWIDTH] IN BLOOD BY AUTOMATED COUNT: 13.3 %
EST. GFR  (AFRICAN AMERICAN): 42.4 ML/MIN/1.73 M^2
EST. GFR  (NON AFRICAN AMERICAN): 36.8 ML/MIN/1.73 M^2
ESTIMATED AVG GLUCOSE: 131 MG/DL
GLUCOSE SERPL-MCNC: 167 MG/DL
HBA1C MFR BLD HPLC: 6.2 %
HCT VFR BLD AUTO: 34.2 %
HGB BLD-MCNC: 11.1 G/DL
IMM GRANULOCYTES # BLD AUTO: 0.02 K/UL
IMM GRANULOCYTES NFR BLD AUTO: 0.3 %
LYMPHOCYTES # BLD AUTO: 1.1 K/UL
LYMPHOCYTES NFR BLD: 19.1 %
MCH RBC QN AUTO: 30.3 PG
MCHC RBC AUTO-ENTMCNC: 32.5 G/DL
MCV RBC AUTO: 93 FL
MONOCYTES # BLD AUTO: 0.5 K/UL
MONOCYTES NFR BLD: 8.4 %
NEUTROPHILS # BLD AUTO: 4 K/UL
NEUTROPHILS NFR BLD: 68.5 %
NRBC BLD-RTO: 0 /100 WBC
PLATELET # BLD AUTO: 235 K/UL
PMV BLD AUTO: 9.9 FL
POTASSIUM SERPL-SCNC: 4.5 MMOL/L
PROT SERPL-MCNC: 6.7 G/DL
RBC # BLD AUTO: 3.66 M/UL
SODIUM SERPL-SCNC: 137 MMOL/L
WBC # BLD AUTO: 5.86 K/UL

## 2018-04-26 PROCEDURE — 80053 COMPREHEN METABOLIC PANEL: CPT

## 2018-04-26 PROCEDURE — 36415 COLL VENOUS BLD VENIPUNCTURE: CPT | Mod: PO

## 2018-04-26 PROCEDURE — 83036 HEMOGLOBIN GLYCOSYLATED A1C: CPT

## 2018-04-26 PROCEDURE — 85025 COMPLETE CBC W/AUTO DIFF WBC: CPT

## 2018-04-30 ENCOUNTER — OFFICE VISIT (OUTPATIENT)
Dept: FAMILY MEDICINE | Facility: CLINIC | Age: 78
End: 2018-04-30
Payer: MEDICARE

## 2018-04-30 VITALS
TEMPERATURE: 98 F | SYSTOLIC BLOOD PRESSURE: 132 MMHG | BODY MASS INDEX: 36.66 KG/M2 | DIASTOLIC BLOOD PRESSURE: 64 MMHG | OXYGEN SATURATION: 97 % | HEIGHT: 61 IN | HEART RATE: 66 BPM | WEIGHT: 194.19 LBS | RESPIRATION RATE: 17 BRPM

## 2018-04-30 DIAGNOSIS — I48.0 PAROXYSMAL ATRIAL FIBRILLATION: ICD-10-CM

## 2018-04-30 DIAGNOSIS — I10 ESSENTIAL HYPERTENSION: ICD-10-CM

## 2018-04-30 DIAGNOSIS — Z79.4 TYPE 2 DIABETES MELLITUS WITH MICROALBUMINURIA, WITH LONG-TERM CURRENT USE OF INSULIN: Primary | ICD-10-CM

## 2018-04-30 DIAGNOSIS — E66.01 MORBID OBESITY: ICD-10-CM

## 2018-04-30 DIAGNOSIS — R26.81 GAIT INSTABILITY: ICD-10-CM

## 2018-04-30 DIAGNOSIS — J30.89 NON-SEASONAL ALLERGIC RHINITIS, UNSPECIFIED TRIGGER: ICD-10-CM

## 2018-04-30 DIAGNOSIS — R80.9 TYPE 2 DIABETES MELLITUS WITH MICROALBUMINURIA, WITH LONG-TERM CURRENT USE OF INSULIN: Primary | ICD-10-CM

## 2018-04-30 DIAGNOSIS — E11.29 TYPE 2 DIABETES MELLITUS WITH MICROALBUMINURIA, WITH LONG-TERM CURRENT USE OF INSULIN: Primary | ICD-10-CM

## 2018-04-30 PROCEDURE — 99214 OFFICE O/P EST MOD 30 MIN: CPT | Mod: S$GLB,,, | Performed by: INTERNAL MEDICINE

## 2018-04-30 PROCEDURE — 3075F SYST BP GE 130 - 139MM HG: CPT | Mod: CPTII,S$GLB,, | Performed by: INTERNAL MEDICINE

## 2018-04-30 PROCEDURE — 99499 UNLISTED E&M SERVICE: CPT | Mod: S$PBB,,, | Performed by: INTERNAL MEDICINE

## 2018-04-30 PROCEDURE — 3044F HG A1C LEVEL LT 7.0%: CPT | Mod: CPTII,S$GLB,, | Performed by: INTERNAL MEDICINE

## 2018-04-30 PROCEDURE — 99999 PR PBB SHADOW E&M-EST. PATIENT-LVL III: CPT | Mod: PBBFAC,,, | Performed by: INTERNAL MEDICINE

## 2018-04-30 PROCEDURE — 3078F DIAST BP <80 MM HG: CPT | Mod: CPTII,S$GLB,, | Performed by: INTERNAL MEDICINE

## 2018-04-30 RX ORDER — FLUTICASONE PROPIONATE 50 MCG
1 SPRAY, SUSPENSION (ML) NASAL 2 TIMES DAILY
Qty: 16 G | Refills: 3 | Status: SHIPPED | OUTPATIENT
Start: 2018-04-30

## 2018-04-30 RX ORDER — INSULIN ASPART 100 [IU]/ML
21 INJECTION, SUSPENSION SUBCUTANEOUS
Qty: 15 ML | Refills: 2 | Status: SHIPPED | OUTPATIENT
Start: 2018-04-30 | End: 2019-02-25 | Stop reason: SDUPTHER

## 2018-04-30 NOTE — PROGRESS NOTES
Subjective:       Patient ID: Kaci Whalen is a 77 y.o. female.    Chief Complaint: Diabetes and Follow-up    Diabetes   She presents for her follow-up diabetic visit. She has type 2 diabetes mellitus. Her disease course has been stable. There are no hypoglycemic associated symptoms. Pertinent negatives for hypoglycemia include no dizziness or headaches. Pertinent negatives for diabetes include no blurred vision, no chest pain, no fatigue, no foot ulcerations, no polyuria, no weakness and no weight loss. Risk factors for coronary artery disease include diabetes mellitus, hypertension, obesity, dyslipidemia and sedentary lifestyle. Current diabetic treatment includes oral agent (monotherapy) and insulin injections. She is compliant with treatment all of the time. Her weight is stable. She is following a generally healthy diet. There is no change in her home blood glucose trend. An ACE inhibitor/angiotensin II receptor blocker is being taken. She sees a podiatrist.Eye exam is current.   non productive cough no change with lying flat no dysphagia _ sore throat no wheezing   Review of Systems   Constitutional: Negative for activity change, appetite change, fatigue, fever, unexpected weight change and weight loss.   HENT: Negative for ear pain, rhinorrhea and sore throat.    Eyes: Negative for blurred vision, discharge and visual disturbance.   Respiratory: Positive for cough. Negative for chest tightness, shortness of breath and wheezing.    Cardiovascular: Negative for chest pain, palpitations and leg swelling.   Gastrointestinal: Negative for abdominal pain, constipation and diarrhea.   Endocrine: Negative for cold intolerance, heat intolerance and polyuria.   Genitourinary: Negative for dysuria and hematuria.   Musculoskeletal: Negative for joint swelling and neck stiffness.   Skin: Negative for rash.   Neurological: Negative for dizziness, syncope, weakness and headaches.   Psychiatric/Behavioral: Negative for  "suicidal ideas.       Objective:     Vitals:    04/30/18 0924 04/30/18 1007   BP: (!) 140/62 132/64   BP Location: Right arm    Patient Position: Sitting    BP Method: Large (Manual)    Pulse: 64 66   Resp: 17    Temp: 98.4 °F (36.9 °C)    TempSrc: Oral    SpO2: 97%    Weight: 88.1 kg (194 lb 3.3 oz)    Height: 5' 1" (1.549 m)           Physical Exam   Constitutional: She is oriented to person, place, and time. She appears well-developed and well-nourished.   HENT:   Head: Normocephalic and atraumatic.   Eyes: Conjunctivae are normal. Pupils are equal, round, and reactive to light.   Neck: Normal range of motion.   Cardiovascular: Normal rate.  Exam reveals no gallop and no friction rub.    No murmur heard.  irregularlly irregular. No LE edema   Pulmonary/Chest: Effort normal and breath sounds normal. She has no wheezes. She has no rales.   Abdominal: Soft. Bowel sounds are normal. There is no tenderness. There is no rebound and no guarding.   obese   Musculoskeletal: Normal range of motion. She exhibits no edema or tenderness.   Neurological: She is alert and oriented to person, place, and time. No cranial nerve deficit.   Skin: Skin is warm and dry.   Psychiatric: She has a normal mood and affect.       Assessment and Plan   1. Type 2 diabetes mellitus with microalbuminuria, with long-term current use of insulin  a1c at goal continue insulin therapy  - insulin aspart protamine-insulin aspart (NOVOLOG 70/30) 100 unit/mL (70-30) InPn pen; Inject 21 Units into the skin 2 (two) times daily before meals. Sliding scale max daily dosing 50 units daily  Dispense: 15 mL; Refill: 2    2. Paroxysmal atrial fibrillation  Rate controlled on on NOAC continue    3. Essential hypertension  BP just at goal K+ stable on increased ARB continue current medications    4. Morbid obesity  The patient is asked to make an attempt to improve diet and exercise patterns to aid in medical management of this problem.      5. Gait " instability  Has fall DME at h ome has completed home PT hours for this calaneder year continue HEP and seated exercise program    6. Non-seasonal allergic rhinitis, unspecified trigger  Nasal steroid  - fluticasone (FLONASE) 50 mcg/actuation nasal spray; 1 spray (50 mcg total) by Each Nare route 2 (two) times daily.  Dispense: 16 g; Refill: 3

## 2018-05-04 ENCOUNTER — LAB VISIT (OUTPATIENT)
Dept: LAB | Facility: HOSPITAL | Age: 78
End: 2018-05-04
Attending: INTERNAL MEDICINE
Payer: MEDICARE

## 2018-05-04 DIAGNOSIS — I10 ESSENTIAL HYPERTENSION: ICD-10-CM

## 2018-05-04 LAB
ANION GAP SERPL CALC-SCNC: 7 MMOL/L
BUN SERPL-MCNC: 29 MG/DL
CALCIUM SERPL-MCNC: 10.1 MG/DL
CHLORIDE SERPL-SCNC: 99 MMOL/L
CO2 SERPL-SCNC: 30 MMOL/L
CREAT SERPL-MCNC: 1.4 MG/DL
EST. GFR  (AFRICAN AMERICAN): 42.4 ML/MIN/1.73 M^2
EST. GFR  (NON AFRICAN AMERICAN): 36.8 ML/MIN/1.73 M^2
GLUCOSE SERPL-MCNC: 161 MG/DL
POTASSIUM SERPL-SCNC: 4.9 MMOL/L
SODIUM SERPL-SCNC: 136 MMOL/L

## 2018-05-04 PROCEDURE — 80048 BASIC METABOLIC PNL TOTAL CA: CPT

## 2018-05-04 PROCEDURE — 36415 COLL VENOUS BLD VENIPUNCTURE: CPT | Mod: PO

## 2018-05-09 ENCOUNTER — OFFICE VISIT (OUTPATIENT)
Dept: PODIATRY | Facility: CLINIC | Age: 78
End: 2018-05-09
Payer: MEDICARE

## 2018-05-09 VITALS
SYSTOLIC BLOOD PRESSURE: 130 MMHG | WEIGHT: 194.25 LBS | DIASTOLIC BLOOD PRESSURE: 62 MMHG | HEIGHT: 61 IN | BODY MASS INDEX: 36.67 KG/M2

## 2018-05-09 DIAGNOSIS — R20.0 NUMBNESS OF TOES: ICD-10-CM

## 2018-05-09 DIAGNOSIS — R60.0 BILATERAL LOWER EXTREMITY EDEMA: ICD-10-CM

## 2018-05-09 DIAGNOSIS — B35.1 ONYCHOMYCOSIS DUE TO DERMATOPHYTE: ICD-10-CM

## 2018-05-09 DIAGNOSIS — E11.49 TYPE II DIABETES MELLITUS WITH NEUROLOGICAL MANIFESTATIONS: Primary | ICD-10-CM

## 2018-05-09 PROCEDURE — 99499 UNLISTED E&M SERVICE: CPT | Mod: S$PBB,,, | Performed by: PODIATRIST

## 2018-05-09 PROCEDURE — 11721 DEBRIDE NAIL 6 OR MORE: CPT | Mod: Q9,S$GLB,, | Performed by: PODIATRIST

## 2018-05-09 PROCEDURE — 99999 PR PBB SHADOW E&M-EST. PATIENT-LVL II: CPT | Mod: PBBFAC,,, | Performed by: PODIATRIST

## 2018-05-09 PROCEDURE — 99499 UNLISTED E&M SERVICE: CPT | Mod: S$GLB,,, | Performed by: PODIATRIST

## 2018-05-09 NOTE — PROGRESS NOTES
Subjective:      Patient ID: Kaci Whalen is a 77 y.o. female.    Chief Complaint: Diabetes Mellitus (pcp Abhilash / 4-30-18); Diabetic Foot Exam; and Nail Care    Kaci is a 77 y.o. female who presents to the clinic for evaluation and treatment of diabetic feet. Kaci has a past medical history of Abdominal pain; Anxiety; Atherosclerosis of aortic arch; Atrial fibrillation (05/2016); Benign hypertension; Chronic constipation; Chronic diarrhea; Chronic edema; Depression; Dercum disease; Dysphagia; Gas pain; Glaucoma of both eyes; psychiatric care; Hyperlipidemia with target LDL less than 100; Immature cataract; Tasia; Morbid obesity; Nausea & vomiting; Neuropathy; Polyneuropathy; Primary osteoarthritis of both knees; Proteinuria; Psychiatric problem; Pulmonary hypertension mixed group 2 and 3 (1/18/2017); Sleep apnea; Therapy; Type II or unspecified type diabetes mellitus with neurological manifestations, uncontrolled(250.62); and Unspecified vitamin D deficiency. Patient relates no major problem with feet. Only complaints today consist of toenails in need of trimming. Also has multiple calluses on bottom of feet which are doing much better with moisturizer and routine trimming. No other complaints today. Routine trimming helps. Wearing DM shoes which help. Has hammertoes for which she uses toe spacers and toe sleeves. These do well for her. Requesting more toe sleeves.     PCP: Toby Hung MD    Date Last Seen by PCP:   Chief Complaint   Patient presents with    Diabetes Mellitus     pcp Abhilash / 4-30-18    Diabetic Foot Exam    Nail Care         Current shoe gear: Rx diabetic extra depth shoes and custom accommodative insoles    Hemoglobin A1C   Date Value Ref Range Status   04/26/2018 6.2 (H) 4.0 - 5.6 % Final     Comment:     According to ADA guidelines, hemoglobin A1c <7.0% represents  optimal control in non-pregnant diabetic patients. Different  metrics may apply to specific patient populations.    Standards of Medical Care in Diabetes-2016.  For the purpose of screening for the presence of diabetes:  <5.7%     Consistent with the absence of diabetes  5.7-6.4%  Consistent with increasing risk for diabetes   (prediabetes)  >or=6.5%  Consistent with diabetes  Currently, no consensus exists for use of hemoglobin A1c  for diagnosis of diabetes for children.  This Hemoglobin A1c assay has significant interference with fetal   hemoglobin   (HbF). The results are invalid for patients with abnormal amounts of   HbF,   including those with known Hereditary Persistence   of Fetal Hemoglobin. Heterozygous hemoglobin variants (HbAS, HbAC,   HbAD, HbAE, HbA2) do not significantly interfere with this assay;   however, presence of multiple variants in a sample may impact the %   interference.     11/16/2017 6.5 (H) 4.0 - 5.6 % Final     Comment:     According to ADA guidelines, hemoglobin A1c <7.0% represents  optimal control in non-pregnant diabetic patients. Different  metrics may apply to specific patient populations.   Standards of Medical Care in Diabetes-2016.  For the purpose of screening for the presence of diabetes:  <5.7%     Consistent with the absence of diabetes  5.7-6.4%  Consistent with increasing risk for diabetes   (prediabetes)  >or=6.5%  Consistent with diabetes  Currently, no consensus exists for use of hemoglobin A1c  for diagnosis of diabetes for children.  This Hemoglobin A1c assay has significant interference with fetal   hemoglobin   (HbF). The results are invalid for patients with abnormal amounts of   HbF,   including those with known Hereditary Persistence   of Fetal Hemoglobin. Heterozygous hemoglobin variants (HbAS, HbAC,   HbAD, HbAE, HbA2) do not significantly interfere with this assay;   however, presence of multiple variants in a sample may impact the %   interference.     06/07/2017 6.2 4.5 - 6.2 % Final     Comment:     According to ADA guidelines, hemoglobin A1C <7.0%  represents  optimal control in non-pregnant diabetic patients.  Different  metrics may apply to specific populations.   Standards of Medical Care in Diabetes - 2016.  For the purpose of screening for the presence of diabetes:  <5.7%     Consistent with the absence of diabetes  5.7-6.4%  Consistent with increasing risk for diabetes   (prediabetes)  >or=6.5%  Consistent with diabetes  Currently no consensus exists for use of hemoglobin A1C  for diagnosis of diabetes for children.         Past Medical History:   Diagnosis Date    Abdominal pain     Anxiety     Atherosclerosis of aortic arch     noted on CXR 7/1/2015    Atrial fibrillation 05/2016    CHADS 4, on Xarelto    Benign hypertension     Chronic constipation     Chronic diarrhea     Chronic edema     Depression     Dercum disease     Multiple painful lipomas    Dysphagia     Gas pain     Glaucoma of both eyes     Hx of psychiatric care     previously amitriptyline, now jut on Trazodone 100mg QHS PRN insomnia    Hyperlipidemia with target LDL less than 100     Unable to tolerate statins    Immature cataract     Tasia     no symptoms prior to this presentation    Morbid obesity     Nausea & vomiting     Neuropathy     Polyneuropathy     Primary osteoarthritis of both knees     Proteinuria     Psychiatric problem     history of depression    Pulmonary hypertension mixed group 2 and 3 1/18/2017    Sleep apnea     Therapy     no history of therapy    Type II or unspecified type diabetes mellitus with neurological manifestations, uncontrolled(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 per Dr Wolf      ok to  hold Xarelto 2 days prior to procedure per Dr Driss Dixon    diabetic retinopathy      right    HYSTERECTOMY      knee surgery x2      Left ankle and leg surgery secondary to a fall      left arm fracture      Left cataract      PARTIAL HYSTERECTOMY      Secondary to bleeding    TOE FUSION      2,3 R       Family History   Problem Relation Age of Onset    No Known Problems Daughter     No Known Problems Father     Liver cancer Mother     Bone cancer Daughter     No Known Problems Sister     No Known Problems Brother     No Known Problems Maternal Aunt     No Known Problems Maternal Uncle     No Known Problems Paternal Aunt     No Known Problems Paternal Uncle     No Known Problems Maternal Grandmother     No Known Problems Maternal Grandfather     No Known Problems Paternal Grandmother     No Known Problems Paternal Grandfather     Amblyopia Neg Hx     Blindness Neg Hx     Cancer Neg Hx     Cataracts Neg Hx     Diabetes Neg Hx     Glaucoma Neg Hx     Hypertension Neg Hx     Macular degeneration Neg Hx     Retinal detachment Neg Hx     Strabismus Neg Hx     Stroke Neg Hx     Thyroid disease Neg Hx     Celiac disease Neg Hx     Cirrhosis Neg Hx     Colon cancer Neg Hx     Colon polyps Neg Hx     Crohn's disease Neg Hx     Cystic fibrosis Neg Hx     Esophageal cancer Neg Hx     Hemochromatosis Neg Hx     Inflammatory bowel disease Neg Hx     Irritable bowel syndrome Neg Hx     Liver disease Neg Hx     Rectal cancer Neg Hx     Stomach cancer Neg Hx     Ulcerative colitis Neg Hx     Montez's disease Neg Hx     Alcohol abuse Neg Hx     Bipolar disorder Neg Hx     Dementia Neg Hx     Depression Neg Hx     Drug abuse Neg Hx     Suicide Neg Hx     Schizophrenia Neg Hx        Social History     Social History    Marital status:      Spouse name: N/A    Number of children: 3    Years of education: 14     Occupational History    housewife      Social History  Main Topics    Smoking status: Never Smoker    Smokeless tobacco: Never Used    Alcohol use No    Drug use: No    Sexual activity: Yes     Partners: Male     Other Topics Concern    None     Social History Narrative    Pt was the youngest of 3 boys and 2 girls.  Her father  when she was 3 months old.  She was raised by her mother, mgm, and an uncle.  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  They had 2 daughters and 1 son together, plus 5 stepchildren by her .  They live together in [place] in their own home, with no pets.  Hobbies include gardening and crafts.  Pt attends a variety of organized [employer] services.            One daughter is  from bone cancer.    One son  2014 after surgery for MIGUEL       Current Outpatient Prescriptions   Medication Sig Dispense Refill    alcohol swabs PadM Apply 1 each topically as needed.  0    fluticasone (FLONASE) 50 mcg/actuation nasal spray 1 spray (50 mcg total) by Each Nare route 2 (two) times daily. 16 g 3    hydrochlorothiazide (HYDRODIURIL) 25 MG tablet Take 1 tablet (25 mg total) by mouth once daily. 90 tablet 3    insulin aspart protamine-insulin aspart (NOVOLOG 70/30) 100 unit/mL (70-30) InPn pen Inject 21 Units into the skin 2 (two) times daily before meals. Sliding scale max daily dosing 50 units daily 15 mL 2    ketoconazole (NIZORAL) 2 % shampoo Apply topically twice a week. Leave on scalp for five minutes then rinse 120 mL 1    lancets (LANCETS,THIN) 28 gauge Misc 1 lancet by Misc.(Non-Drug; Combo Route) route 4 (four) times daily before meals and nightly. 100 each 2    losartan (COZAAR) 100 MG tablet Take 1 tablet (100 mg total) by mouth once daily. 90 tablet 3    magnesium chloride (SLOW-MAG) 71.5 mg TbEC Take 2 tablets by mouth once.      melatonin 10 mg Tab Take by mouth. 2 tabs at night      metformin (GLUCOPHAGE) 500 MG tablet Take 1 tablet (500  "mg total) by mouth 3 (three) times daily. 270 tablet 3    metoprolol succinate (TOPROL-XL) 25 MG 24 hr tablet TAKE ONE TABLET BY MOUTH ONCE DAILY 90 tablet 2    pen needle, diabetic (BD ULTRA-FINE SANDEEP PEN NEEDLES) 32 gauge x 5/32" Ndle Take 1 each by mouth 4 (four) times daily. 120 each 2    psyllium (METAMUCIL) packet Take 1 packet by mouth once daily.  12    ranitidine (ZANTAC) 300 MG capsule Take 1 capsule (300 mg total) by mouth every evening. 90 capsule 2    rivaroxaban (XARELTO) 20 mg Tab Take 1 tablet (20 mg total) by mouth once daily. 90 tablet 3    TRUE METRIX GLUCOSE TEST STRIP Strp TEST FOUR TIMES DAILY BEFORE MEALS  AND EVERY NIGHT 400 strip 2     No current facility-administered medications for this visit.        Review of patient's allergies indicates:   Allergen Reactions    Ace inhibitors      Other reaction(s): cough      Fluorescein Itching     Other reaction(s): Itching    Iodine      Other reaction(s): Itching    Pravastatin Other (See Comments)     Other reaction(s): mouth tingling/numbness    Simvastatin      Other reaction(s): foot swelling           Review of Systems   Constitution: Negative for chills and fever.   Cardiovascular: Positive for leg swelling. Negative for chest pain and claudication.   Respiratory: Negative for cough and shortness of breath.    Skin: Positive for dry skin (with multiple calluses) and nail changes.   Musculoskeletal:        Pain along calluses both feet   Gastrointestinal: Negative for nausea and vomiting.   Neurological: Positive for numbness. Negative for paresthesias.   Psychiatric/Behavioral: Negative for altered mental status.           Objective:      Physical Exam     Vascular  DP 1/4 bl, PT1 /4 bl  Mild pitting edema, skin is atrophic, decreased digital hair, feet slightly cool, nails thickened, mild plantar rubor    Neurological:  Sensation to the 10 g sensory filament is not felt consistently on the feet toes and ball " (5/10)    Dermatologic:  Nails are elongated and mycotic with thickened, discolored, onycholytic and subungual debris changes X10 Nails areapproximately 2   mm thick and    4 mm long. Skin is intact without wounds, rash, or infection.  Web spaces are clear without maceration    Callus sub 3 b/l, R hallux IPJ plantar and lateral aspect of b/l 5th toes adjacent to nails (5 lesions total)    Musculoskeletal:   Equinus noted b/l ankles with < 10 deg DF noted. MMT 5/5 in DF/PF/Inv/Ev resistance with no reproduction of pain in any direction. Passive range of motion of ankle and pedal joints is painless b/l. Semi-reducible hammertoe contractures noted to toes 2-4 b/l-asymptomatic. HAV, mild, non trackbound noted b/l with mild medial bony prominence at 1st met head--asymptomatic. Adductovarus rotation of 4th toes b/l.   Hypermobility noted to 1st ray b/l with near complete collapse of medial longitudinal arch b/l with loading.       Assessment:       Encounter Diagnoses   Name Primary?    Type II diabetes mellitus with neurological manifestations Yes    Bilateral lower extremity edema     Corn or callus     Numbness of toes     Onychomycosis due to dermatophyte          Plan:       Kaci was seen today for diabetes mellitus, diabetic foot exam and nail care.    Diagnoses and all orders for this visit:    Type II diabetes mellitus with neurological manifestations    Bilateral lower extremity edema    Corn or callus    Numbness of toes    Onychomycosis due to dermatophyte      I counseled the patient on her conditions, their implications and medical management.        - Shoe inspection. Diabetic Foot Education. Patient reminded of the importance of good nutrition and blood sugar control to help prevent podiatric complications of diabetes. Patient instructed on proper foot hygeine. We discussed wearing proper shoe gear, daily foot inspections, never walking without protective shoe gear, never putting sharp instruments to  feet, routine podiatric nail visits every 2-3 months.      With patient's permission, nails were aggressively reduced and debrided 1,2,3,4, 5 R and 1,2, 3,4,5 L and filed to their soft tissue attachment mechanically and with electric , removing all offending nail and debris.   Patient tolerated this well and no blood was drawn. Patient reports relief following the procedure.     Calluses stable at this time. No trimming required.     Discussed continuation of regular and routine moisturizer to skin of both feet to help improve dry skin. Advised to apply twice daily until resolution of symptoms. Avoid between toes.     Silicone toe sleeves provided for daily use. Continue foam toe spacers as needed.     Rx diabetic shoes with custom molded inserts to be worn at all times while ambulating. Continue. Has new pair which feel great per patient.     Discussed proper and consistent elevation of lower extremities, above the level of the heart, while at rest, to help control/improve edema.     RTC 3 months, sooner PRN

## 2018-05-10 ENCOUNTER — OFFICE VISIT (OUTPATIENT)
Dept: CARDIOLOGY | Facility: CLINIC | Age: 78
End: 2018-05-10
Payer: MEDICARE

## 2018-05-10 VITALS
OXYGEN SATURATION: 94 % | HEART RATE: 62 BPM | BODY MASS INDEX: 36.63 KG/M2 | DIASTOLIC BLOOD PRESSURE: 80 MMHG | WEIGHT: 194 LBS | HEIGHT: 61 IN | RESPIRATION RATE: 15 BRPM | SYSTOLIC BLOOD PRESSURE: 130 MMHG

## 2018-05-10 DIAGNOSIS — I50.30 (HFPEF) HEART FAILURE WITH PRESERVED EJECTION FRACTION: Primary | ICD-10-CM

## 2018-05-10 DIAGNOSIS — E11.29 TYPE 2 DIABETES MELLITUS WITH MICROALBUMINURIA, WITH LONG-TERM CURRENT USE OF INSULIN: ICD-10-CM

## 2018-05-10 DIAGNOSIS — Z79.4 TYPE 2 DIABETES MELLITUS WITH MICROALBUMINURIA, WITH LONG-TERM CURRENT USE OF INSULIN: ICD-10-CM

## 2018-05-10 DIAGNOSIS — I48.0 PAROXYSMAL ATRIAL FIBRILLATION: ICD-10-CM

## 2018-05-10 DIAGNOSIS — E78.5 HYPERLIPIDEMIA WITH TARGET LDL LESS THAN 100: ICD-10-CM

## 2018-05-10 DIAGNOSIS — I10 ESSENTIAL HYPERTENSION: ICD-10-CM

## 2018-05-10 DIAGNOSIS — R80.9 TYPE 2 DIABETES MELLITUS WITH MICROALBUMINURIA, WITH LONG-TERM CURRENT USE OF INSULIN: ICD-10-CM

## 2018-05-10 DIAGNOSIS — G47.33 OSA (OBSTRUCTIVE SLEEP APNEA): ICD-10-CM

## 2018-05-10 PROCEDURE — 99214 OFFICE O/P EST MOD 30 MIN: CPT | Mod: S$GLB,,, | Performed by: INTERNAL MEDICINE

## 2018-05-10 PROCEDURE — 3044F HG A1C LEVEL LT 7.0%: CPT | Mod: CPTII,S$GLB,, | Performed by: INTERNAL MEDICINE

## 2018-05-10 PROCEDURE — 99499 UNLISTED E&M SERVICE: CPT | Mod: S$PBB,,, | Performed by: INTERNAL MEDICINE

## 2018-05-10 PROCEDURE — 3075F SYST BP GE 130 - 139MM HG: CPT | Mod: CPTII,S$GLB,, | Performed by: INTERNAL MEDICINE

## 2018-05-10 PROCEDURE — 99999 PR PBB SHADOW E&M-EST. PATIENT-LVL III: CPT | Mod: PBBFAC,,, | Performed by: INTERNAL MEDICINE

## 2018-05-10 PROCEDURE — 3079F DIAST BP 80-89 MM HG: CPT | Mod: CPTII,S$GLB,, | Performed by: INTERNAL MEDICINE

## 2018-05-10 NOTE — PROGRESS NOTES
CARDIOVASCULAR PROGRESS NOTE    REASON FOR CONSULT:   Kaci Whalen is a 77 y.o. female who presents for follow up of AF, HFpEF.    PCP: Abhilash  HISTORY OF PRESENT ILLNESS:   The patient returns for follow-up.  She is accompanied by her daughter.  She reports generally stable status without angina or dyspnea.  There's been no palpitations, lightheadedness, dizziness, or syncope.  She denies PND, or orthopnea.  She does report occasional bipedal edema of the dependent nature.  There's been no melena, hematuria, or claudicant symptoms.  It appears that the patient has taken my instructions and is now attempting to ambulate more.    The patient's manual blood pressure was 142/80 today in the office, however home blood pressures have been in the 120-130/60-80 range.    CARDIOVASCULAR HISTORY:   PAF, CHADS 4 on Xarelto  HFpEF  MIGUEL, pt noncompliant with CPAP    PAST MEDICAL HISTORY:     Past Medical History:   Diagnosis Date    Abdominal pain     Anxiety     Atherosclerosis of aortic arch     noted on CXR 7/1/2015    Atrial fibrillation 05/2016    CHADS 4, on Xarelto    Benign hypertension     Chronic constipation     Chronic diarrhea     Chronic edema     Depression     Dercum disease     Multiple painful lipomas    Dysphagia     Gas pain     Glaucoma of both eyes     Hx of psychiatric care     previously amitriptyline, now jut on Trazodone 100mg QHS PRN insomnia    Hyperlipidemia with target LDL less than 100     Unable to tolerate statins    Immature cataract     Tasia     no symptoms prior to this presentation    Morbid obesity     Nausea & vomiting     Neuropathy     Polyneuropathy     Primary osteoarthritis of both knees     Proteinuria     Psychiatric problem     history of depression    Pulmonary hypertension mixed group 2 and 3 1/18/2017    Sleep apnea     Therapy     no history of therapy    Type II or unspecified type diabetes mellitus with neurological manifestations,  uncontrolled(250.62)     Unspecified vitamin D deficiency        PAST SURGICAL HISTORY:     Past Surgical History:   Procedure Laterality Date     tenotomy      flexors 2,3 L toes    CATARACT EXTRACTION      COLONOSCOPY N/A 5/4/2017    Procedure: COLONOSCOPY;  Surgeon: Suellen Wolf MD;  Location: [facility] ([address] [place] [address]);  Service: Endoscopy;  Laterality: N/A;  2nd floor due to pulm htn, possible gastroparesis. per Dr Wolf      ok to hold Xarelto 2 days prior to procedure per Dr Driss Dixon    diabetic retinopathy      right    HYSTERECTOMY      knee surgery x2      Left ankle and leg surgery secondary to a fall      left arm fracture      Left cataract      PARTIAL HYSTERECTOMY      Secondary to bleeding    TOE FUSION      2,3 R       ALLERGIES AND MEDICATION:     Review of patient's allergies indicates:   Allergen Reactions    Ace inhibitors      Other reaction(s): cough      Fluorescein Itching     Other reaction(s): Itching    Iodine      Other reaction(s): Itching    Pravastatin Other (See Comments)     Other reaction(s): mouth tingling/numbness    Simvastatin      Other reaction(s): foot swelling       Previous Medications    ALCOHOL SWABS PADM    Apply 1 each topically as needed.    FLUTICASONE (FLONASE) 50 MCG/ACTUATION NASAL SPRAY    1 spray (50 mcg total) by Each Nare route 2 (two) times daily.    HYDROCHLOROTHIAZIDE (HYDRODIURIL) 25 MG TABLET    Take 1 tablet (25 mg total) by mouth once daily.    INSULIN ASPART PROTAMINE-INSULIN ASPART (NOVOLOG 70/30) 100 UNIT/ML (70-30) INPN PEN    Inject 21 Units into the skin 2 (two) times daily before meals. Sliding scale max daily dosing 50 units daily    KETOCONAZOLE (NIZORAL) 2 % SHAMPOO    Apply topically twice a week. Leave on scalp for five minutes then rinse    LANCETS (LANCETS,THIN) 28 GAUGE MISC    1 lancet by Misc.(Non-Drug; Combo Route) route 4 (four) times daily before meals and nightly.    LOSARTAN (COZAAR) 100 MG TABLET    Take 1  "tablet (100 mg total) by mouth once daily.    MAGNESIUM CHLORIDE (SLOW-MAG) 71.5 MG TBEC    Take 2 tablets by mouth once.    MELATONIN 10 MG TAB    Take by mouth. 2 tabs at night    METFORMIN (GLUCOPHAGE) 500 MG TABLET    Take 1 tablet (500 mg total) by mouth 3 (three) times daily.    METOPROLOL SUCCINATE (TOPROL-XL) 25 MG 24 HR TABLET    TAKE ONE TABLET BY MOUTH ONCE DAILY    PEN NEEDLE, DIABETIC (BD ULTRA-FINE SANDEEP PEN NEEDLES) 32 GAUGE X 5/32" NDLE    Take 1 each by mouth 4 (four) times daily.    PSYLLIUM (METAMUCIL) PACKET    Take 1 packet by mouth once daily.    RANITIDINE (ZANTAC) 300 MG CAPSULE    Take 1 capsule (300 mg total) by mouth every evening.    RIVAROXABAN (XARELTO) 20 MG TAB    Take 1 tablet (20 mg total) by mouth once daily.    TRUE METRIX GLUCOSE TEST STRIP STRP    TEST FOUR TIMES DAILY BEFORE MEALS  AND EVERY NIGHT       SOCIAL HISTORY:     Social History     Social History    Marital status:      Spouse name: N/A    Number of children: 3    Years of education: 14     Occupational History    housewife      Social History Main Topics    Smoking status: Never Smoker    Smokeless tobacco: Never Used    Alcohol use No    Drug use: No    Sexual activity: Yes     Partners: Male     Other Topics Concern    Not on file     Social History Narrative    Pt was the youngest of 3 boys and 2 girls.  Her father  when she was 3 months old.  She was raised by her mother, mgm, and an uncle.  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  They had 2 daughters and 1 son together, plus 5 stepchildren by her .  They live together in [place] in their own home, with no pets.  Hobbies include gardening and crafts.  Pt attends a variety of organized [employer] services.            One daughter is  from bone cancer.    One son  2014 after surgery for MIGUEL       FAMILY HISTORY:     Family History   Problem Relation Age " of Onset    No Known Problems Daughter     No Known Problems Father     Liver cancer Mother     Bone cancer Daughter     No Known Problems Sister     No Known Problems Brother     No Known Problems Maternal Aunt     No Known Problems Maternal Uncle     No Known Problems Paternal Aunt     No Known Problems Paternal Uncle     No Known Problems Maternal Grandmother     No Known Problems Maternal Grandfather     No Known Problems Paternal Grandmother     No Known Problems Paternal Grandfather     Amblyopia Neg Hx     Blindness Neg Hx     Cancer Neg Hx     Cataracts Neg Hx     Diabetes Neg Hx     Glaucoma Neg Hx     Hypertension Neg Hx     Macular degeneration Neg Hx     Retinal detachment Neg Hx     Strabismus Neg Hx     Stroke Neg Hx     Thyroid disease Neg Hx     Celiac disease Neg Hx     Cirrhosis Neg Hx     Colon cancer Neg Hx     Colon polyps Neg Hx     Crohn's disease Neg Hx     Cystic fibrosis Neg Hx     Esophageal cancer Neg Hx     Hemochromatosis Neg Hx     Inflammatory bowel disease Neg Hx     Irritable bowel syndrome Neg Hx     Liver disease Neg Hx     Rectal cancer Neg Hx     Stomach cancer Neg Hx     Ulcerative colitis Neg Hx     Montez's disease Neg Hx     Alcohol abuse Neg Hx     Bipolar disorder Neg Hx     Dementia Neg Hx     Depression Neg Hx     Drug abuse Neg Hx     Suicide Neg Hx     Schizophrenia Neg Hx        REVIEW OF SYSTEMS:   Review of Systems   Constitutional: Negative for chills, diaphoresis and fever.   HENT: Negative for nosebleeds.    Eyes: Negative for blurred vision, double vision and photophobia.   Respiratory: Negative for hemoptysis, shortness of breath and wheezing.    Cardiovascular: Negative for chest pain, palpitations, orthopnea, claudication, leg swelling and PND.   Gastrointestinal: Negative for abdominal pain, blood in stool, heartburn, melena, nausea and vomiting.   Genitourinary: Negative for flank pain and hematuria.  "  Musculoskeletal: Negative for falls, myalgias and neck pain.   Skin: Negative for rash.   Neurological: Negative for dizziness, seizures, loss of consciousness, weakness and headaches.   Endo/Heme/Allergies: Negative for polydipsia. Does not bruise/bleed easily.   Psychiatric/Behavioral: Negative for depression and memory loss. The patient is not nervous/anxious.        PHYSICAL EXAM:     Vitals:    05/10/18 1336   BP: 130/80   Pulse: 62   Resp: 15    Body mass index is 36.66 kg/m².  Weight: 88 kg (194 lb)   Height: 5' 1" (154.9 cm)     Physical Exam   Constitutional: She is oriented to person, place, and time. She appears well-developed and well-nourished. She is cooperative.  Non-toxic appearance. No distress.   HENT:   Head: Normocephalic and atraumatic.   Eyes: Conjunctivae and EOM are normal. Pupils are equal, round, and reactive to light. No scleral icterus.   Neck: Trachea normal and normal range of motion. Neck supple. Normal carotid pulses and no JVD present. Carotid bruit is not present. No neck rigidity. No edema present. No thyromegaly present.   Cardiovascular: Normal rate, regular rhythm, S1 normal and S2 normal.  PMI is not displaced.  Exam reveals distant heart sounds. Exam reveals no gallop and no friction rub.    No murmur heard.  Pulses:       Carotid pulses are 2+ on the right side, and 2+ on the left side.  Pulmonary/Chest: Effort normal and breath sounds normal. No respiratory distress. She has no wheezes. She has no rales. She exhibits no tenderness.   Abdominal: Soft. Bowel sounds are normal. She exhibits no distension and no mass. There is no hepatosplenomegaly. There is no tenderness.   obese   Musculoskeletal: She exhibits no edema or tenderness.   Feet:   Right Foot:   Skin Integrity: Negative for ulcer.   Left Foot:   Skin Integrity: Negative for ulcer.   Neurological: She is alert and oriented to person, place, and time. No cranial nerve deficit.   Skin: Skin is warm and dry. No rash " noted. No erythema.   Psychiatric: She has a normal mood and affect. Her speech is normal and behavior is normal.   Vitals reviewed.      DATA:   EKG: (personally reviewed tracing)  4/13/18 SR 64, PRWP    Laboratory:  CBC:    Recent Labs  Lab 06/07/17  0637 11/16/17  0833 04/26/18  1014   WHITE BLOOD CELL COUNT 6.88 4.59 5.86   HEMOGLOBIN 11.6 L 10.7 L 11.1 L   HEMATOCRIT 34.0 L 32.6 L 34.2 L   PLATELETS 260 245 235       CHEMISTRIES:    Recent Labs  Lab 01/13/17  1325  02/10/17  0810  06/07/17  0637  11/16/17  0833 04/26/18  1014 05/04/18  1005   GLUCOSE 144 H  < > 134 H  < > 44 LL  44 LL  < > 145 H 167 H 161 H   SODIUM 139  < > 126 L  < > 137  137  < > 136 137 136   POTASSIUM 4.0  < > 4.1  < > 3.6  3.6  < > 4.3 4.5 4.9   BUN BLD 24 H  < > 30 H  < > 24 H  24 H  < > 27 H 31 H 29 H   CREATININE 1.0  < > 1.5 H  < > 1.8 H  1.8 H  < > 1.3 1.4 1.4   EGFR IF  >60.0  < > 39.3 A  < > 31.5 A  31.5 A  < > 46.4 A 42.4 A 42.4 A   EGFR IF NON- 55.6 A  < > 34.1 A  < > 27.3 A  27.3 A  < > 40.2 A 36.8 A 36.8 A   CALCIUM 9.7  < > 9.8  < > 9.8  9.8  < > 9.7 9.8 10.1   MAGNESIUM 1.8  --  2.0  --  1.8  --   --   --   --    < > = values in this interval not displayed.    CARDIAC BIOMARKERS:    Recent Labs  Lab 05/28/16  1031 05/28/16  1744 05/29/16  0145   CPK  --   --  49  49   CPK MB  --   --  2.2   TROPONIN I <0.006 0.009 <0.006       COAGS:        LIPIDS/LFTS:    Recent Labs  Lab 12/11/15  0830  06/10/17  0425 11/16/17  0833 04/26/18  1014   CHOLESTEROL 155  --   --  189  189  --    TRIGLYCERIDES 118  --   --  123  123  --    HDL 44  --   --  51  51  --    LDL CHOLESTEROL 87.4  --   --  113.4  113.4  --    NON-HDL CHOLESTEROL 111  --   --  138  138  --    AST 19  < > 13 14 15   ALT 13  < > 7 L 9 L 9 L   < > = values in this interval not displayed.    Lab Results   Component Value Date    TSH 2.263 06/06/2017         Cardiovascular Testing:  [facility] 1/18/17  B. Summary/Post-Operative  Diagnosis   1.   Moderately elevated right atrial pressure.   2.   Moderately elevated mean PCWP pressure.   3.   Moderately elevated mean PA pressure with normal PVR.   4.   Normal CO/CI.   5.   Systemic vascular resistance 1192.  D. Hemodynamic Results  AOPRES: 135/68 (91)  AOSAT: 100  FICKCI: 2.76  FICKCO: 5.18  PAPRES: 60/25 (37)  PASAT: 68  PVR: 1.93  PWPRES: 23/42 (27)  Unable to draw blood for saturation but confirmed on fluoroscopy  RAPRES: 16/16 (14)  RVPRES: 60/15, 15  SVR: 14.88/1192    Echo: 5/28/16     1 - Normal left ventricular systolic function (EF 60-65%).  No diagnostic regional wall motion abnormalities.     2 - Biatrial enlargement.     3 - Trivial mitral regurgitation.     4 - Trivial tricuspid regurgitation.     5 - The estimated PA systolic pressure is 18 mmHg.    ASSESSMENT:   # PAF, CHADS 4, currently in SR on Xarelto 20mg qd.  # DM  # HTN, controlled  # HFpEF.  Pt appears euvolemic off lasix at present.  # BMI 37, stable vs last OV.  # MIGUEL, pt noncompliant with CPAP  # HLP, multiple prior statin intolerances noted    PLAN:   Continue strategy of rate control and anticoag   Diet/exercise/weight loss  I've encouraged patient to maintain her ambulatory status.  RTC 6 months  Resume lasix prn weight gain/LE edema/dyspnea    Driss Dixon MD, FACC

## 2018-05-16 DIAGNOSIS — Z79.4 TYPE 2 DIABETES MELLITUS WITH MICROALBUMINURIA, WITH LONG-TERM CURRENT USE OF INSULIN: ICD-10-CM

## 2018-05-16 DIAGNOSIS — R80.9 TYPE 2 DIABETES MELLITUS WITH MICROALBUMINURIA, WITH LONG-TERM CURRENT USE OF INSULIN: ICD-10-CM

## 2018-05-16 DIAGNOSIS — E11.29 TYPE 2 DIABETES MELLITUS WITH MICROALBUMINURIA, WITH LONG-TERM CURRENT USE OF INSULIN: ICD-10-CM

## 2018-05-17 RX ORDER — CALCIUM CITRATE/VITAMIN D3 200MG-6.25
TABLET ORAL
Qty: 400 STRIP | Refills: 2 | Status: SHIPPED | OUTPATIENT
Start: 2018-05-17 | End: 2019-03-08 | Stop reason: SDUPTHER

## 2018-05-21 ENCOUNTER — TELEPHONE (OUTPATIENT)
Dept: PHARMACY | Facility: CLINIC | Age: 78
End: 2018-05-21

## 2018-05-21 NOTE — TELEPHONE ENCOUNTER
Patient has to spend $944.16 in out of pocket expense.  As of April 30,2018 patient only spent $131.

## 2018-07-09 DIAGNOSIS — I10 HYPERTENSION, ESSENTIAL: ICD-10-CM

## 2018-07-09 RX ORDER — HYDROCHLOROTHIAZIDE 25 MG/1
TABLET ORAL
Qty: 90 TABLET | Refills: 3 | Status: SHIPPED | OUTPATIENT
Start: 2018-07-09 | End: 2019-05-22 | Stop reason: SDUPTHER

## 2018-07-30 ENCOUNTER — LAB VISIT (OUTPATIENT)
Dept: LAB | Facility: HOSPITAL | Age: 78
End: 2018-07-30
Attending: INTERNAL MEDICINE
Payer: MEDICARE

## 2018-07-30 ENCOUNTER — OFFICE VISIT (OUTPATIENT)
Dept: FAMILY MEDICINE | Facility: CLINIC | Age: 78
End: 2018-07-30
Payer: MEDICARE

## 2018-07-30 VITALS
WEIGHT: 200.38 LBS | HEIGHT: 61 IN | TEMPERATURE: 99 F | OXYGEN SATURATION: 95 % | RESPIRATION RATE: 16 BRPM | BODY MASS INDEX: 37.83 KG/M2 | DIASTOLIC BLOOD PRESSURE: 70 MMHG | SYSTOLIC BLOOD PRESSURE: 158 MMHG | HEART RATE: 60 BPM

## 2018-07-30 DIAGNOSIS — R80.9 TYPE 2 DIABETES MELLITUS WITH MICROALBUMINURIA, WITH LONG-TERM CURRENT USE OF INSULIN: ICD-10-CM

## 2018-07-30 DIAGNOSIS — K21.9 GASTROESOPHAGEAL REFLUX DISEASE, ESOPHAGITIS PRESENCE NOT SPECIFIED: ICD-10-CM

## 2018-07-30 DIAGNOSIS — E11.29 TYPE 2 DIABETES MELLITUS WITH MICROALBUMINURIA, WITH LONG-TERM CURRENT USE OF INSULIN: ICD-10-CM

## 2018-07-30 DIAGNOSIS — Z79.4 TYPE 2 DIABETES MELLITUS WITH MICROALBUMINURIA, WITH LONG-TERM CURRENT USE OF INSULIN: ICD-10-CM

## 2018-07-30 DIAGNOSIS — I50.30 (HFPEF) HEART FAILURE WITH PRESERVED EJECTION FRACTION: ICD-10-CM

## 2018-07-30 DIAGNOSIS — I10 ESSENTIAL HYPERTENSION: ICD-10-CM

## 2018-07-30 DIAGNOSIS — N18.30 CKD (CHRONIC KIDNEY DISEASE), STAGE III: ICD-10-CM

## 2018-07-30 DIAGNOSIS — N18.30 CKD (CHRONIC KIDNEY DISEASE), STAGE III: Primary | ICD-10-CM

## 2018-07-30 DIAGNOSIS — I48.0 PAROXYSMAL ATRIAL FIBRILLATION: ICD-10-CM

## 2018-07-30 LAB
ALBUMIN SERPL BCP-MCNC: 3.8 G/DL
ALP SERPL-CCNC: 67 U/L
ALT SERPL W/O P-5'-P-CCNC: 10 U/L
ANION GAP SERPL CALC-SCNC: 9 MMOL/L
AST SERPL-CCNC: 17 U/L
BASOPHILS # BLD AUTO: 0.03 K/UL
BASOPHILS NFR BLD: 0.5 %
BILIRUB SERPL-MCNC: 0.5 MG/DL
BUN SERPL-MCNC: 28 MG/DL
CALCIUM SERPL-MCNC: 10.3 MG/DL
CHLORIDE SERPL-SCNC: 97 MMOL/L
CO2 SERPL-SCNC: 28 MMOL/L
CREAT SERPL-MCNC: 1.4 MG/DL
DIFFERENTIAL METHOD: ABNORMAL
EOSINOPHIL # BLD AUTO: 0.2 K/UL
EOSINOPHIL NFR BLD: 3 %
ERYTHROCYTE [DISTWIDTH] IN BLOOD BY AUTOMATED COUNT: 13.1 %
EST. GFR  (AFRICAN AMERICAN): 42 ML/MIN/1.73 M^2
EST. GFR  (NON AFRICAN AMERICAN): 37 ML/MIN/1.73 M^2
GLUCOSE SERPL-MCNC: 107 MG/DL
HCT VFR BLD AUTO: 34.2 %
HGB BLD-MCNC: 11.6 G/DL
LYMPHOCYTES # BLD AUTO: 1.2 K/UL
LYMPHOCYTES NFR BLD: 19.8 %
MCH RBC QN AUTO: 30.2 PG
MCHC RBC AUTO-ENTMCNC: 33.9 G/DL
MCV RBC AUTO: 89 FL
MONOCYTES # BLD AUTO: 0.6 K/UL
MONOCYTES NFR BLD: 9.2 %
NEUTROPHILS # BLD AUTO: 4.1 K/UL
NEUTROPHILS NFR BLD: 67.2 %
PLATELET # BLD AUTO: 278 K/UL
PMV BLD AUTO: 10 FL
POTASSIUM SERPL-SCNC: 4.6 MMOL/L
PROT SERPL-MCNC: 7.3 G/DL
RBC # BLD AUTO: 3.84 M/UL
SODIUM SERPL-SCNC: 134 MMOL/L
WBC # BLD AUTO: 6.06 K/UL

## 2018-07-30 PROCEDURE — 36415 COLL VENOUS BLD VENIPUNCTURE: CPT | Mod: PN

## 2018-07-30 PROCEDURE — 3044F HG A1C LEVEL LT 7.0%: CPT | Mod: CPTII,S$GLB,, | Performed by: INTERNAL MEDICINE

## 2018-07-30 PROCEDURE — 99999 PR PBB SHADOW E&M-EST. PATIENT-LVL III: CPT | Mod: PBBFAC,,, | Performed by: INTERNAL MEDICINE

## 2018-07-30 PROCEDURE — 83036 HEMOGLOBIN GLYCOSYLATED A1C: CPT

## 2018-07-30 PROCEDURE — 99499 UNLISTED E&M SERVICE: CPT | Mod: S$GLB,,, | Performed by: INTERNAL MEDICINE

## 2018-07-30 PROCEDURE — 99214 OFFICE O/P EST MOD 30 MIN: CPT | Mod: S$GLB,,, | Performed by: INTERNAL MEDICINE

## 2018-07-30 PROCEDURE — 85025 COMPLETE CBC W/AUTO DIFF WBC: CPT

## 2018-07-30 PROCEDURE — 80053 COMPREHEN METABOLIC PANEL: CPT

## 2018-07-30 PROCEDURE — 3077F SYST BP >= 140 MM HG: CPT | Mod: CPTII,S$GLB,, | Performed by: INTERNAL MEDICINE

## 2018-07-30 PROCEDURE — 3078F DIAST BP <80 MM HG: CPT | Mod: CPTII,S$GLB,, | Performed by: INTERNAL MEDICINE

## 2018-07-30 RX ORDER — AMLODIPINE BESYLATE 5 MG/1
5 TABLET ORAL DAILY
Qty: 30 TABLET | Refills: 5 | Status: SHIPPED | OUTPATIENT
Start: 2018-07-30 | End: 2019-01-21 | Stop reason: SDUPTHER

## 2018-07-30 NOTE — PROGRESS NOTES
Subjective:       Patient ID: Kaci Whalen is a 77 y.o. female.    Chief Complaint: Diabetes; Follow-up (3 month); and Cough    Diabetes   She presents for her follow-up diabetic visit. She has type 2 diabetes mellitus. Her disease course has been stable. There are no hypoglycemic associated symptoms. Pertinent negatives for hypoglycemia include no dizziness or headaches. Associated symptoms include foot paresthesias. Pertinent negatives for diabetes include no chest pain, no fatigue, no foot ulcerations, no polyuria, no visual change and no weakness. There are no hypoglycemic complications. Symptoms are stable. Diabetic complications include nephropathy and peripheral neuropathy. Risk factors for coronary artery disease include diabetes mellitus, hypertension, obesity, sedentary lifestyle and post-menopausal. Current diabetic treatment includes insulin injections and oral agent (monotherapy). Her weight is stable. She is following a generally healthy diet. An ACE inhibitor/angiotensin II receptor blocker is being taken. She sees a podiatrist.Eye exam is current.   noted home BP > 140/90 denies chest pain dyspnea chronic non productive cough no wheezing. Has been ambulating more  Review of Systems   Constitutional: Negative for activity change, appetite change, fatigue, fever and unexpected weight change.   HENT: Negative for ear pain, rhinorrhea and sore throat.    Eyes: Negative for discharge and visual disturbance.   Respiratory: Negative for chest tightness, shortness of breath and wheezing.    Cardiovascular: Negative for chest pain, palpitations and leg swelling.   Gastrointestinal: Negative for abdominal pain, constipation and diarrhea.   Endocrine: Negative for cold intolerance, heat intolerance and polyuria.   Genitourinary: Negative for dysuria and hematuria.   Musculoskeletal: Negative for joint swelling and neck stiffness.   Skin: Negative for rash.   Neurological: Negative for dizziness, syncope,  "weakness and headaches.   Psychiatric/Behavioral: Negative for suicidal ideas.       Objective:     Vitals:    07/30/18 0956 07/30/18 1047   BP: (!) 176/70 (!) 158/70   BP Location: Right arm    Patient Position: Sitting    BP Method: Medium (Manual)    Pulse: 62 60   Resp: 16    Temp: 99 °F (37.2 °C)    TempSrc: Oral    SpO2: 95%    Weight: 90.9 kg (200 lb 6.4 oz)    Height: 5' 1" (1.549 m)           Physical Exam   Constitutional: She is oriented to person, place, and time. She appears well-developed and well-nourished.   HENT:   Head: Normocephalic and atraumatic.   Eyes: Conjunctivae are normal. Pupils are equal, round, and reactive to light.   Neck: Normal range of motion.   Cardiovascular: Normal rate and regular rhythm.  Exam reveals no gallop and no friction rub.    No murmur heard.  Pulmonary/Chest: Effort normal and breath sounds normal. She has no wheezes. She has no rales.   Abdominal: Soft. Bowel sounds are normal. There is no tenderness. There is no rebound and no guarding.   Musculoskeletal: Normal range of motion. She exhibits no edema or tenderness.   Neurological: She is alert and oriented to person, place, and time. No cranial nerve deficit.   Skin: Skin is warm and dry.   Psychiatric: She has a normal mood and affect.       Assessment and Plan   1. CKD (chronic kidney disease), stage III  BP above goal add amlodipine, repeat renal functiona nd electrolytes today stressed importance to patient and daughter for good oral hydration avoidance of NSAID's  - Comprehensive metabolic panel; Future    2. Type 2 diabetes mellitus with microalbuminuria, with long-term current use of insulin  Repeat a1c today  - CBC auto differential; Future  - Comprehensive metabolic panel; Future  - Hemoglobin A1c; Future    3. Essential hypertension  Amlodipine BP check in two weeks  - amLODIPine (NORVASC) 5 MG tablet; Take 1 tablet (5 mg total) by mouth once daily.  Dispense: 30 tablet; Refill: 5    4. Paroxysmal atrial " fibrillation  Rate controlled on NOAC continue    5. (HFpEF) heart failure with preserved ejection fraction  Clinically euvolemic on beta blocker and ARB    6. Gastroesophageal reflux disease, esophagitis presence not specified  Change to formulary h2 blocker  - ranitidine (ZANTAC) 150 MG capsule; Take 1 capsule (150 mg total) by mouth 2 (two) times daily.  Dispense: 60 capsule; Refill: 5

## 2018-07-31 LAB
ESTIMATED AVG GLUCOSE: 134 MG/DL
HBA1C MFR BLD HPLC: 6.3 %

## 2018-08-02 ENCOUNTER — PATIENT MESSAGE (OUTPATIENT)
Dept: FAMILY MEDICINE | Facility: CLINIC | Age: 78
End: 2018-08-02

## 2018-08-15 ENCOUNTER — CLINICAL SUPPORT (OUTPATIENT)
Dept: FAMILY MEDICINE | Facility: CLINIC | Age: 78
End: 2018-08-15
Payer: MEDICARE

## 2018-08-15 ENCOUNTER — OFFICE VISIT (OUTPATIENT)
Dept: PODIATRY | Facility: CLINIC | Age: 78
End: 2018-08-15
Payer: MEDICARE

## 2018-08-15 VITALS
HEIGHT: 61 IN | BODY MASS INDEX: 37.76 KG/M2 | WEIGHT: 200 LBS | DIASTOLIC BLOOD PRESSURE: 74 MMHG | SYSTOLIC BLOOD PRESSURE: 130 MMHG

## 2018-08-15 VITALS — SYSTOLIC BLOOD PRESSURE: 126 MMHG | OXYGEN SATURATION: 96 % | HEART RATE: 60 BPM | DIASTOLIC BLOOD PRESSURE: 52 MMHG

## 2018-08-15 DIAGNOSIS — M20.42 HAMMER TOES OF BOTH FEET: ICD-10-CM

## 2018-08-15 DIAGNOSIS — E11.49 TYPE II DIABETES MELLITUS WITH NEUROLOGICAL MANIFESTATIONS: Primary | ICD-10-CM

## 2018-08-15 DIAGNOSIS — I10 ESSENTIAL HYPERTENSION: Primary | ICD-10-CM

## 2018-08-15 DIAGNOSIS — B35.1 ONYCHOMYCOSIS DUE TO DERMATOPHYTE: ICD-10-CM

## 2018-08-15 DIAGNOSIS — M20.41 HAMMER TOES OF BOTH FEET: ICD-10-CM

## 2018-08-15 PROCEDURE — 99499 UNLISTED E&M SERVICE: CPT | Mod: S$GLB,,, | Performed by: INTERNAL MEDICINE

## 2018-08-15 PROCEDURE — 11721 DEBRIDE NAIL 6 OR MORE: CPT | Mod: S$PBB,Q9,, | Performed by: PODIATRIST

## 2018-08-15 PROCEDURE — 99999 PR PBB SHADOW E&M-EST. PATIENT-LVL III: CPT | Mod: PBBFAC,,, | Performed by: PODIATRIST

## 2018-08-15 PROCEDURE — 11721 DEBRIDE NAIL 6 OR MORE: CPT | Mod: PBBFAC,PO | Performed by: PODIATRIST

## 2018-08-15 PROCEDURE — 99999 PR PBB SHADOW E&M-EST. PATIENT-LVL II: CPT | Mod: PBBFAC,,,

## 2018-08-15 PROCEDURE — 99499 UNLISTED E&M SERVICE: CPT | Mod: S$GLB,,, | Performed by: PODIATRIST

## 2018-08-15 NOTE — PROCEDURES
Routine Foot Care  Date/Time: 8/15/2018 3:07 PM  Performed by: Anisha Miller DPM  Authorized by: Anisha Miller DPM     Consent Done?:  Yes (Verbal)  Hyperkeratotic Skin Lesions?: No      Nail Care Type:  Debride  Location(s): All  (Left 1st Toe, Left 3rd Toe, Left 2nd Toe, Left 4th Toe, Left 5th Toe, Right 1st Toe, Right 2nd Toe, Right 3rd Toe, Right 4th Toe and Right 5th Toe)  Patient tolerance:  Patient tolerated the procedure well with no immediate complications

## 2018-08-15 NOTE — PROGRESS NOTES
Kaci Whalen 77 y.o. female is here today for Blood Pressure check.   History of HTN yes.    Review of patient's allergies indicates:   Allergen Reactions    Tramadol Other (See Comments)     Interaction-induced delirium with s/s of estrellita.    Ace inhibitors      Other reaction(s): cough      Fluorescein Itching     Other reaction(s): Itching    Iodine      Other reaction(s): Itching    Pravastatin Other (See Comments)     Other reaction(s): mouth tingling/numbness    Simvastatin      Other reaction(s): foot swelling       Creatinine   Date Value Ref Range Status   07/30/2018 1.4 0.5 - 1.4 mg/dL Final     Sodium   Date Value Ref Range Status   07/30/2018 134 (L) 136 - 145 mmol/L Final     Potassium   Date Value Ref Range Status   07/30/2018 4.6 3.5 - 5.1 mmol/L Final   ]  Patient verifies taking blood pressure medications on a regular basis at the same time of the day.     Current Outpatient Medications:     alcohol swabs PadM, Apply 1 each topically as needed., Disp: , Rfl: 0    amLODIPine (NORVASC) 5 MG tablet, Take 1 tablet (5 mg total) by mouth once daily., Disp: 30 tablet, Rfl: 5    fluticasone (FLONASE) 50 mcg/actuation nasal spray, 1 spray (50 mcg total) by Each Nare route 2 (two) times daily., Disp: 16 g, Rfl: 3    hydroCHLOROthiazide (HYDRODIURIL) 25 MG tablet, TAKE ONE TABLET BY MOUTH ONCE DAILY, Disp: 90 tablet, Rfl: 3    insulin aspart protamine-insulin aspart (NOVOLOG 70/30) 100 unit/mL (70-30) InPn pen, Inject 21 Units into the skin 2 (two) times daily before meals. Sliding scale max daily dosing 50 units daily, Disp: 15 mL, Rfl: 2    ketoconazole (NIZORAL) 2 % shampoo, Apply topically twice a week. Leave on scalp for five minutes then rinse, Disp: 120 mL, Rfl: 1    lancets (LANCETS,THIN) 28 gauge Misc, 1 lancet by Misc.(Non-Drug; Combo Route) route 4 (four) times daily before meals and nightly., Disp: 100 each, Rfl: 2    losartan (COZAAR) 100 MG tablet, Take 1 tablet (100 mg total) by  "mouth once daily., Disp: 90 tablet, Rfl: 3    magnesium chloride (SLOW-MAG) 71.5 mg TbEC, Take 2 tablets by mouth once., Disp: , Rfl:     melatonin 10 mg Tab, Take by mouth. 2 tabs at night, Disp: , Rfl:     metformin (GLUCOPHAGE) 500 MG tablet, Take 1 tablet (500 mg total) by mouth 3 (three) times daily., Disp: 270 tablet, Rfl: 3    metoprolol succinate (TOPROL-XL) 25 MG 24 hr tablet, TAKE ONE TABLET BY MOUTH ONCE DAILY, Disp: 90 tablet, Rfl: 2    pen needle, diabetic (BD ULTRA-FINE SANDEEP PEN NEEDLES) 32 gauge x 5/32" Ndle, Take 1 each by mouth 4 (four) times daily., Disp: 120 each, Rfl: 2    psyllium (METAMUCIL) packet, Take 1 packet by mouth once daily., Disp: , Rfl: 12    ranitidine (ZANTAC) 150 MG tablet, Take 1 tablet (150 mg total) by mouth 2 (two) times daily., Disp: 60 tablet, Rfl: 5    rivaroxaban (XARELTO) 20 mg Tab, Take 1 tablet (20 mg total) by mouth once daily., Disp: 90 tablet, Rfl: 3    TRUE METRIX GLUCOSE TEST STRIP Strp, TEST FOUR TIMES DAILY BEFORE MEALS  AND EVERY NIGHT, Disp: 400 strip, Rfl: 2  Does patient have record of home blood pressure readings no.    Last dose of blood pressure medication was taken at approx 6:50am.  Patient is asymptomatic.   Complains of none.    Vitals:    08/15/18 1344   BP: (!) 126/52   BP Location: Right arm   Patient Position: Sitting   BP Method: Large (Manual)   Pulse: 60   SpO2: 96%         Dr. Hung notified.   "

## 2018-08-15 NOTE — PROGRESS NOTES
Subjective:      Patient ID: Kaci Whalen is a 77 y.o. female.    Chief Complaint: Diabetes Mellitus (Pcp Dr. Hung 7/30/18); Diabetic Foot Exam; and Nail Care    Kaci is a 77 y.o. female who presents to the clinic for evaluation and treatment of diabetic feet. Kaci has a past medical history of Abdominal pain, Anxiety, Atherosclerosis of aortic arch, Atrial fibrillation (05/2016), Benign hypertension, Chronic constipation, Chronic diarrhea, Chronic edema, Depression, Dercum disease, Dysphagia, Gas pain, Glaucoma of both eyes, psychiatric care, Hyperlipidemia with target LDL less than 100, Immature cataract, Tasia, Morbid obesity, Nausea & vomiting, Neuropathy, Polyneuropathy, Primary osteoarthritis of both knees, Proteinuria, Psychiatric problem, Pulmonary hypertension mixed group 2 and 3 (1/18/2017), Sleep apnea, Therapy, Type II or unspecified type diabetes mellitus with neurological manifestations, uncontrolled(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 Patient relates no major problem with feet. Only complaints today consist of toenails in need of trimming. No other complaints today. Wearing DM shoes which help. Has hammertoes for which she uses toe spacers and toe sleeves. Requesting more toe sleeves.     PCP: Toby Hung MD    Date Last Seen by PCP:   Chief Complaint   Patient presents with    Diabetes Mellitus     Pcp Dr. Hung 7/30/18    Diabetic Foot Exam    Nail Care         Current shoe gear: Rx diabetic extra depth shoes and custom accommodative insoles    Hemoglobin A1C   Date Value Ref Range Status   07/30/2018 6.3 (H) 4.0 - 5.6 % Final     Comment:     ADA Screening Guidelines:  5.7-6.4%  Consistent with prediabetes  >or=6.5%  Consistent with diabetes  High levels of fetal hemoglobin interfere with the HbA1C  assay. Heterozygous hemoglobin variants (HbS, HgC, etc)do  not significantly interfere with this assay.   However, presence of multiple variants may affect accuracy.      04/26/2018 6.2 (H) 4.0 - 5.6 % Final     Comment:     According to ADA guidelines, hemoglobin A1c <7.0% represents  optimal control in non-pregnant diabetic patients. Different  metrics may apply to specific patient populations.   Standards of Medical Care in Diabetes-2016.  For the purpose of screening for the presence of diabetes:  <5.7%     Consistent with the absence of diabetes  5.7-6.4%  Consistent with increasing risk for diabetes   (prediabetes)  >or=6.5%  Consistent with diabetes  Currently, no consensus exists for use of hemoglobin A1c  for diagnosis of diabetes for children.  This Hemoglobin A1c assay has significant interference with fetal   hemoglobin   (HbF). The results are invalid for patients with abnormal amounts of   HbF,   including those with known Hereditary Persistence   of Fetal Hemoglobin. Heterozygous hemoglobin variants (HbAS, HbAC,   HbAD, HbAE, HbA2) do not significantly interfere with this assay;   however, presence of multiple variants in a sample may impact the %   interference.     11/16/2017 6.5 (H) 4.0 - 5.6 % Final     Comment:     According to ADA guidelines, hemoglobin A1c <7.0% represents  optimal control in non-pregnant diabetic patients. Different  metrics may apply to specific patient populations.   Standards of Medical Care in Diabetes-2016.  For the purpose of screening for the presence of diabetes:  <5.7%     Consistent with the absence of diabetes  5.7-6.4%  Consistent with increasing risk for diabetes   (prediabetes)  >or=6.5%  Consistent with diabetes  Currently, no consensus exists for use of hemoglobin A1c  for diagnosis of diabetes for children.  This Hemoglobin A1c assay has significant interference with fetal   hemoglobin   (HbF). The results are invalid for patients with abnormal amounts of   HbF,   including those with known Hereditary Persistence   of Fetal Hemoglobin. Heterozygous hemoglobin variants (HbAS, HbAC,   HbAD, HbAE, HbA2) do not significantly  interfere with this assay;   however, presence of multiple variants in a sample may impact the %   interference.         Past Medical History:   Diagnosis Date    Abdominal pain     Anxiety     Atherosclerosis of aortic arch     noted on CXR 7/1/2015    Atrial fibrillation 05/2016    CHADS 4, on Xarelto    Benign hypertension     Chronic constipation     Chronic diarrhea     Chronic edema     Depression     Dercum disease     Multiple painful lipomas    Dysphagia     Gas pain     Glaucoma of both eyes     Hx of psychiatric care     previously amitriptyline, now jut on Trazodone 100mg QHS PRN insomnia    Hyperlipidemia with target LDL less than 100     Unable to tolerate statins    Immature cataract     Tasia     no symptoms prior to this presentation    Morbid obesity     Nausea & vomiting     Neuropathy     Polyneuropathy     Primary osteoarthritis of both knees     Proteinuria     Psychiatric problem     history of depression    Pulmonary hypertension mixed group 2 and 3 1/18/2017    Sleep apnea     Therapy     no history of therapy    Type II or unspecified type diabetes mellitus with neurological manifestations, uncontrolled(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)     Unspecified vitamin D deficiency        Past Surgical History:   Procedure Laterality Date     tenotomy      flexors 2,3 L toes    CATARACT EXTRACTION      diabetic retinopathy      right    HYSTERECTOMY      knee surgery x2      Left ankle and leg surgery secondary to a fall      left arm fracture      Left cataract      PARTIAL HYSTERECTOMY      Secondary to bleeding    TOE FUSION      2,3 R       Family History   Problem Relation Age of Onset    No Known Problems Daughter     No Known Problems Father     Liver cancer Mother     Bone cancer Daughter     No Known Problems Sister     No Known Problems Brother     No Known Problems Maternal Aunt     No Known Problems Maternal Uncle     No Known Problems Paternal Aunt     No Known  Problems Paternal Uncle     No Known Problems Maternal Grandmother     No Known Problems Maternal Grandfather     No Known Problems Paternal Grandmother     No Known Problems Paternal Grandfather     Amblyopia Neg Hx     Blindness Neg Hx     Cancer Neg Hx     Cataracts Neg Hx     Diabetes Neg Hx     Glaucoma Neg Hx     Hypertension Neg Hx     Macular degeneration Neg Hx     Retinal detachment Neg Hx     Strabismus Neg Hx     Stroke Neg Hx     Thyroid disease Neg Hx     Celiac disease Neg Hx     Cirrhosis Neg Hx     Colon cancer Neg Hx     Colon polyps Neg Hx     Crohn's disease Neg Hx     Cystic fibrosis Neg Hx     Esophageal cancer Neg Hx     Hemochromatosis Neg Hx     Inflammatory bowel disease Neg Hx     Irritable bowel syndrome Neg Hx     Liver disease Neg Hx     Rectal cancer Neg Hx     Stomach cancer Neg Hx     Ulcerative colitis Neg Hx     Montez's disease Neg Hx     Alcohol abuse Neg Hx     Bipolar disorder Neg Hx     Dementia Neg Hx     Depression Neg Hx     Drug abuse Neg Hx     Suicide Neg Hx     Schizophrenia Neg Hx        Social History     Socioeconomic History    Marital status:      Spouse name: None    Number of children: 3    Years of education: 14    Highest education level: None   Social Needs    Financial resource strain: None    Food insecurity - worry: None    Food insecurity - inability: None    Transportation needs - medical: None    Transportation needs - non-medical: None   Occupational History    Occupation: housewife   Tobacco Use    Smoking status: Never Smoker    Smokeless tobacco: Never Used   Substance and Sexual Activity    Alcohol use: No    Drug use: No    Sexual activity: Yes     Partners: Male   Other Topics Concern    Patient feels they ought to cut down on drinking/drug use Not Asked    Patient annoyed by others criticizing their drinking/drug use Not Asked    Patient has felt bad or guilty about drinking/drug use  Not Asked    Patient has had a drink/used drugs as an eye opener in the AM Not Asked   Social History Narrative    Pt was the youngest of 3 boys and 2 girls.  Her father  when she was 3 months old.  She was raised by her mother, mgm, and an uncle.  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  They had 2 daughters and 1 son together, plus 5 stepchildren by her .  They live together in [place] in their own home, with no pets.  Hobbies include gardening and crafts.  Pt attends a variety of organized [employer] services.            One daughter is  from bone cancer.    One son  2014 after surgery for MIGUEL       Current Outpatient Medications   Medication Sig Dispense Refill    alcohol swabs PadM Apply 1 each topically as needed.  0    amLODIPine (NORVASC) 5 MG tablet Take 1 tablet (5 mg total) by mouth once daily. 30 tablet 5    fluticasone (FLONASE) 50 mcg/actuation nasal spray 1 spray (50 mcg total) by Each Nare route 2 (two) times daily. 16 g 3    hydroCHLOROthiazide (HYDRODIURIL) 25 MG tablet TAKE ONE TABLET BY MOUTH ONCE DAILY 90 tablet 3    insulin aspart protamine-insulin aspart (NOVOLOG 70/30) 100 unit/mL (70-30) InPn pen Inject 21 Units into the skin 2 (two) times daily before meals. Sliding scale max daily dosing 50 units daily 15 mL 2    ketoconazole (NIZORAL) 2 % shampoo Apply topically twice a week. Leave on scalp for five minutes then rinse 120 mL 1    lancets (LANCETS,THIN) 28 gauge Misc 1 lancet by Misc.(Non-Drug; Combo Route) route 4 (four) times daily before meals and nightly. 100 each 2    losartan (COZAAR) 100 MG tablet Take 1 tablet (100 mg total) by mouth once daily. 90 tablet 3    magnesium chloride (SLOW-MAG) 71.5 mg TbEC Take 2 tablets by mouth once.      melatonin 10 mg Tab Take by mouth. 2 tabs at night      metformin (GLUCOPHAGE) 500 MG tablet Take 1 tablet (500 mg total) by mouth 3 (three)  "times daily. 270 tablet 3    metoprolol succinate (TOPROL-XL) 25 MG 24 hr tablet TAKE ONE TABLET BY MOUTH ONCE DAILY 90 tablet 2    pen needle, diabetic (BD ULTRA-FINE SANDEEP PEN NEEDLES) 32 gauge x 5/32" Ndle Take 1 each by mouth 4 (four) times daily. 120 each 2    psyllium (METAMUCIL) packet Take 1 packet by mouth once daily.  12    ranitidine (ZANTAC) 150 MG tablet Take 1 tablet (150 mg total) by mouth 2 (two) times daily. 60 tablet 5    rivaroxaban (XARELTO) 20 mg Tab Take 1 tablet (20 mg total) by mouth once daily. 90 tablet 3    TRUE METRIX GLUCOSE TEST STRIP Strp TEST FOUR TIMES DAILY BEFORE MEALS  AND EVERY NIGHT 400 strip 2     No current facility-administered medications for this visit.        Review of patient's allergies indicates:   Allergen Reactions    Ace inhibitors      Other reaction(s): cough      Fluorescein Itching     Other reaction(s): Itching    Iodine      Other reaction(s): Itching    Pravastatin Other (See Comments)     Other reaction(s): mouth tingling/numbness    Simvastatin      Other reaction(s): foot swelling           Review of Systems   Constitution: Negative for chills and fever.   Cardiovascular: Negative for chest pain, claudication and leg swelling.   Respiratory: Negative for cough and shortness of breath.    Skin: Positive for nail changes. Negative for dry skin.   Musculoskeletal: Negative for joint pain and muscle weakness.   Gastrointestinal: Negative for nausea and vomiting.   Neurological: Positive for numbness. Negative for paresthesias.   Psychiatric/Behavioral: Negative for altered mental status.           Objective:      Physical Exam     Vascular  DP 1/4 bl, PT1 /4 bl  Mild pitting edema, skin is atrophic, decreased digital hair, feet slightly cool, nails thickened, mild plantar rubor    Neurological:  Sensation to the 10 g sensory filament is not felt consistently on the feet toes and ball (5/10)    Dermatologic:  Nails are elongated and mycotic with " thickened, discolored, onycholytic and subungual debris changes X10 Nails areapproximately 2   mm thick and    4 mm long. Skin is intact without wounds, rash, or infection.  Web spaces are clear without maceration    Callus sub 3 b/l, R hallux IPJ plantar and lateral aspect of b/l 5th toes adjacent to nails (5 lesions total)    Musculoskeletal:   Equinus noted b/l ankles with < 10 deg DF noted. MMT 5/5 in DF/PF/Inv/Ev resistance with no reproduction of pain in any direction. Passive range of motion of ankle and pedal joints is painless b/l. Semi-reducible hammertoe contractures noted to toes 2-4 b/l-asymptomatic. HAV, mild, non trackbound noted b/l with mild medial bony prominence at 1st met head--asymptomatic. Adductovarus rotation of 4th toes b/l.   Hypermobility noted to 1st ray b/l with near complete collapse of medial longitudinal arch b/l with loading.       Assessment:       Encounter Diagnoses   Name Primary?    Type II diabetes mellitus with neurological manifestations Yes    Onychomycosis due to dermatophyte     Hammer toes of both feet          Plan:       Kaci was seen today for diabetes mellitus, diabetic foot exam and nail care.    Diagnoses and all orders for this visit:    Type II diabetes mellitus with neurological manifestations  -     Routine Foot Care    Onychomycosis due to dermatophyte  -     Routine Foot Care    Hammer toes of both feet      I counseled the patient on her conditions, their implications and medical management.    - Shoe inspection. Diabetic Foot Education. Patient reminded of the importance of good nutrition and blood sugar control to help prevent podiatric complications of diabetes. Patient instructed on proper foot hygeine. We discussed wearing proper shoe gear, daily foot inspections, never walking without protective shoe gear, never putting sharp instruments to feet, routine podiatric nail visits every 2-3 months.      See routine foot care procedure note for nail  debridement     Discussed continuation of regular and routine moisturizer to skin of both feet to help improve dry skin. Advised to apply twice daily until resolution of symptoms. Avoid between toes.     Silicone toe sleeves provided for daily use. Continue foam toe spacers as needed.     RTC 3 months, sooner PRN    Anisha Miller DPM

## 2018-08-16 ENCOUNTER — PATIENT MESSAGE (OUTPATIENT)
Dept: FAMILY MEDICINE | Facility: CLINIC | Age: 78
End: 2018-08-16

## 2018-08-16 DIAGNOSIS — L60.9 NAIL ABNORMALITIES: Primary | ICD-10-CM

## 2018-08-16 RX ORDER — LANOLIN ALCOHOL/MO/W.PET/CERES
100 CREAM (GRAM) TOPICAL DAILY
Qty: 90 TABLET | Refills: 3 | Status: SHIPPED | OUTPATIENT
Start: 2018-08-16 | End: 2019-08-12

## 2018-08-18 ENCOUNTER — PATIENT MESSAGE (OUTPATIENT)
Dept: FAMILY MEDICINE | Facility: CLINIC | Age: 78
End: 2018-08-18

## 2018-08-30 ENCOUNTER — PES CALL (OUTPATIENT)
Dept: ADMINISTRATIVE | Facility: CLINIC | Age: 78
End: 2018-08-30

## 2018-09-11 ENCOUNTER — PES CALL (OUTPATIENT)
Dept: ADMINISTRATIVE | Facility: CLINIC | Age: 78
End: 2018-09-11

## 2018-09-12 ENCOUNTER — PATIENT MESSAGE (OUTPATIENT)
Dept: FAMILY MEDICINE | Facility: CLINIC | Age: 78
End: 2018-09-12

## 2018-09-13 ENCOUNTER — TELEPHONE (OUTPATIENT)
Dept: FAMILY MEDICINE | Facility: CLINIC | Age: 78
End: 2018-09-13

## 2018-09-13 ENCOUNTER — PATIENT MESSAGE (OUTPATIENT)
Dept: FAMILY MEDICINE | Facility: CLINIC | Age: 78
End: 2018-09-13

## 2018-09-13 NOTE — TELEPHONE ENCOUNTER
(Late entry from 3:30 PM) - See communication between Dr Hung and pt's daughter, Liliam. I offered an appt for pt to be seen this afternoon with another provider, and pt's daughter refused. Stated that her mom will not see any other providers - only wants to see Dr Hung. She then asked for an appt with Dr Hung tomorrow. There was none available. Daughter didn't schedule any appts because she wanted to talk to her mother first, and will call back tomorrow if her mom decides to see another provider.

## 2018-09-18 ENCOUNTER — PATIENT MESSAGE (OUTPATIENT)
Dept: FAMILY MEDICINE | Facility: CLINIC | Age: 78
End: 2018-09-18

## 2018-09-20 ENCOUNTER — IMMUNIZATION (OUTPATIENT)
Dept: INTERNAL MEDICINE | Facility: CLINIC | Age: 78
End: 2018-09-20
Payer: MEDICARE

## 2018-09-20 PROCEDURE — 90662 IIV NO PRSV INCREASED AG IM: CPT | Mod: PBBFAC,PO

## 2018-09-21 ENCOUNTER — TELEPHONE (OUTPATIENT)
Dept: FAMILY MEDICINE | Facility: CLINIC | Age: 78
End: 2018-09-21

## 2018-09-21 NOTE — TELEPHONE ENCOUNTER
Spoke with daughter and she informed me that they said she passed the eye exam and only needed what was highlighted in Pink. Informed her that paper work is ready for .

## 2018-09-21 NOTE — TELEPHONE ENCOUNTER
----- Message from Toby Hung MD sent at 9/21/2018  8:37 AM CDT -----  Please contact patient's daughter to  [facility] paper work, she needs recent eye exam with vision testing. Please also print and attach medication list

## 2018-09-25 ENCOUNTER — TELEPHONE (OUTPATIENT)
Dept: PHARMACY | Facility: CLINIC | Age: 78
End: 2018-09-25

## 2018-09-25 NOTE — TELEPHONE ENCOUNTER
Patient still have to not met her out of pocket expense for [facility] $1100.  Patient still needs $119.97 to be approved.

## 2018-10-08 ENCOUNTER — PATIENT MESSAGE (OUTPATIENT)
Dept: FAMILY MEDICINE | Facility: CLINIC | Age: 78
End: 2018-10-08

## 2018-10-08 DIAGNOSIS — Z79.4 CONTROLLED TYPE 2 DIABETES MELLITUS WITH BOTH EYES AFFECTED BY PROLIFERATIVE RETINOPATHY AND MACULAR EDEMA, WITH LONG-TERM CURRENT USE OF INSULIN: Primary | ICD-10-CM

## 2018-10-08 DIAGNOSIS — E66.01 MORBID OBESITY: ICD-10-CM

## 2018-10-08 DIAGNOSIS — E11.3513 CONTROLLED TYPE 2 DIABETES MELLITUS WITH BOTH EYES AFFECTED BY PROLIFERATIVE RETINOPATHY AND MACULAR EDEMA, WITH LONG-TERM CURRENT USE OF INSULIN: Primary | ICD-10-CM

## 2018-10-31 ENCOUNTER — PATIENT OUTREACH (OUTPATIENT)
Dept: ADMINISTRATIVE | Facility: HOSPITAL | Age: 78
End: 2018-10-31

## 2018-11-06 ENCOUNTER — PATIENT MESSAGE (OUTPATIENT)
Dept: FAMILY MEDICINE | Facility: CLINIC | Age: 78
End: 2018-11-06

## 2018-11-06 ENCOUNTER — TELEPHONE (OUTPATIENT)
Dept: FAMILY MEDICINE | Facility: CLINIC | Age: 78
End: 2018-11-06

## 2018-11-06 DIAGNOSIS — N18.30 CKD (CHRONIC KIDNEY DISEASE), STAGE III: ICD-10-CM

## 2018-11-06 DIAGNOSIS — Z12.39 SCREENING FOR BREAST CANCER: Primary | ICD-10-CM

## 2018-11-06 DIAGNOSIS — Z79.4 TYPE 2 DIABETES MELLITUS WITH MICROALBUMINURIA, WITH LONG-TERM CURRENT USE OF INSULIN: Primary | ICD-10-CM

## 2018-11-06 DIAGNOSIS — E11.29 TYPE 2 DIABETES MELLITUS WITH MICROALBUMINURIA, WITH LONG-TERM CURRENT USE OF INSULIN: Primary | ICD-10-CM

## 2018-11-06 DIAGNOSIS — R80.9 TYPE 2 DIABETES MELLITUS WITH MICROALBUMINURIA, WITH LONG-TERM CURRENT USE OF INSULIN: Primary | ICD-10-CM

## 2018-11-06 NOTE — TELEPHONE ENCOUNTER
----- Message from Manoj Shultz MA sent at 11/6/2018  8:57 AM CST -----  Contact: Liliam gold/[phone]      ----- Message -----  From: Marvin Villaseñor  Sent: 11/6/2018   8:16 AM  To: [facility] Luis Staff    The patient daughter would like orders placed for a mammo.        Thank you

## 2018-11-07 ENCOUNTER — HOSPITAL ENCOUNTER (OUTPATIENT)
Dept: RADIOLOGY | Facility: HOSPITAL | Age: 78
Discharge: HOME | End: 2018-11-07
Attending: INTERNAL MEDICINE
Payer: MEDICARE

## 2018-11-07 VITALS — HEIGHT: 61 IN | WEIGHT: 200 LBS | BODY MASS INDEX: 37.76 KG/M2

## 2018-11-07 DIAGNOSIS — Z12.39 SCREENING FOR BREAST CANCER: ICD-10-CM

## 2018-11-07 PROCEDURE — 77067 SCR MAMMO BI INCL CAD: CPT | Mod: 26,HCNC,, | Performed by: RADIOLOGY

## 2018-11-07 PROCEDURE — 77067 SCR MAMMO BI INCL CAD: CPT | Mod: TC,HCNC

## 2018-11-07 PROCEDURE — 77063 BREAST TOMOSYNTHESIS BI: CPT | Mod: 26,HCNC,, | Performed by: RADIOLOGY

## 2018-11-07 PROCEDURE — 77063 BREAST TOMOSYNTHESIS BI: CPT | Mod: TC,HCNC

## 2018-11-09 ENCOUNTER — LAB VISIT (OUTPATIENT)
Dept: LAB | Facility: HOSPITAL | Age: 78
End: 2018-11-09
Attending: INTERNAL MEDICINE
Payer: MEDICARE

## 2018-11-09 DIAGNOSIS — R80.9 TYPE 2 DIABETES MELLITUS WITH MICROALBUMINURIA, WITH LONG-TERM CURRENT USE OF INSULIN: ICD-10-CM

## 2018-11-09 DIAGNOSIS — E11.29 TYPE 2 DIABETES MELLITUS WITH MICROALBUMINURIA, WITH LONG-TERM CURRENT USE OF INSULIN: ICD-10-CM

## 2018-11-09 DIAGNOSIS — N18.30 CKD (CHRONIC KIDNEY DISEASE), STAGE III: ICD-10-CM

## 2018-11-09 DIAGNOSIS — Z79.4 TYPE 2 DIABETES MELLITUS WITH MICROALBUMINURIA, WITH LONG-TERM CURRENT USE OF INSULIN: ICD-10-CM

## 2018-11-09 LAB
ALBUMIN SERPL BCP-MCNC: 3.6 G/DL
ALP SERPL-CCNC: 68 U/L
ALT SERPL W/O P-5'-P-CCNC: 11 U/L
ANION GAP SERPL CALC-SCNC: 8 MMOL/L
AST SERPL-CCNC: 19 U/L
BASOPHILS # BLD AUTO: 0.04 K/UL
BASOPHILS NFR BLD: 0.6 %
BILIRUB SERPL-MCNC: 0.4 MG/DL
BUN SERPL-MCNC: 28 MG/DL
CALCIUM SERPL-MCNC: 9.7 MG/DL
CHLORIDE SERPL-SCNC: 98 MMOL/L
CHOLEST SERPL-MCNC: 193 MG/DL
CHOLEST/HDLC SERPL: 3.6 {RATIO}
CO2 SERPL-SCNC: 30 MMOL/L
CREAT SERPL-MCNC: 1.5 MG/DL
DIFFERENTIAL METHOD: ABNORMAL
EOSINOPHIL # BLD AUTO: 0.2 K/UL
EOSINOPHIL NFR BLD: 3.2 %
ERYTHROCYTE [DISTWIDTH] IN BLOOD BY AUTOMATED COUNT: 13.6 %
EST. GFR  (AFRICAN AMERICAN): 38.7 ML/MIN/1.73 M^2
EST. GFR  (NON AFRICAN AMERICAN): 33.6 ML/MIN/1.73 M^2
ESTIMATED AVG GLUCOSE: 151 MG/DL
GLUCOSE SERPL-MCNC: 147 MG/DL
HBA1C MFR BLD HPLC: 6.9 %
HCT VFR BLD AUTO: 35.3 %
HDLC SERPL-MCNC: 53 MG/DL
HDLC SERPL: 27.5 %
HGB BLD-MCNC: 11.2 G/DL
IMM GRANULOCYTES # BLD AUTO: 0.02 K/UL
IMM GRANULOCYTES NFR BLD AUTO: 0.3 %
LDLC SERPL CALC-MCNC: 116.8 MG/DL
LYMPHOCYTES # BLD AUTO: 1.1 K/UL
LYMPHOCYTES NFR BLD: 17.5 %
MCH RBC QN AUTO: 29.9 PG
MCHC RBC AUTO-ENTMCNC: 31.7 G/DL
MCV RBC AUTO: 94 FL
MONOCYTES # BLD AUTO: 0.5 K/UL
MONOCYTES NFR BLD: 8 %
NEUTROPHILS # BLD AUTO: 4.3 K/UL
NEUTROPHILS NFR BLD: 70.4 %
NONHDLC SERPL-MCNC: 140 MG/DL
NRBC BLD-RTO: 0 /100 WBC
PLATELET # BLD AUTO: 263 K/UL
PMV BLD AUTO: 10.1 FL
POTASSIUM SERPL-SCNC: 4.7 MMOL/L
PROT SERPL-MCNC: 6.9 G/DL
RBC # BLD AUTO: 3.75 M/UL
SODIUM SERPL-SCNC: 136 MMOL/L
TRIGL SERPL-MCNC: 116 MG/DL
WBC # BLD AUTO: 6.16 K/UL

## 2018-11-09 PROCEDURE — 80053 COMPREHEN METABOLIC PANEL: CPT | Mod: HCNC

## 2018-11-09 PROCEDURE — 80061 LIPID PANEL: CPT | Mod: HCNC

## 2018-11-09 PROCEDURE — 85025 COMPLETE CBC W/AUTO DIFF WBC: CPT | Mod: HCNC

## 2018-11-09 PROCEDURE — 36415 COLL VENOUS BLD VENIPUNCTURE: CPT | Mod: HCNC,PO

## 2018-11-09 PROCEDURE — 83036 HEMOGLOBIN GLYCOSYLATED A1C: CPT | Mod: HCNC

## 2018-11-14 ENCOUNTER — OFFICE VISIT (OUTPATIENT)
Dept: FAMILY MEDICINE | Facility: CLINIC | Age: 78
End: 2018-11-14
Payer: MEDICARE

## 2018-11-14 VITALS
OXYGEN SATURATION: 93 % | HEART RATE: 79 BPM | BODY MASS INDEX: 38.78 KG/M2 | HEIGHT: 62 IN | SYSTOLIC BLOOD PRESSURE: 140 MMHG | TEMPERATURE: 98 F | DIASTOLIC BLOOD PRESSURE: 67 MMHG | WEIGHT: 210.75 LBS | RESPIRATION RATE: 16 BRPM

## 2018-11-14 DIAGNOSIS — N18.30 CKD (CHRONIC KIDNEY DISEASE), STAGE III: ICD-10-CM

## 2018-11-14 DIAGNOSIS — Z79.4 TYPE 2 DIABETES MELLITUS WITH MICROALBUMINURIA, WITH LONG-TERM CURRENT USE OF INSULIN: Primary | ICD-10-CM

## 2018-11-14 DIAGNOSIS — M62.838 MUSCLE SPASM: ICD-10-CM

## 2018-11-14 DIAGNOSIS — E11.29 TYPE 2 DIABETES MELLITUS WITH MICROALBUMINURIA, WITH LONG-TERM CURRENT USE OF INSULIN: Primary | ICD-10-CM

## 2018-11-14 DIAGNOSIS — R80.9 TYPE 2 DIABETES MELLITUS WITH MICROALBUMINURIA, WITH LONG-TERM CURRENT USE OF INSULIN: Primary | ICD-10-CM

## 2018-11-14 PROCEDURE — 3078F DIAST BP <80 MM HG: CPT | Mod: CPTII,HCNC,S$GLB, | Performed by: INTERNAL MEDICINE

## 2018-11-14 PROCEDURE — 99214 OFFICE O/P EST MOD 30 MIN: CPT | Mod: HCNC,S$GLB,, | Performed by: INTERNAL MEDICINE

## 2018-11-14 PROCEDURE — 3077F SYST BP >= 140 MM HG: CPT | Mod: CPTII,HCNC,S$GLB, | Performed by: INTERNAL MEDICINE

## 2018-11-14 PROCEDURE — 99999 PR PBB SHADOW E&M-EST. PATIENT-LVL III: CPT | Mod: PBBFAC,HCNC,, | Performed by: INTERNAL MEDICINE

## 2018-11-14 PROCEDURE — 1101F PT FALLS ASSESS-DOCD LE1/YR: CPT | Mod: CPTII,HCNC,S$GLB, | Performed by: INTERNAL MEDICINE

## 2018-11-14 PROCEDURE — 82043 UR ALBUMIN QUANTITATIVE: CPT | Mod: HCNC

## 2018-11-14 PROCEDURE — 81000 URINALYSIS NONAUTO W/SCOPE: CPT | Mod: HCNC

## 2018-11-14 RX ORDER — TIZANIDINE 4 MG/1
4 TABLET ORAL NIGHTLY PRN
Qty: 90 TABLET | Refills: 1 | Status: SHIPPED | OUTPATIENT
Start: 2018-11-14 | End: 2018-11-24

## 2018-11-14 NOTE — PROGRESS NOTES
"Subjective:       Patient ID: Kaci Whalen is a 78 y.o. female.    Chief Complaint: Follow-up and Establish Care    F/u chronic conditions    HPI: 77 y/o w/ DM CKD III, morbid obesity MIGUEL (intolerant of CPAP) HTN presents with daughter for scheduled follow up. Has started going to gym three times per week doing seated ellipitical and pool walking exercises. Feels breathing has improved describes "tightness" in back since starting gym no chagne in breathing (in fact exercise tolerance has improved she is able to stay on ellipitical machine for 30 minutes, compared to when starting exercise could only do 10 mintues at a time).       Review of Systems   Constitutional: Negative for activity change, appetite change, fatigue, fever and unexpected weight change.   HENT: Negative for ear pain, rhinorrhea and sore throat.    Eyes: Negative for discharge and visual disturbance.   Respiratory: Negative for chest tightness, shortness of breath and wheezing.    Cardiovascular: Negative for chest pain, palpitations and leg swelling.   Gastrointestinal: Negative for abdominal pain, constipation and diarrhea.   Endocrine: Negative for cold intolerance and heat intolerance.   Genitourinary: Negative for dysuria and hematuria.   Musculoskeletal: Positive for back pain. Negative for joint swelling and neck stiffness.   Skin: Negative for rash.   Neurological: Negative for dizziness, syncope, weakness and headaches.   Psychiatric/Behavioral: Negative for suicidal ideas.       Objective:     Vitals:    11/14/18 1314   BP: (!) 140/67   BP Location: Right arm   Patient Position: Sitting   Pulse: 79   Resp: 16   Temp: 98 °F (36.7 °C)   SpO2: (!) 93%   Weight: 95.6 kg (210 lb 12.2 oz)   Height: 5' 2" (1.575 m)          Physical Exam   Constitutional: She is oriented to person, place, and time. She appears well-developed and well-nourished.   HENT:   Head: Normocephalic and atraumatic.   Eyes: Conjunctivae are normal. Pupils are equal, " round, and reactive to light.   Neck: Normal range of motion.   Cardiovascular: Normal rate and regular rhythm. Exam reveals no gallop and no friction rub.   No murmur heard.  No LE edema   Pulmonary/Chest: Effort normal and breath sounds normal. She has no wheezes. She has no rales.   Abdominal: Soft. Bowel sounds are normal. There is no tenderness. There is no rebound and no guarding.   No CVA tenderness + paraspinous tenderness lumbar region no overlyign skin changes   Musculoskeletal: Normal range of motion. She exhibits no edema or tenderness.   Neurological: She is alert and oriented to person, place, and time. No cranial nerve deficit.   Skin: Skin is warm and dry.   Psychiatric: She has a normal mood and affect.       Assessment and Plan   1. Type 2 diabetes mellitus with microalbuminuria, with long-term current use of insulin  Repeat urine microalbumin a1c at goal continue curretn insulin therapy  - Microalbumin/creatinine urine ratio  - Urinalysis    2. CKD (chronic kidney disease), stage III  Avoid NSAIDs continue current medications    3. Muscle spasm  Trial nightly muscle relaxer daughter to communicate with me via [employer] regardign effect  - tiZANidine (ZANAFLEX) 4 MG tablet; Take 1 tablet (4 mg total) by mouth nightly as needed (for muscle soreness).  Dispense: 90 tablet; Refill: 1

## 2018-11-15 LAB
ALBUMIN/CREAT UR: 957.1 UG/MG
BACTERIA #/AREA URNS HPF: NORMAL /HPF
BILIRUB UR QL STRIP: NEGATIVE
CLARITY UR: CLEAR
COLOR UR: ABNORMAL
CREAT UR-MCNC: 14 MG/DL
GLUCOSE UR QL STRIP: NEGATIVE
HGB UR QL STRIP: NEGATIVE
KETONES UR QL STRIP: NEGATIVE
LEUKOCYTE ESTERASE UR QL STRIP: ABNORMAL
MICROALBUMIN UR DL<=1MG/L-MCNC: 134 UG/ML
MICROSCOPIC COMMENT: NORMAL
NITRITE UR QL STRIP: NEGATIVE
PH UR STRIP: 7 [PH] (ref 5–8)
PROT UR QL STRIP: NEGATIVE
SP GR UR STRIP: 1 (ref 1–1.03)
SQUAMOUS #/AREA URNS HPF: 3 /HPF
URN SPEC COLLECT METH UR: ABNORMAL
UROBILINOGEN UR STRIP-ACNC: NEGATIVE EU/DL
WBC #/AREA URNS HPF: 1 /HPF (ref 0–5)

## 2018-11-21 ENCOUNTER — OFFICE VISIT (OUTPATIENT)
Dept: PODIATRY | Facility: CLINIC | Age: 78
End: 2018-11-21
Payer: MEDICARE

## 2018-11-21 VITALS
HEIGHT: 62 IN | BODY MASS INDEX: 38.64 KG/M2 | SYSTOLIC BLOOD PRESSURE: 138 MMHG | DIASTOLIC BLOOD PRESSURE: 66 MMHG | WEIGHT: 210 LBS

## 2018-11-21 DIAGNOSIS — E11.29 TYPE 2 DIABETES MELLITUS WITH MICROALBUMINURIA, WITH LONG-TERM CURRENT USE OF INSULIN: ICD-10-CM

## 2018-11-21 DIAGNOSIS — B35.1 ONYCHOMYCOSIS DUE TO DERMATOPHYTE: ICD-10-CM

## 2018-11-21 DIAGNOSIS — E11.49 TYPE II DIABETES MELLITUS WITH NEUROLOGICAL MANIFESTATIONS: Primary | ICD-10-CM

## 2018-11-21 DIAGNOSIS — M20.42 HAMMER TOES OF BOTH FEET: ICD-10-CM

## 2018-11-21 DIAGNOSIS — M20.41 HAMMER TOES OF BOTH FEET: ICD-10-CM

## 2018-11-21 DIAGNOSIS — R80.9 TYPE 2 DIABETES MELLITUS WITH MICROALBUMINURIA, WITH LONG-TERM CURRENT USE OF INSULIN: ICD-10-CM

## 2018-11-21 DIAGNOSIS — Z79.4 TYPE 2 DIABETES MELLITUS WITH MICROALBUMINURIA, WITH LONG-TERM CURRENT USE OF INSULIN: ICD-10-CM

## 2018-11-21 PROCEDURE — 11721 DEBRIDE NAIL 6 OR MORE: CPT | Mod: Q9,HCNC,S$GLB, | Performed by: PODIATRIST

## 2018-11-21 PROCEDURE — 99499 UNLISTED E&M SERVICE: CPT | Mod: HCNC,S$GLB,, | Performed by: PODIATRIST

## 2018-11-21 PROCEDURE — 99999 PR PBB SHADOW E&M-EST. PATIENT-LVL IV: CPT | Mod: PBBFAC,HCNC,, | Performed by: PODIATRIST

## 2018-11-21 RX ORDER — METFORMIN HYDROCHLORIDE 500 MG/1
500 TABLET ORAL 3 TIMES DAILY
Qty: 270 TABLET | Refills: 3 | Status: ON HOLD | OUTPATIENT
Start: 2018-11-21 | End: 2019-11-21 | Stop reason: HOSPADM

## 2018-11-28 NOTE — PROCEDURES
Routine Foot Care  Date/Time: 11/21/2018 11:00 AM  Performed by: Anisha Miller DPM  Authorized by: Anisha Miller DPM     Hyperkeratotic Skin Lesions?: No      Nail Care Type:  Debride  Location(s): All  (Left 1st Toe, Left 3rd Toe, Left 2nd Toe, Left 4th Toe, Left 5th Toe, Right 1st Toe, Right 2nd Toe, Right 3rd Toe, Right 4th Toe and Right 5th Toe)  Patient tolerance:  Patient tolerated the procedure well with no immediate complications

## 2018-11-28 NOTE — PROGRESS NOTES
Subjective:      Patient ID: Kaci Whalen is a 78 y.o. female.    Chief Complaint: Diabetes Mellitus (11/14/18 Abhilash); Diabetic Foot Exam; and Nail Care    Kaci is a 78 y.o. female who presents to the clinic for evaluation and treatment of diabetic feet. Kaci has a past medical history of Abdominal pain, Anxiety, Atherosclerosis of aortic arch, Atrial fibrillation (05/2016), Benign hypertension, Chronic constipation, Chronic diarrhea, Chronic edema, Depression, Dercum disease, Dysphagia, Gas pain, Glaucoma of both eyes, psychiatric care, Hyperlipidemia with target LDL less than 100, Immature cataract, Tasia, Morbid obesity, Nausea & vomiting, Neuropathy, Polyneuropathy, Primary osteoarthritis of both knees, Proteinuria, Psychiatric problem, Pulmonary hypertension mixed group 2 and 3 (1/18/2017), Sleep apnea, Therapy, Type II or unspecified type diabetes mellitus with neurological manifestations, uncontrolled(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 Patient relates no major problem with feet. Only complaints today consist of toenails in need of trimming. No other complaints today. Wearing DM shoes which help. Has hammertoes for which she uses toe spacers and toe sleeves. Requesting more toe sleeves.     PCP: Toby Hung MD    Date Last Seen by PCP:   Chief Complaint   Patient presents with    Diabetes Mellitus     11/14/18 Abhilash    Diabetic Foot Exam    Nail Care         Current shoe gear: Rx diabetic extra depth shoes and custom accommodative insoles    Hemoglobin A1C   Date Value Ref Range Status   11/09/2018 6.9 (H) 4.0 - 5.6 % Final     Comment:     ADA Screening Guidelines:  5.7-6.4%  Consistent with prediabetes  >or=6.5%  Consistent with diabetes  High levels of fetal hemoglobin interfere with the HbA1C  assay. Heterozygous hemoglobin variants (HbS, HgC, etc)do  not significantly interfere with this assay.   However, presence of multiple variants may affect accuracy.     07/30/2018 6.3 (H)  4.0 - 5.6 % Final     Comment:     ADA Screening Guidelines:  5.7-6.4%  Consistent with prediabetes  >or=6.5%  Consistent with diabetes  High levels of fetal hemoglobin interfere with the HbA1C  assay. Heterozygous hemoglobin variants (HbS, HgC, etc)do  not significantly interfere with this assay.   However, presence of multiple variants may affect accuracy.     04/26/2018 6.2 (H) 4.0 - 5.6 % Final     Comment:     According to ADA guidelines, hemoglobin A1c <7.0% represents  optimal control in non-pregnant diabetic patients. Different  metrics may apply to specific patient populations.   Standards of Medical Care in Diabetes-2016.  For the purpose of screening for the presence of diabetes:  <5.7%     Consistent with the absence of diabetes  5.7-6.4%  Consistent with increasing risk for diabetes   (prediabetes)  >or=6.5%  Consistent with diabetes  Currently, no consensus exists for use of hemoglobin A1c  for diagnosis of diabetes for children.  This Hemoglobin A1c assay has significant interference with fetal   hemoglobin   (HbF). The results are invalid for patients with abnormal amounts of   HbF,   including those with known Hereditary Persistence   of Fetal Hemoglobin. Heterozygous hemoglobin variants (HbAS, HbAC,   HbAD, HbAE, HbA2) do not significantly interfere with this assay;   however, presence of multiple variants in a sample may impact the %   interference.         Past Medical History:   Diagnosis Date    Abdominal pain     Anxiety     Atherosclerosis of aortic arch     noted on CXR 7/1/2015    Atrial fibrillation 05/2016    CHADS 4, on Xarelto    Benign hypertension     Chronic constipation     Chronic diarrhea     Chronic edema     Depression     Dercum disease     Multiple painful lipomas    Dysphagia     Gas pain     Glaucoma of both eyes     Hx of psychiatric care     previously amitriptyline, now jut on Trazodone 100mg QHS PRN insomnia    Hyperlipidemia with target LDL less than 100      Unable to tolerate statins    Immature cataract     Tasia     no symptoms prior to this presentation    Morbid obesity     Nausea & vomiting     Neuropathy     Polyneuropathy     Primary osteoarthritis of both knees     Proteinuria     Psychiatric problem     history of depression    Pulmonary hypertension mixed group 2 and 3 1/18/2017    Sleep apnea     Therapy     no history of therapy    Type II or unspecified type diabetes mellitus with neurological manifestations, uncontrolled(250.62)     Unspecified vitamin D deficiency        Past Surgical History:   Procedure Laterality Date     tenotomy      flexors 2,3 L toes    CATARACT EXTRACTION      COLONOSCOPY N/A 5/4/2017    Procedure: COLONOSCOPY;  Surgeon: Suellen Wolf MD;  Location: [facility] (2ND FLR);  Service: Endoscopy;  Laterality: N/A;  2nd floor due to pulm htn, possible gastroparesis. per Dr Wolf      ok to hold Xarelto 2 days prior to procedure per Dr Driss Dixon    COLONOSCOPY N/A 5/4/2017    Performed by Suellen Wolf MD at [facility] (2ND FLR)    diabetic retinopathy      right    ESOPHAGOGASTRODUODENOSCOPY (EGD) N/A 5/4/2017    Performed by Suellen Wolf MD at [facility] (2ND FLR)    HYSTERECTOMY      knee surgery x2      Left ankle and leg surgery secondary to a fall      left arm fracture      Left cataract      ORIF, WRIST Left 12/28/2012    Performed by [facility] at [facility]ST FLR    PARTIAL HYSTERECTOMY      Secondary to bleeding    TOE FUSION      2,3 R       Family History   Problem Relation Age of Onset    No Known Problems Daughter     No Known Problems Father     Liver cancer Mother     Bone cancer Daughter     No Known Problems Sister     No Known Problems Brother     No Known Problems Maternal Aunt     No Known Problems Maternal Uncle     No Known Problems Paternal Aunt     No Known Problems Paternal Uncle     No Known Problems Maternal Grandmother     No Known  Problems Maternal Grandfather     No Known Problems Paternal Grandmother     No Known Problems Paternal Grandfather     Amblyopia Neg Hx     Blindness Neg Hx     Cancer Neg Hx     Cataracts Neg Hx     Diabetes Neg Hx     Glaucoma Neg Hx     Hypertension Neg Hx     Macular degeneration Neg Hx     Retinal detachment Neg Hx     Strabismus Neg Hx     Stroke Neg Hx     Thyroid disease Neg Hx     Celiac disease Neg Hx     Cirrhosis Neg Hx     Colon cancer Neg Hx     Colon polyps Neg Hx     Crohn's disease Neg Hx     Cystic fibrosis Neg Hx     Esophageal cancer Neg Hx     Hemochromatosis Neg Hx     Inflammatory bowel disease Neg Hx     Irritable bowel syndrome Neg Hx     Liver disease Neg Hx     Rectal cancer Neg Hx     Stomach cancer Neg Hx     Ulcerative colitis Neg Hx     Montez's disease Neg Hx     Alcohol abuse Neg Hx     Bipolar disorder Neg Hx     Dementia Neg Hx     Depression Neg Hx     Drug abuse Neg Hx     Suicide Neg Hx     Schizophrenia Neg Hx        Social History     Socioeconomic History    Marital status:      Spouse name: None    Number of children: 3    Years of education: 14    Highest education level: None   Social Needs    Financial resource strain: None    Food insecurity - worry: None    Food insecurity - inability: None    Transportation needs - medical: None    Transportation needs - non-medical: None   Occupational History    Occupation: housewife   Tobacco Use    Smoking status: Never Smoker    Smokeless tobacco: Never Used   Substance and Sexual Activity    Alcohol use: No    Drug use: No    Sexual activity: Yes     Partners: Male   Other Topics Concern    Patient feels they ought to cut down on drinking/drug use Not Asked    Patient annoyed by others criticizing their drinking/drug use Not Asked    Patient has felt bad or guilty about drinking/drug use Not Asked    Patient has had a drink/used drugs as an eye opener in the AM Not  Asked   Social History Narrative    Pt was the youngest of 3 boys and 2 girls.  Her father  when she was 3 months old.  She was raised by her mother, mgm, and an uncle.  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  They had 2 daughters and 1 son together, plus 5 stepchildren by her .  They live together in [place] in their own home, with no pets.  Hobbies include gardening and crafts.  Pt attends a variety of organized Jainism services.            One daughter is  from bone cancer.    One son  2014 after surgery for MIGUEL       Current Outpatient Medications   Medication Sig Dispense Refill    alcohol swabs PadM Apply 1 each topically as needed.  0    amLODIPine (NORVASC) 5 MG tablet Take 1 tablet (5 mg total) by mouth once daily. 30 tablet 5    fluticasone (FLONASE) 50 mcg/actuation nasal spray 1 spray (50 mcg total) by Each Nare route 2 (two) times daily. 16 g 3    hydroCHLOROthiazide (HYDRODIURIL) 25 MG tablet TAKE ONE TABLET BY MOUTH ONCE DAILY 90 tablet 3    insulin aspart protamine-insulin aspart (NOVOLOG 70/30) 100 unit/mL (70-30) InPn pen Inject 21 Units into the skin 2 (two) times daily before meals. Sliding scale max daily dosing 50 units daily 15 mL 2    ketoconazole (NIZORAL) 2 % shampoo Apply topically twice a week. Leave on scalp for five minutes then rinse 120 mL 1    lancets (LANCETS,THIN) 28 gauge Misc 1 lancet by Misc.(Non-Drug; Combo Route) route 4 (four) times daily before meals and nightly. 100 each 2    losartan (COZAAR) 100 MG tablet Take 1 tablet (100 mg total) by mouth once daily. 90 tablet 3    magnesium chloride (SLOW-MAG) 71.5 mg TbEC Take 2 tablets by mouth once.      melatonin 10 mg Tab Take by mouth. 2 tabs at night      metFORMIN (GLUCOPHAGE) 500 MG tablet Take 1 tablet (500 mg total) by mouth 3 (three) times daily. 270 tablet 3    metoprolol succinate (TOPROL-XL) 25 MG 24 hr  "tablet TAKE ONE TABLET BY MOUTH ONCE DAILY 90 tablet 2    pen needle, diabetic (BD ULTRA-FINE SANDEEP PEN NEEDLES) 32 gauge x 5/32" Ndle Take 1 each by mouth 4 (four) times daily. 120 each 2    psyllium (METAMUCIL) packet Take 1 packet by mouth once daily.  12    ranitidine (ZANTAC) 150 MG tablet Take 1 tablet (150 mg total) by mouth 2 (two) times daily. 60 tablet 5    rivaroxaban (XARELTO) 20 mg Tab Take 1 tablet (20 mg total) by mouth once daily. 90 tablet 3    thiamine 100 MG tablet Take 1 tablet (100 mg total) by mouth once daily. 90 tablet 3    TRUE METRIX GLUCOSE TEST STRIP Strp TEST FOUR TIMES DAILY BEFORE MEALS  AND EVERY NIGHT 400 strip 2     No current facility-administered medications for this visit.        Review of patient's allergies indicates:   Allergen Reactions    Ace inhibitors      Other reaction(s): cough      Fluorescein Itching     Other reaction(s): Itching    Iodine      Other reaction(s): Itching    Pravastatin Other (See Comments)     Other reaction(s): mouth tingling/numbness    Simvastatin      Other reaction(s): foot swelling           Review of Systems   Constitution: Negative for chills and fever.   Cardiovascular: Negative for chest pain, claudication and leg swelling.   Respiratory: Negative for cough and shortness of breath.    Skin: Positive for nail changes. Negative for dry skin.   Musculoskeletal: Negative for joint pain and muscle weakness.   Gastrointestinal: Negative for nausea and vomiting.   Neurological: Positive for numbness. Negative for paresthesias.   Psychiatric/Behavioral: Negative for altered mental status.           Objective:      Physical Exam     Vascular  DP 1/4 bl, PT1 /4 bl  Mild pitting edema, skin is atrophic, decreased digital hair, feet slightly cool, nails thickened, mild plantar rubor    Neurological:  Sensation to the 10 g sensory filament is not felt consistently on the feet toes and ball (5/10)    Dermatologic:  Nails are elongated and " mycotic with thickened, discolored, onycholytic and subungual debris changes X10 Nails areapproximately 2   mm thick and    4 mm long. Skin is intact without wounds, rash, or infection.  Web spaces are clear without maceration    Callus sub 3 b/l, R hallux IPJ plantar and lateral aspect of b/l 5th toes adjacent to nails (5 lesions total)    Musculoskeletal:   Equinus noted b/l ankles with < 10 deg DF noted. MMT 5/5 in DF/PF/Inv/Ev resistance with no reproduction of pain in any direction. Passive range of motion of ankle and pedal joints is painless b/l. Semi-reducible hammertoe contractures noted to toes 2-4 b/l-asymptomatic. HAV, mild, non trackbound noted b/l with mild medial bony prominence at 1st met head--asymptomatic. Adductovarus rotation of 4th toes b/l.   Hypermobility noted to 1st ray b/l with near complete collapse of medial longitudinal arch b/l with loading.       Assessment:       Encounter Diagnoses   Name Primary?    Type II diabetes mellitus with neurological manifestations Yes    Onychomycosis due to dermatophyte     Hammer toes of both feet          Plan:       Kaci was seen today for diabetes mellitus, diabetic foot exam and nail care.    Diagnoses and all orders for this visit:    Type II diabetes mellitus with neurological manifestations  -     Routine Foot Care    Onychomycosis due to dermatophyte  -     Routine Foot Care    Hammer toes of both feet      I counseled the patient on her conditions, their implications and medical management.    - Shoe inspection. Diabetic Foot Education. Patient reminded of the importance of good nutrition and blood sugar control to help prevent podiatric complications of diabetes. Patient instructed on proper foot hygeine. We discussed wearing proper shoe gear, daily foot inspections, never walking without protective shoe gear, never putting sharp instruments to feet, routine podiatric nail visits every 2-3 months.      See routine foot care procedure note  for nail debridement     Discussed continuation of regular and routine moisturizer to skin of both feet to help improve dry skin. Advised to apply twice daily until resolution of symptoms. Avoid between toes.     Silicone toe sleeves provided for daily use. Continue foam toe spacers as needed.     RTC 3 months, sooner PRN    Anisha Miller DPM

## 2018-12-31 DIAGNOSIS — I48.0 PAROXYSMAL ATRIAL FIBRILLATION: ICD-10-CM

## 2019-01-02 RX ORDER — METOPROLOL SUCCINATE 25 MG/1
TABLET, EXTENDED RELEASE ORAL
Qty: 90 TABLET | Refills: 2 | Status: SHIPPED | OUTPATIENT
Start: 2019-01-02 | End: 2019-05-22 | Stop reason: SDUPTHER

## 2019-01-21 ENCOUNTER — PATIENT MESSAGE (OUTPATIENT)
Dept: FAMILY MEDICINE | Facility: CLINIC | Age: 79
End: 2019-01-21

## 2019-01-21 DIAGNOSIS — Z79.4 CONTROLLED TYPE 2 DIABETES MELLITUS WITH BOTH EYES AFFECTED BY PROLIFERATIVE RETINOPATHY AND MACULAR EDEMA, WITH LONG-TERM CURRENT USE OF INSULIN: ICD-10-CM

## 2019-01-21 DIAGNOSIS — E11.3513 CONTROLLED TYPE 2 DIABETES MELLITUS WITH BOTH EYES AFFECTED BY PROLIFERATIVE RETINOPATHY AND MACULAR EDEMA, WITH LONG-TERM CURRENT USE OF INSULIN: ICD-10-CM

## 2019-01-21 DIAGNOSIS — N18.30 CKD (CHRONIC KIDNEY DISEASE), STAGE III: Primary | ICD-10-CM

## 2019-01-21 DIAGNOSIS — I10 ESSENTIAL HYPERTENSION: ICD-10-CM

## 2019-01-21 RX ORDER — AMLODIPINE BESYLATE 5 MG/1
5 TABLET ORAL DAILY
Qty: 90 TABLET | Refills: 2 | Status: SHIPPED | OUTPATIENT
Start: 2019-01-21 | End: 2019-10-07 | Stop reason: SDUPTHER

## 2019-02-08 ENCOUNTER — LAB VISIT (OUTPATIENT)
Dept: LAB | Facility: HOSPITAL | Age: 79
End: 2019-02-08
Attending: INTERNAL MEDICINE
Payer: MEDICARE

## 2019-02-08 DIAGNOSIS — I10 ESSENTIAL HYPERTENSION: ICD-10-CM

## 2019-02-08 DIAGNOSIS — Z79.4 CONTROLLED TYPE 2 DIABETES MELLITUS WITH BOTH EYES AFFECTED BY PROLIFERATIVE RETINOPATHY AND MACULAR EDEMA, WITH LONG-TERM CURRENT USE OF INSULIN: ICD-10-CM

## 2019-02-08 DIAGNOSIS — E11.3513 CONTROLLED TYPE 2 DIABETES MELLITUS WITH BOTH EYES AFFECTED BY PROLIFERATIVE RETINOPATHY AND MACULAR EDEMA, WITH LONG-TERM CURRENT USE OF INSULIN: ICD-10-CM

## 2019-02-08 DIAGNOSIS — N18.30 CKD (CHRONIC KIDNEY DISEASE), STAGE III: ICD-10-CM

## 2019-02-08 LAB
ALBUMIN SERPL BCP-MCNC: 3.5 G/DL
ALP SERPL-CCNC: 67 U/L
ALT SERPL W/O P-5'-P-CCNC: 11 U/L
ANION GAP SERPL CALC-SCNC: 8 MMOL/L
AST SERPL-CCNC: 16 U/L
BASOPHILS # BLD AUTO: 0.04 K/UL
BASOPHILS NFR BLD: 0.5 %
BILIRUB SERPL-MCNC: 0.4 MG/DL
BUN SERPL-MCNC: 25 MG/DL
CALCIUM SERPL-MCNC: 10 MG/DL
CHLORIDE SERPL-SCNC: 92 MMOL/L
CO2 SERPL-SCNC: 32 MMOL/L
CREAT SERPL-MCNC: 1.3 MG/DL
DIFFERENTIAL METHOD: ABNORMAL
EOSINOPHIL # BLD AUTO: 0.3 K/UL
EOSINOPHIL NFR BLD: 3.1 %
ERYTHROCYTE [DISTWIDTH] IN BLOOD BY AUTOMATED COUNT: 13.4 %
EST. GFR  (AFRICAN AMERICAN): 46 ML/MIN/1.73 M^2
EST. GFR  (NON AFRICAN AMERICAN): 39.9 ML/MIN/1.73 M^2
ESTIMATED AVG GLUCOSE: 163 MG/DL
GLUCOSE SERPL-MCNC: 150 MG/DL
HBA1C MFR BLD HPLC: 7.3 %
HCT VFR BLD AUTO: 34.3 %
HGB BLD-MCNC: 10.7 G/DL
IMM GRANULOCYTES # BLD AUTO: 0.03 K/UL
IMM GRANULOCYTES NFR BLD AUTO: 0.4 %
LYMPHOCYTES # BLD AUTO: 1.2 K/UL
LYMPHOCYTES NFR BLD: 14.9 %
MCH RBC QN AUTO: 28.9 PG
MCHC RBC AUTO-ENTMCNC: 31.2 G/DL
MCV RBC AUTO: 93 FL
MONOCYTES # BLD AUTO: 0.7 K/UL
MONOCYTES NFR BLD: 9 %
NEUTROPHILS # BLD AUTO: 5.7 K/UL
NEUTROPHILS NFR BLD: 72.1 %
NRBC BLD-RTO: 0 /100 WBC
PLATELET # BLD AUTO: 250 K/UL
PMV BLD AUTO: 9.7 FL
POTASSIUM SERPL-SCNC: 4.6 MMOL/L
PROT SERPL-MCNC: 6.7 G/DL
RBC # BLD AUTO: 3.7 M/UL
SODIUM SERPL-SCNC: 132 MMOL/L
WBC # BLD AUTO: 7.96 K/UL

## 2019-02-08 PROCEDURE — 83036 HEMOGLOBIN GLYCOSYLATED A1C: CPT | Mod: HCNC

## 2019-02-08 PROCEDURE — 85025 COMPLETE CBC W/AUTO DIFF WBC: CPT | Mod: HCNC

## 2019-02-08 PROCEDURE — 80053 COMPREHEN METABOLIC PANEL: CPT | Mod: HCNC

## 2019-02-08 PROCEDURE — 36415 COLL VENOUS BLD VENIPUNCTURE: CPT | Mod: HCNC,PO

## 2019-02-13 ENCOUNTER — OFFICE VISIT (OUTPATIENT)
Dept: FAMILY MEDICINE | Facility: CLINIC | Age: 79
End: 2019-02-13
Payer: MEDICARE

## 2019-02-13 VITALS
SYSTOLIC BLOOD PRESSURE: 152 MMHG | HEART RATE: 63 BPM | HEIGHT: 62 IN | TEMPERATURE: 98 F | OXYGEN SATURATION: 94 % | DIASTOLIC BLOOD PRESSURE: 68 MMHG | BODY MASS INDEX: 38.38 KG/M2 | RESPIRATION RATE: 17 BRPM | WEIGHT: 208.56 LBS

## 2019-02-13 DIAGNOSIS — Z79.4 CONTROLLED TYPE 2 DIABETES MELLITUS WITH BOTH EYES AFFECTED BY PROLIFERATIVE RETINOPATHY AND MACULAR EDEMA, WITH LONG-TERM CURRENT USE OF INSULIN: ICD-10-CM

## 2019-02-13 DIAGNOSIS — I50.30 (HFPEF) HEART FAILURE WITH PRESERVED EJECTION FRACTION: ICD-10-CM

## 2019-02-13 DIAGNOSIS — E11.49 TYPE II DIABETES MELLITUS WITH NEUROLOGICAL MANIFESTATIONS: ICD-10-CM

## 2019-02-13 DIAGNOSIS — I70.0 ATHEROSCLEROSIS OF AORTIC ARCH: ICD-10-CM

## 2019-02-13 DIAGNOSIS — J06.9 UPPER RESPIRATORY TRACT INFECTION, UNSPECIFIED TYPE: Primary | ICD-10-CM

## 2019-02-13 DIAGNOSIS — E11.3513 CONTROLLED TYPE 2 DIABETES MELLITUS WITH BOTH EYES AFFECTED BY PROLIFERATIVE RETINOPATHY AND MACULAR EDEMA, WITH LONG-TERM CURRENT USE OF INSULIN: ICD-10-CM

## 2019-02-13 DIAGNOSIS — E66.01 MORBID OBESITY: ICD-10-CM

## 2019-02-13 DIAGNOSIS — I48.0 PAROXYSMAL ATRIAL FIBRILLATION: ICD-10-CM

## 2019-02-13 PROCEDURE — 3078F DIAST BP <80 MM HG: CPT | Mod: HCNC,CPTII,S$GLB, | Performed by: INTERNAL MEDICINE

## 2019-02-13 PROCEDURE — 99999 PR PBB SHADOW E&M-EST. PATIENT-LVL III: ICD-10-PCS | Mod: PBBFAC,HCNC,, | Performed by: INTERNAL MEDICINE

## 2019-02-13 PROCEDURE — 99999 PR PBB SHADOW E&M-EST. PATIENT-LVL III: CPT | Mod: PBBFAC,HCNC,, | Performed by: INTERNAL MEDICINE

## 2019-02-13 PROCEDURE — 3078F PR MOST RECENT DIASTOLIC BLOOD PRESSURE < 80 MM HG: ICD-10-PCS | Mod: HCNC,CPTII,S$GLB, | Performed by: INTERNAL MEDICINE

## 2019-02-13 PROCEDURE — 99214 OFFICE O/P EST MOD 30 MIN: CPT | Mod: HCNC,S$GLB,, | Performed by: INTERNAL MEDICINE

## 2019-02-13 PROCEDURE — 3077F PR MOST RECENT SYSTOLIC BLOOD PRESSURE >= 140 MM HG: ICD-10-PCS | Mod: HCNC,CPTII,S$GLB, | Performed by: INTERNAL MEDICINE

## 2019-02-13 PROCEDURE — 1101F PT FALLS ASSESS-DOCD LE1/YR: CPT | Mod: HCNC,CPTII,S$GLB, | Performed by: INTERNAL MEDICINE

## 2019-02-13 PROCEDURE — 3077F SYST BP >= 140 MM HG: CPT | Mod: HCNC,CPTII,S$GLB, | Performed by: INTERNAL MEDICINE

## 2019-02-13 PROCEDURE — 99499 UNLISTED E&M SERVICE: CPT | Mod: HCNC,S$GLB,, | Performed by: INTERNAL MEDICINE

## 2019-02-13 PROCEDURE — 99499 RISK ADDL DX/OHS AUDIT: ICD-10-PCS | Mod: HCNC,S$GLB,, | Performed by: INTERNAL MEDICINE

## 2019-02-13 PROCEDURE — 99214 PR OFFICE/OUTPT VISIT, EST, LEVL IV, 30-39 MIN: ICD-10-PCS | Mod: HCNC,S$GLB,, | Performed by: INTERNAL MEDICINE

## 2019-02-13 PROCEDURE — 1101F PR PT FALLS ASSESS DOC 0-1 FALLS W/OUT INJ PAST YR: ICD-10-PCS | Mod: HCNC,CPTII,S$GLB, | Performed by: INTERNAL MEDICINE

## 2019-02-13 RX ORDER — DESLORATADINE 5 MG/1
5 TABLET ORAL DAILY
Qty: 90 TABLET | Refills: 1 | Status: SHIPPED | OUTPATIENT
Start: 2019-02-13 | End: 2020-02-13

## 2019-02-13 RX ORDER — CITALOPRAM 10 MG/1
10 TABLET ORAL DAILY
Qty: 90 TABLET | Refills: 1 | Status: SHIPPED | OUTPATIENT
Start: 2019-02-13 | End: 2019-02-13 | Stop reason: CLARIF

## 2019-02-13 NOTE — PROGRESS NOTES
"Subjective:       Patient ID: Kaci Whalen is a 78 y.o. female.    Chief Complaint: Establish Care and Follow-up    F/u chronic conditions    HPI: 79 y/o w/ HTN MIGUEL (intolerant of cpap) Afib diastolic HF and DM presents with daughter of scheduled follow. Non productive cough x four weeks worse when lying flat not attending gym as much with colder weather. No LE swelling. She still has lower back pain minimal improvement with tizanidine did not help with sleep. No fevers/chills. No melena or BRBPR      Review of Systems   Constitutional: Negative for activity change, appetite change, fatigue, fever and unexpected weight change.   HENT: Negative for ear pain, rhinorrhea and sore throat.    Eyes: Negative for discharge and visual disturbance.   Respiratory: Negative for chest tightness, shortness of breath and wheezing.    Cardiovascular: Negative for chest pain, palpitations and leg swelling.   Gastrointestinal: Negative for abdominal pain, constipation and diarrhea.   Endocrine: Negative for cold intolerance and heat intolerance.   Genitourinary: Negative for dysuria and hematuria.   Musculoskeletal: Negative for joint swelling and neck stiffness.   Skin: Negative for rash.   Neurological: Negative for dizziness, syncope, weakness and headaches.   Psychiatric/Behavioral: Negative for suicidal ideas.       Objective:     Vitals:    02/13/19 1309   BP: (!) 152/68   BP Location: Right arm   Patient Position: Sitting   Pulse: 63   Resp: 17   Temp: 98.1 °F (36.7 °C)   TempSrc: Oral   SpO2: (!) 94%   Weight: 94.6 kg (208 lb 8.9 oz)   Height: 5' 2" (1.575 m)          Physical Exam   Constitutional: She is oriented to person, place, and time. She appears well-developed and well-nourished.   HENT:   Head: Normocephalic and atraumatic.   Eyes: Conjunctivae are normal. Pupils are equal, round, and reactive to light. No scleral icterus.   Neck: Normal range of motion.   Cardiovascular: Normal rate and regular rhythm. Exam " reveals no gallop and no friction rub.   No murmur heard.  Ankle edema bilaterally   Pulmonary/Chest: Effort normal and breath sounds normal. She has no wheezes. She has no rales.   Good air movement to bases bilaterally   Abdominal: Soft. Bowel sounds are normal. There is no tenderness. There is no rebound and no guarding.   Musculoskeletal: Normal range of motion. She exhibits no edema or tenderness.   Neurological: She is alert and oriented to person, place, and time. No cranial nerve deficit.   Skin: Skin is warm and dry.   Psychiatric: She has a normal mood and affect.       Assessment and Plan   1. Type II diabetes mellitus with neurological manifestations  a1c at goal for age continue insulin    2. Morbid obesity  Encourage increase physical activity return to gym    3. (HFpEF) heart failure with preserved ejection fraction  Mild excess volume continue oral diuretic limit salt intake    4. Paroxysmal atrial fibrillation  Sinus on exam today continue NOAC for stroke prophylaxis    5. Atherosclerosis of aortic arch  On statyin    6. Controlled type 2 diabetes mellitus with both eyes affected by proliferative retinopathy and macular edema, with long-term current use of insulin  As above repeat labs prior to return in three months to have DFE with outside optometrist this month  - CBC auto differential; Future  - Comprehensive metabolic panel; Future  - Hemoglobin A1c; Future    7. Upper respiratory tract infection, unspecified type  Add oral antihistamine continue nasal saline and steorid sprays  - desloratadine (CLARINEX) 5 mg tablet; Take 1 tablet (5 mg total) by mouth once daily.  Dispense: 90 tablet; Refill: 1

## 2019-02-13 NOTE — PROGRESS NOTES
Patient, Kaci Whalen (MRN #[id number]), presented with a recent Estimated Glumerular Filtration Rate (EGFR) between 30 and 45 consistent with the definition of chronic kidney disease stage 3 - moderate (ICD10 - N18.3).    eGFR if non    Date Value Ref Range Status   02/08/2019 39.9 (A) >60 mL/min/1.73 m^2 Final     Comment:     Calculation used to obtain the estimated glomerular filtration  rate (eGFR) is the CKD-EPI equation.          The patient's chronic kidney disease stage 3 was monitored, evaluated, addressed and/or treated. This addendum to the medical record is made on 02/13/2019.

## 2019-02-25 DIAGNOSIS — R80.9 TYPE 2 DIABETES MELLITUS WITH MICROALBUMINURIA, WITH LONG-TERM CURRENT USE OF INSULIN: ICD-10-CM

## 2019-02-25 DIAGNOSIS — E11.29 TYPE 2 DIABETES MELLITUS WITH MICROALBUMINURIA, WITH LONG-TERM CURRENT USE OF INSULIN: ICD-10-CM

## 2019-02-25 DIAGNOSIS — Z79.4 TYPE 2 DIABETES MELLITUS WITH MICROALBUMINURIA, WITH LONG-TERM CURRENT USE OF INSULIN: ICD-10-CM

## 2019-02-25 RX ORDER — INSULIN ASPART 100 [IU]/ML
21 INJECTION, SUSPENSION SUBCUTANEOUS
Qty: 15 ML | Refills: 2 | Status: SHIPPED | OUTPATIENT
Start: 2019-02-25 | End: 2019-05-22 | Stop reason: SDUPTHER

## 2019-03-08 ENCOUNTER — PATIENT MESSAGE (OUTPATIENT)
Dept: FAMILY MEDICINE | Facility: CLINIC | Age: 79
End: 2019-03-08

## 2019-03-08 DIAGNOSIS — R80.9 TYPE 2 DIABETES MELLITUS WITH MICROALBUMINURIA, WITH LONG-TERM CURRENT USE OF INSULIN: ICD-10-CM

## 2019-03-08 DIAGNOSIS — E11.29 TYPE 2 DIABETES MELLITUS WITH MICROALBUMINURIA, WITH LONG-TERM CURRENT USE OF INSULIN: ICD-10-CM

## 2019-03-08 DIAGNOSIS — Z79.4 TYPE 2 DIABETES MELLITUS WITH MICROALBUMINURIA, WITH LONG-TERM CURRENT USE OF INSULIN: ICD-10-CM

## 2019-03-08 RX ORDER — CALCIUM CITRATE/VITAMIN D3 200MG-6.25
TABLET ORAL
Qty: 400 STRIP | Refills: 2 | Status: ON HOLD | OUTPATIENT
Start: 2019-03-08 | End: 2019-11-21 | Stop reason: HOSPADM

## 2019-03-13 ENCOUNTER — OFFICE VISIT (OUTPATIENT)
Dept: PODIATRY | Facility: CLINIC | Age: 79
End: 2019-03-13
Payer: MEDICARE

## 2019-03-13 VITALS — WEIGHT: 208 LBS | HEIGHT: 62 IN | BODY MASS INDEX: 38.28 KG/M2

## 2019-03-13 DIAGNOSIS — E11.49 TYPE II DIABETES MELLITUS WITH NEUROLOGICAL MANIFESTATIONS: Primary | ICD-10-CM

## 2019-03-13 DIAGNOSIS — M20.42 HAMMER TOES OF BOTH FEET: ICD-10-CM

## 2019-03-13 DIAGNOSIS — M20.10 HALLUX ABDUCTO VALGUS, UNSPECIFIED LATERALITY: ICD-10-CM

## 2019-03-13 DIAGNOSIS — B35.1 ONYCHOMYCOSIS DUE TO DERMATOPHYTE: ICD-10-CM

## 2019-03-13 DIAGNOSIS — M20.41 HAMMER TOES OF BOTH FEET: ICD-10-CM

## 2019-03-13 PROCEDURE — 11721 ROUTINE FOOT CARE: ICD-10-PCS | Mod: Q9,HCNC,S$GLB, | Performed by: PODIATRIST

## 2019-03-13 PROCEDURE — 99999 PR PBB SHADOW E&M-EST. PATIENT-LVL III: ICD-10-PCS | Mod: PBBFAC,HCNC,, | Performed by: PODIATRIST

## 2019-03-13 PROCEDURE — 99499 NO LOS: ICD-10-PCS | Mod: HCNC,S$GLB,, | Performed by: PODIATRIST

## 2019-03-13 PROCEDURE — 99999 PR PBB SHADOW E&M-EST. PATIENT-LVL III: CPT | Mod: PBBFAC,HCNC,, | Performed by: PODIATRIST

## 2019-03-13 PROCEDURE — 99499 UNLISTED E&M SERVICE: CPT | Mod: HCNC,S$GLB,, | Performed by: PODIATRIST

## 2019-03-13 PROCEDURE — 11721 DEBRIDE NAIL 6 OR MORE: CPT | Mod: Q9,HCNC,S$GLB, | Performed by: PODIATRIST

## 2019-03-13 NOTE — PROCEDURES
Routine Foot Care  Date/Time: 3/13/2019 3:36 PM  Performed by: Anisha Miller DPM  Authorized by: Anisha Miller DPM       Nail Care Type:  Debride  Location(s): All  (Left 1st Toe, Left 3rd Toe, Left 2nd Toe, Left 4th Toe, Left 5th Toe, Right 1st Toe, Right 2nd Toe, Right 3rd Toe, Right 4th Toe and Right 5th Toe)  Patient tolerance:  Patient tolerated the procedure well with no immediate complications

## 2019-03-13 NOTE — PROGRESS NOTES
Subjective:      Patient ID: Kaci Whalen is a 78 y.o. female.    Chief Complaint: Diabetes Mellitus (PCP Dr Hung); Diabetic Foot Exam; and Nail Care    Kaci is a 78 y.o. female who presents to the clinic for evaluation and treatment of diabetic feet. Kaci has a past medical history of Abdominal pain, Anxiety, Atherosclerosis of aortic arch, Atrial fibrillation (05/2016), Benign hypertension, Chronic constipation, Chronic diarrhea, Chronic edema, Depression, Dercum disease, Dysphagia, Gas pain, Glaucoma of both eyes, psychiatric care, Hyperlipidemia with target LDL less than 100, Immature cataract, Tasia, Morbid obesity, Nausea & vomiting, Neuropathy, Polyneuropathy, Primary osteoarthritis of both knees, Proteinuria, Psychiatric problem, Pulmonary hypertension mixed group 2 and 3 (1/18/2017), Sleep apnea, Therapy, Type II or unspecified type diabetes mellitus with neurological manifestations, uncontrolled(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 Patient relates no major problem with feet. Only complaints today consist of toenails in need of trimming. No other complaints today. Wearing DM shoes which help. Has hammertoes for which she uses toe spacers and toe sleeves. Requesting more toe sleeves.     PCP: Toby Hung MD    Date Last Seen by PCP:   Chief Complaint   Patient presents with    Diabetes Mellitus     PCP Dr Hung    Diabetic Foot Exam    Nail Care         Current shoe gear: Rx diabetic extra depth shoes and custom accommodative insoles    Hemoglobin A1C   Date Value Ref Range Status   02/08/2019 7.3 (H) 4.0 - 5.6 % Final     Comment:     ADA Screening Guidelines:  5.7-6.4%  Consistent with prediabetes  >or=6.5%  Consistent with diabetes  High levels of fetal hemoglobin interfere with the HbA1C  assay. Heterozygous hemoglobin variants (HbS, HgC, etc)do  not significantly interfere with this assay.   However, presence of multiple variants may affect accuracy.     11/09/2018 6.9 (H) 4.0  - 5.6 % Final     Comment:     ADA Screening Guidelines:  5.7-6.4%  Consistent with prediabetes  >or=6.5%  Consistent with diabetes  High levels of fetal hemoglobin interfere with the HbA1C  assay. Heterozygous hemoglobin variants (HbS, HgC, etc)do  not significantly interfere with this assay.   However, presence of multiple variants may affect accuracy.     07/30/2018 6.3 (H) 4.0 - 5.6 % Final     Comment:     ADA Screening Guidelines:  5.7-6.4%  Consistent with prediabetes  >or=6.5%  Consistent with diabetes  High levels of fetal hemoglobin interfere with the HbA1C  assay. Heterozygous hemoglobin variants (HbS, HgC, etc)do  not significantly interfere with this assay.   However, presence of multiple variants may affect accuracy.         Past Medical History:   Diagnosis Date    Abdominal pain     Anxiety     Atherosclerosis of aortic arch     noted on CXR 7/1/2015    Atrial fibrillation 05/2016    CHADS 4, on Xarelto    Benign hypertension     Chronic constipation     Chronic diarrhea     Chronic edema     Depression     Dercum disease     Multiple painful lipomas    Dysphagia     Gas pain     Glaucoma of both eyes     Hx of psychiatric care     previously amitriptyline, now jut on Trazodone 100mg QHS PRN insomnia    Hyperlipidemia with target LDL less than 100     Unable to tolerate statins    Immature cataract     Tasia     no symptoms prior to this presentation    Morbid obesity     Nausea & vomiting     Neuropathy     Polyneuropathy     Primary osteoarthritis of both knees     Proteinuria     Psychiatric problem     history of depression    Pulmonary hypertension mixed group 2 and 3 1/18/2017    Sleep apnea     Therapy     no history of therapy    Type II or unspecified type diabetes mellitus with neurological manifestations, uncontrolled(250.62)     Unspecified vitamin D deficiency        Past Surgical History:   Procedure Laterality Date     tenotomy      flexors 2,3 L toes     CATARACT EXTRACTION      COLONOSCOPY N/A 5/4/2017    Performed by [facility] at [facility] (2ND FLR)    diabetic retinopathy      right    ESOPHAGOGASTRODUODENOSCOPY (EGD) N/A 5/4/2017    Performed by [facility] at [facility] ENDO (2ND FLR)    HYSTERECTOMY      knee surgery x2      Left ankle and leg surgery secondary to a fall      left arm fracture      Left cataract      ORIF, WRIST Left 12/28/2012    Performed by Rozina Buck MD at [facility] OR 1ST FLR    PARTIAL HYSTERECTOMY      Secondary to bleeding    TOE FUSION      2,3 R       Family History   Problem Relation Age of Onset    No Known Problems Daughter     No Known Problems Father     Liver cancer Mother     Bone cancer Daughter     No Known Problems Sister     No Known Problems Brother     No Known Problems Maternal Aunt     No Known Problems Maternal Uncle     No Known Problems Paternal Aunt     No Known Problems Paternal Uncle     No Known Problems Maternal Grandmother     No Known Problems Maternal Grandfather     No Known Problems Paternal Grandmother     No Known Problems Paternal Grandfather     Amblyopia Neg Hx     Blindness Neg Hx     Cancer Neg Hx     Cataracts Neg Hx     Diabetes Neg Hx     Glaucoma Neg Hx     Hypertension Neg Hx     Macular degeneration Neg Hx     Retinal detachment Neg Hx     Strabismus Neg Hx     Stroke Neg Hx     Thyroid disease Neg Hx     Celiac disease Neg Hx     Cirrhosis Neg Hx     Colon cancer Neg Hx     Colon polyps Neg Hx     Crohn's disease Neg Hx     Cystic fibrosis Neg Hx     Esophageal cancer Neg Hx     Hemochromatosis Neg Hx     Inflammatory bowel disease Neg Hx     Irritable bowel syndrome Neg Hx     Liver disease Neg Hx     Rectal cancer Neg Hx     Stomach cancer Neg Hx     Ulcerative colitis Neg Hx     Montez's disease Neg Hx     Alcohol abuse Neg Hx     Bipolar disorder Neg Hx     Dementia Neg Hx     Depression Neg Hx     Drug abuse Neg  Hx     Suicide Neg Hx     Schizophrenia Neg Hx        Social History     Socioeconomic History    Marital status:      Spouse name: None    Number of children: 3    Years of education: 14    Highest education level: None   Social Needs    Financial resource strain: None    Food insecurity - worry: None    Food insecurity - inability: None    Transportation needs - medical: None    Transportation needs - non-medical: None   Occupational History    Occupation: housewife   Tobacco Use    Smoking status: Never Smoker    Smokeless tobacco: Never Used   Substance and Sexual Activity    Alcohol use: No    Drug use: No    Sexual activity: Yes     Partners: Male   Other Topics Concern    Patient feels they ought to cut down on drinking/drug use Not Asked    Patient annoyed by others criticizing their drinking/drug use Not Asked    Patient has felt bad or guilty about drinking/drug use Not Asked    Patient has had a drink/used drugs as an eye opener in the AM Not Asked   Social History Narrative    Pt was the youngest of 3 boys and 2 girls.  Her father  when she was 3 months old.  She was raised by her mother, m, and an uncle.  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  They had 2 daughters and 1 son together, plus 5 stepchildren by her .  They live together in [place] in their own home, with no pets.  Hobbies include gardening and crafts.  Pt attends a variety of organized Denominational services.            One daughter is  from bone cancer.    One son  2014 after surgery for MIGUEL       Current Outpatient Medications   Medication Sig Dispense Refill    alcohol swabs PadM Apply 1 each topically as needed.  0    amLODIPine (NORVASC) 5 MG tablet Take 1 tablet (5 mg total) by mouth once daily. 90 tablet 2    blood glucose control, low Soln To test meter once per week 1 each 1    desloratadine (CLARINEX) 5  "mg tablet Take 1 tablet (5 mg total) by mouth once daily. 90 tablet 1    fluticasone (FLONASE) 50 mcg/actuation nasal spray 1 spray (50 mcg total) by Each Nare route 2 (two) times daily. 16 g 3    hydroCHLOROthiazide (HYDRODIURIL) 25 MG tablet TAKE ONE TABLET BY MOUTH ONCE DAILY 90 tablet 3    insulin aspart protamine-insulin aspart (NOVOLOG 70/30) 100 unit/mL (70-30) InPn pen Inject 21 Units into the skin 2 (two) times daily before meals. Sliding scale max daily dosing 50 units daily 15 mL 2    ketoconazole (NIZORAL) 2 % shampoo Apply topically twice a week. Leave on scalp for five minutes then rinse 120 mL 1    lancets (LANCETS,THIN) 28 gauge Misc 1 lancet by Misc.(Non-Drug; Combo Route) route 4 (four) times daily before meals and nightly. 100 each 2    losartan (COZAAR) 100 MG tablet Take 1 tablet (100 mg total) by mouth once daily. 90 tablet 3    magnesium chloride (SLOW-MAG) 71.5 mg TbEC Take 2 tablets by mouth once.      melatonin 10 mg Tab Take by mouth. 2 tabs at night      metFORMIN (GLUCOPHAGE) 500 MG tablet Take 1 tablet (500 mg total) by mouth 3 (three) times daily. 270 tablet 3    metoprolol succinate (TOPROL-XL) 25 MG 24 hr tablet TAKE 1 TABLET BY MOUTH ONCE DAILY 90 tablet 2    pen needle, diabetic (BD ULTRA-FINE SANDEEP PEN NEEDLES) 32 gauge x 5/32" Ndle Take 1 each by mouth 4 (four) times daily. 120 each 2    psyllium (METAMUCIL) packet Take 1 packet by mouth once daily.  12    ranitidine (ZANTAC) 150 MG tablet Take 1 tablet (150 mg total) by mouth 2 (two) times daily. 60 tablet 5    rivaroxaban (XARELTO) 20 mg Tab Take 1 tablet (20 mg total) by mouth once daily. 90 tablet 3    thiamine 100 MG tablet Take 1 tablet (100 mg total) by mouth once daily. 90 tablet 3    TRUE METRIX GLUCOSE TEST STRIP Strp TEST FOUR TIMES DAILY BEFORE MEALS  AND EVERY NIGHT 400 strip 2     No current facility-administered medications for this visit.        Review of patient's allergies indicates:   Allergen " Reactions    Ace inhibitors      Other reaction(s): cough      Fluorescein Itching     Other reaction(s): Itching    Iodine      Other reaction(s): Itching    Pravastatin Other (See Comments)     Other reaction(s): mouth tingling/numbness    Simvastatin      Other reaction(s): foot swelling           Review of Systems   Constitution: Negative for chills and fever.   Cardiovascular: Negative for chest pain, claudication and leg swelling.   Respiratory: Negative for cough and shortness of breath.    Skin: Positive for nail changes. Negative for dry skin.   Musculoskeletal: Negative for joint pain and muscle weakness.   Gastrointestinal: Negative for nausea and vomiting.   Neurological: Positive for numbness. Negative for paresthesias.   Psychiatric/Behavioral: Negative for altered mental status.           Objective:      Physical Exam     Vascular  DP 1/4 bl, PT1 /4 bl  Mild pitting edema, skin is atrophic, decreased digital hair, feet slightly cool, nails thickened, mild plantar rubor    Neurological:  Sensation to the 10 g sensory filament is not felt consistently on the feet toes and ball (5/10)    Dermatologic:  Nails are elongated and mycotic with thickened, discolored, onycholytic and subungual debris changes X10 Nails areapproximately 2   mm thick and    4 mm long. Skin is intact without wounds, rash, or infection.  Web spaces are clear without maceration    Callus sub 3 b/l, R hallux IPJ plantar and lateral aspect of b/l 5th toes adjacent to nails (5 lesions total)    Musculoskeletal:   Equinus noted b/l ankles with < 10 deg DF noted. MMT 5/5 in DF/PF/Inv/Ev resistance with no reproduction of pain in any direction. Passive range of motion of ankle and pedal joints is painless b/l. Semi-reducible hammertoe contractures noted to toes 2-4 b/l-asymptomatic. HAV, mild, non trackbound noted b/l with mild medial bony prominence at 1st met head--asymptomatic. Adductovarus rotation of 4th toes b/l.   Hypermobility  noted to 1st ray b/l with near complete collapse of medial longitudinal arch b/l with loading.       Assessment:       Encounter Diagnoses   Name Primary?    Type II diabetes mellitus with neurological manifestations Yes    Onychomycosis due to dermatophyte     Hallux abducto valgus, unspecified laterality     Hammer toes of both feet          Plan:       Kaci was seen today for diabetes mellitus, diabetic foot exam and nail care.    Diagnoses and all orders for this visit:    Type II diabetes mellitus with neurological manifestations  -     DIABETIC SHOES FOR HOME USE  -     Routine Foot Care    Onychomycosis due to dermatophyte  -     DIABETIC SHOES FOR HOME USE  -     Routine Foot Care    Hallux abducto valgus, unspecified laterality  -     DIABETIC SHOES FOR HOME USE    Hammer toes of both feet  -     DIABETIC SHOES FOR HOME USE      I counseled the patient on her conditions, their implications and medical management.    - Shoe inspection. Diabetic Foot Education. Patient reminded of the importance of good nutrition and blood sugar control to help prevent podiatric complications of diabetes. Patient instructed on proper foot hygeine. We discussed wearing proper shoe gear, daily foot inspections, never walking without protective shoe gear, never putting sharp instruments to feet, routine podiatric nail visits every 2-3 months.      See routine foot care procedure note for nail debridement     Discussed continuation of regular and routine moisturizer to skin of both feet to help improve dry skin. Advised to apply twice daily until resolution of symptoms. Avoid between toes.     Silicone toe sleeves provided for daily use. Continue foam toe spacers as needed.     Rx diabetic shoes with custom molded inserts to be worn at all times while ambulating. Prescription provided with list of local retailers.       RTC 3 months, sooner ANTONIO Miller DPM

## 2019-03-18 ENCOUNTER — PATIENT MESSAGE (OUTPATIENT)
Dept: FAMILY MEDICINE | Facility: CLINIC | Age: 79
End: 2019-03-18

## 2019-04-04 ENCOUNTER — OFFICE VISIT (OUTPATIENT)
Dept: OPHTHALMOLOGY | Facility: CLINIC | Age: 79
End: 2019-04-04
Payer: MEDICARE

## 2019-04-04 DIAGNOSIS — H04.123 DRY EYE SYNDROME OF BOTH EYES: ICD-10-CM

## 2019-04-04 DIAGNOSIS — E11.3553 STABLE PROLIFERATIVE DIABETIC RETINOPATHY OF BOTH EYES ASSOCIATED WITH TYPE 2 DIABETES MELLITUS: Primary | ICD-10-CM

## 2019-04-04 DIAGNOSIS — H52.7 REFRACTIVE ERROR: ICD-10-CM

## 2019-04-04 DIAGNOSIS — H35.033 HYPERTENSIVE RETINOPATHY OF BOTH EYES: ICD-10-CM

## 2019-04-04 DIAGNOSIS — Z96.1 PSEUDOPHAKIA: ICD-10-CM

## 2019-04-04 DIAGNOSIS — H43.811 POSTERIOR VITREOUS DETACHMENT, RIGHT EYE: ICD-10-CM

## 2019-04-04 DIAGNOSIS — H35.372 EPIRETINAL MEMBRANE, LEFT EYE: ICD-10-CM

## 2019-04-04 PROCEDURE — 99499 RISK ADDL DX/OHS AUDIT: ICD-10-PCS | Mod: HCNC,S$GLB,, | Performed by: OPHTHALMOLOGY

## 2019-04-04 PROCEDURE — 99499 UNLISTED E&M SERVICE: CPT | Mod: HCNC,S$GLB,, | Performed by: OPHTHALMOLOGY

## 2019-04-04 PROCEDURE — 99999 PR PBB SHADOW E&M-EST. PATIENT-LVL II: ICD-10-PCS | Mod: PBBFAC,HCNC,, | Performed by: OPHTHALMOLOGY

## 2019-04-04 PROCEDURE — 99999 PR PBB SHADOW E&M-EST. PATIENT-LVL II: CPT | Mod: PBBFAC,HCNC,, | Performed by: OPHTHALMOLOGY

## 2019-04-04 PROCEDURE — 92014 PR EYE EXAM, EST PATIENT,COMPREHESV: ICD-10-PCS | Mod: HCNC,S$GLB,, | Performed by: OPHTHALMOLOGY

## 2019-04-04 PROCEDURE — 92014 COMPRE OPH EXAM EST PT 1/>: CPT | Mod: HCNC,S$GLB,, | Performed by: OPHTHALMOLOGY

## 2019-05-16 ENCOUNTER — LAB VISIT (OUTPATIENT)
Dept: LAB | Facility: HOSPITAL | Age: 79
End: 2019-05-16
Attending: INTERNAL MEDICINE
Payer: MEDICARE

## 2019-05-16 DIAGNOSIS — Z79.4 CONTROLLED TYPE 2 DIABETES MELLITUS WITH BOTH EYES AFFECTED BY PROLIFERATIVE RETINOPATHY AND MACULAR EDEMA, WITH LONG-TERM CURRENT USE OF INSULIN: ICD-10-CM

## 2019-05-16 DIAGNOSIS — E11.3513 CONTROLLED TYPE 2 DIABETES MELLITUS WITH BOTH EYES AFFECTED BY PROLIFERATIVE RETINOPATHY AND MACULAR EDEMA, WITH LONG-TERM CURRENT USE OF INSULIN: ICD-10-CM

## 2019-05-16 LAB
ALBUMIN SERPL BCP-MCNC: 3.5 G/DL (ref 3.5–5.2)
ALP SERPL-CCNC: 70 U/L (ref 55–135)
ALT SERPL W/O P-5'-P-CCNC: 9 U/L (ref 10–44)
ANION GAP SERPL CALC-SCNC: 8 MMOL/L (ref 8–16)
AST SERPL-CCNC: 15 U/L (ref 10–40)
BASOPHILS # BLD AUTO: 0.05 K/UL (ref 0–0.2)
BASOPHILS NFR BLD: 0.6 % (ref 0–1.9)
BILIRUB SERPL-MCNC: 0.3 MG/DL (ref 0.1–1)
BUN SERPL-MCNC: 27 MG/DL (ref 8–23)
CALCIUM SERPL-MCNC: 10.3 MG/DL (ref 8.7–10.5)
CHLORIDE SERPL-SCNC: 97 MMOL/L (ref 95–110)
CO2 SERPL-SCNC: 33 MMOL/L (ref 23–29)
CREAT SERPL-MCNC: 1.3 MG/DL (ref 0.5–1.4)
DIFFERENTIAL METHOD: ABNORMAL
EOSINOPHIL # BLD AUTO: 0.3 K/UL (ref 0–0.5)
EOSINOPHIL NFR BLD: 3.2 % (ref 0–8)
ERYTHROCYTE [DISTWIDTH] IN BLOOD BY AUTOMATED COUNT: 13.4 % (ref 11.5–14.5)
EST. GFR  (AFRICAN AMERICAN): 46 ML/MIN/1.73 M^2
EST. GFR  (NON AFRICAN AMERICAN): 39.9 ML/MIN/1.73 M^2
ESTIMATED AVG GLUCOSE: 177 MG/DL (ref 68–131)
GLUCOSE SERPL-MCNC: 103 MG/DL (ref 70–110)
HBA1C MFR BLD HPLC: 7.8 % (ref 4–5.6)
HCT VFR BLD AUTO: 35.9 % (ref 37–48.5)
HGB BLD-MCNC: 11.2 G/DL (ref 12–16)
IMM GRANULOCYTES # BLD AUTO: 0.04 K/UL (ref 0–0.04)
IMM GRANULOCYTES NFR BLD AUTO: 0.5 % (ref 0–0.5)
LYMPHOCYTES # BLD AUTO: 1.2 K/UL (ref 1–4.8)
LYMPHOCYTES NFR BLD: 14.2 % (ref 18–48)
MCH RBC QN AUTO: 29.4 PG (ref 27–31)
MCHC RBC AUTO-ENTMCNC: 31.2 G/DL (ref 32–36)
MCV RBC AUTO: 94 FL (ref 82–98)
MONOCYTES # BLD AUTO: 0.8 K/UL (ref 0.3–1)
MONOCYTES NFR BLD: 9.1 % (ref 4–15)
NEUTROPHILS # BLD AUTO: 6.3 K/UL (ref 1.8–7.7)
NEUTROPHILS NFR BLD: 72.4 % (ref 38–73)
NRBC BLD-RTO: 0 /100 WBC
PLATELET # BLD AUTO: 307 K/UL (ref 150–350)
PMV BLD AUTO: 9.8 FL (ref 9.2–12.9)
POTASSIUM SERPL-SCNC: 4.4 MMOL/L (ref 3.5–5.1)
PROT SERPL-MCNC: 6.8 G/DL (ref 6–8.4)
RBC # BLD AUTO: 3.81 M/UL (ref 4–5.4)
SODIUM SERPL-SCNC: 138 MMOL/L (ref 136–145)
WBC # BLD AUTO: 8.65 K/UL (ref 3.9–12.7)

## 2019-05-16 PROCEDURE — 80053 COMPREHEN METABOLIC PANEL: CPT | Mod: HCNC

## 2019-05-16 PROCEDURE — 85025 COMPLETE CBC W/AUTO DIFF WBC: CPT | Mod: HCNC

## 2019-05-16 PROCEDURE — 83036 HEMOGLOBIN GLYCOSYLATED A1C: CPT | Mod: HCNC

## 2019-05-16 PROCEDURE — 36415 COLL VENOUS BLD VENIPUNCTURE: CPT | Mod: HCNC,PO

## 2019-05-22 ENCOUNTER — TELEPHONE (OUTPATIENT)
Dept: FAMILY MEDICINE | Facility: CLINIC | Age: 79
End: 2019-05-22

## 2019-05-22 ENCOUNTER — OFFICE VISIT (OUTPATIENT)
Dept: FAMILY MEDICINE | Facility: CLINIC | Age: 79
End: 2019-05-22
Payer: MEDICARE

## 2019-05-22 VITALS
WEIGHT: 205 LBS | HEART RATE: 72 BPM | DIASTOLIC BLOOD PRESSURE: 82 MMHG | RESPIRATION RATE: 17 BRPM | TEMPERATURE: 98 F | BODY MASS INDEX: 37.73 KG/M2 | SYSTOLIC BLOOD PRESSURE: 139 MMHG | OXYGEN SATURATION: 88 % | HEIGHT: 62 IN

## 2019-05-22 DIAGNOSIS — I48.0 PAROXYSMAL ATRIAL FIBRILLATION: ICD-10-CM

## 2019-05-22 DIAGNOSIS — I10 HYPERTENSION, ESSENTIAL: ICD-10-CM

## 2019-05-22 DIAGNOSIS — E11.29 TYPE 2 DIABETES MELLITUS WITH MICROALBUMINURIA, WITH LONG-TERM CURRENT USE OF INSULIN: ICD-10-CM

## 2019-05-22 DIAGNOSIS — R06.09 DYSPNEA ON EXERTION: ICD-10-CM

## 2019-05-22 DIAGNOSIS — R26.81 GAIT INSTABILITY: ICD-10-CM

## 2019-05-22 DIAGNOSIS — R80.9 TYPE 2 DIABETES MELLITUS WITH MICROALBUMINURIA, WITH LONG-TERM CURRENT USE OF INSULIN: ICD-10-CM

## 2019-05-22 DIAGNOSIS — I50.30 (HFPEF) HEART FAILURE WITH PRESERVED EJECTION FRACTION: Primary | ICD-10-CM

## 2019-05-22 DIAGNOSIS — Z79.4 TYPE 2 DIABETES MELLITUS WITH MICROALBUMINURIA, WITH LONG-TERM CURRENT USE OF INSULIN: ICD-10-CM

## 2019-05-22 DIAGNOSIS — F33.1 MODERATE EPISODE OF RECURRENT MAJOR DEPRESSIVE DISORDER: Primary | ICD-10-CM

## 2019-05-22 PROCEDURE — 99214 PR OFFICE/OUTPT VISIT, EST, LEVL IV, 30-39 MIN: ICD-10-PCS | Mod: HCNC,S$GLB,, | Performed by: INTERNAL MEDICINE

## 2019-05-22 PROCEDURE — 99499 RISK ADDL DX/OHS AUDIT: ICD-10-PCS | Mod: HCNC,S$GLB,, | Performed by: INTERNAL MEDICINE

## 2019-05-22 PROCEDURE — 3079F PR MOST RECENT DIASTOLIC BLOOD PRESSURE 80-89 MM HG: ICD-10-PCS | Mod: HCNC,CPTII,S$GLB, | Performed by: INTERNAL MEDICINE

## 2019-05-22 PROCEDURE — 99999 PR PBB SHADOW E&M-EST. PATIENT-LVL IV: ICD-10-PCS | Mod: PBBFAC,HCNC,, | Performed by: INTERNAL MEDICINE

## 2019-05-22 PROCEDURE — 1101F PT FALLS ASSESS-DOCD LE1/YR: CPT | Mod: HCNC,CPTII,S$GLB, | Performed by: INTERNAL MEDICINE

## 2019-05-22 PROCEDURE — 99999 PR PBB SHADOW E&M-EST. PATIENT-LVL IV: CPT | Mod: PBBFAC,HCNC,, | Performed by: INTERNAL MEDICINE

## 2019-05-22 PROCEDURE — 3075F SYST BP GE 130 - 139MM HG: CPT | Mod: HCNC,CPTII,S$GLB, | Performed by: INTERNAL MEDICINE

## 2019-05-22 PROCEDURE — 1101F PR PT FALLS ASSESS DOC 0-1 FALLS W/OUT INJ PAST YR: ICD-10-PCS | Mod: HCNC,CPTII,S$GLB, | Performed by: INTERNAL MEDICINE

## 2019-05-22 PROCEDURE — 3079F DIAST BP 80-89 MM HG: CPT | Mod: HCNC,CPTII,S$GLB, | Performed by: INTERNAL MEDICINE

## 2019-05-22 PROCEDURE — 3075F PR MOST RECENT SYSTOLIC BLOOD PRESS GE 130-139MM HG: ICD-10-PCS | Mod: HCNC,CPTII,S$GLB, | Performed by: INTERNAL MEDICINE

## 2019-05-22 PROCEDURE — 99499 UNLISTED E&M SERVICE: CPT | Mod: HCNC,S$GLB,, | Performed by: INTERNAL MEDICINE

## 2019-05-22 PROCEDURE — 99214 OFFICE O/P EST MOD 30 MIN: CPT | Mod: HCNC,S$GLB,, | Performed by: INTERNAL MEDICINE

## 2019-05-22 RX ORDER — HYDROCHLOROTHIAZIDE 25 MG/1
25 TABLET ORAL DAILY
Qty: 90 TABLET | Refills: 3 | Status: SHIPPED | OUTPATIENT
Start: 2019-05-22 | End: 2019-07-02 | Stop reason: SINTOL

## 2019-05-22 RX ORDER — METOPROLOL SUCCINATE 25 MG/1
25 TABLET, EXTENDED RELEASE ORAL DAILY
Qty: 90 TABLET | Refills: 2 | Status: SHIPPED | OUTPATIENT
Start: 2019-05-22 | End: 2019-10-07 | Stop reason: SDUPTHER

## 2019-05-22 RX ORDER — INSULIN ASPART 100 [IU]/ML
21 INJECTION, SUSPENSION SUBCUTANEOUS
Qty: 15 ML | Refills: 2 | Status: ON HOLD | OUTPATIENT
Start: 2019-05-22 | End: 2019-11-26 | Stop reason: HOSPADM

## 2019-05-22 RX ORDER — CITALOPRAM 20 MG/1
20 TABLET, FILM COATED ORAL DAILY
Qty: 30 TABLET | Refills: 11 | Status: SHIPPED | OUTPATIENT
Start: 2019-05-22 | End: 2019-09-20

## 2019-05-22 RX ORDER — FUROSEMIDE 40 MG/1
TABLET ORAL
Qty: 30 TABLET | Refills: 0 | Status: SHIPPED | OUTPATIENT
Start: 2019-05-22 | End: 2019-07-17 | Stop reason: SDUPTHER

## 2019-05-22 RX ORDER — LOSARTAN POTASSIUM 100 MG/1
100 TABLET ORAL DAILY
Qty: 90 TABLET | Refills: 3 | Status: ON HOLD | OUTPATIENT
Start: 2019-05-22 | End: 2019-11-26 | Stop reason: HOSPADM

## 2019-05-22 NOTE — PROGRESS NOTES
"Subjective:       Patient ID: Kaci Whalen is a 78 y.o. female.    Chief Complaint: Shortness of Breath (ongoing ); Establish Care; and Follow-up    Anxiety and breathing    HPI: 79 y/o w/ HTN diastolic HF DM w/ neuropathy presents with daughter. Patient today complains of worsening anxiety. She feels worse when awakening at night feels difficulty getting air out. Daughter reports she is more tearful and at times worrying about non sensical things. She gets very short winded with small distances within her home. No change in LE swelling she has two pillow orthopnea (chronic) she is intolerant of both nasal pillow and full face mask CPAP    Review of Systems   Constitutional: Negative for activity change, appetite change, fatigue, fever and unexpected weight change.   HENT: Negative for ear pain, rhinorrhea and sore throat.    Eyes: Negative for discharge and visual disturbance.   Respiratory: Positive for shortness of breath. Negative for chest tightness and wheezing.    Cardiovascular: Negative for chest pain, palpitations and leg swelling.   Gastrointestinal: Negative for abdominal pain, constipation and diarrhea.   Endocrine: Negative for cold intolerance and heat intolerance.   Genitourinary: Negative for dysuria and hematuria.   Musculoskeletal: Negative for joint swelling and neck stiffness.   Skin: Negative for rash.   Neurological: Positive for numbness. Negative for dizziness, syncope, weakness and headaches.   Psychiatric/Behavioral: Negative for suicidal ideas.       Objective:     Vitals:    05/22/19 1315   BP: 139/82   BP Location: Right arm   Patient Position: Sitting   Pulse: 72   Resp: 17   Temp: 97.9 °F (36.6 °C)   TempSrc: Oral   SpO2: (!) 88%   Weight: 93 kg (205 lb 0.4 oz)   Height: 5' 2" (1.575 m)          Physical Exam   Constitutional: She is oriented to person, place, and time. She appears well-developed and well-nourished.   HENT:   Head: Normocephalic and atraumatic.   Eyes: " Conjunctivae are normal. No scleral icterus.   Neck: Normal range of motion.   Cardiovascular: Normal rate and regular rhythm. Exam reveals no gallop and no friction rub.   No murmur heard.  No pitting edema of lower extremities   Pulmonary/Chest: Effort normal and breath sounds normal. She has no wheezes. She has no rales.   Good air movement to bases   Abdominal: Soft. Bowel sounds are normal. There is no tenderness. There is no rebound and no guarding.   Musculoskeletal: Normal range of motion. She exhibits no edema or tenderness.   Neurological: She is alert and oriented to person, place, and time. No cranial nerve deficit.   Skin: Skin is warm and dry.   Psychiatric: She has a normal mood and affect.       Assessment and Plan   1. Hypertension, essential  At goal continue current medications  - losartan (COZAAR) 100 MG tablet; Take 1 tablet (100 mg total) by mouth once daily.  Dispense: 90 tablet; Refill: 3  - hydroCHLOROthiazide (HYDRODIURIL) 25 MG tablet; Take 1 tablet (25 mg total) by mouth once daily.  Dispense: 90 tablet; Refill: 3    2. Type 2 diabetes mellitus with microalbuminuria, with long-term current use of insulin  a1c at goal for age given her past response for dopamine agonist avoiding TCA or gabapentin for neuropathy  - insulin aspart protamine-insulin aspart (NOVOLOG 70/30) 100 unit/mL (70-30) InPn pen; Inject 21 Units into the skin 2 (two) times daily before meals. Sliding scale max daily dosing 50 units daily  Dispense: 15 mL; Refill: 2  - Ambulatory referral to Home Health  - CBC auto differential; Future  - Comprehensive metabolic panel; Future  - Hemoglobin A1c; Future    3. Paroxysmal atrial fibrillation  Rate controlled on NOAC  - metoprolol succinate (TOPROL-XL) 25 MG 24 hr tablet; Take 1 tablet (25 mg total) by mouth once daily.  Dispense: 90 tablet; Refill: 2    4. Moderate episode of recurrent major depressive disorder  Increase citalopram to 20mg nightly  - citalopram (CELEXA) 20 MG  tablet; Take 1 tablet (20 mg total) by mouth once daily.  Dispense: 30 tablet; Refill: 11    5. Gait instability  Referral for home PT/OT assesment  - Ambulatory referral to Home Health    6. Dyspnea on exertion  Chest xray today to evaluate for vascular congestion. Appears clinically euvolemic  - X-Ray Chest PA And Lateral; Future

## 2019-05-22 NOTE — TELEPHONE ENCOUNTER
Patient's daughter contacted Chest xray with mild vascular congestion with orthopnea and exertional desats consider hypervolemia. Trial lasix 40mg daily x three days. I've asked her to message me if there is significant improvement with this short diuretic course can consider using this as needed

## 2019-05-28 ENCOUNTER — PATIENT MESSAGE (OUTPATIENT)
Dept: FAMILY MEDICINE | Facility: CLINIC | Age: 79
End: 2019-05-28

## 2019-05-29 PROCEDURE — G0180 PR HOME HEALTH MD CERTIFICATION: ICD-10-PCS | Mod: ,,, | Performed by: INTERNAL MEDICINE

## 2019-05-29 PROCEDURE — G0180 MD CERTIFICATION HHA PATIENT: HCPCS | Mod: ,,, | Performed by: INTERNAL MEDICINE

## 2019-05-31 ENCOUNTER — TELEPHONE (OUTPATIENT)
Dept: FAMILY MEDICINE | Facility: CLINIC | Age: 79
End: 2019-05-31

## 2019-05-31 NOTE — TELEPHONE ENCOUNTER
----- Message from Divya Young sent at 5/31/2019  1:00 PM CDT -----  Contact: Link henry/[facility] ph [phone]  .Type: Patient Call Back    Who called: Link henry/[facility]    What is the request in detail: Patient had a fall last night, at home. No injuries. Link is calling to report this.    Would the patient rather a call back or a response via [facility]?  Call back    Best call back number: [phone]

## 2019-06-19 ENCOUNTER — OFFICE VISIT (OUTPATIENT)
Dept: PODIATRY | Facility: CLINIC | Age: 79
End: 2019-06-19
Payer: MEDICARE

## 2019-06-19 DIAGNOSIS — E11.49 TYPE II DIABETES MELLITUS WITH NEUROLOGICAL MANIFESTATIONS: Primary | ICD-10-CM

## 2019-06-19 DIAGNOSIS — B35.1 ONYCHOMYCOSIS DUE TO DERMATOPHYTE: ICD-10-CM

## 2019-06-19 PROCEDURE — 99999 PR PBB SHADOW E&M-EST. PATIENT-LVL III: ICD-10-PCS | Mod: PBBFAC,HCNC,, | Performed by: PODIATRIST

## 2019-06-19 PROCEDURE — 11721 DEBRIDE NAIL 6 OR MORE: CPT | Mod: Q9,HCNC,S$GLB, | Performed by: PODIATRIST

## 2019-06-19 PROCEDURE — 99499 NO LOS: ICD-10-PCS | Mod: HCNC,S$GLB,, | Performed by: PODIATRIST

## 2019-06-19 PROCEDURE — 99499 UNLISTED E&M SERVICE: CPT | Mod: HCNC,S$GLB,, | Performed by: PODIATRIST

## 2019-06-19 PROCEDURE — 11721 ROUTINE FOOT CARE: ICD-10-PCS | Mod: Q9,HCNC,S$GLB, | Performed by: PODIATRIST

## 2019-06-19 PROCEDURE — 99999 PR PBB SHADOW E&M-EST. PATIENT-LVL III: CPT | Mod: PBBFAC,HCNC,, | Performed by: PODIATRIST

## 2019-06-20 ENCOUNTER — TELEPHONE (OUTPATIENT)
Dept: FAMILY MEDICINE | Facility: CLINIC | Age: 79
End: 2019-06-20

## 2019-06-20 ENCOUNTER — PATIENT MESSAGE (OUTPATIENT)
Dept: FAMILY MEDICINE | Facility: CLINIC | Age: 79
End: 2019-06-20

## 2019-06-20 ENCOUNTER — EXTERNAL HOME HEALTH (OUTPATIENT)
Dept: HOME HEALTH SERVICES | Facility: HOSPITAL | Age: 79
End: 2019-06-20
Payer: MEDICARE

## 2019-06-20 DIAGNOSIS — A04.8 H. PYLORI INFECTION: Primary | ICD-10-CM

## 2019-06-20 RX ORDER — AMOXICILLIN 500 MG/1
1000 CAPSULE ORAL EVERY 12 HOURS
Qty: 28 CAPSULE | Refills: 0 | Status: SHIPPED | OUTPATIENT
Start: 2019-06-20 | End: 2019-06-27

## 2019-06-20 RX ORDER — PANTOPRAZOLE SODIUM 40 MG/1
40 TABLET, DELAYED RELEASE ORAL 2 TIMES DAILY
Qty: 14 TABLET | Refills: 0 | Status: SHIPPED | OUTPATIENT
Start: 2019-06-20 | End: 2019-07-15 | Stop reason: ALTCHOICE

## 2019-06-20 NOTE — TELEPHONE ENCOUNTER
Patient's daughter contacted   Reports mom has been complaining of burning in stomach worse after eating similar to symptoms she had two years ago when treated for h pylori no nausea or vomitting moving bowels daily no dysuria or hematuria. Has been taking zantac twice per day.     Will treat emperically for hyplori with salvage therapy for h pylori with amoxicillin and ppi x seven days daughter will contact me with symptom update in five days

## 2019-06-24 ENCOUNTER — PATIENT MESSAGE (OUTPATIENT)
Dept: FAMILY MEDICINE | Facility: CLINIC | Age: 79
End: 2019-06-24

## 2019-06-24 NOTE — PROCEDURES
Routine Foot Care  Date/Time: 6/19/2019 2:13 PM  Performed by: Anisha Miller DPM  Authorized by: Anisha Miller DPM     Consent Done?:  Yes (Verbal)  Hyperkeratotic Skin Lesions?: No      Nail Care Type:  Debride  Location(s): All  (Left 1st Toe, Left 3rd Toe, Left 2nd Toe, Left 4th Toe, Left 5th Toe, Right 1st Toe, Right 2nd Toe, Right 3rd Toe, Right 4th Toe and Right 5th Toe)  Patient tolerance:  Patient tolerated the procedure well with no immediate complications

## 2019-06-26 ENCOUNTER — PATIENT MESSAGE (OUTPATIENT)
Dept: FAMILY MEDICINE | Facility: CLINIC | Age: 79
End: 2019-06-26

## 2019-06-28 ENCOUNTER — TELEPHONE (OUTPATIENT)
Dept: NEUROLOGY | Facility: CLINIC | Age: 79
End: 2019-06-28

## 2019-06-28 NOTE — TELEPHONE ENCOUNTER
----- Message from Pierre Nolasco sent at 6/28/2019 11:29 AM CDT -----  Contact: Liliam ( daughter ) @ [phone]  Caller calling to see If pt can be seen today, for extremity weakness of the hand and feet, pls call

## 2019-06-28 NOTE — TELEPHONE ENCOUNTER
Spoke to daughter who satkalpesh it was advised by internal medicine doctor for patient to be seen to r/o dementia. She has been added to cancellation list, and advised she will be contacted should we get a sooner appointment. Patient has a psychiatry appointment on July 17, and will try to schedule after this if possible. Daughter verbalizes understanding of this.

## 2019-06-28 NOTE — TELEPHONE ENCOUNTER
----- Message from Chantell Guevara sent at 6/28/2019  9:13 AM CDT -----  Contact: Liliam    Name of Who is Calling:Liliam    What is the request in detail: Patient would like to be seen sooner then August 29,2019 Please contact to further discuss and advise     Can the clinic reply by [facility]: No    [facility] [phone]

## 2019-07-01 ENCOUNTER — OFFICE VISIT (OUTPATIENT)
Dept: NEUROLOGY | Facility: CLINIC | Age: 79
End: 2019-07-01
Payer: MEDICARE

## 2019-07-01 VITALS
SYSTOLIC BLOOD PRESSURE: 146 MMHG | WEIGHT: 203.94 LBS | HEIGHT: 57 IN | BODY MASS INDEX: 44 KG/M2 | HEART RATE: 74 BPM | DIASTOLIC BLOOD PRESSURE: 68 MMHG

## 2019-07-01 DIAGNOSIS — R80.9 TYPE 2 DIABETES MELLITUS WITH MICROALBUMINURIA, WITH LONG-TERM CURRENT USE OF INSULIN: ICD-10-CM

## 2019-07-01 DIAGNOSIS — R29.6 FALLS FREQUENTLY: ICD-10-CM

## 2019-07-01 DIAGNOSIS — E11.29 TYPE 2 DIABETES MELLITUS WITH MICROALBUMINURIA, WITH LONG-TERM CURRENT USE OF INSULIN: ICD-10-CM

## 2019-07-01 DIAGNOSIS — R53.1 WEAKNESS: ICD-10-CM

## 2019-07-01 DIAGNOSIS — E55.9 VITAMIN D DEFICIENCY: ICD-10-CM

## 2019-07-01 DIAGNOSIS — Z79.4 TYPE 2 DIABETES MELLITUS WITH MICROALBUMINURIA, WITH LONG-TERM CURRENT USE OF INSULIN: ICD-10-CM

## 2019-07-01 DIAGNOSIS — G62.9 NEUROPATHY: ICD-10-CM

## 2019-07-01 DIAGNOSIS — R41.82 ALTERED MENTAL STATUS, UNSPECIFIED ALTERED MENTAL STATUS TYPE: ICD-10-CM

## 2019-07-01 DIAGNOSIS — G62.9 PERIPHERAL POLYNEUROPATHY: Primary | ICD-10-CM

## 2019-07-01 PROCEDURE — 1101F PR PT FALLS ASSESS DOC 0-1 FALLS W/OUT INJ PAST YR: ICD-10-PCS | Mod: HCNC,CPTII,GC,S$GLB | Performed by: STUDENT IN AN ORGANIZED HEALTH CARE EDUCATION/TRAINING PROGRAM

## 2019-07-01 PROCEDURE — 3077F PR MOST RECENT SYSTOLIC BLOOD PRESSURE >= 140 MM HG: ICD-10-PCS | Mod: HCNC,CPTII,GC,S$GLB | Performed by: STUDENT IN AN ORGANIZED HEALTH CARE EDUCATION/TRAINING PROGRAM

## 2019-07-01 PROCEDURE — 3078F DIAST BP <80 MM HG: CPT | Mod: HCNC,CPTII,GC,S$GLB | Performed by: STUDENT IN AN ORGANIZED HEALTH CARE EDUCATION/TRAINING PROGRAM

## 2019-07-01 PROCEDURE — 3077F SYST BP >= 140 MM HG: CPT | Mod: HCNC,CPTII,GC,S$GLB | Performed by: STUDENT IN AN ORGANIZED HEALTH CARE EDUCATION/TRAINING PROGRAM

## 2019-07-01 PROCEDURE — 3078F PR MOST RECENT DIASTOLIC BLOOD PRESSURE < 80 MM HG: ICD-10-PCS | Mod: HCNC,CPTII,GC,S$GLB | Performed by: STUDENT IN AN ORGANIZED HEALTH CARE EDUCATION/TRAINING PROGRAM

## 2019-07-01 PROCEDURE — 99214 OFFICE O/P EST MOD 30 MIN: CPT | Mod: HCNC,GC,S$GLB, | Performed by: STUDENT IN AN ORGANIZED HEALTH CARE EDUCATION/TRAINING PROGRAM

## 2019-07-01 PROCEDURE — 99999 PR PBB SHADOW E&M-EST. PATIENT-LVL IV: ICD-10-PCS | Mod: PBBFAC,HCNC,GC, | Performed by: STUDENT IN AN ORGANIZED HEALTH CARE EDUCATION/TRAINING PROGRAM

## 2019-07-01 PROCEDURE — 99999 PR PBB SHADOW E&M-EST. PATIENT-LVL IV: CPT | Mod: PBBFAC,HCNC,GC, | Performed by: STUDENT IN AN ORGANIZED HEALTH CARE EDUCATION/TRAINING PROGRAM

## 2019-07-01 PROCEDURE — 1101F PT FALLS ASSESS-DOCD LE1/YR: CPT | Mod: HCNC,CPTII,GC,S$GLB | Performed by: STUDENT IN AN ORGANIZED HEALTH CARE EDUCATION/TRAINING PROGRAM

## 2019-07-01 PROCEDURE — 99214 PR OFFICE/OUTPT VISIT, EST, LEVL IV, 30-39 MIN: ICD-10-PCS | Mod: HCNC,GC,S$GLB, | Performed by: STUDENT IN AN ORGANIZED HEALTH CARE EDUCATION/TRAINING PROGRAM

## 2019-07-01 RX ORDER — AMITRIPTYLINE HYDROCHLORIDE 10 MG/1
10 TABLET, FILM COATED ORAL NIGHTLY
Qty: 30 TABLET | Refills: 11 | Status: SHIPPED | OUTPATIENT
Start: 2019-07-01 | End: 2019-07-08

## 2019-07-01 NOTE — ASSESSMENT & PLAN NOTE
- Home Health orders placed w/ PT/OT recommendation  - Ambulation recommended w/ a walker only  - Likely 2/2 changes in her sensation w/ long standing DM II  - CTH ordered to r/o any intracranial pathology s/p traumatic fall w/ head injury but no LOC

## 2019-07-01 NOTE — ASSESSMENT & PLAN NOTE
"- Objectively patient is fairly strong throughout w/ no focal deficits  - Suspect the " weakness" is likely the sensory changes that patient has on examination   "

## 2019-07-01 NOTE — ASSESSMENT & PLAN NOTE
Per daughter, patient w/ labile mood, tangential speech and frequent crying spells  - Pending Psychiatry eval   - Elavil might have some benefit in terms of patient's mood  - CTH ordered to r/o any intracranial pathology w/ recent falls  - CBC/CMP as well as UA to r/o any infectious causes, as patient w/ previous Hx of UTI

## 2019-07-01 NOTE — ASSESSMENT & PLAN NOTE
Patient w/ noted peripheral sensory neuropathy on the exam, likely 2/2 poorly controlled, long standing DM, causing sensory ataxia and presumed weakness. Patient herself reports that she is unable to feel her shoes or the ground underneath her feet.  - Patient will require tight BG control  - Previously followed by Endocrinology, would consider resuming the follow up  - Alternative causes for peripheral neuropathy to be ruled out w/ laboratory studies   - Elavil 10 mg QHS started for patient's symptomatic painful neuropathy  - Follow up in 3 months

## 2019-07-01 NOTE — LETTER
July 1, 2019   This communication is flagged as high priority.          Toby Hung MD  [address] [facility] [address]             [facility] - Neurology  [address]  [place] [address]  Phone: [phone]  Fax: [phone]   Patient: Kaci Whalen   MR Number: [id number]   YOB: 1940   Date of Visit: 7/1/2019       Dear Dr. Hung:    Thank you for referring Kaci Whalen to me for evaluation. Below are the relevant portions of my assessment and plan of care.  Patient was noted to have hyponatremia on today's labs as well as worsening GFR. Daughter was mentioning some issues w/ urination, so she would benefit from a work up, including urine studies. Just wanted to make sure this is addressed in the near future.       If you have questions, please do not hesitate to call me. I look forward to following Kaci along with you.    Sincerely,      Joanne Jose MD           CC  No Recipients

## 2019-07-01 NOTE — ASSESSMENT & PLAN NOTE
- Poorly controlled as last HbA1c elevated  - Likely the cause of patient's stocking and glove pattern sensory changes  - Would recommend f/u w/ Endocrinology

## 2019-07-01 NOTE — PROGRESS NOTES
"Neurology Clinic  Initial Consult    Patient Name: Kaci Whalen  MRN: [id number]    CC: Weakness/Numbness/Frequent falls     HPI: Kaci Whalen is a 78 y.o. female w/ PMH significant for HTN, MIGUEL (intolerant of CPAP), Afib, PAD,  diastolic HF and DMII (uncontrolled, last HbA1c 7.8) presenting as a referral from her PCP (Dr. Hung) for weakness and numbness and frequent falls.  Here for neuropathy and associated weakness. Reports that numbness in her UE and LE has been present for years, and that she was diagnosed w/ DMII >30 years ago.    - States that she has numbness in her fingers, which has been worse for the past 2-3 weeks     - Unable to feel objects in her hand, dropping things, unable to hold cups of water    - States that it feels as if there is a "knife" in her nailbeds, and that pain is excruciating     - Similar pain in her feet    - Cannot feel the floor or shoes    - The pain prevents her from sleeping    - June 10th/11th --> burning sensation in her abdomen, also concern for UTI    - Fell at the time(amoxicillin for H.Pylori)    - Hit her head, backwards after she fell off of the rollator     - No imaging since then     - Daughter notes that things have gotten progressively worse   - States that she is in pain "all over"   - She does have hallucinations, seeing scorpions in the room    - Worse for the past month   Per daughter, she is agitated, unable to sleep. This is very similar to an emotional "break down" about 2 years ago, which was deemed to be a reaction to trazodone. Was also hallucinating, emotionally labile, similar to what is happening now.   Patient has Psychiatry outpatient appointment in mid July, which daughter has scheduled. She reports that at times he mother has been w/ some SI, no plan and has always denied that she would take her own life, but she is worried that with the new onset emotional lability this might get worse or more serious.   Patient w/ previous significant fall " "in 2017, now has posterior protuberance overlying the R occiput.    Patient also complains of profound feeling of being "cold down to the bones" which usually is followed by significant emotional distress, anxiety and at times episodes of screaming.     Review of Systems:  General: No fevers, chills  Eyes: No changes in vision  ENT: No changes in hearing  Respiratory: No SOB  CV: No chest pain, palpitations  GI: No diarrhea, blood in stool  Urinary: No dysuria, hematuria  Skin: No rashes  Neurological: No weakness,positive for confusion  Psychiatric: Reported visual and auditory hallucinations x 2-3 weeks      Past Medical History  Past Medical History:   Diagnosis Date    Abdominal pain     Anxiety     Atherosclerosis of aortic arch     noted on CXR 7/1/2015    Atrial fibrillation 05/2016    CHADS 4, on Xarelto    Benign hypertension     Chronic constipation     Chronic diarrhea     Chronic edema     Depression     Dercum disease     Multiple painful lipomas    Diabetic retinopathy     Dysphagia     Gas pain     Glaucoma of both eyes     Hx of psychiatric care     previously amitriptyline, now jut on Trazodone 100mg QHS PRN insomnia    Hyperlipidemia with target LDL less than 100     Unable to tolerate statins    Immature cataract     Tasia     no symptoms prior to this presentation    Morbid obesity     Nausea & vomiting     Neuropathy     Polyneuropathy     Primary osteoarthritis of both knees     Proteinuria     Psychiatric problem     history of depression    Pulmonary hypertension mixed group 2 and 3 1/18/2017    Sleep apnea     Therapy     no history of therapy    Type II or unspecified type diabetes mellitus with neurological manifestations, uncontrolled(250.62)     Unspecified vitamin D deficiency        Medications    Current Outpatient Medications:     alcohol swabs PadM, Apply 1 each topically as needed., Disp: , Rfl: 0    amLODIPine (NORVASC) 5 MG tablet, Take 1 tablet (5 " "mg total) by mouth once daily., Disp: 90 tablet, Rfl: 2    blood glucose control, low Soln, To test meter once per week, Disp: 1 each, Rfl: 1    citalopram (CELEXA) 20 MG tablet, Take 1 tablet (20 mg total) by mouth once daily., Disp: 30 tablet, Rfl: 11    desloratadine (CLARINEX) 5 mg tablet, Take 1 tablet (5 mg total) by mouth once daily. (Patient taking differently: Take 5 mg by mouth as needed. ), Disp: 90 tablet, Rfl: 1    fluticasone (FLONASE) 50 mcg/actuation nasal spray, 1 spray (50 mcg total) by Each Nare route 2 (two) times daily., Disp: 16 g, Rfl: 3    furosemide (LASIX) 40 MG tablet, One tab po daily x three days then as needed for LE swelling, Disp: 30 tablet, Rfl: 0    hydroCHLOROthiazide (HYDRODIURIL) 25 MG tablet, Take 1 tablet (25 mg total) by mouth once daily., Disp: 90 tablet, Rfl: 3    insulin aspart protamine-insulin aspart (NOVOLOG 70/30) 100 unit/mL (70-30) InPn pen, Inject 21 Units into the skin 2 (two) times daily before meals. Sliding scale max daily dosing 50 units daily, Disp: 15 mL, Rfl: 2    lancets (LANCETS,THIN) 28 gauge Misc, 1 lancet by Misc.(Non-Drug; Combo Route) route 4 (four) times daily before meals and nightly., Disp: 100 each, Rfl: 2    losartan (COZAAR) 100 MG tablet, Take 1 tablet (100 mg total) by mouth once daily., Disp: 90 tablet, Rfl: 3    magnesium chloride (SLOW-MAG) 71.5 mg TbEC, Take 1 tablet by mouth once. , Disp: , Rfl:     melatonin 10 mg Tab, Take by mouth. 2 tabs at night, Disp: , Rfl:     metFORMIN (GLUCOPHAGE) 500 MG tablet, Take 1 tablet (500 mg total) by mouth 3 (three) times daily., Disp: 270 tablet, Rfl: 3    metoprolol succinate (TOPROL-XL) 25 MG 24 hr tablet, Take 1 tablet (25 mg total) by mouth once daily., Disp: 90 tablet, Rfl: 2    pen needle, diabetic (BD ULTRA-FINE SANDEEP PEN NEEDLES) 32 gauge x 5/32" Ndle, Take 1 each by mouth 4 (four) times daily., Disp: 120 each, Rfl: 2    thiamine 100 MG tablet, Take 1 tablet (100 mg total) by " mouth once daily., Disp: 90 tablet, Rfl: 3    TRUE METRIX GLUCOSE TEST STRIP Strp, TEST FOUR TIMES DAILY BEFORE MEALS  AND EVERY NIGHT, Disp: 400 strip, Rfl: 2    amitriptyline (ELAVIL) 10 MG tablet, Take 1 tablet (10 mg total) by mouth every evening. Neuropathy treatment, Disp: 30 tablet, Rfl: 11    pantoprazole (PROTONIX) 40 MG tablet, Take 1 tablet (40 mg total) by mouth 2 (two) times daily. for 7 days, Disp: 14 tablet, Rfl: 0  Any other notable medications as documented in HPI    Allergies  Review of patient's allergies indicates:   Allergen Reactions    Tramadol Other (See Comments)     Interaction-induced delirium with s/s of estrellita.    Ace inhibitors      Other reaction(s): cough      Fluorescein Itching     Other reaction(s): Itching    Iodine      Other reaction(s): Itching    Pravastatin Other (See Comments)     Other reaction(s): mouth tingling/numbness    Simvastatin      Other reaction(s): foot swelling         Social History  Social History     Socioeconomic History    Marital status:      Spouse name: Not on file    Number of children: 3    Years of education: 14    Highest education level: Not on file   Occupational History    Occupation: housewife   Social Needs    Financial resource strain: Not on file    Food insecurity:     Worry: Not on file     Inability: Not on file    Transportation needs:     Medical: Not on file     Non-medical: Not on file   Tobacco Use    Smoking status: Never Smoker    Smokeless tobacco: Never Used   Substance and Sexual Activity    Alcohol use: No    Drug use: No    Sexual activity: Yes     Partners: Male   Lifestyle    Physical activity:     Days per week: Not on file     Minutes per session: Not on file    Stress: Not on file   Relationships    Social connections:     Talks on phone: Not on file     Gets together: Not on file     Attends Jew service: Not on file     Active member of club or organization: Not on file     Attends  meetings of clubs or organizations: Not on file     Relationship status: Not on file   Other Topics Concern    Patient feels they ought to cut down on drinking/drug use Not Asked    Patient annoyed by others criticizing their drinking/drug use Not Asked    Patient has felt bad or guilty about drinking/drug use Not Asked    Patient has had a drink/used drugs as an eye opener in the AM Not Asked   Social History Narrative    Pt was the youngest of 3 boys and 2 girls.  Her father  when she was 3 months old.  She was raised by her mother, m, and an uncle.  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  They had 2 daughters and 1 son together, plus 5 stepchildren by her .  They live together in [place] in their own home, with no pets.  Hobbies include gardening and crafts.  Pt attends a variety of organized Yazidism services.            One daughter is  from bone cancer.    One son  2014 after surgery for MIGUEL     Any other notable Social History as documented in HPI.    Family History  Family History   Problem Relation Age of Onset    No Known Problems Daughter     No Known Problems Father     Liver cancer Mother     Bone cancer Daughter     No Known Problems Sister     No Known Problems Brother     No Known Problems Maternal Aunt     No Known Problems Maternal Uncle     No Known Problems Paternal Aunt     No Known Problems Paternal Uncle     No Known Problems Maternal Grandmother     No Known Problems Maternal Grandfather     No Known Problems Paternal Grandmother     No Known Problems Paternal Grandfather     Amblyopia Neg Hx     Blindness Neg Hx     Cancer Neg Hx     Cataracts Neg Hx     Diabetes Neg Hx     Glaucoma Neg Hx     Hypertension Neg Hx     Macular degeneration Neg Hx     Retinal detachment Neg Hx     Strabismus Neg Hx     Stroke Neg Hx     Thyroid disease Neg Hx     Celiac disease  "Neg Hx     Cirrhosis Neg Hx     Colon cancer Neg Hx     Colon polyps Neg Hx     Crohn's disease Neg Hx     Cystic fibrosis Neg Hx     Esophageal cancer Neg Hx     Hemochromatosis Neg Hx     Inflammatory bowel disease Neg Hx     Irritable bowel syndrome Neg Hx     Liver disease Neg Hx     Rectal cancer Neg Hx     Stomach cancer Neg Hx     Ulcerative colitis Neg Hx     Montez's disease Neg Hx     Alcohol abuse Neg Hx     Bipolar disorder Neg Hx     Dementia Neg Hx     Depression Neg Hx     Drug abuse Neg Hx     Suicide Neg Hx     Schizophrenia Neg Hx      Any other notable FMH as documented in HPI.    Physical Exam  BP (!) 146/68   Pulse 74   Ht 4' 9" (1.448 m)   Wt 92.5 kg (203 lb 14.8 oz)   BMI 44.13 kg/m²     General: Well-developed, well-groomed. Labile mood, w/ frequent screaming spells.   HENT: Atraumatic. R posterior occipital protuberance, non-tender, non-mobile, soft.   Ear canal clear and tympanic membrane with normal cone of light and no erythema nor bulge.  Cardiovascular: Regular rate and rhythm with no murmurs, rubs or gallops.    Chest: Lungs clear to auscultation bilaterally.  No wheezes, stridor, ronchi appreciated.  Abdomen: Normoactive bowel sounds present.  Soft, nontender to palpation.  Musculoskeletal: No peripheral edema    Neurologic Exam: The patient is awake, alert and oriented. Language is pressured, tangential, frequently stating that "People think I am crazy, people think I am retarded, and I am not".      Cranial nerves:   Pupils are round and reactive to light and accommodation.   Visual fields are full to confrontation.    Ocular motility is full in all cardinal positions of gaze.   Facial sensation is normal to light touch.   Facial activation is symmetric.   Hearing is symmetric bilaterally.   Palate elevates symmetrically.   Shoulder elevation is symmetric and full strength bilaterally.   Tongue is midline and neck rotation strength is normal bilaterally. "      Motor examination of all extremities demonstrates normal bulk and tone in all four limbs. There are no atrophy or fasciculations.   BUE shoulder abduction, biceps/triceps, wrist flexion/extension,  strength 5/5.  BLE hip flexors 4/5, knee flexion/extension 5/5, plantarflexion/dorsiflexion 5/5.      Sensory examination Romberg is negative.  Sensation to light touch: intact in BUE and BLE  Sensation to temperature: intact in BUE and BLE  Sensation to vibration: decreased in BUE digits and decreased in BLE up to knee  Sensation to pinprick: intact in BUE and decreased in BLE    Deep tendon reflexes are 1+ and symmetric in the upper extremities , and absent in  lower extremities bilaterally.  No clonus, downgoing toes b/l.    Gait: Wider based gait. Needs assistance when ambulating. Uses a rollator at home but arrived in a wheelchair with daughter this AM.     Coordination: Finger to nose and heel to shin testing is normal in both upper and lower extremities.      Lab and Test Results    WBC   Date Value Ref Range Status   07/01/2019 8.01 3.90 - 12.70 K/uL Final   05/16/2019 8.65 3.90 - 12.70 K/uL Final   02/08/2019 7.96 3.90 - 12.70 K/uL Final     Hemoglobin   Date Value Ref Range Status   07/01/2019 11.4 (L) 12.0 - 16.0 g/dL Final   05/16/2019 11.2 (L) 12.0 - 16.0 g/dL Final   02/08/2019 10.7 (L) 12.0 - 16.0 g/dL Final     Hematocrit   Date Value Ref Range Status   07/01/2019 34.4 (L) 37.0 - 48.5 % Final   05/16/2019 35.9 (L) 37.0 - 48.5 % Final   02/08/2019 34.3 (L) 37.0 - 48.5 % Final     Platelets   Date Value Ref Range Status   07/01/2019 318 150 - 350 K/uL Final   05/16/2019 307 150 - 350 K/uL Final   02/08/2019 250 150 - 350 K/uL Final     Glucose   Date Value Ref Range Status   07/01/2019 141 (H) 70 - 110 mg/dL Final   05/16/2019 103 70 - 110 mg/dL Final   02/08/2019 150 (H) 70 - 110 mg/dL Final     Sodium   Date Value Ref Range Status   07/01/2019 128 (L) 136 - 145 mmol/L Final   05/16/2019 138 136 -  145 mmol/L Final   02/08/2019 132 (L) 136 - 145 mmol/L Final     Potassium   Date Value Ref Range Status   07/01/2019 4.0 3.5 - 5.1 mmol/L Final   05/16/2019 4.4 3.5 - 5.1 mmol/L Final   02/08/2019 4.6 3.5 - 5.1 mmol/L Final     Chloride   Date Value Ref Range Status   07/01/2019 84 (L) 95 - 110 mmol/L Final   05/16/2019 97 95 - 110 mmol/L Final   02/08/2019 92 (L) 95 - 110 mmol/L Final     CO2   Date Value Ref Range Status   07/01/2019 32 (H) 23 - 29 mmol/L Final   05/16/2019 33 (H) 23 - 29 mmol/L Final   02/08/2019 32 (H) 23 - 29 mmol/L Final     BUN, Bld   Date Value Ref Range Status   07/01/2019 33 (H) 8 - 23 mg/dL Final   05/16/2019 27 (H) 8 - 23 mg/dL Final   02/08/2019 25 (H) 8 - 23 mg/dL Final     Creatinine   Date Value Ref Range Status   07/01/2019 1.6 (H) 0.5 - 1.4 mg/dL Final   05/16/2019 1.3 0.5 - 1.4 mg/dL Final   02/08/2019 1.3 0.5 - 1.4 mg/dL Final     Calcium   Date Value Ref Range Status   07/01/2019 10.5 8.7 - 10.5 mg/dL Final   05/16/2019 10.3 8.7 - 10.5 mg/dL Final   02/08/2019 10.0 8.7 - 10.5 mg/dL Final     Magnesium   Date Value Ref Range Status   06/07/2017 1.8 1.6 - 2.6 mg/dL Final   02/10/2017 2.0 1.6 - 2.6 mg/dL Final   01/13/2017 1.8 1.6 - 2.6 mg/dL Final     Phosphorus   Date Value Ref Range Status   06/07/2017 3.0 2.7 - 4.5 mg/dL Final     Alkaline Phosphatase   Date Value Ref Range Status   07/01/2019 76 55 - 135 U/L Final   05/16/2019 70 55 - 135 U/L Final   02/08/2019 67 55 - 135 U/L Final     ALT   Date Value Ref Range Status   07/01/2019 14 10 - 44 U/L Final   05/16/2019 9 (L) 10 - 44 U/L Final   02/08/2019 11 10 - 44 U/L Final     AST   Date Value Ref Range Status   07/01/2019 27 10 - 40 U/L Final   05/16/2019 15 10 - 40 U/L Final   02/08/2019 16 10 - 40 U/L Final     MRI Brain w/o contrast (2017)  No acute intracranial abnormalities.  Specifically, no acute infarction or space-occupying mass lesion.  Evolving subcutaneous hematoma within the left posterior parietal  calvarium.  Chronic microvascular ischemic changes and generalized cerebral volume loss.    Assessment and Plan    Problem List Items Addressed This Visit        Neuro    Peripheral polyneuropathy - Primary    Current Assessment & Plan     Patient w/ noted peripheral sensory neuropathy on the exam, likely 2/2 poorly controlled, long standing DM, causing sensory ataxia and presumed weakness. Patient herself reports that she is unable to feel her shoes or the ground underneath her feet.  - Patient will require tight BG control  - Previously followed by Endocrinology, would consider resuming the follow up  - Alternative causes for peripheral neuropathy to be ruled out w/ laboratory studies   - Elavil 10 mg QHS started for patient's symptomatic painful neuropathy  - Follow up in 3 months          Neuropathy    Current Assessment & Plan     - See peripheral neuropathy          Relevant Medications    amitriptyline (ELAVIL) 10 MG tablet    Other Relevant Orders    IMMUNOFIXATION ELECTROPHORESIS, SERUM    PROTEIN ELECTROPHORESIS, SERUM (Completed)    RPR    TSH (Completed)    Sedimentation rate (Completed)    VITAMIN A    VITAMIN B2    Vitamin B12 Deficiency Panel    VITAMIN B1    VITAMIN D (Completed)    CT Head Without Contrast    SUBSEQUENT HOME HEALTH ORDERS    Altered mental status    Current Assessment & Plan     Per daughter, patient w/ labile mood, tangential speech and frequent crying spells  - Pending Psychiatry eval   - Elavil might have some benefit in terms of patient's mood  - CTH ordered to r/o any intracranial pathology w/ recent falls  - CBC/CMP as well as UA to r/o any infectious causes, as patient w/ previous Hx of UTI          Relevant Orders    IMMUNOFIXATION ELECTROPHORESIS, SERUM    PROTEIN ELECTROPHORESIS, SERUM (Completed)    RPR    TSH (Completed)    Sedimentation rate (Completed)    VITAMIN A    VITAMIN B2    Vitamin B12 Deficiency Panel    VITAMIN B1    VITAMIN D (Completed)    CT Head Without  "Contrast    SUBSEQUENT HOME HEALTH ORDERS    Urinalysis    CULTURE, URINE    CBC auto differential (Completed)    Comprehensive metabolic panel (Completed)       Endocrine    Vitamin D deficiency    Current Assessment & Plan     - Vit D level ordered and pending          Relevant Orders    VITAMIN D (Completed)    Type 2 diabetes mellitus with microalbuminuria, with long-term current use of insulin    Overview     Chronic kidney disease calculated using CKD-EPI Creatinine 2009 Equation.           Current Assessment & Plan     - Poorly controlled as last HbA1c elevated  - Likely the cause of patient's stocking and glove pattern sensory changes  - Would recommend f/u w/ Endocrinology             Other    Weakness    Current Assessment & Plan     - Objectively patient is fairly strong throughout w/ no focal deficits  - Suspect the " weakness" is likely the sensory changes that patient has on examination          Relevant Orders    IMMUNOFIXATION ELECTROPHORESIS, SERUM    PROTEIN ELECTROPHORESIS, SERUM (Completed)    RPR    TSH (Completed)    Sedimentation rate (Completed)    VITAMIN A    VITAMIN B2    Vitamin B12 Deficiency Panel    VITAMIN B1    VITAMIN D (Completed)    CT Head Without Contrast    SUBSEQUENT HOME HEALTH ORDERS    Falls frequently    Current Assessment & Plan     - Home Health orders placed w/ PT/OT recommendation  - Ambulation recommended w/ a walker only  - Likely 2/2 changes in her sensation w/ long standing DM II  - CTH ordered to r/o any intracranial pathology s/p traumatic fall w/ head injury but no LOC         Relevant Orders    IMMUNOFIXATION ELECTROPHORESIS, SERUM    PROTEIN ELECTROPHORESIS, SERUM (Completed)    RPR    TSH (Completed)    Sedimentation rate (Completed)    VITAMIN A    VITAMIN B2    Vitamin B12 Deficiency Panel    VITAMIN B1    VITAMIN D (Completed)    CT Head Without Contrast    SUBSEQUENT HOME HEALTH ORDERS            Joanne Jose MD  Neurology Resident   [facility]  [address] [place] [address]

## 2019-07-02 ENCOUNTER — TELEPHONE (OUTPATIENT)
Dept: FAMILY MEDICINE | Facility: CLINIC | Age: 79
End: 2019-07-02

## 2019-07-02 NOTE — TELEPHONE ENCOUNTER
Contacted patient's daughter, Liliam Whalen, to follow up recent labs drawn by neurology. With new hyponatremia. Liliam reports no swelling no complaints of difficulty breathing. She had been drinking extra water for previous two weeks because of concern of bladder infection. Stop HCTZ as this could contribute to hyponatremia. By daughter's report sounds euvolemic. She will message me on patient's symptoms in four days

## 2019-07-02 NOTE — PROGRESS NOTES
I have reviewed the notes, assessments, and/or procedures performed by the resident, I concur with her/his documentation of Kaci Whalen.     Joanne Rapp MD, MS  [facility]  Department of Neurology  Movement Disorders

## 2019-07-05 ENCOUNTER — HOSPITAL ENCOUNTER (OUTPATIENT)
Dept: RADIOLOGY | Facility: HOSPITAL | Age: 79
Discharge: HOME | End: 2019-07-05
Attending: STUDENT IN AN ORGANIZED HEALTH CARE EDUCATION/TRAINING PROGRAM
Payer: MEDICARE

## 2019-07-05 ENCOUNTER — PATIENT MESSAGE (OUTPATIENT)
Dept: FAMILY MEDICINE | Facility: CLINIC | Age: 79
End: 2019-07-05

## 2019-07-05 DIAGNOSIS — R41.82 ALTERED MENTAL STATUS, UNSPECIFIED ALTERED MENTAL STATUS TYPE: ICD-10-CM

## 2019-07-05 DIAGNOSIS — G62.9 NEUROPATHY: ICD-10-CM

## 2019-07-05 DIAGNOSIS — R29.6 FALLS FREQUENTLY: ICD-10-CM

## 2019-07-05 DIAGNOSIS — R53.1 WEAKNESS: ICD-10-CM

## 2019-07-05 PROCEDURE — 70450 CT HEAD/BRAIN W/O DYE: CPT | Mod: 26,HCNC,, | Performed by: RADIOLOGY

## 2019-07-05 PROCEDURE — 70450 CT HEAD/BRAIN W/O DYE: CPT | Mod: TC,HCNC

## 2019-07-05 PROCEDURE — 70450 CT HEAD WITHOUT CONTRAST: ICD-10-PCS | Mod: 26,HCNC,, | Performed by: RADIOLOGY

## 2019-07-08 ENCOUNTER — PATIENT MESSAGE (OUTPATIENT)
Dept: FAMILY MEDICINE | Facility: CLINIC | Age: 79
End: 2019-07-08

## 2019-07-08 ENCOUNTER — TELEPHONE (OUTPATIENT)
Dept: FAMILY MEDICINE | Facility: CLINIC | Age: 79
End: 2019-07-08

## 2019-07-08 ENCOUNTER — LAB VISIT (OUTPATIENT)
Dept: LAB | Facility: HOSPITAL | Age: 79
End: 2019-07-08
Attending: INTERNAL MEDICINE
Payer: MEDICARE

## 2019-07-08 ENCOUNTER — TELEPHONE (OUTPATIENT)
Dept: PHARMACY | Facility: CLINIC | Age: 79
End: 2019-07-08

## 2019-07-08 ENCOUNTER — PATIENT MESSAGE (OUTPATIENT)
Dept: NEUROLOGY | Facility: CLINIC | Age: 79
End: 2019-07-08

## 2019-07-08 DIAGNOSIS — E87.1 HYPONATREMIA: ICD-10-CM

## 2019-07-08 DIAGNOSIS — F02.81 ALZHEIMER'S DEMENTIA WITH BEHAVIORAL DISTURBANCE, UNSPECIFIED TIMING OF DEMENTIA ONSET: ICD-10-CM

## 2019-07-08 DIAGNOSIS — Z79.4 TYPE 2 DIABETES MELLITUS WITH MICROALBUMINURIA, WITH LONG-TERM CURRENT USE OF INSULIN: Primary | ICD-10-CM

## 2019-07-08 DIAGNOSIS — G30.9 ALZHEIMER'S DEMENTIA WITH BEHAVIORAL DISTURBANCE, UNSPECIFIED TIMING OF DEMENTIA ONSET: ICD-10-CM

## 2019-07-08 DIAGNOSIS — R80.9 TYPE 2 DIABETES MELLITUS WITH MICROALBUMINURIA, WITH LONG-TERM CURRENT USE OF INSULIN: Primary | ICD-10-CM

## 2019-07-08 DIAGNOSIS — E11.29 TYPE 2 DIABETES MELLITUS WITH MICROALBUMINURIA, WITH LONG-TERM CURRENT USE OF INSULIN: Primary | ICD-10-CM

## 2019-07-08 DIAGNOSIS — G62.9 NEUROPATHY: Primary | ICD-10-CM

## 2019-07-08 LAB
ANION GAP SERPL CALC-SCNC: 10 MMOL/L (ref 8–16)
BUN SERPL-MCNC: 45 MG/DL (ref 8–23)
CALCIUM SERPL-MCNC: 10.5 MG/DL (ref 8.7–10.5)
CHLORIDE SERPL-SCNC: 92 MMOL/L (ref 95–110)
CO2 SERPL-SCNC: 31 MMOL/L (ref 23–29)
CREAT SERPL-MCNC: 1.9 MG/DL (ref 0.5–1.4)
EST. GFR  (AFRICAN AMERICAN): 29.1 ML/MIN/1.73 M^2
EST. GFR  (NON AFRICAN AMERICAN): 25.2 ML/MIN/1.73 M^2
GLUCOSE SERPL-MCNC: 141 MG/DL (ref 70–110)
OSMOLALITY SERPL: 296 MOSM/KG (ref 275–295)
POTASSIUM SERPL-SCNC: 4.8 MMOL/L (ref 3.5–5.1)
SODIUM SERPL-SCNC: 133 MMOL/L (ref 136–145)

## 2019-07-08 PROCEDURE — 83930 ASSAY OF BLOOD OSMOLALITY: CPT | Mod: HCNC

## 2019-07-08 PROCEDURE — 36415 COLL VENOUS BLD VENIPUNCTURE: CPT | Mod: HCNC,PO

## 2019-07-08 PROCEDURE — 80048 BASIC METABOLIC PNL TOTAL CA: CPT | Mod: HCNC

## 2019-07-08 RX ORDER — AMITRIPTYLINE HYDROCHLORIDE 10 MG/1
10 TABLET, FILM COATED ORAL NIGHTLY
Qty: 30 TABLET | Refills: 11 | Status: SHIPPED | OUTPATIENT
Start: 2019-07-08 | End: 2019-07-15

## 2019-07-08 RX ORDER — QUETIAPINE FUMARATE 50 MG/1
50 TABLET, FILM COATED ORAL NIGHTLY
Qty: 30 TABLET | Refills: 0 | Status: SHIPPED | OUTPATIENT
Start: 2019-07-08 | End: 2019-08-06 | Stop reason: SDUPTHER

## 2019-07-08 NOTE — TELEPHONE ENCOUNTER
Called patient daughter  to confirm appt date and time .patient daughter confirmed 07/15/4:20pm  For patient

## 2019-07-08 NOTE — TELEPHONE ENCOUNTER
Called patient daughter to schedule labs and follow up with patient .. Labs schedule on 07/08/2019  [facility] .. Will call patient daughter back with date an time .

## 2019-07-08 NOTE — TELEPHONE ENCOUNTER
"Contacted patient's daughter still frequent night time awakening labile mood no change in sleep. Breathing" fine" no LE swelling check urine sodium and osms and repeat serum sodium off thiazide will stop TCA trial nightly seroquel follow up with me next week for repeat exam  "

## 2019-07-09 ENCOUNTER — PATIENT MESSAGE (OUTPATIENT)
Dept: FAMILY MEDICINE | Facility: CLINIC | Age: 79
End: 2019-07-09

## 2019-07-15 ENCOUNTER — OFFICE VISIT (OUTPATIENT)
Dept: FAMILY MEDICINE | Facility: CLINIC | Age: 79
End: 2019-07-15
Payer: MEDICARE

## 2019-07-15 VITALS
HEART RATE: 77 BPM | DIASTOLIC BLOOD PRESSURE: 62 MMHG | HEIGHT: 57 IN | WEIGHT: 200.63 LBS | BODY MASS INDEX: 43.28 KG/M2 | OXYGEN SATURATION: 97 % | TEMPERATURE: 99 F | SYSTOLIC BLOOD PRESSURE: 128 MMHG

## 2019-07-15 DIAGNOSIS — R41.0 DELIRIUM, DRUG-INDUCED: ICD-10-CM

## 2019-07-15 DIAGNOSIS — R68.89 OTHER GENERAL SYMPTOMS AND SIGNS: ICD-10-CM

## 2019-07-15 DIAGNOSIS — T50.905A DELIRIUM, DRUG-INDUCED: ICD-10-CM

## 2019-07-15 PROCEDURE — 3288F FALL RISK ASSESSMENT DOCD: CPT | Mod: HCNC,CPTII,S$GLB, | Performed by: INTERNAL MEDICINE

## 2019-07-15 PROCEDURE — 3078F DIAST BP <80 MM HG: CPT | Mod: HCNC,CPTII,S$GLB, | Performed by: INTERNAL MEDICINE

## 2019-07-15 PROCEDURE — 3078F PR MOST RECENT DIASTOLIC BLOOD PRESSURE < 80 MM HG: ICD-10-PCS | Mod: HCNC,CPTII,S$GLB, | Performed by: INTERNAL MEDICINE

## 2019-07-15 PROCEDURE — 3288F PR FALLS RISK ASSESSMENT DOCUMENTED: ICD-10-PCS | Mod: HCNC,CPTII,S$GLB, | Performed by: INTERNAL MEDICINE

## 2019-07-15 PROCEDURE — 1100F PR PT FALLS ASSESS DOC 2+ FALLS/FALL W/INJURY/YR: ICD-10-PCS | Mod: HCNC,CPTII,S$GLB, | Performed by: INTERNAL MEDICINE

## 2019-07-15 PROCEDURE — 3074F SYST BP LT 130 MM HG: CPT | Mod: HCNC,CPTII,S$GLB, | Performed by: INTERNAL MEDICINE

## 2019-07-15 PROCEDURE — 1100F PTFALLS ASSESS-DOCD GE2>/YR: CPT | Mod: HCNC,CPTII,S$GLB, | Performed by: INTERNAL MEDICINE

## 2019-07-15 PROCEDURE — 99499 UNLISTED E&M SERVICE: CPT | Mod: HCNC,S$GLB,, | Performed by: INTERNAL MEDICINE

## 2019-07-15 PROCEDURE — 99214 OFFICE O/P EST MOD 30 MIN: CPT | Mod: HCNC,S$GLB,, | Performed by: INTERNAL MEDICINE

## 2019-07-15 PROCEDURE — 3074F PR MOST RECENT SYSTOLIC BLOOD PRESSURE < 130 MM HG: ICD-10-PCS | Mod: HCNC,CPTII,S$GLB, | Performed by: INTERNAL MEDICINE

## 2019-07-15 PROCEDURE — 99999 PR PBB SHADOW E&M-EST. PATIENT-LVL III: ICD-10-PCS | Mod: PBBFAC,HCNC,, | Performed by: INTERNAL MEDICINE

## 2019-07-15 PROCEDURE — 99999 PR PBB SHADOW E&M-EST. PATIENT-LVL III: CPT | Mod: PBBFAC,HCNC,, | Performed by: INTERNAL MEDICINE

## 2019-07-15 PROCEDURE — 99214 PR OFFICE/OUTPT VISIT, EST, LEVL IV, 30-39 MIN: ICD-10-PCS | Mod: HCNC,S$GLB,, | Performed by: INTERNAL MEDICINE

## 2019-07-15 PROCEDURE — 99499 RISK ADDL DX/OHS AUDIT: ICD-10-PCS | Mod: HCNC,S$GLB,, | Performed by: INTERNAL MEDICINE

## 2019-07-15 NOTE — PROGRESS NOTES
"Subjective:       Patient ID: Kaci Whalen is a 78 y.o. female.    Chief Complaint: No chief complaint on file.    F/u sleep    HPI: 77 y/o w/ DM CKD III presents with daughter for follow up of manic behavior. Daughter reports for at least last month patient has been speaking rapidly on variable topics at times not making sense and not sleep. Last week started on low dose seroquel daughter reports sleep significantly improved but still with frequent verbal outbursts during day. No confusion no fevers no motor weakness. No LE swelling patient reports breathighn "fine" she perseverates on whether or not "anyone will believe me" and has some references to Jehovah's witness healers.     Review of Systems   Constitutional: Negative for activity change, appetite change, fatigue, fever and unexpected weight change.   HENT: Negative for ear pain, rhinorrhea and sore throat.    Eyes: Negative for discharge and visual disturbance.   Respiratory: Negative for chest tightness, shortness of breath and wheezing.    Cardiovascular: Negative for chest pain, palpitations and leg swelling.   Gastrointestinal: Negative for abdominal pain, constipation and diarrhea.   Endocrine: Negative for cold intolerance and heat intolerance.   Genitourinary: Negative for dysuria and hematuria.   Musculoskeletal: Negative for joint swelling and neck stiffness.   Skin: Negative for rash.   Neurological: Negative for dizziness, syncope, weakness and headaches.   Psychiatric/Behavioral: Positive for agitation. Negative for suicidal ideas. The patient is nervous/anxious and is hyperactive.        Objective:     Vitals:    07/15/19 1612   BP: 128/62   Pulse: 77   Temp: 98.5 °F (36.9 °C)   TempSrc: Oral   SpO2: 97%   Weight: 91 kg (200 lb 9.9 oz)   Height: 4' 9" (1.448 m)          Physical Exam   Constitutional: She is oriented to person, place, and time. She appears well-developed and well-nourished.   HENT:   Head: Normocephalic and atraumatic.   Eyes: " Conjunctivae are normal. No scleral icterus.   Neck: Normal range of motion.   Cardiovascular: Normal rate and regular rhythm. Exam reveals no gallop and no friction rub.   No murmur heard.  Pulmonary/Chest: Effort normal and breath sounds normal. She has no wheezes. She has no rales.   Abdominal: Soft. Bowel sounds are normal. There is no tenderness. There is no rebound and no guarding.   Musculoskeletal: Normal range of motion. She exhibits no edema or tenderness.   Neurological: She is alert and oriented to person, place, and time. No cranial nerve deficit.   No cogwheeling or fasiculation of wrists or ankles able to follow three step commands appropriately   Skin: Skin is warm and dry.   Psychiatric:   Pressured speech difficult to re direct needs frequent prompting. But not responding to internal stimuli and able to repeat back three words and name objects remebers 2 out of three objects after 8 minutes       Assessment and Plan   1. Delirium, drug-induced  Check urine culture for secondary cause continue nightly seroquel  - Urine culture; Future    2. Other general symptoms and signs   Urine culture as above off HCTZ sodium improved clinically is euvolemic (?bipolar with manic features) to follow up with psychiatry  - Urine culture; Future

## 2019-07-16 ENCOUNTER — PATIENT MESSAGE (OUTPATIENT)
Dept: FAMILY MEDICINE | Facility: CLINIC | Age: 79
End: 2019-07-16

## 2019-07-17 ENCOUNTER — PATIENT MESSAGE (OUTPATIENT)
Dept: PSYCHIATRY | Facility: CLINIC | Age: 79
End: 2019-07-17

## 2019-07-17 ENCOUNTER — PATIENT MESSAGE (OUTPATIENT)
Dept: FAMILY MEDICINE | Facility: CLINIC | Age: 79
End: 2019-07-17

## 2019-07-17 ENCOUNTER — OFFICE VISIT (OUTPATIENT)
Dept: PSYCHIATRY | Facility: CLINIC | Age: 79
End: 2019-07-17
Payer: MEDICARE

## 2019-07-17 DIAGNOSIS — F30.9 MANIA: Primary | ICD-10-CM

## 2019-07-17 DIAGNOSIS — I50.30 (HFPEF) HEART FAILURE WITH PRESERVED EJECTION FRACTION: ICD-10-CM

## 2019-07-17 DIAGNOSIS — F31.9 BIPOLAR 1 DISORDER: Primary | ICD-10-CM

## 2019-07-17 PROCEDURE — 90791 PSYCH DIAGNOSTIC EVALUATION: CPT | Mod: HCNC,S$GLB,, | Performed by: SOCIAL WORKER

## 2019-07-17 PROCEDURE — 90791 PR PSYCHIATRIC DIAGNOSTIC EVALUATION: ICD-10-PCS | Mod: HCNC,S$GLB,, | Performed by: SOCIAL WORKER

## 2019-07-17 RX ORDER — DIVALPROEX SODIUM 500 MG/1
500 TABLET, DELAYED RELEASE ORAL EVERY 12 HOURS
Qty: 60 TABLET | Refills: 1 | Status: SHIPPED | OUTPATIENT
Start: 2019-07-17 | End: 2019-08-29 | Stop reason: SDUPTHER

## 2019-07-17 RX ORDER — FUROSEMIDE 40 MG/1
40 TABLET ORAL DAILY
Qty: 90 TABLET | Refills: 1 | Status: SHIPPED | OUTPATIENT
Start: 2019-07-17

## 2019-07-17 NOTE — PROGRESS NOTES
"Psychiatry Initial Visit (PhD/LCSW)  Diagnostic Interview - CPT 71122    Date: 7/17/2019    Site: [facility]    Referral source: daughter and     Clinical status of patient: Outpatient    Kaci Whalen, a 78 y.o. female, for initial evaluation visit.  Met with patient.    Chief complaint/reason for encounter: interpersonal    History of present illness: Prior to being taken in for assessment patient was sitting in the lobby having an argument with daughter. Patient was difficult to redirect. Argument with daughter was about speaking Vietnamese or English. Patient fixated on this problem at beginning of assessment. Later patient fixates on various topics. Unable to stay focused during assessment. Provider attempts to redirect patient but unable to answer questions asked because she is fixated on thought process. States that about 3 years ago she "exploded" because she couldn't "stand it anymore". States that she is tired of talking to her daughter and , they don't listen. States that she doesn't have any problems with depression. States that she takes care of her hygiene. Has to wear an adult diaper because she is having problem with urine incontinence and this upsets patient. Fixated on the beliefs of others and how they impact her. Also upset about the way she is treated due to her language and her ability to speak English.     Patient unable to provide sufficient information on current status or history. Patient declines her daughter or  to come into assessment. Upon chart review patient recently saw Dr. Hung for estrellita. Patient had labile mood and pressured speech at that time. Was started on Seroquel to help with sleep. In the past did see Dr. Garner and WH[facility] Gates for therapy. Patient has been hospitalized for psychiatric reasons in the past. Per [employer] message to Dr. Hung patient recently had a fall. In the past when patient saw Ms. Gates she had also " recently fallen. It was reported that patient prior to episode 2 years ago never presented with depressive or manic symptoms.     Pain: noncontributory    Symptoms:   · Mood: denied  · Anxiety: denied  · Substance abuse: denied  · Cognitive functioning: impaired. Pressured, unable to focus, fixated easily on topics that do not relate to situation  · Health behaviors: limited ability to take care of self, multiple falls within last two years    Psychiatric history: psychotropic management by PCP    Medical history: diabetes    Family history of psychiatric illness: unknown, patient unable to provide information due to cogntive state at time of assessment    Social history (marriage, employment, etc.): Born and raised in [facility]. Came to [place] around 21 y/o. . 8 children. Due to pressured cognitive state unable to obtain history from patient.       Substance use:   Alcohol: unable to assess   Drugs: unable to assess   Tobacco: unable to assess   Caffeine: unable to assess    Current medications and drug reactions (include OTC, herbal): see medication list     Strengths and liabilities: Liability: Patient is defensive., Liability: Patient is impulsive., Liability: Patient has poor health., Liability: Patient has poor judgment, Liability: Patient is unstable., Liability: Patient has possible cognitive impairment., Liability: Patient lacks coping skills.    Current Evaluation:     Mental Status Exam:  General Appearance:  unremarkable, age appropriate   Speech: loud, pressured, rapid      Level of Cooperation: impaired      Thought Processes: circumstantial, flight of ideas, racing   Mood: euphoric, manic      Thought Content: ruminations, fixated   Affect: expansive, labile   Orientation: Oriented x3   Memory: impaired   Attention Span & Concentration: unable to assess   Fund of General Knowledge: unable to assess   Abstract Reasoning: unable to assess   Judgment & Insight: poor, limited     Language   intact     Diagnostic Impression - Plan:       ICD-10-CM ICD-9-CM   1. 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 At this time patient is not appropriate for psychotherapy. Patient has appointment with psychiatrist next month and should be evaluated for medication management. Upon time that patient is able to be more cognitively stable she would be appropriate for psychotherapy and could return to clinic. Patient later signed release of information and therapist will inform daughter (primary caregiver) of this plan.     Return to Clinic: see above    Length of Service (minutes): 45     Cassandra Rockweiler, LCSW-BACS

## 2019-07-17 NOTE — TELEPHONE ENCOUNTER
Contacted patient's daughter patient still with pressured speech she slept only three hours last night. Unable to participate in psychotherapy. Will add depakote 500 BID for mood stabilization. If no improvement may need psychiatric hospitalization. Daughter is in agreement

## 2019-07-24 ENCOUNTER — PATIENT MESSAGE (OUTPATIENT)
Dept: FAMILY MEDICINE | Facility: CLINIC | Age: 79
End: 2019-07-24

## 2019-07-24 RX ORDER — AMITRIPTYLINE HYDROCHLORIDE 10 MG/1
10 TABLET, FILM COATED ORAL NIGHTLY
Qty: 30 TABLET | Refills: 11 | Status: SHIPPED | OUTPATIENT
Start: 2019-07-24 | End: 2019-08-29 | Stop reason: ALTCHOICE

## 2019-08-05 ENCOUNTER — PATIENT MESSAGE (OUTPATIENT)
Dept: FAMILY MEDICINE | Facility: CLINIC | Age: 79
End: 2019-08-05

## 2019-08-06 DIAGNOSIS — F02.81 ALZHEIMER'S DEMENTIA WITH BEHAVIORAL DISTURBANCE, UNSPECIFIED TIMING OF DEMENTIA ONSET: ICD-10-CM

## 2019-08-06 DIAGNOSIS — G30.9 ALZHEIMER'S DEMENTIA WITH BEHAVIORAL DISTURBANCE, UNSPECIFIED TIMING OF DEMENTIA ONSET: ICD-10-CM

## 2019-08-06 RX ORDER — QUETIAPINE FUMARATE 50 MG/1
50 TABLET, FILM COATED ORAL NIGHTLY
Qty: 30 TABLET | Refills: 1 | Status: SHIPPED | OUTPATIENT
Start: 2019-08-06 | End: 2019-08-29 | Stop reason: SDUPTHER

## 2019-08-08 ENCOUNTER — PATIENT OUTREACH (OUTPATIENT)
Dept: ADMINISTRATIVE | Facility: OTHER | Age: 79
End: 2019-08-08

## 2019-08-12 ENCOUNTER — OFFICE VISIT (OUTPATIENT)
Dept: NEUROLOGY | Facility: CLINIC | Age: 79
End: 2019-08-12
Payer: MEDICARE

## 2019-08-12 VITALS
BODY MASS INDEX: 44.23 KG/M2 | DIASTOLIC BLOOD PRESSURE: 74 MMHG | SYSTOLIC BLOOD PRESSURE: 172 MMHG | WEIGHT: 205 LBS | HEIGHT: 57 IN | HEART RATE: 69 BPM

## 2019-08-12 DIAGNOSIS — R20.2 PARESTHESIAS: Primary | ICD-10-CM

## 2019-08-12 PROCEDURE — 1101F PT FALLS ASSESS-DOCD LE1/YR: CPT | Mod: HCNC,CPTII,S$GLB, | Performed by: PSYCHIATRY & NEUROLOGY

## 2019-08-12 PROCEDURE — 3078F DIAST BP <80 MM HG: CPT | Mod: HCNC,CPTII,S$GLB, | Performed by: PSYCHIATRY & NEUROLOGY

## 2019-08-12 PROCEDURE — 99999 PR PBB SHADOW E&M-EST. PATIENT-LVL IV: ICD-10-PCS | Mod: PBBFAC,HCNC,, | Performed by: PSYCHIATRY & NEUROLOGY

## 2019-08-12 PROCEDURE — 3077F PR MOST RECENT SYSTOLIC BLOOD PRESSURE >= 140 MM HG: ICD-10-PCS | Mod: HCNC,CPTII,S$GLB, | Performed by: PSYCHIATRY & NEUROLOGY

## 2019-08-12 PROCEDURE — 3078F PR MOST RECENT DIASTOLIC BLOOD PRESSURE < 80 MM HG: ICD-10-PCS | Mod: HCNC,CPTII,S$GLB, | Performed by: PSYCHIATRY & NEUROLOGY

## 2019-08-12 PROCEDURE — 99999 PR PBB SHADOW E&M-EST. PATIENT-LVL IV: CPT | Mod: PBBFAC,HCNC,, | Performed by: PSYCHIATRY & NEUROLOGY

## 2019-08-12 PROCEDURE — 99215 PR OFFICE/OUTPT VISIT, EST, LEVL V, 40-54 MIN: ICD-10-PCS | Mod: HCNC,S$GLB,, | Performed by: PSYCHIATRY & NEUROLOGY

## 2019-08-12 PROCEDURE — 3077F SYST BP >= 140 MM HG: CPT | Mod: HCNC,CPTII,S$GLB, | Performed by: PSYCHIATRY & NEUROLOGY

## 2019-08-12 PROCEDURE — 99215 OFFICE O/P EST HI 40 MIN: CPT | Mod: HCNC,S$GLB,, | Performed by: PSYCHIATRY & NEUROLOGY

## 2019-08-12 PROCEDURE — 1101F PR PT FALLS ASSESS DOC 0-1 FALLS W/OUT INJ PAST YR: ICD-10-PCS | Mod: HCNC,CPTII,S$GLB, | Performed by: PSYCHIATRY & NEUROLOGY

## 2019-08-12 RX ORDER — GABAPENTIN 100 MG/1
CAPSULE ORAL
Qty: 60 CAPSULE | Refills: 3 | Status: SHIPPED | OUTPATIENT
Start: 2019-08-12 | End: 2019-09-03

## 2019-08-12 NOTE — PROGRESS NOTES
Neurology Consult Note    Chief Complaint: neuropathy    HPI:   Kaci Whalen is a 78 y.o. female with medical conditions as outlined below who presents for further evaluation of neuropathy. She states for years, she has had numbness and tingling in her feet which progressed to involve numbness and tingling in her hands after a few years. She was started on amitriptyline for this, but she does not feel it has made a difference. She has had diabetes for many years. She denies neck pain. She admits to having right sided low back pain for years, but denies radicular symptoms. She admits to feeling unsteady at times, but denies recent falls. She admits to chronic left shoulder pain. Her daughter states she had a manic episode a few weeks ago, but she feels this improved with depakote. She is set up to a see a psychiatrist later this month. She is on celexa for anxiety. She has no further complaints.     Past Medical History:  Past Medical History:   Diagnosis Date    Abdominal pain     Anxiety     Atherosclerosis of aortic arch     noted on CXR 7/1/2015    Atrial fibrillation 05/2016    CHADS 4, on Xarelto    Benign hypertension     Chronic constipation     Chronic diarrhea     Chronic edema     Depression     Dercum disease     Multiple painful lipomas    Diabetic retinopathy     Dysphagia     Gas pain     Glaucoma of both eyes     Hx of psychiatric care     previously amitriptyline, now jut on Trazodone 100mg QHS PRN insomnia    Hyperlipidemia with target LDL less than 100     Unable to tolerate statins    Immature cataract     Tasia     no symptoms prior to this presentation    Morbid obesity     Nausea & vomiting     Neuropathy     Polyneuropathy     Primary osteoarthritis of both knees     Proteinuria     Psychiatric problem     history of depression    Pulmonary hypertension mixed group 2 and 3 1/18/2017    Sleep apnea     Therapy     no history of therapy    Type II or unspecified  type diabetes mellitus with neurological manifestations, uncontrolled(250.62)     Unspecified vitamin D deficiency        Past Surgical History:  Past Surgical History:   Procedure Laterality Date     tenotomy      flexors 2,3 L toes    CATARACT EXTRACTION W/  INTRAOCULAR LENS IMPLANT Bilateral     COLONOSCOPY N/A 5/4/2017    [facility] (2ND F[facility])    diabetic retinopathy      right    ESOPHAGOGASTRODUODENOSCOPY (EGD) N/A 5/4/2017    [facility] (2ND FLR)    HYSTERECTOMY      knee surgery x2      Left ankle and leg surgery secondary to a fall      left arm fracture      Left cataract      ORIF, WRIST Left 12/28/2012    [facility]ozina Buck [facility] at [facility] OR 1ST FLR    PARTIAL HYSTERECTOMY      Secondary to bleeding    TOE FUSION      2,3 R       Social History:  Social History     Socioeconomic History    Marital status:      Spouse name: Not on file    Number of children: 3    Years of education: 14    Highest education level: Not on file   Occupational History    Occupation: housewife   Social Needs    Financial resource strain: Not on file    Food insecurity:     Worry: Not on file     Inability: Not on file    Transportation needs:     Medical: Not on file     Non-medical: Not on file   Tobacco Use    Smoking status: Never Smoker    Smokeless tobacco: Never Used   Substance and Sexual Activity    Alcohol use: No    Drug use: No    Sexual activity: Yes     Partners: Male   Lifestyle    Physical activity:     Days per week: Not on file     Minutes per session: Not on file    Stress: Not on file   Relationships    Social connections:     Talks on phone: Not on file     Gets together: Not on file     Attends Mu-ism service: Not on file     Active member of club or organization: Not on file     Attends meetings of clubs or organizations: Not on file     Relationship status: Not on file   Other Topics  Concern    Patient feels they ought to cut down on drinking/drug use Not Asked    Patient annoyed by others criticizing their drinking/drug use Not Asked    Patient has felt bad or guilty about drinking/drug use Not Asked    Patient has had a drink/used drugs as an eye opener in the AM Not Asked   Social History Narrative    Pt was the youngest of 3 boys and 2 girls.  Her father  when she was 3 months old.  She was raised by her mother, mgm, and an uncle.  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  They had 2 daughters and 1 son together, plus 5 stepchildren by her .  They live together in [place] in their own home, with no pets.  Hobbies include gardening and crafts.  Pt attends a variety of organized Orthodox services.            One daughter is  from bone cancer.    One son  2014 after surgery for MIGUEL       Family History:  Family History   Problem Relation Age of Onset    No Known Problems Daughter     No Known Problems Father     Liver cancer Mother     Bone cancer Daughter     No Known Problems Sister     No Known Problems Brother     No Known Problems Maternal Aunt     No Known Problems Maternal Uncle     No Known Problems Paternal Aunt     No Known Problems Paternal Uncle     No Known Problems Maternal Grandmother     No Known Problems Maternal Grandfather     No Known Problems Paternal Grandmother     No Known Problems Paternal Grandfather     Amblyopia Neg Hx     Blindness Neg Hx     Cancer Neg Hx     Cataracts Neg Hx     Diabetes Neg Hx     Glaucoma Neg Hx     Hypertension Neg Hx     Macular degeneration Neg Hx     Retinal detachment Neg Hx     Strabismus Neg Hx     Stroke Neg Hx     Thyroid disease Neg Hx     Celiac disease Neg Hx     Cirrhosis Neg Hx     Colon cancer Neg Hx     Colon polyps Neg Hx     Crohn's disease Neg Hx     Cystic fibrosis Neg Hx     Esophageal cancer  Neg Hx     Hemochromatosis Neg Hx     Inflammatory bowel disease Neg Hx     Irritable bowel syndrome Neg Hx     Liver disease Neg Hx     Rectal cancer Neg Hx     Stomach cancer Neg Hx     Ulcerative colitis Neg Hx     Montez's disease Neg Hx     Alcohol abuse Neg Hx     Bipolar disorder Neg Hx     Dementia Neg Hx     Depression Neg Hx     Drug abuse Neg Hx     Suicide Neg Hx     Schizophrenia Neg Hx        Medications:  Current Outpatient Medications   Medication Sig Dispense Refill    alcohol swabs PadM Apply 1 each topically as needed.  0    amitriptyline (ELAVIL) 10 MG tablet Take 1 tablet (10 mg total) by mouth every evening. 30 tablet 11    amLODIPine (NORVASC) 5 MG tablet Take 1 tablet (5 mg total) by mouth once daily. 90 tablet 2    blood glucose control, low Soln To test meter once per week 1 each 1    citalopram (CELEXA) 20 MG tablet Take 1 tablet (20 mg total) by mouth once daily. 30 tablet 11    desloratadine (CLARINEX) 5 mg tablet Take 1 tablet (5 mg total) by mouth once daily. (Patient taking differently: Take 5 mg by mouth as needed. ) 90 tablet 1    divalproex (DEPAKOTE) 500 MG TbEC Take 1 tablet (500 mg total) by mouth every 12 (twelve) hours. 60 tablet 1    fluticasone (FLONASE) 50 mcg/actuation nasal spray 1 spray (50 mcg total) by Each Nare route 2 (two) times daily. 16 g 3    furosemide (LASIX) 40 MG tablet Take 1 tablet (40 mg total) by mouth once daily. One tab po daily x three days then as needed for LE swelling 90 tablet 1    insulin aspart protamine-insulin aspart (NOVOLOG 70/30) 100 unit/mL (70-30) InPn pen Inject 21 Units into the skin 2 (two) times daily before meals. Sliding scale max daily dosing 50 units daily 15 mL 2    lancets (LANCETS,THIN) 28 gauge Misc 1 lancet by Misc.(Non-Drug; Combo Route) route 4 (four) times daily before meals and nightly. 100 each 2    losartan (COZAAR) 100 MG tablet Take 1 tablet (100 mg total) by mouth once daily. 90 tablet 3     "melatonin 10 mg Tab Take by mouth. 2 tabs at night      metFORMIN (GLUCOPHAGE) 500 MG tablet Take 1 tablet (500 mg total) by mouth 3 (three) times daily. 270 tablet 3    metoprolol succinate (TOPROL-XL) 25 MG 24 hr tablet Take 1 tablet (25 mg total) by mouth once daily. 90 tablet 2    pen needle, diabetic (BD ULTRA-FINE SANDEEP PEN NEEDLES) 32 gauge x 5/32" Ndle Take 1 each by mouth 4 (four) times daily. 120 each 2    QUEtiapine (SEROQUEL) 50 MG tablet Take 1 tablet (50 mg total) by mouth every evening. 30 tablet 1    TRUE METRIX GLUCOSE TEST STRIP Strp TEST FOUR TIMES DAILY BEFORE MEALS  AND EVERY NIGHT 400 strip 2    gabapentin (NEURONTIN) 100 MG capsule Take 100mg at bedtime for a week, if tolerating well increase to 100mg two times a day 60 capsule 3     No current facility-administered medications for this visit.        Allergies:  Review of patient's allergies indicates:   Allergen Reactions    Tramadol Other (See Comments)     Interaction-induced delirium with s/s of estrellita.    Ace inhibitors      Other reaction(s): cough      Fluorescein Itching     Other reaction(s): Itching    Iodine      Other reaction(s): Itching    Pravastatin Other (See Comments)     Other reaction(s): mouth tingling/numbness    Simvastatin      Other reaction(s): foot swelling         ROS:  A 12 point review of system was negative aside from pertinent positives and negatives as outlined above.    Physical Exam  Vitals:    08/12/19 1403   BP: (!) 172/74   Pulse: 69       General: well nourished, well developed  Eyes: no scleral icterus   Nose: nasal turbinates intact  Neck: supple, ROM intact  Skin: no rash or ecchymosis  Joints: no swelling or erythema  Cardiac: regular rate and rhythm  Lungs: clear to auscultation bilaterally    Neuro:  Mental status: AAO x 3, no dysarthria, no aphasia, communicating appropriately  CN: PERRL, EOMI, VFF, V1-V3 sensation intact, no facial asymmetry, hearing grossly intact, tongue " midline  Motor:   Deltoid R 5/5 L 5/5   Biceps R 5/5 L 5/5   Triceps R 5/5 L 5/5   Wrist flexion R 5/5 L 5/5   Wrist extension R 5/5 L 5/5   Finger abduction  R 5/5 L 5/5   Thumb abduction R 5/5 L 5/5     Hip flexion R 5/5 L 5/5   Knee extension R 5/5 L 5/5   Knee flexion R 5/5 L 5/5   Foot dorsiflexion R 5/5 L 5/5   Foot plantar flexion R 5/5 L 5/5   Foot inversion R 5/5 L 5/5   Foot eversion R 5/5 L 5/5       Normal bulk and tone    Reflexes: absent ankle jerks bilaterally, otherwise 1+, toes equivocal bilaterally  Sensory: decreased to pinprick up to ankles bilaterally, intact to position sense, decreased vibration sense at toes, intact to light touch and pinprick in upper extremities  Coordination: no dysmetria on FTN  Gait: steady    Prior Imaging/Labs:  Reviewed      Assessment and Plan:    78 y.o. female with paresthesias in hands and feet likely due to neuropathy 2/2 diabetes    1. Paresthesias  Patient was advised to taper off of amitriptyline as she feels it is not helping and she is already on celexa (another serotonergic agent)  She was advised to take amitriptyline 10mg at bedtime every other night for a week and then stop  She was advised to notify me if she notices a change in mood once coming off of this medication  - gabapentin (NEURONTIN) 100 MG capsule; Take 100mg at bedtime for a week, if tolerating well increase to 100mg two times a day  Dispense: 60 capsule; Refill: 3  - EMG W/ ULTRASOUND AND NERVE CONDUCTION TEST 2 Extremities; Future  She was made aware of side effects of gabapentin and was advised to notify me if she experiences any.          Patient was advised to notify me for worsening symptoms. I will see patient back after EMG/NCS or sooner if necessary.     Thank you for this consultation.    Malou Stephens DO  [facility]  [address] [facility] [address]  [place] [address]  [phone]

## 2019-08-12 NOTE — LETTER
August 12, 2019      Cassandra Rockweiler, [facility]  [address]  [place] [address]           [facility] [address] [facility] [address]  [place] [address]  Phone: [phone]  Fax: [phone]          Patient: Kaci Whalen   MR Number: [id number]   YOB: 1940   Date of Visit: 8/12/2019       Dear Cassandra Rockweiler:    Thank you for referring Kaci Whalen to me for evaluation. Attached you will find relevant portions of my assessment and plan of care.    If you have questions, please do not hesitate to call me. I look forward to following Kaci Whalen along with you.    Sincerely,    Malou Stephens, DO    Enclosure  CC:  No Recipients    If you would like to receive this communication electronically, please contact [email or web address] or [phone] to request more information on [employer] access.    For providers and/or their staff who would like to refer a patient to [facility], please contact us through our one-stop-shop provider referral line, [facility], at [phone].    If you feel you have received this communication in error or would no longer like to receive these types of communications, please e-mail [email or web address]

## 2019-08-19 ENCOUNTER — PATIENT MESSAGE (OUTPATIENT)
Dept: FAMILY MEDICINE | Facility: CLINIC | Age: 79
End: 2019-08-19

## 2019-08-21 ENCOUNTER — PROCEDURE VISIT (OUTPATIENT)
Dept: NEUROLOGY | Facility: CLINIC | Age: 79
End: 2019-08-21
Payer: MEDICARE

## 2019-08-21 VITALS — HEIGHT: 57 IN | BODY MASS INDEX: 44.23 KG/M2 | WEIGHT: 205 LBS

## 2019-08-21 DIAGNOSIS — R20.2 PARESTHESIAS: ICD-10-CM

## 2019-08-21 DIAGNOSIS — M79.602 LEFT ARM PAIN: Primary | ICD-10-CM

## 2019-08-21 PROCEDURE — 95885 PR MUSC TST DONE W/NERV TST LIM: ICD-10-PCS | Mod: 59,HCNC,S$GLB, | Performed by: PSYCHIATRY & NEUROLOGY

## 2019-08-21 PROCEDURE — 95886 PR EMG COMPLETE, W/ NERVE CONDUCTION STUDIES, 5+ MUSCLES: ICD-10-PCS | Mod: HCNC,S$GLB,, | Performed by: PSYCHIATRY & NEUROLOGY

## 2019-08-21 PROCEDURE — 95913 NRV CNDJ TEST 13/> STUDIES: CPT | Mod: HCNC,S$GLB,, | Performed by: PSYCHIATRY & NEUROLOGY

## 2019-08-21 PROCEDURE — 95886 MUSC TEST DONE W/N TEST COMP: CPT | Mod: HCNC,S$GLB,, | Performed by: PSYCHIATRY & NEUROLOGY

## 2019-08-21 PROCEDURE — 95913 PR NERVE CONDUCTION STUDY; 13 OR MORE STUDIES: ICD-10-PCS | Mod: HCNC,S$GLB,, | Performed by: PSYCHIATRY & NEUROLOGY

## 2019-08-21 PROCEDURE — 95885 MUSC TST DONE W/NERV TST LIM: CPT | Mod: 59,HCNC,S$GLB, | Performed by: PSYCHIATRY & NEUROLOGY

## 2019-08-22 ENCOUNTER — OFFICE VISIT (OUTPATIENT)
Dept: PSYCHIATRY | Facility: CLINIC | Age: 79
End: 2019-08-22
Payer: MEDICARE

## 2019-08-22 DIAGNOSIS — F30.9 MANIA: Primary | ICD-10-CM

## 2019-08-22 PROCEDURE — 90834 PR PSYCHOTHERAPY W/PATIENT, 45 MIN: ICD-10-PCS | Mod: HCNC,S$GLB,, | Performed by: SOCIAL WORKER

## 2019-08-22 PROCEDURE — 90834 PSYTX W PT 45 MINUTES: CPT | Mod: HCNC,S$GLB,, | Performed by: SOCIAL WORKER

## 2019-08-23 NOTE — PROGRESS NOTES
"Individual Psychotherapy (PhD/LCSW)    8/22/2019    Site:  [facility]        Therapeutic Intervention: Met with patient.  Outpatient - Supportive psychotherapy 45 min - CPT Code 35902    Chief complaint/reason for encounter: mood swings     Interval history and content of current session: Patient returned to clinic for follow up psychotherapy. Patient presents to clinic in much better mood. Patient was seen by neurology and was prescribed medication to help with sleep. Reports that she feels "better". When asked about what has been going on lately patient instantly starts talking about troubles with  in the past. States that he had a previous marriage and ex-wife left him with 5 children. States that she was 26 when they  and she took care of all 8 children. Reports that she always asked him if they could get help and he refused to bring in anyone that wasn't family for help. 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 Then quickly switches to an incident that happened in the more recent past with daughter. States that daughter that accompanied her to appointment today is the one that they call to help them. She is unmarried and has no children. Reports that patient fell in the bathtub one day and daughter came to help. Daughter later told patient that she was going to bring patient to the ED. Patient states that she did not want to go to the ED. Daughter told her that she would bring her even if she had to force her. States that  was on daughter's side and this upset her. States that throughout their marriage she asked for help and now he wanted to bring her in for help. Reports that she got very angry and has struggled with her daughter and  since then. Patient continues to be pressured in her speech. Patient lacks insight into current problems and is focused on her past problems. Patient is difficult " to redirect and when able she does not answer questions that were asked. Patient less irritable and does not argue with daughter or  before or after this visit like she did at initial visit.     Treatment plan:  · Target symptoms: mood disorder  · Why chosen therapy is appropriate versus another modality: patient responds to this modality  · Outcome monitoring methods: self-report, observation  · Therapeutic intervention type: supportive psychotherapy    Risk parameters:  Patient reports no suicidal ideation  Patient reports no homicidal ideation  Patient reports no self-injurious behavior  Patient reports no violent behavior    Verbal deficits: None    Patient's response to intervention:  The patient's response to intervention is limited.    Progress toward goals and other mental status changes:  The patient's progress toward goals is limited.    Diagnosis:     ICD-10-CM ICD-9-CM   1. 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

## 2019-08-29 ENCOUNTER — PATIENT MESSAGE (OUTPATIENT)
Dept: PSYCHIATRY | Facility: CLINIC | Age: 79
End: 2019-08-29

## 2019-08-29 ENCOUNTER — TELEPHONE (OUTPATIENT)
Dept: FAMILY MEDICINE | Facility: CLINIC | Age: 79
End: 2019-08-29

## 2019-08-29 ENCOUNTER — PATIENT OUTREACH (OUTPATIENT)
Dept: ADMINISTRATIVE | Facility: OTHER | Age: 79
End: 2019-08-29

## 2019-08-29 ENCOUNTER — PATIENT MESSAGE (OUTPATIENT)
Dept: FAMILY MEDICINE | Facility: CLINIC | Age: 79
End: 2019-08-29

## 2019-08-29 ENCOUNTER — TELEPHONE (OUTPATIENT)
Dept: RADIOLOGY | Facility: HOSPITAL | Age: 79
End: 2019-08-29

## 2019-08-29 ENCOUNTER — OFFICE VISIT (OUTPATIENT)
Dept: PSYCHIATRY | Facility: CLINIC | Age: 79
End: 2019-08-29
Payer: MEDICARE

## 2019-08-29 VITALS
WEIGHT: 208.88 LBS | HEART RATE: 67 BPM | DIASTOLIC BLOOD PRESSURE: 69 MMHG | BODY MASS INDEX: 45.2 KG/M2 | SYSTOLIC BLOOD PRESSURE: 157 MMHG

## 2019-08-29 DIAGNOSIS — F31.9 BIPOLAR 1 DISORDER: Primary | ICD-10-CM

## 2019-08-29 DIAGNOSIS — F31.13 BIPOLAR DISORDER WITH SEVERE MANIA: Primary | ICD-10-CM

## 2019-08-29 DIAGNOSIS — F31.9 BIPOLAR 1 DISORDER: ICD-10-CM

## 2019-08-29 DIAGNOSIS — F31.10 BIPOLAR DISORDER, MOST RECENT EPISODE MANIC: Primary | ICD-10-CM

## 2019-08-29 DIAGNOSIS — F02.81 ALZHEIMER'S DEMENTIA WITH BEHAVIORAL DISTURBANCE, UNSPECIFIED TIMING OF DEMENTIA ONSET: ICD-10-CM

## 2019-08-29 DIAGNOSIS — G30.9 ALZHEIMER'S DEMENTIA WITH BEHAVIORAL DISTURBANCE, UNSPECIFIED TIMING OF DEMENTIA ONSET: ICD-10-CM

## 2019-08-29 PROCEDURE — 90791 PR PSYCHIATRIC DIAGNOSTIC EVALUATION: ICD-10-PCS | Mod: HCNC,S$GLB,, | Performed by: PSYCHIATRY & NEUROLOGY

## 2019-08-29 PROCEDURE — 99999 PR PBB SHADOW E&M-EST. PATIENT-LVL II: ICD-10-PCS | Mod: PBBFAC,HCNC,, | Performed by: PSYCHIATRY & NEUROLOGY

## 2019-08-29 PROCEDURE — 90791 PSYCH DIAGNOSTIC EVALUATION: CPT | Mod: HCNC,S$GLB,, | Performed by: PSYCHIATRY & NEUROLOGY

## 2019-08-29 PROCEDURE — 99999 PR PBB SHADOW E&M-EST. PATIENT-LVL II: CPT | Mod: PBBFAC,HCNC,, | Performed by: PSYCHIATRY & NEUROLOGY

## 2019-08-29 RX ORDER — QUETIAPINE FUMARATE 50 MG/1
50 TABLET, FILM COATED ORAL NIGHTLY
Qty: 30 TABLET | Refills: 3 | Status: ON HOLD | OUTPATIENT
Start: 2019-08-29 | End: 2019-11-21 | Stop reason: HOSPADM

## 2019-08-29 RX ORDER — DIVALPROEX SODIUM 500 MG/1
500 TABLET, DELAYED RELEASE ORAL EVERY 12 HOURS
Qty: 60 TABLET | Refills: 3 | Status: SHIPPED | OUTPATIENT
Start: 2019-08-29 | End: 2019-11-14

## 2019-08-29 NOTE — TELEPHONE ENCOUNTER
----- Message from Norma Monsalve LPN sent at 8/29/2019  3:03 PM CDT -----  Contact: tonia alfonso daughter  Please advise.   ----- Message -----  From: Ofelia Rich  Sent: 8/29/2019   2:53 PM  To: Abhilash Luis Staff    Name of Who is Calling:tonia pt daughter     What is the request in detail: patient would like orders added to blood work on tomorrow    Can the clinic reply by [facility]: no    What Number to Call Back if not in [facility]: [phone]

## 2019-08-29 NOTE — TELEPHONE ENCOUNTER
Contacted patient's daughter and informed her that Dr. Hung is not in clinic today and to be sure to contact Dr. Rosenbaum's office to make sure those labs he ordered were placed. Daughter verbalized understanding of all of the above, stating she would contact Dr. Rosenbaum's office concerning the labs they ordered. No further questions or concerns at this time.

## 2019-08-29 NOTE — PROGRESS NOTES
Outpatient Psychiatry Initial Visit (MD/NP)    8/29/2019    Kaci Whalen, a 78 y.o. female, for initial evaluation visit.  Patient is referred by Toby Hung MD       Reason for Encounter: Referral for treatment    Complaints:  She has been experiencing for followup manic episode. Patient was hospitalized in June 2017 for a manic episode that included rapid speech, decreased sleep and hallucinations. Since being on Depakote and Seroquel, these symptoms have remitted, and her daughter indicates that she is back to normal at this time. She sleeps better and her speech is normal, and she has no signs or symptoms of psychosis. She is in good self control and compliant with her care. She is  and still grieving the loss of two children, as well as dealing with the symptoms of her 's Parkinson's Disease. She has a college degree in teaching. She raised 8 children. Her concentration is also better. She was in good self control at this time and denies depressive symptoms. She denies side effects from her medication and is pleased with her response to Depakote. Her thoughts are well organized as well. She has frequent falls and incontinence which started before her manic episode. She does not smoke or drink. She is pleased with her current condition, though she gets upset when her  becomes paranoid due to his own illness.  Medication List with Changes/Refills   Current Medications    ALCOHOL SWABS PADM    Apply 1 each topically as needed.    AMLODIPINE (NORVASC) 5 MG TABLET    Take 1 tablet (5 mg total) by mouth once daily.    BLOOD GLUCOSE CONTROL, LOW SOLN    To test meter once per week    CITALOPRAM (CELEXA) 20 MG TABLET    Take 1 tablet (20 mg total) by mouth once daily.    DESLORATADINE (CLARINEX) 5 MG TABLET    Take 1 tablet (5 mg total) by mouth once daily.    FLUTICASONE (FLONASE) 50 MCG/ACTUATION NASAL SPRAY    1 spray (50 mcg total) by Each Nare route 2 (two) times daily.    FUROSEMIDE  "(LASIX) 40 MG TABLET    Take 1 tablet (40 mg total) by mouth once daily. One tab po daily x three days then as needed for LE swelling    GABAPENTIN (NEURONTIN) 100 MG CAPSULE    Take 100mg at bedtime for a week, if tolerating well increase to 100mg two times a day    INSULIN ASPART PROTAMINE-INSULIN ASPART (NOVOLOG 70/30) 100 UNIT/ML (70-30) INPN PEN    Inject 21 Units into the skin 2 (two) times daily before meals. Sliding scale max daily dosing 50 units daily    LANCETS (LANCETS,THIN) 28 GAUGE MISC    1 lancet by Misc.(Non-Drug; Combo Route) route 4 (four) times daily before meals and nightly.    LOSARTAN (COZAAR) 100 MG TABLET    Take 1 tablet (100 mg total) by mouth once daily.    MELATONIN 10 MG TAB    Take by mouth. 2 tabs at night    METFORMIN (GLUCOPHAGE) 500 MG TABLET    Take 1 tablet (500 mg total) by mouth 3 (three) times daily.    METOPROLOL SUCCINATE (TOPROL-XL) 25 MG 24 HR TABLET    Take 1 tablet (25 mg total) by mouth once daily.    PEN NEEDLE, DIABETIC (BD ULTRA-FINE SANDEEP PEN NEEDLES) 32 GAUGE X 5/32" NDLE    Take 1 each by mouth 4 (four) times daily.    TRUE METRIX GLUCOSE TEST STRIP STRP    TEST FOUR TIMES DAILY BEFORE MEALS  AND EVERY NIGHT   Changed and/or Refilled Medications    Modified Medication Previous Medication    DIVALPROEX (DEPAKOTE) 500 MG TBEC divalproex (DEPAKOTE) 500 MG TbEC       Take 1 tablet (500 mg total) by mouth every 12 (twelve) hours.    Take 1 tablet (500 mg total) by mouth every 12 (twelve) hours.    QUETIAPINE (SEROQUEL) 50 MG TABLET QUEtiapine (SEROQUEL) 50 MG tablet       Take 1 tablet (50 mg total) by mouth every evening.    Take 1 tablet (50 mg total) by mouth every evening.   Discontinued Medications    AMITRIPTYLINE (ELAVIL) 10 MG TABLET    Take 1 tablet (10 mg total) by mouth every evening.        Stressors:  Dealing with 's illness and grief re loss of her children.    History:     Past Psychiatric History:   Previous therapy: yes  Previous psychiatric " treatment and medication trials: yes  Previous psychiatric hospitalizations: yes  Previous diagnoses: yes  Previous suicide attempts: no  History of violence: no  Currently in treatment with myself  Education: college graduate  Other pertinent history: None  Depression screening was performed with standardized tool: Not Screened - Not Eligible/Appropriate    Substance Abuse History:  Recreational drugs: Does not use recreational drugs  Use of alcohol: denied  Use of caffeine: coffee One cup daily /day  Tobacco use: no  Legal consequences of chemical use: no  Patient feels she ought to cut down on drinking and/or drug use: no  Patient has been annoyed by others criticizing her drinking or drug use: no  Patient has felt bad or guilty about drinking or drug use:no  Patient has had a drink or used drugs as an eye opener first thing in the morning to steady nerves, get rid of a hangover or get the day started: no  Use of OTC medications: Melatonin    The following portions of the patient's history were reviewed and updated as appropriate: allergies, current medications, past family history, past medical history, past social history, past surgical history and problem list.    Record Review: moderate      Review Of Systems:     Medical Review Of Systems:  ROS     Psychiatric Review Of Systems:  Sleep: no  Appetite changes: no  Weight changes: no  Energy: no  Interest/pleasure/anhedonia: no  Somatic symptoms: yes Pain due to neuropathy  Libido: Not known  Anxiety/panic: yes  Guilty/hopeless: no  Self-injurious behavior/risky behavior: no  Any drugs: no  Alcohol: no     Current Evaluation:       Mental Status Evaluation:  Appearance:  unremarkable, age appropriate, well dressed, neatly groomed   Behavior:  normal, cooperative   Speech:  no latency; no press, spontaneous   Mood:  happy   Affect:  congruent and appropriate   Thought Process:  normal and logical   Thought Content:  normal, no suicidality, no homicidality,  delusions, or paranoia   Sensorium:  grossly intact   Cognition:  grossly intact   Insight:  intact   Judgment:  behavior is adequate to circumstances     SIGECAPS  Sleep:   Interest:   Guilt:   Energy:   Concentration:   Appetite:   Psychomotor agitation:   Suicidal intentions: no  Suicidal plan: no    Physical/Somatic Complaints  The patient lists: pain    Functioning in Relationships:  Spouse/partner:   Peers:   Employers:       Assessment - Diagnosis - Goals:       ICD-10-CM ICD-9-CM   1. Bipolar disorder, most recent episode manic F31.10 296.40   2. Bipolar 1 disorder F31.9 296.7   3. Alzheimer's dementia with behavioral disturbance, unspecified timing of dementia onset G30.9 331.0    F02.81 294.11       Treatment Goals:  Specify outcomes written in observable, behavioral terms:   Maintain mood stability with medication. Allow to discuss sad feelings re grief    Treatment Plan/Recommendations: No additional recommendations at this time.  Follow-up plan for depression was discussed with patient. Continue Depakote 500mg bid. Obtain depakote blood level, liver profile, and serum amylase. Continue Seroquel 50mg q hs. Stop Elavil, which she has not been taking. I reviewed the side effects of her medicines, and advised her and her daughter that they could call if she has problems with the medicines or her clinical condition.    Review with patient: Treatment plan reviewed with the patient.  Medication risks/benefit reviewed with the patient    Solis Rosenbaum Jr

## 2019-08-30 ENCOUNTER — HOSPITAL ENCOUNTER (OUTPATIENT)
Dept: RADIOLOGY | Facility: HOSPITAL | Age: 79
Discharge: HOME | End: 2019-08-30
Attending: PSYCHIATRY & NEUROLOGY
Payer: MEDICARE

## 2019-08-30 DIAGNOSIS — M79.602 LEFT ARM PAIN: ICD-10-CM

## 2019-08-30 PROCEDURE — 72141 MRI CERVICAL SPINE WITHOUT CONTRAST: ICD-10-PCS | Mod: 26,HCNC,, | Performed by: RADIOLOGY

## 2019-08-30 PROCEDURE — 72141 MRI NECK SPINE W/O DYE: CPT | Mod: TC,HCNC

## 2019-08-30 PROCEDURE — 72141 MRI NECK SPINE W/O DYE: CPT | Mod: 26,HCNC,, | Performed by: RADIOLOGY

## 2019-09-03 ENCOUNTER — OFFICE VISIT (OUTPATIENT)
Dept: NEUROLOGY | Facility: CLINIC | Age: 79
End: 2019-09-03
Payer: MEDICARE

## 2019-09-03 VITALS
HEART RATE: 70 BPM | WEIGHT: 208 LBS | SYSTOLIC BLOOD PRESSURE: 175 MMHG | BODY MASS INDEX: 44.88 KG/M2 | HEIGHT: 57 IN | DIASTOLIC BLOOD PRESSURE: 71 MMHG

## 2019-09-03 DIAGNOSIS — M48.02 CERVICAL STENOSIS OF SPINAL CANAL: Primary | ICD-10-CM

## 2019-09-03 DIAGNOSIS — G62.9 NEUROPATHY: ICD-10-CM

## 2019-09-03 PROCEDURE — 99214 PR OFFICE/OUTPT VISIT, EST, LEVL IV, 30-39 MIN: ICD-10-PCS | Mod: HCNC,S$GLB,, | Performed by: PSYCHIATRY & NEUROLOGY

## 2019-09-03 PROCEDURE — 3077F SYST BP >= 140 MM HG: CPT | Mod: HCNC,CPTII,S$GLB, | Performed by: PSYCHIATRY & NEUROLOGY

## 2019-09-03 PROCEDURE — 3078F PR MOST RECENT DIASTOLIC BLOOD PRESSURE < 80 MM HG: ICD-10-PCS | Mod: HCNC,CPTII,S$GLB, | Performed by: PSYCHIATRY & NEUROLOGY

## 2019-09-03 PROCEDURE — 1101F PT FALLS ASSESS-DOCD LE1/YR: CPT | Mod: HCNC,CPTII,S$GLB, | Performed by: PSYCHIATRY & NEUROLOGY

## 2019-09-03 PROCEDURE — 99999 PR PBB SHADOW E&M-EST. PATIENT-LVL IV: ICD-10-PCS | Mod: PBBFAC,HCNC,, | Performed by: PSYCHIATRY & NEUROLOGY

## 2019-09-03 PROCEDURE — 99999 PR PBB SHADOW E&M-EST. PATIENT-LVL IV: CPT | Mod: PBBFAC,HCNC,, | Performed by: PSYCHIATRY & NEUROLOGY

## 2019-09-03 PROCEDURE — 3078F DIAST BP <80 MM HG: CPT | Mod: HCNC,CPTII,S$GLB, | Performed by: PSYCHIATRY & NEUROLOGY

## 2019-09-03 PROCEDURE — 1101F PR PT FALLS ASSESS DOC 0-1 FALLS W/OUT INJ PAST YR: ICD-10-PCS | Mod: HCNC,CPTII,S$GLB, | Performed by: PSYCHIATRY & NEUROLOGY

## 2019-09-03 PROCEDURE — 3077F PR MOST RECENT SYSTOLIC BLOOD PRESSURE >= 140 MM HG: ICD-10-PCS | Mod: HCNC,CPTII,S$GLB, | Performed by: PSYCHIATRY & NEUROLOGY

## 2019-09-03 PROCEDURE — 99214 OFFICE O/P EST MOD 30 MIN: CPT | Mod: HCNC,S$GLB,, | Performed by: PSYCHIATRY & NEUROLOGY

## 2019-09-03 RX ORDER — GABAPENTIN 100 MG/1
100 CAPSULE ORAL 3 TIMES DAILY
Qty: 90 CAPSULE | Refills: 5 | Status: SHIPPED | OUTPATIENT
Start: 2019-09-03

## 2019-09-03 NOTE — PROGRESS NOTES
Neurology Follow Up Note    Chief Complaint: follow up MRI and EMG results    Interval History:  Since last visit, patient feels gabapentin 100mg two times a day has been helping her with her pain and paresthesias. She would like to increase to 100mg three times a day. She is tolerating this medication well. She states she drops objects with her hands at times. She admits to localized left sided back pain at times, but denies radicular symptoms. She states she can feel when she needs to urinate, but is unable to make it to the bathroom at times. She states this has been occurring for the past 5 months. She had an MRI C spine which showed severe central canal stenosis with no abnormal signal and varying degrees of neural foraminal narrowing. Daughter reports that patient has had multiple falls this month. She denies neck pain or radicular symptoms. She walks with a walker at home. She had an EMG/NCS which showed a generalized neuropathy likely due to diabetes as well as bilateral carpal tunnel syndrome. It also showed multilevel cervical polyradiculopathies. She has no further complaints.      Last Visit 8/12/19:   Kaci Whalen is a 78 y.o. female with medical conditions as outlined below who presents for further evaluation of neuropathy. She states for years, she has had numbness and tingling in her feet which progressed to involve numbness and tingling in her hands after a few years. She was started on amitriptyline for this, but she does not feel it has made a difference. She has had diabetes for many years. She denies neck pain. She admits to having right sided low back pain for years, but denies radicular symptoms. She admits to feeling unsteady at times, but denies recent falls. She admits to chronic left shoulder pain. Her daughter states she had a manic episode a few weeks ago, but she feels this improved with depakote. She is set up to a see a psychiatrist later this month. She is on celexa for anxiety.  She has no further complaints.     Past Medical History:  Past Medical History:   Diagnosis Date    Abdominal pain     Anxiety     Atherosclerosis of aortic arch     noted on CXR 7/1/2015    Atrial fibrillation 05/2016    CHADS 4, on Xarelto    Benign hypertension     Bipolar disorder, most recent episode manic 8/29/2019    Chronic constipation     Chronic diarrhea     Chronic edema     Depression     Dercum disease     Multiple painful lipomas    Diabetic retinopathy     Dysphagia     Gas pain     Glaucoma of both eyes     Hx of psychiatric care     previously amitriptyline, now jut on Trazodone 100mg QHS PRN insomnia    Hyperlipidemia with target LDL less than 100     Unable to tolerate statins    Immature cataract     Tasia     no symptoms prior to this presentation    Morbid obesity     Nausea & vomiting     Neuropathy     Polyneuropathy     Primary osteoarthritis of both knees     Proteinuria     Psychiatric problem     history of depression    Pulmonary hypertension mixed group 2 and 3 1/18/2017    Sleep apnea     Therapy     no history of therapy    Type II or unspecified type diabetes mellitus with neurological manifestations, uncontrolled(250.62)     Unspecified vitamin D deficiency        Past Surgical History:  Past Surgical History:   Procedure Laterality Date     tenotomy      flexors 2,3 L toes    CATARACT EXTRACTION W/  INTRAOCULAR LENS IMPLANT Bilateral     COLONOSCOPY N/A 5/4/2017    Performed by [facility]ellen Wolf [facility] at [facility] ENDO (2ND FLR)    diabetic retinopathy      right    ESOPHAGOGASTRODUODENOSCOPY (EGD) N/A 5/4/2017    Performed by [facility] at [facility] ENDO (2ND FLR)    HYSTERECTOMY      knee surgery x2      Left ankle and leg surgery secondary to a fall      left arm fracture      Left cataract      ORIF, WRIST Left 12/28/2012    Performed by Rozina Buck MD at [facility] OR 1ST FLR    PARTIAL HYSTERECTOMY      Secondary to bleeding    TOE  FUSION      2,3 R       Social History:  Social History     Socioeconomic History    Marital status:      Spouse name: Not on file    Number of children: 3    Years of education: 14    Highest education level: Not on file   Occupational History    Occupation: housewife   Social Needs    Financial resource strain: Not on file    Food insecurity:     Worry: Not on file     Inability: Not on file    Transportation needs:     Medical: Not on file     Non-medical: Not on file   Tobacco Use    Smoking status: Never Smoker    Smokeless tobacco: Never Used   Substance and Sexual Activity    Alcohol use: No    Drug use: No    Sexual activity: Yes     Partners: Male   Lifestyle    Physical activity:     Days per week: Not on file     Minutes per session: Not on file    Stress: Not on file   Relationships    Social connections:     Talks on phone: Not on file     Gets together: Not on file     Attends Mosque service: Not on file     Active member of club or organization: Not on file     Attends meetings of clubs or organizations: Not on file     Relationship status: Not on file   Other Topics Concern    Patient feels they ought to cut down on drinking/drug use Not Asked    Patient annoyed by others criticizing their drinking/drug use Not Asked    Patient has felt bad or guilty about drinking/drug use Not Asked    Patient has had a drink/used drugs as an eye opener in the AM Not Asked   Social History Narrative    Pt was the youngest of 3 boys and 2 girls.  Her father  when she was 3 months old.  She was raised by her mother, m, and an uncle.  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  They had 2 daughters and 1 son together, plus 5 stepchildren by her .  They live together in [place] in their own home, with no pets.  Hobbies include gardening and crafts.  Pt attends a variety of organized Mosque services.             One daughter is  from bone cancer.    One son  2014 after surgery for MIGUEL       Family History:  Family History   Problem Relation Age of Onset    No Known Problems Daughter     No Known Problems Father     Liver cancer Mother     Bone cancer Daughter     No Known Problems Sister     No Known Problems Brother     No Known Problems Maternal Aunt     No Known Problems Maternal Uncle     No Known Problems Paternal Aunt     No Known Problems Paternal Uncle     No Known Problems Maternal Grandmother     No Known Problems Maternal Grandfather     No Known Problems Paternal Grandmother     No Known Problems Paternal Grandfather     Amblyopia Neg Hx     Blindness Neg Hx     Cancer Neg Hx     Cataracts Neg Hx     Diabetes Neg Hx     Glaucoma Neg Hx     Hypertension Neg Hx     Macular degeneration Neg Hx     Retinal detachment Neg Hx     Strabismus Neg Hx     Stroke Neg Hx     Thyroid disease Neg Hx     Celiac disease Neg Hx     Cirrhosis Neg Hx     Colon cancer Neg Hx     Colon polyps Neg Hx     Crohn's disease Neg Hx     Cystic fibrosis Neg Hx     Esophageal cancer Neg Hx     Hemochromatosis Neg Hx     Inflammatory bowel disease Neg Hx     Irritable bowel syndrome Neg Hx     Liver disease Neg Hx     Rectal cancer Neg Hx     Stomach cancer Neg Hx     Ulcerative colitis Neg Hx     Montez's disease Neg Hx     Alcohol abuse Neg Hx     Bipolar disorder Neg Hx     Dementia Neg Hx     Depression Neg Hx     Drug abuse Neg Hx     Suicide Neg Hx     Schizophrenia Neg Hx        Medications:  Current Outpatient Medications   Medication Sig Dispense Refill    alcohol swabs PadM Apply 1 each topically as needed.  0    amLODIPine (NORVASC) 5 MG tablet Take 1 tablet (5 mg total) by mouth once daily. 90 tablet 2    blood glucose control, low Soln To test meter once per week 1 each 1    citalopram (CELEXA) 20 MG tablet Take 1 tablet (20 mg total) by mouth once daily. 30  "tablet 11    desloratadine (CLARINEX) 5 mg tablet Take 1 tablet (5 mg total) by mouth once daily. (Patient taking differently: Take 5 mg by mouth as needed. ) 90 tablet 1    divalproex (DEPAKOTE) 500 MG TbEC Take 1 tablet (500 mg total) by mouth every 12 (twelve) hours. 60 tablet 3    fluticasone (FLONASE) 50 mcg/actuation nasal spray 1 spray (50 mcg total) by Each Nare route 2 (two) times daily. 16 g 3    furosemide (LASIX) 40 MG tablet Take 1 tablet (40 mg total) by mouth once daily. One tab po daily x three days then as needed for LE swelling 90 tablet 1    gabapentin (NEURONTIN) 100 MG capsule Take 100mg at bedtime for a week, if tolerating well increase to 100mg two times a day 60 capsule 3    insulin aspart protamine-insulin aspart (NOVOLOG 70/30) 100 unit/mL (70-30) InPn pen Inject 21 Units into the skin 2 (two) times daily before meals. Sliding scale max daily dosing 50 units daily 15 mL 2    lancets (LANCETS,THIN) 28 gauge Misc 1 lancet by Misc.(Non-Drug; Combo Route) route 4 (four) times daily before meals and nightly. 100 each 2    losartan (COZAAR) 100 MG tablet Take 1 tablet (100 mg total) by mouth once daily. 90 tablet 3    melatonin 10 mg Tab Take by mouth. 2 tabs at night      metFORMIN (GLUCOPHAGE) 500 MG tablet Take 1 tablet (500 mg total) by mouth 3 (three) times daily. 270 tablet 3    metoprolol succinate (TOPROL-XL) 25 MG 24 hr tablet Take 1 tablet (25 mg total) by mouth once daily. 90 tablet 2    pen needle, diabetic (BD ULTRA-FINE SANDEEP PEN NEEDLES) 32 gauge x 5/32" Ndle Take 1 each by mouth 4 (four) times daily. 120 each 2    QUEtiapine (SEROQUEL) 50 MG tablet Take 1 tablet (50 mg total) by mouth every evening. 30 tablet 3    TRUE METRIX GLUCOSE TEST STRIP Strp TEST FOUR TIMES DAILY BEFORE MEALS  AND EVERY NIGHT 400 strip 2     No current facility-administered medications for this visit.        Allergies:  Review of patient's allergies indicates:   Allergen Reactions    Tramadol " Other (See Comments)     Interaction-induced delirium with s/s of estrellita.    Ace inhibitors      Other reaction(s): cough      Fluorescein Itching     Other reaction(s): Itching    Iodine      Other reaction(s): Itching    Pravastatin Other (See Comments)     Other reaction(s): mouth tingling/numbness    Simvastatin      Other reaction(s): foot swelling         ROS:  A 12 point review of system was negative aside from pertinent positives and negatives as outlined above.    Physical Exam  Vitals:    09/03/19 1303   BP: (!) 175/71   Pulse: 70       General: well nourished, well developed  Eyes: no scleral icterus   Nose: nasal turbinates intact  Neck: supple, ROM intact  Skin: no rash or ecchymosis  Joints: no swelling or erythema  Cardiac: regular rate and rhythm  Lungs: clear to auscultation bilaterally    Neuro:  Mental status: AAO x 3, no dysarthria, no aphasia, communicating appropriately  CN: PERRL, EOMI, VFF, V1-V3 sensation intact, no facial asymmetry, hearing grossly intact, tongue midline  Motor:   Deltoid R 5/5 L 4+/5 possible pain limited  Biceps R 5/5 L 5/5   Triceps R 5/5 L 5/5   Wrist flexion R 5/5 L 5/5   Wrist extension R 5/5 L 5/5   Finger abduction  R 5/5 L 5/5   Thumb abduction R 5/5 L 5/5      Hip flexion R 5/5 L 5/5   Knee extension R 5/5 L 5/5   Knee flexion R 5/5 L 5/5   Foot dorsiflexion R 5/5 L 5/5   Foot plantar flexion R 5/5 L 5/5   Foot inversion R 5/5 L 5/5   Foot eversion R 5/5 L 5/5         Normal bulk and tone     Reflexes: absent ankle jerks bilaterally, otherwise 1+, toes equivocal bilaterally  Sensory: decreased to pinprick up to knees bilaterally, intact to position sense, decreased vibration sense at toes, intact to light touch and pinprick in upper extremities  Coordination: no dysmetria on FTN  Gait: steady with walker, mildly unsteady without assistance    Prior Imaging/Labs:  Reviewed      Assessment and Plan:    78 y.o. female with generalized polyneuropathy, bilateral  carpal tunnel syndrome and cervical stenosis. She feels gabapentin is helping her with paresthesias in hands and feet. She states if paresthesias in hands worsen, she will notify me and we will consider a referral to ortho for carpal tunnel syndrome at that time. As patient has had frequent falls this month, we discussed a referral to neurosurgery for cervical stenosis and patient is in agreement with this plan. She was advised to seek medical attention immediately if she develops worsening weakness, gait, or new bowel/bladder problems.    1. Cervical stenosis of spinal canal  - Ambulatory Referral to Neurosurgery    2. Neuropathy  Likely due to diabetes  - gabapentin (NEURONTIN) 100 MG capsule; Take 1 capsule (100 mg total) by mouth 3 (three) times daily.  Dispense: 90 capsule; Refill: 5      She was made aware of side effects of gabapentin and was advised to notify me for worsening symptoms.      Patient was advised to notify me for worsening symptoms. I will see patient back in 2 months or sooner if necessary.       Malou Meyerssayra [facility] Neurology  [address] [facility] [address]  [place] [id number] [phone]

## 2019-09-04 ENCOUNTER — PATIENT OUTREACH (OUTPATIENT)
Dept: ADMINISTRATIVE | Facility: OTHER | Age: 79
End: 2019-09-04

## 2019-09-04 ENCOUNTER — OFFICE VISIT (OUTPATIENT)
Dept: FAMILY MEDICINE | Facility: CLINIC | Age: 79
End: 2019-09-04
Payer: MEDICARE

## 2019-09-04 VITALS
HEART RATE: 86 BPM | OXYGEN SATURATION: 87 % | TEMPERATURE: 99 F | DIASTOLIC BLOOD PRESSURE: 70 MMHG | RESPIRATION RATE: 16 BRPM | SYSTOLIC BLOOD PRESSURE: 143 MMHG | WEIGHT: 207.88 LBS | HEIGHT: 57 IN | BODY MASS INDEX: 44.85 KG/M2

## 2019-09-04 DIAGNOSIS — R80.9 TYPE 2 DIABETES MELLITUS WITH MICROALBUMINURIA, WITH LONG-TERM CURRENT USE OF INSULIN: ICD-10-CM

## 2019-09-04 DIAGNOSIS — Z79.4 TYPE 2 DIABETES MELLITUS WITH MICROALBUMINURIA, WITH LONG-TERM CURRENT USE OF INSULIN: ICD-10-CM

## 2019-09-04 DIAGNOSIS — E66.01 MORBID OBESITY: ICD-10-CM

## 2019-09-04 DIAGNOSIS — E11.29 TYPE 2 DIABETES MELLITUS WITH MICROALBUMINURIA, WITH LONG-TERM CURRENT USE OF INSULIN: ICD-10-CM

## 2019-09-04 DIAGNOSIS — R05.9 COUGH: Primary | ICD-10-CM

## 2019-09-04 PROCEDURE — 3077F PR MOST RECENT SYSTOLIC BLOOD PRESSURE >= 140 MM HG: ICD-10-PCS | Mod: HCNC,CPTII,S$GLB, | Performed by: INTERNAL MEDICINE

## 2019-09-04 PROCEDURE — 99999 PR PBB SHADOW E&M-EST. PATIENT-LVL III: ICD-10-PCS | Mod: PBBFAC,HCNC,, | Performed by: INTERNAL MEDICINE

## 2019-09-04 PROCEDURE — 1101F PR PT FALLS ASSESS DOC 0-1 FALLS W/OUT INJ PAST YR: ICD-10-PCS | Mod: HCNC,CPTII,S$GLB, | Performed by: INTERNAL MEDICINE

## 2019-09-04 PROCEDURE — 3077F SYST BP >= 140 MM HG: CPT | Mod: HCNC,CPTII,S$GLB, | Performed by: INTERNAL MEDICINE

## 2019-09-04 PROCEDURE — 3078F PR MOST RECENT DIASTOLIC BLOOD PRESSURE < 80 MM HG: ICD-10-PCS | Mod: HCNC,CPTII,S$GLB, | Performed by: INTERNAL MEDICINE

## 2019-09-04 PROCEDURE — 99214 OFFICE O/P EST MOD 30 MIN: CPT | Mod: HCNC,S$GLB,, | Performed by: INTERNAL MEDICINE

## 2019-09-04 PROCEDURE — 3078F DIAST BP <80 MM HG: CPT | Mod: HCNC,CPTII,S$GLB, | Performed by: INTERNAL MEDICINE

## 2019-09-04 PROCEDURE — 99214 PR OFFICE/OUTPT VISIT, EST, LEVL IV, 30-39 MIN: ICD-10-PCS | Mod: HCNC,S$GLB,, | Performed by: INTERNAL MEDICINE

## 2019-09-04 PROCEDURE — 99999 PR PBB SHADOW E&M-EST. PATIENT-LVL III: CPT | Mod: PBBFAC,HCNC,, | Performed by: INTERNAL MEDICINE

## 2019-09-04 PROCEDURE — 1101F PT FALLS ASSESS-DOCD LE1/YR: CPT | Mod: HCNC,CPTII,S$GLB, | Performed by: INTERNAL MEDICINE

## 2019-09-04 NOTE — PROCEDURES
Procedures     EMG/NCS was performed. Please see scanned EMG report for further details.    Recommend MRI C spine.    Follow up in 2 weeks or sooner if necessary to further review MRI and EMG results.    Patient was advised to notify me for worsening symptoms.    Malou Stephens DO

## 2019-09-04 NOTE — PROGRESS NOTES
"Subjective:       Patient ID: Kaci Whalen is a 78 y.o. female.    Chief Complaint: Nasal Congestion (2 weeks ); Cough (coughing with chest discomfort / 2 weeks ); and Establish Care    Cough and nasal congestion    HPI: 79 y/o w/ morbid obesity HTN CHF (preserved EF) bipolar disorder presents for follow up. Daughter reports two weeks of night time coughing white sputum no fevers/ has been taking OTC robitussin without relief. No LE swelling sleep had improved with addition of depakote. She is undergoing evaluation for severe cervical spinal stenosis C4/C5    Review of Systems   Constitutional: Negative for activity change, appetite change, fatigue, fever and unexpected weight change.   HENT: Negative for ear pain, rhinorrhea and sore throat.    Eyes: Negative for discharge and visual disturbance.   Respiratory: Positive for cough. Negative for chest tightness, shortness of breath and wheezing.    Cardiovascular: Negative for chest pain, palpitations and leg swelling.   Gastrointestinal: Negative for abdominal pain, constipation and diarrhea.   Endocrine: Negative for cold intolerance and heat intolerance.   Genitourinary: Negative for dysuria and hematuria.   Musculoskeletal: Positive for back pain. Negative for joint swelling and neck stiffness.   Skin: Negative for rash.   Neurological: Negative for dizziness, syncope, weakness and headaches.   Psychiatric/Behavioral: Negative for suicidal ideas.       Objective:     Vitals:    09/04/19 1041   BP: (!) 143/70   BP Location: Right arm   Patient Position: Sitting   Pulse: 86   Resp: 16   Temp: 98.6 °F (37 °C)   TempSrc: Oral   SpO2: (!) 87%   Weight: 94.3 kg (207 lb 14.3 oz)   Height: 4' 9" (1.448 m)          Physical Exam   Constitutional: She is oriented to person, place, and time. She appears well-developed and well-nourished.   HENT:   Head: Normocephalic and atraumatic.   Eyes: Pupils are equal, round, and reactive to light. Conjunctivae are normal.   Neck: " Normal range of motion.   Cardiovascular: Normal rate and regular rhythm. Exam reveals no gallop and no friction rub.   No murmur heard.  No LE edema   Pulmonary/Chest: Effort normal and breath sounds normal. She has no wheezes. She has no rales.   Good air movement to bases w/o coughing   Abdominal: Soft. Bowel sounds are normal. There is no tenderness. There is no rebound and no guarding.   Obese abdomen, soft   Musculoskeletal: Normal range of motion. She exhibits no edema or tenderness.   Neurological: She is alert and oriented to person, place, and time. No cranial nerve deficit.   Skin: Skin is warm and dry.   Psychiatric: She has a normal mood and affect.     xr without significant efussion or consolidation  Assessment and Plan   1. Cough  benzonate nasal steroid and saline gargles  - X-Ray Chest PA And Lateral; Future    2. Type 2 diabetes mellitus with microalbuminuria, with long-term current use of insulin  Improved continue insulin    3. Morbid obesity  The patient is asked to make an attempt to improve diet and exercise patterns to aid in medical management of this problem.

## 2019-09-05 ENCOUNTER — OFFICE VISIT (OUTPATIENT)
Dept: NEUROSURGERY | Facility: CLINIC | Age: 79
End: 2019-09-05
Payer: MEDICARE

## 2019-09-05 VITALS
DIASTOLIC BLOOD PRESSURE: 58 MMHG | BODY MASS INDEX: 44.81 KG/M2 | HEIGHT: 57 IN | HEART RATE: 78 BPM | WEIGHT: 207.69 LBS | RESPIRATION RATE: 18 BRPM | SYSTOLIC BLOOD PRESSURE: 123 MMHG

## 2019-09-05 DIAGNOSIS — M48.02 CERVICAL STENOSIS OF SPINAL CANAL: ICD-10-CM

## 2019-09-05 DIAGNOSIS — R32 URINARY INCONTINENCE, UNSPECIFIED TYPE: ICD-10-CM

## 2019-09-05 DIAGNOSIS — R15.9 INCONTINENCE OF FECES, UNSPECIFIED FECAL INCONTINENCE TYPE: ICD-10-CM

## 2019-09-05 DIAGNOSIS — R29.6 FALLS FREQUENTLY: Primary | ICD-10-CM

## 2019-09-05 PROCEDURE — 1101F PT FALLS ASSESS-DOCD LE1/YR: CPT | Mod: HCNC,CPTII,S$GLB, | Performed by: PHYSICIAN ASSISTANT

## 2019-09-05 PROCEDURE — 3074F PR MOST RECENT SYSTOLIC BLOOD PRESSURE < 130 MM HG: ICD-10-PCS | Mod: HCNC,CPTII,S$GLB, | Performed by: PHYSICIAN ASSISTANT

## 2019-09-05 PROCEDURE — 99999 PR PBB SHADOW E&M-EST. PATIENT-LVL III: ICD-10-PCS | Mod: PBBFAC,HCNC,, | Performed by: PHYSICIAN ASSISTANT

## 2019-09-05 PROCEDURE — 1101F PR PT FALLS ASSESS DOC 0-1 FALLS W/OUT INJ PAST YR: ICD-10-PCS | Mod: HCNC,CPTII,S$GLB, | Performed by: PHYSICIAN ASSISTANT

## 2019-09-05 PROCEDURE — 99205 OFFICE O/P NEW HI 60 MIN: CPT | Mod: HCNC,S$GLB,, | Performed by: PHYSICIAN ASSISTANT

## 2019-09-05 PROCEDURE — 99205 PR OFFICE/OUTPT VISIT, NEW, LEVL V, 60-74 MIN: ICD-10-PCS | Mod: HCNC,S$GLB,, | Performed by: PHYSICIAN ASSISTANT

## 2019-09-05 PROCEDURE — 3074F SYST BP LT 130 MM HG: CPT | Mod: HCNC,CPTII,S$GLB, | Performed by: PHYSICIAN ASSISTANT

## 2019-09-05 PROCEDURE — 3078F DIAST BP <80 MM HG: CPT | Mod: HCNC,CPTII,S$GLB, | Performed by: PHYSICIAN ASSISTANT

## 2019-09-05 PROCEDURE — 99999 PR PBB SHADOW E&M-EST. PATIENT-LVL III: CPT | Mod: PBBFAC,HCNC,, | Performed by: PHYSICIAN ASSISTANT

## 2019-09-05 PROCEDURE — 3078F PR MOST RECENT DIASTOLIC BLOOD PRESSURE < 80 MM HG: ICD-10-PCS | Mod: HCNC,CPTII,S$GLB, | Performed by: PHYSICIAN ASSISTANT

## 2019-09-05 NOTE — LETTER
September 5, 2019      Malou Stephens, DO  [address] [facility]  [address]  [place] [address]           [facility] [place] - Neurosurgery  [address] [facility]  [place] [address]  Phone: [phone]  Fax: [phone]          Patient: Kaci Whalen   MR Number: [id number]   YOB: 1940   Date of Visit: 9/5/2019       Dear Dr. Malou Stephens:    Thank you for referring Kaci Whalen to me for evaluation. Attached you will find relevant portions of my assessment and plan of care.    If you have questions, please do not hesitate to call me. I look forward to following Kaci Whalen along with you.    Sincerely,    Nettie Raymond PA-C    Enclosure  CC:  No Recipients    If you would like to receive this communication electronically, please contact [email or web address] or [phone] to request more information on [employer] access.    For providers and/or their staff who would like to refer a patient to [facility], please contact us through our one-stop-shop provider referral line, [facility], at [phone].    If you feel you have received this communication in error or would no longer like to receive these types of communications, please e-mail [email or web address]

## 2019-09-05 NOTE — PATIENT INSTRUCTIONS
-please make an appointment with your cardiologist to see if you would be cleared for a 2-level cervical fusion  -Follow-up in one month to re-evaluate the need for surgery   -Return to clinic sooner for any new or worsening symptoms

## 2019-09-05 NOTE — PROGRESS NOTES
[facility]  Neurosurgery    SUBJECTIVE:     History of Present Illness:  Kaci Whalen is a 78 y.o. female with complex medical history including CHF, DM, and bipolar disorder who presents with s/s concerning for cervical myelopathy. She was referred by Dr. Resendez after cervical MRI revealed severe stenosis.     Symptoms began >15 years ago, but seem to have worsened over the last 6-12 weeks. Previously she fell 3-4x/yr but has now fallen 20-30x just since January.     Location: pain is present in her low back and left arm/shoulder  Pain level: 7/10 today    Numbness: left sided, chronic, pcp suspects multiple undiagnosed TIA's over the years  Weakness: +on left side   Endorses frequent and worsening urge urinary incontinence. Also notes more recent onset of bowel incontinence, also described as urgency on occasion but also occurs when she thinks she is simply passing gas.   Endorses dropping items from hands  + gait disturbance   + falls, see above    Treatments tried:  -PT: last month for gait disturbance and upper extremity strengthening  -Gabapentin: recently began taking and feels it is helping  -Rx pain medications: none  -Spine surgery: none    Blood thinners: none      (Not in a hospital admission)    Review of patient's allergies indicates:   Allergen Reactions    Tramadol Other (See Comments)     Interaction-induced delirium with s/s of estrellita.    Ace inhibitors      Other reaction(s): cough      Fluorescein Itching     Other reaction(s): Itching    Iodine      Other reaction(s): Itching    Pravastatin Other (See Comments)     Other reaction(s): mouth tingling/numbness    Simvastatin      Other reaction(s): foot swelling         Past Medical History:   Diagnosis Date    Abdominal pain     Anxiety     Atherosclerosis of aortic arch     noted on CXR 7/1/2015    Atrial fibrillation 05/2016    CHADS 4, on Xarelto    Benign hypertension     Bipolar disorder, most recent episode manic  8/29/2019    Chronic constipation     Chronic diarrhea     Chronic edema     Depression     Dercum disease     Multiple painful lipomas    Diabetic retinopathy     Dysphagia     Gas pain     Glaucoma of both eyes     Hx of psychiatric care     previously amitriptyline, now jut on Trazodone 100mg QHS PRN insomnia    Hyperlipidemia with target LDL less than 100     Unable to tolerate statins    Immature cataract     Tasia     no symptoms prior to this presentation    Morbid obesity     Nausea & vomiting     Neuropathy     Polyneuropathy     Primary osteoarthritis of both knees     Proteinuria     Psychiatric problem     history of depression    Pulmonary hypertension mixed group 2 and 3 1/18/2017    Sleep apnea     Therapy     no history of therapy    Type II or unspecified type diabetes mellitus with neurological manifestations, uncontrolled(250.62)     Unspecified vitamin D deficiency      Past Surgical History:   Procedure Laterality Date     tenotomy      flexors 2,3 L toes    CATARACT EXTRACTION W/  INTRAOCULAR LENS IMPLANT Bilateral     COLONOSCOPY N/A 5/4/2017    [facility] (2ND [facility])    diabetic retinopathy      right    ESOPHAGOGASTRODUODENOSCOPY (EGD) N/A 5/4/2017    [facility] (2ND FLR)    HYSTERECTOMY      knee surgery x2      Left ankle and leg surgery secondary to a fall      left arm fracture      Left cataract      ORIF, WRIST Left 12/28/2012    Performed by Rozina Buck [facility] at [facility] OR 1ST FLR    PARTIAL HYSTERECTOMY      Secondary to bleeding    TOE FUSION      2,3 R     Family History   Problem Relation Age of Onset    No Known Problems Daughter     No Known Problems Father     Liver cancer Mother     Bone cancer Daughter     No Known Problems Sister     No Known Problems Brother     No Known Problems Maternal Aunt     No Known Problems Maternal Uncle     No Known Problems  Paternal Aunt     No Known Problems Paternal Uncle     No Known Problems Maternal Grandmother     No Known Problems Maternal Grandfather     No Known Problems Paternal Grandmother     No Known Problems Paternal Grandfather     Amblyopia Neg Hx     Blindness Neg Hx     Cancer Neg Hx     Cataracts Neg Hx     Diabetes Neg Hx     Glaucoma Neg Hx     Hypertension Neg Hx     Macular degeneration Neg Hx     Retinal detachment Neg Hx     Strabismus Neg Hx     Stroke Neg Hx     Thyroid disease Neg Hx     Celiac disease Neg Hx     Cirrhosis Neg Hx     Colon cancer Neg Hx     Colon polyps Neg Hx     Crohn's disease Neg Hx     Cystic fibrosis Neg Hx     Esophageal cancer Neg Hx     Hemochromatosis Neg Hx     Inflammatory bowel disease Neg Hx     Irritable bowel syndrome Neg Hx     Liver disease Neg Hx     Rectal cancer Neg Hx     Stomach cancer Neg Hx     Ulcerative colitis Neg Hx     Montez's disease Neg Hx     Alcohol abuse Neg Hx     Bipolar disorder Neg Hx     Dementia Neg Hx     Depression Neg Hx     Drug abuse Neg Hx     Suicide Neg Hx     Schizophrenia Neg Hx      Social History     Tobacco Use    Smoking status: Never Smoker    Smokeless tobacco: Never Used   Substance Use Topics    Alcohol use: No    Drug use: No        Review of Systems:  Constitutional: no fever or chills  Eyes: no visual changes  ENT: no nasal congestion or sore throat  Respiratory: no cough or shortness of breath  Cardiovascular: no chest pain or palpitations  Gastrointestinal: no nausea or vomiting, tolerating diet  Genitourinary: no hematuria or dysuria  Integument/Breast: no rash or pruritis  Hematologic/Lymphatic: no easy bruising or lymphadenopathy  Musculoskeletal: no arthralgias or myalgias  Neurological: no seizures or tremors  Behavioral/Psych: no auditory or visual hallucinations  Endocrine: no heat or cold intolerance    OBJECTIVE:     Vital Signs (Most Recent):  Pulse: 78 (09/05/19 1304)  Resp:  18 (09/05/19 1304)  BP: (!) 123/58 (09/05/19 1304)    Physical Exam:  General: well developed, well nourished, no distress  Head: normocephalic, atraumatic  Neurologic: Alert and oriented. Thought content appropriate  GCS: Motor: 6/Verbal: 5/Eyes: 4 GCS Total: 15  Language: No aphasia  Speech: No dysarthria  Cranial nerves: face symmetric, tongue midline, CN II-XII grossly intact.   Eyes: pupils equal, round, reactive to light with accommodation, EOMI.   Pulmonary: normal respirations, not labored, no accessory muscles used  Sensory: intact to light touch throughout  Motor Strength: Moves all extremities spontaneously with good tone.  Full strength upper and lower extremities. No abnormal movements seen.     Strength  Deltoids Triceps Biceps Wrist Extension Wrist Flexion Hand    Upper: R 5/5 5/5 5/5 5/5 5/5 5/5    L 5/5 5/5 5/5 5/5 5/5 5/5     Iliopsoas Quadriceps Knee  Flexion Tibialis  anterior Gastro- cnemius EHL   Lower: R 5/5 5/5 5/5 5/5 5/5 5/5    L 5/5 5/5 5/5 5/5 5/5 5/5   Left shoulder ROM is significantly decreased. She cannot raise her arm above 90 degrees.       DTR's - 1 + and symmetric patellar; absent achilles   Garcia: absent  Clonus: absent  Skin: warm, dry and intact, no rashes  Gait: normal  Tandem Gait: unable to assess 2/2 to fall risk  Cervical ROM: full     Straight leg raise: unable to assess 2/2 to fall risk getting on the exam table   MARTHA: unable to assess 2/2 to fall risk getting on the exam table         Diagnostic Results:  I have personally reviewed imaging and agree with the findings.     Cervical MRI 8/30/19  C4/5: There is a posteriorly oriented disc osteophyte complex resulting in severe central canal narrowing and severe narrowing of bilateral neural foramina.    C5/6: There is a circumferential disc osteophyte complex resulting in moderate bilateral neural foraminal narrowing and moderate central canal narrowing.    ASSESSMENT/PLAN:     Kaci Whalen is a 78 y.o. female  who presents with s/s concerning for cervical myelopathy. Cervical MRI shows severe C4-5 stenosis and moderate C5-6 stenosis. It is possible that the stenosis is causing her gait disturbance, b/b dysfunction, and decreased hand coordination. However, she has full strength and sensation on exam with no hoffmans or clonus. I will have the patient rtc to discuss the need for surgery with Dr. Conn in one month. However, given her age and significant comorbidities, I have asked her to schedule an appt with her cardiologist prior to returning to clinic in order to assess whether she would be cleared for surgery, if necessary. Pt's daughter requested at least one month to process the information and schedule cardiology/pcp appts.       Please feel free to call with any further questions    Disclaimer: This note was dictated by speech recognition. Minor errors in transcription may be present.  Please call with any questions.      Nettie Raymond PA-C  [facility]  Department of Neurosurgery  [phone]

## 2019-09-09 ENCOUNTER — HOSPITAL ENCOUNTER (INPATIENT)
Facility: HOSPITAL | Age: 79
LOS: 3 days | Discharge: HOME HEALTH | DRG: 291 | End: 2019-09-12
Attending: EMERGENCY MEDICINE | Admitting: INTERNAL MEDICINE
Payer: MEDICARE

## 2019-09-09 ENCOUNTER — PATIENT MESSAGE (OUTPATIENT)
Dept: FAMILY MEDICINE | Facility: CLINIC | Age: 79
End: 2019-09-09

## 2019-09-09 DIAGNOSIS — R06.02 SHORTNESS OF BREATH: ICD-10-CM

## 2019-09-09 DIAGNOSIS — I50.9 ACUTE ON CHRONIC CONGESTIVE HEART FAILURE, UNSPECIFIED HEART FAILURE TYPE: Primary | ICD-10-CM

## 2019-09-09 DIAGNOSIS — I50.9 CHF (CONGESTIVE HEART FAILURE): ICD-10-CM

## 2019-09-09 DIAGNOSIS — E87.1 HYPONATREMIA: ICD-10-CM

## 2019-09-09 PROBLEM — J96.21 ACUTE ON CHRONIC RESPIRATORY FAILURE WITH HYPOXIA: Status: ACTIVE | Noted: 2019-09-09

## 2019-09-09 PROBLEM — E66.01 OBESITY, CLASS III, BMI 40-49.9 (MORBID OBESITY): Status: ACTIVE | Noted: 2019-09-09

## 2019-09-09 PROBLEM — E11.22 CKD STAGE 3 DUE TO TYPE 2 DIABETES MELLITUS: Status: ACTIVE | Noted: 2019-09-09

## 2019-09-09 PROBLEM — N18.30 CKD STAGE 3 DUE TO TYPE 2 DIABETES MELLITUS: Status: ACTIVE | Noted: 2019-09-09

## 2019-09-09 LAB
ALBUMIN SERPL BCP-MCNC: 3.4 G/DL (ref 3.5–5.2)
ALP SERPL-CCNC: 62 U/L (ref 55–135)
ALT SERPL W/O P-5'-P-CCNC: 12 U/L (ref 10–44)
ANION GAP SERPL CALC-SCNC: 6 MMOL/L (ref 8–16)
AST SERPL-CCNC: 18 U/L (ref 10–40)
BASOPHILS # BLD AUTO: 0.02 K/UL (ref 0–0.2)
BASOPHILS NFR BLD: 0.2 % (ref 0–1.9)
BILIRUB SERPL-MCNC: 0.3 MG/DL (ref 0.1–1)
BNP SERPL-MCNC: 1004 PG/ML (ref 0–99)
BUN SERPL-MCNC: 26 MG/DL (ref 8–23)
CALCIUM SERPL-MCNC: 8.9 MG/DL (ref 8.7–10.5)
CHLORIDE SERPL-SCNC: 83 MMOL/L (ref 95–110)
CO2 SERPL-SCNC: 35 MMOL/L (ref 23–29)
CREAT SERPL-MCNC: 1.2 MG/DL (ref 0.5–1.4)
DIFFERENTIAL METHOD: ABNORMAL
EOSINOPHIL # BLD AUTO: 0 K/UL (ref 0–0.5)
EOSINOPHIL NFR BLD: 0.4 % (ref 0–8)
ERYTHROCYTE [DISTWIDTH] IN BLOOD BY AUTOMATED COUNT: 14.4 % (ref 11.5–14.5)
EST. GFR  (AFRICAN AMERICAN): 51 ML/MIN/1.73 M^2
EST. GFR  (NON AFRICAN AMERICAN): 44 ML/MIN/1.73 M^2
GLUCOSE SERPL-MCNC: 125 MG/DL (ref 70–110)
GLUCOSE SERPL-MCNC: 165 MG/DL (ref 70–110)
HCT VFR BLD AUTO: 31.7 % (ref 37–48.5)
HGB BLD-MCNC: 9.9 G/DL (ref 12–16)
INR PPP: 1 (ref 0.8–1.2)
LYMPHOCYTES # BLD AUTO: 0.6 K/UL (ref 1–4.8)
LYMPHOCYTES NFR BLD: 6.9 % (ref 18–48)
MCH RBC QN AUTO: 29.2 PG (ref 27–31)
MCHC RBC AUTO-ENTMCNC: 31.2 G/DL (ref 32–36)
MCV RBC AUTO: 94 FL (ref 82–98)
MONOCYTES # BLD AUTO: 0.6 K/UL (ref 0.3–1)
MONOCYTES NFR BLD: 7.4 % (ref 4–15)
NEUTROPHILS # BLD AUTO: 7 K/UL (ref 1.8–7.7)
NEUTROPHILS NFR BLD: 85.8 % (ref 38–73)
PLATELET # BLD AUTO: 345 K/UL (ref 150–350)
PMV BLD AUTO: 9 FL (ref 9.2–12.9)
POCT GLUCOSE: 103 MG/DL (ref 70–110)
POCT GLUCOSE: 112 MG/DL (ref 70–110)
POCT GLUCOSE: 125 MG/DL (ref 70–110)
POCT GLUCOSE: 99 MG/DL (ref 70–110)
POTASSIUM SERPL-SCNC: 5.1 MMOL/L (ref 3.5–5.1)
PROT SERPL-MCNC: 7 G/DL (ref 6–8.4)
PROTHROMBIN TIME: 10.7 SEC (ref 9–12.5)
RBC # BLD AUTO: 3.39 M/UL (ref 4–5.4)
SODIUM SERPL-SCNC: 124 MMOL/L (ref 136–145)
TROPONIN I SERPL DL<=0.01 NG/ML-MCNC: <0.006 NG/ML (ref 0–0.03)
VALPROATE SERPL-MCNC: 77.7 UG/ML (ref 50–100)
WBC # BLD AUTO: 8.22 K/UL (ref 3.9–12.7)

## 2019-09-09 PROCEDURE — 99285 EMERGENCY DEPT VISIT HI MDM: CPT | Mod: 25,HCNC

## 2019-09-09 PROCEDURE — S5571 INSULIN DISPOS PEN 3 ML: HCPCS | Mod: HCNC | Performed by: INTERNAL MEDICINE

## 2019-09-09 PROCEDURE — 93010 EKG 12-LEAD: ICD-10-PCS | Mod: HCNC,,, | Performed by: INTERNAL MEDICINE

## 2019-09-09 PROCEDURE — 80164 ASSAY DIPROPYLACETIC ACD TOT: CPT | Mod: HCNC

## 2019-09-09 PROCEDURE — 84484 ASSAY OF TROPONIN QUANT: CPT | Mod: HCNC

## 2019-09-09 PROCEDURE — 83880 ASSAY OF NATRIURETIC PEPTIDE: CPT | Mod: HCNC

## 2019-09-09 PROCEDURE — 63600175 PHARM REV CODE 636 W HCPCS: Mod: HCNC | Performed by: EMERGENCY MEDICINE

## 2019-09-09 PROCEDURE — 93005 ELECTROCARDIOGRAM TRACING: CPT | Mod: HCNC

## 2019-09-09 PROCEDURE — 85025 COMPLETE CBC W/AUTO DIFF WBC: CPT | Mod: HCNC

## 2019-09-09 PROCEDURE — 80053 COMPREHEN METABOLIC PANEL: CPT | Mod: HCNC

## 2019-09-09 PROCEDURE — 63600175 PHARM REV CODE 636 W HCPCS: Mod: HCNC | Performed by: INTERNAL MEDICINE

## 2019-09-09 PROCEDURE — 25000003 PHARM REV CODE 250: Mod: HCNC | Performed by: EMERGENCY MEDICINE

## 2019-09-09 PROCEDURE — 85610 PROTHROMBIN TIME: CPT | Mod: HCNC

## 2019-09-09 PROCEDURE — 94761 N-INVAS EAR/PLS OXIMETRY MLT: CPT | Mod: HCNC

## 2019-09-09 PROCEDURE — 21400001 HC TELEMETRY ROOM: Mod: HCNC

## 2019-09-09 PROCEDURE — 25000242 PHARM REV CODE 250 ALT 637 W/ HCPCS: Mod: HCNC | Performed by: EMERGENCY MEDICINE

## 2019-09-09 PROCEDURE — 27000221 HC OXYGEN, UP TO 24 HOURS: Mod: HCNC

## 2019-09-09 PROCEDURE — 94640 AIRWAY INHALATION TREATMENT: CPT | Mod: HCNC

## 2019-09-09 PROCEDURE — 93010 ELECTROCARDIOGRAM REPORT: CPT | Mod: HCNC,,, | Performed by: INTERNAL MEDICINE

## 2019-09-09 RX ORDER — IBUPROFEN 200 MG
24 TABLET ORAL
Status: DISCONTINUED | OUTPATIENT
Start: 2019-09-09 | End: 2019-09-12 | Stop reason: HOSPADM

## 2019-09-09 RX ORDER — SODIUM CHLORIDE 0.9 % (FLUSH) 0.9 %
10 SYRINGE (ML) INJECTION
Status: DISCONTINUED | OUTPATIENT
Start: 2019-09-09 | End: 2019-09-12 | Stop reason: HOSPADM

## 2019-09-09 RX ORDER — INSULIN ASPART 100 [IU]/ML
1-10 INJECTION, SOLUTION INTRAVENOUS; SUBCUTANEOUS
Status: DISCONTINUED | OUTPATIENT
Start: 2019-09-09 | End: 2019-09-12 | Stop reason: HOSPADM

## 2019-09-09 RX ORDER — METOPROLOL SUCCINATE 25 MG/1
25 TABLET, EXTENDED RELEASE ORAL DAILY
Status: DISCONTINUED | OUTPATIENT
Start: 2019-09-10 | End: 2019-09-12 | Stop reason: HOSPADM

## 2019-09-09 RX ORDER — DIVALPROEX SODIUM 250 MG/1
500 TABLET, DELAYED RELEASE ORAL EVERY 12 HOURS
Status: DISCONTINUED | OUTPATIENT
Start: 2019-09-09 | End: 2019-09-12 | Stop reason: HOSPADM

## 2019-09-09 RX ORDER — QUETIAPINE FUMARATE 25 MG/1
50 TABLET, FILM COATED ORAL NIGHTLY
Status: DISCONTINUED | OUTPATIENT
Start: 2019-09-09 | End: 2019-09-12 | Stop reason: HOSPADM

## 2019-09-09 RX ORDER — GLUCAGON 1 MG
1 KIT INJECTION
Status: DISCONTINUED | OUTPATIENT
Start: 2019-09-09 | End: 2019-09-12 | Stop reason: HOSPADM

## 2019-09-09 RX ORDER — IBUPROFEN 200 MG
16 TABLET ORAL
Status: DISCONTINUED | OUTPATIENT
Start: 2019-09-09 | End: 2019-09-12 | Stop reason: HOSPADM

## 2019-09-09 RX ORDER — ONDANSETRON 2 MG/ML
4 INJECTION INTRAMUSCULAR; INTRAVENOUS EVERY 8 HOURS PRN
Status: DISCONTINUED | OUTPATIENT
Start: 2019-09-09 | End: 2019-09-12 | Stop reason: HOSPADM

## 2019-09-09 RX ORDER — ACETAMINOPHEN 325 MG/1
650 TABLET ORAL EVERY 4 HOURS PRN
Status: DISCONTINUED | OUTPATIENT
Start: 2019-09-09 | End: 2019-09-12 | Stop reason: HOSPADM

## 2019-09-09 RX ORDER — PANTOPRAZOLE SODIUM 40 MG/1
40 TABLET, DELAYED RELEASE ORAL DAILY
Status: DISCONTINUED | OUTPATIENT
Start: 2019-09-10 | End: 2019-09-12 | Stop reason: HOSPADM

## 2019-09-09 RX ORDER — INSULIN ASPART 100 [IU]/ML
0-5 INJECTION, SOLUTION INTRAVENOUS; SUBCUTANEOUS
Status: DISCONTINUED | OUTPATIENT
Start: 2019-09-09 | End: 2019-09-09

## 2019-09-09 RX ORDER — FUROSEMIDE 10 MG/ML
80 INJECTION INTRAMUSCULAR; INTRAVENOUS
Status: COMPLETED | OUTPATIENT
Start: 2019-09-09 | End: 2019-09-09

## 2019-09-09 RX ORDER — AMLODIPINE BESYLATE 5 MG/1
5 TABLET ORAL DAILY
Status: DISCONTINUED | OUTPATIENT
Start: 2019-09-10 | End: 2019-09-12 | Stop reason: HOSPADM

## 2019-09-09 RX ORDER — FUROSEMIDE 10 MG/ML
60 INJECTION INTRAMUSCULAR; INTRAVENOUS DAILY
Status: COMPLETED | OUTPATIENT
Start: 2019-09-10 | End: 2019-09-10

## 2019-09-09 RX ORDER — CITALOPRAM 20 MG/1
20 TABLET, FILM COATED ORAL DAILY
Status: DISCONTINUED | OUTPATIENT
Start: 2019-09-10 | End: 2019-09-12 | Stop reason: HOSPADM

## 2019-09-09 RX ORDER — IPRATROPIUM BROMIDE AND ALBUTEROL SULFATE 2.5; .5 MG/3ML; MG/3ML
3 SOLUTION RESPIRATORY (INHALATION)
Status: COMPLETED | OUTPATIENT
Start: 2019-09-09 | End: 2019-09-09

## 2019-09-09 RX ORDER — LOSARTAN POTASSIUM 25 MG/1
100 TABLET ORAL DAILY
Status: DISCONTINUED | OUTPATIENT
Start: 2019-09-10 | End: 2019-09-12 | Stop reason: HOSPADM

## 2019-09-09 RX ADMIN — IPRATROPIUM BROMIDE AND ALBUTEROL SULFATE 3 ML: .5; 3 SOLUTION RESPIRATORY (INHALATION) at 01:09

## 2019-09-09 RX ADMIN — INSULIN DETEMIR 10 UNITS: 100 INJECTION, SOLUTION SUBCUTANEOUS at 09:09

## 2019-09-09 RX ADMIN — FUROSEMIDE 80 MG: 10 INJECTION, SOLUTION INTRAVENOUS at 04:09

## 2019-09-09 RX ADMIN — QUETIAPINE FUMARATE 50 MG: 25 TABLET ORAL at 09:09

## 2019-09-09 RX ADMIN — DIVALPROEX SODIUM 500 MG: 250 TABLET, DELAYED RELEASE ORAL at 09:09

## 2019-09-09 NOTE — ASSESSMENT & PLAN NOTE
Patient has pulmonary edema despite compliance with furosemide three times a week  This is likely due to high sodium diet  Change diet to cardiac (and diabetic) and initiate IV diuresis   Goal is for patient's breathing to be good enough for patient to feel more comfortably and to optimize functional status.   Monitor renal function closely  Will need O2 testing prior to discharge  PT/OT when appropriate

## 2019-09-09 NOTE — H&P
[facility] Hospital Medicine  History & Physical    Patient Name: Kaci Whalen  MRN: [id number]  Admission Date: 9/9/2019  Attending Physician: Danii Ryan MD   Primary Care Provider: Toby Hung MD         Patient information was obtained from patient, relative(s), past medical records and ER records.     Subjective:     Principal Problem:Acute on chronic respiratory failure with hypoxia    Chief Complaint:   Chief Complaint   Patient presents with    Shortness of Breath     Arrives to ER with daughter reports increased SOB, pt. has hx of CHF, daughter reports home oxygen saturation was reading in the 80's oxygen saturation 88% on RA is suppose to wear home o2 but pt. tooker her self off.         HPI: 78 year old female with diastolic heart failure, hypertension, bipolar disorder, CKD stage 3 and diabetes mellitus type 2 who presented with shortness of breath of days in evolution. Per daughter, patient developed a cough. Associated symptoms include white sputum. Patient saw her PCP 5 days PTA. Patient did not have signs of fluid overload or pulmonary edema. She was started on robitussin and tessalon perles. Per daughter, cough resolved with this. However, patient slowly developed progressive shortness of breath. Patient had portable O2 at some point in the past but patient returned it as she thought she did not need it.  Patient lives by herself with her  who has Parkinson's. Daughter visits them daily and organizes their medications, but patient takes her meds. Reports compliance. Takes furosemide three times a week. Patient admits to adding more salt to her diet lately. Daughter add patient was recently started on gabapentin and has become lethargic since.     In the ED, patient was hypoxic and using accessory muscles at rest. BNP 1004, has rales on exam and CXR consistent with pulmonary edema. Admitted for heart failure exacerbation.     Past Medical History:   Diagnosis  Date    Abdominal pain     Anxiety     Atherosclerosis of aortic arch     noted on CXR 7/1/2015    Atrial fibrillation 05/2016    CHADS 4, on Xarelto    Benign hypertension     Bipolar disorder, most recent episode manic 8/29/2019    CHF (congestive heart failure)     Chronic constipation     Chronic diarrhea     Chronic edema     Depression     Dercum disease     Multiple painful lipomas    Diabetic retinopathy     Dysphagia     Gas pain     Glaucoma of both eyes     Hx of psychiatric care     previously amitriptyline, now jut on Trazodone 100mg QHS PRN insomnia    Hyperlipidemia with target LDL less than 100     Unable to tolerate statins    Immature cataract     Left sided numbness     per daughter    Tasia     no symptoms prior to this presentation    Morbid obesity     Nausea & vomiting     Neuropathy     Polyneuropathy     Primary osteoarthritis of both knees     Proteinuria     Psychiatric problem     history of depression    Pulmonary hypertension mixed group 2 and 3 1/18/2017    Sleep apnea     Therapy     no history of therapy    Type II or unspecified type diabetes mellitus with neurological manifestations, uncontrolled(250.62)     Unspecified vitamin D deficiency     Uses roller walker        Past Surgical History:   Procedure Laterality Date     tenotomy      flexors 2,3 L toes    CATARACT EXTRACTION W/  INTRAOCULAR LENS IMPLANT Bilateral     COLONOSCOPY N/A 5/4/2017    Performed by [facility] at [facility] ENDO (2ND FLR)    diabetic retinopathy      right    ESOPHAGOGASTRODUODENOSCOPY (EGD) N/A 5/4/2017    [facility]formed by [facility] at [facility] ENDO (2ND FLR)    HYSTERECTOMY      knee surgery x2      Left ankle and leg surgery secondary to a fall      rods & screws present    left arm fracture      Left cataract      ORIF, WRIST Left 12/28/2012    Performed by R[facility] at [facility] OR 1ST FLR    PARTIAL HYSTERECTOMY      Secondary to  bleeding    TOE FUSION      2,3 R    WRIST SURGERY Left 12/28/2012    pins & plate present       Review of patient's allergies indicates:   Allergen Reactions    Tramadol Other (See Comments)     Interaction-induced delirium with s/s of estrellita.    Ace inhibitors      Other reaction(s): cough      Fluorescein Itching     Other reaction(s): Itching    Iodine      Other reaction(s): Itching    Pravastatin Other (See Comments)     Other reaction(s): mouth tingling/numbness    Simvastatin      Other reaction(s): foot swelling         No current facility-administered medications on file prior to encounter.      Current Outpatient Medications on File Prior to Encounter   Medication Sig    alcohol swabs PadM Apply 1 each topically as needed.    amLODIPine (NORVASC) 5 MG tablet Take 1 tablet (5 mg total) by mouth once daily.    blood glucose control, low Soln To test meter once per week    citalopram (CELEXA) 20 MG tablet Take 1 tablet (20 mg total) by mouth once daily.    desloratadine (CLARINEX) 5 mg tablet Take 1 tablet (5 mg total) by mouth once daily. (Patient taking differently: Take 5 mg by mouth as needed. )    divalproex (DEPAKOTE) 500 MG TbEC Take 1 tablet (500 mg total) by mouth every 12 (twelve) hours.    fluticasone (FLONASE) 50 mcg/actuation nasal spray 1 spray (50 mcg total) by Each Nare route 2 (two) times daily.    furosemide (LASIX) 40 MG tablet Take 1 tablet (40 mg total) by mouth once daily. One tab po daily x three days then as needed for LE swelling    gabapentin (NEURONTIN) 100 MG capsule Take 1 capsule (100 mg total) by mouth 3 (three) times daily.    insulin aspart protamine-insulin aspart (NOVOLOG 70/30) 100 unit/mL (70-30) InPn pen Inject 21 Units into the skin 2 (two) times daily before meals. Sliding scale max daily dosing 50 units daily    lancets (LANCETS,THIN) 28 gauge Misc 1 lancet by Misc.(Non-Drug; Combo Route) route 4 (four) times daily before meals and nightly.     "losartan (COZAAR) 100 MG tablet Take 1 tablet (100 mg total) by mouth once daily.    melatonin 10 mg Tab Take by mouth. 2 tabs at night    metFORMIN (GLUCOPHAGE) 500 MG tablet Take 1 tablet (500 mg total) by mouth 3 (three) times daily.    metoprolol succinate (TOPROL-XL) 25 MG 24 hr tablet Take 1 tablet (25 mg total) by mouth once daily.    pen needle, diabetic (BD ULTRA-FINE SANDEEP PEN NEEDLES) 32 gauge x 5/32" Ndle Take 1 each by mouth 4 (four) times daily.    QUEtiapine (SEROQUEL) 50 MG tablet Take 1 tablet (50 mg total) by mouth every evening.    TRUE METRIX GLUCOSE TEST STRIP Strp TEST FOUR TIMES DAILY BEFORE MEALS  AND EVERY NIGHT     Family History     Problem Relation (Age of Onset)    Bone cancer Daughter    Liver cancer Mother    No Known Problems Daughter, Father, Sister, Brother, Maternal Aunt, Maternal Uncle, Paternal Aunt, Paternal Uncle, Maternal Grandmother, Maternal Grandfather, Paternal Grandmother, Paternal Grandfather        Tobacco Use    Smoking status: Never Smoker    Smokeless tobacco: Never Used   Substance and Sexual Activity    Alcohol use: No    Drug use: No    Sexual activity: Yes     Partners: Male     Review of Systems   Constitutional: Negative.    HENT: Negative.    Eyes: Negative.    Respiratory: Positive for cough and shortness of breath.    Cardiovascular: Positive for chest pain (with cough).   Gastrointestinal: Negative.    Endocrine: Negative.    Genitourinary: Negative.    Musculoskeletal: Negative.    Skin: Negative.    Neurological: Negative.    Hematological: Negative.    Psychiatric/Behavioral:        Lethargic     Objective:     Vital Signs (Most Recent):  Temp: 98 °F (36.7 °C) (09/09/19 1500)  Pulse: 65 (09/09/19 1518)  Resp: (!) 23 (09/09/19 1518)  BP: (!) 150/69 (09/09/19 1518)  SpO2: 99 % (09/09/19 1518) Vital Signs (24h Range):  Temp:  [97.8 °F (36.6 °C)-98 °F (36.7 °C)] 98 °F (36.7 °C)  Pulse:  [65-68] 65  Resp:  [21-24] 23  SpO2:  [88 %-99 %] 99 %  BP: " (150-177)/(69-91) 150/69     Weight: 94.3 kg (208 lb)  Body mass index is 40.62 kg/m².    Physical Exam   Constitutional: She is oriented to person, place, and time. She appears well-developed. No distress.   obese   Eyes: Conjunctivae and EOM are normal.   Neck: Normal range of motion.   Cardiovascular: Normal rate and regular rhythm.   Pulmonary/Chest: No respiratory distress. She has no wheezes. She has rales. She exhibits no tenderness.   Using accessory muscles at rest and while speaking  Speaking short sentences at a time   Abdominal: Soft. Bowel sounds are normal.   Musculoskeletal: She exhibits edema (trace).   Neurological: She is oriented to person, place, and time.   lethargic   Skin: Skin is warm. Capillary refill takes less than 2 seconds. She is not diaphoretic.   Psychiatric: She has a normal mood and affect. Her behavior is normal. Judgment and thought content normal.   Nursing note and vitals reviewed.        CRANIAL NERVES     CN III, IV, VI   Extraocular motions are normal.       Significant Imaging: I have reviewed all pertinent imaging results/findings within the past 24 hours.  I have reviewed and interpreted all pertinent imaging results/findings within the past 24 hours.    Assessment/Plan:     * Acute on chronic respiratory failure with hypoxia  Patient has pulmonary edema despite compliance with furosemide three times a week  This is likely due to high sodium diet  Change diet to cardiac (and diabetic) and initiate IV diuresis   Goal is for patient's breathing to be good enough for patient to feel more comfortably and to optimize functional status.   Monitor renal function closely  Will need O2 testing prior to discharge  PT/OT when appropriate    Acute on chronic congestive heart failure  As above  No evidence of ACS  Repeat Echo  Cardiac monitoring      (HFpEF) heart failure with preserved ejection fraction  Per Echo in 2016  Will repeat during this admission    Obesity, Class III, BMI  40-49.9 (morbid obesity)  Will address when appropriate      CKD stage 3 due to type 2 diabetes mellitus  At baseline  Monitor closely while on IV diuresis      Bipolar disorder, most recent episode manic  Stable on current home regimen  Will resume      Essential hypertension  Resume home regimen  Cardiac diet    MIGUEL (obstructive sleep apnea) unable to afford CPAP  Not on CPAP at home      Type 2 diabetes mellitus with microalbuminuria, with long-term current use of insulin  Start insulin and adjust as needed for goal -180  Decrease dose of gabapentin due to lethargy      VTE Risk Mitigation (From admission, onward)    None             Ana Mckeon MD  Department of Hospital Medicine   [facility]

## 2019-09-09 NOTE — ED PROVIDER NOTES
Encounter Date: 9/9/2019    SCRIBE #1 NOTE: I, Soha Valenzuela, am scribing for, and in the presence of, Danii Ryan MD. Other sections scribed: HPI, ROS.       History     Chief Complaint   Patient presents with    Shortness of Breath     Arrives to ER with daughter reports increased SOB, pt. has hx of CHF, daughter reports home oxygen saturation was reading in the 80's oxygen saturation 88% on RA is suppose to wear home o2 but pt. tooker her self off.      This is a 78 y.o. female with a PMHx of Afib and CHF who presents to the Emergency Department with a cc of chronic SOB x2 weeks ago. The pt's daughter states that she got wet in the rain and as a result she got a cold and had very bad congestion. Her PCP recommended her to use decongestants, gave her a nebulize treatment, and prescribed Tessalon. The congestion cleared up 3 days ago but her breathing is still labored. The pt was on oxygen and decided to stop using it approximately a year ago. Pt reports associated CP and dizziness. Pt denies any fever, coughing, wheezing, or abdominal pain. Symptoms are worsened with movement. The pt is currently taking lasix for her CHF. The pt does not smoke, consume alcohol, or use drugs.    The history is provided by the patient and a relative. No  was used.     Review of patient's allergies indicates:   Allergen Reactions    Tramadol Other (See Comments)     Interaction-induced delirium with s/s of estrellita.    Ace inhibitors      Other reaction(s): cough      Fluorescein Itching     Other reaction(s): Itching    Iodine      Other reaction(s): Itching    Pravastatin Other (See Comments)     Other reaction(s): mouth tingling/numbness    Simvastatin      Other reaction(s): foot swelling       Past Medical History:   Diagnosis Date    Abdominal pain     Anxiety     Atherosclerosis of aortic arch     noted on CXR 7/1/2015    Atrial fibrillation 05/2016    CHADS 4, on Xarelto    Benign hypertension      Bipolar disorder, most recent episode manic 8/29/2019    Chronic constipation     Chronic diarrhea     Chronic edema     Depression     Dercum disease     Multiple painful lipomas    Diabetic retinopathy     Dysphagia     Gas pain     Glaucoma of both eyes     Hx of psychiatric care     previously amitriptyline, now jut on Trazodone 100mg QHS PRN insomnia    Hyperlipidemia with target LDL less than 100     Unable to tolerate statins    Immature cataract     Tasia     no symptoms prior to this presentation    Morbid obesity     Nausea & vomiting     Neuropathy     Polyneuropathy     Primary osteoarthritis of both knees     Proteinuria     Psychiatric problem     history of depression    Pulmonary hypertension mixed group 2 and 3 1/18/2017    Sleep apnea     Therapy     no history of therapy    Type II or unspecified type diabetes mellitus with neurological manifestations, uncontrolled(250.62)     Unspecified vitamin D deficiency      Past Surgical History:   Procedure Laterality Date     tenotomy      flexors 2,3 L toes    CATARACT EXTRACTION W/  INTRAOCULAR LENS IMPLANT Bilateral     COLONOSCOPY N/A 5/4/2017    [facility] (2ND FLR)    diabetic retinopathy      right    ESOPHAGOGASTRODUODENOSCOPY (EGD) N/A 5/4/2017    Performed by [facility] at [facility] ENDO (2ND FLR)    HYSTERECTOMY      knee surgery x2      Left ankle and leg surgery secondary to a fall      left arm fracture      Left cataract      ORIF, WRIST Left 12/28/2012    Performed by Rozina Buck [facility] at [facility] OR 1ST FLR    PARTIAL HYSTERECTOMY      Secondary to bleeding    TOE FUSION      2,3 R     Family History   Problem Relation Age of Onset    No Known Problems Daughter     No Known Problems Father     Liver cancer Mother     Bone cancer Daughter     No Known Problems Sister     No Known Problems Brother     No Known Problems Maternal Aunt     No Known  Problems Maternal Uncle     No Known Problems Paternal Aunt     No Known Problems Paternal Uncle     No Known Problems Maternal Grandmother     No Known Problems Maternal Grandfather     No Known Problems Paternal Grandmother     No Known Problems Paternal Grandfather     Amblyopia Neg Hx     Blindness Neg Hx     Cancer Neg Hx     Cataracts Neg Hx     Diabetes Neg Hx     Glaucoma Neg Hx     Hypertension Neg Hx     Macular degeneration Neg Hx     Retinal detachment Neg Hx     Strabismus Neg Hx     Stroke Neg Hx     Thyroid disease Neg Hx     Celiac disease Neg Hx     Cirrhosis Neg Hx     Colon cancer Neg Hx     Colon polyps Neg Hx     Crohn's disease Neg Hx     Cystic fibrosis Neg Hx     Esophageal cancer Neg Hx     Hemochromatosis Neg Hx     Inflammatory bowel disease Neg Hx     Irritable bowel syndrome Neg Hx     Liver disease Neg Hx     Rectal cancer Neg Hx     Stomach cancer Neg Hx     Ulcerative colitis Neg Hx     Montez's disease Neg Hx     Alcohol abuse Neg Hx     Bipolar disorder Neg Hx     Dementia Neg Hx     Depression Neg Hx     Drug abuse Neg Hx     Suicide Neg Hx     Schizophrenia Neg Hx      Social History     Tobacco Use    Smoking status: Never Smoker    Smokeless tobacco: Never Used   Substance Use Topics    Alcohol use: No    Drug use: No     Review of Systems   Constitutional: Negative for diaphoresis and fever.   HENT: Negative for ear pain and sore throat.    Respiratory: Positive for shortness of breath. Negative for cough and wheezing.    Cardiovascular: Positive for chest pain.   Gastrointestinal: Negative for abdominal pain, diarrhea, nausea and vomiting.   Genitourinary: Negative for dysuria.   Musculoskeletal: Negative for arthralgias and myalgias.   Skin: Negative for rash.   Neurological: Negative for headaches.       Physical Exam     Initial Vitals [09/09/19 1156]   BP Pulse Resp Temp SpO2   (!) 177/82 68 (!) 24 97.8 °F (36.6 °C) (!) 88 %       MAP       --         Physical Exam    Nursing note and vitals reviewed.  Constitutional: She is not diaphoretic. No distress.   HENT:   Head: Normocephalic and atraumatic.   Mouth/Throat: Oropharynx is clear and moist.   Eyes: EOM are normal. Pupils are equal, round, and reactive to light. No scleral icterus.   Neck: Normal range of motion. Neck supple. No JVD present.   Cardiovascular: Normal rate, regular rhythm and intact distal pulses.   Murmur heard.  Pulmonary/Chest: No stridor. Tachypnea noted. She has rales.   + accessory muscle use  Speaking in 3-4 word sentences   Abdominal: Soft. Bowel sounds are normal. She exhibits no distension.   Musculoskeletal: Normal range of motion. She exhibits no edema or tenderness.   Lymphadenopathy:   Chronic left lower extremity edema.   Neurological: She is alert and oriented to person, place, and time. She has normal strength. No cranial nerve deficit or sensory deficit.   Skin: Skin is warm and dry.   Psychiatric:   Anxious appearing         ED Course   Procedures  Labs Reviewed   CBC W/ AUTO DIFFERENTIAL - Abnormal; Notable for the following components:       Result Value    RBC 3.39 (*)     Hemoglobin 9.9 (*)     Hematocrit 31.7 (*)     Mean Corpuscular Hemoglobin Conc 31.2 (*)     MPV 9.0 (*)     Lymph # 0.6 (*)     Gran% 85.8 (*)     Lymph% 6.9 (*)     All other components within normal limits   COMPREHENSIVE METABOLIC PANEL - Abnormal; Notable for the following components:    Sodium 124 (*)     Chloride 83 (*)     CO2 35 (*)     Glucose 165 (*)     BUN, Bld 26 (*)     Albumin 3.4 (*)     Anion Gap 6 (*)     eGFR if  51 (*)     eGFR if non  44 (*)     All other components within normal limits   B-TYPE NATRIURETIC PEPTIDE - Abnormal; Notable for the following components:    BNP 1,004 (*)     All other components within normal limits   TROPONIN I   PROTIME-INR   VALPROIC ACID     EKG Readings: (Independently Interpreted)   Initial  Reading: No STEMI. Previous EKG: Compared with most recent EKG Rhythm: Normal Sinus Rhythm. Heart Rate: 70. ST Segments: Normal ST Segments. T Waves: Normal. Axis: Normal.       Imaging Results           X-Ray Chest AP Portable (Final result)  Result time 09/09/19 13:29:56    Final result by Marion Garcia MD (09/09/19 13:29:56)                 Impression:      Slight interval increase in pulmonary vascularity and pulmonary parenchymal opacification, consistent with the provided clinical history of congestive heart failure.    This report was flagged in Epic as abnormal.      Electronically signed by: Marion Garcia MD  Date:    09/09/2019  Time:    13:29             Narrative:    EXAMINATION:  XR CHEST AP PORTABLE    CLINICAL HISTORY:  CHF;.    TECHNIQUE:  Single frontal portable view of the chest was performed.    COMPARISON:  September 4, 2019 at 11:39    FINDINGS:  Cardiac silhouette is mildly enlarged.  Pulmonary vascularity is mildly increased, and this has progressed.  There is no large pleural effusion or pneumothorax.  There is mild diffusely increased pulmonary parenchymal opacification with a predominantly interstitial pattern; this has increased as well and may be due to edema.  The right lateral costophrenic sulcus is mildly blunted, suggesting pleural fluid.  No pneumothorax.  Visualized osseous structures are stable with degenerative changes of the spine and shoulders.  There are atherosclerotic calcifications of the aorta.                              X-Rays:   Independently Interpreted Readings:   Chest X-Ray: Increased vascular markings consistent with CHF are present.     Medical Decision Making:   History:   Old Medical Records: I decided to obtain old medical records.  Old Records Summarized: records from clinic visits.       <> Summary of Records: Recent PMD evaluation for cough.  Initial Assessment:   Patient with history of CHF, past home oxygen use presents for evaluation of shortness  of breath, cough.  Room air saturations are in the 80s but patient is saturating  Adequately on supplemental nasal cannula.  She has rales on auscultation. Is speaking in short sentences but is not in respiratory distress.  Differential Diagnosis:   COPD exacerbation, CHF exacerbation, pneumonia, upper respiratory infection, anxiety  Independently Interpreted Test(s):   I have ordered and independently interpreted X-rays - see prior notes.  I have ordered and independently interpreted EKG Reading(s) - see prior notes  Clinical Tests:   Lab Tests: Ordered and Reviewed  Radiological Study: Ordered and Reviewed  Medical Tests: Ordered and Reviewed  ED Management:  Chest x-ray with pulmonary edema.  Labs with BNP elevated over 1000.  Troponin is negative. Patient also has hyponatremia and hypochloremia likely related to fluid overload.  She is to be admitted to Hospital Medicine for further management of CHF exacerbation and hyponatremia.  This chart was completed using dictation software, as a result there may be some transcription errors.             Scribe Attestation:   Scribe #1: I performed the above scribed service and the documentation accurately describes the services I performed. I attest to the accuracy of the note.               Clinical Impression:       ICD-10-CM ICD-9-CM   1. Acute on chronic congestive heart failure, unspecified heart failure type I50.9 428.0   2. Shortness of breath R06.02 786.05   3. Hyponatremia E87.1 276.1             Scribe attestation: I, Danii Ryan , personally performed the services described in this documentation. All medical record entries made by the scribe were at my direction and in my presence.  I have reviewed the chart and agree that the record reflects my personal performance and is accurate and complete.                        Danii Ryan MD  09/09/19 [id number]

## 2019-09-09 NOTE — TELEPHONE ENCOUNTER
I spoke to pt daughter and advised per Dr. Hung to take pt  to ER immediately for evaluation. Daughter verbalizes understanding.

## 2019-09-09 NOTE — ED TRIAGE NOTES
Pt arrived to the ED due to increased SOB that began this morning. Pt's daughter states that pt's O2 sat's this morning were 79-81% on room air. Pt's daughter reports pt is supposed to wear O2 at home but doesn't. Pt has a hx of CHF

## 2019-09-09 NOTE — ED PROVIDER NOTES
Encounter Date: 9/9/2019    SCRIBE #1 NOTE: I, Soha Valenzuela, am scribing for, and in the presence of,  Danii Baldwin MD. I have scribed the following portions of the note - Other sections scribed: HPI, ROS, PE.       History     Chief Complaint   Patient presents with    Shortness of Breath     Arrives to ER with daughter reports increased SOB, pt. has hx of CHF, daughter reports home oxygen saturation was reading in the 80's oxygen saturation 88% on RA is suppose to wear home o2 but pt. tooker her self off.      HPI  Review of patient's allergies indicates:   Allergen Reactions    Tramadol Other (See Comments)     Interaction-induced delirium with s/s of tasia.    Ace inhibitors      Other reaction(s): cough      Fluorescein Itching     Other reaction(s): Itching    Iodine      Other reaction(s): Itching    Pravastatin Other (See Comments)     Other reaction(s): mouth tingling/numbness    Simvastatin      Other reaction(s): foot swelling       Past Medical History:   Diagnosis Date    Abdominal pain     Anxiety     Atherosclerosis of aortic arch     noted on CXR 7/1/2015    Atrial fibrillation 05/2016    CHADS 4, on Xarelto    Benign hypertension     Bipolar disorder, most recent episode manic 8/29/2019    CHF (congestive heart failure)     Chronic constipation     Chronic diarrhea     Chronic edema     Depression     Dercum disease     Multiple painful lipomas    Diabetic retinopathy     Dysphagia     Gas pain     Glaucoma of both eyes     Hx of psychiatric care     previously amitriptyline, now jut on Trazodone 100mg QHS PRN insomnia    Hyperlipidemia with target LDL less than 100     Unable to tolerate statins    Immature cataract     Left sided numbness     per daughter    Tasia     no symptoms prior to this presentation    Morbid obesity     Nausea & vomiting     Neuropathy     Polyneuropathy     Primary osteoarthritis of both knees     Proteinuria     Psychiatric problem      history of depression    Pulmonary hypertension mixed group 2 and 3 1/18/2017    Sleep apnea     Therapy     no history of therapy    Type II or unspecified type diabetes mellitus with neurological manifestations, uncontrolled(250.62)     Unspecified vitamin D deficiency     Uses roller walker      Past Surgical History:   Procedure Laterality Date     tenotomy      flexors 2,3 L toes    CATARACT EXTRACTION W/  INTRAOCULAR LENS IMPLANT Bilateral     COLONOSCOPY N/A 5/4/2017    Performed by [facility] at [facility] ENDO (2ND FLR)    diabetic retinopathy      right    ESOPHAGOGASTRODUODENOSCOPY (EGD) N/A 5/4/2017    Performed by [facility] at [facility] ENDO (2ND FLR)    HYSTERECTOMY      knee surgery x2      Left ankle and leg surgery secondary to a fall      rods & screws present    left arm fracture      Left cataract      ORIF, WRIST Left 12/28/2012    Performed by Roz[facility] at [facility] OR 1ST FLR    PARTIAL HYSTERECTOMY      Secondary to bleeding    TOE FUSION      2,3 R    WRIST SURGERY Left 12/28/2012    pins & plate present     Family History   Problem Relation Age of Onset    No Known Problems Daughter     No Known Problems Father     Liver cancer Mother     Bone cancer Daughter     No Known Problems Sister     No Known Problems Brother     No Known Problems Maternal Aunt     No Known Problems Maternal Uncle     No Known Problems Paternal Aunt     No Known Problems Paternal Uncle     No Known Problems Maternal Grandmother     No Known Problems Maternal Grandfather     No Known Problems Paternal Grandmother     No Known Problems Paternal Grandfather     Amblyopia Neg Hx     Blindness Neg Hx     Cancer Neg Hx     Cataracts Neg Hx     Diabetes Neg Hx     Glaucoma Neg Hx     Hypertension Neg Hx     Macular degeneration Neg Hx     Retinal detachment Neg Hx     Strabismus Neg Hx     Stroke Neg Hx     Thyroid disease Neg Hx     Celiac disease Neg Hx      Cirrhosis Neg Hx     Colon cancer Neg Hx     Colon polyps Neg Hx     Crohn's disease Neg Hx     Cystic fibrosis Neg Hx     Esophageal cancer Neg Hx     Hemochromatosis Neg Hx     Inflammatory bowel disease Neg Hx     Irritable bowel syndrome Neg Hx     Liver disease Neg Hx     Rectal cancer Neg Hx     Stomach cancer Neg Hx     Ulcerative colitis Neg Hx     Montez's disease Neg Hx     Alcohol abuse Neg Hx     Bipolar disorder Neg Hx     Dementia Neg Hx     Depression Neg Hx     Drug abuse Neg Hx     Suicide Neg Hx     Schizophrenia Neg Hx      Social History     Tobacco Use    Smoking status: Never Smoker    Smokeless tobacco: Never Used   Substance Use Topics    Alcohol use: No    Drug use: No     Review of Systems    Physical Exam     Initial Vitals [09/09/19 1156]   BP Pulse Resp Temp SpO2   (!) 177/82 68 (!) 24 97.8 °F (36.6 °C) (!) 88 %      MAP       --         Physical Exam    ED Course   Procedures  Labs Reviewed   CBC W/ AUTO DIFFERENTIAL - Abnormal; Notable for the following components:       Result Value    RBC 3.39 (*)     Hemoglobin 9.9 (*)     Hematocrit 31.7 (*)     Mean Corpuscular Hemoglobin Conc 31.2 (*)     MPV 9.0 (*)     Lymph # 0.6 (*)     Gran% 85.8 (*)     Lymph% 6.9 (*)     All other components within normal limits   COMPREHENSIVE METABOLIC PANEL - Abnormal; Notable for the following components:    Sodium 124 (*)     Chloride 83 (*)     CO2 35 (*)     Glucose 165 (*)     BUN, Bld 26 (*)     Albumin 3.4 (*)     Anion Gap 6 (*)     eGFR if  51 (*)     eGFR if non  44 (*)     All other components within normal limits   B-TYPE NATRIURETIC PEPTIDE - Abnormal; Notable for the following components:    BNP 1,004 (*)     All other components within normal limits   TROPONIN I   PROTIME-INR   VALPROIC ACID          Imaging Results           X-Ray Chest AP Portable (Final result)  Result time 09/09/19 13:29:56    Final result by Marion ORTEGA  MD Radha (09/09/19 13:29:56)                 Impression:      Slight interval increase in pulmonary vascularity and pulmonary parenchymal opacification, consistent with the provided clinical history of congestive heart failure.    This report was flagged in Epic as abnormal.      Electronically signed by: Marion Garcia MD  Date:    09/09/2019  Time:    13:29             Narrative:    EXAMINATION:  XR CHEST AP PORTABLE    CLINICAL HISTORY:  CHF;.    TECHNIQUE:  Single frontal portable view of the chest was performed.    COMPARISON:  September 4, 2019 at 11:39    FINDINGS:  Cardiac silhouette is mildly enlarged.  Pulmonary vascularity is mildly increased, and this has progressed.  There is no large pleural effusion or pneumothorax.  There is mild diffusely increased pulmonary parenchymal opacification with a predominantly interstitial pattern; this has increased as well and may be due to edema.  The right lateral costophrenic sulcus is mildly blunted, suggesting pleural fluid.  No pneumothorax.  Visualized osseous structures are stable with degenerative changes of the spine and shoulders.  There are atherosclerotic calcifications of the aorta.                                                      Clinical Impression:   {Add your Clinical Impression here. If you haven't documented one yet, please pend the note, finalize a Clinical Impression, and refresh your note before signing.:[id number]}

## 2019-09-09 NOTE — HPI
78 year old female with diastolic heart failure, hypertension, bipolar disorder, CKD stage 3 and diabetes mellitus type 2 who presented with shortness of breath of days in evolution. Per daughter, patient developed a cough. Associated symptoms include white sputum. Patient saw her PCP 5 days PTA. Patient did not have signs of fluid overload or pulmonary edema. She was started on robitussin and tessalon perles. Per daughter, cough resolved with this. However, patient slowly developed progressive shortness of breath. Patient had portable O2 at some point in the past but patient returned it as she thought she did not need it.  Patient lives by herself with her  who has Parkinson's. Daughter visits them daily and organizes their medications, but patient takes her meds. Reports compliance. Takes furosemide three times a week. Patient admits to adding more salt to her diet lately. Daughter add patient was recently started on gabapentin and has become lethargic since.     In the ED, patient was hypoxic and using accessory muscles at rest. BNP 1004, has rales on exam and CXR consistent with pulmonary edema. Admitted for heart failure exacerbation.

## 2019-09-09 NOTE — SUBJECTIVE & OBJECTIVE
Past Medical History:   Diagnosis Date    Abdominal pain     Anxiety     Atherosclerosis of aortic arch     noted on CXR 7/1/2015    Atrial fibrillation 05/2016    CHADS 4, on Xarelto    Benign hypertension     Bipolar disorder, most recent episode manic 8/29/2019    CHF (congestive heart failure)     Chronic constipation     Chronic diarrhea     Chronic edema     Depression     Dercum disease     Multiple painful lipomas    Diabetic retinopathy     Dysphagia     Gas pain     Glaucoma of both eyes     Hx of psychiatric care     previously amitriptyline, now jut on Trazodone 100mg QHS PRN insomnia    Hyperlipidemia with target LDL less than 100     Unable to tolerate statins    Immature cataract     Left sided numbness     per daughter    Tasia     no symptoms prior to this presentation    Morbid obesity     Nausea & vomiting     Neuropathy     Polyneuropathy     Primary osteoarthritis of both knees     Proteinuria     Psychiatric problem     history of depression    Pulmonary hypertension mixed group 2 and 3 1/18/2017    Sleep apnea     Therapy     no history of therapy    Type II or unspecified type diabetes mellitus with neurological manifestations, uncontrolled(250.62)     Unspecified vitamin D deficiency     Uses roller walker        Past Surgical History:   Procedure Laterality Date     tenotomy      flexors 2,3 L toes    CATARACT EXTRACTION W/  INTRAOCULAR LENS IMPLANT Bilateral     COLONOSCOPY N/A 5/4/2017    [facility] (2ND [facility])    diabetic retinopathy      right    ESOPHAGOGASTRODUODENOSCOPY (EGD) N/A 5/4/2017    [facility] (2ND FLR)    HYSTERECTOMY      knee surgery x2      Left ankle and leg surgery secondary to a fall      rods & screws present    left arm fracture      Left cataract      ORIF, WRIST Left 12/28/2012    [facility] at [facility] 1ST FLR    PARTIAL  HYSTERECTOMY      Secondary to bleeding    TOE FUSION      2,3 R    WRIST SURGERY Left 12/28/2012    pins & plate present       Review of patient's allergies indicates:   Allergen Reactions    Tramadol Other (See Comments)     Interaction-induced delirium with s/s of estrellita.    Ace inhibitors      Other reaction(s): cough      Fluorescein Itching     Other reaction(s): Itching    Iodine      Other reaction(s): Itching    Pravastatin Other (See Comments)     Other reaction(s): mouth tingling/numbness    Simvastatin      Other reaction(s): foot swelling         No current facility-administered medications on file prior to encounter.      Current Outpatient Medications on File Prior to Encounter   Medication Sig    alcohol swabs PadM Apply 1 each topically as needed.    amLODIPine (NORVASC) 5 MG tablet Take 1 tablet (5 mg total) by mouth once daily.    blood glucose control, low Soln To test meter once per week    citalopram (CELEXA) 20 MG tablet Take 1 tablet (20 mg total) by mouth once daily.    desloratadine (CLARINEX) 5 mg tablet Take 1 tablet (5 mg total) by mouth once daily. (Patient taking differently: Take 5 mg by mouth as needed. )    divalproex (DEPAKOTE) 500 MG TbEC Take 1 tablet (500 mg total) by mouth every 12 (twelve) hours.    fluticasone (FLONASE) 50 mcg/actuation nasal spray 1 spray (50 mcg total) by Each Nare route 2 (two) times daily.    furosemide (LASIX) 40 MG tablet Take 1 tablet (40 mg total) by mouth once daily. One tab po daily x three days then as needed for LE swelling    gabapentin (NEURONTIN) 100 MG capsule Take 1 capsule (100 mg total) by mouth 3 (three) times daily.    insulin aspart protamine-insulin aspart (NOVOLOG 70/30) 100 unit/mL (70-30) InPn pen Inject 21 Units into the skin 2 (two) times daily before meals. Sliding scale max daily dosing 50 units daily    lancets (LANCETS,THIN) 28 gauge Misc 1 lancet by Misc.(Non-Drug; Combo Route) route 4 (four) times daily  "before meals and nightly.    losartan (COZAAR) 100 MG tablet Take 1 tablet (100 mg total) by mouth once daily.    melatonin 10 mg Tab Take by mouth. 2 tabs at night    metFORMIN (GLUCOPHAGE) 500 MG tablet Take 1 tablet (500 mg total) by mouth 3 (three) times daily.    metoprolol succinate (TOPROL-XL) 25 MG 24 hr tablet Take 1 tablet (25 mg total) by mouth once daily.    pen needle, diabetic (BD ULTRA-FINE SANDEEP PEN NEEDLES) 32 gauge x 5/32" Ndle Take 1 each by mouth 4 (four) times daily.    QUEtiapine (SEROQUEL) 50 MG tablet Take 1 tablet (50 mg total) by mouth every evening.    TRUE METRIX GLUCOSE TEST STRIP Strp TEST FOUR TIMES DAILY BEFORE MEALS  AND EVERY NIGHT     Family History     Problem Relation (Age of Onset)    Bone cancer Daughter    Liver cancer Mother    No Known Problems Daughter, Father, Sister, Brother, Maternal Aunt, Maternal Uncle, Paternal Aunt, Paternal Uncle, Maternal Grandmother, Maternal Grandfather, Paternal Grandmother, Paternal Grandfather        Tobacco Use    Smoking status: Never Smoker    Smokeless tobacco: Never Used   Substance and Sexual Activity    Alcohol use: No    Drug use: No    Sexual activity: Yes     Partners: Male     Review of Systems   Constitutional: Negative.    HENT: Negative.    Eyes: Negative.    Respiratory: Positive for cough and shortness of breath.    Cardiovascular: Positive for chest pain (with cough).   Gastrointestinal: Negative.    Endocrine: Negative.    Genitourinary: Negative.    Musculoskeletal: Negative.    Skin: Negative.    Neurological: Negative.    Hematological: Negative.    Psychiatric/Behavioral:        Lethargic     Objective:     Vital Signs (Most Recent):  Temp: 98 °F (36.7 °C) (09/09/19 1500)  Pulse: 65 (09/09/19 1518)  Resp: (!) 23 (09/09/19 1518)  BP: (!) 150/69 (09/09/19 1518)  SpO2: 99 % (09/09/19 1518) Vital Signs (24h Range):  Temp:  [97.8 °F (36.6 °C)-98 °F (36.7 °C)] 98 °F (36.7 °C)  Pulse:  [65-68] 65  Resp:  [21-24] " 23  SpO2:  [88 %-99 %] 99 %  BP: (150-177)/(69-91) 150/69     Weight: 94.3 kg (208 lb)  Body mass index is 40.62 kg/m².    Physical Exam   Constitutional: She is oriented to person, place, and time. She appears well-developed. No distress.   obese   Eyes: Conjunctivae and EOM are normal.   Neck: Normal range of motion.   Cardiovascular: Normal rate and regular rhythm.   Pulmonary/Chest: No respiratory distress. She has no wheezes. She has rales. She exhibits no tenderness.   Using accessory muscles at rest and while speaking  Speaking short sentences at a time   Abdominal: Soft. Bowel sounds are normal.   Musculoskeletal: She exhibits edema (trace).   Neurological: She is oriented to person, place, and time.   lethargic   Skin: Skin is warm. Capillary refill takes less than 2 seconds. She is not diaphoretic.   Psychiatric: She has a normal mood and affect. Her behavior is normal. Judgment and thought content normal.   Nursing note and vitals reviewed.        CRANIAL NERVES     CN III, IV, VI   Extraocular motions are normal.        Significant Labs: Urine Studies: No results for input(s): COLORU, APPEARANCEUA, PHUR, SPECGRAV, PROTEINUA, GLUCUA, KETONESU, BILIRUBINUA, OCCULTUA, NITRITE, UROBILINOGEN, LEUKOCYTESUR, RBCUA, WBCUA, BACTERIA, SQUAMEPITHEL, HYALINECASTS in the last 48 hours.    Invalid input(s): [place]    Significant Imaging: I have reviewed all pertinent imaging results/findings within the past 24 hours.  I have reviewed and interpreted all pertinent imaging results/findings within the past 24 hours.

## 2019-09-10 LAB
ALBUMIN SERPL BCP-MCNC: 3 G/DL (ref 3.5–5.2)
ALLENS TEST: ABNORMAL
ANION GAP SERPL CALC-SCNC: 8 MMOL/L (ref 8–16)
AORTIC ROOT ANNULUS: 3.09 CM
AORTIC VALVE CUSP SEPERATION: 2.13 CM
ASCENDING AORTA: 3.49 CM
AV INDEX (PROSTH): 0.64
AV MEAN GRADIENT: 5 MMHG
AV PEAK GRADIENT: 9 MMHG
AV VALVE AREA: 2.64 CM2
AV VELOCITY RATIO: 0.65
BSA FOR ECHO PROCEDURE: 1.98 M2
BUN SERPL-MCNC: 26 MG/DL (ref 8–23)
CALCIUM SERPL-MCNC: 8.7 MG/DL (ref 8.7–10.5)
CHLORIDE SERPL-SCNC: 83 MMOL/L (ref 95–110)
CO2 SERPL-SCNC: 37 MMOL/L (ref 23–29)
CREAT SERPL-MCNC: 1.2 MG/DL (ref 0.5–1.4)
CV ECHO LV RWT: 0.43 CM
DELSYS: ABNORMAL
DOP CALC AO PEAK VEL: 1.49 M/S
DOP CALC AO VTI: 36.99 CM
DOP CALC LVOT AREA: 4.1 CM2
DOP CALC LVOT DIAMETER: 2.29 CM
DOP CALC LVOT PEAK VEL: 0.97 M/S
DOP CALC LVOT STROKE VOLUME: 97.61 CM3
DOP CALCLVOT PEAK VEL VTI: 23.71 CM
E WAVE DECELERATION TIME: 348.37 MSEC
E/A RATIO: 1.25
E/E' RATIO: 22.43 M/S
ECHO LV POSTERIOR WALL: 1.18 CM (ref 0.6–1.1)
EST. GFR  (AFRICAN AMERICAN): 51 ML/MIN/1.73 M^2
EST. GFR  (NON AFRICAN AMERICAN): 44 ML/MIN/1.73 M^2
FRACTIONAL SHORTENING: 28 % (ref 28–44)
GLUCOSE SERPL-MCNC: 65 MG/DL (ref 70–110)
HCO3 UR-SCNC: 42.3 MMOL/L (ref 24–28)
INTERVENTRICULAR SEPTUM: 1.07 CM (ref 0.6–1.1)
IVRT: 0.07 MSEC
LA MAJOR: 6.17 CM
LA MINOR: 6.03 CM
LA WIDTH: 4.79 CM
LEFT ATRIUM SIZE: 3.9 CM
LEFT ATRIUM VOLUME INDEX: 51.3 ML/M2
LEFT ATRIUM VOLUME: 96.85 CM3
LEFT INTERNAL DIMENSION IN SYSTOLE: 3.93 CM (ref 2.1–4)
LEFT VENTRICLE DIASTOLIC VOLUME INDEX: 76.81 ML/M2
LEFT VENTRICLE DIASTOLIC VOLUME: 144.89 ML
LEFT VENTRICLE MASS INDEX: 131 G/M2
LEFT VENTRICLE SYSTOLIC VOLUME INDEX: 35.5 ML/M2
LEFT VENTRICLE SYSTOLIC VOLUME: 67.03 ML
LEFT VENTRICULAR INTERNAL DIMENSION IN DIASTOLE: 5.46 CM (ref 3.5–6)
LEFT VENTRICULAR MASS: 246.49 G
LV LATERAL E/E' RATIO: 17.44 M/S
LV SEPTAL E/E' RATIO: 31.4 M/S
MV PEAK A VEL: 1.26 M/S
MV PEAK E VEL: 1.57 M/S
PCO2 BLDA: 81.1 MMHG (ref 35–45)
PH SMN: 7.33 [PH] (ref 7.35–7.45)
PHOSPHATE SERPL-MCNC: 4.5 MG/DL (ref 2.7–4.5)
PISA TR MAX VEL: 1.71 M/S
PO2 BLDA: 102 MMHG (ref 80–100)
POC BE: 13 MMOL/L
POC SATURATED O2: 97 % (ref 95–100)
POC TCO2: 45 MMOL/L (ref 23–27)
POCT GLUCOSE: 103 MG/DL (ref 70–110)
POCT GLUCOSE: 183 MG/DL (ref 70–110)
POCT GLUCOSE: 195 MG/DL (ref 70–110)
POCT GLUCOSE: 60 MG/DL (ref 70–110)
POTASSIUM SERPL-SCNC: 4.3 MMOL/L (ref 3.5–5.1)
PULM VEIN S/D RATIO: 1.24
PV PEAK D VEL: 0.42 M/S
PV PEAK S VEL: 0.52 M/S
PV PEAK VELOCITY: 0.86 CM/S
RA MAJOR: 5.05 CM
RA WIDTH: 3.4 CM
RIGHT VENTRICULAR END-DIASTOLIC DIMENSION: 2.79 CM
RV TISSUE DOPPLER FREE WALL SYSTOLIC VELOCITY 1 (APICAL 4 CHAMBER VIEW): 11.75 CM/S
SAMPLE: ABNORMAL
SINUS: 2.94 CM
SITE: ABNORMAL
SODIUM SERPL-SCNC: 128 MMOL/L (ref 136–145)
STJ: 2.18 CM
TDI LATERAL: 0.09 M/S
TDI SEPTAL: 0.05 M/S
TDI: 0.07 M/S
TR MAX PG: 12 MMHG
TRICUSPID ANNULAR PLANE SYSTOLIC EXCURSION: 2.72 CM

## 2019-09-10 PROCEDURE — 82803 BLOOD GASES ANY COMBINATION: CPT | Mod: HCNC

## 2019-09-10 PROCEDURE — 25000003 PHARM REV CODE 250: Mod: HCNC | Performed by: EMERGENCY MEDICINE

## 2019-09-10 PROCEDURE — 94761 N-INVAS EAR/PLS OXIMETRY MLT: CPT | Mod: HCNC

## 2019-09-10 PROCEDURE — 27000221 HC OXYGEN, UP TO 24 HOURS: Mod: HCNC

## 2019-09-10 PROCEDURE — 36415 COLL VENOUS BLD VENIPUNCTURE: CPT | Mod: HCNC

## 2019-09-10 PROCEDURE — 99900035 HC TECH TIME PER 15 MIN (STAT): Mod: HCNC

## 2019-09-10 PROCEDURE — 94660 CPAP INITIATION&MGMT: CPT | Mod: HCNC

## 2019-09-10 PROCEDURE — 27000190 HC CPAP FULL FACE MASK W/VALVE: Mod: HCNC

## 2019-09-10 PROCEDURE — 21400001 HC TELEMETRY ROOM: Mod: HCNC

## 2019-09-10 PROCEDURE — 36600 WITHDRAWAL OF ARTERIAL BLOOD: CPT | Mod: HCNC

## 2019-09-10 PROCEDURE — 80069 RENAL FUNCTION PANEL: CPT | Mod: HCNC

## 2019-09-10 PROCEDURE — 63600175 PHARM REV CODE 636 W HCPCS: Mod: HCNC | Performed by: INTERNAL MEDICINE

## 2019-09-10 RX ADMIN — FUROSEMIDE 60 MG: 10 INJECTION, SOLUTION INTRAMUSCULAR; INTRAVENOUS at 10:09

## 2019-09-10 RX ADMIN — DIVALPROEX SODIUM 500 MG: 250 TABLET, DELAYED RELEASE ORAL at 10:09

## 2019-09-10 RX ADMIN — AMLODIPINE BESYLATE 5 MG: 5 TABLET ORAL at 10:09

## 2019-09-10 RX ADMIN — METOPROLOL SUCCINATE 25 MG: 25 TABLET, EXTENDED RELEASE ORAL at 10:09

## 2019-09-10 RX ADMIN — INSULIN DETEMIR 10 UNITS: 100 INJECTION, SOLUTION SUBCUTANEOUS at 08:09

## 2019-09-10 RX ADMIN — CITALOPRAM HYDROBROMIDE 20 MG: 20 TABLET ORAL at 10:09

## 2019-09-10 RX ADMIN — LOSARTAN POTASSIUM 100 MG: 25 TABLET, FILM COATED ORAL at 10:09

## 2019-09-10 RX ADMIN — DIVALPROEX SODIUM 500 MG: 250 TABLET, DELAYED RELEASE ORAL at 08:09

## 2019-09-10 RX ADMIN — ACETAMINOPHEN 650 MG: 325 TABLET, FILM COATED ORAL at 11:09

## 2019-09-10 RX ADMIN — QUETIAPINE FUMARATE 50 MG: 25 TABLET ORAL at 11:09

## 2019-09-10 RX ADMIN — PANTOPRAZOLE SODIUM 40 MG: 40 TABLET, DELAYED RELEASE ORAL at 10:09

## 2019-09-10 NOTE — HOSPITAL COURSE
Ms. Whalen was admitted for acute on chronic respiratory failure due to volume overload and CHF exacerbation. She was started on IV diuretics and started to improve. PT/OT consulted and recommended SNF. Discussed with the patient and her daughter. The patient qualified for home O2 due to her CHF even after renal function showed evidence of volume depletion. SNF recommended by PT/OT however given the patient's dementia and unwillingness to go to SNF, her PCP and I discussed this issue as well as discussion with her primary caregiver and it was decided best to send her home with HH PT/OT/SN. She was stable to follow up closely with her PCP.

## 2019-09-10 NOTE — PROGRESS NOTES
Placed pt on Bipap at documented settings 10/+5/12/.35. Pt is wearing medium full face mask. ALLYSON

## 2019-09-10 NOTE — NURSING
Patient appears sedated, pinpoint pupils briskly reactive to light. RR 20, opens eyes to sternal rub only. Received order for stat CT for weakness and facial droop.

## 2019-09-10 NOTE — PROGRESS NOTES
Result reported to Dr. Loraine Goldberg, RN. Results for MANUEL HORAN (MRN [id number]) as of 9/10/2019 00:40   Ref. Range 9/10/2019 00:32   POC PH Latest Ref Range: 7.35 - 7.45  7.326 (L)   POC PCO2 Latest Ref Range: 35 - 45 mmHg 81.1 (HH)   POC PO2 Latest Ref Range: 80 - 100 mmHg 102 (H)   POC BE Latest Ref Range: -2 to 2 mmol/L 13   POC HCO3 Latest Ref Range: 24 - 28 mmol/L 42.3 (H)   POC SATURATED O2 Latest Ref Range: 95 - 100 % 97   POC TCO2 Latest Ref Range: 23 - 27 mmol/L 45 (H)   Sample Unknown ARTERIAL   DelSys Unknown Nasal Can   Allens Test Unknown Pass   Site Unknown LR

## 2019-09-10 NOTE — ASSESSMENT & PLAN NOTE
Patient has pulmonary edema despite compliance with furosemide three times a week  This is likely due to high sodium diet  Change diet to cardiac (and diabetic) and initiate IV diuresis   Goal is for patient's breathing to be good enough for patient to feel more comfortably and to optimize functional status.   Monitor renal function closely  O2 testing prior to discharge  PT/OT consulted

## 2019-09-10 NOTE — NURSING
Upon departure from floor to cardilogy: cardiac monitor in progress, patient awake, alert, oriented x4. No apparent distress noted at this time.

## 2019-09-10 NOTE — NURSING
Pt transferred back from CT with RN and transport.  Pt on tele and on 2L O2 nasal canula.  Pt tolerated transport well.

## 2019-09-10 NOTE — SUBJECTIVE & OBJECTIVE
Interval History:  Doing better. O2 sats stable on 2L.     Review of Systems   Constitutional: Negative.    HENT: Negative.    Eyes: Negative.    Respiratory: Positive for cough and shortness of breath.    Cardiovascular: Positive for chest pain (with cough).   Gastrointestinal: Negative.    Endocrine: Negative.    Genitourinary: Negative.    Musculoskeletal: Negative.    Skin: Negative.    Neurological: Negative.    Hematological: Negative.    Psychiatric/Behavioral: Negative for agitation.     Objective:     Vital Signs (Most Recent):  Temp: 96.2 °F (35.7 °C) (09/10/19 1135)  Pulse: 70 (09/10/19 1135)  Resp: 18 (09/10/19 1135)  BP: (!) 164/67 (09/10/19 1135)  SpO2: 95 % (09/10/19 1135) Vital Signs (24h Range):  Temp:  [96.2 °F (35.7 °C)-98.4 °F (36.9 °C)] 96.2 °F (35.7 °C)  Pulse:  [49-70] 70  Resp:  [13-24] 18  SpO2:  [95 %-100 %] 95 %  BP: (138-194)/(55-93) 164/67     Weight: 93 kg (205 lb)  Body mass index is 40.04 kg/m².    Physical Exam   Constitutional: She is oriented to person, place, and time. She appears well-developed. No distress.   obese   Eyes: Conjunctivae and EOM are normal.   Neck: Normal range of motion.   Cardiovascular: Normal rate and regular rhythm.   Pulmonary/Chest: Effort normal. No respiratory distress. She has no wheezes. She has rales (at bases). She exhibits no tenderness.   Abdominal: Soft. Bowel sounds are normal.   Musculoskeletal: She exhibits no edema.   Neurological: She is alert and oriented to person, place, and time.   Skin: Skin is warm. Capillary refill takes less than 2 seconds. She is not diaphoretic.   Psychiatric: She has a normal mood and affect. Her behavior is normal. Judgment and thought content normal.   Nursing note and vitals reviewed.        CRANIAL NERVES     CN III, IV, VI   Extraocular motions are normal.        Significant Labs: reviewed    Significant Imaging: I have reviewed and interpreted all pertinent imaging results/findings within the past 24 hours.

## 2019-09-10 NOTE — PROGRESS NOTES
[facility] Hospital Medicine  Progress Note    Patient Name: Kaci Whalen  MRN: [id number]  Patient Class: IP- Inpatient   Admission Date: 9/9/2019  Length of Stay: 1 days  Attending Physician: Allie Drake MD  Primary Care Provider: Toby Hung MD        Subjective:     Principal Problem:Acute on chronic respiratory failure with hypoxia        HPI:  78 year old female with diastolic heart failure, hypertension, bipolar disorder, CKD stage 3 and diabetes mellitus type 2 who presented with shortness of breath of days in evolution. Per daughter, patient developed a cough. Associated symptoms include white sputum. Patient saw her PCP 5 days PTA. Patient did not have signs of fluid overload or pulmonary edema. She was started on robitussin and tessalon perles. Per daughter, cough resolved with this. However, patient slowly developed progressive shortness of breath. Patient had portable O2 at some point in the past but patient returned it as she thought she did not need it.  Patient lives by herself with her  who has Parkinson's. Daughter visits them daily and organizes their medications, but patient takes her meds. Reports compliance. Takes furosemide three times a week. Patient admits to adding more salt to her diet lately. Daughter add patient was recently started on gabapentin and has become lethargic since.     In the ED, patient was hypoxic and using accessory muscles at rest. BNP 1004, has rales on exam and CXR consistent with pulmonary edema. Admitted for heart failure exacerbation.     Overview/Hospital Course:  Ms. Whalen was admitted for acute on chronic respiratory failure due to volume overload and CHF exacerbation. She was started on IV diuretics and started to improve. PT/OT consulted.    Interval History:  Doing better. O2 sats stable on 2L.     Review of Systems   Constitutional: Negative.    HENT: Negative.    Eyes: Negative.    Respiratory: Positive for cough  and shortness of breath.    Cardiovascular: Positive for chest pain (with cough).   Gastrointestinal: Negative.    Endocrine: Negative.    Genitourinary: Negative.    Musculoskeletal: Negative.    Skin: Negative.    Neurological: Negative.    Hematological: Negative.    Psychiatric/Behavioral: Negative for agitation.     Objective:     Vital Signs (Most Recent):  Temp: 96.2 °F (35.7 °C) (09/10/19 1135)  Pulse: 70 (09/10/19 1135)  Resp: 18 (09/10/19 1135)  BP: (!) 164/67 (09/10/19 1135)  SpO2: 95 % (09/10/19 1135) Vital Signs (24h Range):  Temp:  [96.2 °F (35.7 °C)-98.4 °F (36.9 °C)] 96.2 °F (35.7 °C)  Pulse:  [49-70] 70  Resp:  [13-24] 18  SpO2:  [95 %-100 %] 95 %  BP: (138-194)/(55-93) 164/67     Weight: 93 kg (205 lb)  Body mass index is 40.04 kg/m².    Physical Exam   Constitutional: She is oriented to person, place, and time. She appears well-developed. No distress.   obese   Eyes: Conjunctivae and EOM are normal.   Neck: Normal range of motion.   Cardiovascular: Normal rate and regular rhythm.   Pulmonary/Chest: Effort normal. No respiratory distress. She has no wheezes. She has rales (at bases). She exhibits no tenderness.   Abdominal: Soft. Bowel sounds are normal.   Musculoskeletal: She exhibits no edema.   Neurological: She is alert and oriented to person, place, and time.   Skin: Skin is warm. Capillary refill takes less than 2 seconds. She is not diaphoretic.   Psychiatric: She has a normal mood and affect. Her behavior is normal. Judgment and thought content normal.   Nursing note and vitals reviewed.        CRANIAL NERVES     CN III, IV, VI   Extraocular motions are normal.        Significant Labs: reviewed    Significant Imaging: I have reviewed and interpreted all pertinent imaging results/findings within the past 24 hours.      Assessment/Plan:      * Acute on chronic respiratory failure with hypoxia  Patient has pulmonary edema despite compliance with furosemide three times a week  This is likely  due to high sodium diet  Change diet to cardiac (and diabetic) and initiate IV diuresis   Goal is for patient's breathing to be good enough for patient to feel more comfortably and to optimize functional status.   Monitor renal function closely  O2 testing prior to discharge  PT/OT consulted    Obesity, Class III, BMI 40-49.9 (morbid obesity)  Will address when appropriate      CKD stage 3 due to type 2 diabetes mellitus  At baseline  Monitor closely while on IV diuresis      Acute on chronic congestive heart failure  As above  No evidence of ACS  Repeat Echo  Cardiac monitoring    Bipolar disorder, most recent episode manic  Stable on current home regimen  Will resume      (HFpEF) heart failure with preserved ejection fraction  Per Echo in 2016  Repeat 9/10/2019    Essential hypertension  Resume home regimen  Cardiac diet    MIGUEL (obstructive sleep apnea) unable to afford CPAP  Not on CPAP at home      Type 2 diabetes mellitus with microalbuminuria, with long-term current use of insulin  Started insulin and adjust as needed for goal -180  Decreased dose of gabapentin due to lethargy      VTE Risk Mitigation (From admission, onward)        Ordered     IP VTE HIGH RISK PATIENT  Once      09/09/19 1601     Place sequential compression device  Until discontinued      09/09/19 1601                Allie Drake MD  Department of Hospital Medicine   [facility]

## 2019-09-10 NOTE — PLAN OF CARE
Problem: Adult Inpatient Plan of Care  Goal: Plan of Care Review  Outcome: Ongoing (interventions implemented as appropriate)  POC reviewed with pt at 0300. Pt verbalized understanding. Questions and concerns addressed. No acute events overnight. CT head done overnight see note.. Pt progressing toward goals. Will continue to monitor. See flowsheets for full assessment and VS info

## 2019-09-10 NOTE — PLAN OF CARE
Attempted to complete assessment, per nurse patient off unit for cardiac procedure.     09/10/19 1111   Discharge Assessment   Assessment Type Discharge Planning Assessment

## 2019-09-10 NOTE — NURSING
Upon arrival to floor from cardilogy: cardiac monitoring in progress, patient oriented to room, call bell in reach and bed in lowest position. Patient awake, and alert, oriented. No apparent distress noted

## 2019-09-10 NOTE — NURSING
RN noted pt had rightfacial droop and left sided weakness with no history of previous CVA.  Dr. Renata MD notified.  Ordered STAT CT head without contrast.  Will continue to monitor.

## 2019-09-10 NOTE — PLAN OF CARE
09/10/19 1513   Discharge Assessment   Assessment Type Discharge Planning Assessment   Confirmed/corrected address and phone number on facesheet? Yes   Assessment information obtained from? Patient   Communicated expected length of stay with patient/caregiver yes   Prior to hospitilization cognitive status: Alert/Oriented   Prior to hospitalization functional status: Assistive Equipment   Current cognitive status: Alert/Oriented   Current Functional Status: Independent   Facility Arrived From:   ([facility]- [phone])   Lives With child(gold), adult   Able to Return to Prior Arrangements no   Is patient able to care for self after discharge? No;Unable to determine at this time (comments)   Who are your caregiver(s) and their phone number(s)? [facility] Whalen  [phone]   Patient's perception of discharge disposition home or selfcare;home health   Readmission Within the Last 30 Days no previous admission in last 30 days   Patient currently being followed by outpatient case management? No   Patient currently receives any other outside agency services? Yes   Name and contact number of agency or person providing outside services [facility]   Is it the patient/care giver preference to resume care with the current outside agency? Yes   Equipment Currently Used at Home rollator;walker, standard   Do you have any problems affording any of your prescribed medications? No   Is the patient taking medications as prescribed? yes   Does the patient have transportation home? Yes   Transportation Anticipated family or friend will provide  (Daughter, Liliam Whalen)   Does the patient receive services at the [facility]? No   Discharge Plan A Home Health   Discharge Plan B Home Health   DME Needed Upon Discharge  none   Patient/Family in Agreement with Plan yes       SW noticed the patient could not speak english very well. SW ask the patient if she would like us to get a  on the phone the patient response  "was no. She stated since her daughter Joanne knows all about her medication and doctors appointment she could interpret for her.       SW met with patient to complete discharge needs assessment. SW reviewed with patient contents of "[facility] Packet" including "help at home", "things patient responsible for to manage her health at home" and "preferences". Patient daughter was able to verbalize her  help at home with patient . SW discussed with patient and daughter the things they are  responsible for to manage the patient  health at home would be by going on her doctor appointments, taking medications as prescribed, and getting prescriptions filled. SW wrote name and phone number on white communication board. Patient prefers daughter Joanne will make patient appointments.       [facility] [place] - [address] [facility]  [address]  [place] [address]  Phone: [phone] Fax: [phone]    [facility] - [place], [place] - [address]  [address] [facility] [address] [address]  Phone: [phone] Fax: [phone]    "

## 2019-09-11 ENCOUNTER — PATIENT MESSAGE (OUTPATIENT)
Dept: FAMILY MEDICINE | Facility: CLINIC | Age: 79
End: 2019-09-11

## 2019-09-11 PROBLEM — I50.30 (HFPEF) HEART FAILURE WITH PRESERVED EJECTION FRACTION: Chronic | Status: ACTIVE | Noted: 2018-05-10

## 2019-09-11 PROBLEM — F31.10 BIPOLAR DISORDER, MOST RECENT EPISODE MANIC: Chronic | Status: ACTIVE | Noted: 2019-08-29

## 2019-09-11 PROBLEM — N18.30 CKD STAGE 3 DUE TO TYPE 2 DIABETES MELLITUS: Chronic | Status: ACTIVE | Noted: 2019-09-09

## 2019-09-11 PROBLEM — E66.01 OBESITY, CLASS III, BMI 40-49.9 (MORBID OBESITY): Chronic | Status: ACTIVE | Noted: 2019-09-09

## 2019-09-11 PROBLEM — I10 ESSENTIAL HYPERTENSION: Chronic | Status: ACTIVE | Noted: 2017-06-06

## 2019-09-11 PROBLEM — E11.22 CKD STAGE 3 DUE TO TYPE 2 DIABETES MELLITUS: Chronic | Status: ACTIVE | Noted: 2019-09-09

## 2019-09-11 LAB
ALBUMIN SERPL BCP-MCNC: 2.7 G/DL (ref 3.5–5.2)
ANION GAP SERPL CALC-SCNC: 4 MMOL/L (ref 8–16)
BUN SERPL-MCNC: 35 MG/DL (ref 8–23)
CALCIUM SERPL-MCNC: 8.4 MG/DL (ref 8.7–10.5)
CHLORIDE SERPL-SCNC: 83 MMOL/L (ref 95–110)
CO2 SERPL-SCNC: 43 MMOL/L (ref 23–29)
CREAT SERPL-MCNC: 1.7 MG/DL (ref 0.5–1.4)
EST. GFR  (AFRICAN AMERICAN): 33 ML/MIN/1.73 M^2
EST. GFR  (NON AFRICAN AMERICAN): 29 ML/MIN/1.73 M^2
GLUCOSE SERPL-MCNC: 58 MG/DL (ref 70–110)
PHOSPHATE SERPL-MCNC: 4.1 MG/DL (ref 2.7–4.5)
POCT GLUCOSE: 110 MG/DL (ref 70–110)
POCT GLUCOSE: 132 MG/DL (ref 70–110)
POCT GLUCOSE: 206 MG/DL (ref 70–110)
POCT GLUCOSE: 53 MG/DL (ref 70–110)
POTASSIUM SERPL-SCNC: 4.7 MMOL/L (ref 3.5–5.1)
SODIUM SERPL-SCNC: 130 MMOL/L (ref 136–145)

## 2019-09-11 PROCEDURE — 94761 N-INVAS EAR/PLS OXIMETRY MLT: CPT | Mod: HCNC

## 2019-09-11 PROCEDURE — 97162 PT EVAL MOD COMPLEX 30 MIN: CPT | Mod: HCNC

## 2019-09-11 PROCEDURE — 99900035 HC TECH TIME PER 15 MIN (STAT): Mod: HCNC

## 2019-09-11 PROCEDURE — 80069 RENAL FUNCTION PANEL: CPT | Mod: HCNC

## 2019-09-11 PROCEDURE — 97535 SELF CARE MNGMENT TRAINING: CPT | Mod: HCNC

## 2019-09-11 PROCEDURE — 36415 COLL VENOUS BLD VENIPUNCTURE: CPT | Mod: HCNC

## 2019-09-11 PROCEDURE — 97165 OT EVAL LOW COMPLEX 30 MIN: CPT | Mod: HCNC

## 2019-09-11 PROCEDURE — 21400001 HC TELEMETRY ROOM: Mod: HCNC

## 2019-09-11 PROCEDURE — 94660 CPAP INITIATION&MGMT: CPT | Mod: HCNC

## 2019-09-11 PROCEDURE — 25000003 PHARM REV CODE 250: Mod: HCNC | Performed by: EMERGENCY MEDICINE

## 2019-09-11 PROCEDURE — 27000221 HC OXYGEN, UP TO 24 HOURS: Mod: HCNC

## 2019-09-11 PROCEDURE — 97110 THERAPEUTIC EXERCISES: CPT | Mod: HCNC

## 2019-09-11 RX ORDER — CLONIDINE HYDROCHLORIDE 0.1 MG/1
0.1 TABLET ORAL 3 TIMES DAILY PRN
Status: DISCONTINUED | OUTPATIENT
Start: 2019-09-12 | End: 2019-09-12 | Stop reason: HOSPADM

## 2019-09-11 RX ADMIN — DIVALPROEX SODIUM 500 MG: 250 TABLET, DELAYED RELEASE ORAL at 09:09

## 2019-09-11 RX ADMIN — AMLODIPINE BESYLATE 5 MG: 5 TABLET ORAL at 08:09

## 2019-09-11 RX ADMIN — QUETIAPINE FUMARATE 50 MG: 25 TABLET ORAL at 09:09

## 2019-09-11 RX ADMIN — PANTOPRAZOLE SODIUM 40 MG: 40 TABLET, DELAYED RELEASE ORAL at 08:09

## 2019-09-11 RX ADMIN — DIVALPROEX SODIUM 500 MG: 250 TABLET, DELAYED RELEASE ORAL at 08:09

## 2019-09-11 RX ADMIN — METOPROLOL SUCCINATE 25 MG: 25 TABLET, EXTENDED RELEASE ORAL at 08:09

## 2019-09-11 RX ADMIN — CITALOPRAM HYDROBROMIDE 20 MG: 20 TABLET ORAL at 08:09

## 2019-09-11 RX ADMIN — LOSARTAN POTASSIUM 100 MG: 25 TABLET, FILM COATED ORAL at 08:09

## 2019-09-11 RX ADMIN — INSULIN DETEMIR 10 UNITS: 100 INJECTION, SOLUTION SUBCUTANEOUS at 09:09

## 2019-09-11 NOTE — PLAN OF CARE
VIRAL spoke with Milvia dahl [facility], she stated that patient was discharged from oxygen on 4-2017.      09/11/19 1021   Post-Acute Status   Post-Acute Authorization [facility]

## 2019-09-11 NOTE — SUBJECTIVE & OBJECTIVE
Interval History:  Doing better. Still requiring 2L O2.     Review of Systems   Constitutional: Negative.    HENT: Negative.    Eyes: Negative.    Respiratory: Positive for shortness of breath. Negative for wheezing.    Cardiovascular: Positive for chest pain (with cough).   Gastrointestinal: Negative.    Endocrine: Negative.    Genitourinary: Negative.    Musculoskeletal: Negative.    Skin: Negative.    Neurological: Negative.    Hematological: Negative.    Psychiatric/Behavioral: Negative for agitation.     Objective:     Vital Signs (Most Recent):  Temp: 98.7 °F (37.1 °C) (09/11/19 1511)  Pulse: 74 (09/11/19 1511)  Resp: 18 (09/11/19 1511)  BP: (!) 164/70 (09/11/19 1511)  SpO2: 96 % (09/11/19 1511) Vital Signs (24h Range):  Temp:  [97.9 °F (36.6 °C)-100.2 °F (37.9 °C)] 98.7 °F (37.1 °C)  Pulse:  [62-74] 74  Resp:  [16-20] 18  SpO2:  [94 %-98 %] 96 %  BP: (143-164)/(64-70) 164/70     Weight: 92.1 kg (203 lb 0.7 oz)  Body mass index is 39.65 kg/m².    Physical Exam   Constitutional: She is oriented to person, place, and time. She appears well-developed. No distress.   obese   Eyes: Conjunctivae and EOM are normal.   Neck: Normal range of motion.   Cardiovascular: Normal rate and regular rhythm.   Pulmonary/Chest: Effort normal. No respiratory distress. She has no wheezes. She has rales (at bases). She exhibits no tenderness.   Abdominal: Soft. Bowel sounds are normal.   Musculoskeletal: She exhibits no edema.   Neurological: She is alert and oriented to person, place, and time.   Skin: Skin is warm. Capillary refill takes less than 2 seconds. She is not diaphoretic.   Psychiatric: She has a normal mood and affect. Her behavior is normal. Judgment and thought content normal.   Nursing note and vitals reviewed.        CRANIAL NERVES     CN III, IV, VI   Extraocular motions are normal.        Significant Labs: reviewed

## 2019-09-11 NOTE — PROGRESS NOTES
[facility] Hospital Medicine  Progress Note    Patient Name: Kaci Whalen  MRN: [id number]  Patient Class: IP- Inpatient   Admission Date: 9/9/2019  Length of Stay: 2 days  Attending Physician: Allie Drake MD  Primary Care Provider: Toby Hung MD        Subjective:     Principal Problem:Acute on chronic respiratory failure with hypoxia        HPI:  78 year old female with diastolic heart failure, hypertension, bipolar disorder, CKD stage 3 and diabetes mellitus type 2 who presented with shortness of breath of days in evolution. Per daughter, patient developed a cough. Associated symptoms include white sputum. Patient saw her PCP 5 days PTA. Patient did not have signs of fluid overload or pulmonary edema. She was started on robitussin and tessalon perles. Per daughter, cough resolved with this. However, patient slowly developed progressive shortness of breath. Patient had portable O2 at some point in the past but patient returned it as she thought she did not need it.  Patient lives by herself with her  who has Parkinson's. Daughter visits them daily and organizes their medications, but patient takes her meds. Reports compliance. Takes furosemide three times a week. Patient admits to adding more salt to her diet lately. Daughter add patient was recently started on gabapentin and has become lethargic since.     In the ED, patient was hypoxic and using accessory muscles at rest. BNP 1004, has rales on exam and CXR consistent with pulmonary edema. Admitted for heart failure exacerbation.     Overview/Hospital Course:  Ms. Whalen was admitted for acute on chronic respiratory failure due to volume overload and CHF exacerbation. She was started on IV diuretics and started to improve. PT/OT consulted and recommended SNF. Discussed with the patient and her daughter. The patient qualified for home O2 due to her CHF even after renal function showed evidence of volume  depletion.    Interval History:  Doing better. Still requiring 2L O2.     Review of Systems   Constitutional: Negative.    HENT: Negative.    Eyes: Negative.    Respiratory: Positive for shortness of breath. Negative for wheezing.    Cardiovascular: Positive for chest pain (with cough).   Gastrointestinal: Negative.    Endocrine: Negative.    Genitourinary: Negative.    Musculoskeletal: Negative.    Skin: Negative.    Neurological: Negative.    Hematological: Negative.    Psychiatric/Behavioral: Negative for agitation.     Objective:     Vital Signs (Most Recent):  Temp: 98.7 °F (37.1 °C) (09/11/19 1511)  Pulse: 74 (09/11/19 1511)  Resp: 18 (09/11/19 1511)  BP: (!) 164/70 (09/11/19 1511)  SpO2: 96 % (09/11/19 1511) Vital Signs (24h Range):  Temp:  [97.9 °F (36.6 °C)-100.2 °F (37.9 °C)] 98.7 °F (37.1 °C)  Pulse:  [62-74] 74  Resp:  [16-20] 18  SpO2:  [94 %-98 %] 96 %  BP: (143-164)/(64-70) 164/70     Weight: 92.1 kg (203 lb 0.7 oz)  Body mass index is 39.65 kg/m².    Physical Exam   Constitutional: She is oriented to person, place, and time. She appears well-developed. No distress.   obese   Eyes: Conjunctivae and EOM are normal.   Neck: Normal range of motion.   Cardiovascular: Normal rate and regular rhythm.   Pulmonary/Chest: Effort normal. No respiratory distress. She has no wheezes. She has rales (at bases). She exhibits no tenderness.   Abdominal: Soft. Bowel sounds are normal.   Musculoskeletal: She exhibits no edema.   Neurological: She is alert and oriented to person, place, and time.   Skin: Skin is warm. Capillary refill takes less than 2 seconds. She is not diaphoretic.   Psychiatric: She has a normal mood and affect. Her behavior is normal. Judgment and thought content normal.   Nursing note and vitals reviewed.        CRANIAL NERVES     CN III, IV, VI   Extraocular motions are normal.        Significant Labs: reviewed      Assessment/Plan:      * Acute on chronic respiratory failure with  hypoxia  Patient has pulmonary edema despite compliance with furosemide three times a week  This is likely due to high sodium diet  Change diet to cardiac (and diabetic) and initiate IV diuresis   Goal is for patient's breathing to be good enough for patient to feel more comfortably and to optimize functional status.   Monitor renal function closely  O2 testing prior to discharge shows she qualifies for home oxygen  PT/OT consulted and recommended SNF    Obesity, Class III, BMI 40-49.9 (morbid obesity)  Will address when appropriate      CKD stage 3 due to type 2 diabetes mellitus  At baseline  Monitor closely while on IV diuresis      Acute on chronic congestive heart failure  As above  No evidence of ACS  Repeat Echo w/ G2DD  Cardiac monitoring    Bipolar disorder, most recent episode manic  Stable on current home regimen  Will resume      (HFpEF) heart failure with preserved ejection fraction  Per Echo in 2016  Repeat 9/10/2019    Essential hypertension  Resume home regimen  Cardiac diet    MIGUEL (obstructive sleep apnea) unable to afford CPAP  Not on CPAP at home      Type 2 diabetes mellitus with microalbuminuria, with long-term current use of insulin  Started insulin and adjust as needed for goal -180  Decreased dose of gabapentin due to lethargy      VTE Risk Mitigation (From admission, onward)        Ordered     IP VTE HIGH RISK PATIENT  Once      09/09/19 1601     Place sequential compression device  Until discontinued      09/09/19 1601                Allie Drake MD  Department of Hospital Medicine   [facility]

## 2019-09-11 NOTE — PT/OT/SLP EVAL
Physical Therapy Evaluation    Patient Name:  Kaci Whalen   MRN:  [id number]    Recommendations:     Discharge Recommendations:  nursing facility, skilled   Discharge Equipment Recommendations: (home O2???)   Barriers to discharge home: Decreased caregiver support and Pt with decreased mobility.     Assessment:     Kaci Whalen is a 78 y.o. female admitted with a medical diagnosis of Acute on chronic respiratory failure with hypoxia.  She presents with the following impairments/functional limitations:  weakness, impaired endurance, impaired self care skills, impaired functional mobilty, gait instability, impaired balance, impaired cognition, decreased coordination, decreased upper extremity function, decreased lower extremity function, decreased safety awareness, impaired cardiopulmonary response to activity.    Rehab Prognosis: Fair+; patient would benefit from acute skilled PT services to address these deficits and reach maximum level of function.    Recent Surgery: * No surgery found *      Plan:     During this hospitalization, patient to be seen 5 x/week to address the identified rehab impairments via gait training, therapeutic exercises, therapeutic activities and progress toward the following goals:    · Plan of Care Expires:  09/25/19    Subjective     Chief Complaint: Pt c/o SOB with activities.  Patient/Family Comments/goals: Pt agreeable to therapy.   Pain/Comfort:  Pain Rating 1: 0/10    Living Environment:  Pt lives with spouse in a SS house with 1 step at entry.   Prior to admission, patients level of function was mod I with ambulation using rollator.  Pt does not drive PTA.  Equipment used at home: cane, straight, rollator, raised toilet, bedside commode, bath bench.  Upon discharge, patient will have assistance from daughter (travels for work).  Pt also has another daughter.     Objective:     Communicated with nurse Lewis prior to session.  Patient found HOB elevated with oxygen 2L,  peripheral IV, telemetry upon PT entry to room.    General Precautions: Standard, fall, diabetic, respiratory, vision impaired   Orthopedic Precautions:N/A   Braces: N/A     Exams:  · Cognitive Exam:  Patient was able to follow simple commands.  · Gross Motor Coordination:  impaired  · Postural Exam:  Patient presented with the following abnormalities:    · -       Rounded shoulders  · -       Obesity  · Sensation:    · -       Intact  light/touch BLE  · Skin Integrity/Edema:      · -       Skin integrity: Visible skin intact  · -       Edema: None noted BLE  · BLE ROM: WFL  · BLE Strength: WFL    Functional Mobility:  Pt was alert and able to follow simple commands, however required repetitions and demonstration to complete tasks.  Pt was limited with ambulation 2* occasional jerky movement.  Pt with mass to L side of neck, nurse Debbie notified.    · Bed Mobility:     · Scooting: moderate assistance to scoot EOB  · Bridging: stand by assistance  · Supine to Sit: moderate assistance and of 2 persons and HOB elevated  · Sit to Supine: moderate assistance and of 2 persons; Pt had difficulty understanding what was being asked of her.  · Transfers:     · Sit to Stand:  minimum assistance with rolling walker x 3 trials and bed elevated   · Gait: Pt ambulated ~4-5 sidesteps along bedside with min A using RW.  Pt with jerky movement limiting ambulation.  Pt with decreased weight shifting, foot clearance, and step length.  spO2 RA ~80% with activities and spO2 on 3L ~96%, nurse Debbie present and placed pt on 3L O2 NC.    · Balance: Pt with fair balance.       Therapeutic Activities and Exercises:  BLE supine therex 10 reps: AP, HS (AAROM LLE), and hip abd/add (AAROM LLE).  Family reported that pt with h/o L sided weakness.  Pt required max VC's/TC's to attend to therex on the LLE.      AM-PAC 6 CLICK MOBILITY  Total Score:15     Patient left HOB elevated with all lines intact, call button in reach, bed alarm on and nurse  Debbie and daughter present.    GOALS:   Multidisciplinary Problems     Physical Therapy Goals        Problem: Physical Therapy Goal    Goal Priority Disciplines Outcome Goal Variances Interventions   Physical Therapy Goal     PT, PT/OT      Description:  Goals to be met by: 19     Patient will increase functional independence with mobility by performin. Supine to sit with Modified [place]  2. Rolling to Left and Right with Modified [place]  3. Sit to stand transfer with Modified [place] using RW  4. Bed to chair transfer with Modified [place] using Rolling Walker  5. Gait  x100 feet with Modified [place] using Rolling Walker and O2  6. Lower extremity exercise program 2 sets x10 reps per handout, with independence                      History:     Past Medical History:   Diagnosis Date    Abdominal pain     Anxiety     Atherosclerosis of aortic arch     noted on CXR 2015    Atrial fibrillation 2016    CHADS 4, on Xarelto    Benign hypertension     Bipolar disorder, most recent episode manic 2019    CHF (congestive heart failure)     Chronic constipation     Chronic diarrhea     Chronic edema     Depression     Dercum disease     Multiple painful lipomas    Diabetic retinopathy     Dysphagia     Gas pain     Glaucoma of both eyes     Hx of psychiatric care     previously amitriptyline, now jut on Trazodone 100mg QHS PRN insomnia    Hyperlipidemia with target LDL less than 100     Unable to tolerate statins    Immature cataract     Left sided numbness     per daughter    Tasia     no symptoms prior to this presentation    Morbid obesity     Nausea & vomiting     Neuropathy     Polyneuropathy     Primary osteoarthritis of both knees     Proteinuria     Psychiatric problem     history of depression    Pulmonary hypertension mixed group 2 and 3 2017    Sleep apnea     Therapy     no history of therapy    Type II or unspecified type  diabetes mellitus with neurological manifestations, uncontrolled(250.62)     Unspecified vitamin D deficiency     Uses roller walker        Past Surgical History:   Procedure Laterality Date     tenotomy      flexors 2,3 L toes    CATARACT EXTRACTION W/  INTRAOCULAR LENS IMPLANT Bilateral     COLONOSCOPY N/A 5/4/2017    Performed by [facility] Wolf [facility] at [facility] (2ND FLR)    diabetic retinopathy      right    ESOPHAGOGASTRODUODENOSCOPY (EGD) N/A 5/4/2017    Performed by [facility] at [facility] (2ND FLR)    HYSTERECTOMY      knee surgery x2      Left ankle and leg surgery secondary to a fall      rods & screws present    left arm fracture      Left cataract      ORIF, WRIST Left 12/28/2012    Performed by Rozina Buck [facility] at [facility] OR 1ST FLR    PARTIAL HYSTERECTOMY      Secondary to bleeding    TOE FUSION      2,3 R    WRIST SURGERY Left 12/28/2012    pins & plate present       Time Tracking:     PT Received On: 09/11/19  PT Start Time: 1445     PT Stop Time: 1511  PT Total Time (min): 26 min     Billable Minutes: Evaluation 16 min co-eval with OT and Therapeutic Exercise 10 min      Tisha U Bah, PT  09/11/2019

## 2019-09-11 NOTE — PLAN OF CARE
Problem: Occupational Therapy Goal  Goal: Occupational Therapy Goal  Goals to be met by: 9/25/19    Patient will increase functional independence with ADLs by performing:    Feeding with Modified [place] with set up  UE Dressing with Minimal Assistance.  LE Dressing with Contact Guard Assistance.  Grooming while standing at sink with Stand-by Assistance.  Toileting from bedside commode with Set-up Assistance and Supervision for hygiene and clothing management.   Rolling to Bilateral with Stand-by Assistance.   Supine to sit with Stand-by Assistance.  Step transfer with Stand-by Assistance  Toilet transfer to toilet with Stand-by Assistance.  Upper extremity exercise program x10 reps per handout, with assistance as needed.    Outcome: Ongoing (interventions implemented as appropriate)  The patient participated in the OT evaluation with the daughter present. The patient was unable to transfer to the chair 2* c/o weakness with jerky movement in BLE while standing. The patient will benefit from OT to address functional deficits.    Comments: The patient will benefit from SNF.

## 2019-09-11 NOTE — PT/OT/SLP EVAL
Occupational Therapy   Evaluation/Treatment    Name: Kaci Whalen  MRN: [id number]  Admitting Diagnosis:  Acute on chronic respiratory failure with hypoxia      Recommendations:     Discharge Recommendations: nursing facility, skilled  Discharge Equipment Recommendations:  (home O2?)  Barriers to discharge:  Decreased caregiver support    Assessment:     Kaci Whalen is a 78 y.o. female with a medical diagnosis of Acute on chronic respiratory failure with hypoxia.  She presents with self care and functional mobility deficits. The patient was limited by jerky movements in legs and c/o weakness. Performance deficits affecting function: weakness, impaired endurance, impaired self care skills, gait instability, impaired functional mobilty, impaired balance, decreased lower extremity function, decreased upper extremity function, decreased coordination, decreased safety awareness, decreased ROM, edema, impaired cardiopulmonary response to activity.      Rehab Prognosis: Good; patient would benefit from acute skilled OT services to address these deficits and reach maximum level of function.       Plan:     Patient to be seen 5 x/week to address the above listed problems via self-care/home management, therapeutic activities, therapeutic exercises  · Plan of Care Expires: 09/25/19  · Plan of Care Reviewed with: patient, daughter    Subjective     Chief Complaint: fearful of falling  Patient/Family Comments/goals: returnt to PLOF    Occupational Profile:  Living Environment: The patient lives with her spouse in a SS house with a handicapped bathroom with a walk in shower and a bench.  Previous level of function: The patient was able to amb using a rollater. The patient was able to bathe and dress herself. The patient's daughter came to the house in the morning to assist with medications and meals.  Roles and Routines: The patient's spouse required some assist from the patient and her daughter. The patient was recently  D/C from HH PT/OT. The patient's daughter travels for work.   Equipment Used at Home:  bedside commode, walker, rolling, rollator, cane, straight, raised toilet, bath bench  Assistance upon Discharge: spouse (limited), daughter. Per daughter, 2 sisters assistance is limited    Pain/Comfort:  · Pain Rating 1: 0/10    Patients cultural, spiritual, Mandaen conflicts given the current situation: no    Objective:     Communicated with: nurseDebbie prior to session.  Patient found HOB elevated with telemetry, peripheral IV, oxygen upon OT entry to room.    General Precautions: Standard, fall, diabetic, respiratory   Orthopedic Precautions:N/A   Braces: N/A     Occupational Performance:    Bed Mobility:    · Patient completed Scooting/Bridging with moderate assistance  · Patient completed Supine to Sit with moderate assistance and 2 person assist  · Patient completed Sit to Supine with moderate assistance  With leg lift and 2 person assist    Functional Mobility/Transfers:  · Patient completed Sit <> Stand Transfer with minimum assistance  with  rolling walker   · Functional Mobility: The patient stood x3 trials with max verbal and tactile cues with jerky movements present in LE and decreased ability to follow commands for RW use.    Activities of Daily Living:  · Upper Body Dressing: maximal assistance to don/doff back gown  · Lower Body Dressing: minimum assistance to don crocks, max assist to doff    Cognitive/Visual Perceptual:  Cognitive/Psychosocial Skills:     -       Oriented to: Person, Place and Situation   -       Follows Commands/attention:Follows one-step commands and difficuly 2* language?.   -       Communication: clear/fluent and The patient spoke English but the daughter occasssionalyy used Telugu when the patient was having difficulty  -       Memory: Poor immediate recall  -       Safety awareness/insight to disability: impaired   -       Mood/Affect/Coping skills/emotional control: Appropriate to  situation and fear of falling    Physical Exam:  Balance: -       fair  Postural examination/scapula alignment:    -       Rounded shoulders  -       Forward head  Skin integrity: Visible skin intact  Edema:  None noted  Sensation:    -       Intact  Upper Extremity Range of Motion:     -       Right Upper Extremity: WFL  -       Left Upper Extremity: WFL except shldr  Upper Extremity Strength:    -       Right Upper Extremity: WFL  -       Left Upper Extremity: 3/5 shoulder, distally 4/5   Strength:    -       Right Upper Extremity: WFL  -       Left Upper Extremity: weak    AMPAC 6 Click ADL:  AMPAC Total Score: 15    Treatment & Education:  The patient participated in OT eval. The patient with some difficulty following commands with verbal and tactile cues needed. The patient's daughter was present and was educated re: OT role and recommendations for SNF. The patient will have limited assistance available and is not at her PLOF. The patient will benefit from SNF.The oxygen was removed during OT eval with spO2 RA ~80% with activities and spO2 on 3L ~96%, nurse Debbie present and placed pt on 3L O2 NC.  Education:    Patient left HOB elevated with all lines intact, call button in reach and daughter present    GOALS:   Multidisciplinary Problems     Occupational Therapy Goals        Problem: Occupational Therapy Goal    Goal Priority Disciplines Outcome Interventions   Occupational Therapy Goal     OT, PT/OT Ongoing (interventions implemented as appropriate)    Description:  Goals to be met by: 9/25/19    Patient will increase functional independence with ADLs by performing:    Feeding with Modified [place] with set up  UE Dressing with Minimal Assistance.  LE Dressing with Contact Guard Assistance.  Grooming while standing at sink with Stand-by Assistance.  Toileting from bedside commode with Set-up Assistance and Supervision for hygiene and clothing management.   Rolling to Bilateral with Stand-by  Assistance.   Supine to sit with Stand-by Assistance.  Step transfer with Stand-by Assistance  Toilet transfer to toilet with Stand-by Assistance.  Upper extremity exercise program x10 reps per handout, with assistance as needed.                      History:     Past Medical History:   Diagnosis Date    Abdominal pain     Anxiety     Atherosclerosis of aortic arch     noted on CXR 7/1/2015    Atrial fibrillation 05/2016    CHADS 4, on Xarelto    Benign hypertension     Bipolar disorder, most recent episode manic 8/29/2019    CHF (congestive heart failure)     Chronic constipation     Chronic diarrhea     Chronic edema     Depression     Dercum disease     Multiple painful lipomas    Diabetic retinopathy     Dysphagia     Gas pain     Glaucoma of both eyes     Hx of psychiatric care     previously amitriptyline, now jut on Trazodone 100mg QHS PRN insomnia    Hyperlipidemia with target LDL less than 100     Unable to tolerate statins    Immature cataract     Left sided numbness     per daughter    Tasia     no symptoms prior to this presentation    Morbid obesity     Nausea & vomiting     Neuropathy     Polyneuropathy     Primary osteoarthritis of both knees     Proteinuria     Psychiatric problem     history of depression    Pulmonary hypertension mixed group 2 and 3 1/18/2017    Sleep apnea     Therapy     no history of therapy    Type II or unspecified type diabetes mellitus with neurological manifestations, uncontrolled(250.62)     Unspecified vitamin D deficiency     Uses roller walker        Past Surgical History:   Procedure Laterality Date     tenotomy      flexors 2,3 L toes    CATARACT EXTRACTION W/  INTRAOCULAR LENS IMPLANT Bilateral     COLONOSCOPY N/A 5/4/2017    [facility]formed [facility] at [facility] (2ND FLR)    diabetic retinopathy      right    ESOPHAGOGASTRODUODENOSCOPY (EGD) N/A 5/4/2017    [facility] at [facility] (2ND FLR)     HYSTERECTOMY      knee surgery x2      Left ankle and leg surgery secondary to a fall      rods & screws present    left arm fracture      Left cataract      ORIF, WRIST Left 12/28/2012    [facility]    PARTIAL HYSTERECTOMY      Secondary to bleeding    TOE FUSION      2,3 R    WRIST SURGERY Left 12/28/2012    pins & plate present       Time Tracking:     OT Date of Treatment: 09/11/19  OT Start Time: 1443  OT Stop Time: 1519  OT Total Time (min): 36 min    Billable Minutes:Evaluation 16 (with PT)  Self Care/Home Management 8  Total: 36       Mallorie Carter OT  9/11/2019

## 2019-09-11 NOTE — ASSESSMENT & PLAN NOTE
Patient has pulmonary edema despite compliance with furosemide three times a week  This is likely due to high sodium diet  Change diet to cardiac (and diabetic) and initiate IV diuresis   Goal is for patient's breathing to be good enough for patient to feel more comfortably and to optimize functional status.   Monitor renal function closely  O2 testing prior to discharge shows she qualifies for home oxygen  PT/OT consulted and recommended SNF

## 2019-09-11 NOTE — NURSING
Testing for Home O2    O2 Sats on RA at rest = 88    O2 sats on RA with exercise  = 83    O2 sats on 2L O2 with exercise = 92-93

## 2019-09-11 NOTE — PLAN OF CARE
Problem: Physical Therapy Goal  Goal: Physical Therapy Goal  Goals to be met by: 19     Patient will increase functional independence with mobility by performin. Supine to sit with Modified [place]  2. Rolling to Left and Right with Modified [place]  3. Sit to stand transfer with Modified [place] using RW  4. Bed to chair transfer with Modified [place] using Rolling Walker  5. Gait  x100 feet with Modified [place] using Rolling Walker and O2  6. Lower extremity exercise program 2 sets x10 reps per handout, with independence    Pt may benefit from SNF.

## 2019-09-11 NOTE — PLAN OF CARE
VIRAL spoke with nurse Debbie and asked her to check patient oxygen sat for possible Home oxygen. Also VIRAL spoke with patient Daughter Liliam regarding the recommendation the OT and PT suggested for the patient a skilled nursing facility. Patients daughter Liliam called the SW and stated that she did not want her mother to go back to a skilled nursing facility, because of the way she was treated before. I did advise the pt if that is your preference we will go with what you prefer.        09/11/19 1021   Post-Acute Status   Post-Acute Authorization Other

## 2019-09-12 ENCOUNTER — TELEPHONE (OUTPATIENT)
Dept: FAMILY MEDICINE | Facility: CLINIC | Age: 79
End: 2019-09-12

## 2019-09-12 ENCOUNTER — HOSPITAL ENCOUNTER (EMERGENCY)
Facility: HOSPITAL | Age: 79
Discharge: HOME | End: 2019-09-12
Attending: EMERGENCY MEDICINE
Payer: MEDICARE

## 2019-09-12 VITALS
HEIGHT: 60 IN | TEMPERATURE: 98 F | RESPIRATION RATE: 18 BRPM | DIASTOLIC BLOOD PRESSURE: 71 MMHG | SYSTOLIC BLOOD PRESSURE: 160 MMHG | HEART RATE: 62 BPM | WEIGHT: 202.63 LBS | BODY MASS INDEX: 39.78 KG/M2 | OXYGEN SATURATION: 99 %

## 2019-09-12 VITALS
HEIGHT: 59 IN | WEIGHT: 210 LBS | BODY MASS INDEX: 42.33 KG/M2 | OXYGEN SATURATION: 97 % | HEART RATE: 65 BPM | DIASTOLIC BLOOD PRESSURE: 65 MMHG | RESPIRATION RATE: 18 BRPM | SYSTOLIC BLOOD PRESSURE: 165 MMHG

## 2019-09-12 DIAGNOSIS — R53.1 WEAKNESS: Primary | ICD-10-CM

## 2019-09-12 LAB
ALBUMIN SERPL BCP-MCNC: 2.7 G/DL (ref 3.5–5.2)
ANION GAP SERPL CALC-SCNC: 4 MMOL/L (ref 8–16)
BUN SERPL-MCNC: 35 MG/DL (ref 8–23)
CALCIUM SERPL-MCNC: 8.4 MG/DL (ref 8.7–10.5)
CHLORIDE SERPL-SCNC: 86 MMOL/L (ref 95–110)
CO2 SERPL-SCNC: 42 MMOL/L (ref 23–29)
CREAT SERPL-MCNC: 1.5 MG/DL (ref 0.5–1.4)
EST. GFR  (AFRICAN AMERICAN): 39 ML/MIN/1.73 M^2
EST. GFR  (NON AFRICAN AMERICAN): 34 ML/MIN/1.73 M^2
GLUCOSE SERPL-MCNC: 142 MG/DL (ref 70–110)
PHOSPHATE SERPL-MCNC: 3.7 MG/DL (ref 2.7–4.5)
POCT GLUCOSE: 112 MG/DL (ref 70–110)
POCT GLUCOSE: 142 MG/DL (ref 70–110)
POCT GLUCOSE: 163 MG/DL (ref 70–110)
POTASSIUM SERPL-SCNC: 5 MMOL/L (ref 3.5–5.1)
SODIUM SERPL-SCNC: 132 MMOL/L (ref 136–145)

## 2019-09-12 PROCEDURE — 25000003 PHARM REV CODE 250: Mod: HCNC | Performed by: EMERGENCY MEDICINE

## 2019-09-12 PROCEDURE — 97535 SELF CARE MNGMENT TRAINING: CPT | Mod: HCNC

## 2019-09-12 PROCEDURE — 80069 RENAL FUNCTION PANEL: CPT | Mod: HCNC

## 2019-09-12 PROCEDURE — 94660 CPAP INITIATION&MGMT: CPT | Mod: HCNC

## 2019-09-12 PROCEDURE — 36415 COLL VENOUS BLD VENIPUNCTURE: CPT | Mod: HCNC

## 2019-09-12 PROCEDURE — 99900035 HC TECH TIME PER 15 MIN (STAT): Mod: HCNC

## 2019-09-12 PROCEDURE — 99282 EMERGENCY DEPT VISIT SF MDM: CPT | Mod: 25,HCNC

## 2019-09-12 RX ADMIN — METOPROLOL SUCCINATE 25 MG: 25 TABLET, EXTENDED RELEASE ORAL at 08:09

## 2019-09-12 RX ADMIN — AMLODIPINE BESYLATE 5 MG: 5 TABLET ORAL at 08:09

## 2019-09-12 RX ADMIN — DIVALPROEX SODIUM 500 MG: 250 TABLET, DELAYED RELEASE ORAL at 08:09

## 2019-09-12 RX ADMIN — LOSARTAN POTASSIUM 100 MG: 25 TABLET, FILM COATED ORAL at 08:09

## 2019-09-12 RX ADMIN — CITALOPRAM HYDROBROMIDE 20 MG: 20 TABLET ORAL at 08:09

## 2019-09-12 RX ADMIN — PANTOPRAZOLE SODIUM 40 MG: 40 TABLET, DELAYED RELEASE ORAL at 08:09

## 2019-09-12 NOTE — PLAN OF CARE
SW spoke with Milvia from [facility], she did advise me that the pt was approved for oxygen and to call respiratory to pull the tanks.

## 2019-09-12 NOTE — TELEPHONE ENCOUNTER
----- Message from Chantell Angulo sent at 9/12/2019  2:25 PM CDT -----  Contact: Liliam-Daughter  Type: Patient Call Back    Who called:Liliam    What is the request in detail:Liliam called to request orders for skilled nursing. Patient was discharged from [facility] today and fell in the parking lot. Please call to advise    Can the clinic reply by [facility]?    Would the patient rather a call back or a response via [facility]? Call    Best call back number:[phone]

## 2019-09-12 NOTE — PLAN OF CARE
Problem: Adult Inpatient Plan of Care  Goal: Plan of Care Review  Outcome: Ongoing (interventions implemented as appropriate)  Pt is on bipap 10/5 35% and is resting comfortably.

## 2019-09-12 NOTE — NURSING
Discharge instructions given to patient and daughter, both verbalized understanding. O2 2L on. Patient left with transport by wheelchair to the family vehicle. No distress noted.

## 2019-09-12 NOTE — PLAN OF CARE
[facility] have accepted the patient and will start providing services on Jim 9-15-19       09/12/19 4017   Post-Acute Status   Post-Acute Authorization Home Health/Hospice   Home Health/Hospice Status Set-up Complete

## 2019-09-12 NOTE — PLAN OF CARE
[facility]  HOME  HEALTH ORDERS     09/12/2019    Admit to Home Health    Diagnoses:  Active Hospital Problems    Diagnosis  POA    *Acute on chronic respiratory failure with hypoxia [J96.21]  Yes    Acute on chronic congestive heart failure [I50.9]  Yes    CKD stage 3 due to type 2 diabetes mellitus [E11.22, N18.3]  Yes     Chronic    Obesity, Class III, BMI 40-49.9 (morbid obesity) [E66.01]  Yes     Chronic    Bipolar disorder, most recent episode manic [F31.10]  Yes     Chronic    (HFpEF) heart failure with preserved ejection fraction [I50.30]  Yes     Chronic    Essential hypertension [I10]  Yes     Chronic    MIGUEL (obstructive sleep apnea) unable to afford CPAP [G47.33]  Yes     Chronic    Type 2 diabetes mellitus with microalbuminuria, with long-term current use of insulin [E11.29, R80.9, Z79.4]  Not Applicable     Chronic     Chronic kidney disease calculated using CKD-EPI Creatinine 2009 Equation.          Resolved Hospital Problems   No resolved problems to display.       Patient is homebound due to:  Acute on chronic respiratory failure with hypoxia    Allergies:  Review of patient's allergies indicates:   Allergen Reactions    Tramadol Other (See Comments)     Interaction-induced delirium with s/s of estrellita.    Ace inhibitors      Other reaction(s): cough      Fluorescein Itching     Other reaction(s): Itching    Iodine      Other reaction(s): Itching    Pravastatin Other (See Comments)     Other reaction(s): mouth tingling/numbness    Simvastatin      Other reaction(s): foot swelling         Diet: 1800 ADA, cardiac, low salt    Acitivities: As tolerated    Nursing:   SN to complete comprehensive assessment including routine vital signs. Instruct on disease process and s/s of complications to report to MD. Review/verify medication list sent home with the patient at time of discharge  and instruct patient/caregiver as needed. Frequency may be adjusted depending on start of  care date.    Notify MD if SBP > 160 or < 90; DBP > 90 or < 50; HR > 120 or < 50; Temp > 100.4    CONSULTS:      PT to evaluate and treat. Evaluate for home safety and equipment needs; Establish/upgrade home exercise program. Perform / instruct on therapeutic exercises, gait training, transfer training, and Range of Motion.    OT to evaluate and treat. Evaluate home environment for safety and equipment needs. Perform/Instruct on transfers, ADL training, ROM, and therapeutic exercises.    MSW to evaluate for community resources/long-range planning.     Aide to provide assistance with personal care, ADLs, and vital signs        Medications: Review discharge medications with patient and family and provide education.       Kaci Whalen   Home Medication Instructions RUBÉN:[id number]    Printed on:09/12/19 6867   Medication Information                      alcohol swabs PadM  Apply 1 each topically as needed.             amLODIPine (NORVASC) 5 MG tablet  Take 1 tablet (5 mg total) by mouth once daily.             blood glucose control, low Soln  To test meter once per week             citalopram (CELEXA) 20 MG tablet  Take 1 tablet (20 mg total) by mouth once daily.             desloratadine (CLARINEX) 5 mg tablet  Take 1 tablet (5 mg total) by mouth once daily.             divalproex (DEPAKOTE) 500 MG TbEC  Take 1 tablet (500 mg total) by mouth every 12 (twelve) hours.             fluticasone (FLONASE) 50 mcg/actuation nasal spray  1 spray (50 mcg total) by Each Nare route 2 (two) times daily.             furosemide (LASIX) 40 MG tablet  Take 1 tablet (40 mg total) by mouth once daily. One tab po daily x three days then as needed for LE swelling             gabapentin (NEURONTIN) 100 MG capsule  Take 1 capsule (100 mg total) by mouth 3 (three) times daily.             insulin aspart protamine-insulin aspart (NOVOLOG 70/30) 100 unit/mL (70-30) InPn pen  Inject 21 Units into the skin 2 (two) times daily before meals.  "Sliding scale max daily dosing 50 units daily             lancets (LANCETS,THIN) 28 gauge Misc  1 lancet by Misc.(Non-Drug; Combo Route) route 4 (four) times daily before meals and nightly.             losartan (COZAAR) 100 MG tablet  Take 1 tablet (100 mg total) by mouth once daily.             melatonin 10 mg Tab  Take by mouth. 2 tabs at night             metFORMIN (GLUCOPHAGE) 500 MG tablet  Take 1 tablet (500 mg total) by mouth 3 (three) times daily.             metoprolol succinate (TOPROL-XL) 25 MG 24 hr tablet  Take 1 tablet (25 mg total) by mouth once daily.             pen needle, diabetic (BD ULTRA-FINE SANDEEP PEN NEEDLES) 32 gauge x 5/32" Ndle  Take 1 each by mouth 4 (four) times daily.             QUEtiapine (SEROQUEL) 50 MG tablet  Take 1 tablet (50 mg total) by mouth every evening.             TRUE METRIX GLUCOSE TEST STRIP Strp  TEST FOUR TIMES DAILY BEFORE MEALS  AND EVERY NIGHT                       _________________________________  Allie Drake MD  09/12/2019      "

## 2019-09-12 NOTE — ED TRIAGE NOTES
Patient was discharged from the hospital and upon attempting to get into her car patient had a fall.  Denies LOC.

## 2019-09-12 NOTE — ED PROVIDER NOTES
Encounter Date: 9/12/2019    SCRIBE #1 NOTE: I, Brianna Stuart, am scribing for, and in the presence of,  Bora Moctezuma MD. I have scribed the following portions of the note - Other sections scribed: HPI, ROS.       History     Chief Complaint   Patient presents with    Fall     Patient's that patient lost her balance while getting in her car in the parking lot. Patient did not hit ground, hit hoead, or loc. Patient reports right lower leg pain and left knee pain.     CC: Fall    HPI: This is a 78 y.o. Female with a PMHx of CHF and  Dementia who presents to the ED complaining of a fall that occurred at 1345 today. The patient's daughter reports that the patient was admitted in this hospital on 9/9/2019 for SOB. The patient's daughter states that she was discharged today. The patient's daughter notes that when she was trying to get her mother into the car she fell in the parking lot. The patient denies any pain or injuries from the fall. No other associated symptoms.     The history is provided by a relative and the patient. No  was used.     Review of patient's allergies indicates:   Allergen Reactions    Tramadol Other (See Comments)     Interaction-induced delirium with s/s of estrellita.    Ace inhibitors      Other reaction(s): cough      Fluorescein Itching     Other reaction(s): Itching    Iodine      Other reaction(s): Itching    Pravastatin Other (See Comments)     Other reaction(s): mouth tingling/numbness    Simvastatin      Other reaction(s): foot swelling       Past Medical History:   Diagnosis Date    Abdominal pain     Anxiety     Atherosclerosis of aortic arch     noted on CXR 7/1/2015    Atrial fibrillation 05/2016    CHADS 4, on Xarelto    Benign hypertension     Bipolar disorder, most recent episode manic 8/29/2019    CHF (congestive heart failure)     Chronic constipation     Chronic diarrhea     Chronic edema     Depression     Dercum disease     Multiple  painful lipomas    Diabetic retinopathy     Dysphagia     Gas pain     Glaucoma of both eyes     Hx of psychiatric care     previously amitriptyline, now jut on Trazodone 100mg QHS PRN insomnia    Hyperlipidemia with target LDL less than 100     Unable to tolerate statins    Immature cataract     Left sided numbness     per daughter    Tasia     no symptoms prior to this presentation    Morbid obesity     Nausea & vomiting     Neuropathy     Polyneuropathy     Primary osteoarthritis of both knees     Proteinuria     Psychiatric problem     history of depression    Pulmonary hypertension mixed group 2 and 3 1/18/2017    Sleep apnea     Therapy     no history of therapy    Type II or unspecified type diabetes mellitus with neurological manifestations, uncontrolled(250.62)     Unspecified vitamin D deficiency     Uses roller walker      Past Surgical History:   Procedure Laterality Date     tenotomy      flexors 2,3 L toes    CATARACT EXTRACTION W/  INTRAOCULAR LENS IMPLANT Bilateral     COLONOSCOPY N/A 5/4/2017    Performed by [facility]ellen Wolf [facility] at [facility] (2ND FLR)    diabetic retinopathy      right    ESOPHAGOGASTRODUODENOSCOPY (EGD) N/A 5/4/2017    Performed by [facility] at [facility] (2ND FLR)    HYSTERECTOMY      knee surgery x2      Left ankle and leg surgery secondary to a fall      rods & screws present    left arm fracture      Left cataract      ORIF, WRIST Left 12/28/2012    Performed by Rozina Buck [facility] at [facility] OR 1ST FLR    PARTIAL HYSTERECTOMY      Secondary to bleeding    TOE FUSION      2,3 R    WRIST SURGERY Left 12/28/2012    pins & plate present     Family History   Problem Relation Age of Onset    No Known Problems Daughter     No Known Problems Father     Liver cancer Mother     Bone cancer Daughter     No Known Problems Sister     No Known Problems Brother     No Known Problems Maternal Aunt     No Known Problems Maternal Uncle      No Known Problems Paternal Aunt     No Known Problems Paternal Uncle     No Known Problems Maternal Grandmother     No Known Problems Maternal Grandfather     No Known Problems Paternal Grandmother     No Known Problems Paternal Grandfather     Amblyopia Neg Hx     Blindness Neg Hx     Cancer Neg Hx     Cataracts Neg Hx     Diabetes Neg Hx     Glaucoma Neg Hx     Hypertension Neg Hx     Macular degeneration Neg Hx     Retinal detachment Neg Hx     Strabismus Neg Hx     Stroke Neg Hx     Thyroid disease Neg Hx     Celiac disease Neg Hx     Cirrhosis Neg Hx     Colon cancer Neg Hx     Colon polyps Neg Hx     Crohn's disease Neg Hx     Cystic fibrosis Neg Hx     Esophageal cancer Neg Hx     Hemochromatosis Neg Hx     Inflammatory bowel disease Neg Hx     Irritable bowel syndrome Neg Hx     Liver disease Neg Hx     Rectal cancer Neg Hx     Stomach cancer Neg Hx     Ulcerative colitis Neg Hx     Montez's disease Neg Hx     Alcohol abuse Neg Hx     Bipolar disorder Neg Hx     Dementia Neg Hx     Depression Neg Hx     Drug abuse Neg Hx     Suicide Neg Hx     Schizophrenia Neg Hx      Social History     Tobacco Use    Smoking status: Never Smoker    Smokeless tobacco: Never Used   Substance Use Topics    Alcohol use: No    Drug use: No     Review of Systems   Constitutional: Negative.    HENT: Negative.    Eyes: Negative.    Respiratory: Negative.    Cardiovascular: Negative.    Gastrointestinal: Negative.    Endocrine: Negative.    Genitourinary: Negative.    Musculoskeletal: Negative.    Skin: Negative.    Allergic/Immunologic: Negative.    Neurological: Negative.    Psychiatric/Behavioral: Negative.        Physical Exam     Initial Vitals [09/12/19 1419]   BP Pulse Resp Temp SpO2   (!) 165/65 65 18 -- 97 %      MAP       --         Physical Exam    Constitutional: She appears well-developed. She is not diaphoretic. No distress.   HENT:   Head: Normocephalic and atraumatic.    Nose: Nose normal.   Eyes: EOM are normal. Pupils are equal, round, and reactive to light.   Neck: Normal range of motion. Neck supple. No JVD present.   Cardiovascular: Regular rhythm and normal heart sounds.   No murmur heard.  Pulmonary/Chest: No stridor. No respiratory distress. She has no wheezes.   Abdominal: Soft. Bowel sounds are normal. She exhibits no distension. There is no tenderness.   Musculoskeletal: Normal range of motion. She exhibits no edema or tenderness.   Neurological: She is alert and oriented to person, place, and time. No cranial nerve deficit.   Skin: Skin is warm and dry. Capillary refill takes less than 2 seconds. No rash noted. No erythema.         ED Course   Procedures  Labs Reviewed - No data to display       Imaging Results    None                     Scribe Attestation:   Scribe #1: I performed the above scribed service and the documentation accurately describes the services I performed. I attest to the accuracy of the note.    Attending Attestation:           Physician Attestation for Scribe:  Physician Attestation Statement for Scribe #1: IBora MD, reviewed documentation, as scribed by Brianna Stuart in my presence, and it is both accurate and complete.           MDM:    78 y.o.female with PMHx as ntoed above presents after discharge from the observation unit this afternoon, with daughter unable to get patient into vehicle because of debility after daughter reprots refusing SNF placement for patient.  Pt without traumatic injury noted as security was there to assist.  Daughter now reports she definitely needs assistance caring for her mother at home. SW consulted and assisted with arranging transportation and further assistance at home.  Pt and daughter discharged to home improved and stable.             Clinical Impression:       ICD-10-CM ICD-9-CM   1. Weakness R53.1 780.79            Scribe attestation: Bora PERKINS M.D., personally performed the  services described in this documentation. All medical record entries made by the scribe were at my direction and in my presence. I have reviewed the chart and agree that the record reflects my personal performance and is accurate and complete.                    Bora Moctezuma MD  09/13/19 9130

## 2019-09-12 NOTE — TELEPHONE ENCOUNTER
----- Message from Quoc Bradley LCSW sent at 9/12/2019 10:03 AM CDT -----  Contact: JAIME Villalta   Good morning, Ms Whalen has an appointment with Dr Hung on October 8 at 1:15 pm per attending DR Drake would like to know if she could get a sooner appointment for next week if possible on Mon- Tue or Wed in the morning per attending.

## 2019-09-12 NOTE — PLAN OF CARE
VIRAL sent referral and clinical information to [facility] for patient, waiting on a response.   VIRAL also snet Dr Hung staff an inbasket message about getting an earlier appointment for the patient.      09/12/19 1201   Post-Acute Status   Post-Acute Authorization Home Health/Hospice   HME Status Pending Clinical Review

## 2019-09-12 NOTE — PROGRESS NOTES
VIRAL spoke with Melody from wound care and she scheduled the patient for an appointment on Tuesday 17 at 2:40 pm

## 2019-09-12 NOTE — TELEPHONE ENCOUNTER
I spoke to pt daughter. Please see message below. They are back at the ER waiting to be seen. Advised to be seen and talk to ER doctor about seeing  for placement since pt is still listed as Inpatient. Please advise.

## 2019-09-12 NOTE — PT/OT/SLP PROGRESS
Occupational Therapy   Treatment/Discharge    Name: Kaci Whalen  MRN: [id number]  Admitting Diagnosis:  Acute on chronic respiratory failure with hypoxia       Recommendations:     Discharge Recommendations: nursing facility, skilled  Discharge Equipment Recommendations:  (home O2)  Barriers to discharge:  Decreased caregiver support    Assessment:     Kaci Whalen is a 78 y.o. female with a medical diagnosis of Acute on chronic respiratory failure with hypoxia.  The patient requires min assist for bed mobility and transfer to a chair. The patient's daughter is aware of the recommendation for SNF but states the patient will get better faster at home. The patient demo some improved functional mobility but will require 24* care/assistance for safety. Performance deficits affecting function are weakness, impaired endurance, impaired self care skills, gait instability, impaired functional mobilty, impaired balance, decreased lower extremity function, decreased upper extremity function, decreased coordination, decreased safety awareness, edema, impaired cardiopulmonary response to activity.     Rehab Prognosis:  Fair; patient would benefit from acute skilled OT services to address these deficits and reach maximum level of function.       Plan:     Patient to be seen 5 x/week to address the above listed problems via self-care/home management, therapeutic activities, therapeutic exercises  · Plan of Care Expires: 09/25/19  · Plan of Care Reviewed with: patient, daughter    Subjective     Pain/Comfort:  · Pain Rating 1: 0/10    Objective:     Communicated with: nurseDebbie prior to session.  Patient found HOB elevated with daughter present with telemetry, oxygen, peripheral IV, PureWick upon OT entry to room.    General Precautions: Standard, fall, diabetic, respiratory   Orthopedic Precautions:N/A   Braces: N/A     Occupational Performance:     Bed Mobility:    · Patient completed Scooting/Bridging with minimum  assistance, with side rail and HOB elevated  · Patient completed Supine to Sit with minimum assistance, with side rail and HOB elevated     Functional Mobility/Transfers:  · Patient completed Sit <> Stand Transfer with contact guard assistance  with  hand-held assist   · Patient completed Bed <> Chair Transfer using Step Transfer technique with minimum assistance with hand-held assist    Activities of Daily Living:  · Feeding:  stand by assistance and set up assist to cut food.  The patient fed herself using her right hand. The patient with jerky movement x1 and dropped the fork x1 while feeding herself, seated in the bedside chair.  · Grooming: modified independence to wipe face and hands while seated in the chair  · Upper Body Dressing: maximal assistance to don back gown      [facility] 6 Click ADL: 15    Treatment & Education:  The patient and daughter were educated re: the patient's need for assist to transfer to the chair. The daughter was educated re: recommendation to use the W/C for transportation into the house for safety. The daughter was educated re: recommendation for assist with amb and transfers and 24* (S) for safety.    Patient left up in chair with daughter and nurseDebbie presentEducation:      GOALS:   Multidisciplinary Problems     Occupational Therapy Goals        Problem: Occupational Therapy Goal    Goal Priority Disciplines Outcome Interventions   Occupational Therapy Goal     OT, PT/OT Unable to achieve outcome(s) by discharge    Description:  Goals to be met by: 9/25/19    Patient will increase functional independence with ADLs by performing:    Feeding with Modified [place] with set up  UE Dressing with Minimal Assistance.  LE Dressing with Contact Guard Assistance.  Grooming while standing at sink with Stand-by Assistance.  Toileting from bedside commode with Set-up Assistance and Supervision for hygiene and clothing management.   Rolling to Bilateral with Stand-by Assistance.   Supine  to sit with Stand-by Assistance.  Step transfer with Stand-by Assistance  Toilet transfer to toilet with Stand-by Assistance.  Upper extremity exercise program x10 reps per handout, with assistance as needed.                      Time Tracking:     OT Date of Treatment: 09/12/19  OT Start Time: 1130  OT Stop Time: 1203  OT Total Time (min): 33 min    Billable Minutes:Self Care/Home Management 33    Mallorie Carter OT  9/12/2019

## 2019-09-12 NOTE — PLAN OF CARE
"   09/12/19 1251   Final Note   Assessment Type Final Discharge Note   Anticipated Discharge Disposition Home-[facility]   Hospital Follow Up  Appt(s) scheduled? Yes   Discharge plans and expectations educations in teach back method with documentation complete? Yes   Right Care Referral Info   Post Acute Recommendation Home-care   Referral Type Home Health   Facility Name [facility] [address] [place] [address]       EDUCATION:  Patient and Daughter was  provided with educational information on CHF.  Information reviewed and placed in :My Healthcare Packet" to be brought home for patient  to use as resource after discharge.  Information included:  signs and symptoms to look for and call the doctor if experiencing, and symptoms that may indicate a medical emergency: CALL 911.      All questions answered.  Teach back method used.    Patient daughter stated when her mother felt weak and SOB, she knew it was time to bring her to the Emergency Department.    VRIAL spoke with Debbie the nurse and advised her that patient is cleared from case management stand point, once portable oxygen tank is delivered to patient room.         "

## 2019-09-12 NOTE — PLAN OF CARE
Problem: Occupational Therapy Goal  Goal: Occupational Therapy Goal  Goals to be met by: 9/25/19    Patient will increase functional independence with ADLs by performing:    Feeding with Modified [place] with set up  UE Dressing with Minimal Assistance.  LE Dressing with Contact Guard Assistance.  Grooming while standing at sink with Stand-by Assistance.  Toileting from bedside commode with Set-up Assistance and Supervision for hygiene and clothing management.   Rolling to Bilateral with Stand-by Assistance.   Supine to sit with Stand-by Assistance.  Step transfer with Stand-by Assistance  Toilet transfer to toilet with Stand-by Assistance.  Upper extremity exercise program x10 reps per handout, with assistance as needed.     Outcome: Unable to achieve outcome(s) by discharge  The patient was able to feed herself with assist to cut her  Food. The patient with jerky movement in the RUE x1 and dropped her fork. The patient required min assist for bed mobility and transfer to the chair. The patient's daughter was educated re: the patient level of assistance needed.    Comments: SNF was recommended but the daughter wants to take the patient home with  services.

## 2019-09-12 NOTE — PLAN OF CARE
SW spoke with respiratory about pulling  patient oxygen tanks for the patient could discharge.       09/12/19 1143   Post-Acute Status   Post-Acute Authorization HME   HME Status Pending Delivery Hospital

## 2019-09-12 NOTE — PT/OT/SLP DISCHARGE
Physical Therapy Discharge Summary    Name: Kaci Whalen  MRN: [id number]   Principal Problem: Acute on chronic respiratory failure with hypoxia     Patient Discharged from acute Physical Therapy on 19.  Please refer to prior PT notes for functional status.     Assessment:     Patient appropriate for care in another setting.    Objective:     GOALS:   Multidisciplinary Problems     Physical Therapy Goals        Problem: Physical Therapy Goal    Goal Priority Disciplines Outcome Goal Variances Interventions   Physical Therapy Goal     PT, PT/OT      Description:  Goals to be met by: 19     Patient will increase functional independence with mobility by performin. Supine to sit with Modified [place]  2. Rolling to Left and Right with Modified [place]  3. Sit to stand transfer with Modified [place] using RW  4. Bed to chair transfer with Modified [place] using Rolling Walker  5. Gait  x100 feet with Modified [place] using Rolling Walker and O2  6. Lower extremity exercise program 2 sets x10 reps per handout, with independence                      Reasons for Discontinuation of Therapy Services  Transfer to alternate level of care.      Plan:     Patient Discharged to: Home with Home Health Service, family declined SNF.    Tisha Bah, PT  2019

## 2019-09-12 NOTE — PROGRESS NOTES
Follow-up Information     Toby Hung MD. Go on 10/8/2019.    Specialties:  Internal Medicine, Wound Care  Why:  Outpatient Services, follow up with PCP on Tuesday at 1:15pm  Contact information:  [address] [place] [address]  [phone]             [facility].    Specialty:  DME Provider  Why:  DME, oxygen   Contact information:  [address] [facility]  [address]  [facility] [address]  [phone]             [facility].    Specialty:  Home Health Services  Why:  Home Health  Contact information:  [address] [place] [address]  [phone]                       [facility] CASE MANAGEMENT                  WRITTEN DISCHARGE INFORMATION      APPOINTMENTS AND RESOURCES TO HELP YOU MANAGE YOUR CARE AT HOME BASED ON YOUR PREFERENCES:  (If an appointment is not scheduled for you when you leave the hospital, call your doctor to schedule a follow up visit within a week)        Healthy Living Instructions to HELP MANAGE YOUR CARE AT HOME:  Things You are responsible for:  1.    Getting your prescriptions filled   2.    Taking your medications as directed, DO NOT MISS ANY DOSES!  3.    Following the diet and exercise recommended by your doctor  4.    Going to your follow-up doctor appointment. This is important because it allows the doctor to monitor your progress and determine if any changes need to made to your treatment plan.  5. If you have any questions about MANAGING YOUR CARE AT HOME Call the Nurse Care Line for 24/7 Assistance [phone]       Please answer any calls you may receive from [facility]. We want to continue to support you as you manage your healthcare needs. [facility] is happy to have the opportunity to serve you.      Thank you for choosing [facility] for your healthcare needs!  Your [facility] Case Management Team,    JAIME Newberry     [phone]

## 2019-09-13 NOTE — DISCHARGE SUMMARY
[facility] Hospital Medicine  Discharge Summary      Patient Name: Kaci Whalen  MRN: [id number]  Admission Date: 9/9/2019  Hospital Length of Stay: 3 days  Discharge Date and Time: 9/12/2019  1:41 PM  Attending Physician: Rena att. providers found   Discharging Provider: Allie Drake MD  Primary Care Provider: Toby Hung MD      HPI:   78 year old female with diastolic heart failure, hypertension, bipolar disorder, CKD stage 3 and diabetes mellitus type 2 who presented with shortness of breath of days in evolution. Per daughter, patient developed a cough. Associated symptoms include white sputum. Patient saw her PCP 5 days PTA. Patient did not have signs of fluid overload or pulmonary edema. She was started on robitussin and tessalon perles. Per daughter, cough resolved with this. However, patient slowly developed progressive shortness of breath. Patient had portable O2 at some point in the past but patient returned it as she thought she did not need it.  Patient lives by herself with her  who has Parkinson's. Daughter visits them daily and organizes their medications, but patient takes her meds. Reports compliance. Takes furosemide three times a week. Patient admits to adding more salt to her diet lately. Daughter add patient was recently started on gabapentin and has become lethargic since.     In the ED, patient was hypoxic and using accessory muscles at rest. BNP 1004, has rales on exam and CXR consistent with pulmonary edema. Admitted for heart failure exacerbation.       Hospital Course:   Ms. Whalen was admitted for acute on chronic respiratory failure due to volume overload and CHF exacerbation. She was started on IV diuretics and started to improve. PT/OT consulted and recommended SNF. Discussed with the patient and her daughter. The patient qualified for home O2 due to her CHF even after renal function showed evidence of volume depletion. SNF recommended by PT/OT  however given the patient's dementia and unwillingness to go to SNF, her PCP and I discussed this issue as well as discussion with her primary caregiver and it was decided best to send her home with HH PT/OT/SN. She was stable to follow up closely with her PCP.     Consults:   Consults (From admission, onward)        Status Ordering Provider     IP consult to case management  Once     Provider:  (Not yet assigned)    Completed RICHA WERNER        Final Active Diagnoses:    Diagnosis Date Noted POA    PRINCIPAL PROBLEM:  Acute on chronic respiratory failure with hypoxia [J96.21] 09/09/2019 Yes    Acute on chronic congestive heart failure [I50.9] 09/09/2019 Yes    CKD stage 3 due to type 2 diabetes mellitus [E11.22, N18.3] 09/09/2019 Yes     Chronic    Obesity, Class III, BMI 40-49.9 (morbid obesity) [E66.01] 09/09/2019 Yes     Chronic    Bipolar disorder, most recent episode manic [F31.10] 08/29/2019 Yes     Chronic    (HFpEF) heart failure with preserved ejection fraction [I50.30] 05/10/2018 Yes     Chronic    Essential hypertension [I10] 06/06/2017 Yes     Chronic    MIGUEL (obstructive sleep apnea) unable to afford CPAP [G47.33]  Yes     Chronic    Type 2 diabetes mellitus with microalbuminuria, with long-term current use of insulin [E11.29, R80.9, Z79.4] 04/10/2015 Not Applicable     Chronic      Problems Resolved During this Admission:       Discharged Condition: stable    [place] [facility]    Follow Up:  Follow-up Information     Toby Hung MD. Go on 10/8/2019.    Specialties:  Internal Medicine, Wound Care  Why:  Outpatient Services, follow up with PCP on Tuesday at 1:15pm  Contact information:  [address]  [place] [address]  [phone]             [facility].    Specialty:  DME Provider  Why:  DME, oxygen   Contact information:  [address]  [facility] [address]  [phone]             [facility].    Specialty:  Home Health  Services  Why:  Home Health  Contact information:  [address] [place] [address]  [phone]                 Patient Instructions:      OXYGEN FOR HOME USE     Order Specific Question Answer Comments   Liter Flow 2    Duration Continuous    Qualifying SpO2: 83    Testing done at: Exercise/Activity    Route nasal cannula    Portable mode: continuous    Device home concentrator with portable unit    Length of need (in months): 99 mos    Patient condition with qualifying saturation CHF    Height: 5' (1.524 m)    Weight: 91.9 kg (202 lb 9.6 oz)    Does patient have medical equipment at home? bedside commode    Does patient have medical equipment at home? walker, rolling    Does patient have medical equipment at home? rollator    Does patient have medical equipment at home? cane, straight    Does patient have medical equipment at home? raised toilet    Does patient have medical equipment at home? bath bench    Alternative treatment measures have been tried or considered and deemed clinically ineffective. Yes      Diet Adult Regular     Notify your health care provider if you experience any of the following:  increased confusion or weakness     Notify your health care provider if you experience any of the following:  persistent dizziness, light-headedness, or visual disturbances     Notify your health care provider if you experience any of the following:  worsening rash     Notify your health care provider if you experience any of the following:  severe persistent headache     Notify your health care provider if you experience any of the following:  difficulty breathing or increased cough     Notify your health care provider if you experience any of the following:  redness, tenderness, or signs of infection (pain, swelling, redness, odor or green/yellow discharge around incision site)     Notify your health care provider if you experience any of the following:  severe uncontrolled pain     Notify your health care  provider if you experience any of the following:  persistent nausea and vomiting or diarrhea     Notify your health care provider if you experience any of the following:  temperature >100.4     Activity as tolerated       Medications:  Reconciled Home Medications:      Medication List      CHANGE how you take these medications    desloratadine 5 mg tablet  Commonly known as:  CLARINEX  Take 1 tablet (5 mg total) by mouth once daily.  What changed:    · when to take this  · reasons to take this        CONTINUE taking these medications    alcohol swabs Padm  Apply 1 each topically as needed.     amLODIPine 5 MG tablet  Commonly known as:  NORVASC  Take 1 tablet (5 mg total) by mouth once daily.     blood glucose control, low Soln  To test meter once per week     citalopram 20 MG tablet  Commonly known as:  CELEXA  Take 1 tablet (20 mg total) by mouth once daily.     divalproex 500 MG Tbec  Commonly known as:  DEPAKOTE  Take 1 tablet (500 mg total) by mouth every 12 (twelve) hours.     fluticasone propionate 50 mcg/actuation nasal spray  Commonly known as:  FLONASE  1 spray (50 mcg total) by Each Nare route 2 (two) times daily.     furosemide 40 MG tablet  Commonly known as:  LASIX  Take 1 tablet (40 mg total) by mouth once daily. One tab po daily x three days then as needed for LE swelling     gabapentin 100 MG capsule  Commonly known as:  NEURONTIN  Take 1 capsule (100 mg total) by mouth 3 (three) times daily.     insulin aspart protamine-insulin aspart 100 unit/mL (70-30) Inpn pen  Commonly known as:  NovoLOG 70/30  Inject 21 Units into the skin 2 (two) times daily before meals. Sliding scale max daily dosing 50 units daily     lancets 28 gauge Misc  Commonly known as:  LANCETS,THIN  1 lancet by Misc.(Non-Drug; Combo Route) route 4 (four) times daily before meals and nightly.     losartan 100 MG tablet  Commonly known as:  COZAAR  Take 1 tablet (100 mg total) by mouth once daily.     melatonin 10 mg Tab  Take by  "mouth. 2 tabs at night     metFORMIN 500 MG tablet  Commonly known as:  GLUCOPHAGE  Take 1 tablet (500 mg total) by mouth 3 (three) times daily.     metoprolol succinate 25 MG 24 hr tablet  Commonly known as:  TOPROL-XL  Take 1 tablet (25 mg total) by mouth once daily.     pen needle, diabetic 32 gauge x 5/32" Ndle  Commonly known as:  BD ULTRA-FINE SANDEEP PEN NEEDLE  Take 1 each by mouth 4 (four) times daily.     QUEtiapine 50 MG tablet  Commonly known as:  SEROQUEL  Take 1 tablet (50 mg total) by mouth every evening.     TRUE METRIX GLUCOSE TEST STRIP Strp  Generic drug:  blood sugar diagnostic  TEST FOUR TIMES DAILY BEFORE MEALS  AND EVERY NIGHT            Indwelling Lines/Drains at time of discharge:   Lines/Drains/Airways          None          Time spent on the discharge of patient: 40 minutes  Patient was seen and examined on the date of discharge and determined to be suitable for discharge.         Allie Drake MD  Department of Hospital Medicine  [facility]  "

## 2019-09-13 NOTE — PLAN OF CARE
[facility]  [address] [place] [address]    Facility Transfer Orders                        09/13/2019    Admit to: SNF    Diagnoses:  Active Hospital Problems    Diagnosis  POA    *Acute on chronic respiratory failure with hypoxia [J96.21]  Yes    Acute on chronic congestive heart failure [I50.9]  Yes    CKD stage 3 due to type 2 diabetes mellitus [E11.22, N18.3]  Yes     Chronic    Obesity, Class III, BMI 40-49.9 (morbid obesity) [E66.01]  Yes     Chronic    Bipolar disorder, most recent episode manic [F31.10]  Yes     Chronic    (HFpEF) heart failure with preserved ejection fraction [I50.30]  Yes     Chronic    Essential hypertension [I10]  Yes     Chronic    MIGUEL (obstructive sleep apnea) unable to afford CPAP [G47.33]  Yes     Chronic    Type 2 diabetes mellitus with microalbuminuria, with long-term current use of insulin [E11.29, R80.9, Z79.4]  Not Applicable     Chronic     Chronic kidney disease calculated using CKD-EPI Creatinine 2009 Equation.          Resolved Hospital Problems   No resolved problems to display.       Allergies:  Review of patient's allergies indicates:   Allergen Reactions    Tramadol Other (See Comments)     Interaction-induced delirium with s/s of estrellita.    Ace inhibitors      Other reaction(s): cough      Fluorescein Itching     Other reaction(s): Itching    Iodine      Other reaction(s): Itching    Pravastatin Other (See Comments)     Other reaction(s): mouth tingling/numbness    Simvastatin      Other reaction(s): foot swelling         Vitals:  Every shift (Skilled Nursing patients)    Diet: Low sodium    Activity:     - Up in a chair each morning as tolerated   - Ambulate with assistance to bathroom    Nursing Precautions:     - Aspiration precautions:             - Total assistance with meals            -  Upright 90 degrees befor during and after meals             -  Suction at bedside          - Fall precautions   - Seizure  precaution   - Decubitus precautions:        -  for positioning   - Pressure reducing foam mattress   - Turn patient every two hours. Use wedge pillows to anchor patient        CONSULTS:        PT to evaluate and treat - five times a week     OT to evaluate and treat - five times a week        Medications: Discontinue all previous medication orders, if any. See new list below.     Kaci Whalen   Home Medication Instructions RUBÉN:[id number]    Printed on:09/13/19 4919   Medication Information                      alcohol swabs PadM  Apply 1 each topically as needed.             amLODIPine (NORVASC) 5 MG tablet  Take 1 tablet (5 mg total) by mouth once daily.             blood glucose control, low Soln  To test meter once per week             citalopram (CELEXA) 20 MG tablet  Take 1 tablet (20 mg total) by mouth once daily.             desloratadine (CLARINEX) 5 mg tablet  Take 1 tablet (5 mg total) by mouth once daily.             divalproex (DEPAKOTE) 500 MG TbEC  Take 1 tablet (500 mg total) by mouth every 12 (twelve) hours.             fluticasone (FLONASE) 50 mcg/actuation nasal spray  1 spray (50 mcg total) by Each Nare route 2 (two) times daily.             furosemide (LASIX) 40 MG tablet  Take 1 tablet (40 mg total) by mouth once daily. One tab po daily x three days then as needed for LE swelling             gabapentin (NEURONTIN) 100 MG capsule  Take 1 capsule (100 mg total) by mouth 3 (three) times daily.             insulin aspart protamine-insulin aspart (NOVOLOG 70/30) 100 unit/mL (70-30) InPn pen  Inject 21 Units into the skin 2 (two) times daily before meals. Sliding scale max daily dosing 50 units daily             lancets (LANCETS,THIN) 28 gauge Misc  1 lancet by Misc.(Non-Drug; Combo Route) route 4 (four) times daily before meals and nightly.             losartan (COZAAR) 100 MG tablet  Take 1 tablet (100 mg total) by mouth once daily.             melatonin 10 mg Tab  Take by mouth. 2  "tabs at night             metFORMIN (GLUCOPHAGE) 500 MG tablet  Take 1 tablet (500 mg total) by mouth 3 (three) times daily.             metoprolol succinate (TOPROL-XL) 25 MG 24 hr tablet  Take 1 tablet (25 mg total) by mouth once daily.             pen needle, diabetic (BD ULTRA-FINE SANDEEP PEN NEEDLES) 32 gauge x 5/32" Ndle  Take 1 each by mouth 4 (four) times daily.             QUEtiapine (SEROQUEL) 50 MG tablet  Take 1 tablet (50 mg total) by mouth every evening.             TRUE METRIX GLUCOSE TEST STRIP Strp  TEST FOUR TIMES DAILY BEFORE MEALS  AND EVERY NIGHT                       _________________________________  Allie Drake MD  09/13/2019      "

## 2019-09-13 NOTE — PROGRESS NOTES
VIRAL called patient daughter Thais to give her an update on the process with SNF. VIRAL explained to Nash because of her mothers mental status the SNF process would have to be further evaluated and it could take up to 7-9 business days in order to review. Thais the patients daughter stated the patients mobility have increased since yesterday and she would like us to hold off on SNF placement, due to how long the process will take and because of the increased mobility of her mother. Patients daughter did state that he mothers doctors appointment is on Monday and she will communicate with the doctor to see what other steps they could take.

## 2019-09-13 NOTE — PROGRESS NOTES
Patient's daughter Thais called VIRAL on yesterday evening after patient discharged to report  her mother fell while trying to get her into car in the parking lot. Daughter did not know what to do and wanted patient to be checked out, therefore she brought the patient to the ED.Daughter informed ED doctor that she was unable to take care of patient in her current condition and felt that patient needed SNF. Krissy (ORACIO) contacted VIRAL to report patient and daughter are currently in the ED and daughter requesting SNF due patient's limited mobility. SW went to ED to speak with daughter and patient. VIRAL explained to the daughter Thais that SNF would have to be authorized and arrange for the patient, but unfournatley the patient would have to go home during this process because there is  no medical need to be admitted back into the hospital. VIRAL explained to Thais that a wheelchair van could be provide to transport patient home. Daughter verbalized understanding and in agreement with SW working on SNF placement for the patient.

## 2019-09-16 ENCOUNTER — PATIENT MESSAGE (OUTPATIENT)
Dept: PSYCHIATRY | Facility: CLINIC | Age: 79
End: 2019-09-16

## 2019-09-16 ENCOUNTER — PATIENT OUTREACH (OUTPATIENT)
Dept: ADMINISTRATIVE | Facility: CLINIC | Age: 79
End: 2019-09-16

## 2019-09-16 ENCOUNTER — PATIENT MESSAGE (OUTPATIENT)
Dept: FAMILY MEDICINE | Facility: CLINIC | Age: 79
End: 2019-09-16

## 2019-09-16 ENCOUNTER — NURSE TRIAGE (OUTPATIENT)
Dept: ADMINISTRATIVE | Facility: CLINIC | Age: 79
End: 2019-09-16

## 2019-09-16 DIAGNOSIS — I50.9 CONGESTIVE HEART FAILURE, UNSPECIFIED HF CHRONICITY, UNSPECIFIED HEART FAILURE TYPE: Primary | ICD-10-CM

## 2019-09-16 NOTE — PATIENT INSTRUCTIONS
Left- or Right- Side Congestive Heart Failure (CHF)    The heart is a large muscle. It is a pump that circulates blood throughout the body. Blood carries oxygen to all of the organs, including the brain, muscles, and skin. After your body takes the oxygen out of the blood, the blood returns to the heart. The right side of the heart collects the blood from the body and pumps it to the lungs. In the lungs, it gets fresh oxygen and gives up carbon dioxide. The oxygen-rich blood from the lungs then returns to the left side of the heart, where it is pumped back out to the rest of your body, starting the process all over.  Congestive heart failure (CHF) occurs when the heart muscle is weakened. This affects the pumping action of the heart. Heart failure can affect the right side of the heart or the left side. But heart failure may affect not only the right side of the heart or only the left side. Although it may have started on one side, it can and often eventually does affect both sides.  Right-side heart failure  When the right side of the heart is weakened, it cant handle the blood it is getting from the rest of the body. This blood returns to the heart through veins. When too much pressure builds up in the veins, fluid leaks out into the tissues. Gravity then causes that fluid to move to those parts of the body that are the lowest. So one of the first symptoms of right-side CHF can include swelling in the feet and ankles. If the condition gets worse, the swelling can even go up past the knees. Sometimes it gets so severe, the liver can get congested as well.  Left-side heart failure  When the left side of the heart is weakened, it cant handle the blood it gets from the lungs. Pressure then builds up in the veins of the lungs, causing fluid to leak into the lung tissues. This may cause CHF and pulmonary edema. This causes you to feel short of breath, weak, or dizzy. These symptoms are often worse with exertion,  such as when climbing stairs or walking up hills. Lying with your head flat is uncomfortable and can make your breathing worse. This may make sleeping difficult. You may need to use extra pillows to elevate your upper body to sleep well. The same is true when just resting during the daytime.  There are many causes of heart failure including:  · Coronary artery disease  · Past heart attack (also known as acute myocardial infarction, or AMI)  · High blood pressure  · Damaged heart valve  · Diabetes  · Obesity  · Cigarette smoking  · Alcohol abuse  Heart failure is a chronic condition. There is no cure. The purpose of medical treatment is to improve the pumping action of the heart. The main way to do this is to remove excess water from the body. A number of medicines can help reach this goal, improve symptoms, and prevent the heart from becoming weaker. Sometimes, heart failure can become so severe that a device is placed in the heart to help with pumping. Another major goal is to better treat the causes of heart failure, such as diabetes and high blood pressure, by making changes in your lifestyle and maximizing medical control when needed.  Home care  Follow these guidelines when caring for yourself at home:  · Check your weight every day. This is very important because a sudden increase in weight gain could mean worsening heart failure. Keep these things in mind:  ¨ Use the same scale every day.  ¨ Weigh yourself at the same time every day.  ¨ Make sure the scale is on a hard floor surface, not on a rug or carpet.  ¨ Keep a record of your weight every day so your healthcare provider can see it. If you are not given a log sheet for this, keep a separate journal for this purpose.   · Cut back on the amount of salt (sodium) you eat. Follow your healthcare provider's recommendation on how much salt or sodium you should have each day.  ¨ Avoid high-salt foods. These include olives, pickles, smoked meats, salted potato  chips, and most prepared foods.  ¨ Don't add salt to your food at the table. Use only small amounts of salt when cooking.  ¨ Read the labels carefully on food packages to learn how much salt or sodium is in each serving in the package. Remember, a can or package of food may contain more than 1 serving. So if you eat all the food in the package, you may be getting more salt than you think.  · Follow your healthcare provider's recommendations about how much fluid you should have. Be aware that some foods, such as soup, pudding, and juicy fruits like oranges or melons, contain liquid. You'll need to count the liquid in those foods as part of your daily fluid intake. Your provider can help you with this.  · Stop smoking.  · Cut back on how much alcohol you drink.  · Lose weight if you are overweight. The excess weight adds a lot of stress on the workload of the heart.  · Stay active. Talk with your provider about an exercise program that is safe for your heart.  · Keep your feet elevated to reduce swelling. Ask your provider about support hose as a preventive treatment for daytime leg swelling.  Besides taking your medicine as instructed, an important part of treatment is lifestyle changes. These include diet, physical activity, stopping smoking, and weight control.  Improve your diet by including more fresh foods, cutting back on how much sugar and saturated fat you eat, and eating fewer processed foods and less salt.  Follow-up care  Follow up with your healthcare provider, or as advised.  Make sure to keep any appointments that were made for you. These can help better control your congestive heart failure. You will need to follow up with your provider on a routine basis to make sure your heart failure is well managed.  If an X-ray, ECG, or other tests were done, you will be told of any new findings that may affect your care.  Call 911  Call 911 if you:  · Become severely short of breath  · Feel lightheaded, or feel  like you might pass out or faint  · Have chest pain or discomfort that is different than usual, the medicines your doctor told you to use for this don't help, or the pain lasts longer than 10 to 15 minutes  · You suddenly develop a rapid heart rate  When to seek medical advice  The following may be signs that your heart failure is getting worse. Call your healthcare provider right away if any of these happen:  · Sudden weight gain. This means 3 or more pounds in one day, or 5 or more pounds in 1 week.  · Trouble breathing not related to being active  · New or increased swelling of your legs or ankles  · Swelling or pain in your abdomen  · Breathing trouble at night. This means waking up short of breath or needing more pillows to breathe.  · Frequent coughing that doesnt go away  · Feeling much more tired than usual  Date Last Reviewed: 1/4/2016  © 3397-5252 [employer]. [address] [place] [address], [place] [address]. All rights reserved. This information is not intended as a substitute for professional medical care. Always follow your healthcare professional's instructions.

## 2019-09-16 NOTE — TELEPHONE ENCOUNTER
Spoke with daughter for TCC call. First c\o no BM since home. Daughter states not able to get her out of house because she is unable to get patient out of wheelchair. Message to pcp.   Reason for Disposition   Last bowel movement (BM) > 4 days ago    Additional Information   Negative: Abdomen pain is the main symptom and adult male   Negative: Abdomen pain is the main symptom and adult female   Negative: Rectal bleeding or blood in stool is the main symptom   Negative: Patient sounds very sick or weak to the triager   Negative: Constant abdominal pain lasting > 2 hours   Negative: Vomiting bile (green color)   Negative: Vomiting and abdomen looks much more swollen than usual   Negative: Rectal pain or fullness from fecal impaction (rectum full of stool) and NOT better after SITZ bath, suppository or enema   Negative: Abdomen is more swollen than usual    Protocols used: CONSTIPATION-A-[place]

## 2019-09-16 NOTE — PROGRESS NOTES
C3 nurse attempted to contact patient. No answer. The following message was left for the patient to return the call:  Good morning,  I am a nurse calling on behalf of [facility] from the [facility].  This is a Transitional Care Call for Kaci Whalen. When you have a moment please contact us at [phone] or [phone] Monday through Friday, between the hours of 8 am to 4 pm. We look forward to speaking with you. On behalf of [facility] have a nice day.    The patient has a scheduled [facility] appointment with Toby Hung MD on 10/8 @ 1320. Message sent to Physician staff to move appointment up to on\before 9/19.

## 2019-09-17 ENCOUNTER — PATIENT MESSAGE (OUTPATIENT)
Dept: FAMILY MEDICINE | Facility: CLINIC | Age: 79
End: 2019-09-17

## 2019-09-17 NOTE — PROGRESS NOTES
Spoke with patient's daughter, Liliam,today who was advised to let me know that Ms Whalen was depressed due to her recent immobility. The daughter did not think she was a danger to herself or others. I advised that I reviewed the lab results and her serum Na was low, but that was low before she started on the Depakote. I advised that the Depakote and Seroquel may contribute to the low Na. She has an ongoing low Cbc as well. Her serum amylase was wnl. I did let the daughter know that the valproate could contribute to the low Na and that the other doctors should know that also.  No med changes were made at this time. We will continue to monitor the blood levels of these indices and the daughter was in agreement with this plan.

## 2019-09-20 ENCOUNTER — OFFICE VISIT (OUTPATIENT)
Dept: FAMILY MEDICINE | Facility: CLINIC | Age: 79
End: 2019-09-20
Payer: MEDICARE

## 2019-09-20 VITALS
HEIGHT: 59 IN | DIASTOLIC BLOOD PRESSURE: 78 MMHG | WEIGHT: 200.81 LBS | RESPIRATION RATE: 17 BRPM | HEART RATE: 69 BPM | SYSTOLIC BLOOD PRESSURE: 134 MMHG | TEMPERATURE: 99 F | BODY MASS INDEX: 40.48 KG/M2 | OXYGEN SATURATION: 96 %

## 2019-09-20 DIAGNOSIS — I11.0 HYPERTENSIVE HEART DISEASE WITH CHRONIC DIASTOLIC CONGESTIVE HEART FAILURE: ICD-10-CM

## 2019-09-20 DIAGNOSIS — N39.41 URGE URINARY INCONTINENCE: ICD-10-CM

## 2019-09-20 DIAGNOSIS — I50.32 HYPERTENSIVE HEART DISEASE WITH CHRONIC DIASTOLIC CONGESTIVE HEART FAILURE: ICD-10-CM

## 2019-09-20 DIAGNOSIS — Z23 NEED FOR INFLUENZA VACCINATION: Primary | ICD-10-CM

## 2019-09-20 PROCEDURE — 99999 PR PBB SHADOW E&M-EST. PATIENT-LVL III: CPT | Mod: PBBFAC,HCNC,, | Performed by: INTERNAL MEDICINE

## 2019-09-20 PROCEDURE — G0008 FLU VACCINE - HIGH DOSE (65+) PRESERVATIVE FREE IM: ICD-10-PCS | Mod: HCNC,S$GLB,, | Performed by: INTERNAL MEDICINE

## 2019-09-20 PROCEDURE — G0008 ADMIN INFLUENZA VIRUS VAC: HCPCS | Mod: HCNC,S$GLB,, | Performed by: INTERNAL MEDICINE

## 2019-09-20 PROCEDURE — 90662 IIV NO PRSV INCREASED AG IM: CPT | Mod: HCNC,S$GLB,, | Performed by: INTERNAL MEDICINE

## 2019-09-20 PROCEDURE — 99495 TRANSJ CARE MGMT MOD F2F 14D: CPT | Mod: HCNC,S$GLB,, | Performed by: INTERNAL MEDICINE

## 2019-09-20 PROCEDURE — 99999 PR PBB SHADOW E&M-EST. PATIENT-LVL III: ICD-10-PCS | Mod: PBBFAC,HCNC,, | Performed by: INTERNAL MEDICINE

## 2019-09-20 PROCEDURE — 99495 TCM SERVICES (MODERATE COMPLEXITY): ICD-10-PCS | Mod: HCNC,S$GLB,, | Performed by: INTERNAL MEDICINE

## 2019-09-20 PROCEDURE — 90662 FLU VACCINE - HIGH DOSE (65+) PRESERVATIVE FREE IM: ICD-10-PCS | Mod: HCNC,S$GLB,, | Performed by: INTERNAL MEDICINE

## 2019-09-20 RX ORDER — HYDROCHLOROTHIAZIDE 25 MG/1
25 TABLET ORAL DAILY
Refills: 3 | COMMUNITY
Start: 2019-09-06

## 2019-09-20 RX ORDER — OXYBUTYNIN CHLORIDE 5 MG/1
5 TABLET ORAL DAILY
Qty: 30 TABLET | Refills: 0 | Status: SHIPPED | OUTPATIENT
Start: 2019-09-20 | End: 2019-10-15

## 2019-09-20 NOTE — PROGRESS NOTES
"Subjective:       Patient ID: Kaci Whalen is a 78 y.o. female.    Chief Complaint: Hospital Follow Up (weakness)    Hospital follow up    HPI: 77 y/o w/ DM CHF presents with daughter for follow up recent hospitalization for CHF exacerbation. Had elevated BNP and hypoxia along with evidence of vascular congestion on chest xray. Treated with IV lasix with improvement of breathing. She continues on home oxygen at 2lpm. She has been working with home health PT. Patient's daughter tells me over last three days much more active primarily complaitn is urge urinary incontinence. No dysuria or hematuria. Worse on days she uses lasix     Review of Systems   Constitutional: Negative for activity change, appetite change, fatigue, fever and unexpected weight change.   HENT: Negative for ear pain, rhinorrhea and sore throat.    Eyes: Negative for discharge and visual disturbance.   Respiratory: Negative for chest tightness, shortness of breath and wheezing.    Cardiovascular: Negative for chest pain, palpitations and leg swelling.   Gastrointestinal: Negative for abdominal pain, constipation and diarrhea.   Endocrine: Negative for cold intolerance and heat intolerance.   Genitourinary: Positive for urgency. Negative for dysuria and hematuria.   Musculoskeletal: Negative for joint swelling and neck stiffness.   Skin: Negative for rash.   Neurological: Negative for dizziness, syncope, weakness and headaches.   Psychiatric/Behavioral: Negative for suicidal ideas.       Objective:     Vitals:    09/20/19 1000   BP: 134/78   BP Location: Left arm   Patient Position: Sitting   BP Method: Medium (Manual)   Pulse: 69   Resp: 17   Temp: 98.8 °F (37.1 °C)   TempSrc: Oral   SpO2: 96%   Weight: 91.1 kg (200 lb 13.4 oz)   Height: 4' 11" (1.499 m)          Physical Exam   Constitutional: She is oriented to person, place, and time. She appears well-developed and well-nourished.   Wearing portable oxygen speaking full sentences   HENT: "   Head: Normocephalic and atraumatic.   Eyes: Conjunctivae are normal. No scleral icterus.   Neck: Normal range of motion.   Cardiovascular: Normal rate and regular rhythm. Exam reveals no gallop and no friction rub.   No murmur heard.  No LE edema   Pulmonary/Chest: Effort normal and breath sounds normal. She has no wheezes. She has no rales.   Good air movement to bases bilaterlly   Abdominal: Soft. Bowel sounds are normal. There is no tenderness. There is no rebound and no guarding.   Musculoskeletal: Normal range of motion. She exhibits no edema or tenderness.   Neurological: She is alert and oriented to person, place, and time. No cranial nerve deficit.   Skin: Skin is warm and dry.   Psychiatric: She has a normal mood and affect.     Transitional Care Note    Family and/or Caretaker present at visit?  Yes.  Diagnostic tests reviewed/disposition: No diagnosic tests pending after this hospitalization.  Disease/illness education: [facility]  Home health/community services discussion/referrals: Patient has home health established at [facility].   Establishment or re-establishment of referral orders for community resources: No other necessary community resources.   Discussion with other health care providers: No discussion with other health care providers necessary.           Assessment and Plan   1. Need for influenza vaccination  Flu vaccine today  - Influenza - High Dose (65+) (PF) (IM)    2. Urge urinary incontinence  Low dose ditropan in ligh tof age will take in a.m. Only discussed side effects of dry mouth   - oxybutynin (DITROPAN) 5 MG Tab; Take 1 tablet (5 mg total) by mouth once daily. In morning  Dispense: 30 tablet; Refill: 0

## 2019-09-21 ENCOUNTER — PATIENT OUTREACH (OUTPATIENT)
Dept: ADMINISTRATIVE | Facility: OTHER | Age: 79
End: 2019-09-21

## 2019-09-24 ENCOUNTER — PATIENT OUTREACH (OUTPATIENT)
Dept: ADMINISTRATIVE | Facility: HOSPITAL | Age: 79
End: 2019-09-24

## 2019-09-24 DIAGNOSIS — M94.9 DISORDER OF BONE AND ARTICULAR CARTILAGE: Primary | ICD-10-CM

## 2019-09-24 DIAGNOSIS — M89.9 DISORDER OF BONE AND ARTICULAR CARTILAGE: Primary | ICD-10-CM

## 2019-09-25 ENCOUNTER — OFFICE VISIT (OUTPATIENT)
Dept: PODIATRY | Facility: CLINIC | Age: 79
End: 2019-09-25
Payer: MEDICARE

## 2019-09-25 ENCOUNTER — TELEPHONE (OUTPATIENT)
Dept: INTERNAL MEDICINE | Facility: CLINIC | Age: 79
End: 2019-09-25

## 2019-09-25 ENCOUNTER — EXTERNAL HOME HEALTH (OUTPATIENT)
Dept: HOME HEALTH SERVICES | Facility: HOSPITAL | Age: 79
End: 2019-09-25
Payer: MEDICARE

## 2019-09-25 VITALS
HEIGHT: 59 IN | BODY MASS INDEX: 40.32 KG/M2 | SYSTOLIC BLOOD PRESSURE: 132 MMHG | WEIGHT: 200 LBS | DIASTOLIC BLOOD PRESSURE: 60 MMHG

## 2019-09-25 DIAGNOSIS — E11.49 TYPE II DIABETES MELLITUS WITH NEUROLOGICAL MANIFESTATIONS: Primary | ICD-10-CM

## 2019-09-25 DIAGNOSIS — B35.1 ONYCHOMYCOSIS DUE TO DERMATOPHYTE: ICD-10-CM

## 2019-09-25 PROCEDURE — 99999 PR PBB SHADOW E&M-EST. PATIENT-LVL III: CPT | Mod: PBBFAC,HCNC,, | Performed by: PODIATRIST

## 2019-09-25 PROCEDURE — 11721 DEBRIDE NAIL 6 OR MORE: CPT | Mod: HCNC,Q9,S$GLB, | Performed by: PODIATRIST

## 2019-09-25 PROCEDURE — 11721 ROUTINE FOOT CARE: ICD-10-PCS | Mod: HCNC,Q9,S$GLB, | Performed by: PODIATRIST

## 2019-09-25 PROCEDURE — 99499 NO LOS: ICD-10-PCS | Mod: HCNC,S$GLB,, | Performed by: PODIATRIST

## 2019-09-25 PROCEDURE — 99499 UNLISTED E&M SERVICE: CPT | Mod: HCNC,S$GLB,, | Performed by: PODIATRIST

## 2019-09-25 PROCEDURE — 99999 PR PBB SHADOW E&M-EST. PATIENT-LVL III: ICD-10-PCS | Mod: PBBFAC,HCNC,, | Performed by: PODIATRIST

## 2019-09-25 NOTE — TELEPHONE ENCOUNTER
----- Message from Abida Marshall sent at 9/25/2019  4:55 PM CDT -----  Contact: [facility]  Good afternoon,    Thank you for consulting [facility]. Unfortunately in the attempt to schedule your patient for our services, daughter declined. This is just a kind informative.     Thank you again for your referral. Please contact feel free to call our office at [phone] if you need any assistance.

## 2019-09-29 NOTE — PROCEDURES
Routine Foot Care  Date/Time: 9/25/2019 2:15 PM  Performed by: Anisha Mliler DPM  Authorized by: Anisha Miller DPM     Consent Done?:  Yes (Verbal)    Nail Care Type:  Debride  Location(s): All  (Left 1st Toe, Left 3rd Toe, Left 2nd Toe, Left 4th Toe, Left 5th Toe, Right 1st Toe, Right 2nd Toe, Right 3rd Toe, Right 4th Toe and Right 5th Toe)  Patient tolerance:  Patient tolerated the procedure well with no immediate complications

## 2019-09-29 NOTE — PROGRESS NOTES
Subjective:      Patient ID: Kaci Whalen is a 78 y.o. female.    Chief Complaint: Diabetic Foot Exam (last ov 9/20/19 pcp luma) and Nail Care    Kaci is a 78 y.o. female who presents to the clinic for evaluation and treatment of diabetic feet. Kaci has a past medical history of Abdominal pain, Anxiety, Atherosclerosis of aortic arch, Atrial fibrillation (05/2016), Benign hypertension, Bipolar disorder, most recent episode manic (8/29/2019), CHF (congestive heart failure), Chronic constipation, Chronic diarrhea, Chronic edema, Depression, Dercum disease, Diabetic retinopathy, Dysphagia, Gas pain, Glaucoma of both eyes, psychiatric care, Hyperlipidemia with target LDL less than 100, Immature cataract, Left sided numbness, Tasia, Morbid obesity, Nausea & vomiting, Neuropathy, Polyneuropathy, Primary osteoarthritis of both knees, Proteinuria, Psychiatric problem, Pulmonary hypertension mixed group 2 and 3 (1/18/2017), Sleep apnea, Therapy, Type II or unspecified type diabetes mellitus with neurological manifestations, uncontrolled(250.62), Unspecified vitamin D deficiency, and Uses roller walker. Patient relates no major problem with feet. Only complaints today consist of toenails in need of trimming. No other complaints today. Wearing DM shoes which help. Recently discharged from hospital for CHF exacerbation.     PCP: Toby Hung MD    Date Last Seen by PCP:   Chief Complaint   Patient presents with    Diabetic Foot Exam     last ov 9/20/19 pcp luma    Nail Care         Current shoe gear: Rx diabetic extra depth shoes and custom accommodative insoles    Hemoglobin A1C   Date Value Ref Range Status   08/30/2019 6.9 (H) 4.0 - 5.6 % Final     Comment:     ADA Screening Guidelines:  5.7-6.4%  Consistent with prediabetes  >or=6.5%  Consistent with diabetes  High levels of fetal hemoglobin interfere with the HbA1C  assay. Heterozygous hemoglobin variants (HbS, HgC, etc)do  not significantly interfere with  this assay.   However, presence of multiple variants may affect accuracy.     05/16/2019 7.8 (H) 4.0 - 5.6 % Final     Comment:     ADA Screening Guidelines:  5.7-6.4%  Consistent with prediabetes  >or=6.5%  Consistent with diabetes  High levels of fetal hemoglobin interfere with the HbA1C  assay. Heterozygous hemoglobin variants (HbS, HgC, etc)do  not significantly interfere with this assay.   However, presence of multiple variants may affect accuracy.     02/08/2019 7.3 (H) 4.0 - 5.6 % Final     Comment:     ADA Screening Guidelines:  5.7-6.4%  Consistent with prediabetes  >or=6.5%  Consistent with diabetes  High levels of fetal hemoglobin interfere with the HbA1C  assay. Heterozygous hemoglobin variants (HbS, HgC, etc)do  not significantly interfere with this assay.   However, presence of multiple variants may affect accuracy.         Past Medical History:   Diagnosis Date    Abdominal pain     Anxiety     Atherosclerosis of aortic arch     noted on CXR 7/1/2015    Atrial fibrillation 05/2016    CHADS 4, on Xarelto    Benign hypertension     Bipolar disorder, most recent episode manic 8/29/2019    CHF (congestive heart failure)     Chronic constipation     Chronic diarrhea     Chronic edema     Depression     Dercum disease     Multiple painful lipomas    Diabetic retinopathy     Dysphagia     Gas pain     Glaucoma of both eyes     Hx of psychiatric care     previously amitriptyline, now jut on Trazodone 100mg QHS PRN insomnia    Hyperlipidemia with target LDL less than 100     Unable to tolerate statins    Immature cataract     Left sided numbness     per daughter    Tasia     no symptoms prior to this presentation    Morbid obesity     Nausea & vomiting     Neuropathy     Polyneuropathy     Primary osteoarthritis of both knees     Proteinuria     Psychiatric problem     history of depression    Pulmonary hypertension mixed group 2 and 3 1/18/2017    Sleep apnea     Therapy     no  history of therapy    Type II or unspecified type diabetes mellitus with neurological manifestations, uncontrolled(250.62)     Unspecified vitamin D deficiency     Uses roller walker        Past Surgical History:   Procedure Laterality Date     tenotomy      flexors 2,3 L toes    CATARACT EXTRACTION W/  INTRAOCULAR LENS IMPLANT Bilateral     COLONOSCOPY N/A 5/4/2017    Procedure: COLONOSCOPY;  Surgeon: Suellen Wolf MD;  Location: [facility] ([address] [place] [address]);  Service: Endoscopy;  Laterality: N/A;  2nd floor due to pulm htn, possible gastroparesis. per Dr Wolf      ok to hold Xarelto 2 days prior to procedure per Dr Driss Dixon    diabetic retinopathy      right    HYSTERECTOMY      knee surgery x2      Left ankle and leg surgery secondary to a fall      rods & screws present    left arm fracture      Left cataract      PARTIAL HYSTERECTOMY      Secondary to bleeding    TOE FUSION      2,3 R    WRIST SURGERY Left 12/28/2012    pins & plate present       Family History   Problem Relation Age of Onset    No Known Problems Daughter     No Known Problems Father     Liver cancer Mother     Bone cancer Daughter     No Known Problems Sister     No Known Problems Brother     No Known Problems Maternal Aunt     No Known Problems Maternal Uncle     No Known Problems Paternal Aunt     No Known Problems Paternal Uncle     No Known Problems Maternal Grandmother     No Known Problems Maternal Grandfather     No Known Problems Paternal Grandmother     No Known Problems Paternal Grandfather     Amblyopia Neg Hx     Blindness Neg Hx     Cancer Neg Hx     Cataracts Neg Hx     Diabetes Neg Hx     Glaucoma Neg Hx     Hypertension Neg Hx     Macular degeneration Neg Hx     Retinal detachment Neg Hx     Strabismus Neg Hx     Stroke Neg Hx     Thyroid disease Neg Hx     Celiac disease Neg Hx     Cirrhosis Neg Hx     Colon cancer Neg Hx     Colon polyps Neg Hx     Crohn's disease Neg Hx      Cystic fibrosis Neg Hx     Esophageal cancer Neg Hx     Hemochromatosis Neg Hx     Inflammatory bowel disease Neg Hx     Irritable bowel syndrome Neg Hx     Liver disease Neg Hx     Rectal cancer Neg Hx     Stomach cancer Neg Hx     Ulcerative colitis Neg Hx     Montez's disease Neg Hx     Alcohol abuse Neg Hx     Bipolar disorder Neg Hx     Dementia Neg Hx     Depression Neg Hx     Drug abuse Neg Hx     Suicide Neg Hx     Schizophrenia Neg Hx        Social History     Socioeconomic History    Marital status:      Spouse name: Not on file    Number of children: 3    Years of education: 14    Highest education level: Not on file   Occupational History    Occupation: housewife   Social Needs    Financial resource strain: Not on file    Food insecurity:     Worry: Not on file     Inability: Not on file    Transportation needs:     Medical: Not on file     Non-medical: Not on file   Tobacco Use    Smoking status: Never Smoker    Smokeless tobacco: Never Used   Substance and Sexual Activity    Alcohol use: No    Drug use: No    Sexual activity: Yes     Partners: Male   Lifestyle    Physical activity:     Days per week: Not on file     Minutes per session: Not on file    Stress: Not on file   Relationships    Social connections:     Talks on phone: Not on file     Gets together: Not on file     Attends Mu-ism service: Not on file     Active member of club or organization: Not on file     Attends meetings of clubs or organizations: Not on file     Relationship status: Not on file   Other Topics Concern    Patient feels they ought to cut down on drinking/drug use Not Asked    Patient annoyed by others criticizing their drinking/drug use Not Asked    Patient has felt bad or guilty about drinking/drug use Not Asked    Patient has had a drink/used drugs as an eye opener in the AM Not Asked   Social History Narrative    Pt was the youngest of 3 boys and 2 girls.  Her father   when she was 3 months old.  She was raised by her mother, ct, and an uncle.  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  They had 2 daughters and 1 son together, plus 5 stepchildren by her .  They live together in [place] in their own home, with no pets.  Hobbies include gardening and crafts.  Pt attends a variety of organized Spiritism services.            One daughter is  from bone cancer.    One son  2014 after surgery for MIGUEL       Current Outpatient Medications   Medication Sig Dispense Refill    alcohol swabs PadM Apply 1 each topically as needed.  0    amLODIPine (NORVASC) 5 MG tablet Take 1 tablet (5 mg total) by mouth once daily. 90 tablet 2    blood glucose control, low Soln To test meter once per week 1 each 1    desloratadine (CLARINEX) 5 mg tablet Take 1 tablet (5 mg total) by mouth once daily. (Patient taking differently: Take 5 mg by mouth as needed. ) 90 tablet 1    divalproex (DEPAKOTE) 500 MG TbEC Take 1 tablet (500 mg total) by mouth every 12 (twelve) hours. 60 tablet 3    fluticasone (FLONASE) 50 mcg/actuation nasal spray 1 spray (50 mcg total) by Each Nare route 2 (two) times daily. 16 g 3    furosemide (LASIX) 40 MG tablet Take 1 tablet (40 mg total) by mouth once daily. One tab po daily x three days then as needed for LE swelling 90 tablet 1    gabapentin (NEURONTIN) 100 MG capsule Take 1 capsule (100 mg total) by mouth 3 (three) times daily. 90 capsule 5    hydroCHLOROthiazide (HYDRODIURIL) 25 MG tablet Take 25 mg by mouth once daily.  3    insulin aspart protamine-insulin aspart (NOVOLOG 70/30) 100 unit/mL (70-30) InPn pen Inject 21 Units into the skin 2 (two) times daily before meals. Sliding scale max daily dosing 50 units daily 15 mL 2    lancets (LANCETS,THIN) 28 gauge Misc 1 lancet by Misc.(Non-Drug; Combo Route) route 4 (four) times daily before meals and nightly. 100 each 2     "losartan (COZAAR) 100 MG tablet Take 1 tablet (100 mg total) by mouth once daily. 90 tablet 3    melatonin 10 mg Tab Take by mouth. 2 tabs at night      metFORMIN (GLUCOPHAGE) 500 MG tablet Take 1 tablet (500 mg total) by mouth 3 (three) times daily. 270 tablet 3    metoprolol succinate (TOPROL-XL) 25 MG 24 hr tablet Take 1 tablet (25 mg total) by mouth once daily. 90 tablet 2    multivitamin capsule Take 1 capsule by mouth once daily.      oxybutynin (DITROPAN) 5 MG Tab Take 1 tablet (5 mg total) by mouth once daily. In morning 30 tablet 0    pen needle, diabetic (BD ULTRA-FINE SANDEEP PEN NEEDLES) 32 gauge x 5/32" Ndle Take 1 each by mouth 4 (four) times daily. 120 each 2    QUEtiapine (SEROQUEL) 50 MG tablet Take 1 tablet (50 mg total) by mouth every evening. 30 tablet 3    ranitidine (ZANTAC) 150 MG tablet Take 150 mg by mouth 2 (two) times daily.  1    TRUE METRIX GLUCOSE TEST STRIP Strp TEST FOUR TIMES DAILY BEFORE MEALS  AND EVERY NIGHT 400 strip 2     No current facility-administered medications for this visit.        Review of patient's allergies indicates:   Allergen Reactions    Ace inhibitors      Other reaction(s): cough      Fluorescein Itching     Other reaction(s): Itching    Iodine      Other reaction(s): Itching    Pravastatin Other (See Comments)     Other reaction(s): mouth tingling/numbness    Simvastatin      Other reaction(s): foot swelling           Review of Systems   Constitution: Negative for chills and fever.   Cardiovascular: Negative for chest pain, claudication and leg swelling.   Respiratory: Negative for cough and shortness of breath.    Skin: Positive for nail changes. Negative for dry skin.   Musculoskeletal: Negative for joint pain and muscle weakness.   Gastrointestinal: Negative for nausea and vomiting.   Neurological: Positive for numbness. Negative for paresthesias.   Psychiatric/Behavioral: Negative for altered mental status.           Objective:      Physical Exam "     Vascular  DP 1/4 bl, PT1 /4 bl  Mild pitting edema, skin is atrophic, decreased digital hair, feet slightly cool, nails thickened, mild plantar rubor    Neurological:  Sensation to the 10 g sensory filament is not felt consistently on the feet toes and ball (5/10)    Dermatologic:  Nails are elongated and mycotic with thickened, discolored, onycholytic and subungual debris changes X10 Nails areapproximately 2   mm thick and    4 mm long. Skin is intact without wounds, rash, or infection.  Web spaces are clear without maceration    Callus sub 3 b/l, R hallux IPJ plantar and lateral aspect of b/l 5th toes adjacent to nails (5 lesions total)    Musculoskeletal:   Equinus noted b/l ankles with < 10 deg DF noted. MMT 5/5 in DF/PF/Inv/Ev resistance with no reproduction of pain in any direction. Passive range of motion of ankle and pedal joints is painless b/l. Semi-reducible hammertoe contractures noted to toes 2-4 b/l-asymptomatic. HAV, mild, non trackbound noted b/l with mild medial bony prominence at 1st met head--asymptomatic. Adductovarus rotation of 4th toes b/l.   Hypermobility noted to 1st ray b/l with near complete collapse of medial longitudinal arch b/l with loading.       Assessment:       Encounter Diagnoses   Name Primary?    Type II diabetes mellitus with neurological manifestations Yes    Onychomycosis due to dermatophyte          Plan:       Kaci was seen today for diabetic foot exam and nail care.    Diagnoses and all orders for this visit:    Type II diabetes mellitus with neurological manifestations    Onychomycosis due to dermatophyte      I counseled the patient on her conditions, their implications and medical management.    - Shoe inspection. Diabetic Foot Education. Patient reminded of the importance of good nutrition and blood sugar control to help prevent podiatric complications of diabetes. Patient instructed on proper foot hygeine. We discussed wearing proper shoe gear, daily foot  inspections, never walking without protective shoe gear, never putting sharp instruments to feet, routine podiatric nail visits every 2-3 months.      See routine foot care procedure note for nail debridement     Discussed continuation of regular and routine moisturizer to skin of both feet to help improve dry skin. Advised to apply twice daily until resolution of symptoms. Avoid between toes.     Silicone toe sleeves provided for daily use. Continue foam toe spacers as needed.     Rx diabetic shoes with custom molded inserts to be worn at all times while ambulating. Prescription provided with list of local retailers.       RTC 3 months, sooner PRN    Anisha Miller DPM

## 2019-10-03 ENCOUNTER — OFFICE VISIT (OUTPATIENT)
Dept: PSYCHIATRY | Facility: CLINIC | Age: 79
End: 2019-10-03
Payer: MEDICARE

## 2019-10-03 ENCOUNTER — TELEPHONE (OUTPATIENT)
Dept: FAMILY MEDICINE | Facility: CLINIC | Age: 79
End: 2019-10-03

## 2019-10-03 VITALS
BODY MASS INDEX: 40.4 KG/M2 | SYSTOLIC BLOOD PRESSURE: 151 MMHG | WEIGHT: 200 LBS | HEART RATE: 65 BPM | DIASTOLIC BLOOD PRESSURE: 67 MMHG

## 2019-10-03 DIAGNOSIS — F31.10 BIPOLAR DISORDER, MOST RECENT EPISODE MANIC: Primary | Chronic | ICD-10-CM

## 2019-10-03 PROCEDURE — 3078F PR MOST RECENT DIASTOLIC BLOOD PRESSURE < 80 MM HG: ICD-10-PCS | Mod: HCNC,CPTII,S$GLB, | Performed by: PSYCHIATRY & NEUROLOGY

## 2019-10-03 PROCEDURE — 99999 PR PBB SHADOW E&M-EST. PATIENT-LVL II: CPT | Mod: PBBFAC,HCNC,, | Performed by: PSYCHIATRY & NEUROLOGY

## 2019-10-03 PROCEDURE — 99213 PR OFFICE/OUTPT VISIT, EST, LEVL III, 20-29 MIN: ICD-10-PCS | Mod: HCNC,S$GLB,, | Performed by: PSYCHIATRY & NEUROLOGY

## 2019-10-03 PROCEDURE — 99213 OFFICE O/P EST LOW 20 MIN: CPT | Mod: HCNC,S$GLB,, | Performed by: PSYCHIATRY & NEUROLOGY

## 2019-10-03 PROCEDURE — 1101F PR PT FALLS ASSESS DOC 0-1 FALLS W/OUT INJ PAST YR: ICD-10-PCS | Mod: HCNC,CPTII,S$GLB, | Performed by: PSYCHIATRY & NEUROLOGY

## 2019-10-03 PROCEDURE — 99999 PR PBB SHADOW E&M-EST. PATIENT-LVL II: ICD-10-PCS | Mod: PBBFAC,HCNC,, | Performed by: PSYCHIATRY & NEUROLOGY

## 2019-10-03 PROCEDURE — 1101F PT FALLS ASSESS-DOCD LE1/YR: CPT | Mod: HCNC,CPTII,S$GLB, | Performed by: PSYCHIATRY & NEUROLOGY

## 2019-10-03 PROCEDURE — 3077F SYST BP >= 140 MM HG: CPT | Mod: HCNC,CPTII,S$GLB, | Performed by: PSYCHIATRY & NEUROLOGY

## 2019-10-03 PROCEDURE — 3077F PR MOST RECENT SYSTOLIC BLOOD PRESSURE >= 140 MM HG: ICD-10-PCS | Mod: HCNC,CPTII,S$GLB, | Performed by: PSYCHIATRY & NEUROLOGY

## 2019-10-03 PROCEDURE — 3078F DIAST BP <80 MM HG: CPT | Mod: HCNC,CPTII,S$GLB, | Performed by: PSYCHIATRY & NEUROLOGY

## 2019-10-03 NOTE — TELEPHONE ENCOUNTER
----- Message from Damien Mar sent at 10/3/2019  3:36 PM CDT -----  Contact: [facility]   Type: Patient Call Back    Who called:[facility]    What is the request in detail: Patient had a fall on last night with no visible  injuries    Can the clinic reply by [facility]? no    Would the patient rather a call back or a response via [facility]? call    Best call back number: [phone]

## 2019-10-03 NOTE — TELEPHONE ENCOUNTER
I spoke to Pepe from  and they are calling to advise PCP that pt had fall last night and landed on her left side. No LOC and does not have any injuries that they can see.

## 2019-10-03 NOTE — PROGRESS NOTES
Outpatient Psychiatry Follow-Up Visit (MD/NP)    10/03/2019  Clinical Status of Patient:  Outpatient (Ambulatory)    Chief Complaint:  Ms Whalen is a 78 y.o. female who presents today for follow-up of a mood disorder primarily.     Met with patient at the office.      Interval History and Content of Current Session:  General impression:Patient is considerably better than on her initial visit with myself. She has recently been hospitalized with an episode of CHF and that is now more stable. She is more energetic and able to enjoy herself. She was more open re information today and her concerns re her 's dementia. She was upbeat and had a sense of humor at this time. She has no current signs of symptoms of estrellita or psychosis, and is not depressed. She has no thoughts or intent of harming herself or others , and is in good self control. She enjoys walks in the park with her daughter and is still sewing at home and very proud of what she can do. She has no side effects from her medicines, and is very pleased with her current condition.    Target symptoms:signs of estrellita, depression, and behavioral problems. Now stable      Compliance:  Pt reports she is  taking medications as directed    Side effects:  None at this ti me    Risk Parameters:  Patient reports no suicidal ideation  Patient reports no homicidal ideation  Patient reports no self-injurious behavior  Patient reports no violent behavior    Patient's Response to Intervention:  The patient's response to intervention is quite positive and accepting.    Progress Toward Goals and Other Mental Status Changes:  The patient's progress toward goals is good at this time.    Vitals: Most recent vital signs taken today reviewed.     Mental Status Evaluation     Appearance:  Neatly dressed and groomed   Behavior:  Good self control and appropriate   Speech:  Normal volume, tone, and speed and projection   Mood:  Upbeat and hopeful   Affect:  Consistent with mood    Thought Process:  logical   Thought Content:  Appropriate to interview   Sensorium:  Stab;e   Attention Span & Concentration good   Cognition:  intact   Insight:  good   Judgment:  improved         Diagnosis:Bipolar 1, recent episiode estrellita, in remission        Plan:(Medication and Therapy Recommendation)  · Continue Depakote 500mg bid, and Seroquel 50mg qhs. I again reviewed the side effects of her medicines and advised she and her daughter could call if she had problems with the medicines or her clinical condition. (will repeat depakote level, CBC, and CMP next visit)    Additional Notes: Supportive psychotherapy provided during this session.    Return to Clinic: 3 months

## 2019-10-05 ENCOUNTER — PATIENT MESSAGE (OUTPATIENT)
Dept: FAMILY MEDICINE | Facility: CLINIC | Age: 79
End: 2019-10-05

## 2019-10-07 DIAGNOSIS — I48.0 PAROXYSMAL ATRIAL FIBRILLATION: ICD-10-CM

## 2019-10-07 DIAGNOSIS — I10 ESSENTIAL HYPERTENSION: ICD-10-CM

## 2019-10-07 RX ORDER — AMLODIPINE BESYLATE 5 MG/1
5 TABLET ORAL DAILY
Qty: 90 TABLET | Refills: 2 | Status: ON HOLD | OUTPATIENT
Start: 2019-10-07 | End: 2019-11-21 | Stop reason: HOSPADM

## 2019-10-07 RX ORDER — METOPROLOL SUCCINATE 25 MG/1
25 TABLET, EXTENDED RELEASE ORAL DAILY
Qty: 90 TABLET | Refills: 2 | Status: ON HOLD | OUTPATIENT
Start: 2019-10-07 | End: 2019-11-26 | Stop reason: HOSPADM

## 2019-10-08 ENCOUNTER — TELEPHONE (OUTPATIENT)
Dept: HOME HEALTH SERVICES | Facility: HOSPITAL | Age: 79
End: 2019-10-08

## 2019-10-15 ENCOUNTER — PATIENT MESSAGE (OUTPATIENT)
Dept: FAMILY MEDICINE | Facility: CLINIC | Age: 79
End: 2019-10-15

## 2019-10-15 ENCOUNTER — TELEPHONE (OUTPATIENT)
Dept: FAMILY MEDICINE | Facility: CLINIC | Age: 79
End: 2019-10-15

## 2019-10-15 DIAGNOSIS — N39.41 URGE URINARY INCONTINENCE: ICD-10-CM

## 2019-10-15 DIAGNOSIS — M75.51 BURSITIS OF RIGHT SHOULDER: Primary | ICD-10-CM

## 2019-10-15 RX ORDER — OXYBUTYNIN CHLORIDE 5 MG/1
5 TABLET ORAL DAILY
Qty: 90 TABLET | Refills: 1 | Status: ON HOLD | OUTPATIENT
Start: 2019-10-15 | End: 2019-11-21 | Stop reason: HOSPADM

## 2019-10-15 RX ORDER — OXYBUTYNIN CHLORIDE 5 MG/1
5 TABLET ORAL DAILY
Qty: 30 TABLET | Refills: 0 | Status: CANCELLED | OUTPATIENT
Start: 2019-10-15 | End: 2020-10-14

## 2019-10-15 NOTE — TELEPHONE ENCOUNTER
----- Message from Arik Nj sent at 10/15/2019  1:44 PM CDT -----  Contact: Munira [phone]  Type: Patient Call Back    Who called: Munira     What is the request in detail: Munira, nurse with [facility] is calling to notify the staff, that the patient had a fall this morning at 7 am. The patient reports no injuries. Also, their physical therapist is with the patient accessing her as well.     Can the clinic reply by [facility]?no    Would the patient rather a call back or a response via [facility]? call    Best call back number:[phone]

## 2019-11-01 ENCOUNTER — PATIENT OUTREACH (OUTPATIENT)
Dept: ADMINISTRATIVE | Facility: OTHER | Age: 79
End: 2019-11-01

## 2019-11-04 ENCOUNTER — OFFICE VISIT (OUTPATIENT)
Dept: FAMILY MEDICINE | Facility: CLINIC | Age: 79
End: 2019-11-04
Payer: MEDICARE

## 2019-11-04 VITALS
HEIGHT: 59 IN | WEIGHT: 211.63 LBS | SYSTOLIC BLOOD PRESSURE: 128 MMHG | BODY MASS INDEX: 42.66 KG/M2 | HEART RATE: 68 BPM | OXYGEN SATURATION: 96 % | DIASTOLIC BLOOD PRESSURE: 64 MMHG | TEMPERATURE: 98 F | RESPIRATION RATE: 16 BRPM

## 2019-11-04 DIAGNOSIS — E66.01 MORBID OBESITY: ICD-10-CM

## 2019-11-04 DIAGNOSIS — N18.30 TYPE 2 DIABETES MELLITUS WITH STAGE 3 CHRONIC KIDNEY DISEASE, WITHOUT LONG-TERM CURRENT USE OF INSULIN: ICD-10-CM

## 2019-11-04 DIAGNOSIS — E11.22 TYPE 2 DIABETES MELLITUS WITH STAGE 3 CHRONIC KIDNEY DISEASE, WITHOUT LONG-TERM CURRENT USE OF INSULIN: ICD-10-CM

## 2019-11-04 DIAGNOSIS — L21.9 SEBORRHEIC DERMATITIS OF SCALP: ICD-10-CM

## 2019-11-04 DIAGNOSIS — I50.32 CHRONIC HEART FAILURE WITH PRESERVED EJECTION FRACTION: Primary | Chronic | ICD-10-CM

## 2019-11-04 DIAGNOSIS — I10 ESSENTIAL HYPERTENSION: Chronic | ICD-10-CM

## 2019-11-04 PROCEDURE — 99214 PR OFFICE/OUTPT VISIT, EST, LEVL IV, 30-39 MIN: ICD-10-PCS | Mod: HCNC,S$GLB,, | Performed by: INTERNAL MEDICINE

## 2019-11-04 PROCEDURE — 99214 OFFICE O/P EST MOD 30 MIN: CPT | Mod: HCNC,S$GLB,, | Performed by: INTERNAL MEDICINE

## 2019-11-04 PROCEDURE — 3078F PR MOST RECENT DIASTOLIC BLOOD PRESSURE < 80 MM HG: ICD-10-PCS | Mod: HCNC,CPTII,S$GLB, | Performed by: INTERNAL MEDICINE

## 2019-11-04 PROCEDURE — 3078F DIAST BP <80 MM HG: CPT | Mod: HCNC,CPTII,S$GLB, | Performed by: INTERNAL MEDICINE

## 2019-11-04 PROCEDURE — 1100F PR PT FALLS ASSESS DOC 2+ FALLS/FALL W/INJURY/YR: ICD-10-PCS | Mod: HCNC,CPTII,S$GLB, | Performed by: INTERNAL MEDICINE

## 2019-11-04 PROCEDURE — 3288F FALL RISK ASSESSMENT DOCD: CPT | Mod: HCNC,CPTII,S$GLB, | Performed by: INTERNAL MEDICINE

## 2019-11-04 PROCEDURE — 3074F PR MOST RECENT SYSTOLIC BLOOD PRESSURE < 130 MM HG: ICD-10-PCS | Mod: HCNC,CPTII,S$GLB, | Performed by: INTERNAL MEDICINE

## 2019-11-04 PROCEDURE — 1100F PTFALLS ASSESS-DOCD GE2>/YR: CPT | Mod: HCNC,CPTII,S$GLB, | Performed by: INTERNAL MEDICINE

## 2019-11-04 PROCEDURE — 3074F SYST BP LT 130 MM HG: CPT | Mod: HCNC,CPTII,S$GLB, | Performed by: INTERNAL MEDICINE

## 2019-11-04 PROCEDURE — 99999 PR PBB SHADOW E&M-EST. PATIENT-LVL III: CPT | Mod: PBBFAC,HCNC,, | Performed by: INTERNAL MEDICINE

## 2019-11-04 PROCEDURE — 3288F PR FALLS RISK ASSESSMENT DOCUMENTED: ICD-10-PCS | Mod: HCNC,CPTII,S$GLB, | Performed by: INTERNAL MEDICINE

## 2019-11-04 PROCEDURE — 99999 PR PBB SHADOW E&M-EST. PATIENT-LVL III: ICD-10-PCS | Mod: PBBFAC,HCNC,, | Performed by: INTERNAL MEDICINE

## 2019-11-04 RX ORDER — KETOCONAZOLE 20 MG/ML
SHAMPOO, SUSPENSION TOPICAL
Qty: 120 ML | Refills: 1 | Status: ON HOLD | OUTPATIENT
Start: 2019-11-04 | End: 2019-11-21 | Stop reason: HOSPADM

## 2019-11-04 NOTE — PROGRESS NOTES
"Subjective:       Patient ID: Kaci Whalen is a 79 y.o. female.    Chief Complaint: Follow-up    F/u chronic conditions    HPI: 80 y/o w/ HTN DM morbid obesity CHF on home O2 presents with daughter for schedule  Follow up. Doing well sleep "good" no further manic episodes. She has been workign with home PT for gait strengthening feels oxybutyinin has helped for urinary urgency. No LE swelling no hypoglycemia overall feels well.  with dementia has had three hospitalizations in the last month and this has caused increase stress    Review of Systems   Constitutional: Negative for activity change, appetite change, fatigue, fever and unexpected weight change.   HENT: Negative for ear pain, rhinorrhea and sore throat.    Eyes: Negative for discharge and visual disturbance.   Respiratory: Negative for chest tightness, shortness of breath and wheezing.    Cardiovascular: Negative for chest pain, palpitations and leg swelling.   Gastrointestinal: Negative for abdominal pain, constipation and diarrhea.   Endocrine: Negative for cold intolerance and heat intolerance.   Genitourinary: Negative for dysuria and hematuria.   Musculoskeletal: Negative for joint swelling and neck stiffness.   Skin: Negative for rash.   Neurological: Negative for dizziness, syncope, weakness and headaches.   Psychiatric/Behavioral: Negative for suicidal ideas.       Objective:     Vitals:    11/04/19 1036 11/04/19 1055   BP: (!) 158/64 128/64   BP Location: Left arm    Patient Position: Sitting    BP Method: Small (Manual)    Pulse: 76 68   Resp: 16    Temp: 98.1 °F (36.7 °C)    TempSrc: Oral    SpO2: 96%    Weight: 96 kg (211 lb 10.3 oz)    Height: 4' 11" (1.499 m)           Physical Exam   Constitutional: She is oriented to person, place, and time. She appears well-developed and well-nourished.   Wearing portable O2   HENT:   Head: Normocephalic and atraumatic.   Eyes: Conjunctivae are normal. No scleral icterus.   Neck: Normal range of " motion.   Cardiovascular: Normal rate and regular rhythm. Exam reveals no gallop and no friction rub.   No murmur heard.  No LE edema   Pulmonary/Chest: Effort normal and breath sounds normal. She has no wheezes. She has no rales.   Abdominal: Soft. Bowel sounds are normal. There is no tenderness. There is no rebound and no guarding.   Musculoskeletal: Normal range of motion. She exhibits no edema or tenderness.   Neurological: She is alert and oriented to person, place, and time. No cranial nerve deficit.   Skin: Skin is warm and dry.   Psychiatric: She has a normal mood and affect.       Assessment and Plan   1. Chronic heart failure with preserved ejection fraction  euvolemic continue current medications labs prior to follow up in fourr months  - CBC auto differential; Future  - Comprehensive metabolic panel; Future    2. Essential hypertension  As above  - CBC auto differential; Future  - Comprehensive metabolic panel; Future    3. Morbid obesity  The patient is asked to make an attempt to improve diet and exercise patterns to aid in medical management of this problem.      4. Seborrheic dermatitis of scalp  Stable continue twice weekly ketoconazole shampoo  - ketoconazole (NIZORAL) 2 % shampoo; Apply topically twice a week.  Dispense: 120 mL; Refill: 1    5. Type 2 diabetes mellitus with stage 3 chronic kidney disease, without long-term current use of insulin  At goal continue current medications  - CBC auto differential; Future  - Comprehensive metabolic panel; Future  - Hemoglobin A1c; Future

## 2019-11-06 ENCOUNTER — OFFICE VISIT (OUTPATIENT)
Dept: NEUROLOGY | Facility: CLINIC | Age: 79
End: 2019-11-06
Payer: MEDICARE

## 2019-11-06 VITALS
HEIGHT: 59 IN | DIASTOLIC BLOOD PRESSURE: 73 MMHG | HEART RATE: 74 BPM | WEIGHT: 212.31 LBS | BODY MASS INDEX: 42.8 KG/M2 | SYSTOLIC BLOOD PRESSURE: 183 MMHG

## 2019-11-06 DIAGNOSIS — G56.00 CARPAL TUNNEL SYNDROME, UNSPECIFIED LATERALITY: ICD-10-CM

## 2019-11-06 DIAGNOSIS — M48.02 CERVICAL STENOSIS OF SPINAL CANAL: Primary | ICD-10-CM

## 2019-11-06 PROCEDURE — 99499 RISK ADDL DX/OHS AUDIT: ICD-10-PCS | Mod: S$GLB,,, | Performed by: PSYCHIATRY & NEUROLOGY

## 2019-11-06 PROCEDURE — 1101F PT FALLS ASSESS-DOCD LE1/YR: CPT | Mod: HCNC,CPTII,S$GLB, | Performed by: PSYCHIATRY & NEUROLOGY

## 2019-11-06 PROCEDURE — 3077F SYST BP >= 140 MM HG: CPT | Mod: HCNC,CPTII,S$GLB, | Performed by: PSYCHIATRY & NEUROLOGY

## 2019-11-06 PROCEDURE — 99214 OFFICE O/P EST MOD 30 MIN: CPT | Mod: HCNC,S$GLB,, | Performed by: PSYCHIATRY & NEUROLOGY

## 2019-11-06 PROCEDURE — 99999 PR PBB SHADOW E&M-EST. PATIENT-LVL IV: CPT | Mod: PBBFAC,HCNC,, | Performed by: PSYCHIATRY & NEUROLOGY

## 2019-11-06 PROCEDURE — 99214 PR OFFICE/OUTPT VISIT, EST, LEVL IV, 30-39 MIN: ICD-10-PCS | Mod: HCNC,S$GLB,, | Performed by: PSYCHIATRY & NEUROLOGY

## 2019-11-06 PROCEDURE — 99999 PR PBB SHADOW E&M-EST. PATIENT-LVL IV: ICD-10-PCS | Mod: PBBFAC,HCNC,, | Performed by: PSYCHIATRY & NEUROLOGY

## 2019-11-06 PROCEDURE — 3078F DIAST BP <80 MM HG: CPT | Mod: HCNC,CPTII,S$GLB, | Performed by: PSYCHIATRY & NEUROLOGY

## 2019-11-06 PROCEDURE — 99499 UNLISTED E&M SERVICE: CPT | Mod: S$GLB,,, | Performed by: PSYCHIATRY & NEUROLOGY

## 2019-11-06 PROCEDURE — 3078F PR MOST RECENT DIASTOLIC BLOOD PRESSURE < 80 MM HG: ICD-10-PCS | Mod: HCNC,CPTII,S$GLB, | Performed by: PSYCHIATRY & NEUROLOGY

## 2019-11-06 PROCEDURE — 3077F PR MOST RECENT SYSTOLIC BLOOD PRESSURE >= 140 MM HG: ICD-10-PCS | Mod: HCNC,CPTII,S$GLB, | Performed by: PSYCHIATRY & NEUROLOGY

## 2019-11-06 PROCEDURE — 1101F PR PT FALLS ASSESS DOC 0-1 FALLS W/OUT INJ PAST YR: ICD-10-PCS | Mod: HCNC,CPTII,S$GLB, | Performed by: PSYCHIATRY & NEUROLOGY

## 2019-11-06 NOTE — PROGRESS NOTES
Neurology Follow Up Note    Chief Complaint: follow up for carpal tunnel syndrome and paresthesias    Interval History:  Patient states since last visit, she has been taking gabapentin 100mg two times a day as afternoon dose makes her tired. She states this is helping a little bit with her numbness and tingling, but she still has paresthesias in her hands. She had an EMG/NCS which showed bilateral carpal tunnel syndrome. She has swelling in her legs from CHF. Daughter states she has had swelling in her legs for a long time even before starting gabapentin. She was admitted to the hospital for CHF exacerbation. She feels unsteady when she walks. She has cervical stenosis, but daughter and patient would not like to pursue surgery for this. She denies bowel or bladder problems. She has no further complaints.     Last Visit 9/3/19:  Since last visit, patient feels gabapentin 100mg two times a day has been helping her with her pain and paresthesias. She would like to increase to 100mg three times a day. She is tolerating this medication well. She states she drops objects with her hands at times. She admits to localized left sided back pain at times, but denies radicular symptoms. She states she can feel when she needs to urinate, but is unable to make it to the bathroom at times. She states this has been occurring for the past 5 months. She had an MRI C spine which showed severe central canal stenosis with no abnormal signal and varying degrees of neural foraminal narrowing. Daughter reports that patient has had multiple falls this month. She denies neck pain or radicular symptoms. She walks with a walker at home. She had an EMG/NCS which showed a generalized neuropathy likely due to diabetes as well as bilateral carpal tunnel syndrome. It also showed multilevel cervical polyradiculopathies. She has no further complaints.        Visit 8/12/19:   Kaci Whalen is a 78 y.o. female with medical conditions as outlined  below who presents for further evaluation of neuropathy. She states for years, she has had numbness and tingling in her feet which progressed to involve numbness and tingling in her hands after a few years. She was started on amitriptyline for this, but she does not feel it has made a difference. She has had diabetes for many years. She denies neck pain. She admits to having right sided low back pain for years, but denies radicular symptoms. She admits to feeling unsteady at times, but denies recent falls. She admits to chronic left shoulder pain. Her daughter states she had a manic episode a few weeks ago, but she feels this improved with depakote. She is set up to a see a psychiatrist later this month. She is on celexa for anxiety. She has no further complaints.   Past Medical History:  Past Medical History:   Diagnosis Date    Abdominal pain     Anxiety     Atherosclerosis of aortic arch     noted on CXR 7/1/2015    Atrial fibrillation 05/2016    CHADS 4, on Xarelto    Benign hypertension     Bipolar disorder, most recent episode manic 8/29/2019    CHF (congestive heart failure)     Chronic constipation     Chronic diarrhea     Chronic edema     Depression     Dercum disease     Multiple painful lipomas    Diabetic retinopathy     Dysphagia     Gas pain     Glaucoma of both eyes     Hx of psychiatric care     previously amitriptyline, now jut on Trazodone 100mg QHS PRN insomnia    Hyperlipidemia with target LDL less than 100     Unable to tolerate statins    Immature cataract     Left sided numbness     per daughter    Tasia     no symptoms prior to this presentation    Morbid obesity     Nausea & vomiting     Neuropathy     Polyneuropathy     Primary osteoarthritis of both knees     Proteinuria     Psychiatric problem     history of depression    Pulmonary hypertension mixed group 2 and 3 1/18/2017    Sleep apnea     Therapy     no history of therapy    Type II or unspecified type  diabetes mellitus with neurological manifestations, uncontrolled(250.62)     Unspecified vitamin D deficiency     Uses roller walker        Past Surgical History:  Past Surgical History:   Procedure Laterality Date     tenotomy      flexors 2,3 L toes    CATARACT EXTRACTION W/  INTRAOCULAR LENS IMPLANT Bilateral     COLONOSCOPY N/A 5/4/2017    Procedure: COLONOSCOPY;  Surgeon: Suellen Wolf MD;  Location: [facility] ([address] [place] [address]);  Service: Endoscopy;  Laterality: N/A;  2nd floor due to pulm htn, possible gastroparesis. per Dr Wolf      ok to hold Xarelto 2 days prior to procedure per Dr Driss Dixon    diabetic retinopathy      right    HYSTERECTOMY      knee surgery x2      Left ankle and leg surgery secondary to a fall      rods & screws present    left arm fracture      Left cataract      PARTIAL HYSTERECTOMY      Secondary to bleeding    TOE FUSION      2,3 R    WRIST SURGERY Left 12/28/2012    pins & plate present       Social History:  Social History     Socioeconomic History    Marital status:      Spouse name: Not on file    Number of children: 3    Years of education: 14    Highest education level: Not on file   Occupational History    Occupation: housewife   Social Needs    Financial resource strain: Not on file    Food insecurity:     Worry: Not on file     Inability: Not on file    Transportation needs:     Medical: Not on file     Non-medical: Not on file   Tobacco Use    Smoking status: Never Smoker    Smokeless tobacco: Never Used   Substance and Sexual Activity    Alcohol use: No    Drug use: No    Sexual activity: Yes     Partners: Male   Lifestyle    Physical activity:     Days per week: Not on file     Minutes per session: Not on file    Stress: Not on file   Relationships    Social connections:     Talks on phone: Not on file     Gets together: Not on file     Attends Faith service: Not on file     Active member of club or organization: Not on file      Attends meetings of clubs or organizations: Not on file     Relationship status: Not on file   Other Topics Concern    Patient feels they ought to cut down on drinking/drug use Not Asked    Patient annoyed by others criticizing their drinking/drug use Not Asked    Patient has felt bad or guilty about drinking/drug use Not Asked    Patient has had a drink/used drugs as an eye opener in the AM Not Asked   Social History Narrative    Pt was the youngest of 3 boys and 2 girls.  Her father  when she was 3 months old.  She was raised by her mother, m, and an uncle.  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  They had 2 daughters and 1 son together, plus 5 stepchildren by her .  They live together in [place] in their own home, with no pets.  Hobbies include gardening and crafts.  Pt attends a variety of organized [employer] services.            One daughter is  from bone cancer.    One son  2014 after surgery for MIGUEL       Family History:  Family History   Problem Relation Age of Onset    No Known Problems Daughter     No Known Problems Father     Liver cancer Mother     Bone cancer Daughter     No Known Problems Sister     No Known Problems Brother     No Known Problems Maternal Aunt     No Known Problems Maternal Uncle     No Known Problems Paternal Aunt     No Known Problems Paternal Uncle     No Known Problems Maternal Grandmother     No Known Problems Maternal Grandfather     No Known Problems Paternal Grandmother     No Known Problems Paternal Grandfather     Amblyopia Neg Hx     Blindness Neg Hx     Cancer Neg Hx     Cataracts Neg Hx     Diabetes Neg Hx     Glaucoma Neg Hx     Hypertension Neg Hx     Macular degeneration Neg Hx     Retinal detachment Neg Hx     Strabismus Neg Hx     Stroke Neg Hx     Thyroid disease Neg Hx     Celiac disease Neg Hx     Cirrhosis Neg Hx     Colon cancer  Neg Hx     Colon polyps Neg Hx     Crohn's disease Neg Hx     Cystic fibrosis Neg Hx     Esophageal cancer Neg Hx     Hemochromatosis Neg Hx     Inflammatory bowel disease Neg Hx     Irritable bowel syndrome Neg Hx     Liver disease Neg Hx     Rectal cancer Neg Hx     Stomach cancer Neg Hx     Ulcerative colitis Neg Hx     Montez's disease Neg Hx     Alcohol abuse Neg Hx     Bipolar disorder Neg Hx     Dementia Neg Hx     Depression Neg Hx     Drug abuse Neg Hx     Suicide Neg Hx     Schizophrenia Neg Hx        Medications:  Current Outpatient Medications   Medication Sig Dispense Refill    alcohol swabs PadM Apply 1 each topically as needed.  0    amLODIPine (NORVASC) 5 MG tablet Take 1 tablet (5 mg total) by mouth once daily. 90 tablet 2    blood glucose control, low Soln To test meter once per week 1 each 1    desloratadine (CLARINEX) 5 mg tablet Take 1 tablet (5 mg total) by mouth once daily. (Patient taking differently: Take 5 mg by mouth as needed. ) 90 tablet 1    fluticasone (FLONASE) 50 mcg/actuation nasal spray 1 spray (50 mcg total) by Each Nare route 2 (two) times daily. 16 g 3    furosemide (LASIX) 40 MG tablet Take 1 tablet (40 mg total) by mouth once daily. One tab po daily x three days then as needed for LE swelling 90 tablet 1    gabapentin (NEURONTIN) 100 MG capsule Take 1 capsule (100 mg total) by mouth 3 (three) times daily. 90 capsule 5    hydroCHLOROthiazide (HYDRODIURIL) 25 MG tablet Take 25 mg by mouth once daily.  3    insulin aspart protamine-insulin aspart (NOVOLOG 70/30) 100 unit/mL (70-30) InPn pen Inject 21 Units into the skin 2 (two) times daily before meals. Sliding scale max daily dosing 50 units daily 15 mL 2    ketoconazole (NIZORAL) 2 % shampoo Apply topically twice a week. 120 mL 1    lancets (LANCETS,THIN) 28 gauge Misc 1 lancet by Misc.(Non-Drug; Combo Route) route 4 (four) times daily before meals and nightly. 100 each 2    losartan (COZAAR) 100  "MG tablet Take 1 tablet (100 mg total) by mouth once daily. 90 tablet 3    melatonin 10 mg Tab Take by mouth. 2 tabs at night      metFORMIN (GLUCOPHAGE) 500 MG tablet Take 1 tablet (500 mg total) by mouth 3 (three) times daily. 270 tablet 3    metoprolol succinate (TOPROL-XL) 25 MG 24 hr tablet Take 1 tablet (25 mg total) by mouth once daily. 90 tablet 2    multivitamin capsule Take 1 capsule by mouth once daily.      oxybutynin (DITROPAN) 5 MG Tab Take 1 tablet (5 mg total) by mouth once daily. In morning 90 tablet 1    pen needle, diabetic (BD ULTRA-FINE SANDEEP PEN NEEDLES) 32 gauge x 5/32" Ndle Take 1 each by mouth 4 (four) times daily. 120 each 2    ranitidine (ZANTAC) 150 MG tablet Take 150 mg by mouth 2 (two) times daily.  1    TRUE METRIX GLUCOSE TEST STRIP Strp TEST FOUR TIMES DAILY BEFORE MEALS  AND EVERY NIGHT 400 strip 2    divalproex (DEPAKOTE) 500 MG TbEC Take 1 tablet (500 mg total) by mouth every 12 (twelve) hours. 60 tablet 3    QUEtiapine (SEROQUEL) 50 MG tablet Take 1 tablet (50 mg total) by mouth every evening. 30 tablet 3     No current facility-administered medications for this visit.        Allergies:  Review of patient's allergies indicates:   Allergen Reactions    Tramadol Other (See Comments)     Interaction-induced delirium with s/s of estrellita.    Ace inhibitors      Other reaction(s): cough      Fluorescein Itching     Other reaction(s): Itching    Iodine      Other reaction(s): Itching    Pravastatin Other (See Comments)     Other reaction(s): mouth tingling/numbness    Simvastatin      Other reaction(s): foot swelling         ROS:  A 12 point review of system was negative aside from pertinent positives and negatives as outlined above.    Physical Exam  Vitals:    11/06/19 1029   BP: (!) 183/73   Pulse: 74       General: well nourished, well developed  Eyes: no scleral icterus   Nose: nasal turbinates intact  Neck: supple, ROM intact  Skin: no rash or ecchymosis  Joints: no " swelling or erythema  Cardiac: regular rate and rhythm  Lungs: clear to auscultation bilaterally    Neuro:  Mental status: AAO x 3, no dysarthria, no aphasia, communicating appropriately  CN: PERRL, EOMI, VFF, V1-V3 sensation intact, no facial asymmetry, hearing grossly intact, tongue midline  Motor:   Mental status: AAO x 3, no dysarthria, no aphasia, communicating appropriately  CN: PERRL, EOMI, VFF, V1-V3 sensation intact, no facial asymmetry, hearing grossly intact, tongue midline  Motor:   Deltoid R 5/5 L 4+/5 possible pain limited  Biceps R 5/5 L 5/5   Triceps R 5/5 L 5/5   Wrist flexion R 5/5 L 5/5   Wrist extension R 5/5 L 5/5   Finger abduction  R 5/5 L 5/5   Thumb abduction R 5/5 L 5/5      Hip flexion R 5/5 L 5/5   Knee extension R 5/5 L 5/5   Knee flexion R 5/5 L 5/5   Foot dorsiflexion R 5/5 L 5/5   Foot plantar flexion R 5/5 L 5/5   Foot inversion R 5/5 L 5/5   Foot eversion R 5/5 L 5/5         Normal bulk and tone     Reflexes: absent ankle jerks bilaterally, otherwise 1+, toes equivocal bilaterally  Sensory: decreased to pinprick up to knees bilaterally, intact to position sense, decreased vibration sense at toes, intact to light touch and pinprick in upper extremities  Coordination: no dysmetria on FTN  Gait: steady with walker    Prior Imaging/Labs:  Reviewed      Assessment and Plan:    79 y.o. female with with generalized polyneuropathy, bilateral carpal tunnel syndrome and cervical stenosis    1. Cervical stenosis of spinal canal  Patient and daughter would not like to consider surgery as an option at this time. Risks of worsening weakness and gait were discussed with patient and daughter and they demonstrated understanding    2. Carpal tunnel syndrome  Take gabapentin 100mg in the morning and 200mg at bedtime  Patient is seeing Dr. Chapman later this month for left shoulder pain. She was also advised to ask her about possible wrist injections for CTS vs carpal tunnel release          Patient was  advised to notify me for worsening symptoms. I will see patient back in 3 months or sooner if necessary.       Malou Stephens DO  [facility]  [address] [facility] [address]  [place] [id number] [phone]

## 2019-11-14 ENCOUNTER — TELEPHONE (OUTPATIENT)
Dept: FAMILY MEDICINE | Facility: CLINIC | Age: 79
End: 2019-11-14

## 2019-11-14 ENCOUNTER — HOSPITAL ENCOUNTER (INPATIENT)
Facility: HOSPITAL | Age: 79
LOS: 7 days | Discharge: NURSING, REHAB OR LONG-TERM CARE | DRG: 291 | End: 2019-11-21
Attending: EMERGENCY MEDICINE | Admitting: EMERGENCY MEDICINE
Payer: MEDICARE

## 2019-11-14 ENCOUNTER — PATIENT MESSAGE (OUTPATIENT)
Dept: FAMILY MEDICINE | Facility: CLINIC | Age: 79
End: 2019-11-14

## 2019-11-14 DIAGNOSIS — I50.9 ACUTE CONGESTIVE HEART FAILURE, UNSPECIFIED HEART FAILURE TYPE: Primary | ICD-10-CM

## 2019-11-14 DIAGNOSIS — R06.03 RESPIRATORY DISTRESS: ICD-10-CM

## 2019-11-14 DIAGNOSIS — E87.5 HYPERKALEMIA: ICD-10-CM

## 2019-11-14 DIAGNOSIS — E11.29 TYPE 2 DIABETES MELLITUS WITH MICROALBUMINURIA, WITH LONG-TERM CURRENT USE OF INSULIN: ICD-10-CM

## 2019-11-14 DIAGNOSIS — G47.33 OSA (OBSTRUCTIVE SLEEP APNEA): Chronic | ICD-10-CM

## 2019-11-14 DIAGNOSIS — R80.9 TYPE 2 DIABETES MELLITUS WITH MICROALBUMINURIA, WITH LONG-TERM CURRENT USE OF INSULIN: ICD-10-CM

## 2019-11-14 DIAGNOSIS — R06.02 SHORTNESS OF BREATH: ICD-10-CM

## 2019-11-14 DIAGNOSIS — Z79.4 TYPE 2 DIABETES MELLITUS WITH MICROALBUMINURIA, WITH LONG-TERM CURRENT USE OF INSULIN: ICD-10-CM

## 2019-11-14 DIAGNOSIS — R09.02 HYPOXIA: ICD-10-CM

## 2019-11-14 PROBLEM — I50.33 ACUTE ON CHRONIC DIASTOLIC CONGESTIVE HEART FAILURE: Status: ACTIVE | Noted: 2019-09-09

## 2019-11-14 PROBLEM — D63.8 ANEMIA OF CHRONIC DISORDER: Chronic | Status: ACTIVE | Noted: 2019-11-14

## 2019-11-14 LAB
ALBUMIN SERPL BCP-MCNC: 3.7 G/DL (ref 3.5–5.2)
ALLENS TEST: ABNORMAL
ALLENS TEST: ABNORMAL
ALP SERPL-CCNC: 57 U/L (ref 55–135)
ALT SERPL W/O P-5'-P-CCNC: 13 U/L (ref 10–44)
ANION GAP SERPL CALC-SCNC: 9 MMOL/L (ref 8–16)
APTT BLDCRRT: 23.3 SEC (ref 21–32)
AST SERPL-CCNC: 19 U/L (ref 10–40)
BASOPHILS # BLD AUTO: 0.03 K/UL (ref 0–0.2)
BASOPHILS NFR BLD: 0.3 % (ref 0–1.9)
BILIRUB SERPL-MCNC: 0.4 MG/DL (ref 0.1–1)
BILIRUB UR QL STRIP: NEGATIVE
BNP SERPL-MCNC: 1100 PG/ML (ref 0–99)
BUN SERPL-MCNC: 33 MG/DL (ref 8–23)
CALCIUM SERPL-MCNC: 9.1 MG/DL (ref 8.7–10.5)
CHLORIDE SERPL-SCNC: 93 MMOL/L (ref 95–110)
CLARITY UR: CLEAR
CO2 SERPL-SCNC: 33 MMOL/L (ref 23–29)
COLOR UR: YELLOW
CREAT SERPL-MCNC: 1.6 MG/DL (ref 0.5–1.4)
CTP QC/QA: YES
DELSYS: ABNORMAL
DELSYS: ABNORMAL
DIFFERENTIAL METHOD: ABNORMAL
EOSINOPHIL # BLD AUTO: 0 K/UL (ref 0–0.5)
EOSINOPHIL NFR BLD: 0.1 % (ref 0–8)
ERYTHROCYTE [DISTWIDTH] IN BLOOD BY AUTOMATED COUNT: 13.5 % (ref 11.5–14.5)
EST. GFR  (AFRICAN AMERICAN): 36 ML/MIN/1.73 M^2
EST. GFR  (NON AFRICAN AMERICAN): 31 ML/MIN/1.73 M^2
FLOW: 2
GLUCOSE SERPL-MCNC: 262 MG/DL (ref 70–110)
GLUCOSE UR QL STRIP: ABNORMAL
HCO3 UR-SCNC: 37.8 MMOL/L (ref 24–28)
HCO3 UR-SCNC: 40 MMOL/L (ref 24–28)
HCT VFR BLD AUTO: 29.6 % (ref 37–48.5)
HGB BLD-MCNC: 9 G/DL (ref 12–16)
HGB UR QL STRIP: NEGATIVE
IMM GRANULOCYTES # BLD AUTO: 0.12 K/UL (ref 0–0.04)
IMM GRANULOCYTES NFR BLD AUTO: 1.3 % (ref 0–0.5)
INR PPP: 1 (ref 0.8–1.2)
KETONES UR QL STRIP: NEGATIVE
LACTATE SERPL-SCNC: 2.5 MMOL/L (ref 0.5–2.2)
LEUKOCYTE ESTERASE UR QL STRIP: NEGATIVE
LYMPHOCYTES # BLD AUTO: 0.5 K/UL (ref 1–4.8)
LYMPHOCYTES NFR BLD: 6 % (ref 18–48)
MCH RBC QN AUTO: 30.1 PG (ref 27–31)
MCHC RBC AUTO-ENTMCNC: 30.4 G/DL (ref 32–36)
MCV RBC AUTO: 99 FL (ref 82–98)
MODE: ABNORMAL
MODE: ABNORMAL
MONOCYTES # BLD AUTO: 0.4 K/UL (ref 0.3–1)
MONOCYTES NFR BLD: 4.3 % (ref 4–15)
NEUTROPHILS # BLD AUTO: 7.9 K/UL (ref 1.8–7.7)
NEUTROPHILS NFR BLD: 88 % (ref 38–73)
NITRITE UR QL STRIP: NEGATIVE
NRBC BLD-RTO: 0 /100 WBC
PCO2 BLDA: 76.8 MMHG (ref 35–45)
PCO2 BLDA: 78.4 MMHG (ref 35–45)
PH SMN: 7.29 [PH] (ref 7.35–7.45)
PH SMN: 7.33 [PH] (ref 7.35–7.45)
PH UR STRIP: 5 [PH] (ref 5–8)
PLATELET # BLD AUTO: 237 K/UL (ref 150–350)
PMV BLD AUTO: 9.6 FL (ref 9.2–12.9)
PO2 BLDA: 68 MMHG (ref 80–100)
PO2 BLDA: 68 MMHG (ref 80–100)
POC BE: 12 MMOL/L
POC BE: 9 MMOL/L
POC MOLECULAR INFLUENZA A AGN: NEGATIVE
POC MOLECULAR INFLUENZA B AGN: NEGATIVE
POC SATURATED O2: 90 % (ref 95–100)
POC SATURATED O2: 90 % (ref 95–100)
POC TCO2: 40 MMOL/L (ref 23–27)
POC TCO2: 42 MMOL/L (ref 23–27)
POCT GLUCOSE: 161 MG/DL (ref 70–110)
POCT GLUCOSE: 222 MG/DL (ref 70–110)
POCT GLUCOSE: 222 MG/DL (ref 70–110)
POCT GLUCOSE: 249 MG/DL (ref 70–110)
POTASSIUM SERPL-SCNC: 5.8 MMOL/L (ref 3.5–5.1)
PROCALCITONIN SERPL IA-MCNC: 0.04 NG/ML
PROT SERPL-MCNC: 7 G/DL (ref 6–8.4)
PROT UR QL STRIP: NEGATIVE
PROTHROMBIN TIME: 10.7 SEC (ref 9–12.5)
RBC # BLD AUTO: 2.99 M/UL (ref 4–5.4)
SAMPLE: ABNORMAL
SAMPLE: ABNORMAL
SITE: ABNORMAL
SITE: ABNORMAL
SODIUM SERPL-SCNC: 135 MMOL/L (ref 136–145)
SP GR UR STRIP: 1.01 (ref 1–1.03)
SP02: 92
SP02: 98
SPONT RATE: 24
TROPONIN I SERPL DL<=0.01 NG/ML-MCNC: 0.01 NG/ML (ref 0–0.03)
URN SPEC COLLECT METH UR: ABNORMAL
UROBILINOGEN UR STRIP-ACNC: NEGATIVE EU/DL
WBC # BLD AUTO: 8.98 K/UL (ref 3.9–12.7)

## 2019-11-14 PROCEDURE — 94644 CONT INHLJ TX 1ST HOUR: CPT | Mod: HCNC

## 2019-11-14 PROCEDURE — 81003 URINALYSIS AUTO W/O SCOPE: CPT | Mod: HCNC

## 2019-11-14 PROCEDURE — 63600175 PHARM REV CODE 636 W HCPCS: Mod: HCNC | Performed by: EMERGENCY MEDICINE

## 2019-11-14 PROCEDURE — 25000003 PHARM REV CODE 250: Mod: HCNC | Performed by: EMERGENCY MEDICINE

## 2019-11-14 PROCEDURE — 25000242 PHARM REV CODE 250 ALT 637 W/ HCPCS: Mod: HCNC | Performed by: EMERGENCY MEDICINE

## 2019-11-14 PROCEDURE — 25000242 PHARM REV CODE 250 ALT 637 W/ HCPCS: Mod: HCNC | Performed by: HOSPITALIST

## 2019-11-14 PROCEDURE — 87502 INFLUENZA DNA AMP PROBE: CPT | Mod: HCNC

## 2019-11-14 PROCEDURE — 93010 EKG 12-LEAD: ICD-10-PCS | Mod: HCNC,,, | Performed by: INTERNAL MEDICINE

## 2019-11-14 PROCEDURE — 96374 THER/PROPH/DIAG INJ IV PUSH: CPT | Mod: HCNC

## 2019-11-14 PROCEDURE — 25000003 PHARM REV CODE 250: Mod: HCNC | Performed by: HOSPITALIST

## 2019-11-14 PROCEDURE — 82803 BLOOD GASES ANY COMBINATION: CPT | Mod: HCNC

## 2019-11-14 PROCEDURE — 85730 THROMBOPLASTIN TIME PARTIAL: CPT | Mod: HCNC

## 2019-11-14 PROCEDURE — 99900035 HC TECH TIME PER 15 MIN (STAT): Mod: HCNC

## 2019-11-14 PROCEDURE — 84484 ASSAY OF TROPONIN QUANT: CPT | Mod: 91,HCNC

## 2019-11-14 PROCEDURE — 63600175 PHARM REV CODE 636 W HCPCS: Mod: HCNC | Performed by: HOSPITALIST

## 2019-11-14 PROCEDURE — 82962 GLUCOSE BLOOD TEST: CPT | Mod: HCNC

## 2019-11-14 PROCEDURE — 93005 ELECTROCARDIOGRAM TRACING: CPT | Mod: HCNC

## 2019-11-14 PROCEDURE — 21400001 HC TELEMETRY ROOM: Mod: HCNC

## 2019-11-14 PROCEDURE — 93010 ELECTROCARDIOGRAM REPORT: CPT | Mod: HCNC,,, | Performed by: INTERNAL MEDICINE

## 2019-11-14 PROCEDURE — 80053 COMPREHEN METABOLIC PANEL: CPT | Mod: HCNC

## 2019-11-14 PROCEDURE — 85025 COMPLETE CBC W/AUTO DIFF WBC: CPT | Mod: HCNC

## 2019-11-14 PROCEDURE — 83880 ASSAY OF NATRIURETIC PEPTIDE: CPT | Mod: HCNC

## 2019-11-14 PROCEDURE — 83605 ASSAY OF LACTIC ACID: CPT | Mod: HCNC

## 2019-11-14 PROCEDURE — 85610 PROTHROMBIN TIME: CPT | Mod: HCNC

## 2019-11-14 PROCEDURE — 99291 CRITICAL CARE FIRST HOUR: CPT | Mod: 25,HCNC

## 2019-11-14 PROCEDURE — 36600 WITHDRAWAL OF ARTERIAL BLOOD: CPT | Mod: HCNC

## 2019-11-14 PROCEDURE — 96375 TX/PRO/DX INJ NEW DRUG ADDON: CPT | Mod: HCNC

## 2019-11-14 PROCEDURE — 36415 COLL VENOUS BLD VENIPUNCTURE: CPT | Mod: HCNC

## 2019-11-14 PROCEDURE — 87040 BLOOD CULTURE FOR BACTERIA: CPT | Mod: HCNC

## 2019-11-14 PROCEDURE — 84145 PROCALCITONIN (PCT): CPT | Mod: HCNC

## 2019-11-14 RX ORDER — INSULIN ASPART 100 [IU]/ML
15 INJECTION, SUSPENSION SUBCUTANEOUS
Status: DISCONTINUED | OUTPATIENT
Start: 2019-11-14 | End: 2019-11-15

## 2019-11-14 RX ORDER — DIVALPROEX SODIUM 250 MG/1
500 TABLET, DELAYED RELEASE ORAL EVERY 12 HOURS
Status: DISCONTINUED | OUTPATIENT
Start: 2019-11-14 | End: 2019-11-18

## 2019-11-14 RX ORDER — ONDANSETRON 2 MG/ML
8 INJECTION INTRAMUSCULAR; INTRAVENOUS EVERY 6 HOURS PRN
Status: DISCONTINUED | OUTPATIENT
Start: 2019-11-14 | End: 2019-11-21 | Stop reason: HOSPADM

## 2019-11-14 RX ORDER — ALBUTEROL SULFATE 2.5 MG/.5ML
10 SOLUTION RESPIRATORY (INHALATION)
Status: COMPLETED | OUTPATIENT
Start: 2019-11-14 | End: 2019-11-14

## 2019-11-14 RX ORDER — OXYBUTYNIN CHLORIDE 5 MG/1
5 TABLET ORAL DAILY
Status: DISCONTINUED | OUTPATIENT
Start: 2019-11-15 | End: 2019-11-21 | Stop reason: HOSPADM

## 2019-11-14 RX ORDER — HEPARIN SODIUM 5000 [USP'U]/ML
5000 INJECTION, SOLUTION INTRAVENOUS; SUBCUTANEOUS EVERY 8 HOURS
Status: DISCONTINUED | OUTPATIENT
Start: 2019-11-14 | End: 2019-11-21 | Stop reason: HOSPADM

## 2019-11-14 RX ORDER — LOSARTAN POTASSIUM 25 MG/1
100 TABLET ORAL DAILY
Status: DISCONTINUED | OUTPATIENT
Start: 2019-11-15 | End: 2019-11-21 | Stop reason: HOSPADM

## 2019-11-14 RX ORDER — CLONIDINE HYDROCHLORIDE 0.1 MG/1
0.1 TABLET ORAL EVERY 8 HOURS PRN
Status: DISCONTINUED | OUTPATIENT
Start: 2019-11-14 | End: 2019-11-21 | Stop reason: HOSPADM

## 2019-11-14 RX ORDER — GABAPENTIN 100 MG/1
100 CAPSULE ORAL 3 TIMES DAILY
Status: DISCONTINUED | OUTPATIENT
Start: 2019-11-14 | End: 2019-11-18

## 2019-11-14 RX ORDER — FUROSEMIDE 10 MG/ML
40 INJECTION INTRAMUSCULAR; INTRAVENOUS 2 TIMES DAILY
Status: DISCONTINUED | OUTPATIENT
Start: 2019-11-14 | End: 2019-11-16

## 2019-11-14 RX ORDER — FUROSEMIDE 10 MG/ML
40 INJECTION INTRAMUSCULAR; INTRAVENOUS
Status: COMPLETED | OUTPATIENT
Start: 2019-11-14 | End: 2019-11-14

## 2019-11-14 RX ORDER — AMLODIPINE BESYLATE 5 MG/1
5 TABLET ORAL DAILY
Status: DISCONTINUED | OUTPATIENT
Start: 2019-11-15 | End: 2019-11-19

## 2019-11-14 RX ORDER — SODIUM CHLORIDE 0.9 % (FLUSH) 0.9 %
10 SYRINGE (ML) INJECTION
Status: DISCONTINUED | OUTPATIENT
Start: 2019-11-14 | End: 2019-11-21 | Stop reason: HOSPADM

## 2019-11-14 RX ORDER — IPRATROPIUM BROMIDE 0.5 MG/2.5ML
1 SOLUTION RESPIRATORY (INHALATION)
Status: COMPLETED | OUTPATIENT
Start: 2019-11-14 | End: 2019-11-14

## 2019-11-14 RX ORDER — FAMOTIDINE 20 MG/1
20 TABLET, FILM COATED ORAL DAILY
Status: DISCONTINUED | OUTPATIENT
Start: 2019-11-15 | End: 2019-11-21 | Stop reason: HOSPADM

## 2019-11-14 RX ORDER — INSULIN ASPART 100 [IU]/ML
15 INJECTION, SOLUTION INTRAVENOUS; SUBCUTANEOUS
Status: DISCONTINUED | OUTPATIENT
Start: 2019-11-14 | End: 2019-11-14

## 2019-11-14 RX ORDER — ACETAMINOPHEN 325 MG/1
650 TABLET ORAL EVERY 4 HOURS PRN
Status: DISCONTINUED | OUTPATIENT
Start: 2019-11-14 | End: 2019-11-21 | Stop reason: HOSPADM

## 2019-11-14 RX ORDER — FLUTICASONE PROPIONATE 50 MCG
1 SPRAY, SUSPENSION (ML) NASAL 2 TIMES DAILY
Status: DISCONTINUED | OUTPATIENT
Start: 2019-11-14 | End: 2019-11-21 | Stop reason: HOSPADM

## 2019-11-14 RX ORDER — METOPROLOL SUCCINATE 25 MG/1
25 TABLET, EXTENDED RELEASE ORAL DAILY
Status: DISCONTINUED | OUTPATIENT
Start: 2019-11-15 | End: 2019-11-21 | Stop reason: HOSPADM

## 2019-11-14 RX ORDER — QUETIAPINE FUMARATE 25 MG/1
50 TABLET, FILM COATED ORAL NIGHTLY
Status: DISCONTINUED | OUTPATIENT
Start: 2019-11-14 | End: 2019-11-18

## 2019-11-14 RX ORDER — POLYETHYLENE GLYCOL 3350 17 G/17G
17 POWDER, FOR SOLUTION ORAL 2 TIMES DAILY PRN
Status: DISCONTINUED | OUTPATIENT
Start: 2019-11-14 | End: 2019-11-21 | Stop reason: HOSPADM

## 2019-11-14 RX ADMIN — DIVALPROEX SODIUM 500 MG: 250 TABLET, DELAYED RELEASE ORAL at 09:11

## 2019-11-14 RX ADMIN — ALBUTEROL SULFATE 10 MG: 2.5 SOLUTION RESPIRATORY (INHALATION) at 11:11

## 2019-11-14 RX ADMIN — GABAPENTIN 100 MG: 100 CAPSULE ORAL at 09:11

## 2019-11-14 RX ADMIN — INSULIN ASPART 15 UNITS: 100 INJECTION, SUSPENSION SUBCUTANEOUS at 06:11

## 2019-11-14 RX ADMIN — QUETIAPINE FUMARATE 50 MG: 25 TABLET ORAL at 09:11

## 2019-11-14 RX ADMIN — DEXTROSE MONOHYDRATE 25 G: 25 INJECTION, SOLUTION INTRAVENOUS at 12:11

## 2019-11-14 RX ADMIN — FUROSEMIDE 40 MG: 10 INJECTION, SOLUTION INTRAVENOUS at 06:11

## 2019-11-14 RX ADMIN — INSULIN HUMAN 4 UNITS: 100 INJECTION, SOLUTION PARENTERAL at 12:11

## 2019-11-14 RX ADMIN — FUROSEMIDE 40 MG: 10 INJECTION, SOLUTION INTRAVENOUS at 11:11

## 2019-11-14 RX ADMIN — IPRATROPIUM BROMIDE 1 MG: 0.5 SOLUTION RESPIRATORY (INHALATION) at 11:11

## 2019-11-14 RX ADMIN — FLUTICASONE PROPIONATE 50 MCG: 50 SPRAY, METERED NASAL at 09:11

## 2019-11-14 RX ADMIN — SODIUM POLYSTYRENE SULFONATE 30 G: 15 SUSPENSION ORAL; RECTAL at 01:11

## 2019-11-14 RX ADMIN — HEPARIN SODIUM 5000 UNITS: 5000 INJECTION INTRAVENOUS; SUBCUTANEOUS at 09:11

## 2019-11-14 NOTE — ASSESSMENT & PLAN NOTE
Patient noted to be hypoxic and in respiratory distress.  Respiratory failure secondary to CHF exacerbation.  Symptoms improved with IV Lasix.  Continue IV Lasix and CHF medications.

## 2019-11-14 NOTE — ED NOTES
Unable to given insulin prior to patient transport as our [facility]is does not stock the insulin. Mirta AVRELA notified.

## 2019-11-14 NOTE — TELEPHONE ENCOUNTER
Contacted patient's daughter, tonia. She is concerned because mom is more tearful complaining of not wanting to get up and move. Initially said she was having trouble breathing per tonia she is speaking full sentences not breathing any faster not using supplemental oxygen. No change in weight. Her  continues to decline and this is causing her stress per Tonia. Monitor weights daily encourge out of house once per day and mobility as tolerated. Tonia will update me beginning of next week. Reviewed ED precautions including worsenign lower leg swelling increase oxygen requirement

## 2019-11-14 NOTE — ED NOTES
Pt transported with telebox number [id number] and confirmed with tech in tele monitor room. Pt is going to room 317. Monitor tech confirms tele monitor SR at 72. Patient transported with 3L of 02 via NC

## 2019-11-14 NOTE — PROGRESS NOTES
Dr. Villalobos initially wanted to place pt on Bipap but decided against it .  Patient placed on NC @ 2lpm per Dr. Villalobos.  Oxygen saturation 92% at present.  RT will continue to monitor pt status.

## 2019-11-14 NOTE — ASSESSMENT & PLAN NOTE
Creat around baseline.  Monitor renal function while on diuresis.  Avoid nephrotoxic medications.

## 2019-11-14 NOTE — PROGRESS NOTES
Results for MANUEL HORAN (MRN [id number]) as of 11/14/2019 13:02   Ref. Range 11/14/2019 12:43   POC PH Latest Ref Range: 7.35 - 7.45  7.291 (L)   POC PCO2 Latest Ref Range: 35 - 45 mmHg 78.4 (HH)   POC PO2 Latest Ref Range: 80 - 100 mmHg 68 (L)   POC BE Latest Ref Range: -2 to 2 mmol/L 9   POC HCO3 Latest Ref Range: 24 - 28 mmol/L 37.8 (H)   POC SATURATED O2 Latest Ref Range: 95 - 100 % 90 (L)   POC TCO2 Latest Ref Range: 23 - 27 mmol/L 40 (H)   Flow Unknown 2   Sample Unknown ARTERIAL   DelSys Unknown Nasal Can   Allens Test Unknown Pass   Site Unknown RR   Mode Unknown SPONT

## 2019-11-14 NOTE — ED PROVIDER NOTES
Encounter Date: 11/14/2019    SCRIBE #1 NOTE: I, Hari Ramos, am scribing for, and in the presence of,  Rosalio Villalobos MD. I have scribed the following portions of the note - the EKG reading. Other sections scribed: HPI, ROS, PE, MDM.       History     Chief Complaint   Patient presents with    Shortness of Breath     Increased SOB, pt. arrives via EMS. EMS reports breathing about 50 times a minute and oxygen saturation 77% on 2L nasal canula. Placed on CPAP oxygen saturation improved to 97%. 2 SL nitro and inch of nitro paste given. Pt. reports relief from CPAP. Hx of CHF     79 y.o F with a hx of Atrial fibrillation, HTN, CHF, Chronic edema, HLD, Morbid obesity, Pulmonary hypertension mixed group 2 and 3, Sleep apnea and DM type II presents to the ED via EMS c/o gradually worsening SOB and unproductive cough x2 days. EMS reports an initial O2 sat of 77% on 2L O2. The pt uses 2L of supplemental O2 at all times. She is compliant with her Lasix 40mg (MWF). Per EMS, the pt's daughter reports increased urinary frequency. The pt denies fever, chills, dysuria and any other associated symptoms. She does not smoke cigarettes, consume EtOH or use illicit drugs. EMS administered 2L SL Nitro and 1 inch of Nitro paste while en route.    9/10/19  ECHO  · Normal left ventricular systolic function. The estimated ejection fraction is 55%  · Concentric left ventricular hypertrophy.  · Grade II (moderate) left ventricular diastolic dysfunction consistent with pseudonormalization.  · Moderate left atrial enlargement.  · Normal right ventricular systolic function.    The history is provided by the patient, the EMS personnel and a relative.     Review of patient's allergies indicates:   Allergen Reactions    Tramadol Other (See Comments)     Interaction-induced delirium with s/s of estrellita.    Ace inhibitors      Other reaction(s): cough      Fluorescein Itching     Other reaction(s): Itching    Iodine      Other reaction(s):  Itching    Pravastatin Other (See Comments)     Other reaction(s): mouth tingling/numbness    Simvastatin      Other reaction(s): foot swelling       Past Medical History:   Diagnosis Date    Abdominal pain     Anxiety     Atherosclerosis of aortic arch     noted on CXR 7/1/2015    Atrial fibrillation 05/2016    CHADS 4, on Xarelto    Benign hypertension     Bipolar disorder, most recent episode manic 8/29/2019    CHF (congestive heart failure)     Chronic constipation     Chronic diarrhea     Chronic edema     Depression     Dercum disease     Multiple painful lipomas    Diabetic retinopathy     Dysphagia     Gas pain     Glaucoma of both eyes     Hx of psychiatric care     previously amitriptyline, now jut on Trazodone 100mg QHS PRN insomnia    Hyperlipidemia with target LDL less than 100     Unable to tolerate statins    Immature cataract     Left sided numbness     per daughter    Tasia     no symptoms prior to this presentation    Morbid obesity     Nausea & vomiting     Neuropathy     Polyneuropathy     Primary osteoarthritis of both knees     Proteinuria     Psychiatric problem     history of depression    Pulmonary hypertension mixed group 2 and 3 1/18/2017    Sleep apnea     Therapy     no history of therapy    Type II or unspecified type diabetes mellitus with neurological manifestations, uncontrolled(250.62)     Unspecified vitamin D deficiency     Uses roller walker      Past Surgical History:   Procedure Laterality Date     tenotomy      flexors 2,3 L toes    CATARACT EXTRACTION W/  INTRAOCULAR LENS IMPLANT Bilateral     COLONOSCOPY N/A 5/4/2017    Procedure: COLONOSCOPY;  Surgeon: Suellen Wolf MD;  Location: [facility] ([address] [place] [address]);  Service: Endoscopy;  Laterality: N/A;  2nd floor due to pulm htn, possible gastroparesis. per Dr Wolf      ok to hold Xarelto 2 days prior to procedure per Dr Driss Dixon    diabetic retinopathy      right    HYSTERECTOMY       knee surgery x2      Left ankle and leg surgery secondary to a fall      rods & screws present    left arm fracture      Left cataract      PARTIAL HYSTERECTOMY      Secondary to bleeding    TOE FUSION      2,3 R    WRIST SURGERY Left 12/28/2012    pins & plate present     Family History   Problem Relation Age of Onset    No Known Problems Daughter     No Known Problems Father     Liver cancer Mother     Bone cancer Daughter     No Known Problems Sister     No Known Problems Brother     No Known Problems Maternal Aunt     No Known Problems Maternal Uncle     No Known Problems Paternal Aunt     No Known Problems Paternal Uncle     No Known Problems Maternal Grandmother     No Known Problems Maternal Grandfather     No Known Problems Paternal Grandmother     No Known Problems Paternal Grandfather     Amblyopia Neg Hx     Blindness Neg Hx     Cancer Neg Hx     Cataracts Neg Hx     Diabetes Neg Hx     Glaucoma Neg Hx     Hypertension Neg Hx     Macular degeneration Neg Hx     Retinal detachment Neg Hx     Strabismus Neg Hx     Stroke Neg Hx     Thyroid disease Neg Hx     Celiac disease Neg Hx     Cirrhosis Neg Hx     Colon cancer Neg Hx     Colon polyps Neg Hx     Crohn's disease Neg Hx     Cystic fibrosis Neg Hx     Esophageal cancer Neg Hx     Hemochromatosis Neg Hx     Inflammatory bowel disease Neg Hx     Irritable bowel syndrome Neg Hx     Liver disease Neg Hx     Rectal cancer Neg Hx     Stomach cancer Neg Hx     Ulcerative colitis Neg Hx     Montez's disease Neg Hx     Alcohol abuse Neg Hx     Bipolar disorder Neg Hx     Dementia Neg Hx     Depression Neg Hx     Drug abuse Neg Hx     Suicide Neg Hx     Schizophrenia Neg Hx      Social History     Tobacco Use    Smoking status: Never Smoker    Smokeless tobacco: Never Used   Substance Use Topics    Alcohol use: No    Drug use: No     Review of Systems   Constitutional: Negative for chills and fever.    Respiratory: Positive for cough and shortness of breath.    Genitourinary: Positive for frequency.   All other systems reviewed and are negative.      Physical Exam     Initial Vitals [11/14/19 1111]   BP Pulse Resp Temp SpO2   138/80 70 (!) 24 98.1 °F (36.7 °C) 100 %      MAP       --         Physical Exam    Nursing note and vitals reviewed.  Constitutional: Vital signs are normal. She appears well-developed and well-nourished. She is Obese .  Non-toxic appearance. No distress.   HENT:   Head: Normocephalic and atraumatic.   Mouth/Throat: Oropharynx is clear and moist.   Eyes: Conjunctivae and EOM are normal. Pupils are equal, round, and reactive to light. Right conjunctiva is not injected. Left conjunctiva is not injected. No scleral icterus.   Neck: Normal range of motion and full passive range of motion without pain. Neck supple.   Cardiovascular: Normal rate, regular rhythm, S1 normal, S2 normal and normal heart sounds. Exam reveals no gallop.    No murmur heard.  Pulses:       Radial pulses are 2+ on the right side, and 2+ on the left side.   Pulmonary/Chest: Accessory muscle usage present. No respiratory distress. She has wheezes. She has rales.   Speaking in 3-4 word sentences on CPAP.    Abdominal: Soft. She exhibits no distension. There is no tenderness.   Musculoskeletal: Normal range of motion. She exhibits no edema.   Good active ROM of all extremities. No lower extremity cyanosis. 1+ lower extremity edema.   Neurological: No cranial nerve deficit or sensory deficit. Gait normal.   A&Ox4. Normal Speech.    Skin: Skin is warm. No ecchymosis and no rash noted.   Psychiatric: She has a normal mood and affect. Thought content normal.         ED Course   Procedures  Labs Reviewed   CBC W/ AUTO DIFFERENTIAL - Abnormal; Notable for the following components:       Result Value    RBC 2.99 (*)     Hemoglobin 9.0 (*)     Hematocrit 29.6 (*)     Mean Corpuscular Volume 99 (*)     Mean Corpuscular Hemoglobin  Conc 30.4 (*)     Immature Granulocytes 1.3 (*)     Gran # (ANC) 7.9 (*)     Immature Grans (Abs) 0.12 (*)     Lymph # 0.5 (*)     Gran% 88.0 (*)     Lymph% 6.0 (*)     All other components within normal limits   COMPREHENSIVE METABOLIC PANEL - Abnormal; Notable for the following components:    Sodium 135 (*)     Potassium 5.8 (*)     Chloride 93 (*)     CO2 33 (*)     Glucose 262 (*)     BUN, Bld 33 (*)     Creatinine 1.6 (*)     eGFR if  36 (*)     eGFR if non  31 (*)     All other components within normal limits   B-TYPE NATRIURETIC PEPTIDE - Abnormal; Notable for the following components:    BNP 1,100 (*)     All other components within normal limits   ISTAT PROCEDURE - Abnormal; Notable for the following components:    POC PH 7.325 (*)     POC PCO2 76.8 (*)     POC PO2 68 (*)     POC HCO3 40.0 (*)     POC SATURATED O2 90 (*)     POC TCO2 42 (*)     All other components within normal limits   POCT GLUCOSE - Abnormal; Notable for the following components:    POCT Glucose 222 (*)     All other components within normal limits   CULTURE, BLOOD   CULTURE, BLOOD   TROPONIN I   PROTIME-INR   APTT   URINALYSIS, REFLEX TO URINE CULTURE   PROCALCITONIN   LACTIC ACID, PLASMA   POCT INFLUENZA A/B MOLECULAR     EKG Readings: (Independently Interpreted)   Initial Reading: No STEMI. Previous EKG: Compared with most recent EKG Previous EKG Date: 09-SEPT-2019. Rhythm: Normal Sinus Rhythm. Heart Rate: 68bpm. Ectopy: No Ectopy. Conduction: Normal. T Waves: Normal.   Unchanged EKG when compared to previous. Low Voltage QRS. Borderline Abnormal EKG.     ECG Results          EKG 12-lead (In process)  Result time 11/14/19 11:46:49    In process by Interface, Lab In [facility] (11/14/19 11:46:49)                 Narrative:    Test Reason : R06.02,    Vent. Rate : 068 BPM     Atrial Rate : 068 BPM     P-R Int : 200 ms          QRS Dur : 084 ms      QT Int : 390 ms       P-R-T Axes : 047 039 046 degrees      QTc Int : 414 ms    Normal sinus rhythm  Low voltage QRS  Borderline Abnormal ECG  When compared with ECG of 09-SEP-2019 12:29,  Significant changes have occurred    Referred By: System System           Confirmed By:                   In process by Interface, Lab In [facility] (11/14/19 11:44:25)                 Narrative:    Test Reason : R06.02,    Vent. Rate : 068 BPM     Atrial Rate : 068 BPM     P-R Int : 200 ms          QRS Dur : 084 ms      QT Int : 390 ms       P-R-T Axes : 047 039 046 degrees     QTc Int : 414 ms    Normal sinus rhythm  Low voltage QRS  Borderline Abnormal ECG  When compared with ECG of 09-SEP-2019 12:29,  Significant changes have occurred    Referred By: System System           Confirmed By:                             Imaging Results          X-Ray Chest AP Portable (Final result)  Result time 11/14/19 11:57:11    Final result by David Kim MD (11/14/19 11:57:11)                 Impression:      Findings suggestive of pulmonary edema or extensive bilateral pulmonary infiltrates.      Electronically signed by: David Kim MD  Date:    11/14/2019  Time:    11:57             Narrative:    EXAMINATION:  XR CHEST AP PORTABLE    CLINICAL HISTORY:  CHF;    TECHNIQUE:  Single frontal view of the chest was performed.    COMPARISON:  Chest 09/09/2019    FINDINGS:  Patient is slightly rotated to the left.  Under penetrated chest radiograph due to patient habitus (large patient).    Since the previous examination there is marked bilateral pulmonary edema/pulmonary infiltrates.  No gross signs for pleural effusions.  There is no pneumothorax.  There is borderline cardiomegaly.    There are cardiac monitoring leads over the chest.                                 Medical Decision Making:   History:   Old Medical Records: I decided to obtain old medical records.  Initial Assessment:   Pt presenting 2/2 rales in fluid overload and shortness of breath consistent with CHF exacerbation.  Patient is given IV  Lasix.  Considered nitroglycerin.  If worsening respiratory status will consider BiPAP and/or intubation.  Due to hearing some wheezing with crackles I did give 10 mg albuterol and 1 mg of ipratropium.  Patient is already on nitro paste.  Given IV Lasix.  ABG shows some respiratory acidosis.    Also considered but less likely:  Acs: ekg doesn't show stemi. Troponin ordered  Pna: symptoms bilaterally and no fevers.   Bronchitis: considered but hpi most c/w CHF  COPD:  Considered but less likely  Pneumothorax: bilateral breath sounds    Labs notable for an elevated BNP.  At baseline chronic kidney disease.  Patient's potassium is elevated but no major hyperkalemic changes.  No major the T-waves.  Patient also received some insulin and glucose and Kayexalate.  I had already received breathing treatments.    Repeat ABG shows slightly worsening respiratory acidosis but PCO to is about the same and PO2 is about the same but patient is on her baseline 2 L oxygen at that time.  Patient clinically states she feels so much better than before.  I spoke with Dr. Mckeon with the ICU and she states that she believes the patient will be acceptable on the floor..  I spoke with Dr. Moreira accepted the patient to his service.    Please put in 35 minutes of critical care due to patient having a high risk of respiratory failure.   Separate from teaching and exclusive of procedure and ekg time  Includes:  Time at bedside  Time reviewing test results  Time discussing case with staff  Time documenting the medical record  Time spent with family members  Time spent with consults  Management        Clinical Tests:   Lab Tests: Ordered and Reviewed  Radiological Study: Reviewed and Ordered  Medical Tests: Ordered and Reviewed            Scribe Attestation:   Scribe #1: I performed the above scribed service and the documentation accurately describes the services I performed. I attest to the accuracy of the note.    I, Rosalio Villalobos, personally  performed the services described in this documentation. All medical record entries made by the scribe were at my direction and in my presence. I have reviewed the chart and agree that the record reflects my personal performance and is accurate and complete.                       Clinical Impression:       ICD-10-CM ICD-9-CM   1. Acute congestive heart failure, unspecified heart failure type I50.9 428.0   2. Shortness of breath R06.02 786.05   3. Hypoxia R09.02 799.02   4. Respiratory distress R06.03 786.09                             Rosalio Villalobos MD  11/14/19 125

## 2019-11-14 NOTE — HPI
80 y/o female presents to the ER via EMS complaining of gradually worsening shortness of breath and non productive cough for 2 days.  EMS reports an initial O2 sat of 77% on 2L O2.  The patient uses 2L of supplemental O2 at all times.  Patient apparently started complaining of congestion that did not improve with Mucinex and cough syrup.  Patient complains of severe shortness of breath with minimal exertion.  She can never lay flat to sleep.  Does complain of some lower extremity swelling, but states that it's actually much better than in the past.  She is compliant with her Lasix 40mg (MWF).  Shortness of breath worsened this morning and patient presented to ER.  Patient states that she also gets very anxious when she gets short of breath.  No other complaints.

## 2019-11-14 NOTE — SUBJECTIVE & OBJECTIVE
Past Medical History:   Diagnosis Date    Abdominal pain     Anxiety     Atherosclerosis of aortic arch     noted on CXR 7/1/2015    Atrial fibrillation 05/2016    CHADS 4, on Xarelto    Benign hypertension     Bipolar disorder, most recent episode manic 8/29/2019    CHF (congestive heart failure)     Chronic constipation     Chronic diarrhea     Chronic edema     Depression     Dercum disease     Multiple painful lipomas    Diabetic retinopathy     Dysphagia     Gas pain     Glaucoma of both eyes     Hx of psychiatric care     previously amitriptyline, now jut on Trazodone 100mg QHS PRN insomnia    Hyperlipidemia with target LDL less than 100     Unable to tolerate statins    Immature cataract     Left sided numbness     per daughter    Tasia     no symptoms prior to this presentation    Morbid obesity     Nausea & vomiting     Neuropathy     On home oxygen therapy     Polyneuropathy     Primary osteoarthritis of both knees     Proteinuria     Psychiatric problem     history of depression    Pulmonary hypertension mixed group 2 and 3 1/18/2017    Requires assistance with activities of daily living (ADL)     Sleep apnea     Therapy     no history of therapy    Type II or unspecified type diabetes mellitus with neurological manifestations, uncontrolled(250.62)     Unspecified vitamin D deficiency     Unsteady gait     Uses roller walker        Past Surgical History:   Procedure Laterality Date     tenotomy      flexors 2,3 L toes    CATARACT EXTRACTION W/  INTRAOCULAR LENS IMPLANT Bilateral     COLONOSCOPY N/A 5/4/2017    Procedure: COLONOSCOPY;  Surgeon: Suellen Wolf MD;  Location: [facility] ([address] [place] [address]);  Service: Endoscopy;  Laterality: N/A;  2nd floor due to pulm htn, possible gastroparesis. per Dr Wolf      ok to hold Xarelto 2 days prior to procedure per Dr Driss Dixon    diabetic retinopathy      right    HYSTERECTOMY      knee surgery x2      Left ankle and  leg surgery secondary to a fall      rods & screws present    left arm fracture      Left cataract      PARTIAL HYSTERECTOMY      Secondary to bleeding    TOE FUSION      2,3 R    WRIST SURGERY Left 12/28/2012    pins & plate present       Review of patient's allergies indicates:   Allergen Reactions    Tramadol Other (See Comments)     Interaction-induced delirium with s/s of estrellita.    Ace inhibitors      Other reaction(s): cough      Fluorescein Itching     Other reaction(s): Itching    Iodine      Other reaction(s): Itching    Pravastatin Other (See Comments)     Other reaction(s): mouth tingling/numbness    Simvastatin      Other reaction(s): foot swelling         No current facility-administered medications on file prior to encounter.      Current Outpatient Medications on File Prior to Encounter   Medication Sig    divalproex (DEPAKOTE) 500 MG TbEC Take 1 tablet (500 mg total) by mouth every 12 (twelve) hours.    furosemide (LASIX) 40 MG tablet Take 1 tablet (40 mg total) by mouth once daily. One tab po daily x three days then as needed for LE swelling    losartan (COZAAR) 100 MG tablet Take 1 tablet (100 mg total) by mouth once daily.    metFORMIN (GLUCOPHAGE) 500 MG tablet Take 1 tablet (500 mg total) by mouth 3 (three) times daily.    alcohol swabs PadM Apply 1 each topically as needed.    amLODIPine (NORVASC) 5 MG tablet Take 1 tablet (5 mg total) by mouth once daily.    blood glucose control, low Soln To test meter once per week    desloratadine (CLARINEX) 5 mg tablet Take 1 tablet (5 mg total) by mouth once daily. (Patient taking differently: Take 5 mg by mouth as needed. )    fluticasone (FLONASE) 50 mcg/actuation nasal spray 1 spray (50 mcg total) by Each Nare route 2 (two) times daily.    gabapentin (NEURONTIN) 100 MG capsule Take 1 capsule (100 mg total) by mouth 3 (three) times daily.    hydroCHLOROthiazide (HYDRODIURIL) 25 MG tablet Take 25 mg by mouth once daily.    insulin  "aspart protamine-insulin aspart (NOVOLOG 70/30) 100 unit/mL (70-30) InPn pen Inject 21 Units into the skin 2 (two) times daily before meals. Sliding scale max daily dosing 50 units daily    ketoconazole (NIZORAL) 2 % shampoo Apply topically twice a week.    lancets (LANCETS,THIN) 28 gauge Misc 1 lancet by Misc.(Non-Drug; Combo Route) route 4 (four) times daily before meals and nightly.    melatonin 10 mg Tab Take by mouth. 2 tabs at night    metoprolol succinate (TOPROL-XL) 25 MG 24 hr tablet Take 1 tablet (25 mg total) by mouth once daily.    multivitamin capsule Take 1 capsule by mouth once daily.    oxybutynin (DITROPAN) 5 MG Tab Take 1 tablet (5 mg total) by mouth once daily. In morning    pen needle, diabetic (BD ULTRA-FINE SANDEEP PEN NEEDLES) 32 gauge x 5/32" Ndle Take 1 each by mouth 4 (four) times daily.    QUEtiapine (SEROQUEL) 50 MG tablet Take 1 tablet (50 mg total) by mouth every evening.    ranitidine (ZANTAC) 150 MG tablet Take 150 mg by mouth 2 (two) times daily.    TRUE METRIX GLUCOSE TEST STRIP Strp TEST FOUR TIMES DAILY BEFORE MEALS  AND EVERY NIGHT     Family History     Problem Relation (Age of Onset)    Bone cancer Daughter    Liver cancer Mother    No Known Problems Daughter, Father, Sister, Brother, Maternal Aunt, Maternal Uncle, Paternal Aunt, Paternal Uncle, Maternal Grandmother, Maternal Grandfather, Paternal Grandmother, Paternal Grandfather        Tobacco Use    Smoking status: Never Smoker    Smokeless tobacco: Never Used   Substance and Sexual Activity    Alcohol use: No    Drug use: No    Sexual activity: Yes     Partners: Male     Review of Systems   Constitutional: Negative for chills and fever.   HENT: Negative for ear discharge and ear pain.    Eyes: Negative for pain and itching.   Respiratory: Positive for cough and shortness of breath.    Cardiovascular: Negative for chest pain and palpitations.   Gastrointestinal: Negative for abdominal distention and abdominal " pain.   Endocrine: Negative for polyphagia and polyuria.   Genitourinary: Negative for difficulty urinating and dysuria.   Musculoskeletal: Negative for neck pain and neck stiffness.   Skin: Negative for rash and wound.   Neurological: Negative for seizures and syncope.   Psychiatric/Behavioral: Negative for hallucinations and self-injury.     Objective:     Vital Signs (Most Recent):  Temp: 98.1 °F (36.7 °C) (11/14/19 1111)  Pulse: 72 (11/14/19 1603)  Resp: (!) 22 (11/14/19 1603)  BP: (!) 166/72 (11/14/19 1603)  SpO2: 96 % (11/14/19 1603) Vital Signs (24h Range):  Temp:  [98.1 °F (36.7 °C)] 98.1 °F (36.7 °C)  Pulse:  [67-76] 72  Resp:  [17-38] 22  SpO2:  [93 %-100 %] 96 %  BP: (121-190)/(60-87) 166/72     Weight: 98.9 kg (218 lb)  Body mass index is 44.03 kg/m².    Physical Exam   Constitutional: She is oriented to person, place, and time. No distress.   HENT:   Head: Normocephalic and atraumatic.   Eyes: Conjunctivae are normal. Right eye exhibits no discharge. Left eye exhibits no discharge.   Neck: Neck supple. No tracheal deviation present.   Cardiovascular: Normal rate and regular rhythm.   Pulmonary/Chest:   Speaks in short sentences  Tachypnea  Coarse BS bilaterally   Abdominal: Soft. Bowel sounds are normal.   Musculoskeletal: She exhibits edema.   Neurological: She is alert and oriented to person, place, and time.   Skin: Skin is warm and dry. She is not diaphoretic.           Significant Labs:   BMP:   Recent Labs   Lab 11/14/19  1110   *   *   K 5.8*   CL 93*   CO2 33*   BUN 33*   CREATININE 1.6*   CALCIUM 9.1     CBC:   Recent Labs   Lab 11/14/19  1110   WBC 8.98   HGB 9.0*   HCT 29.6*          Significant Imaging: I have reviewed all pertinent imaging results/findings within the past 24 hours.

## 2019-11-14 NOTE — H&P
[facility] Medicine  History & Physical    Patient Name: Kaci Whalen  MRN: [id number]  Admission Date: 11/14/2019  Attending Physician: Rosalio Villalobos MD   Primary Care Provider: Toby Hung MD         Patient information was obtained from patient and ER records.     Subjective:     Principal Problem:Acute on chronic respiratory failure with hypoxia    Chief Complaint:   Chief Complaint   Patient presents with    Shortness of Breath     Increased SOB, pt. arrives via EMS. EMS reports breathing about 50 times a minute and oxygen saturation 77% on 2L nasal canula. Placed on CPAP oxygen saturation improved to 97%. 2 SL nitro and inch of nitro paste given. Pt. reports relief from CPAP. Hx of CHF        HPI: 80 y/o female presents to the ER via EMS complaining of gradually worsening shortness of breath and non productive cough for 2 days.  EMS reports an initial O2 sat of 77% on 2L O2.  The patient uses 2L of supplemental O2 at all times.  Patient apparently started complaining of congestion that did not improve with Mucinex and cough syrup.  Patient complains of severe shortness of breath with minimal exertion.  She can never lay flat to sleep.  Does complain of some lower extremity swelling, but states that it's actually much better than in the past.  She is compliant with her Lasix 40mg (MWF).  Shortness of breath worsened this morning and patient presented to ER.  Patient states that she also gets very anxious when she gets short of breath.  No other complaints.    Past Medical History:   Diagnosis Date    Abdominal pain     Anxiety     Atherosclerosis of aortic arch     noted on CXR 7/1/2015    Atrial fibrillation 05/2016    CHADS 4, on Xarelto    Benign hypertension     Bipolar disorder, most recent episode manic 8/29/2019    CHF (congestive heart failure)     Chronic constipation     Chronic diarrhea     Chronic edema     Depression     Dercum disease     Multiple  painful lipomas    Diabetic retinopathy     Dysphagia     Gas pain     Glaucoma of both eyes     Hx of psychiatric care     previously amitriptyline, now jut on Trazodone 100mg QHS PRN insomnia    Hyperlipidemia with target LDL less than 100     Unable to tolerate statins    Immature cataract     Left sided numbness     per daughter    Tasia     no symptoms prior to this presentation    Morbid obesity     Nausea & vomiting     Neuropathy     On home oxygen therapy     Polyneuropathy     Primary osteoarthritis of both knees     Proteinuria     Psychiatric problem     history of depression    Pulmonary hypertension mixed group 2 and 3 1/18/2017    Requires assistance with activities of daily living (ADL)     Sleep apnea     Therapy     no history of therapy    Type II or unspecified type diabetes mellitus with neurological manifestations, uncontrolled(250.62)     Unspecified vitamin D deficiency     Unsteady gait     Uses roller walker        Past Surgical History:   Procedure Laterality Date     tenotomy      flexors 2,3 L toes    CATARACT EXTRACTION W/  INTRAOCULAR LENS IMPLANT Bilateral     COLONOSCOPY N/A 5/4/2017    Procedure: COLONOSCOPY;  Surgeon: Suellen Wolf MD;  Location: [facility] ([address] [place] [address]);  Service: Endoscopy;  Laterality: N/A;  2nd floor due to pulm htn, possible gastroparesis. per Dr Wolf      ok to hold Xarelto 2 days prior to procedure per Dr Driss Dixon    diabetic retinopathy      right    HYSTERECTOMY      knee surgery x2      Left ankle and leg surgery secondary to a fall      rods & screws present    left arm fracture      Left cataract      PARTIAL HYSTERECTOMY      Secondary to bleeding    TOE FUSION      2,3 R    WRIST SURGERY Left 12/28/2012    pins & plate present       Review of patient's allergies indicates:   Allergen Reactions    Tramadol Other (See Comments)     Interaction-induced delirium with s/s of tasia.    Ace inhibitors      Other  reaction(s): cough      Fluorescein Itching     Other reaction(s): Itching    Iodine      Other reaction(s): Itching    Pravastatin Other (See Comments)     Other reaction(s): mouth tingling/numbness    Simvastatin      Other reaction(s): foot swelling         No current facility-administered medications on file prior to encounter.      Current Outpatient Medications on File Prior to Encounter   Medication Sig    divalproex (DEPAKOTE) 500 MG TbEC Take 1 tablet (500 mg total) by mouth every 12 (twelve) hours.    furosemide (LASIX) 40 MG tablet Take 1 tablet (40 mg total) by mouth once daily. One tab po daily x three days then as needed for LE swelling    losartan (COZAAR) 100 MG tablet Take 1 tablet (100 mg total) by mouth once daily.    metFORMIN (GLUCOPHAGE) 500 MG tablet Take 1 tablet (500 mg total) by mouth 3 (three) times daily.    alcohol swabs PadM Apply 1 each topically as needed.    amLODIPine (NORVASC) 5 MG tablet Take 1 tablet (5 mg total) by mouth once daily.    blood glucose control, low Soln To test meter once per week    desloratadine (CLARINEX) 5 mg tablet Take 1 tablet (5 mg total) by mouth once daily. (Patient taking differently: Take 5 mg by mouth as needed. )    fluticasone (FLONASE) 50 mcg/actuation nasal spray 1 spray (50 mcg total) by Each Nare route 2 (two) times daily.    gabapentin (NEURONTIN) 100 MG capsule Take 1 capsule (100 mg total) by mouth 3 (three) times daily.    hydroCHLOROthiazide (HYDRODIURIL) 25 MG tablet Take 25 mg by mouth once daily.    insulin aspart protamine-insulin aspart (NOVOLOG 70/30) 100 unit/mL (70-30) InPn pen Inject 21 Units into the skin 2 (two) times daily before meals. Sliding scale max daily dosing 50 units daily    ketoconazole (NIZORAL) 2 % shampoo Apply topically twice a week.    lancets (LANCETS,THIN) 28 gauge Misc 1 lancet by Misc.(Non-Drug; Combo Route) route 4 (four) times daily before meals and nightly.    melatonin 10 mg Tab Take by  "mouth. 2 tabs at night    metoprolol succinate (TOPROL-XL) 25 MG 24 hr tablet Take 1 tablet (25 mg total) by mouth once daily.    multivitamin capsule Take 1 capsule by mouth once daily.    oxybutynin (DITROPAN) 5 MG Tab Take 1 tablet (5 mg total) by mouth once daily. In morning    pen needle, diabetic (BD ULTRA-FINE SANDEEP PEN NEEDLES) 32 gauge x 5/32" Ndle Take 1 each by mouth 4 (four) times daily.    QUEtiapine (SEROQUEL) 50 MG tablet Take 1 tablet (50 mg total) by mouth every evening.    ranitidine (ZANTAC) 150 MG tablet Take 150 mg by mouth 2 (two) times daily.    TRUE METRIX GLUCOSE TEST STRIP Strp TEST FOUR TIMES DAILY BEFORE MEALS  AND EVERY NIGHT     Family History     Problem Relation (Age of Onset)    Bone cancer Daughter    Liver cancer Mother    No Known Problems Daughter, Father, Sister, Brother, Maternal Aunt, Maternal Uncle, Paternal Aunt, Paternal Uncle, Maternal Grandmother, Maternal Grandfather, Paternal Grandmother, Paternal Grandfather        Tobacco Use    Smoking status: Never Smoker    Smokeless tobacco: Never Used   Substance and Sexual Activity    Alcohol use: No    Drug use: No    Sexual activity: Yes     Partners: Male     Review of Systems   Constitutional: Negative for chills and fever.   HENT: Negative for ear discharge and ear pain.    Eyes: Negative for pain and itching.   Respiratory: Positive for cough and shortness of breath.    Cardiovascular: Negative for chest pain and palpitations.   Gastrointestinal: Negative for abdominal distention and abdominal pain.   Endocrine: Negative for polyphagia and polyuria.   Genitourinary: Negative for difficulty urinating and dysuria.   Musculoskeletal: Negative for neck pain and neck stiffness.   Skin: Negative for rash and wound.   Neurological: Negative for seizures and syncope.   Psychiatric/Behavioral: Negative for hallucinations and self-injury.     Objective:     Vital Signs (Most Recent):  Temp: 98.1 °F (36.7 °C) (11/14/19 " 1111)  Pulse: 72 (11/14/19 1603)  Resp: (!) 22 (11/14/19 1603)  BP: (!) 166/72 (11/14/19 1603)  SpO2: 96 % (11/14/19 1603) Vital Signs (24h Range):  Temp:  [98.1 °F (36.7 °C)] 98.1 °F (36.7 °C)  Pulse:  [67-76] 72  Resp:  [17-38] 22  SpO2:  [93 %-100 %] 96 %  BP: (121-190)/(60-87) 166/72     Weight: 98.9 kg (218 lb)  Body mass index is 44.03 kg/m².    Physical Exam   Constitutional: She is oriented to person, place, and time. No distress.   HENT:   Head: Normocephalic and atraumatic.   Eyes: Conjunctivae are normal. Right eye exhibits no discharge. Left eye exhibits no discharge.   Neck: Neck supple. No tracheal deviation present.   Cardiovascular: Normal rate and regular rhythm.   Pulmonary/Chest:   Speaks in short sentences  Tachypnea  Coarse BS bilaterally   Abdominal: Soft. Bowel sounds are normal.   Musculoskeletal: She exhibits edema.   Neurological: She is alert and oriented to person, place, and time.   Skin: Skin is warm and dry. She is not diaphoretic.           Significant Labs:   BMP:   Recent Labs   Lab 11/14/19  1110   *   *   K 5.8*   CL 93*   CO2 33*   BUN 33*   CREATININE 1.6*   CALCIUM 9.1     CBC:   Recent Labs   Lab 11/14/19  1110   WBC 8.98   HGB 9.0*   HCT 29.6*          Significant Imaging: I have reviewed all pertinent imaging results/findings within the past 24 hours.    Assessment/Plan:     * Acute on chronic respiratory failure with hypoxia  Patient noted to be hypoxic and in respiratory distress.  Respiratory failure secondary to CHF exacerbation.  Symptoms improved with IV Lasix.  Continue IV Lasix and CHF medications.      Acute on chronic diastolic congestive heart failure  As above.      Hyperkalemia  Should improve with diuresis.  Repeat labs in Am.      Anemia of chronic disorder  H/H similar to previous labs.  No evidence of bleeding.    CKD (chronic kidney disease), stage III  Creat around baseline.  Monitor renal function while on diuresis.  Avoid nephrotoxic  medications.      Essential hypertension  Resume home medications with parameters.  Low Na diet.      Morbid obesity  Body mass index is 44.03 kg/m².        Paroxysmal atrial fibrillation  Currently in sinus rhythm.  Continue B blocker.      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  Diabetic diet and insulin sliding scale.      Hyperlipidemia with target LDL less than 100          VTE Risk Mitigation (From admission, onward)         Ordered     heparin (porcine) injection 5,000 Units  Every 8 hours      11/14/19 1623     IP VTE HIGH RISK PATIENT  Once      11/14/19 1623     Place sequential compression device  Until discontinued      11/14/19 1623     Place EDE hose  Until discontinued      11/14/19 1623                   Maximilian Moreira MD  Department of Hospital Medicine   [facility]

## 2019-11-14 NOTE — PROGRESS NOTES
Results for MANUEL HORAN (MRN [id number]) as of 11/14/2019 13:02   Ref. Range 11/14/2019 11:36   POC PH Latest Ref Range: 7.35 - 7.45  7.325 (L)   POC PCO2 Latest Ref Range: 35 - 45 mmHg 76.8 (HH)   POC PO2 Latest Ref Range: 80 - 100 mmHg 68 (L)   POC BE Latest Ref Range: -2 to 2 mmol/L 12   POC HCO3 Latest Ref Range: 24 - 28 mmol/L 40.0 (H)   POC SATURATED O2 Latest Ref Range: 95 - 100 % 90 (L)   POC TCO2 Latest Ref Range: 23 - 27 mmol/L 42 (H)   Sample Unknown ARTERIAL   DelSys Unknown CPAP/BiPAP   Allens Test Unknown Pass   Site Unknown LR   Mode Unknown CPAP

## 2019-11-15 LAB
ALBUMIN SERPL BCP-MCNC: 3.2 G/DL (ref 3.5–5.2)
ALP SERPL-CCNC: 54 U/L (ref 55–135)
ALT SERPL W/O P-5'-P-CCNC: 11 U/L (ref 10–44)
ANION GAP SERPL CALC-SCNC: 8 MMOL/L (ref 8–16)
AST SERPL-CCNC: 17 U/L (ref 10–40)
BASOPHILS # BLD AUTO: 0.05 K/UL (ref 0–0.2)
BASOPHILS NFR BLD: 0.6 % (ref 0–1.9)
BILIRUB SERPL-MCNC: 0.2 MG/DL (ref 0.1–1)
BUN SERPL-MCNC: 34 MG/DL (ref 8–23)
CALCIUM SERPL-MCNC: 9 MG/DL (ref 8.7–10.5)
CHLORIDE SERPL-SCNC: 94 MMOL/L (ref 95–110)
CO2 SERPL-SCNC: 40 MMOL/L (ref 23–29)
CREAT SERPL-MCNC: 1.4 MG/DL (ref 0.5–1.4)
DIFFERENTIAL METHOD: ABNORMAL
EOSINOPHIL # BLD AUTO: 0.1 K/UL (ref 0–0.5)
EOSINOPHIL NFR BLD: 1.2 % (ref 0–8)
ERYTHROCYTE [DISTWIDTH] IN BLOOD BY AUTOMATED COUNT: 13.7 % (ref 11.5–14.5)
EST. GFR  (AFRICAN AMERICAN): 42 ML/MIN/1.73 M^2
EST. GFR  (NON AFRICAN AMERICAN): 36 ML/MIN/1.73 M^2
GLUCOSE SERPL-MCNC: 34 MG/DL (ref 70–110)
HCT VFR BLD AUTO: 31.7 % (ref 37–48.5)
HGB BLD-MCNC: 9.3 G/DL (ref 12–16)
IMM GRANULOCYTES # BLD AUTO: 0.08 K/UL (ref 0–0.04)
IMM GRANULOCYTES NFR BLD AUTO: 0.9 % (ref 0–0.5)
LYMPHOCYTES # BLD AUTO: 1 K/UL (ref 1–4.8)
LYMPHOCYTES NFR BLD: 11.9 % (ref 18–48)
MCH RBC QN AUTO: 29.9 PG (ref 27–31)
MCHC RBC AUTO-ENTMCNC: 29.3 G/DL (ref 32–36)
MCV RBC AUTO: 102 FL (ref 82–98)
MONOCYTES # BLD AUTO: 1.2 K/UL (ref 0.3–1)
MONOCYTES NFR BLD: 14.1 % (ref 4–15)
NEUTROPHILS # BLD AUTO: 6.2 K/UL (ref 1.8–7.7)
NEUTROPHILS NFR BLD: 71.3 % (ref 38–73)
NRBC BLD-RTO: 0 /100 WBC
PLATELET # BLD AUTO: 237 K/UL (ref 150–350)
PMV BLD AUTO: 9.7 FL (ref 9.2–12.9)
POCT GLUCOSE: 123 MG/DL (ref 70–110)
POCT GLUCOSE: 134 MG/DL (ref 70–110)
POCT GLUCOSE: 147 MG/DL (ref 70–110)
POCT GLUCOSE: 38 MG/DL (ref 70–110)
POTASSIUM SERPL-SCNC: 4 MMOL/L (ref 3.5–5.1)
PROT SERPL-MCNC: 6.4 G/DL (ref 6–8.4)
RBC # BLD AUTO: 3.11 M/UL (ref 4–5.4)
SODIUM SERPL-SCNC: 142 MMOL/L (ref 136–145)
WBC # BLD AUTO: 8.67 K/UL (ref 3.9–12.7)

## 2019-11-15 PROCEDURE — 27000221 HC OXYGEN, UP TO 24 HOURS: Mod: HCNC

## 2019-11-15 PROCEDURE — 25000003 PHARM REV CODE 250: Mod: HCNC | Performed by: EMERGENCY MEDICINE

## 2019-11-15 PROCEDURE — 94761 N-INVAS EAR/PLS OXIMETRY MLT: CPT | Mod: HCNC

## 2019-11-15 PROCEDURE — 97165 OT EVAL LOW COMPLEX 30 MIN: CPT | Mod: HCNC

## 2019-11-15 PROCEDURE — 21400001 HC TELEMETRY ROOM: Mod: HCNC

## 2019-11-15 PROCEDURE — 97162 PT EVAL MOD COMPLEX 30 MIN: CPT | Mod: HCNC

## 2019-11-15 PROCEDURE — 36415 COLL VENOUS BLD VENIPUNCTURE: CPT | Mod: HCNC

## 2019-11-15 PROCEDURE — 85025 COMPLETE CBC W/AUTO DIFF WBC: CPT | Mod: HCNC

## 2019-11-15 PROCEDURE — 25000003 PHARM REV CODE 250: Mod: HCNC | Performed by: HOSPITALIST

## 2019-11-15 PROCEDURE — 80053 COMPREHEN METABOLIC PANEL: CPT | Mod: HCNC

## 2019-11-15 PROCEDURE — 63600175 PHARM REV CODE 636 W HCPCS: Mod: HCNC | Performed by: EMERGENCY MEDICINE

## 2019-11-15 RX ADMIN — OXYBUTYNIN CHLORIDE 5 MG: 5 TABLET ORAL at 08:11

## 2019-11-15 RX ADMIN — AMLODIPINE BESYLATE 5 MG: 5 TABLET ORAL at 08:11

## 2019-11-15 RX ADMIN — QUETIAPINE FUMARATE 50 MG: 25 TABLET ORAL at 09:11

## 2019-11-15 RX ADMIN — GABAPENTIN 100 MG: 100 CAPSULE ORAL at 03:11

## 2019-11-15 RX ADMIN — FLUTICASONE PROPIONATE 50 MCG: 50 SPRAY, METERED NASAL at 08:11

## 2019-11-15 RX ADMIN — FUROSEMIDE 40 MG: 10 INJECTION, SOLUTION INTRAVENOUS at 05:11

## 2019-11-15 RX ADMIN — DIVALPROEX SODIUM 500 MG: 250 TABLET, DELAYED RELEASE ORAL at 09:11

## 2019-11-15 RX ADMIN — GABAPENTIN 100 MG: 100 CAPSULE ORAL at 08:11

## 2019-11-15 RX ADMIN — LOSARTAN POTASSIUM 100 MG: 25 TABLET, FILM COATED ORAL at 08:11

## 2019-11-15 RX ADMIN — HEPARIN SODIUM 5000 UNITS: 5000 INJECTION INTRAVENOUS; SUBCUTANEOUS at 03:11

## 2019-11-15 RX ADMIN — HEPARIN SODIUM 5000 UNITS: 5000 INJECTION INTRAVENOUS; SUBCUTANEOUS at 05:11

## 2019-11-15 RX ADMIN — FUROSEMIDE 40 MG: 10 INJECTION, SOLUTION INTRAVENOUS at 08:11

## 2019-11-15 RX ADMIN — METOPROLOL SUCCINATE 25 MG: 25 TABLET, EXTENDED RELEASE ORAL at 08:11

## 2019-11-15 RX ADMIN — GABAPENTIN 100 MG: 100 CAPSULE ORAL at 09:11

## 2019-11-15 RX ADMIN — HEPARIN SODIUM 5000 UNITS: 5000 INJECTION INTRAVENOUS; SUBCUTANEOUS at 09:11

## 2019-11-15 RX ADMIN — FAMOTIDINE 20 MG: 20 TABLET ORAL at 08:11

## 2019-11-15 RX ADMIN — DIVALPROEX SODIUM 500 MG: 250 TABLET, DELAYED RELEASE ORAL at 08:11

## 2019-11-15 NOTE — PT/OT/SLP EVAL
Physical Therapy Evaluation    Patient Name:  Kaci Whalen   MRN:  [id number]    Recommendations:     Discharge Recommendations:  home health PT(with family assistance/supervision)   Discharge Equipment Recommendations: none   Barriers to discharge: None    Assessment:     Kaci Whalen is a 79 y.o. female admitted with a medical diagnosis of Acute on chronic respiratory failure with hypoxia.  She presents with the following impairments/functional limitations:  weakness, impaired endurance, impaired functional mobilty, gait instability, impaired balance, decreased safety awareness, decreased coordination, decreased upper extremity function, decreased lower extremity function, impaired cardiopulmonary response to activity.    Rehab Prognosis: Fair; patient would benefit from acute skilled PT services to address these deficits and reach maximum level of function.    Recent Surgery: * No surgery found *      Plan:     During this hospitalization, patient to be seen daily to address the identified rehab impairments via gait training, therapeutic activities, therapeutic exercises and progress toward the following goals:    · Plan of Care Expires:  11/29/19    Subjective     Chief Complaint: weakness and SOB  Patient/Family Comments/goals: Pt agreeable to therapy.   Pain/Comfort:  · Pain Rating 1: 0/10    Living Environment:  Pt lives with spouse and daughter in a SS house with 1 step at entry.  Prior to admission, patients level of function was short distances ambulation using rollator and home O2.  Pt does not drive.  Equipment used at home: wheelchair, rollator, oxygen, cane, straight, raised toilet, bedside commode, bath bench.  Upon discharge, patient will have assistance from 6 children and 17 grandchildren.    Objective:     Patient found HOB elevated with oxygen 4L, PureWick, peripheral IV, telemetry, bed alarm upon PT entry to room.    General Precautions: Standard, fall, respiratory, diabetic, vision  impaired   Orthopedic Precautions:N/A   Braces: N/A     Exams:  · Cognitive Exam:  Patient is oriented to Person and Time (pt required cue for year).    · Gross Motor Coordination:  WFL  · Postural Exam:  Patient presented with the following abnormalities:    · -       Rounded shoulders  · -       Obesity  · Sensation:    · -       Intact  light/touch BEL  · Skin Integrity/Edema:      · -       Skin integrity: Visible skin intact  · -       Edema: Mild BLE  · BLE ROM: WFL  · BLE Strength: WFL    Functional Mobility: Pt with some visual impairments, had some difficulty reaching for the side rail during supine>sit.  Pt with decreased coordination and jerky movement to BUE, reported h/o LUE weakness 2* CVA.  Pt with decreased B  to RW, required min-mod VC's for safety with RW management.     · Bed Mobility:     · Scooting: contact guard assistance  · Supine to Sit: minimum assistance for upper body with HOB elevated  · Sit to Supine: minimum assistance for lower body  · Transfers:  Sit to Stand:  contact guard assistance with rolling walker x 2 trials  · Gait: Pt ambulated ~4 ft and ~8 ft along bedside with min A-CGA using RW and 4L O2 NC.  Pt limited by BUE jerky movement.  Pt with decreased step length and bryce.  Pt with decreased endurance.    · Balance: Pt with fair sit and stand balance.       Therapeutic Activities and Exercises:  Pt encouraged to perform B AP's while in the bed.  Pt educated on acute skilled PT services and goals.   Pt verbalized good understanding.     AM-PAC 6 CLICK MOBILITY  Total Score:17     Patient left HOB elevated and BLE elevated on pillow with all lines intact, call button in reach, bed alarm on and nurse Mirta notified.    GOALS:   Multidisciplinary Problems     Physical Therapy Goals        Problem: Physical Therapy Goal    Goal Priority Disciplines Outcome Goal Variances Interventions   Physical Therapy Goal     PT, PT/OT Ongoing, Progressing     Description:  Goals to be  met by: 19     Patient will increase functional independence with mobility by performin. Supine to sit with Stand-by Assistance  2. Rolling to Left and Right with Stand-by Assistance  3. Sit to stand transfer with Stand-by Assistance using RW  4. Bed to chair transfer with Stand-by Assistance using Rolling Walker  5. Gait x50 feet with Stand-by Assistance using Rolling Walker and O2   6. Lower extremity exercise program 3 sets x10 reps per handout, with SBA                      History:     Past Medical History:   Diagnosis Date    Abdominal pain     Anxiety     Atherosclerosis of aortic arch     noted on CXR 2015    Atrial fibrillation 2016    CHADS 4, on Xarelto    Benign hypertension     Bipolar disorder, most recent episode manic 2019    CHF (congestive heart failure)     Chronic constipation     Chronic diarrhea     Chronic edema     Depression     Dercum disease     Multiple painful lipomas    Diabetic retinopathy     Dysphagia     Gas pain     Glaucoma of both eyes     Hx of psychiatric care     previously amitriptyline, now jut on Trazodone 100mg QHS PRN insomnia    Hyperlipidemia with target LDL less than 100     Unable to tolerate statins    Immature cataract     Left sided numbness     per daughter    Tasia     no symptoms prior to this presentation    Morbid obesity     Nausea & vomiting     Neuropathy     On home oxygen therapy     Polyneuropathy     Primary osteoarthritis of both knees     Proteinuria     Psychiatric problem     history of depression    Pulmonary hypertension mixed group 2 and 3 2017    Requires assistance with activities of daily living (ADL)     Sleep apnea     Therapy     no history of therapy    Type II or unspecified type diabetes mellitus with neurological manifestations, uncontrolled(250.62)     Unspecified vitamin D deficiency     Unsteady gait     Uses roller walker        Past Surgical History:   Procedure  Laterality Date     tenotomy      flexors 2,3 L toes    CATARACT EXTRACTION W/  INTRAOCULAR LENS IMPLANT Bilateral     COLONOSCOPY N/A 5/4/2017    Procedure: COLONOSCOPY;  Surgeon: Suellen Wolf MD;  Location: [facility] ([address] [place] [address]);  Service: Endoscopy;  Laterality: N/A;  2nd floor due to pulm htn, possible gastroparesis. per Dr Wolf      ok to hold Xarelto 2 days prior to procedure per Dr Driss Dixon    diabetic retinopathy      right    HYSTERECTOMY      knee surgery x2      Left ankle and leg surgery secondary to a fall      rods & screws present    left arm fracture      Left cataract      PARTIAL HYSTERECTOMY      Secondary to bleeding    TOE FUSION      2,3 R    WRIST SURGERY Left 12/28/2012    pins & plate present       Time Tracking:     PT Received On: 11/15/19  PT Start Time: 1448     PT Stop Time: 1505  PT Total Time (min): 17 min     Billable Minutes: Evaluation 17 min co-eval with OT      Tisha Bah, PT  11/15/2019

## 2019-11-15 NOTE — ASSESSMENT & PLAN NOTE
Patient noted to be hypoxic and in respiratory distress.  Respiratory failure secondary to CHF exacerbation.  Symptoms improved with IV Lasix.  Continue IV Lasix and CHF medications.  Will also try CPAP at night.

## 2019-11-15 NOTE — SUBJECTIVE & OBJECTIVE
Interval History: Feeling better.    Review of Systems   HENT: Negative for ear discharge and ear pain.    Eyes: Negative for pain and itching.   Endocrine: Negative for polyphagia and polyuria.   Neurological: Negative for seizures and syncope.     Objective:     Vital Signs (Most Recent):  Temp: 98 °F (36.7 °C) (11/15/19 1226)  Pulse: 69 (11/15/19 1226)  Resp: 17 (11/15/19 1226)  BP: 138/61 (11/15/19 1226)  SpO2: 96 % (11/15/19 1226) Vital Signs (24h Range):  Temp:  [97.2 °F (36.2 °C)-98.1 °F (36.7 °C)] 98 °F (36.7 °C)  Pulse:  [67-95] 69  Resp:  [17-24] 17  SpO2:  [93 %-99 %] 96 %  BP: ()/(52-95) 138/61     Weight: 98.9 kg (218 lb)  Body mass index is 44.03 kg/m².    Intake/Output Summary (Last 24 hours) at 11/15/2019 1333  Last data filed at 11/15/2019 1200  Gross per 24 hour   Intake 780 ml   Output 1450 ml   Net -670 ml      Physical Exam   Constitutional: She is oriented to person, place, and time. No distress.   HENT:   Head: Normocephalic and atraumatic.   Eyes: Conjunctivae are normal. Right eye exhibits no discharge. Left eye exhibits no discharge.   Neck: Neck supple. No tracheal deviation present.   Cardiovascular: Normal rate and regular rhythm.   Pulmonary/Chest:   Slight tachypnea  Coarse BS bilaterally   Abdominal: Soft. Bowel sounds are normal.   Musculoskeletal: She exhibits edema.   Neurological: She is alert and oriented to person, place, and time.   Skin: Skin is warm and dry. She is not diaphoretic.       Significant Labs:   BMP:   Recent Labs   Lab 11/15/19  0456   GLU 34*      K 4.0   CL 94*   CO2 40*   BUN 34*   CREATININE 1.4   CALCIUM 9.0     CBC:   Recent Labs   Lab 11/14/19  1110 11/15/19  0457   WBC 8.98 8.67   HGB 9.0* 9.3*   HCT 29.6* 31.7*    237       Significant Imaging: I have reviewed all pertinent imaging results/findings within the past 24 hours.

## 2019-11-15 NOTE — PLAN OF CARE
Problem: Physical Therapy Goal  Goal: Physical Therapy Goal  Description  Goals to be met by: 19     Patient will increase functional independence with mobility by performin. Supine to sit with Stand-by Assistance  2. Rolling to Left and Right with Stand-by Assistance  3. Sit to stand transfer with Stand-by Assistance using RW  4. Bed to chair transfer with Stand-by Assistance using Rolling Walker  5. Gait x50 feet with Stand-by Assistance using Rolling Walker and O2   6. Lower extremity exercise program 3 sets x10 reps per handout, with SBA     Outcome: Ongoing, Progressing    Pt ambulated ~4 ft and ~8 ft along bedside with min A-CGA using RW and 4L O2 NC, gait limited 2* BUE jerky movement.

## 2019-11-15 NOTE — HOSPITAL COURSE
80 y/o female presents to the ER via EMS complaining of gradually worsening shortness of breath and non productive cough for 2 days.  EMS reports an initial O2 sat of 77% on 2L O2.  The patient uses 2L of supplemental O2 at all times.  Patient apparently started complaining of congestion that did not improve with Mucinex and cough syrup.  Patient complains of severe shortness of breath with minimal exertion.  She can never lay flat to sleep.  Does complain of some lower extremity swelling, but states that it's actually much better than in the past.  She is compliant with her Lasix 40mg (MWF).  Shortness of breath worsened  and patient presented to ER.  Patient states that she also gets very anxious when she gets short of breath.patiemnt was admitted for   Acute on chronic diastolic heart failure and was  started on IV Lasix. Some improvement with diuresis, but patient somewhat confused and lethargic.  ABG's show hypercapnia and started on Bipap.  Pulmonary consulted.respiratory status is improved,biapap switched to QHS,need out patient sleep study and  CPAP,was  Spiking low grade fevers.  UA with 3+ leukocytes and many bacteria.  Started on Rocephin.  Some dysphagia and ST consulted.  Very weak and fatigued.  PT/OT evaluation.recomending SNF,consulted SW,placed PPD.  Still has some fever,multiple organism on urine culture.resolved with IV Rocephin.  Her symptoms much improved,patient has been discharged to SNF.

## 2019-11-15 NOTE — PLAN OF CARE
Problem: Occupational Therapy Goal  Goal: Occupational Therapy Goal  Description  Goals to be met by: 11/22/2019     Patient will increase functional independence with ADLs by performing:    Feeding with Set-up Assistance.  UE Dressing with Stand-by Assistance.  Grooming while seated with Stand-by Assistance.  Sitting at edge of bed x15 minutes with Stand-by Assistance.  Supine to sit with Stand-by Assistance.  Stand pivot transfers with Minimal Assistance.  Upper extremity exercise program per handout, with assistance as needed.     Outcome: Ongoing, Progressing    Patient tolerated evaluation well, good participation.  Patient will benefit from skilled OT services to address the above mentioned goals. BUSHRA Rodriguez, MS

## 2019-11-15 NOTE — PLAN OF CARE
"SW met with patient to complete discharge planning assessment. Prior to beginning the discharge planning assessment, patient verified name and date of birth. SW educated patient on the discharge process and explained that discharge planning begins at admission. SW reviewed contents of the "[facility] Packet" emphasizing, "Help at home", "Managing your health", and "Your preferences". Patient dtr. stated that she is  help at home for pt. And pt's .  SW wrote name and phone number on white communication board.    Pt. Is a 79 yr. old  female, who is bilingual. Pt. was AAOx3, is O2 dependent, and is dependent care in function with mobility aides. Pt. resides with her 91 yr. old , Bruno, and dtr, Liliam.  Pt. has 6 living children, and a host of extended family to assist with her care. Dtr. stated pt. has an O2 concentrator, portable cylinder w/conserving device for travel, pulse oximetry, Rollator, and RW.  Dtr. stated pt. Had [facility], but they are requesting to be referred to [facility] upon d/c. Dtr. also asked about pt. receiving a nebulizer and CPAP for d/c.  SW advised pt. and family that the physician would need to write orders for DME based on pt's medical need. SW to notify  via Secure Chat regarding pt's personal preferences.     Home Health: [facility] upon d/c.       [facility]   [id number]  [place] [address]  [phone]       11/15/19 1215   Discharge Assessment   Assessment Type Discharge Planning Assessment   Confirmed/corrected address and phone number on facesheet? Yes   Assessment information obtained from? Patient;Caregiver   Prior to hospitilization cognitive status: Alert/Oriented   Prior to hospitalization functional status: Assistive Equipment;Partially Dependent;Needs Assistance   Current cognitive status: Alert/Oriented   Current Functional Status: Assistive Equipment;Needs Assistance;Partially Dependent   Lives With " spouse   Able to Return to Prior Arrangements yes   Is patient able to care for self after discharge? Yes   Who are your caregiver(s) and their phone number(s)? Dtr: Liliam ([phone]) residess in home with pt. and pt's .  Pt. has 6 adult children and a host of family that assists with care.    Patient's perception of discharge disposition home or selfcare;home health   Readmission Within the Last 30 Days no previous admission in last 30 days   Patient currently being followed by outpatient case management? No   Patient currently receives any other outside agency services? Yes   Name and contact number of agency or person providing outside services [facility]   Equipment Currently Used at Home bedside commode;oxygen;rollator;walker, rolling   Do you have any problems affording any of your prescribed medications? No   Is the patient taking medications as prescribed? yes   Does the patient have transportation home? Yes   Transportation Anticipated family or friend will provide   Discharge Plan A Home with family   Discharge Plan B Home with family   Patient/Family in Agreement with Plan yes

## 2019-11-15 NOTE — NURSING
Bedside report given to NICHOLE Ferreira Patient awake and alert. NAD noted at this time. All safety precautions in place. Will continue to monitor.

## 2019-11-15 NOTE — NURSING
Bedside report received from NICHOLE Ferreira. Patient awake and alert. NAD noted at this time. All safety precautions in place. Will continue to monitor.

## 2019-11-15 NOTE — PROGRESS NOTES
[facility] Hospital Medicine  Progress Note    Patient Name: Kaci Whalen  MRN: [id number]  Patient Class: IP- Inpatient   Admission Date: 11/14/2019  Length of Stay: 1 days  Attending Physician: Maximilian Moreira MD  Primary Care Provider: Toby Hung MD        Subjective:     Principal Problem:Acute on chronic respiratory failure with hypoxia        HPI:  80 y/o female presents to the ER via EMS complaining of gradually worsening shortness of breath and non productive cough for 2 days.  EMS reports an initial O2 sat of 77% on 2L O2.  The patient uses 2L of supplemental O2 at all times.  Patient apparently started complaining of congestion that did not improve with Mucinex and cough syrup.  Patient complains of severe shortness of breath with minimal exertion.  She can never lay flat to sleep.  Does complain of some lower extremity swelling, but states that it's actually much better than in the past.  She is compliant with her Lasix 40mg (MWF).  Shortness of breath worsened this morning and patient presented to ER.  Patient states that she also gets very anxious when she gets short of breath.  No other complaints.    Overview/Hospital Course:  80 y/o female presented with worsening shortness of breath.  Acute on chronic diastolic heart failure and started on IV Lasix.    Interval History: Feeling better.    Review of Systems   HENT: Negative for ear discharge and ear pain.    Eyes: Negative for pain and itching.   Endocrine: Negative for polyphagia and polyuria.   Neurological: Negative for seizures and syncope.     Objective:     Vital Signs (Most Recent):  Temp: 98 °F (36.7 °C) (11/15/19 1226)  Pulse: 69 (11/15/19 1226)  Resp: 17 (11/15/19 1226)  BP: 138/61 (11/15/19 1226)  SpO2: 96 % (11/15/19 1226) Vital Signs (24h Range):  Temp:  [97.2 °F (36.2 °C)-98.1 °F (36.7 °C)] 98 °F (36.7 °C)  Pulse:  [67-95] 69  Resp:  [17-24] 17  SpO2:  [93 %-99 %] 96 %  BP: ()/(52-95) 138/61      Weight: 98.9 kg (218 lb)  Body mass index is 44.03 kg/m².    Intake/Output Summary (Last 24 hours) at 11/15/2019 1333  Last data filed at 11/15/2019 1200  Gross per 24 hour   Intake 780 ml   Output 1450 ml   Net -670 ml      Physical Exam   Constitutional: She is oriented to person, place, and time. No distress.   HENT:   Head: Normocephalic and atraumatic.   Eyes: Conjunctivae are normal. Right eye exhibits no discharge. Left eye exhibits no discharge.   Neck: Neck supple. No tracheal deviation present.   Cardiovascular: Normal rate and regular rhythm.   Pulmonary/Chest:   Slight tachypnea  Coarse BS bilaterally   Abdominal: Soft. Bowel sounds are normal.   Musculoskeletal: She exhibits edema.   Neurological: She is alert and oriented to person, place, and time.   Skin: Skin is warm and dry. She is not diaphoretic.       Significant Labs:   BMP:   Recent Labs   Lab 11/15/19  0456   GLU 34*      K 4.0   CL 94*   CO2 40*   BUN 34*   CREATININE 1.4   CALCIUM 9.0     CBC:   Recent Labs   Lab 11/14/19  1110 11/15/19  0457   WBC 8.98 8.67   HGB 9.0* 9.3*   HCT 29.6* 31.7*    237       Significant Imaging: I have reviewed all pertinent imaging results/findings within the past 24 hours.      Assessment/Plan:      * Acute on chronic respiratory failure with hypoxia  Patient noted to be hypoxic and in respiratory distress.  Respiratory failure secondary to CHF exacerbation.  Symptoms improved with IV Lasix.  Continue IV Lasix and CHF medications.  Will also try CPAP at night.      Acute on chronic diastolic congestive heart failure  As above.      Hyperkalemia  Should improve with diuresis.  Repeat labs in Am.      Anemia of chronic disorder  H/H similar to previous labs.  No evidence of bleeding.    CKD (chronic kidney disease), stage III  Creat around baseline.  Monitor renal function while on diuresis.  Avoid nephrotoxic medications.      Essential hypertension  Resume home medications with  parameters.  Low Na diet.      Morbid obesity  Body mass index is 44.03 kg/m².        Paroxysmal atrial fibrillation  Currently in sinus rhythm.  Continue B blocker.      Metabolic alkalosis  Worsening CO2 probably secondary to diuresis.  Probable MIGUEL and will try CPAP at night.      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  Diabetic diet and insulin sliding scale.      Hyperlipidemia with target LDL less than 100          VTE Risk Mitigation (From admission, onward)         Ordered     heparin (porcine) injection 5,000 Units  Every 8 hours      11/14/19 1623     IP VTE HIGH RISK PATIENT  Once      11/14/19 1623     Place sequential compression device  Until discontinued      11/14/19 1623     Place EDE hose  Until discontinued      11/14/19 1623                      Maximilian Moreira MD  Department of Hospital Medicine   [facility]

## 2019-11-15 NOTE — PT/OT/SLP EVAL
"Occupational Therapy   Evaluation    Name: Kaci Whalen  MRN: [id number]  Admitting Diagnosis:  Acute on chronic respiratory failure with hypoxia      Recommendations:     Discharge Recommendations: home, home health OT  Discharge Equipment Recommendations:  none  Barriers to discharge:  None    Assessment:     Kaci Whalen is a 79 y.o. female with a medical diagnosis of Acute on chronic respiratory failure with hypoxia.  She presents with  independence for self-care and functional transfers. Performance deficits affecting function: weakness, impaired endurance, impaired self care skills, impaired functional mobilty, impaired balance, impaired cognition, decreased coordination, decreased upper extremity function, decreased safety awareness, impaired cardiopulmonary response to activity, impaired fine motor, impaired coordination.      Rehab Prognosis: Good; patient would benefit from acute skilled OT services to address these deficits and reach maximum level of function.       Plan:     Patient to be seen 3 x/week to address the above listed problems via self-care/home management, therapeutic activities, therapeutic exercises  · Plan of Care Expires: 19  · Plan of Care Reviewed with: patient    Subjective     Chief Complaint: "My family helps me when I need it."  Patient/Family Comments/goals: Daughter would like to take patient home as soon as possible as her mother gets confused and demonstrates decreased independence following a hospital stay    Occupational Profile:  Living Environment: lives with her  in a SS house with a 2 BILLY, single HR  Previous level of function: requires assistance as needed  Roles and Routines: Patient unable to cook meals but is able to warm them in the microwave, patient's  is blind  Equipment Used at Home:  wheelchair, rollator, oxygen, cane, straight, raised toilet, bedside commode, bath bench  Assistance upon Discharge: from daughter and extended " family (multiple children and grandchildren)    Pain/Comfort:  · Pain Rating 1: 0/10    Patients cultural, spiritual, Mormonism conflicts given the current situation: no    Objective:     Communicated with: nurse prior to session.  Patient found supine with oxygen, PureWick, peripheral IV, telemetry, bed alarm upon OT entry to room.    General Precautions: Standard, fall, respiratory, diabetic, vision impaired   Orthopedic Precautions:N/A   Braces: N/A     Occupational Performance:    Bed Mobility:    · Patient completed Rolling/Turning to Left with  minimum assistance  · Patient completed Rolling/Turning to Right with minimum assistance  · Patient completed Scooting/Bridging with contact guard assistance  · Patient completed Supine to Sit with moderate assistance  · Patient completed Sit to Supine with maximal assistance    Functional Mobility/Transfers:  · Patient completed Sit <> Stand Transfer with moderate assistance  with  rolling walker   · Functional Mobility: able to take side steps along the EOB with minimal assistance and a RW    Activities of Daily Living:  · Feeding:  minimum assistance bed level from daughter secondary to spillage from position  · Grooming: stand by assistance to wipe face  · Upper Body Dressing: moderate assistance to jose back gown  · Lower Body Dressing: total assistance to jsoe socks  · Toileting: Purewick      Cognitive/Visual Perceptual:  Cognitive/Psychosocial Skills:     -       Oriented to: Person and Situation   -       Follows Commands/attention:Easily distracted and Follows one-step commands  -       Communication: clear/fluent  -       Memory: Poor immediate recall  -       Safety awareness/insight to disability: impaired   -       Mood/Affect/Coping skills/emotional control: Appropriate to situation    Physical Exam:  Balance:    -       sit balance fair; standing balance fair  Postural examination/scapula alignment:    -       Rounded shoulders  -       Forward  head  Skin integrity: Visible skin intact  Upper Extremity Range of Motion:     -       Right Upper Extremity: WFL  -       Left Upper Extremity: decreased ROM  Upper Extremity Strength:    -       Right Upper Extremity: WFL  -       Left Upper Extremity: decreased from multiple TIA    AMPAC 6 Click ADL:  AMPAC Total Score: 13    Treatment & Education:  Evaluation   Education:    Patient left supine with all lines intact, call button in reach, bed alarm on and nurse Mirta notified    GOALS:   Multidisciplinary Problems     Occupational Therapy Goals        Problem: Occupational Therapy Goal    Goal Priority Disciplines Outcome Interventions   Occupational Therapy Goal     OT, PT/OT Ongoing, Progressing    Description:  Goals to be met by: 11/22/2019     Patient will increase functional independence with ADLs by performing:    Feeding with Set-up Assistance.  UE Dressing with Stand-by Assistance.  Grooming while seated with Stand-by Assistance.  Sitting at edge of bed x15 minutes with Stand-by Assistance.  Supine to sit with Stand-by Assistance.  Stand pivot transfers with Minimal Assistance.  Upper extremity exercise program per handout, with assistance as needed.                      History:     Past Medical History:   Diagnosis Date    Abdominal pain     Anxiety     Atherosclerosis of aortic arch     noted on CXR 7/1/2015    Atrial fibrillation 05/2016    CHADS 4, on Xarelto    Benign hypertension     Bipolar disorder, most recent episode manic 8/29/2019    CHF (congestive heart failure)     Chronic constipation     Chronic diarrhea     Chronic edema     Depression     Dercum disease     Multiple painful lipomas    Diabetic retinopathy     Dysphagia     Gas pain     Glaucoma of both eyes     Hx of psychiatric care     previously amitriptyline, now jut on Trazodone 100mg QHS PRN insomnia    Hyperlipidemia with target LDL less than 100     Unable to tolerate statins    Immature cataract      Left sided numbness     per daughter    Tasia     no symptoms prior to this presentation    Morbid obesity     Nausea & vomiting     Neuropathy     On home oxygen therapy     Polyneuropathy     Primary osteoarthritis of both knees     Proteinuria     Psychiatric problem     history of depression    Pulmonary hypertension mixed group 2 and 3 1/18/2017    Requires assistance with activities of daily living (ADL)     Sleep apnea     Therapy     no history of therapy    Type II or unspecified type diabetes mellitus with neurological manifestations, uncontrolled(250.62)     Unspecified vitamin D deficiency     Unsteady gait     Uses roller walker        Past Surgical History:   Procedure Laterality Date     tenotomy      flexors 2,3 L toes    CATARACT EXTRACTION W/  INTRAOCULAR LENS IMPLANT Bilateral     COLONOSCOPY N/A 5/4/2017    Procedure: COLONOSCOPY;  Surgeon: Suellen Wolf MD;  Location: [facility] ([address] [place] [address]);  Service: Endoscopy;  Laterality: N/A;  2nd floor due to pulm htn, possible gastroparesis. per Dr Wolf      ok to hold Xarelto 2 days prior to procedure per Dr Dirss Dixon    diabetic retinopathy      right    HYSTERECTOMY      knee surgery x2      Left ankle and leg surgery secondary to a fall      rods & screws present    left arm fracture      Left cataract      PARTIAL HYSTERECTOMY      Secondary to bleeding    TOE FUSION      2,3 R    WRIST SURGERY Left 12/28/2012    pins & plate present       Time Tracking:     OT Date of Treatment: 11/15/19  OT Start Time: 1445  OT Stop Time: 1506  OT Total Time (min): 21 min    Billable Minutes:Evaluation 21 minutes    BUSHRA Rodriguez, MS  11/15/2019

## 2019-11-16 LAB
ALBUMIN SERPL BCP-MCNC: 3.1 G/DL (ref 3.5–5.2)
ALLENS TEST: ABNORMAL
ALP SERPL-CCNC: 53 U/L (ref 55–135)
ALT SERPL W/O P-5'-P-CCNC: 10 U/L (ref 10–44)
ANION GAP SERPL CALC-SCNC: 7 MMOL/L (ref 8–16)
AST SERPL-CCNC: 11 U/L (ref 10–40)
BASOPHILS # BLD AUTO: 0.04 K/UL (ref 0–0.2)
BASOPHILS NFR BLD: 0.6 % (ref 0–1.9)
BILIRUB SERPL-MCNC: 0.2 MG/DL (ref 0.1–1)
BUN SERPL-MCNC: 38 MG/DL (ref 8–23)
CALCIUM SERPL-MCNC: 8.6 MG/DL (ref 8.7–10.5)
CHLORIDE SERPL-SCNC: 91 MMOL/L (ref 95–110)
CO2 SERPL-SCNC: 42 MMOL/L (ref 23–29)
CREAT SERPL-MCNC: 1.5 MG/DL (ref 0.5–1.4)
DELSYS: ABNORMAL
DIFFERENTIAL METHOD: ABNORMAL
EOSINOPHIL # BLD AUTO: 0.1 K/UL (ref 0–0.5)
EOSINOPHIL NFR BLD: 1.3 % (ref 0–8)
ERYTHROCYTE [DISTWIDTH] IN BLOOD BY AUTOMATED COUNT: 13.4 % (ref 11.5–14.5)
EST. GFR  (AFRICAN AMERICAN): 38 ML/MIN/1.73 M^2
EST. GFR  (NON AFRICAN AMERICAN): 33 ML/MIN/1.73 M^2
FLOW: 4
GLUCOSE SERPL-MCNC: 163 MG/DL (ref 70–110)
HCO3 UR-SCNC: 45.4 MMOL/L (ref 24–28)
HCT VFR BLD AUTO: 28.3 % (ref 37–48.5)
HGB BLD-MCNC: 8.1 G/DL (ref 12–16)
IMM GRANULOCYTES # BLD AUTO: 0.06 K/UL (ref 0–0.04)
IMM GRANULOCYTES NFR BLD AUTO: 0.9 % (ref 0–0.5)
LYMPHOCYTES # BLD AUTO: 0.7 K/UL (ref 1–4.8)
LYMPHOCYTES NFR BLD: 10.8 % (ref 18–48)
MCH RBC QN AUTO: 29.1 PG (ref 27–31)
MCHC RBC AUTO-ENTMCNC: 28.6 G/DL (ref 32–36)
MCV RBC AUTO: 102 FL (ref 82–98)
MODE: ABNORMAL
MONOCYTES # BLD AUTO: 0.5 K/UL (ref 0.3–1)
MONOCYTES NFR BLD: 7.7 % (ref 4–15)
NEUTROPHILS # BLD AUTO: 5.4 K/UL (ref 1.8–7.7)
NEUTROPHILS NFR BLD: 78.7 % (ref 38–73)
NRBC BLD-RTO: 0 /100 WBC
PCO2 BLDA: 93.8 MMHG (ref 35–45)
PH SMN: 7.29 [PH] (ref 7.35–7.45)
PLATELET # BLD AUTO: 231 K/UL (ref 150–350)
PMV BLD AUTO: 9.6 FL (ref 9.2–12.9)
PO2 BLDA: 77 MMHG (ref 80–100)
POC BE: 16 MMOL/L
POC SATURATED O2: 92 % (ref 95–100)
POC TCO2: 48 MMOL/L (ref 23–27)
POCT GLUCOSE: 181 MG/DL (ref 70–110)
POCT GLUCOSE: 220 MG/DL (ref 70–110)
POCT GLUCOSE: 220 MG/DL (ref 70–110)
POCT GLUCOSE: 223 MG/DL (ref 70–110)
POTASSIUM SERPL-SCNC: 4.2 MMOL/L (ref 3.5–5.1)
PROT SERPL-MCNC: 6.2 G/DL (ref 6–8.4)
RBC # BLD AUTO: 2.78 M/UL (ref 4–5.4)
SAMPLE: ABNORMAL
SITE: ABNORMAL
SODIUM SERPL-SCNC: 140 MMOL/L (ref 136–145)
WBC # BLD AUTO: 6.87 K/UL (ref 3.9–12.7)

## 2019-11-16 PROCEDURE — 36600 WITHDRAWAL OF ARTERIAL BLOOD: CPT | Mod: HCNC

## 2019-11-16 PROCEDURE — 27000190 HC CPAP FULL FACE MASK W/VALVE: Mod: HCNC

## 2019-11-16 PROCEDURE — 99900035 HC TECH TIME PER 15 MIN (STAT): Mod: HCNC

## 2019-11-16 PROCEDURE — 25000003 PHARM REV CODE 250: Mod: HCNC | Performed by: EMERGENCY MEDICINE

## 2019-11-16 PROCEDURE — 94761 N-INVAS EAR/PLS OXIMETRY MLT: CPT | Mod: HCNC

## 2019-11-16 PROCEDURE — 82803 BLOOD GASES ANY COMBINATION: CPT | Mod: HCNC

## 2019-11-16 PROCEDURE — 21400001 HC TELEMETRY ROOM: Mod: HCNC

## 2019-11-16 PROCEDURE — 85025 COMPLETE CBC W/AUTO DIFF WBC: CPT | Mod: HCNC

## 2019-11-16 PROCEDURE — 97110 THERAPEUTIC EXERCISES: CPT | Mod: HCNC

## 2019-11-16 PROCEDURE — 80053 COMPREHEN METABOLIC PANEL: CPT | Mod: HCNC

## 2019-11-16 PROCEDURE — 94660 CPAP INITIATION&MGMT: CPT | Mod: HCNC

## 2019-11-16 PROCEDURE — 63600175 PHARM REV CODE 636 W HCPCS: Mod: HCNC | Performed by: EMERGENCY MEDICINE

## 2019-11-16 PROCEDURE — 36415 COLL VENOUS BLD VENIPUNCTURE: CPT | Mod: HCNC

## 2019-11-16 PROCEDURE — 25000003 PHARM REV CODE 250: Mod: HCNC | Performed by: HOSPITALIST

## 2019-11-16 RX ORDER — FUROSEMIDE 40 MG/1
40 TABLET ORAL DAILY
Status: DISCONTINUED | OUTPATIENT
Start: 2019-11-16 | End: 2019-11-21 | Stop reason: HOSPADM

## 2019-11-16 RX ADMIN — HEPARIN SODIUM 5000 UNITS: 5000 INJECTION INTRAVENOUS; SUBCUTANEOUS at 09:11

## 2019-11-16 RX ADMIN — OXYBUTYNIN CHLORIDE 5 MG: 5 TABLET ORAL at 09:11

## 2019-11-16 RX ADMIN — LOSARTAN POTASSIUM 100 MG: 25 TABLET, FILM COATED ORAL at 09:11

## 2019-11-16 RX ADMIN — AMLODIPINE BESYLATE 5 MG: 5 TABLET ORAL at 09:11

## 2019-11-16 RX ADMIN — HEPARIN SODIUM 5000 UNITS: 5000 INJECTION INTRAVENOUS; SUBCUTANEOUS at 07:11

## 2019-11-16 RX ADMIN — DIVALPROEX SODIUM 500 MG: 250 TABLET, DELAYED RELEASE ORAL at 09:11

## 2019-11-16 RX ADMIN — METOPROLOL SUCCINATE 25 MG: 25 TABLET, EXTENDED RELEASE ORAL at 09:11

## 2019-11-16 RX ADMIN — HEPARIN SODIUM 5000 UNITS: 5000 INJECTION INTRAVENOUS; SUBCUTANEOUS at 02:11

## 2019-11-16 RX ADMIN — GABAPENTIN 100 MG: 100 CAPSULE ORAL at 09:11

## 2019-11-16 RX ADMIN — FAMOTIDINE 20 MG: 20 TABLET ORAL at 09:11

## 2019-11-16 RX ADMIN — FLUTICASONE PROPIONATE 50 MCG: 50 SPRAY, METERED NASAL at 09:11

## 2019-11-16 RX ADMIN — FUROSEMIDE 40 MG: 10 INJECTION, SOLUTION INTRAVENOUS at 09:11

## 2019-11-16 RX ADMIN — QUETIAPINE FUMARATE 50 MG: 25 TABLET ORAL at 09:11

## 2019-11-16 NOTE — PT/OT/SLP PROGRESS
"Physical Therapy Treatment    Patient Name:  Kaci Whalen   MRN:  [id number]    Recommendations:     Discharge Recommendations:  home health PT   Discharge Equipment Recommendations: none   Barriers to discharge: None    Assessment:     Kaci Whalen is a 79 y.o. female admitted with a medical diagnosis of Acute on chronic respiratory failure with hypoxia.  She presents with the following impairments/functional limitations:  weakness, impaired functional mobilty, impaired cognition, decreased safety awareness, impaired coordination, impaired cardiopulmonary response to activity, impaired endurance, gait instability, decreased coordination, pain, impaired balance, decreased upper extremity function, impaired self care skills, visual deficits, decreased lower extremity function . Pt on bedrest per nurse due to increased ABGs. Nurse authorized therex supine only.  Pt needed assistance to stay on track and to perform proper technique    Rehab Prognosis: Fair; patient would benefit from acute skilled PT services to address these deficits and reach maximum level of function.    Recent Surgery: * No surgery found *      Plan:     During this hospitalization, patient to be seen daily to address the identified rehab impairments via gait training, therapeutic activities, therapeutic exercises and progress toward the following goals:    · Plan of Care Expires:  11/29/19    Subjective     Chief Complaint: "I can't breath or swallow."  Patient/Family Comments/goals: to feel better  Pain/Comfort:  · Pain Rating 1: 0/10      Objective:     Communicated with nurse prior to session.  Patient found supine with PureWick, oxygen, peripheral IV, telemetry, bed alarm upon PT entry to room.     General Precautions: Standard, fall, respiratory, diabetic, vision impaired   Orthopedic Precautions:N/A   Braces: N/A     Functional Mobility:  · N/A      AM-PAC 6 CLICK MOBILITY  Turning over in bed (including adjusting bedclothes, sheets and " blankets)?: 3  Sitting down on and standing up from a chair with arms (e.g., wheelchair, bedside commode, etc.): 3  Moving from lying on back to sitting on the side of the bed?: 3  Moving to and from a bed to a chair (including a wheelchair)?: 3  Need to walk in hospital room?: 3  Climbing 3-5 steps with a railing?: 2  Basic Mobility Total Score: 17       Therapeutic Activities and Exercises:   BLE Roberto supine 3x10 reps with AAROM to include HS, ABD/ADD, AP    Patient left supine with all lines intact, call button in reach, bed alarm on and nurse notified..    GOALS:   Multidisciplinary Problems     Physical Therapy Goals        Problem: Physical Therapy Goal    Goal Priority Disciplines Outcome Goal Variances Interventions   Physical Therapy Goal     PT, PT/OT Ongoing, Progressing     Description:  Goals to be met by: 19     Patient will increase functional independence with mobility by performin. Supine to sit with Stand-by Assistance  2. Rolling to Left and Right with Stand-by Assistance  3. Sit to stand transfer with Stand-by Assistance using RW  4. Bed to chair transfer with Stand-by Assistance using Rolling Walker  5. Gait x50 feet with Stand-by Assistance using Rolling Walker and O2   6. Lower extremity exercise program 3 sets x10 reps per handout, with SBA                      Time Tracking:     PT Received On: 19  PT Start Time: 1317     PT Stop Time: 1331  PT Total Time (min): 14 min     Billable Minutes: Therapeutic Exercise 14    Treatment Type: Treatment  PT/PTA: PTA     PTA Visit Number: 1     Schuyler Perez, RENAE  2019

## 2019-11-16 NOTE — NURSING
Bedside report received from NICHOLE Kirby. Patient awake and alert. NAD noted at this time. Daughter, Mirta, at bedside. All safety precautions in place. Will continue to monitor.

## 2019-11-16 NOTE — PLAN OF CARE
Problem: Physical Therapy Goal  Goal: Physical Therapy Goal  Description  Goals to be met by: 19     Patient will increase functional independence with mobility by performin. Supine to sit with Stand-by Assistance  2. Rolling to Left and Right with Stand-by Assistance  3. Sit to stand transfer with Stand-by Assistance using RW  4. Bed to chair transfer with Stand-by Assistance using Rolling Walker  5. Gait x50 feet with Stand-by Assistance using Rolling Walker and O2   6. Lower extremity exercise program 3 sets x10 reps per handout, with SBA     Outcome: Ongoing, Progressing   Pt performed supine Roberto 3x10 with Olesya with vcs to stay on task

## 2019-11-16 NOTE — PROGRESS NOTES
[facility] Hospital Medicine  Progress Note    Patient Name: Kaci Whalen  MRN: [id number]  Patient Class: IP- Inpatient   Admission Date: 11/14/2019  Length of Stay: 2 days  Attending Physician: Maximilian Moreira MD  Primary Care Provider: Toby Hung MD        Subjective:     Principal Problem:Acute on chronic respiratory failure with hypoxia        HPI:  80 y/o female presents to the ER via EMS complaining of gradually worsening shortness of breath and non productive cough for 2 days.  EMS reports an initial O2 sat of 77% on 2L O2.  The patient uses 2L of supplemental O2 at all times.  Patient apparently started complaining of congestion that did not improve with Mucinex and cough syrup.  Patient complains of severe shortness of breath with minimal exertion.  She can never lay flat to sleep.  Does complain of some lower extremity swelling, but states that it's actually much better than in the past.  She is compliant with her Lasix 40mg (MWF).  Shortness of breath worsened this morning and patient presented to ER.  Patient states that she also gets very anxious when she gets short of breath.  No other complaints.    Overview/Hospital Course:  80 y/o female presented with worsening shortness of breath.  Acute on chronic diastolic heart failure and started on IV Lasix.    Interval History: Weaker with some confusion this morning.    Review of Systems   HENT: Negative for ear discharge and ear pain.    Eyes: Negative for pain and itching.   Endocrine: Negative for polyphagia and polyuria.   Neurological: Negative for seizures and syncope.     Objective:     Vital Signs (Most Recent):  Temp: 98 °F (36.7 °C) (11/16/19 1120)  Pulse: 73 (11/16/19 1120)  Resp: 18 (11/16/19 1120)  BP: 139/64 (11/16/19 1120)  SpO2: (!) 94 % (11/16/19 1120) Vital Signs (24h Range):  Temp:  [97.5 °F (36.4 °C)-99.7 °F (37.6 °C)] 98 °F (36.7 °C)  Pulse:  [68-85] 73  Resp:  [17-18] 18  SpO2:  [90 %-96 %] 94 %  BP:  (131-158)/(57-82) 139/64     Weight: 98.9 kg (218 lb)  Body mass index is 44.03 kg/m².    Intake/Output Summary (Last 24 hours) at 11/16/2019 1242  Last data filed at 11/16/2019 0920  Gross per 24 hour   Intake 550 ml   Output 2300 ml   Net -1750 ml      Physical Exam   Constitutional: She is oriented to person, place, and time. No distress.   HENT:   Head: Normocephalic and atraumatic.   Eyes: Conjunctivae are normal. Right eye exhibits no discharge. Left eye exhibits no discharge.   Neck: Neck supple. No tracheal deviation present.   Cardiovascular: Normal rate and regular rhythm.   Pulmonary/Chest:   Slight tachypnea  Coarse BS bilaterally   Abdominal: Soft. Bowel sounds are normal.   Musculoskeletal: She exhibits edema.   Neurological: She is alert and oriented to person, place, and time.   Skin: Skin is warm and dry. She is not diaphoretic.       Significant Labs:   BMP:   Recent Labs   Lab 11/16/19  0434   *      K 4.2   CL 91*   CO2 42*   BUN 38*   CREATININE 1.5*   CALCIUM 8.6*     CBC:   Recent Labs   Lab 11/15/19  0457 11/16/19  0434   WBC 8.67 6.87   HGB 9.3* 8.1*   HCT 31.7* 28.3*    231       Significant Imaging: I have reviewed all pertinent imaging results/findings within the past 24 hours.      Assessment/Plan:      * Acute on chronic respiratory failure with hypoxia  Patient noted to be hypoxic and in respiratory distress.  Respiratory failure secondary to CHF exacerbation.  Symptoms improved with IV Lasix.  Change Lasix to oral.  Patient with some confusion this morning.  Noted increasing CO2.  Check ABG's  Will place on Bipap.      Acute on chronic diastolic congestive heart failure  As above.      Hyperkalemia  Should improve with diuresis.  Repeat labs in Am.      Anemia of chronic disorder  H/H similar to previous labs.  No evidence of bleeding.    CKD (chronic kidney disease), stage III  Creat around baseline.  Monitor renal function while on diuresis.  Avoid nephrotoxic  medications.      Essential hypertension  Resume home medications with parameters.  Low Na diet.      Morbid obesity  Body mass index is 44.03 kg/m².        Paroxysmal atrial fibrillation  Currently in sinus rhythm.  Continue B blocker.      Metabolic alkalosis  Worsening CO2 probably secondary to diuresis.      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  Diabetic diet and insulin sliding scale.      Hyperlipidemia with target LDL less than 100          VTE Risk Mitigation (From admission, onward)         Ordered     heparin (porcine) injection 5,000 Units  Every 8 hours      11/14/19 1623     IP VTE HIGH RISK PATIENT  Once      11/14/19 1623     Place sequential compression device  Until discontinued      11/14/19 1623     Place EDE hose  Until discontinued      11/14/19 1623                      Maximilian Moreira MD  Department of Hospital Medicine   [facility]

## 2019-11-16 NOTE — PLAN OF CARE
Pt remains on bipap 12/6 with 4 L/M bleed in. Continue bipap therapy per orders. MIMI Stewart, RRT

## 2019-11-16 NOTE — NURSING
Bedside report given to NICHOLE Kirby. Patient awake and alert. Sitting up in bed. Nasal cannula in place at 4L. NAD noted at this time. All safety precautions in place. Will continue to monitor  12 hr chart check complete

## 2019-11-16 NOTE — SUBJECTIVE & OBJECTIVE
Interval History: Weaker with some confusion this morning.    Review of Systems   HENT: Negative for ear discharge and ear pain.    Eyes: Negative for pain and itching.   Endocrine: Negative for polyphagia and polyuria.   Neurological: Negative for seizures and syncope.     Objective:     Vital Signs (Most Recent):  Temp: 98 °F (36.7 °C) (11/16/19 1120)  Pulse: 73 (11/16/19 1120)  Resp: 18 (11/16/19 1120)  BP: 139/64 (11/16/19 1120)  SpO2: (!) 94 % (11/16/19 1120) Vital Signs (24h Range):  Temp:  [97.5 °F (36.4 °C)-99.7 °F (37.6 °C)] 98 °F (36.7 °C)  Pulse:  [68-85] 73  Resp:  [17-18] 18  SpO2:  [90 %-96 %] 94 %  BP: (131-158)/(57-82) 139/64     Weight: 98.9 kg (218 lb)  Body mass index is 44.03 kg/m².    Intake/Output Summary (Last 24 hours) at 11/16/2019 1242  Last data filed at 11/16/2019 0920  Gross per 24 hour   Intake 550 ml   Output 2300 ml   Net -1750 ml      Physical Exam   Constitutional: She is oriented to person, place, and time. No distress.   HENT:   Head: Normocephalic and atraumatic.   Eyes: Conjunctivae are normal. Right eye exhibits no discharge. Left eye exhibits no discharge.   Neck: Neck supple. No tracheal deviation present.   Cardiovascular: Normal rate and regular rhythm.   Pulmonary/Chest:   Slight tachypnea  Coarse BS bilaterally   Abdominal: Soft. Bowel sounds are normal.   Musculoskeletal: She exhibits edema.   Neurological: She is alert and oriented to person, place, and time.   Skin: Skin is warm and dry. She is not diaphoretic.       Significant Labs:   BMP:   Recent Labs   Lab 11/16/19  0434   *      K 4.2   CL 91*   CO2 42*   BUN 38*   CREATININE 1.5*   CALCIUM 8.6*     CBC:   Recent Labs   Lab 11/15/19  0457 11/16/19  0434   WBC 8.67 6.87   HGB 9.3* 8.1*   HCT 31.7* 28.3*    231       Significant Imaging: I have reviewed all pertinent imaging results/findings within the past 24 hours.

## 2019-11-16 NOTE — ASSESSMENT & PLAN NOTE
Patient noted to be hypoxic and in respiratory distress.  Respiratory failure secondary to CHF exacerbation.  Symptoms improved with IV Lasix.  Change Lasix to oral.  Patient with some confusion this morning.  Noted increasing CO2.  Check ABG's  Will place on Bipap.

## 2019-11-17 LAB
ALBUMIN SERPL BCP-MCNC: 2.9 G/DL (ref 3.5–5.2)
ALP SERPL-CCNC: 52 U/L (ref 55–135)
ALT SERPL W/O P-5'-P-CCNC: 8 U/L (ref 10–44)
ANION GAP SERPL CALC-SCNC: 5 MMOL/L (ref 8–16)
AST SERPL-CCNC: 10 U/L (ref 10–40)
BASOPHILS # BLD AUTO: 0.03 K/UL (ref 0–0.2)
BASOPHILS NFR BLD: 0.5 % (ref 0–1.9)
BILIRUB SERPL-MCNC: 0.2 MG/DL (ref 0.1–1)
BUN SERPL-MCNC: 37 MG/DL (ref 8–23)
CALCIUM SERPL-MCNC: 8.7 MG/DL (ref 8.7–10.5)
CHLORIDE SERPL-SCNC: 90 MMOL/L (ref 95–110)
CO2 SERPL-SCNC: 45 MMOL/L (ref 23–29)
CREAT SERPL-MCNC: 1.5 MG/DL (ref 0.5–1.4)
DIFFERENTIAL METHOD: ABNORMAL
EOSINOPHIL # BLD AUTO: 0.1 K/UL (ref 0–0.5)
EOSINOPHIL NFR BLD: 1.8 % (ref 0–8)
ERYTHROCYTE [DISTWIDTH] IN BLOOD BY AUTOMATED COUNT: 13.5 % (ref 11.5–14.5)
EST. GFR  (AFRICAN AMERICAN): 38 ML/MIN/1.73 M^2
EST. GFR  (NON AFRICAN AMERICAN): 33 ML/MIN/1.73 M^2
GLUCOSE SERPL-MCNC: 123 MG/DL (ref 70–110)
HCT VFR BLD AUTO: 29.6 % (ref 37–48.5)
HGB BLD-MCNC: 8.6 G/DL (ref 12–16)
IMM GRANULOCYTES # BLD AUTO: 0.02 K/UL (ref 0–0.04)
IMM GRANULOCYTES NFR BLD AUTO: 0.3 % (ref 0–0.5)
LYMPHOCYTES # BLD AUTO: 1 K/UL (ref 1–4.8)
LYMPHOCYTES NFR BLD: 15.6 % (ref 18–48)
MCH RBC QN AUTO: 29.8 PG (ref 27–31)
MCHC RBC AUTO-ENTMCNC: 29.1 G/DL (ref 32–36)
MCV RBC AUTO: 102 FL (ref 82–98)
MONOCYTES # BLD AUTO: 0.7 K/UL (ref 0.3–1)
MONOCYTES NFR BLD: 11.3 % (ref 4–15)
NEUTROPHILS # BLD AUTO: 4.4 K/UL (ref 1.8–7.7)
NEUTROPHILS NFR BLD: 70.5 % (ref 38–73)
NRBC BLD-RTO: 0 /100 WBC
PLATELET # BLD AUTO: 205 K/UL (ref 150–350)
PMV BLD AUTO: 9.4 FL (ref 9.2–12.9)
POCT GLUCOSE: 106 MG/DL (ref 70–110)
POCT GLUCOSE: 118 MG/DL (ref 70–110)
POCT GLUCOSE: 227 MG/DL (ref 70–110)
POCT GLUCOSE: 239 MG/DL (ref 70–110)
POTASSIUM SERPL-SCNC: 4 MMOL/L (ref 3.5–5.1)
PROT SERPL-MCNC: 5.9 G/DL (ref 6–8.4)
RBC # BLD AUTO: 2.89 M/UL (ref 4–5.4)
SODIUM SERPL-SCNC: 140 MMOL/L (ref 136–145)
WBC # BLD AUTO: 6.27 K/UL (ref 3.9–12.7)

## 2019-11-17 PROCEDURE — 36600 WITHDRAWAL OF ARTERIAL BLOOD: CPT | Mod: HCNC

## 2019-11-17 PROCEDURE — 99900035 HC TECH TIME PER 15 MIN (STAT): Mod: HCNC

## 2019-11-17 PROCEDURE — 94761 N-INVAS EAR/PLS OXIMETRY MLT: CPT | Mod: HCNC

## 2019-11-17 PROCEDURE — 25000003 PHARM REV CODE 250: Mod: HCNC | Performed by: HOSPITALIST

## 2019-11-17 PROCEDURE — 82803 BLOOD GASES ANY COMBINATION: CPT | Mod: HCNC

## 2019-11-17 PROCEDURE — 80053 COMPREHEN METABOLIC PANEL: CPT | Mod: HCNC

## 2019-11-17 PROCEDURE — 25000003 PHARM REV CODE 250: Mod: HCNC | Performed by: EMERGENCY MEDICINE

## 2019-11-17 PROCEDURE — 63600175 PHARM REV CODE 636 W HCPCS: Mod: HCNC | Performed by: EMERGENCY MEDICINE

## 2019-11-17 PROCEDURE — 85025 COMPLETE CBC W/AUTO DIFF WBC: CPT | Mod: HCNC

## 2019-11-17 PROCEDURE — 27000221 HC OXYGEN, UP TO 24 HOURS: Mod: HCNC

## 2019-11-17 PROCEDURE — 36415 COLL VENOUS BLD VENIPUNCTURE: CPT | Mod: HCNC

## 2019-11-17 PROCEDURE — 21400001 HC TELEMETRY ROOM: Mod: HCNC

## 2019-11-17 RX ADMIN — OXYBUTYNIN CHLORIDE 5 MG: 5 TABLET ORAL at 09:11

## 2019-11-17 RX ADMIN — HEPARIN SODIUM 5000 UNITS: 5000 INJECTION INTRAVENOUS; SUBCUTANEOUS at 08:11

## 2019-11-17 RX ADMIN — LOSARTAN POTASSIUM 100 MG: 25 TABLET, FILM COATED ORAL at 09:11

## 2019-11-17 RX ADMIN — GABAPENTIN 100 MG: 100 CAPSULE ORAL at 08:11

## 2019-11-17 RX ADMIN — ACETAMINOPHEN 650 MG: 325 TABLET, FILM COATED ORAL at 04:11

## 2019-11-17 RX ADMIN — FAMOTIDINE 20 MG: 20 TABLET ORAL at 09:11

## 2019-11-17 RX ADMIN — METOPROLOL SUCCINATE 25 MG: 25 TABLET, EXTENDED RELEASE ORAL at 09:11

## 2019-11-17 RX ADMIN — QUETIAPINE FUMARATE 50 MG: 25 TABLET ORAL at 08:11

## 2019-11-17 RX ADMIN — FLUTICASONE PROPIONATE 50 MCG: 50 SPRAY, METERED NASAL at 08:11

## 2019-11-17 RX ADMIN — HEPARIN SODIUM 5000 UNITS: 5000 INJECTION INTRAVENOUS; SUBCUTANEOUS at 06:11

## 2019-11-17 RX ADMIN — DIVALPROEX SODIUM 500 MG: 250 TABLET, DELAYED RELEASE ORAL at 08:11

## 2019-11-17 RX ADMIN — GABAPENTIN 100 MG: 100 CAPSULE ORAL at 02:11

## 2019-11-17 RX ADMIN — DIVALPROEX SODIUM 500 MG: 250 TABLET, DELAYED RELEASE ORAL at 09:11

## 2019-11-17 RX ADMIN — GABAPENTIN 100 MG: 100 CAPSULE ORAL at 09:11

## 2019-11-17 RX ADMIN — FUROSEMIDE 40 MG: 40 TABLET ORAL at 09:11

## 2019-11-17 RX ADMIN — FLUTICASONE PROPIONATE 50 MCG: 50 SPRAY, METERED NASAL at 09:11

## 2019-11-17 RX ADMIN — AMLODIPINE BESYLATE 5 MG: 5 TABLET ORAL at 09:11

## 2019-11-17 RX ADMIN — HEPARIN SODIUM 5000 UNITS: 5000 INJECTION INTRAVENOUS; SUBCUTANEOUS at 02:11

## 2019-11-17 NOTE — SUBJECTIVE & OBJECTIVE
Interval History: More awake today.    Review of Systems   HENT: Negative for ear discharge and ear pain.    Eyes: Negative for pain and itching.   Endocrine: Negative for polyphagia and polyuria.   Neurological: Negative for seizures and syncope.     Objective:     Vital Signs (Most Recent):  Temp: 99.3 °F (37.4 °C) (11/17/19 1121)  Pulse: 68 (11/17/19 1121)  Resp: 20 (11/17/19 1121)  BP: (!) 170/70 (11/17/19 1121)  SpO2: 96 % (11/17/19 1121) Vital Signs (24h Range):  Temp:  [98 °F (36.7 °C)-99.7 °F (37.6 °C)] 99.3 °F (37.4 °C)  Pulse:  [56-79] 68  Resp:  [17-20] 20  SpO2:  [92 %-97 %] 96 %  BP: (124-170)/(57-71) 170/70     Weight: 98.3 kg (216 lb 11.4 oz)  Body mass index is 43.77 kg/m².    Intake/Output Summary (Last 24 hours) at 11/17/2019 1136  Last data filed at 11/17/2019 0500  Gross per 24 hour   Intake 500 ml   Output 750 ml   Net -250 ml      Physical Exam   Constitutional: She is oriented to person, place, and time. No distress.   HENT:   Head: Normocephalic and atraumatic.   Eyes: Conjunctivae are normal. Right eye exhibits no discharge. Left eye exhibits no discharge.   Neck: Neck supple. No tracheal deviation present.   Cardiovascular: Normal rate and regular rhythm.   Pulmonary/Chest:   Slight tachypnea  Coarse BS bilaterally   Abdominal: Soft. Bowel sounds are normal.   Musculoskeletal: She exhibits edema.   Neurological: She is alert and oriented to person, place, and time.   Skin: Skin is warm and dry. She is not diaphoretic.       Significant Labs:   BMP:   Recent Labs   Lab 11/17/19 0428   *      K 4.0   CL 90*   CO2 45*   BUN 37*   CREATININE 1.5*   CALCIUM 8.7     CBC:   Recent Labs   Lab 11/16/19  0434 11/17/19  0428   WBC 6.87 6.27   HGB 8.1* 8.6*   HCT 28.3* 29.6*    205       Significant Imaging: I have reviewed all pertinent imaging results/findings within the past 24 hours.

## 2019-11-17 NOTE — NURSING
Bedside report received from NICHOLE Kirby. Patient lying in bed with eyes closed. BiPap in place. Daughter at bedside. All safety precautions in place. Will continue to monitor.

## 2019-11-17 NOTE — PLAN OF CARE
Problem: Fall Injury Risk  Goal: Absence of Fall and Fall-Related Injury  Outcome: Ongoing, Progressing     Problem: Adult Inpatient Plan of Care  Goal: Plan of Care Review  Outcome: Ongoing, Progressing  Goal: Patient-Specific Goal (Individualization)  Outcome: Ongoing, Progressing  Goal: Absence of Hospital-Acquired Illness or Injury  Outcome: Ongoing, Progressing  Goal: Optimal Comfort and Wellbeing  Outcome: Ongoing, Progressing  Goal: Readiness for Transition of Care  Outcome: Ongoing, Progressing  Goal: Rounds/Family Conference  Outcome: Ongoing, Progressing     Problem: Fall Injury Risk  Goal: Absence of Fall and Fall-Related Injury  Outcome: Ongoing, Progressing     Problem: Diabetes Comorbidity  Goal: Blood Glucose Level Within Desired Range  Outcome: Ongoing, Progressing

## 2019-11-17 NOTE — NURSING
Patient nauseated and asked for medication.  Nurse brought zofran to bedside to administer.  Once at bedside patient refused zofran.

## 2019-11-17 NOTE — PROGRESS NOTES
[facility] Hospital Medicine  Progress Note    Patient Name: Kaci Whalen  MRN: [id number]  Patient Class: IP- Inpatient   Admission Date: 11/14/2019  Length of Stay: 3 days  Attending Physician: Maximilian Moreira MD  Primary Care Provider: Toby Hung MD        Subjective:     Principal Problem:Acute on chronic respiratory failure with hypoxia        HPI:  78 y/o female presents to the ER via EMS complaining of gradually worsening shortness of breath and non productive cough for 2 days.  EMS reports an initial O2 sat of 77% on 2L O2.  The patient uses 2L of supplemental O2 at all times.  Patient apparently started complaining of congestion that did not improve with Mucinex and cough syrup.  Patient complains of severe shortness of breath with minimal exertion.  She can never lay flat to sleep.  Does complain of some lower extremity swelling, but states that it's actually much better than in the past.  She is compliant with her Lasix 40mg (MWF).  Shortness of breath worsened this morning and patient presented to ER.  Patient states that she also gets very anxious when she gets short of breath.  No other complaints.    Overview/Hospital Course:  78 y/o female presented with worsening shortness of breath.  Acute on chronic diastolic heart failure and started on IV Lasix. Some improvement with diuresis, but patient somewhat confused and lethargic.  ABG's show hypercapnia and started on Bipap.    Interval History: More awake today.    Review of Systems   HENT: Negative for ear discharge and ear pain.    Eyes: Negative for pain and itching.   Endocrine: Negative for polyphagia and polyuria.   Neurological: Negative for seizures and syncope.     Objective:     Vital Signs (Most Recent):  Temp: 99.3 °F (37.4 °C) (11/17/19 1121)  Pulse: 68 (11/17/19 1121)  Resp: 20 (11/17/19 1121)  BP: (!) 170/70 (11/17/19 1121)  SpO2: 96 % (11/17/19 1121) Vital Signs (24h Range):  Temp:  [98 °F (36.7 °C)-99.7 °F  (37.6 °C)] 99.3 °F (37.4 °C)  Pulse:  [56-79] 68  Resp:  [17-20] 20  SpO2:  [92 %-97 %] 96 %  BP: (124-170)/(57-71) 170/70     Weight: 98.3 kg (216 lb 11.4 oz)  Body mass index is 43.77 kg/m².    Intake/Output Summary (Last 24 hours) at 11/17/2019 1136  Last data filed at 11/17/2019 0500  Gross per 24 hour   Intake 500 ml   Output 750 ml   Net -250 ml      Physical Exam   Constitutional: She is oriented to person, place, and time. No distress.   HENT:   Head: Normocephalic and atraumatic.   Eyes: Conjunctivae are normal. Right eye exhibits no discharge. Left eye exhibits no discharge.   Neck: Neck supple. No tracheal deviation present.   Cardiovascular: Normal rate and regular rhythm.   Pulmonary/Chest:   Slight tachypnea  Coarse BS bilaterally   Abdominal: Soft. Bowel sounds are normal.   Musculoskeletal: She exhibits edema.   Neurological: She is alert and oriented to person, place, and time.   Skin: Skin is warm and dry. She is not diaphoretic.       Significant Labs:   BMP:   Recent Labs   Lab 11/17/19  0428   *      K 4.0   CL 90*   CO2 45*   BUN 37*   CREATININE 1.5*   CALCIUM 8.7     CBC:   Recent Labs   Lab 11/16/19  0434 11/17/19  0428   WBC 6.87 6.27   HGB 8.1* 8.6*   HCT 28.3* 29.6*    205       Significant Imaging: I have reviewed all pertinent imaging results/findings within the past 24 hours.      Assessment/Plan:      * Acute on chronic respiratory failure with hypoxia  Patient noted to be hypoxic and in respiratory distress.  Respiratory failure secondary to CHF exacerbation.  Symptoms improved with IV Lasix.  Change Lasix to oral.  Patient with some confusion and lethargy.    ABG's show hypercapnia.  Started on Bipap.  Will get Pulmonary input.      Acute on chronic diastolic congestive heart failure  As above.      Hyperkalemia  Should improve with diuresis.  Repeat labs in Am.      Anemia of chronic disorder  H/H similar to previous labs.  No evidence of bleeding.    CKD  (chronic kidney disease), stage III  Creat around baseline.  Monitor renal function while on diuresis.  Avoid nephrotoxic medications.      Essential hypertension  Resume home medications with parameters.  Low Na diet.      Morbid obesity  Body mass index is 44.03 kg/m².        Paroxysmal atrial fibrillation  Currently in sinus rhythm.  Continue B blocker.      Metabolic alkalosis  Worsening CO2 probably secondary to diuresis.      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  Diabetic diet and insulin sliding scale.      Hyperlipidemia with target LDL less than 100          VTE Risk Mitigation (From admission, onward)         Ordered     heparin (porcine) injection 5,000 Units  Every 8 hours      11/14/19 1623     IP VTE HIGH RISK PATIENT  Once      11/14/19 1623     Place sequential compression device  Until discontinued      11/14/19 1623     Place EDE hose  Until discontinued      11/14/19 1623                      Maximilian Moreira MD  Department of Hospital Medicine   [facility]

## 2019-11-17 NOTE — ASSESSMENT & PLAN NOTE
Patient noted to be hypoxic and in respiratory distress.  Respiratory failure secondary to CHF exacerbation.  Symptoms improved with IV Lasix.  Change Lasix to oral.  Patient with some confusion and lethargy.    ABG's show hypercapnia.  Started on Bipap.  Will get Pulmonary input.

## 2019-11-17 NOTE — NURSING
Report given to NICHOLE Kirby. Patient awake and alert. Patient daughter,Mirta at bedside. NAD noted at this time. All safety precautions in place. Will continue to monitor.  12 hr chart check complete

## 2019-11-18 LAB
ALBUMIN SERPL BCP-MCNC: 2.9 G/DL (ref 3.5–5.2)
ALLENS TEST: ABNORMAL
ALP SERPL-CCNC: 55 U/L (ref 55–135)
ALT SERPL W/O P-5'-P-CCNC: 8 U/L (ref 10–44)
ANION GAP SERPL CALC-SCNC: 6 MMOL/L (ref 8–16)
AST SERPL-CCNC: 12 U/L (ref 10–40)
BACTERIA #/AREA URNS HPF: ABNORMAL /HPF
BASOPHILS # BLD AUTO: 0.02 K/UL (ref 0–0.2)
BASOPHILS NFR BLD: 0.3 % (ref 0–1.9)
BILIRUB SERPL-MCNC: 0.3 MG/DL (ref 0.1–1)
BILIRUB UR QL STRIP: NEGATIVE
BUN SERPL-MCNC: 30 MG/DL (ref 8–23)
CALCIUM SERPL-MCNC: 9.1 MG/DL (ref 8.7–10.5)
CHLORIDE SERPL-SCNC: 90 MMOL/L (ref 95–110)
CLARITY UR: ABNORMAL
CO2 SERPL-SCNC: 44 MMOL/L (ref 23–29)
COLOR UR: YELLOW
CREAT SERPL-MCNC: 1.3 MG/DL (ref 0.5–1.4)
DELSYS: ABNORMAL
DIFFERENTIAL METHOD: ABNORMAL
EOSINOPHIL # BLD AUTO: 0 K/UL (ref 0–0.5)
EOSINOPHIL NFR BLD: 0.3 % (ref 0–8)
EP: 5
EP: 6
EP: 6
ERYTHROCYTE [DISTWIDTH] IN BLOOD BY AUTOMATED COUNT: 13.2 % (ref 11.5–14.5)
ERYTHROCYTE [SEDIMENTATION RATE] IN BLOOD BY WESTERGREN METHOD: 12 MM/H
EST. GFR  (AFRICAN AMERICAN): 46 ML/MIN/1.73 M^2
EST. GFR  (NON AFRICAN AMERICAN): 40 ML/MIN/1.73 M^2
FIO2: 4
FIO2: 4
GLUCOSE SERPL-MCNC: 186 MG/DL (ref 70–110)
GLUCOSE UR QL STRIP: NEGATIVE
HCO3 UR-SCNC: 46.3 MMOL/L (ref 24–28)
HCO3 UR-SCNC: 48.7 MMOL/L (ref 24–28)
HCO3 UR-SCNC: 50 MMOL/L (ref 24–28)
HCT VFR BLD AUTO: 31 % (ref 37–48.5)
HGB BLD-MCNC: 9.3 G/DL (ref 12–16)
HGB UR QL STRIP: ABNORMAL
HYALINE CASTS #/AREA URNS LPF: 0 /LPF
IMM GRANULOCYTES # BLD AUTO: 0.05 K/UL (ref 0–0.04)
IMM GRANULOCYTES NFR BLD AUTO: 0.7 % (ref 0–0.5)
IP: 12
IP: 12
IP: 15
KETONES UR QL STRIP: ABNORMAL
LEUKOCYTE ESTERASE UR QL STRIP: ABNORMAL
LYMPHOCYTES # BLD AUTO: 0.5 K/UL (ref 1–4.8)
LYMPHOCYTES NFR BLD: 6.8 % (ref 18–48)
MCH RBC QN AUTO: 29.5 PG (ref 27–31)
MCHC RBC AUTO-ENTMCNC: 30 G/DL (ref 32–36)
MCV RBC AUTO: 98 FL (ref 82–98)
MICROSCOPIC COMMENT: ABNORMAL
MODE: ABNORMAL
MONOCYTES # BLD AUTO: 0.9 K/UL (ref 0.3–1)
MONOCYTES NFR BLD: 12 % (ref 4–15)
NEUTROPHILS # BLD AUTO: 5.7 K/UL (ref 1.8–7.7)
NEUTROPHILS NFR BLD: 79.9 % (ref 38–73)
NITRITE UR QL STRIP: NEGATIVE
NRBC BLD-RTO: 0 /100 WBC
PCO2 BLDA: 68.3 MMHG (ref 35–45)
PCO2 BLDA: 76.1 MMHG (ref 35–45)
PCO2 BLDA: 93.7 MMHG (ref 35–45)
PH SMN: 7.33 [PH] (ref 7.35–7.45)
PH SMN: 7.41 [PH] (ref 7.35–7.45)
PH SMN: 7.44 [PH] (ref 7.35–7.45)
PH UR STRIP: 6 [PH] (ref 5–8)
PLATELET # BLD AUTO: 228 K/UL (ref 150–350)
PMV BLD AUTO: 9.8 FL (ref 9.2–12.9)
PO2 BLDA: 67 MMHG (ref 80–100)
PO2 BLDA: 71 MMHG (ref 80–100)
PO2 BLDA: 73 MMHG (ref 80–100)
POC BE: 19 MMOL/L
POC BE: 21 MMOL/L
POC BE: 21 MMOL/L
POC SATURATED O2: 92 % (ref 95–100)
POC SATURATED O2: 93 % (ref 95–100)
POC SATURATED O2: 93 % (ref 95–100)
POC TCO2: 48 MMOL/L (ref 23–27)
POC TCO2: >50 MMOL/L (ref 23–27)
POC TCO2: >50 MMOL/L (ref 23–27)
POCT GLUCOSE: 164 MG/DL (ref 70–110)
POCT GLUCOSE: 191 MG/DL (ref 70–110)
POCT GLUCOSE: 199 MG/DL (ref 70–110)
POCT GLUCOSE: 209 MG/DL (ref 70–110)
POCT GLUCOSE: 219 MG/DL (ref 70–110)
POCT GLUCOSE: 274 MG/DL (ref 70–110)
POTASSIUM SERPL-SCNC: 4.2 MMOL/L (ref 3.5–5.1)
PROT SERPL-MCNC: 6.4 G/DL (ref 6–8.4)
PROT UR QL STRIP: ABNORMAL
RBC # BLD AUTO: 3.15 M/UL (ref 4–5.4)
RBC #/AREA URNS HPF: 10 /HPF (ref 0–4)
SAMPLE: ABNORMAL
SITE: ABNORMAL
SODIUM SERPL-SCNC: 140 MMOL/L (ref 136–145)
SP GR UR STRIP: 1.01 (ref 1–1.03)
SPONT RATE: 18
URN SPEC COLLECT METH UR: ABNORMAL
UROBILINOGEN UR STRIP-ACNC: NEGATIVE EU/DL
WBC # BLD AUTO: 7.17 K/UL (ref 3.9–12.7)
WBC #/AREA URNS HPF: 25 /HPF (ref 0–5)

## 2019-11-18 PROCEDURE — 87088 URINE BACTERIA CULTURE: CPT | Mod: HCNC

## 2019-11-18 PROCEDURE — 82803 BLOOD GASES ANY COMBINATION: CPT | Mod: HCNC

## 2019-11-18 PROCEDURE — 85025 COMPLETE CBC W/AUTO DIFF WBC: CPT | Mod: HCNC

## 2019-11-18 PROCEDURE — 99223 PR INITIAL HOSPITAL CARE,LEVL III: ICD-10-PCS | Mod: HCNC,,, | Performed by: INTERNAL MEDICINE

## 2019-11-18 PROCEDURE — 99223 1ST HOSP IP/OBS HIGH 75: CPT | Mod: HCNC,,, | Performed by: INTERNAL MEDICINE

## 2019-11-18 PROCEDURE — 36415 COLL VENOUS BLD VENIPUNCTURE: CPT | Mod: HCNC

## 2019-11-18 PROCEDURE — 25000003 PHARM REV CODE 250: Mod: HCNC | Performed by: HOSPITALIST

## 2019-11-18 PROCEDURE — 63600175 PHARM REV CODE 636 W HCPCS: Mod: HCNC | Performed by: EMERGENCY MEDICINE

## 2019-11-18 PROCEDURE — 63600175 PHARM REV CODE 636 W HCPCS: Mod: HCNC | Performed by: INTERNAL MEDICINE

## 2019-11-18 PROCEDURE — 21400001 HC TELEMETRY ROOM: Mod: HCNC

## 2019-11-18 PROCEDURE — 94761 N-INVAS EAR/PLS OXIMETRY MLT: CPT | Mod: HCNC

## 2019-11-18 PROCEDURE — 87077 CULTURE AEROBIC IDENTIFY: CPT | Mod: 59,HCNC

## 2019-11-18 PROCEDURE — 97530 THERAPEUTIC ACTIVITIES: CPT | Mod: HCNC

## 2019-11-18 PROCEDURE — 87086 URINE CULTURE/COLONY COUNT: CPT | Mod: HCNC

## 2019-11-18 PROCEDURE — 27000221 HC OXYGEN, UP TO 24 HOURS: Mod: HCNC

## 2019-11-18 PROCEDURE — 63600175 PHARM REV CODE 636 W HCPCS: Mod: HCNC | Performed by: HOSPITALIST

## 2019-11-18 PROCEDURE — 36600 WITHDRAWAL OF ARTERIAL BLOOD: CPT | Mod: HCNC

## 2019-11-18 PROCEDURE — 99900035 HC TECH TIME PER 15 MIN (STAT): Mod: HCNC

## 2019-11-18 PROCEDURE — 94660 CPAP INITIATION&MGMT: CPT | Mod: HCNC

## 2019-11-18 PROCEDURE — 81000 URINALYSIS NONAUTO W/SCOPE: CPT | Mod: HCNC

## 2019-11-18 PROCEDURE — 25000003 PHARM REV CODE 250: Mod: HCNC | Performed by: EMERGENCY MEDICINE

## 2019-11-18 PROCEDURE — 80053 COMPREHEN METABOLIC PANEL: CPT | Mod: HCNC

## 2019-11-18 PROCEDURE — 87186 SC STD MICRODIL/AGAR DIL: CPT | Mod: 59,HCNC

## 2019-11-18 PROCEDURE — 97110 THERAPEUTIC EXERCISES: CPT | Mod: HCNC

## 2019-11-18 RX ORDER — IBUPROFEN 200 MG
24 TABLET ORAL
Status: DISCONTINUED | OUTPATIENT
Start: 2019-11-18 | End: 2019-11-21 | Stop reason: HOSPADM

## 2019-11-18 RX ORDER — GLUCAGON 1 MG
1 KIT INJECTION
Status: DISCONTINUED | OUTPATIENT
Start: 2019-11-18 | End: 2019-11-21 | Stop reason: HOSPADM

## 2019-11-18 RX ORDER — INSULIN ASPART 100 [IU]/ML
0-5 INJECTION, SOLUTION INTRAVENOUS; SUBCUTANEOUS
Status: DISCONTINUED | OUTPATIENT
Start: 2019-11-18 | End: 2019-11-21 | Stop reason: HOSPADM

## 2019-11-18 RX ORDER — IBUPROFEN 200 MG
16 TABLET ORAL
Status: DISCONTINUED | OUTPATIENT
Start: 2019-11-18 | End: 2019-11-21 | Stop reason: HOSPADM

## 2019-11-18 RX ADMIN — DIVALPROEX SODIUM 500 MG: 250 TABLET, DELAYED RELEASE ORAL at 10:11

## 2019-11-18 RX ADMIN — HEPARIN SODIUM 5000 UNITS: 5000 INJECTION INTRAVENOUS; SUBCUTANEOUS at 06:11

## 2019-11-18 RX ADMIN — ACETAZOLAMIDE 500 MG: 500 INJECTION, POWDER, LYOPHILIZED, FOR SOLUTION INTRAVENOUS at 03:11

## 2019-11-18 RX ADMIN — FLUTICASONE PROPIONATE 50 MCG: 50 SPRAY, METERED NASAL at 08:11

## 2019-11-18 RX ADMIN — FUROSEMIDE 40 MG: 40 TABLET ORAL at 10:11

## 2019-11-18 RX ADMIN — LOSARTAN POTASSIUM 100 MG: 25 TABLET, FILM COATED ORAL at 10:11

## 2019-11-18 RX ADMIN — ACETAMINOPHEN 650 MG: 325 TABLET, FILM COATED ORAL at 04:11

## 2019-11-18 RX ADMIN — AMLODIPINE BESYLATE 5 MG: 5 TABLET ORAL at 10:11

## 2019-11-18 RX ADMIN — HEPARIN SODIUM 5000 UNITS: 5000 INJECTION INTRAVENOUS; SUBCUTANEOUS at 02:11

## 2019-11-18 RX ADMIN — CEFTRIAXONE 1 G: 1 INJECTION, SOLUTION INTRAVENOUS at 02:11

## 2019-11-18 RX ADMIN — INSULIN ASPART 1 UNITS: 100 INJECTION, SOLUTION INTRAVENOUS; SUBCUTANEOUS at 10:11

## 2019-11-18 RX ADMIN — GABAPENTIN 100 MG: 100 CAPSULE ORAL at 10:11

## 2019-11-18 RX ADMIN — HEPARIN SODIUM 5000 UNITS: 5000 INJECTION INTRAVENOUS; SUBCUTANEOUS at 08:11

## 2019-11-18 RX ADMIN — METOPROLOL SUCCINATE 25 MG: 25 TABLET, EXTENDED RELEASE ORAL at 10:11

## 2019-11-18 NOTE — NURSING
Bedside report given to NICHOLE Flores. Patient awake and alert. Sitting up in bed. BiPap in place. NAD noted at this time. All safety precautions in place. Will continue to monitor  12 hr chart check complete

## 2019-11-18 NOTE — CONSULTS
[facility]  Pulmonology  Consult Note    Patient Name: Kaci Whalen  MRN: [id number]  Admission Date: 11/14/2019  Hospital Length of Stay: 4 days  Code Status: Full Code  Attending Physician: Maximilian Moreira MD  Primary Care Provider: Toby Hung MD   Principal Problem: Acute on chronic respiratory failure with hypoxia    Inpatient consult to Pulmonology  Consult performed by: Kelly Jay MD  Consult ordered by: Maximilian Moreira MD        Subjective:     HPI:  78 y/o female presents to the ER via EMS complaining of gradually worsening shortness of breath and non productive cough for 2 days.  EMS reports an initial O2 sat of 77% on 2L O2.  The patient uses 2L of supplemental O2 at all times.  Patient apparently started complaining of congestion that did not improve with Mucinex and cough syrup.  Patient complains of severe shortness of breath with minimal exertion.  She can never lay flat to sleep.  Does complain of some lower extremity swelling, but states that it's actually much better than in the past.  She is compliant with her Lasix 40mg (MWF).  Shortness of breath worsened this morning and patient presented to ER.  Patient states that she also gets very anxious when she gets short of breath.  No other complaints.    Patient admitted to Hospital Medicine. Improvement with diuresis. Pulmonary consulted for acute hypercapnic respiratory failure.   Patient is awake and alert.   ABG 7.44/68/67/46  Patient and daughter report that they saw Dr. Stewart in 2016. At that time patient needed CPAP but was unable to afford it.     Past Medical History:   Diagnosis Date    Abdominal pain     Anxiety     Atherosclerosis of aortic arch     noted on CXR 7/1/2015    Atrial fibrillation 05/2016    CHADS 4, on Xarelto    Benign hypertension     Bipolar disorder, most recent episode manic 8/29/2019    CHF (congestive heart failure)     Chronic constipation     Chronic diarrhea      Chronic edema     Depression     Dercum disease     Multiple painful lipomas    Diabetic retinopathy     Dysphagia     Gas pain     Glaucoma of both eyes     Hx of psychiatric care     previously amitriptyline, now jut on Trazodone 100mg QHS PRN insomnia    Hyperlipidemia with target LDL less than 100     Unable to tolerate statins    Immature cataract     Left sided numbness     per daughter    Tasia     no symptoms prior to this presentation    Morbid obesity     Nausea & vomiting     Neuropathy     On home oxygen therapy     Polyneuropathy     Primary osteoarthritis of both knees     Proteinuria     Psychiatric problem     history of depression    Pulmonary hypertension mixed group 2 and 3 1/18/2017    Requires assistance with activities of daily living (ADL)     Sleep apnea     Therapy     no history of therapy    Type II or unspecified type diabetes mellitus with neurological manifestations, uncontrolled(250.62)     Unspecified vitamin D deficiency     Unsteady gait     Uses roller walker        Past Surgical History:   Procedure Laterality Date     tenotomy      flexors 2,3 L toes    CATARACT EXTRACTION W/  INTRAOCULAR LENS IMPLANT Bilateral     COLONOSCOPY N/A 5/4/2017    Procedure: COLONOSCOPY;  Surgeon: Suellen Wolf MD;  Location: [facility] ([address] [place] [address]);  Service: Endoscopy;  Laterality: N/A;  2nd floor due to pulm htn, possible gastroparesis. per Dr Wolf      ok to hold Xarelto 2 days prior to procedure per Dr Driss Dixon    diabetic retinopathy      right    HYSTERECTOMY      knee surgery x2      Left ankle and leg surgery secondary to a fall      rods & screws present    left arm fracture      Left cataract      PARTIAL HYSTERECTOMY      Secondary to bleeding    TOE FUSION      2,3 R    WRIST SURGERY Left 12/28/2012    pins & plate present       Review of patient's allergies indicates:   Allergen Reactions    Tramadol Other (See Comments)      Interaction-induced delirium with s/s of estrellita.    Ace inhibitors      Other reaction(s): cough      Fluorescein Itching     Other reaction(s): Itching    Iodine      Other reaction(s): Itching    Pravastatin Other (See Comments)     Other reaction(s): mouth tingling/numbness    Simvastatin      Other reaction(s): foot swelling         No current facility-administered medications on file prior to encounter.      Current Outpatient Medications on File Prior to Encounter   Medication Sig    divalproex (DEPAKOTE) 500 MG TbEC Take 1 tablet (500 mg total) by mouth every 12 (twelve) hours.    furosemide (LASIX) 40 MG tablet Take 1 tablet (40 mg total) by mouth once daily. One tab po daily x three days then as needed for LE swelling    losartan (COZAAR) 100 MG tablet Take 1 tablet (100 mg total) by mouth once daily.    metFORMIN (GLUCOPHAGE) 500 MG tablet Take 1 tablet (500 mg total) by mouth 3 (three) times daily.    alcohol swabs PadM Apply 1 each topically as needed.    amLODIPine (NORVASC) 5 MG tablet Take 1 tablet (5 mg total) by mouth once daily.    blood glucose control, low Soln To test meter once per week    desloratadine (CLARINEX) 5 mg tablet Take 1 tablet (5 mg total) by mouth once daily. (Patient taking differently: Take 5 mg by mouth as needed. )    fluticasone (FLONASE) 50 mcg/actuation nasal spray 1 spray (50 mcg total) by Each Nare route 2 (two) times daily.    gabapentin (NEURONTIN) 100 MG capsule Take 1 capsule (100 mg total) by mouth 3 (three) times daily.    hydroCHLOROthiazide (HYDRODIURIL) 25 MG tablet Take 25 mg by mouth once daily.    insulin aspart protamine-insulin aspart (NOVOLOG 70/30) 100 unit/mL (70-30) InPn pen Inject 21 Units into the skin 2 (two) times daily before meals. Sliding scale max daily dosing 50 units daily    ketoconazole (NIZORAL) 2 % shampoo Apply topically twice a week.    lancets (LANCETS,THIN) 28 gauge Misc 1 lancet by Misc.(Non-Drug; Combo Route) route 4  "(four) times daily before meals and nightly.    melatonin 10 mg Tab Take by mouth. 2 tabs at night    metoprolol succinate (TOPROL-XL) 25 MG 24 hr tablet Take 1 tablet (25 mg total) by mouth once daily.    multivitamin capsule Take 1 capsule by mouth once daily.    oxybutynin (DITROPAN) 5 MG Tab Take 1 tablet (5 mg total) by mouth once daily. In morning    pen needle, diabetic (BD ULTRA-FINE SANDEEP PEN NEEDLES) 32 gauge x 5/32" Ndle Take 1 each by mouth 4 (four) times daily.    QUEtiapine (SEROQUEL) 50 MG tablet Take 1 tablet (50 mg total) by mouth every evening.    ranitidine (ZANTAC) 150 MG tablet Take 150 mg by mouth 2 (two) times daily.    TRUE METRIX GLUCOSE TEST STRIP Strp TEST FOUR TIMES DAILY BEFORE MEALS  AND EVERY NIGHT     Family History     Problem Relation (Age of Onset)    Bone cancer Daughter    Liver cancer Mother    No Known Problems Daughter, Father, Sister, Brother, Maternal Aunt, Maternal Uncle, Paternal Aunt, Paternal Uncle, Maternal Grandmother, Maternal Grandfather, Paternal Grandmother, Paternal Grandfather        Tobacco Use    Smoking status: Never Smoker    Smokeless tobacco: Never Used   Substance and Sexual Activity    Alcohol use: No    Drug use: No    Sexual activity: Yes     Partners: Male     Review of Systems   Constitutional: Negative for chills and fever.   HENT: Negative for ear discharge and ear pain.    Eyes: Negative for pain and itching.   Respiratory: Positive for cough and shortness of breath.    Cardiovascular: Negative for chest pain and palpitations.   Gastrointestinal: Negative for abdominal distention and abdominal pain.   Endocrine: Negative for polyphagia and polyuria.   Genitourinary: Negative for difficulty urinating and dysuria.   Musculoskeletal: Negative for neck pain and neck stiffness.   Skin: Negative for rash and wound.   Neurological: Negative for seizures and syncope.   Psychiatric/Behavioral: Negative for hallucinations and self-injury. "     Objective:     Vital Signs (Most Recent):  Temp: 98.4 °F (36.9 °C) (11/18/19 1143)  Pulse: 75 (11/18/19 1143)  Resp: 17 (11/18/19 1143)  BP: (!) 156/65 (11/18/19 1143)  SpO2: 95 % (11/18/19 1143) Vital Signs (24h Range):  Temp:  [98.4 °F (36.9 °C)-100.9 °F (38.3 °C)] 98.4 °F (36.9 °C)  Pulse:  [65-97] 75  Resp:  [17-20] 17  SpO2:  [91 %-95 %] 95 %  BP: (124-176)/(65-83) 156/65     Weight: 98.5 kg (217 lb 2.5 oz)  Body mass index is 43.86 kg/m².    Physical Exam   Constitutional: She is oriented to person, place, and time. No distress.   HENT:   Head: Normocephalic and atraumatic.   Eyes: Conjunctivae are normal. Right eye exhibits no discharge. Left eye exhibits no discharge.   Neck: Neck supple. No tracheal deviation present.   Cardiovascular: Normal rate and regular rhythm.   Pulmonary/Chest: Effort normal. She has rales. She exhibits no tenderness.   Abdominal: Soft. Bowel sounds are normal.   Musculoskeletal: She exhibits edema.   Neurological: She is alert and oriented to person, place, and time.   Skin: Skin is warm and dry. She is not diaphoretic.           Significant Labs:   BMP:   Recent Labs   Lab 11/18/19  0449   *      K 4.2   CL 90*   CO2 44*   BUN 30*   CREATININE 1.3   CALCIUM 9.1     CBC:   Recent Labs   Lab 11/17/19  0428 11/18/19  0449   WBC 6.27 7.17   HGB 8.6* 9.3*   HCT 29.6* 31.0*    228       Significant Imaging: I have reviewed all pertinent imaging results/findings within the past 24 hours.    Assessment/Plan:     * Acute on chronic respiratory failure with hypoxia  Patient with acute on chronic respiratory failure most likely secondary to pulmonary edema. Patient also has chronic hypercapnia likely secondary to OHS/MIGUEL. Patient was told in 2016 when she saw Dr. Stewart that she needed a CPAP.   I suspect given her hypercapnia that patient would benefit from Bipap(pCO2 68). In 2016 she could not afford her CPAP.   Recommend:  -Bipap at night--Daughter states patient has  Medicare--Consider asking social work to try to set up home Bipap.     Patient needs outpatient follow up with Dr. Stewart in [facility].           Thank you for your consult. I will sign off. Please contact us if you have any additional questions.     Kelly Jay MD  Pulmonology  [facility]

## 2019-11-18 NOTE — PLAN OF CARE
11/18/19 ST RECS: mech soft with thin liquids, meds one at a time with thin liquids. Pt with complaints of periodic regurgitation of solids. No issues with liquids. If complaints persist, consider GI consult. No further ST is warranted. Pao Leslie, CCC-SLP

## 2019-11-18 NOTE — PLAN OF CARE
Problem: Physical Therapy Goal  Goal: Physical Therapy Goal  Description  Goals to be met by: 19     Patient will increase functional independence with mobility by performin. Supine to sit with Stand-by Assistance  2. Rolling to Left and Right with Stand-by Assistance  3. Sit to stand transfer with Stand-by Assistance using RW  4. Bed to chair transfer with Stand-by Assistance using Rolling Walker  5. Gait x50 feet with Stand-by Assistance using Rolling Walker and O2   6. Lower extremity exercise program 3 sets x10 reps per handout, with SBA     Outcome: Ongoing, Progressing    Pt with change in status, dep for bed mobility and transfers at this time.  Pt unable to ambulate today.  Pt will benefit from SNF.

## 2019-11-18 NOTE — NURSING
Patient con verted to afib heart 112.,patient resting comfortably skin warm and dry.B/P 75240 heart rate 106 Dr Moreira notified of rhythm changed. No new order received.Patient remains on bipap. No distress noted.

## 2019-11-18 NOTE — NURSING
Patient sleeping difficult to arouse unable to take her pill for her blood pressure.No distress noted.

## 2019-11-18 NOTE — ASSESSMENT & PLAN NOTE
Patient with acute on chronic respiratory failure most likely secondary to pulmonary edema. Patient also has chronic hypercapnia likely secondary to OHS/MIGUEL. Patient was told in 2016 when she saw Dr. Stewart that she needed a CPAP.   I suspect given her hypercapnia that patient would benefit from Bipap(pCO2 68). In 2016 she could not afford her CPAP.   Recommend:  -Bipap at night--Daughter states patient has Medicare--Consider asking social work to try to set up home Bipap.     Patient needs outpatient follow up with Dr. Stewart in [facility] Clinic.

## 2019-11-18 NOTE — SUBJECTIVE & OBJECTIVE
Interval History: Weak and fatigued.    Review of Systems   HENT: Negative for ear discharge and ear pain.    Eyes: Negative for pain and itching.   Endocrine: Negative for polyphagia and polyuria.   Neurological: Negative for seizures and syncope.     Objective:     Vital Signs (Most Recent):  Temp: 98.4 °F (36.9 °C) (11/18/19 1143)  Pulse: 75 (11/18/19 1143)  Resp: 17 (11/18/19 1143)  BP: (!) 156/65 (11/18/19 1143)  SpO2: 95 % (11/18/19 1143) Vital Signs (24h Range):  Temp:  [98.4 °F (36.9 °C)-100.9 °F (38.3 °C)] 98.4 °F (36.9 °C)  Pulse:  [65-97] 75  Resp:  [17-20] 17  SpO2:  [91 %-95 %] 95 %  BP: (124-176)/(65-83) 156/65     Weight: 98.5 kg (217 lb 2.5 oz)  Body mass index is 43.86 kg/m².    Intake/Output Summary (Last 24 hours) at 11/18/2019 1441  Last data filed at 11/17/2019 1700  Gross per 24 hour   Intake 200 ml   Output 950 ml   Net -750 ml      Physical Exam   Constitutional: She is oriented to person, place, and time. No distress.   HENT:   Head: Normocephalic and atraumatic.   Eyes: Conjunctivae are normal. Right eye exhibits no discharge. Left eye exhibits no discharge.   Neck: Neck supple. No tracheal deviation present.   Cardiovascular: Normal rate and regular rhythm.   Pulmonary/Chest:   Slight tachypnea  Coarse BS bilaterally   Abdominal: Soft. Bowel sounds are normal.   Musculoskeletal: She exhibits edema.   Neurological: She is alert and oriented to person, place, and time.   Skin: Skin is warm and dry. She is not diaphoretic.       Significant Labs:   BMP:   Recent Labs   Lab 11/18/19  0449   *      K 4.2   CL 90*   CO2 44*   BUN 30*   CREATININE 1.3   CALCIUM 9.1     CBC:   Recent Labs   Lab 11/17/19  0428 11/18/19  0449   WBC 6.27 7.17   HGB 8.6* 9.3*   HCT 29.6* 31.0*    228       Significant Imaging: I have reviewed all pertinent imaging results/findings within the past 24 hours.

## 2019-11-18 NOTE — SUBJECTIVE & OBJECTIVE
Past Medical History:   Diagnosis Date    Abdominal pain     Anxiety     Atherosclerosis of aortic arch     noted on CXR 7/1/2015    Atrial fibrillation 05/2016    CHADS 4, on Xarelto    Benign hypertension     Bipolar disorder, most recent episode manic 8/29/2019    CHF (congestive heart failure)     Chronic constipation     Chronic diarrhea     Chronic edema     Depression     Dercum disease     Multiple painful lipomas    Diabetic retinopathy     Dysphagia     Gas pain     Glaucoma of both eyes     Hx of psychiatric care     previously amitriptyline, now jut on Trazodone 100mg QHS PRN insomnia    Hyperlipidemia with target LDL less than 100     Unable to tolerate statins    Immature cataract     Left sided numbness     per daughter    Tasia     no symptoms prior to this presentation    Morbid obesity     Nausea & vomiting     Neuropathy     On home oxygen therapy     Polyneuropathy     Primary osteoarthritis of both knees     Proteinuria     Psychiatric problem     history of depression    Pulmonary hypertension mixed group 2 and 3 1/18/2017    Requires assistance with activities of daily living (ADL)     Sleep apnea     Therapy     no history of therapy    Type II or unspecified type diabetes mellitus with neurological manifestations, uncontrolled(250.62)     Unspecified vitamin D deficiency     Unsteady gait     Uses roller walker        Past Surgical History:   Procedure Laterality Date     tenotomy      flexors 2,3 L toes    CATARACT EXTRACTION W/  INTRAOCULAR LENS IMPLANT Bilateral     COLONOSCOPY N/A 5/4/2017    Procedure: COLONOSCOPY;  Surgeon: Suellen Wolf MD;  Location: [facility] ([address] [place] [address]);  Service: Endoscopy;  Laterality: N/A;  2nd floor due to pulm htn, possible gastroparesis. per Dr Wolf      ok to hold Xarelto 2 days prior to procedure per Dr Driss Dixon    diabetic retinopathy      right    HYSTERECTOMY      knee surgery x2      Left ankle and  leg surgery secondary to a fall      rods & screws present    left arm fracture      Left cataract      PARTIAL HYSTERECTOMY      Secondary to bleeding    TOE FUSION      2,3 R    WRIST SURGERY Left 12/28/2012    pins & plate present       Review of patient's allergies indicates:   Allergen Reactions    Tramadol Other (See Comments)     Interaction-induced delirium with s/s of estrellita.    Ace inhibitors      Other reaction(s): cough      Fluorescein Itching     Other reaction(s): Itching    Iodine      Other reaction(s): Itching    Pravastatin Other (See Comments)     Other reaction(s): mouth tingling/numbness    Simvastatin      Other reaction(s): foot swelling         No current facility-administered medications on file prior to encounter.      Current Outpatient Medications on File Prior to Encounter   Medication Sig    divalproex (DEPAKOTE) 500 MG TbEC Take 1 tablet (500 mg total) by mouth every 12 (twelve) hours.    furosemide (LASIX) 40 MG tablet Take 1 tablet (40 mg total) by mouth once daily. One tab po daily x three days then as needed for LE swelling    losartan (COZAAR) 100 MG tablet Take 1 tablet (100 mg total) by mouth once daily.    metFORMIN (GLUCOPHAGE) 500 MG tablet Take 1 tablet (500 mg total) by mouth 3 (three) times daily.    alcohol swabs PadM Apply 1 each topically as needed.    amLODIPine (NORVASC) 5 MG tablet Take 1 tablet (5 mg total) by mouth once daily.    blood glucose control, low Soln To test meter once per week    desloratadine (CLARINEX) 5 mg tablet Take 1 tablet (5 mg total) by mouth once daily. (Patient taking differently: Take 5 mg by mouth as needed. )    fluticasone (FLONASE) 50 mcg/actuation nasal spray 1 spray (50 mcg total) by Each Nare route 2 (two) times daily.    gabapentin (NEURONTIN) 100 MG capsule Take 1 capsule (100 mg total) by mouth 3 (three) times daily.    hydroCHLOROthiazide (HYDRODIURIL) 25 MG tablet Take 25 mg by mouth once daily.    insulin  "aspart protamine-insulin aspart (NOVOLOG 70/30) 100 unit/mL (70-30) InPn pen Inject 21 Units into the skin 2 (two) times daily before meals. Sliding scale max daily dosing 50 units daily    ketoconazole (NIZORAL) 2 % shampoo Apply topically twice a week.    lancets (LANCETS,THIN) 28 gauge Misc 1 lancet by Misc.(Non-Drug; Combo Route) route 4 (four) times daily before meals and nightly.    melatonin 10 mg Tab Take by mouth. 2 tabs at night    metoprolol succinate (TOPROL-XL) 25 MG 24 hr tablet Take 1 tablet (25 mg total) by mouth once daily.    multivitamin capsule Take 1 capsule by mouth once daily.    oxybutynin (DITROPAN) 5 MG Tab Take 1 tablet (5 mg total) by mouth once daily. In morning    pen needle, diabetic (BD ULTRA-FINE SANDEEP PEN NEEDLES) 32 gauge x 5/32" Ndle Take 1 each by mouth 4 (four) times daily.    QUEtiapine (SEROQUEL) 50 MG tablet Take 1 tablet (50 mg total) by mouth every evening.    ranitidine (ZANTAC) 150 MG tablet Take 150 mg by mouth 2 (two) times daily.    TRUE METRIX GLUCOSE TEST STRIP Strp TEST FOUR TIMES DAILY BEFORE MEALS  AND EVERY NIGHT     Family History     Problem Relation (Age of Onset)    Bone cancer Daughter    Liver cancer Mother    No Known Problems Daughter, Father, Sister, Brother, Maternal Aunt, Maternal Uncle, Paternal Aunt, Paternal Uncle, Maternal Grandmother, Maternal Grandfather, Paternal Grandmother, Paternal Grandfather        Tobacco Use    Smoking status: Never Smoker    Smokeless tobacco: Never Used   Substance and Sexual Activity    Alcohol use: No    Drug use: No    Sexual activity: Yes     Partners: Male     Review of Systems   Constitutional: Negative for chills and fever.   HENT: Negative for ear discharge and ear pain.    Eyes: Negative for pain and itching.   Respiratory: Positive for cough and shortness of breath.    Cardiovascular: Negative for chest pain and palpitations.   Gastrointestinal: Negative for abdominal distention and abdominal " pain.   Endocrine: Negative for polyphagia and polyuria.   Genitourinary: Negative for difficulty urinating and dysuria.   Musculoskeletal: Negative for neck pain and neck stiffness.   Skin: Negative for rash and wound.   Neurological: Negative for seizures and syncope.   Psychiatric/Behavioral: Negative for hallucinations and self-injury.     Objective:     Vital Signs (Most Recent):  Temp: 98.4 °F (36.9 °C) (11/18/19 1143)  Pulse: 75 (11/18/19 1143)  Resp: 17 (11/18/19 1143)  BP: (!) 156/65 (11/18/19 1143)  SpO2: 95 % (11/18/19 1143) Vital Signs (24h Range):  Temp:  [98.4 °F (36.9 °C)-100.9 °F (38.3 °C)] 98.4 °F (36.9 °C)  Pulse:  [65-97] 75  Resp:  [17-20] 17  SpO2:  [91 %-95 %] 95 %  BP: (124-176)/(65-83) 156/65     Weight: 98.5 kg (217 lb 2.5 oz)  Body mass index is 43.86 kg/m².    Physical Exam   Constitutional: She is oriented to person, place, and time. No distress.   HENT:   Head: Normocephalic and atraumatic.   Eyes: Conjunctivae are normal. Right eye exhibits no discharge. Left eye exhibits no discharge.   Neck: Neck supple. No tracheal deviation present.   Cardiovascular: Normal rate and regular rhythm.   Pulmonary/Chest: Effort normal. She has rales. She exhibits no tenderness.   Abdominal: Soft. Bowel sounds are normal.   Musculoskeletal: She exhibits edema.   Neurological: She is alert and oriented to person, place, and time.   Skin: Skin is warm and dry. She is not diaphoretic.           Significant Labs:   BMP:   Recent Labs   Lab 11/18/19 0449   *      K 4.2   CL 90*   CO2 44*   BUN 30*   CREATININE 1.3   CALCIUM 9.1     CBC:   Recent Labs   Lab 11/17/19 0428 11/18/19 0449   WBC 6.27 7.17   HGB 8.6* 9.3*   HCT 29.6* 31.0*    228       Significant Imaging: I have reviewed all pertinent imaging results/findings within the past 24 hours.

## 2019-11-18 NOTE — PT/OT/SLP EVAL
Speech Language Pathology Evaluation  Bedside Swallow    Patient Name:  Kaci Whalen   MRN:  [id number]  Admitting Diagnosis: Acute on chronic respiratory failure with hypoxia    Recommendations:                 General Recommendations:  Follow-up not indicated, if complaints persist consider GI consult  Diet recommendations:  Mechanical soft, Thin   Aspiration Precautions: 1 bite/sip at a time, HOB to 90 degrees and Remain upright 30 minutes post meal   General Precautions: Standard,    Communication strategies:  provide increased time to answer    History:   HPI: 80 y/o female presents to the ER via EMS complaining of gradually worsening shortness of breath and non productive cough for 2 days.  EMS reports an initial O2 sat of 77% on 2L O2.  The patient uses 2L of supplemental O2 at all times.  Patient apparently started complaining of congestion that did not improve with Mucinex and cough syrup.  Patient complains of severe shortness of breath with minimal exertion.  She can never lay flat to sleep.  Does complain of some lower extremity swelling, but states that it's actually much better than in the past.  She is compliant with her Lasix 40mg (MWF).  Shortness of breath worsened this morning and patient presented to ER.  Patient states that she also gets very anxious when she gets short of breath.  No other complaints.  Past Medical History:   Diagnosis Date    Abdominal pain     Anxiety     Atherosclerosis of aortic arch     noted on CXR 7/1/2015    Atrial fibrillation 05/2016    CHADS 4, on Xarelto    Benign hypertension     Bipolar disorder, most recent episode manic 8/29/2019    CHF (congestive heart failure)     Chronic constipation     Chronic diarrhea     Chronic edema     Depression     Dercum disease     Multiple painful lipomas    Diabetic retinopathy     Dysphagia     Gas pain     Glaucoma of both eyes     Hx of psychiatric care     previously amitriptyline, now jut on Trazodone  100mg QHS PRN insomnia    Hyperlipidemia with target LDL less than 100     Unable to tolerate statins    Immature cataract     Left sided numbness     per daughter    Tasia     no symptoms prior to this presentation    Morbid obesity     Nausea & vomiting     Neuropathy     On home oxygen therapy     Polyneuropathy     Primary osteoarthritis of both knees     Proteinuria     Psychiatric problem     history of depression    Pulmonary hypertension mixed group 2 and 3 1/18/2017    Requires assistance with activities of daily living (ADL)     Sleep apnea     Therapy     no history of therapy    Type II or unspecified type diabetes mellitus with neurological manifestations, uncontrolled(250.62)     Unspecified vitamin D deficiency     Unsteady gait     Uses roller walker        Past Surgical History:   Procedure Laterality Date     tenotomy      flexors 2,3 L toes    CATARACT EXTRACTION W/  INTRAOCULAR LENS IMPLANT Bilateral     COLONOSCOPY N/A 5/4/2017    Procedure: COLONOSCOPY;  Surgeon: Suellen Wolf MD;  Location: [facility] ([address] [place] [address]);  Service: Endoscopy;  Laterality: N/A;  2nd floor due to pulm htn, possible gastroparesis. per Dr Wolf      ok to hold Xarelto 2 days prior to procedure per Dr Driss Dixon    diabetic retinopathy      right    HYSTERECTOMY      knee surgery x2      Left ankle and leg surgery secondary to a fall      rods & screws present    left arm fracture      Left cataract      PARTIAL HYSTERECTOMY      Secondary to bleeding    TOE FUSION      2,3 R    WRIST SURGERY Left 12/28/2012    pins & plate present     Social History: Patient lives with family    Chest X-Rays: 11/17/19 Suspected small right and possible trace left pleural effusion.  Continued follow-up is suggested.     Prior diet: soft    Subjective   Pt and daughter reporting no issues with swallow of thin liquids or soft solids however regular solids are periodically regurgitated.   Patient goals:  resolve of breathing issues    Pain/Comfort:  · Pain Rating 1: 0/10  · Pain Rating Post-Intervention 1: 0/10    Objective:     Oral Musculature Evaluation  · Oral Musculature: WFL  · Dentition: scattered dentition  · Secretion Management: adequate  · Mucosal Quality: good  · Mandibular Strength and Mobility: WFL  · Oral Labial Strength and Mobility: WFL  · Lingual Strength and Mobility: WFL  · Buccal Strength and Mobility: WFL  · Voice Prior to PO Intake: wfl  · Oral Musculature Comments: wfl    Bedside Swallow Eval:   Consistencies Assessed:  · Thin liquids shoaib 3 oz across multiple straw sips presented by daughter  · Solids X3 presented by daugther    · Tongue thrust    Oral Phase:   · Excess chewing  · Prolonged mastication    Pharyngeal Phase:   · no overt clinical signs/symptoms of aspiration    Compensatory Strategies  · None    Treatment: please note silent aspiration cannot be r/o at bedside, family educated regarding safe swallow practices specifically of upright in bed, shown how to operate tilt function for optimal positioning.     Assessment:     Kaci Whalen is a 79 y.o. female with an dx of chronic resp failure she presents with mild oral dysphagia c/b prolonged mastication negatively impacted by likely esophogeal dysphagia.     Goals:   Multidisciplinary Problems     SLP Goals     Not on file          Multidisciplinary Problems (Resolved)        Problem: SLP Goal    Goal Priority Disciplines Outcome   SLP Goal   (Resolved)    Low SLP Met   Description:  Pt will tolerate mech soft with thin liquids without overt s/s of aspiration. (goal met 11/18/19)  Pt and family will demonstrate knowledge of safe swallow practices. (goal met 11/18/19)                    Plan:     ·   · Plan of Care expires:  11/18/19  · Plan of Care reviewed with:  patient, family   · SLP Follow-Up:  No       Discharge recommendations:    no further ST is warranted   Barriers to Discharge:  None    Time Tracking:     SLP  Treatment Date:   11/18/19  Speech Start Time:  1200  Speech Stop Time:  1215     Speech Total Time (min):  15 min    Billable Minutes: Eval Swallow and Oral Function 15    Pao Leslie CCC-SLP  11/18/2019

## 2019-11-18 NOTE — ASSESSMENT & PLAN NOTE
Currently in sinus rhythm.  Continue B blocker.  In and out of Afib this morning, but rate controlled.

## 2019-11-18 NOTE — ASSESSMENT & PLAN NOTE
Patient noted to be hypoxic and in respiratory distress.  Respiratory failure secondary to CHF exacerbation.  Symptoms improved with IV Lasix.  Change Lasix to oral.  Will give dose of Acetazolamide.  Patient with some confusion and lethargy.    ABG's show hypercapnia.  Started on Bipap.  Appreciate Pulmonary input. SW consult to see if patient could get home Bipap upon discharge.  Will also hold sedative medications (Seroquel, Gabapentin, Depakote).  Low grade fevers.  UA consistent with UTI.  Will start Rocephin.

## 2019-11-18 NOTE — PLAN OF CARE
Problem: Diabetes Comorbidity  Goal: Blood Glucose Level Within Desired Range  Outcome: Ongoing, Progressing  Intervention: Maintain Glycemic Control  Flowsheets (Taken 11/18/2019 9376)  Glycemic Management: blood glucose monitoring; oral hydration promoted; supplemental insulin given

## 2019-11-18 NOTE — PROGRESS NOTES
[facility] Hospital Medicine  Progress Note    Patient Name: Kaci Whalen  MRN: [id number]  Patient Class: IP- Inpatient   Admission Date: 11/14/2019  Length of Stay: 4 days  Attending Physician: Maximilian Moreira MD  Primary Care Provider: Toby Hung MD        Subjective:     Principal Problem:Acute on chronic respiratory failure with hypoxia        HPI:  80 y/o female presents to the ER via EMS complaining of gradually worsening shortness of breath and non productive cough for 2 days.  EMS reports an initial O2 sat of 77% on 2L O2.  The patient uses 2L of supplemental O2 at all times.  Patient apparently started complaining of congestion that did not improve with Mucinex and cough syrup.  Patient complains of severe shortness of breath with minimal exertion.  She can never lay flat to sleep.  Does complain of some lower extremity swelling, but states that it's actually much better than in the past.  She is compliant with her Lasix 40mg (MWF).  Shortness of breath worsened this morning and patient presented to ER.  Patient states that she also gets very anxious when she gets short of breath.  No other complaints.    Overview/Hospital Course:  80 y/o female presented with worsening shortness of breath.  Acute on chronic diastolic heart failure and started on IV Lasix. Some improvement with diuresis, but patient somewhat confused and lethargic.  ABG's show hypercapnia and started on Bipap.  Pulmonary consulted.  Spiking low grade fevers.  UA with 3+ leukocytes and many bacteria.  Started on Rocephin.  Some dysphagia and ST consulted.  Very weak and fatigued.  PT/OT evaluation.    Interval History: Weak and fatigued.    Review of Systems   HENT: Negative for ear discharge and ear pain.    Eyes: Negative for pain and itching.   Endocrine: Negative for polyphagia and polyuria.   Neurological: Negative for seizures and syncope.     Objective:     Vital Signs (Most Recent):  Temp: 98.4 °F (36.9  °C) (11/18/19 1143)  Pulse: 75 (11/18/19 1143)  Resp: 17 (11/18/19 1143)  BP: (!) 156/65 (11/18/19 1143)  SpO2: 95 % (11/18/19 1143) Vital Signs (24h Range):  Temp:  [98.4 °F (36.9 °C)-100.9 °F (38.3 °C)] 98.4 °F (36.9 °C)  Pulse:  [65-97] 75  Resp:  [17-20] 17  SpO2:  [91 %-95 %] 95 %  BP: (124-176)/(65-83) 156/65     Weight: 98.5 kg (217 lb 2.5 oz)  Body mass index is 43.86 kg/m².    Intake/Output Summary (Last 24 hours) at 11/18/2019 1441  Last data filed at 11/17/2019 1700  Gross per 24 hour   Intake 200 ml   Output 950 ml   Net -750 ml      Physical Exam   Constitutional: She is oriented to person, place, and time. No distress.   HENT:   Head: Normocephalic and atraumatic.   Eyes: Conjunctivae are normal. Right eye exhibits no discharge. Left eye exhibits no discharge.   Neck: Neck supple. No tracheal deviation present.   Cardiovascular: Normal rate and regular rhythm.   Pulmonary/Chest:   Slight tachypnea  Coarse BS bilaterally   Abdominal: Soft. Bowel sounds are normal.   Musculoskeletal: She exhibits edema.   Neurological: She is alert and oriented to person, place, and time.   Skin: Skin is warm and dry. She is not diaphoretic.       Significant Labs:   BMP:   Recent Labs   Lab 11/18/19  0449   *      K 4.2   CL 90*   CO2 44*   BUN 30*   CREATININE 1.3   CALCIUM 9.1     CBC:   Recent Labs   Lab 11/17/19  0428 11/18/19  0449   WBC 6.27 7.17   HGB 8.6* 9.3*   HCT 29.6* 31.0*    228       Significant Imaging: I have reviewed all pertinent imaging results/findings within the past 24 hours.      Assessment/Plan:      * Acute on chronic respiratory failure with hypoxia  Patient noted to be hypoxic and in respiratory distress.  Respiratory failure secondary to CHF exacerbation.  Symptoms improved with IV Lasix.  Change Lasix to oral.  Will give dose of Acetazolamide.  Patient with some confusion and lethargy.    ABG's show hypercapnia.  Started on Bipap.  Appreciate Pulmonary input. SW consult  to see if patient could get home Bipap upon discharge.  Will also hold sedative medications (Seroquel, Gabapentin, Depakote).  Low grade fevers.  UA consistent with UTI.  Will start Rocephin.        Acute on chronic diastolic congestive heart failure  As above.      Hyperkalemia  Should improve with diuresis.  Repeat labs in Am.      Anemia of chronic disorder  H/H similar to previous labs.  No evidence of bleeding.    CKD (chronic kidney disease), stage III  Creat around baseline.  Monitor renal function while on diuresis.  Avoid nephrotoxic medications.      Essential hypertension  Resume home medications with parameters.  Low Na diet.      Morbid obesity  Body mass index is 44.03 kg/m².        Paroxysmal atrial fibrillation  Currently in sinus rhythm.  Continue B blocker.  In and out of Afib this morning, but rate controlled.      Metabolic alkalosis  Worsening CO2 probably secondary to diuresis.      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  Diabetic diet and insulin sliding scale.      Hyperlipidemia with target LDL less than 100          VTE Risk Mitigation (From admission, onward)         Ordered     heparin (porcine) injection 5,000 Units  Every 8 hours      11/14/19 1623     IP VTE HIGH RISK PATIENT  Once      11/14/19 1623     Place sequential compression device  Until discontinued      11/14/19 1623     Place EDE hose  Until discontinued      11/14/19 1623                      Maximilian Moreira MD  Department of Hospital Medicine   [facility]

## 2019-11-19 LAB
ALBUMIN SERPL BCP-MCNC: 2.6 G/DL (ref 3.5–5.2)
ALP SERPL-CCNC: 49 U/L (ref 55–135)
ALT SERPL W/O P-5'-P-CCNC: 5 U/L (ref 10–44)
ANION GAP SERPL CALC-SCNC: 5 MMOL/L (ref 8–16)
AST SERPL-CCNC: 12 U/L (ref 10–40)
BACTERIA BLD CULT: NORMAL
BACTERIA BLD CULT: NORMAL
BASOPHILS # BLD AUTO: 0.02 K/UL (ref 0–0.2)
BASOPHILS NFR BLD: 0.3 % (ref 0–1.9)
BILIRUB SERPL-MCNC: 0.3 MG/DL (ref 0.1–1)
BUN SERPL-MCNC: 34 MG/DL (ref 8–23)
CALCIUM SERPL-MCNC: 9 MG/DL (ref 8.7–10.5)
CHLORIDE SERPL-SCNC: 92 MMOL/L (ref 95–110)
CO2 SERPL-SCNC: 42 MMOL/L (ref 23–29)
CREAT SERPL-MCNC: 1.3 MG/DL (ref 0.5–1.4)
DIFFERENTIAL METHOD: ABNORMAL
EOSINOPHIL # BLD AUTO: 0.1 K/UL (ref 0–0.5)
EOSINOPHIL NFR BLD: 1.7 % (ref 0–8)
ERYTHROCYTE [DISTWIDTH] IN BLOOD BY AUTOMATED COUNT: 13.2 % (ref 11.5–14.5)
EST. GFR  (AFRICAN AMERICAN): 46 ML/MIN/1.73 M^2
EST. GFR  (NON AFRICAN AMERICAN): 40 ML/MIN/1.73 M^2
GLUCOSE SERPL-MCNC: 185 MG/DL (ref 70–110)
HCT VFR BLD AUTO: 27.6 % (ref 37–48.5)
HGB BLD-MCNC: 8.4 G/DL (ref 12–16)
IMM GRANULOCYTES # BLD AUTO: 0.04 K/UL (ref 0–0.04)
IMM GRANULOCYTES NFR BLD AUTO: 0.7 % (ref 0–0.5)
LYMPHOCYTES # BLD AUTO: 0.5 K/UL (ref 1–4.8)
LYMPHOCYTES NFR BLD: 9.2 % (ref 18–48)
MCH RBC QN AUTO: 30.2 PG (ref 27–31)
MCHC RBC AUTO-ENTMCNC: 30.4 G/DL (ref 32–36)
MCV RBC AUTO: 99 FL (ref 82–98)
MONOCYTES # BLD AUTO: 0.6 K/UL (ref 0.3–1)
MONOCYTES NFR BLD: 10.1 % (ref 4–15)
NEUTROPHILS # BLD AUTO: 4.6 K/UL (ref 1.8–7.7)
NEUTROPHILS NFR BLD: 78 % (ref 38–73)
NRBC BLD-RTO: 0 /100 WBC
PLATELET # BLD AUTO: 209 K/UL (ref 150–350)
PMV BLD AUTO: 9.7 FL (ref 9.2–12.9)
POCT GLUCOSE: 166 MG/DL (ref 70–110)
POCT GLUCOSE: 243 MG/DL (ref 70–110)
POTASSIUM SERPL-SCNC: 3.8 MMOL/L (ref 3.5–5.1)
PROT SERPL-MCNC: 5.9 G/DL (ref 6–8.4)
RBC # BLD AUTO: 2.78 M/UL (ref 4–5.4)
SODIUM SERPL-SCNC: 139 MMOL/L (ref 136–145)
WBC # BLD AUTO: 5.84 K/UL (ref 3.9–12.7)

## 2019-11-19 PROCEDURE — 80053 COMPREHEN METABOLIC PANEL: CPT | Mod: HCNC

## 2019-11-19 PROCEDURE — 99900035 HC TECH TIME PER 15 MIN (STAT): Mod: HCNC

## 2019-11-19 PROCEDURE — 63600175 PHARM REV CODE 636 W HCPCS: Mod: HCNC | Performed by: HOSPITALIST

## 2019-11-19 PROCEDURE — 25000003 PHARM REV CODE 250: Mod: HCNC | Performed by: EMERGENCY MEDICINE

## 2019-11-19 PROCEDURE — 25000003 PHARM REV CODE 250: Mod: HCNC | Performed by: HOSPITALIST

## 2019-11-19 PROCEDURE — 85025 COMPLETE CBC W/AUTO DIFF WBC: CPT | Mod: HCNC

## 2019-11-19 PROCEDURE — 36415 COLL VENOUS BLD VENIPUNCTURE: CPT | Mod: HCNC

## 2019-11-19 PROCEDURE — 94660 CPAP INITIATION&MGMT: CPT | Mod: HCNC

## 2019-11-19 PROCEDURE — 86580 TB INTRADERMAL TEST: CPT | Mod: HCNC | Performed by: HOSPITALIST

## 2019-11-19 PROCEDURE — 97530 THERAPEUTIC ACTIVITIES: CPT | Mod: HCNC

## 2019-11-19 PROCEDURE — 30200315 PPD INTRADERMAL TEST REV CODE 302: Mod: HCNC | Performed by: HOSPITALIST

## 2019-11-19 PROCEDURE — 63600175 PHARM REV CODE 636 W HCPCS: Mod: HCNC | Performed by: EMERGENCY MEDICINE

## 2019-11-19 PROCEDURE — 21400001 HC TELEMETRY ROOM: Mod: HCNC

## 2019-11-19 RX ORDER — AMLODIPINE BESYLATE 5 MG/1
10 TABLET ORAL DAILY
Status: DISCONTINUED | OUTPATIENT
Start: 2019-11-20 | End: 2019-11-21 | Stop reason: HOSPADM

## 2019-11-19 RX ADMIN — TUBERCULIN PURIFIED PROTEIN DERIVATIVE 5 UNITS: 5 INJECTION, SOLUTION INTRADERMAL at 11:11

## 2019-11-19 RX ADMIN — INSULIN ASPART 1 UNITS: 100 INJECTION, SOLUTION INTRAVENOUS; SUBCUTANEOUS at 09:11

## 2019-11-19 RX ADMIN — LOSARTAN POTASSIUM 100 MG: 25 TABLET, FILM COATED ORAL at 09:11

## 2019-11-19 RX ADMIN — ACETAMINOPHEN 650 MG: 325 TABLET, FILM COATED ORAL at 11:11

## 2019-11-19 RX ADMIN — FAMOTIDINE 20 MG: 20 TABLET ORAL at 09:11

## 2019-11-19 RX ADMIN — CEFTRIAXONE 1 G: 1 INJECTION, SOLUTION INTRAVENOUS at 03:11

## 2019-11-19 RX ADMIN — AMLODIPINE BESYLATE 5 MG: 5 TABLET ORAL at 09:11

## 2019-11-19 RX ADMIN — OXYBUTYNIN CHLORIDE 5 MG: 5 TABLET ORAL at 09:11

## 2019-11-19 RX ADMIN — HEPARIN SODIUM 5000 UNITS: 5000 INJECTION INTRAVENOUS; SUBCUTANEOUS at 09:11

## 2019-11-19 RX ADMIN — METOPROLOL SUCCINATE 25 MG: 25 TABLET, EXTENDED RELEASE ORAL at 09:11

## 2019-11-19 RX ADMIN — HEPARIN SODIUM 5000 UNITS: 5000 INJECTION INTRAVENOUS; SUBCUTANEOUS at 05:11

## 2019-11-19 RX ADMIN — FLUTICASONE PROPIONATE 50 MCG: 50 SPRAY, METERED NASAL at 09:11

## 2019-11-19 RX ADMIN — INSULIN ASPART 2 UNITS: 100 INJECTION, SOLUTION INTRAVENOUS; SUBCUTANEOUS at 04:11

## 2019-11-19 RX ADMIN — HEPARIN SODIUM 5000 UNITS: 5000 INJECTION INTRAVENOUS; SUBCUTANEOUS at 03:11

## 2019-11-19 RX ADMIN — FUROSEMIDE 40 MG: 40 TABLET ORAL at 09:11

## 2019-11-19 NOTE — PT/OT/SLP PROGRESS
Occupational Therapy   Treatment    Name: Kaci Whalen  MRN: [id number]  Admitting Diagnosis:  Acute on chronic respiratory failure with hypoxia       Recommendations:     Discharge Recommendations: nursing facility, skilled  Discharge Equipment Recommendations:  none  Barriers to discharge:  None    Assessment:     Kaci Whalen is a 79 y.o. female with a medical diagnosis of Acute on chronic respiratory failure with hypoxia.  She presents with  independence for self-care and functional transfers. Performance deficits affecting function are weakness, impaired endurance, impaired sensation, impaired self care skills, impaired functional mobilty, impaired balance, impaired cognition, decreased coordination, visual deficits, decreased upper extremity function, decreased safety awareness, pain, abnormal tone, decreased ROM, impaired coordination, impaired fine motor, edema, impaired cardiopulmonary response to activity.     Rehab Prognosis:  Good; patient would benefit from acute skilled OT services to address these deficits and reach maximum level of function.       Plan:     Patient to be seen 3 x/week to address the above listed problems via self-care/home management, therapeutic activities  · Plan of Care Expires: 19  · Plan of Care Reviewed with: patient, daughter    Subjective     Pain/Comfort:  · Pain Rating 1: other (see comments)(L shoulder and RLE pain with movement)    Objective:     Communicated with: nurse Guerra prior to session.  Patient found supine with telemetry, peripheral IV, PureWick, oxygen(diaper) upon OT entry to room.    General Precautions: Standard, fall, respiratory, vision impaired   Orthopedic Precautions:N/A   Braces: N/A     Occupational Performance:     Bed Mobility:    · Patient completed Rolling/Turning to Left with  total assistance  · Patient completed Rolling/Turning to Right with total assistance  · Patient completed Scooting/Bridging with moderate  assistance  · Patient completed Supine to Sit with maximal assistance  · Patient completed Sit to Supine with maximal assistance     Functional Mobility/Transfers:  · Patient completed Sit <> Stand Transfer with maximal assistance  with  rolling walker   · Functional Mobility: tolerated 3 attempts to stand with RW    Activities of Daily Living:  · Feeding:  maximal assistance from daughter  · Grooming: maximal assistance from daughter  · Upper Body Dressing: maximal assistance seated EOB  · Lower Body Dressing: dependence bed level      AMPAC 6 Click ADL: 9    Treatment & Education:  Therapeutic activity    Patient left supine with all lines intact, call button in reach, bed alarm on, nurse Mari notified, daughter present and with HOB elevatedEducation:      GOALS:   Multidisciplinary Problems     Occupational Therapy Goals        Problem: Occupational Therapy Goal    Goal Priority Disciplines Outcome Interventions   Occupational Therapy Goal     OT, PT/OT Ongoing, Progressing    Description:  Goals to be met by: 11/22/2019     Patient will increase functional independence with ADLs by performing:    Feeding with Set-up Assistance.  UE Dressing with Stand-by Assistance.  Grooming while seated with Stand-by Assistance.  Sitting at edge of bed x15 minutes with Stand-by Assistance.  Supine to sit with Stand-by Assistance.  Stand pivot transfers with Minimal Assistance.  Upper extremity exercise program per handout, with assistance as needed.                      Time Tracking:     OT Date of Treatment: 11/19/19  OT Start Time: 1439  OT Stop Time: 1509  OT Total Time (min): 30 min    Billable Minutes:Therapeutic Activity 15 minutes    BUSHRA Rodriguez, MS  11/19/2019

## 2019-11-19 NOTE — PT/OT/SLP PROGRESS
Occupational Therapy   Treatment    Name: Kaci Whalen  MRN: [id number]  Admitting Diagnosis:  Acute on chronic respiratory failure with hypoxia       Recommendations:     Discharge Recommendations: nursing facility, skilled  Discharge Equipment Recommendations:  none  Barriers to discharge:  None    Assessment:     Kaci Whalen is a 79 y.o. female with a medical diagnosis of Acute on chronic respiratory failure with hypoxia.  She presents with  independence for self-care and functional transfers. Performance deficits affecting function are weakness, impaired endurance, impaired self care skills, impaired functional mobilty, impaired balance, visual deficits, decreased coordination, impaired cognition, decreased upper extremity function, decreased safety awareness, pain, abnormal tone, decreased ROM, impaired coordination, impaired fine motor, edema, impaired cardiopulmonary response to activity.     Rehab Prognosis:  Fair; patient would benefit from acute skilled OT services to address these deficits and reach maximum level of function.       Plan:     Patient to be seen 3 x/week to address the above listed problems via self-care/home management, therapeutic activities, therapeutic exercises  · Plan of Care Expires: 19  · Plan of Care Reviewed with: patient, spouse, daughter    Subjective     Pain/Comfort:  · 0/10 no complaints of pain    Objective:     Communicated with: nurse prior to session.  Patient found seated EOB with PT with PureWick, oxygen, peripheral IV, telemetry, bed alarm upon OT entry to room.    General Precautions: Standard, fall, respiratory, diabetic, vision impaired   Orthopedic Precautions:N/A   Braces:       Occupational Performance:     Bed Mobility:    · Patient completed Scooting/Bridging with maximal assistance  · Patient completed Sit to Supine with maximal assistance and 2 persons     Functional Mobility/Transfers:  · Patient completed Sit <> Stand Transfer with  maximal assistance  with  rolling walker   · Functional Mobility: patient tolerated sit to stand x2 trials, unable to weight shift to take steps    Activities of Daily Living:  · Feeding:  maximal assistance with daughter  · Upper Body Dressing: maximal assistance seated EOB  · Lower Body Dressing: dependence        AMPAC 6 Click ADL: 9    Treatment & Education:  Therapeutic activity    Patient left supine with all lines intact, call button in reach, nurse notified and family presentEducation:      GOALS:   Multidisciplinary Problems     Occupational Therapy Goals        Problem: Occupational Therapy Goal    Goal Priority Disciplines Outcome Interventions   Occupational Therapy Goal     OT, PT/OT Ongoing, Progressing    Description:  Goals to be met by: 11/22/2019     Patient will increase functional independence with ADLs by performing:    Feeding with Set-up Assistance.  UE Dressing with Stand-by Assistance.  Grooming while seated with Stand-by Assistance.  Sitting at edge of bed x15 minutes with Stand-by Assistance.  Supine to sit with Stand-by Assistance.  Stand pivot transfers with Minimal Assistance.  Upper extremity exercise program per handout, with assistance as needed.                      Time Tracking:     OT Date of Treatment: 11/18/19  OT Start Time: 1610  OT Stop Time: 1620  OT Total Time (min): 10 min    Billable Minutes:Therapeutic Activity 10 minutes with PT    Shania Pride OT  11/19/2019

## 2019-11-19 NOTE — PT/OT/SLP PROGRESS
Physical Therapy Treatment    Patient Name:  Kaci Whalen   MRN:  [id number]    Recommendations:     Discharge Recommendations:  nursing facility, skilled   Discharge Equipment Recommendations: none   Barriers to discharge home: Pt with decreased mobility.    Assessment:     Kaci Whalen is a 79 y.o. female admitted with a medical diagnosis of Acute on chronic respiratory failure with hypoxia.  She presents with the following impairments/functional limitations:  weakness, impaired endurance, impaired functional mobilty, gait instability, impaired balance, decreased safety awareness, decreased coordination, decreased upper extremity function, decreased lower extremity function, impaired cardiopulmonary response to activity.    Rehab Prognosis: Fair; patient would benefit from acute skilled PT services to address these deficits and reach maximum level of function.    Recent Surgery: * No surgery found *      Plan:     During this hospitalization, patient to be seen daily to address the identified rehab impairments via gait training, therapeutic activities, therapeutic exercises and progress toward the following goals:    · Plan of Care Expires:  11/29/19    Subjective     Chief Complaint: L shoulder pain  Patient/Family Comments/goals: Pt agreeable to therapy.  Family in agreement with SNF.    Pain/Comfort:  · Pain Rating 1: (L shoulder pain )      Objective:     Communicated with nurse Guerra prior to session.  Patient found HOB elevated with oxygen, PureWick, peripheral IV, telemetry, bed alarm upon PT entry to room.     General Precautions: Standard, fall, respiratory, diabetic, vision impaired   Orthopedic Precautions:N/A   Braces: N/A     Functional Mobility:  Pt with confusion and lethargy, had respiratory issues and fever over the weekend.  Pt now required increase assistance for bed mobility and transfers.  Pt may benefit from SNF prior to D/C home.    · Bed Mobility:     · Scooting: maximal assistance  for anterior scooting  · Bridging: dependence and of 2 persons with bed in trendelenburg to reposition HOB   · Supine to Sit: maximal assistance with HOB elevated   · Sit to Supine: dependence and of 2 persons  · Transfers:     · Sit to Stand:  maximal assistance and of 2 persons with hand-held assist and rolling walker x 2 trials; Pt with forward flexed posture, had difficulty with B  on RW.  Pt with occasional jerky movement to BUE.     · Gait: Pt unable to take steps at this time.  · Balance: Pt with fair static sit balance and poor static stand balance.       AM-PAC 6 CLICK MOBILITY  Turning over in bed (including adjusting bedclothes, sheets and blankets)?: 2  Sitting down on and standing up from a chair with arms (e.g., wheelchair, bedside commode, etc.): 2  Moving from lying on back to sitting on the side of the bed?: 2  Moving to and from a bed to a chair (including a wheelchair)?: 1  Need to walk in hospital room?: 1  Climbing 3-5 steps with a railing?: 1  Basic Mobility Total Score: 9       Therapeutic Activities and Exercises:  BLE seated therex 10 reps: hip abd/add, LAQ, and AP.  Pt required min VC's to stay on task.    Patient left HOB elevated with all lines intact, call button in reach, bed alarm on, nurse Mari notified pt ready for diaper change and bath and spouse/daughter present.    GOALS:   Multidisciplinary Problems     Physical Therapy Goals        Problem: Physical Therapy Goal    Goal Priority Disciplines Outcome Goal Variances Interventions   Physical Therapy Goal     PT, PT/OT Ongoing, Progressing     Description:  Goals to be met by: 19     Patient will increase functional independence with mobility by performin. Supine to sit with Stand-by Assistance  2. Rolling to Left and Right with Stand-by Assistance  3. Sit to stand transfer with Stand-by Assistance using RW  4. Bed to chair transfer with Stand-by Assistance using Rolling Walker  5. Gait x50 feet with Stand-by  Assistance using Rolling Walker and O2   6. Lower extremity exercise program 3 sets x10 reps per handout, with SBA                      Time Tracking:     PT Received On: 11/18/19  PT Start Time: 1552     PT Stop Time: 1620  PT Total Time (min): 28 min     Billable Minutes: Therapeutic Activity 14 min and Therapeutic Exercise 14 min partial co-tx with OT    Treatment Type: Treatment  PT/PTA: PT     PTA Visit Number: 0     Tisha GAUTAM Bah, PT   11/18/2019

## 2019-11-19 NOTE — ASSESSMENT & PLAN NOTE
Patient noted to be hypoxic and in respiratory distress.  Respiratory failure secondary to CHF exacerbation.  Symptoms improved with IV Lasix.  Change Lasix to oral.  Will give dose of Acetazolamide.  Patient with some confusion and lethargy.    ABG's show hypercapnia.  Started on Bipap.  Appreciate Pulmonary input. SW consult to see if patient could get home Bipap upon discharge.  Will also hold sedative medications (Seroquel, Gabapentin, Depakote).  Low grade fevers.  UA consistent with UTI.  Will start Rocephin.no growth yet.

## 2019-11-19 NOTE — PT/OT/SLP PROGRESS
Physical Therapy Treatment    Patient Name:  Kaci Whalen   MRN:  [id number]    Recommendations:     Discharge Recommendations:  nursing facility, skilled   Discharge Equipment Recommendations: none   Barriers to discharge home: Pt with decreased mobility.    Assessment:     Kcai Whalen is a 79 y.o. female admitted with a medical diagnosis of Acute on chronic respiratory failure with hypoxia.  She presents with the following impairments/functional limitations:  weakness, impaired endurance, impaired functional mobilty, gait instability, impaired balance, decreased safety awareness, decreased coordination, decreased upper extremity function, decreased lower extremity function, impaired cardiopulmonary response to activity.    Rehab Prognosis: Fair; patient would benefit from acute skilled PT services to address these deficits and reach maximum level of function.    Recent Surgery: * No surgery found *      Plan:     During this hospitalization, patient to be seen 5 x/week to address the identified rehab impairments via gait training, therapeutic activities, therapeutic exercises and progress toward the following goals:    · Plan of Care Expires:  11/29/19    Subjective     Chief Complaint: Pt reported aggravation with BiPAP at night.  Patient/Family Comments/goals: Pt agreeable to therapy.   Pain/Comfort:  · Pain Rating 1: (L shoulder and RLE pain with movement)      Objective:     Patient found left sidelying with oxygen 3L, PureWick, peripheral IV, telemetry, bed alarm upon PT entry to room.     General Precautions: Standard, fall, respiratory, diabetic, vision impaired   Orthopedic Precautions:N/A   Braces: N/A      Functional Mobility:  Pt still with confusion, however much more alert and participatory today during therapy.    · Bed Mobility:     · Scooting: maximal assistance for anterior/posterior scooting  · Bridging: max A and of 2 persons with bed in trendelenburg to reposition HOB; Pt was able to push  with BLE.    · Supine to Sit: maximal assistance of 2 persons with HOB elevated   · Sit to Supine: dependence and of 2 persons  · Transfers:     · Sit to Stand:  maximal assistance and of 2 persons with rolling walker x 3 trials; Pt with forward flexed posture, increased B  on RW today.  Pt still with minimal jerky movement to BUE when standing and holding onto RW.     · Gait: Pt still unable to weight shift to take steps at this time.  · Balance: Pt with fair static sit balance and poor static stand balance.      AM-PAC 6 CLICK MOBILITY  Turning over in bed (including adjusting bedclothes, sheets and blankets)?: 2  Sitting down on and standing up from a chair with arms (e.g., wheelchair, bedside commode, etc.): 2  Moving from lying on back to sitting on the side of the bed?: 2  Moving to and from a bed to a chair (including a wheelchair)?: 1  Need to walk in hospital room?: 1  Climbing 3-5 steps with a railing?: 1  Basic Mobility Total Score: 9       Therapeutic Activities and Exercises:  BLE seated therex 10 reps: hip flex, LAQ, and AP; Pt required min VC's to stay on task.      Balance Training  Dynamic Sitting:  Patient performed dynamic sitting on foam mat with min A- Contact Guard Assistance and moderate verbal cues during minimal and moderate excursions for reaching activities with UE across multiple planes for different size objects.  Minimal excursions for reaching activities with LUE 2* decreased shoulder ROM.  Pt with no jerky movement to BUE during seated reaching activities.     Static Standing:  Patient performed static standing on level surface  using rolling walker with Moderate Assistance and maximal verbal cues for upright postures x 3 trials.       Patient left HOB elevated, BUE, and BLE elevated on pillows with all lines intact, call button in reach, bed alarm on and daughter present.    GOALS:   Multidisciplinary Problems     Physical Therapy Goals        Problem: Physical Therapy Goal     Goal Priority Disciplines Outcome Goal Variances Interventions   Physical Therapy Goal     PT, PT/OT Ongoing, Progressing     Description:  Goals to be met by: 19     Patient will increase functional independence with mobility by performin. Supine to sit with Stand-by Assistance  2. Rolling to Left and Right with Stand-by Assistance  3. Sit to stand transfer with Stand-by Assistance using RW  4. Bed to chair transfer with Stand-by Assistance using Rolling Walker  5. Gait x50 feet with Stand-by Assistance using Rolling Walker and O2   6. Lower extremity exercise program 3 sets x10 reps per handout, with SBA                      Time Tracking:     PT Received On: 19  PT Start Time: 1439     PT Stop Time: 1509  PT Total Time (min): 30 min     Billable Minutes: Therapeutic Activity 15 min co-eval with OT    Treatment Type: Treatment  PT/PTA: PT     PTA Visit Number: 0     Tisha Bah, PT  2019

## 2019-11-19 NOTE — PLAN OF CARE
Problem: Occupational Therapy Goal  Goal: Occupational Therapy Goal  Description  Goals to be met by: 11/22/2019     Patient will increase functional independence with ADLs by performing:    Feeding with Set-up Assistance.  UE Dressing with Stand-by Assistance.  Grooming while seated with Stand-by Assistance.  Sitting at edge of bed x15 minutes with Stand-by Assistance.  Supine to sit with Stand-by Assistance.  Stand pivot transfers with Minimal Assistance.  Upper extremity exercise program per handout, with assistance as needed.     11/19/2019 1149 by Shania Pride OT  Outcome: Ongoing, Progressing     Patient tolerated treatment well, improved ability to participate, able to attend to therapy today. BUSHRA Rodriguez, MS

## 2019-11-19 NOTE — CONSULTS
TN spoke with daughter Joanne regarding SNF consult, agreed to [facility], [facility]. So that daughter and spouse/Bruno can visit pt while in SNF..Berta Nolasco RN, BSN, STN [facility]  11/19/2019

## 2019-11-19 NOTE — PLAN OF CARE
Problem: Occupational Therapy Goal  Goal: Occupational Therapy Goal  Description  Goals to be met by: 11/22/2019     Patient will increase functional independence with ADLs by performing:    Feeding with Set-up Assistance.  UE Dressing with Stand-by Assistance.  Grooming while seated with Stand-by Assistance.  Sitting at edge of bed x15 minutes with Stand-by Assistance.  Supine to sit with Stand-by Assistance.  Stand pivot transfers with Minimal Assistance.  Upper extremity exercise program per handout, with assistance as needed.     Outcome: Ongoing, Progressing    Patient weak and with involuntary jerking of BUE with volitional movement. BUSHRA Rodriguez, MS

## 2019-11-19 NOTE — PLAN OF CARE
Problem: Physical Therapy Goal  Goal: Physical Therapy Goal  Description  Goals to be met by: 19     Patient will increase functional independence with mobility by performin. Supine to sit with Stand-by Assistance  2. Rolling to Left and Right with Stand-by Assistance  3. Sit to stand transfer with Stand-by Assistance using RW  4. Bed to chair transfer with Stand-by Assistance using Rolling Walker  5. Gait x50 feet with Stand-by Assistance using Rolling Walker and O2   6. Lower extremity exercise program 3 sets x10 reps per handout, with SBA     Outcome: Ongoing, Progressing    Pt stood x 3 trials today with max A of 2 persons using RW and 3L O2 NC.  Pt unable to take steps at this time.

## 2019-11-19 NOTE — SUBJECTIVE & OBJECTIVE
Interval History: Weak and fatigued.    Review of Systems   HENT: Negative for ear discharge and ear pain.    Eyes: Negative for pain and itching.   Endocrine: Negative for polyphagia and polyuria.   Neurological: Negative for seizures and syncope.     Objective:     Vital Signs (Most Recent):  Temp: 97.5 °F (36.4 °C) (11/19/19 0732)  Pulse: 63 (11/19/19 0732)  Resp: 17 (11/19/19 0732)  BP: (!) 160/71 (11/19/19 0732)  SpO2: 96 % (11/19/19 0732) Vital Signs (24h Range):  Temp:  [97.5 °F (36.4 °C)-99.5 °F (37.5 °C)] 97.5 °F (36.4 °C)  Pulse:  [63-83] 63  Resp:  [17-22] 17  SpO2:  [93 %-100 %] 96 %  BP: (120-160)/(56-94) 160/71     Weight: 99.1 kg (218 lb 7.6 oz)  Body mass index is 44.13 kg/m².    Intake/Output Summary (Last 24 hours) at 11/19/2019 1035  Last data filed at 11/19/2019 0100  Gross per 24 hour   Intake --   Output 800 ml   Net -800 ml      Physical Exam   Constitutional: She is oriented to person, place, and time. No distress.   HENT:   Head: Normocephalic and atraumatic.   Eyes: Conjunctivae are normal. Right eye exhibits no discharge. Left eye exhibits no discharge.   Neck: Neck supple. No tracheal deviation present.   Cardiovascular: Normal rate and regular rhythm.   Pulmonary/Chest:   Slight tachypnea  Coarse BS bilaterally   Abdominal: Soft. Bowel sounds are normal.   Musculoskeletal: She exhibits edema.   Neurological: She is alert and oriented to person, place, and time.   Skin: Skin is warm and dry. She is not diaphoretic.       Significant Labs:   BMP:   Recent Labs   Lab 11/19/19 0528   *      K 3.8   CL 92*   CO2 42*   BUN 34*   CREATININE 1.3   CALCIUM 9.0     CBC:   Recent Labs   Lab 11/18/19  0449 11/19/19 0528   WBC 7.17 5.84   HGB 9.3* 8.4*   HCT 31.0* 27.6*    209       Significant Imaging: I have reviewed all pertinent imaging results/findings within the past 24 hours.

## 2019-11-19 NOTE — PLAN OF CARE
TN spoke to Milvia Partida at [facility] [phone] and Mariaj[facility] at [facility], Mariajose says qualifying sleep sturdy in 2016 but patient will need to connect respiratory with [facility] [phone] when discharged from snf.    TN sent through [facility] referral to : 1. [facility], 2. [facility], 3. [facility] and 4. [facility].Patient has [employer], so pending insurance authorization.     11/19/19 1151   Post-Acute Status   Post-Acute Authorization Placement;HME   Post-Acute Placement Status Pending Payor Review  (TN sent snf referrals.)   HME Status Pending Payor Review  (sleep study for cpap done 2016, need to schedule appt with respiratory when d/c from cnf)   Discharge Delays None known at this time   .Berta Nolasco RN, BSN, STN [facility]  11/19/2019

## 2019-11-20 PROBLEM — N39.0 URINARY TRACT INFECTION WITHOUT HEMATURIA: Status: ACTIVE | Noted: 2019-11-20

## 2019-11-20 LAB
ALBUMIN SERPL BCP-MCNC: 2.7 G/DL (ref 3.5–5.2)
ALP SERPL-CCNC: 54 U/L (ref 55–135)
ALT SERPL W/O P-5'-P-CCNC: 8 U/L (ref 10–44)
ANION GAP SERPL CALC-SCNC: 10 MMOL/L (ref 8–16)
AST SERPL-CCNC: 12 U/L (ref 10–40)
BACTERIA UR CULT: ABNORMAL
BASOPHILS # BLD AUTO: 0.04 K/UL (ref 0–0.2)
BASOPHILS NFR BLD: 0.6 % (ref 0–1.9)
BILIRUB SERPL-MCNC: 0.3 MG/DL (ref 0.1–1)
BUN SERPL-MCNC: 31 MG/DL (ref 8–23)
CALCIUM SERPL-MCNC: 9.2 MG/DL (ref 8.7–10.5)
CHLORIDE SERPL-SCNC: 91 MMOL/L (ref 95–110)
CO2 SERPL-SCNC: 35 MMOL/L (ref 23–29)
CREAT SERPL-MCNC: 1.3 MG/DL (ref 0.5–1.4)
DIFFERENTIAL METHOD: ABNORMAL
EOSINOPHIL # BLD AUTO: 0.1 K/UL (ref 0–0.5)
EOSINOPHIL NFR BLD: 1.2 % (ref 0–8)
ERYTHROCYTE [DISTWIDTH] IN BLOOD BY AUTOMATED COUNT: 13.1 % (ref 11.5–14.5)
EST. GFR  (AFRICAN AMERICAN): 46 ML/MIN/1.73 M^2
EST. GFR  (NON AFRICAN AMERICAN): 40 ML/MIN/1.73 M^2
GLUCOSE SERPL-MCNC: 276 MG/DL (ref 70–110)
HCT VFR BLD AUTO: 29.6 % (ref 37–48.5)
HGB BLD-MCNC: 9.1 G/DL (ref 12–16)
IMM GRANULOCYTES # BLD AUTO: 0.05 K/UL (ref 0–0.04)
IMM GRANULOCYTES NFR BLD AUTO: 0.7 % (ref 0–0.5)
LYMPHOCYTES # BLD AUTO: 0.4 K/UL (ref 1–4.8)
LYMPHOCYTES NFR BLD: 6.2 % (ref 18–48)
MCH RBC QN AUTO: 29.8 PG (ref 27–31)
MCHC RBC AUTO-ENTMCNC: 30.7 G/DL (ref 32–36)
MCV RBC AUTO: 97 FL (ref 82–98)
MONOCYTES # BLD AUTO: 0.9 K/UL (ref 0.3–1)
MONOCYTES NFR BLD: 13.2 % (ref 4–15)
NEUTROPHILS # BLD AUTO: 5.3 K/UL (ref 1.8–7.7)
NEUTROPHILS NFR BLD: 78.1 % (ref 38–73)
NRBC BLD-RTO: 0 /100 WBC
PLATELET # BLD AUTO: 236 K/UL (ref 150–350)
PMV BLD AUTO: 9.4 FL (ref 9.2–12.9)
POCT GLUCOSE: 219 MG/DL (ref 70–110)
POCT GLUCOSE: 239 MG/DL (ref 70–110)
POCT GLUCOSE: 287 MG/DL (ref 70–110)
POCT GLUCOSE: 288 MG/DL (ref 70–110)
POCT GLUCOSE: 317 MG/DL (ref 70–110)
POCT GLUCOSE: 337 MG/DL (ref 70–110)
POTASSIUM SERPL-SCNC: 4 MMOL/L (ref 3.5–5.1)
PROT SERPL-MCNC: 6.3 G/DL (ref 6–8.4)
RBC # BLD AUTO: 3.05 M/UL (ref 4–5.4)
SODIUM SERPL-SCNC: 136 MMOL/L (ref 136–145)
WBC # BLD AUTO: 6.75 K/UL (ref 3.9–12.7)

## 2019-11-20 PROCEDURE — 97530 THERAPEUTIC ACTIVITIES: CPT | Mod: HCNC

## 2019-11-20 PROCEDURE — 85025 COMPLETE CBC W/AUTO DIFF WBC: CPT | Mod: HCNC

## 2019-11-20 PROCEDURE — 94660 CPAP INITIATION&MGMT: CPT | Mod: HCNC

## 2019-11-20 PROCEDURE — 80053 COMPREHEN METABOLIC PANEL: CPT | Mod: HCNC

## 2019-11-20 PROCEDURE — 63600175 PHARM REV CODE 636 W HCPCS: Mod: HCNC | Performed by: HOSPITALIST

## 2019-11-20 PROCEDURE — 21400001 HC TELEMETRY ROOM: Mod: HCNC

## 2019-11-20 PROCEDURE — 25000003 PHARM REV CODE 250: Mod: HCNC | Performed by: HOSPITALIST

## 2019-11-20 PROCEDURE — 63600175 PHARM REV CODE 636 W HCPCS: Mod: HCNC | Performed by: EMERGENCY MEDICINE

## 2019-11-20 PROCEDURE — 94761 N-INVAS EAR/PLS OXIMETRY MLT: CPT | Mod: HCNC

## 2019-11-20 PROCEDURE — 36415 COLL VENOUS BLD VENIPUNCTURE: CPT | Mod: HCNC

## 2019-11-20 PROCEDURE — 25000003 PHARM REV CODE 250: Mod: HCNC | Performed by: EMERGENCY MEDICINE

## 2019-11-20 PROCEDURE — 99900035 HC TECH TIME PER 15 MIN (STAT): Mod: HCNC

## 2019-11-20 RX ORDER — DIVALPROEX SODIUM 250 MG/1
500 TABLET, DELAYED RELEASE ORAL EVERY 12 HOURS
Status: DISCONTINUED | OUTPATIENT
Start: 2019-11-20 | End: 2019-11-20

## 2019-11-20 RX ORDER — DIVALPROEX SODIUM 250 MG/1
500 TABLET, DELAYED RELEASE ORAL EVERY 12 HOURS
Status: DISCONTINUED | OUTPATIENT
Start: 2019-11-20 | End: 2019-11-21 | Stop reason: HOSPADM

## 2019-11-20 RX ADMIN — HEPARIN SODIUM 5000 UNITS: 5000 INJECTION INTRAVENOUS; SUBCUTANEOUS at 02:11

## 2019-11-20 RX ADMIN — CEFTRIAXONE 1 G: 1 INJECTION, SOLUTION INTRAVENOUS at 02:11

## 2019-11-20 RX ADMIN — FLUTICASONE PROPIONATE 50 MCG: 50 SPRAY, METERED NASAL at 08:11

## 2019-11-20 RX ADMIN — LOSARTAN POTASSIUM 100 MG: 25 TABLET, FILM COATED ORAL at 08:11

## 2019-11-20 RX ADMIN — METOPROLOL SUCCINATE 25 MG: 25 TABLET, EXTENDED RELEASE ORAL at 08:11

## 2019-11-20 RX ADMIN — OXYBUTYNIN CHLORIDE 5 MG: 5 TABLET ORAL at 08:11

## 2019-11-20 RX ADMIN — DIVALPROEX SODIUM 500 MG: 250 TABLET, DELAYED RELEASE ORAL at 08:11

## 2019-11-20 RX ADMIN — HEPARIN SODIUM 5000 UNITS: 5000 INJECTION INTRAVENOUS; SUBCUTANEOUS at 10:11

## 2019-11-20 RX ADMIN — CLONIDINE HYDROCHLORIDE 0.1 MG: 0.1 TABLET ORAL at 05:11

## 2019-11-20 RX ADMIN — AMLODIPINE BESYLATE 10 MG: 5 TABLET ORAL at 08:11

## 2019-11-20 RX ADMIN — CLONIDINE HYDROCHLORIDE 0.1 MG: 0.1 TABLET ORAL at 08:11

## 2019-11-20 RX ADMIN — INSULIN ASPART 2 UNITS: 100 INJECTION, SOLUTION INTRAVENOUS; SUBCUTANEOUS at 08:11

## 2019-11-20 RX ADMIN — FAMOTIDINE 20 MG: 20 TABLET ORAL at 08:11

## 2019-11-20 RX ADMIN — ACETAMINOPHEN 650 MG: 325 TABLET, FILM COATED ORAL at 08:11

## 2019-11-20 RX ADMIN — INSULIN ASPART 4 UNITS: 100 INJECTION, SOLUTION INTRAVENOUS; SUBCUTANEOUS at 04:11

## 2019-11-20 RX ADMIN — HEPARIN SODIUM 5000 UNITS: 5000 INJECTION INTRAVENOUS; SUBCUTANEOUS at 05:11

## 2019-11-20 RX ADMIN — FUROSEMIDE 40 MG: 40 TABLET ORAL at 08:11

## 2019-11-20 NOTE — NURSING
End of shift bedside report given to NICHOLE Flores. Patient in no apparent distress.     12 hour chart check complete.   Right arm;

## 2019-11-20 NOTE — PROGRESS NOTES
[facility] Medicine  Progress Note    Patient Name: Kaci Whalen  MRN: [id number]  Patient Class: IP- Inpatient   Admission Date: 11/14/2019  Length of Stay: 6 days  Attending Physician: Mayelin Marshall MD  Primary Care Provider: Toby Hung MD        Subjective:     Principal Problem:Acute on chronic respiratory failure with hypoxia        HPI:  80 y/o female presents to the ER via EMS complaining of gradually worsening shortness of breath and non productive cough for 2 days.  EMS reports an initial O2 sat of 77% on 2L O2.  The patient uses 2L of supplemental O2 at all times.  Patient apparently started complaining of congestion that did not improve with Mucinex and cough syrup.  Patient complains of severe shortness of breath with minimal exertion.  She can never lay flat to sleep.  Does complain of some lower extremity swelling, but states that it's actually much better than in the past.  She is compliant with her Lasix 40mg (MWF).  Shortness of breath worsened this morning and patient presented to ER.  Patient states that she also gets very anxious when she gets short of breath.  No other complaints.    Overview/Hospital Course:  80 y/o female presented with worsening shortness of breath.  Acute on chronic diastolic heart failure and started on IV Lasix. Some improvement with diuresis, but patient somewhat confused and lethargic.  ABG's show hypercapnia and started on Bipap.  Pulmonary consulted.  Spiking low grade fevers.  UA with 3+ leukocytes and many bacteria.  Started on Rocephin.  Some dysphagia and ST consulted.  Very weak and fatigued.  PT/OT evaluation.recomending SNF,consulted SW,placed PPD.  Still has some fever,multiple organism on urine culture.    Interval History: Weak and fatigued.still some fever.    Review of Systems   Constitutional: Positive for fever.   HENT: Negative for ear discharge and ear pain.    Eyes: Negative for pain and itching.    Endocrine: Negative for polyphagia and polyuria.   Neurological: Negative for seizures and syncope.     Objective:     Vital Signs (Most Recent):  Temp: (!) 100.8 °F (38.2 °C) (11/20/19 0828)  Pulse: 84 (11/20/19 0742)  Resp: 19 (11/20/19 0742)  BP: (!) 178/78 (11/20/19 0742)  SpO2: 96 % (11/20/19 0742) Vital Signs (24h Range):  Temp:  [98 °F (36.7 °C)-100.8 °F (38.2 °C)] 100.8 °F (38.2 °C)  Pulse:  [71-92] 84  Resp:  [17-22] 19  SpO2:  [90 %-97 %] 96 %  BP: (133-197)/(60-78) 178/78     Weight: 98.3 kg (216 lb 11.4 oz)  Body mass index is 43.77 kg/m².    Intake/Output Summary (Last 24 hours) at 11/20/2019 0839  Last data filed at 11/20/2019 0538  Gross per 24 hour   Intake 795 ml   Output 3000 ml   Net -2205 ml      Physical Exam   Constitutional: She is oriented to person, place, and time. No distress.   HENT:   Head: Normocephalic and atraumatic.   Eyes: Conjunctivae are normal. Right eye exhibits no discharge. Left eye exhibits no discharge.   Neck: Neck supple. No tracheal deviation present.   Cardiovascular: Normal rate and regular rhythm.   Pulmonary/Chest:   Slight tachypnea  Coarse BS bilaterally   Abdominal: Soft. Bowel sounds are normal.   Musculoskeletal: She exhibits edema.   Neurological: She is alert and oriented to person, place, and time.   Skin: Skin is warm and dry. She is not diaphoretic.       Significant Labs:   BMP:   Recent Labs   Lab 11/20/19 0339   *      K 4.0   CL 91*   CO2 35*   BUN 31*   CREATININE 1.3   CALCIUM 9.2     CBC:   Recent Labs   Lab 11/19/19 0528 11/20/19 0339   WBC 5.84 6.75   HGB 8.4* 9.1*   HCT 27.6* 29.6*    236       Significant Imaging: I have reviewed all pertinent imaging results/findings within the past 24 hours.      Assessment/Plan:      * Acute on chronic respiratory failure with hypoxia  Patient noted to be hypoxic and in respiratory distress.  Respiratory failure secondary to CHF exacerbation.  Symptoms improved with IV Lasix.  Change  Lasix to oral.  Will give dose of Acetazolamide.  Patient with some confusion and lethargy.    ABG's show hypercapnia.  Started on Bipap.  Appreciate Pulmonary input. SW consult to see if patient could get home Bipap upon discharge.  Will also hold sedative medications (Seroquel, Gabapentin, Depakote).  Low grade fevers.  UA consistent with UTI.  Will start Rocephin.no growth yet.        Urinary tract infection without hematuria    Still has some fever,multiple organism on urine culture.    Hyperkalemia  Should improve with diuresis.  Repeat labs in Am.      Anemia of chronic disorder  H/H similar to previous labs.  No evidence of bleeding.    Acute on chronic diastolic congestive heart failure  As above.      CKD (chronic kidney disease), stage III  Creat around baseline.  Monitor renal function while on diuresis.  Avoid nephrotoxic medications.      Essential hypertension  Resume home medications with parameters.  Low Na diet.      Morbid obesity  Body mass index is 44.03 kg/m².        Paroxysmal atrial fibrillation  Currently in sinus rhythm.  Continue B blocker.  In and out of Afib this morning, but rate controlled.      Metabolic alkalosis  Worsening CO2 probably secondary to diuresis.      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  Diabetic diet and insulin sliding scale.      Hyperlipidemia with target LDL less than 100          VTE Risk Mitigation (From admission, onward)         Ordered     heparin (porcine) injection 5,000 Units  Every 8 hours      11/14/19 1623     IP VTE HIGH RISK PATIENT  Once      11/14/19 1623     Place sequential compression device  Until discontinued      11/14/19 1623     Place EDE hose  Until discontinued      11/14/19 1623                      Mayelin Marshall MD  Department of Hospital Medicine   [facility]

## 2019-11-20 NOTE — PLAN OF CARE
Problem: Diabetes Comorbidity  Goal: Blood Glucose Level Within Desired Range  Outcome: Ongoing, Progressing  Intervention: Maintain Glycemic Control  Flowsheets (Taken 11/20/2019 0100)  Glycemic Management: blood glucose monitoring; oral hydration promoted; supplemental insulin given

## 2019-11-20 NOTE — PLAN OF CARE
Pam at [facility] says she has not received insurance authorizaiton.     11/20/19 1201   Post-Acute Status   Post-Acute Authorization Placement   Post-Acute Placement Status Pending Payor Review  (Sun says she put in request yesterday late but has not recieived authorizaiton yet..)   Discharge Delays (!) Procedure Scheduling (IR, OR, Labs, Echo, Cath, Echo, EEG)   .Berta Nolasco RN, BSN, [facility]  11/20/2019

## 2019-11-20 NOTE — PT/OT/SLP PROGRESS
Occupational Therapy   Treatment    Name: Kaci Whalen  MRN: [id number]  Admitting Diagnosis:  Acute on chronic respiratory failure with hypoxia       Recommendations:     Discharge Recommendations: nursing facility, skilled  Discharge Equipment Recommendations:  none  Barriers to discharge:  None    Assessment:     Kaci Whalen is a 79 y.o. female with a medical diagnosis of Acute on chronic respiratory failure with hypoxia.  She presents with  independence for self-care and functional transfers. Performance deficits affecting function are weakness, impaired endurance, impaired self care skills, impaired functional mobilty, impaired balance, visual deficits, impaired cognition, decreased coordination, decreased safety awareness, pain, decreased ROM, impaired coordination, impaired fine motor, edema, impaired cardiopulmonary response to activity.     Rehab Prognosis:  Good; patient would benefit from acute skilled OT services to address these deficits and reach maximum level of function.       Plan:     Patient to be seen 3 x/week to address the above listed problems via self-care/home management, therapeutic activities, therapeutic exercises  · Plan of Care Expires: 19  · Plan of Care Reviewed with: patient, spouse, daughter    Subjective     Pain/Comfort:  · Pain Rating 1: (L shoulder still sore)    Objective:     Communicated with: nurse Kirby prior to session.  Patient found supine with telemetry, peripheral IV, oxygen, PureWick upon OT entry to room.    General Precautions: Standard, fall, respiratory, vision impaired   Orthopedic Precautions:N/A   Braces: N/A     Occupational Performance:     Bed Mobility:    · Patient completed Rolling/Turning to Left with  dependent  · Patient completed Rolling/Turning to Right with dependent  · Patient completed Scooting/Bridging with dependent  · Patient completed Supine to Sit with dependent  · Patient completed Sit to Supine with dependent      Functional Mobility/Transfers:  · Patient completed Sit <> Stand Transfer with maximal assistance and of 2 persons  with  rolling walker   · Functional Mobility: 4 trials of standing with increasing amount of time in stand    Activities of Daily Living:  · Feeding:  maximal assistance with daughter's assistance  · Grooming: minimum assistance with LUE  · Upper Body Dressing: maximal assistance seated EOB      [facility] 6 Click ADL: 10    Treatment & Education:  Therapeutic activity    Patient left supine with all lines intact, call button in reach, nurse notified and family presentEducation:      GOALS:   Multidisciplinary Problems     Occupational Therapy Goals        Problem: Occupational Therapy Goal    Goal Priority Disciplines Outcome Interventions   Occupational Therapy Goal     OT, PT/OT Ongoing, Progressing    Description:  Goals to be met by: 11/22/2019     Patient will increase functional independence with ADLs by performing:    Feeding with Set-up Assistance.  UE Dressing with Stand-by Assistance.  Grooming while seated with Stand-by Assistance.  Sitting at edge of bed x15 minutes with Stand-by Assistance.  Supine to sit with Stand-by Assistance.  Stand pivot transfers with Minimal Assistance.  Upper extremity exercise program per handout, with assistance as needed.                      Time Tracking:     OT Date of Treatment: 11/20/19  OT Start Time: 1601  OT Stop Time: 1629  OT Total Time (min): 28 min    Billable Minutes:Therapeutic Activity 14 minutes with PT    BUSHRA Rodriguez, MS  11/20/2019

## 2019-11-20 NOTE — NURSING
Bedside report received from NICHOLE Flores. Patient lying in bed with eyes open. BiPap in place. NAD noted at this time. All safety precautions in place. Will continue to monitor.

## 2019-11-20 NOTE — PLAN OF CARE
Problem: Occupational Therapy Goal  Goal: Occupational Therapy Goal  Description  Goals to be met by: 11/22/2019     Patient will increase functional independence with ADLs by performing:    Feeding with Set-up Assistance.  UE Dressing with Stand-by Assistance.  Grooming while seated with Stand-by Assistance.  Sitting at edge of bed x15 minutes with Stand-by Assistance.  Supine to sit with Stand-by Assistance.  Stand pivot transfers with Minimal Assistance.  Upper extremity exercise program per handout, with assistance as needed.     Outcome: Ongoing, Progressing    Patient tolerated treatment well, able to tolerate sitting EOB with some self care. BUSHRA Rodriguez, MS

## 2019-11-20 NOTE — PLAN OF CARE
11/20/19 1010   Discharge Reassessment   Assessment Type Discharge Planning Reassessment   Provided patient/caregiver education on the expected discharge date and the discharge plan Yes   Do you have any problems affording any of your prescribed medications? No   Discharge Plan A Skilled Nursing Facility   Discharge Plan B Home with family;Home Health   DME Needed Upon Discharge  CPAP   Patient choice form signed by patient/caregiver Yes   Anticipated Discharge Disposition SNF   Can the patient answer the patient profile reliably? Yes, cognitively intact   How does the patient rate their overall health at the present time? Fair   Describe the patient's ability to walk at the present time. Major restrictions/daily assistance from another person   How often would a person be available to care for the patient? Whenever needed   Number of comorbid conditions (as recorded on the chart) Five or more   During the past month, has the patient often been bothered by feeling down, depressed or hopeless? No   During the past month, has the patient often been bothered by little interest or pleasure in doing things? No   Post-Acute Status   Post-Acute Authorization Placement   Post-Acute Placement Status Authorization Obtained   E Status Discharge Plan Changed   Discharge Delays (!) Procedure Scheduling (IR, OR, Labs, Echo, Cath, Echo, EEG)   Berta Nolasco RN, BSN, STN CCM  11/20/2019

## 2019-11-20 NOTE — SUBJECTIVE & OBJECTIVE
Interval History: Weak and fatigued.still some fever.    Review of Systems   Constitutional: Positive for fever.   HENT: Negative for ear discharge and ear pain.    Eyes: Negative for pain and itching.   Endocrine: Negative for polyphagia and polyuria.   Neurological: Negative for seizures and syncope.     Objective:     Vital Signs (Most Recent):  Temp: (!) 100.8 °F (38.2 °C) (11/20/19 0828)  Pulse: 84 (11/20/19 0742)  Resp: 19 (11/20/19 0742)  BP: (!) 178/78 (11/20/19 0742)  SpO2: 96 % (11/20/19 0742) Vital Signs (24h Range):  Temp:  [98 °F (36.7 °C)-100.8 °F (38.2 °C)] 100.8 °F (38.2 °C)  Pulse:  [71-92] 84  Resp:  [17-22] 19  SpO2:  [90 %-97 %] 96 %  BP: (133-197)/(60-78) 178/78     Weight: 98.3 kg (216 lb 11.4 oz)  Body mass index is 43.77 kg/m².    Intake/Output Summary (Last 24 hours) at 11/20/2019 0839  Last data filed at 11/20/2019 0538  Gross per 24 hour   Intake 795 ml   Output 3000 ml   Net -2205 ml      Physical Exam   Constitutional: She is oriented to person, place, and time. No distress.   HENT:   Head: Normocephalic and atraumatic.   Eyes: Conjunctivae are normal. Right eye exhibits no discharge. Left eye exhibits no discharge.   Neck: Neck supple. No tracheal deviation present.   Cardiovascular: Normal rate and regular rhythm.   Pulmonary/Chest:   Slight tachypnea  Coarse BS bilaterally   Abdominal: Soft. Bowel sounds are normal.   Musculoskeletal: She exhibits edema.   Neurological: She is alert and oriented to person, place, and time.   Skin: Skin is warm and dry. She is not diaphoretic.       Significant Labs:   BMP:   Recent Labs   Lab 11/20/19 0339   *      K 4.0   CL 91*   CO2 35*   BUN 31*   CREATININE 1.3   CALCIUM 9.2     CBC:   Recent Labs   Lab 11/19/19 0528 11/20/19 0339   WBC 5.84 6.75   HGB 8.4* 9.1*   HCT 27.6* 29.6*    236       Significant Imaging: I have reviewed all pertinent imaging results/findings within the past 24 hours.

## 2019-11-20 NOTE — PLAN OF CARE
11/19/19 1600   Medicare Message   Important Message from Medicare regarding Discharge Appeal Rights Given to patient/caregiver;Explained to patient/caregiver;Signed/date by patient/caregiver   Date IMM was signed 11/19/19   Time IMM was signed 1600     Preference/choice form signed per daughterJoanne and yellow copy was given.  Preference, [facility].  Juanita at [facility] applied for [employer] auth was received.  Plan for dtr to sign papers if medically stable on tomorrow.Berta Nolasco, RN, BSN, STN CCM  11/19/2019

## 2019-11-21 ENCOUNTER — TELEPHONE (OUTPATIENT)
Dept: PULMONOLOGY | Facility: CLINIC | Age: 79
End: 2019-11-21

## 2019-11-21 VITALS
WEIGHT: 214.5 LBS | DIASTOLIC BLOOD PRESSURE: 85 MMHG | RESPIRATION RATE: 18 BRPM | OXYGEN SATURATION: 98 % | TEMPERATURE: 100 F | BODY MASS INDEX: 43.24 KG/M2 | HEIGHT: 59 IN | SYSTOLIC BLOOD PRESSURE: 148 MMHG | HEART RATE: 70 BPM

## 2019-11-21 DIAGNOSIS — J44.9 CHRONIC OBSTRUCTIVE PULMONARY DISEASE, UNSPECIFIED COPD TYPE: Primary | ICD-10-CM

## 2019-11-21 LAB
ALBUMIN SERPL BCP-MCNC: 2.4 G/DL (ref 3.5–5.2)
ALP SERPL-CCNC: 56 U/L (ref 55–135)
ALT SERPL W/O P-5'-P-CCNC: 10 U/L (ref 10–44)
ANION GAP SERPL CALC-SCNC: 7 MMOL/L (ref 8–16)
AST SERPL-CCNC: 14 U/L (ref 10–40)
BASOPHILS # BLD AUTO: 0.03 K/UL (ref 0–0.2)
BASOPHILS NFR BLD: 0.3 % (ref 0–1.9)
BILIRUB SERPL-MCNC: 0.3 MG/DL (ref 0.1–1)
BUN SERPL-MCNC: 28 MG/DL (ref 8–23)
CALCIUM SERPL-MCNC: 9.2 MG/DL (ref 8.7–10.5)
CHLORIDE SERPL-SCNC: 94 MMOL/L (ref 95–110)
CO2 SERPL-SCNC: 34 MMOL/L (ref 23–29)
CREAT SERPL-MCNC: 1.2 MG/DL (ref 0.5–1.4)
DIFFERENTIAL METHOD: ABNORMAL
EOSINOPHIL # BLD AUTO: 0 K/UL (ref 0–0.5)
EOSINOPHIL NFR BLD: 0.1 % (ref 0–8)
ERYTHROCYTE [DISTWIDTH] IN BLOOD BY AUTOMATED COUNT: 13.2 % (ref 11.5–14.5)
EST. GFR  (AFRICAN AMERICAN): 50 ML/MIN/1.73 M^2
EST. GFR  (NON AFRICAN AMERICAN): 44 ML/MIN/1.73 M^2
GLUCOSE SERPL-MCNC: 302 MG/DL (ref 70–110)
HCT VFR BLD AUTO: 27.9 % (ref 37–48.5)
HGB BLD-MCNC: 8.7 G/DL (ref 12–16)
IMM GRANULOCYTES # BLD AUTO: 0.07 K/UL (ref 0–0.04)
IMM GRANULOCYTES NFR BLD AUTO: 0.6 % (ref 0–0.5)
LYMPHOCYTES # BLD AUTO: 0.7 K/UL (ref 1–4.8)
LYMPHOCYTES NFR BLD: 6 % (ref 18–48)
MCH RBC QN AUTO: 29.9 PG (ref 27–31)
MCHC RBC AUTO-ENTMCNC: 31.2 G/DL (ref 32–36)
MCV RBC AUTO: 96 FL (ref 82–98)
MONOCYTES # BLD AUTO: 1.4 K/UL (ref 0.3–1)
MONOCYTES NFR BLD: 12.6 % (ref 4–15)
NEUTROPHILS # BLD AUTO: 8.8 K/UL (ref 1.8–7.7)
NEUTROPHILS NFR BLD: 80.4 % (ref 38–73)
NRBC BLD-RTO: 0 /100 WBC
PLATELET # BLD AUTO: 258 K/UL (ref 150–350)
PMV BLD AUTO: 9.8 FL (ref 9.2–12.9)
POCT GLUCOSE: 273 MG/DL (ref 70–110)
POCT GLUCOSE: 310 MG/DL (ref 70–110)
POCT GLUCOSE: 336 MG/DL (ref 70–110)
POTASSIUM SERPL-SCNC: 4 MMOL/L (ref 3.5–5.1)
PROT SERPL-MCNC: 6.1 G/DL (ref 6–8.4)
RBC # BLD AUTO: 2.91 M/UL (ref 4–5.4)
SODIUM SERPL-SCNC: 135 MMOL/L (ref 136–145)
WBC # BLD AUTO: 10.94 K/UL (ref 3.9–12.7)

## 2019-11-21 PROCEDURE — 97530 THERAPEUTIC ACTIVITIES: CPT | Mod: HCNC

## 2019-11-21 PROCEDURE — 63600175 PHARM REV CODE 636 W HCPCS: Mod: HCNC | Performed by: HOSPITALIST

## 2019-11-21 PROCEDURE — 80053 COMPREHEN METABOLIC PANEL: CPT | Mod: HCNC

## 2019-11-21 PROCEDURE — 36415 COLL VENOUS BLD VENIPUNCTURE: CPT | Mod: HCNC

## 2019-11-21 PROCEDURE — 85025 COMPLETE CBC W/AUTO DIFF WBC: CPT | Mod: HCNC

## 2019-11-21 PROCEDURE — 25000003 PHARM REV CODE 250: Mod: HCNC | Performed by: EMERGENCY MEDICINE

## 2019-11-21 PROCEDURE — C9399 UNCLASSIFIED DRUGS OR BIOLOG: HCPCS | Mod: HCNC | Performed by: HOSPITALIST

## 2019-11-21 PROCEDURE — 25000003 PHARM REV CODE 250: Mod: HCNC | Performed by: HOSPITALIST

## 2019-11-21 PROCEDURE — 63600175 PHARM REV CODE 636 W HCPCS: Mod: HCNC | Performed by: EMERGENCY MEDICINE

## 2019-11-21 RX ORDER — AMOXICILLIN AND CLAVULANATE POTASSIUM 875; 125 MG/1; MG/1
1 TABLET, FILM COATED ORAL 2 TIMES DAILY
Qty: 14 TABLET | Refills: 0
Start: 2019-11-21 | End: 2019-11-28

## 2019-11-21 RX ORDER — POLYETHYLENE GLYCOL 3350 17 G/17G
17 POWDER, FOR SOLUTION ORAL 2 TIMES DAILY PRN
Refills: 0
Start: 2019-11-21

## 2019-11-21 RX ORDER — INSULIN ASPART 100 [IU]/ML
7 INJECTION, SOLUTION INTRAVENOUS; SUBCUTANEOUS
Status: DISCONTINUED | OUTPATIENT
Start: 2019-11-21 | End: 2019-11-21 | Stop reason: HOSPADM

## 2019-11-21 RX ORDER — AMLODIPINE BESYLATE 10 MG/1
10 TABLET ORAL DAILY
Qty: 30 TABLET | Refills: 11 | Status: ON HOLD
Start: 2019-11-21 | End: 2019-11-26 | Stop reason: HOSPADM

## 2019-11-21 RX ADMIN — LOSARTAN POTASSIUM 100 MG: 25 TABLET, FILM COATED ORAL at 08:11

## 2019-11-21 RX ADMIN — INSULIN DETEMIR 20 UNITS: 100 INJECTION, SOLUTION SUBCUTANEOUS at 08:11

## 2019-11-21 RX ADMIN — AMLODIPINE BESYLATE 10 MG: 5 TABLET ORAL at 08:11

## 2019-11-21 RX ADMIN — DIVALPROEX SODIUM 500 MG: 250 TABLET, DELAYED RELEASE ORAL at 08:11

## 2019-11-21 RX ADMIN — OXYBUTYNIN CHLORIDE 5 MG: 5 TABLET ORAL at 08:11

## 2019-11-21 RX ADMIN — FAMOTIDINE 20 MG: 20 TABLET ORAL at 08:11

## 2019-11-21 RX ADMIN — FUROSEMIDE 40 MG: 40 TABLET ORAL at 08:11

## 2019-11-21 RX ADMIN — METOPROLOL SUCCINATE 25 MG: 25 TABLET, EXTENDED RELEASE ORAL at 08:11

## 2019-11-21 RX ADMIN — INSULIN ASPART 7 UNITS: 100 INJECTION, SOLUTION INTRAVENOUS; SUBCUTANEOUS at 11:11

## 2019-11-21 RX ADMIN — HEPARIN SODIUM 5000 UNITS: 5000 INJECTION INTRAVENOUS; SUBCUTANEOUS at 06:11

## 2019-11-21 RX ADMIN — FLUTICASONE PROPIONATE 50 MCG: 50 SPRAY, METERED NASAL at 08:11

## 2019-11-21 RX ADMIN — INSULIN ASPART 4 UNITS: 100 INJECTION, SOLUTION INTRAVENOUS; SUBCUTANEOUS at 11:11

## 2019-11-21 RX ADMIN — CEFTRIAXONE 1 G: 1 INJECTION, SOLUTION INTRAVENOUS at 02:11

## 2019-11-21 RX ADMIN — HEPARIN SODIUM 5000 UNITS: 5000 INJECTION INTRAVENOUS; SUBCUTANEOUS at 02:11

## 2019-11-21 NOTE — PLAN OF CARE
Problem: Fall Injury Risk  Goal: Absence of Fall and Fall-Related Injury  Intervention: Identify and Manage Contributors to Fall Injury Risk  Flowsheets (Taken 11/21/2019 1149)  Self-Care Promotion: independence encouraged; BADL personal objects within reach; meal setup provided  Medication Review/Management: medications reviewed  Intervention: Promote Injury-Free Environment  Flowsheets (Taken 11/21/2019 1149)  Safety Promotion/Fall Prevention: assistive device/personal item within reach; bed alarm set; medications reviewed; family to remain at bedside; room near unit station; nonskid shoes/socks when out of bed; instructed to call staff for mobility; side rails raised x 3  Environmental Safety Modification: assistive device/personal items within reach; lighting adjusted; room near unit station; clutter free environment maintained     Problem: Fall Injury Risk  Goal: Absence of Fall and Fall-Related Injury  Intervention: Identify and Manage Contributors to Fall Injury Risk  Flowsheets (Taken 11/21/2019 1149)  Self-Care Promotion: independence encouraged; BADL personal objects within reach; meal setup provided  Medication Review/Management: medications reviewed     Problem: Fall Injury Risk  Goal: Absence of Fall and Fall-Related Injury  Intervention: Promote Injury-Free Environment  Flowsheets (Taken 11/21/2019 1149)  Safety Promotion/Fall Prevention: assistive device/personal item within reach; bed alarm set; medications reviewed; family to remain at bedside; room near unit station; nonskid shoes/socks when out of bed; instructed to call staff for mobility; side rails raised x 3  Environmental Safety Modification: assistive device/personal items within reach; lighting adjusted; room near unit station; clutter free environment maintained     Problem: Fall Injury Risk  Goal: Absence of Fall and Fall-Related Injury  Intervention: Promote Injury-Free Environment  Flowsheets (Taken 11/21/2019 1149)  Safety  Promotion/Fall Prevention: assistive device/personal item within reach; bed alarm set; medications reviewed; family to remain at bedside; room near unit station; nonskid shoes/socks when out of bed; instructed to call staff for mobility; side rails raised x 3  Environmental Safety Modification: assistive device/personal items within reach; lighting adjusted; room near unit station; clutter free environment maintained     Problem: Diabetes Comorbidity  Goal: Blood Glucose Level Within Desired Range  Intervention: Maintain Glycemic Control  Flowsheets (Taken 11/21/2019 1149)  Glycemic Management: blood glucose monitoring; supplemental insulin given     Problem: Diabetes Comorbidity  Goal: Blood Glucose Level Within Desired Range  Intervention: Maintain Glycemic Control  Flowsheets (Taken 11/21/2019 1149)  Glycemic Management: blood glucose monitoring; supplemental insulin given     Problem: Diabetes Comorbidity  Goal: Blood Glucose Level Within Desired Range  Intervention: Maintain Glycemic Control  Flowsheets (Taken 11/21/2019 1149)  Glycemic Management: blood glucose monitoring; supplemental insulin given     Problem: Fall Injury Risk  Goal: Absence of Fall and Fall-Related Injury  Intervention: Identify and Manage Contributors to Fall Injury Risk  Flowsheets (Taken 11/21/2019 1149)  Self-Care Promotion: independence encouraged; BADL personal objects within reach; meal setup provided  Medication Review/Management: medications reviewed

## 2019-11-21 NOTE — PLAN OF CARE
[facility]     Department of Hospital Medicine     [address] [place] [address]     [phone] after hours  [phone] fax       NURSING HOME ORDERS    11/21/2019    Admit to Nursing Home:      Skilled Bed                                                Diagnoses:  Active Hospital Problems    Diagnosis  POA    *Acute on chronic respiratory failure with hypoxia [J96.21]  Yes    Urinary tract infection without hematuria [N39.0]  Yes    Anemia of chronic disorder [D63.8]  Yes     Chronic    Hyperkalemia [E87.5]  Yes    Acute on chronic diastolic congestive heart failure [I50.33]  Yes    CKD (chronic kidney disease), stage III [N18.3]  Yes    Essential hypertension [I10]  Yes     Chronic    Morbid obesity [E66.01]  Yes    Paroxysmal atrial fibrillation [I48.0]  Yes    Metabolic alkalosis [E87.3]  Yes    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    Ace inhibitors      Other reaction(s): cough      Fluorescein Itching     Other reaction(s): Itching    Iodine      Other reaction(s): Itching    Pravastatin Other (See Comments)     Other reaction(s): mouth tingling/numbness    Simvastatin      Other reaction(s): foot swelling         Vitals:       Every shift (Skilled Nursing patients)    Diet: ADA 1800 salomón, soft diet     Acitivities:      - Up in a chair  each morning as tolerated   - Ambulate with assistance to bathroom     - May use walker, cane, or self-propelled wheelchair   - Weight bearing: as tolerated.    LABS:  Per facility protocol    Nursing Precautions:    - Aspiration precautions:                       -  Upright 90 degrees befor during and after meals                   - Fall precautions per nursing home protocol     - Decubitus precautions:        -  for positioning   - Pressure reducing foam mattress   - Turn patient every two hours. Use wedge pillows to anchor patient    CONSULTS:    Physical Therapy to evaluate and treat     Occupational Therapy to evaluate and treat         MISCELLANEOUS CARE:              Respiratory,    Please on 3 liter NC O 2 during day     Bipap 12/6 at FIO2  of 35% nightly          Routine Skin for Bedridden Patients:  Apply moisture barrier cream to all    skin folds and wet areas in perineal area daily and after baths and                           all bowel movements.                                     DIABETES CARE:        Check blood sugar:      Fingerstick blood sugar a.m and p.m.    Fingerstick blood sugar AC and HS   Fingerstick blood sugar every 6 hours if unable to eat      Report CBG < 60 or > 400 to physician.                                          Insulin Sliding Scale          Glucose  Novolog Insulin Subcutaneous        0 - 60   Orange juice or glucose tablet, hold insulin      No insulin   201-250  2 units   251-300  4 units   301-350  6 units   351-400  8 units   >400   10 units then call physician      Medications: Discontinue all previous medication orders, if any. See new list below.     Kaci Whalen   Home Medication Instructions RUBÉN:[id number]    Printed on:11/21/19 0754   Medication Information                      amLODIPine (NORVASC) 10 MG tablet  Take 1 tablet (10 mg total) by mouth once daily.             amoxicillin-clavulanate 875-125mg (AUGMENTIN) 875-125 mg per tablet  Take  1 tablet by mouth 2 (two) times daily. for 7 days  Until 11.28.19            y.             divalproex (DEPAKOTE) 500 MG TbEC  Take 1 tablet (500 mg total) by mouth every 12 (twelve) hours.             fluticasone (FLONASE) 50 mcg/actuation nasal spray  1 spray (50 mcg total) by Each Nare route 2 (two) times daily as needed for nasal drip or cough              furosemide (LASIX) 40 MG tablet  Take 1 tablet (40 mg total) by mouth once daily.              gabapentin (NEURONTIN) 100 MG capsule  Take 1 capsule (100 mg total) by mouth 3 (three) times daily.             hydroCHLOROthiazide (HYDRODIURIL) 25 MG tablet  Take 25 mg by mouth once daily.             insulin aspart protamine-insulin aspart (NOVOLOG 70/30) 100 unit/mL (70-30) InPn pen  Inject 21 Units into the skin 2 (two) times daily before meals.              losartan (COZAAR) 100 MG tablet  Take 1 tablet (100 mg total) by mouth once daily.             metoprolol succinate (TOPROL-XL) 25 MG 24 hr tablet  Take 1 tablet (25 mg total) by mouth once daily.             multivitamin capsule  Take 1 capsule by mouth once daily.             polyethylene glycol (GLYCOLAX) 17 gram PwPk  Take 17 g by mouth 2 (two) times daily as needed for constipation              ranitidine (ZANTAC) 150 MG tablet  Take 150 mg by mouth 2 (two) times daily.                       _________________________________  Mayelin Marshall MD  11/21/2019   supervision

## 2019-11-21 NOTE — PROGRESS NOTES
0948 TN uploaded d/c summary and NH orders via [facility] to [facility]. TN contacted [facility] at [phone]; spoke with Sun to inquire of [employer] authorization; was in a meeting; will contact TN.    1055 TN received a call from Sun stating no auth yet from [employer]; requested MAR and updated clinicals. Sent MAR and updated clinicals via [facility]; will contact TN with auth and call report.    1230 TN contacted Sun at [facility] to get an update on [employer] auth; left message.s    1441 TN contacted Sun [facility] regarding authorization, no [employer] auth yet; will continue to  f/u.    1455 TN was contacted by Sun of [facility] with call report, room 609; [facility].    1500 ADT 30 order placed for transportation.  Requested  time: 4:00 PM. If transportation does not arrive at ETA time nurse, Amita, will be instructed to follow protocol for transportation below:   How can I get in touch directly with dispatch, if needed?                 Non-emergent dispatch: [phone]                                    Emergent dispatch: [phone]  Non-emergent (stretcher): [phone]  Escalation Needs (PFC Lead): [phone] TN scheduled an electronic appt with Dr. Stewart, earliest time, 12/12/19 at 9:00 AM.

## 2019-11-21 NOTE — PLAN OF CARE
Problem: Physical Therapy Goal  Goal: Physical Therapy Goal  Description  Goals to be met by: 19     Patient will increase functional independence with mobility by performin. Supine to sit with Stand-by Assistance  2. Rolling to Left and Right with Stand-by Assistance  3. Sit to stand transfer with Stand-by Assistance using RW  4. Bed to chair transfer with Stand-by Assistance using Rolling Walker  5. Gait x50 feet with Stand-by Assistance using Rolling Walker and O2   6. Lower extremity exercise program 3 sets x10 reps per handout, with SBA     Outcome: Ongoing, Progressing     Pt sleepy today, able to stand with max A of 2 persons using RW x 3 trials with increased tolerance with each trials.

## 2019-11-21 NOTE — PROGRESS NOTES
Bedside report given to oncoming nurse CANDI Levi noted. Pt lying in bed, Respirations even and unlabored. O2 in use. Safety maintained, Call light within reach.     24 hr chart check completed.

## 2019-11-21 NOTE — PLAN OF CARE
11/21/19 1514   Final Note   Assessment Type Final Discharge Note   Anticipated Discharge Disposition SNF   What phone number can be called within the next 1-3 days to see how you are doing after discharge?   (Listed in chart)   Hospital Follow Up  Appt(s) scheduled? Yes   Discharge plans and expectations educations in teach back method with documentation complete? Yes   [facility] Referral Info   Post Acute Recommendation SNF / Sub-Acute Rehab     TN informed floor nurse, Amita, care management is complete and can proceed with discharge teaching.

## 2019-11-21 NOTE — PT/OT/SLP PROGRESS
Physical Therapy Treatment    Patient Name:  Kaci Whalen   MRN:  [id number]    Recommendations:     Discharge Recommendations:  nursing facility, skilled   Discharge Equipment Recommendations: none   Barriers to discharge home: Pt with decreased mobility.    Assessment:     Kaci Whalen is a 79 y.o. female admitted with a medical diagnosis of Acute on chronic respiratory failure with hypoxia.  She presents with the following impairments/functional limitations:  weakness, impaired endurance, impaired functional mobilty, gait instability, impaired balance, decreased safety awareness, decreased coordination, decreased upper extremity function, decreased lower extremity function, impaired cardiopulmonary response to activity.    Rehab Prognosis: Fair; patient would benefit from acute skilled PT services to address these deficits and reach maximum level of function.    Recent Surgery: * No surgery found *      Plan:     During this hospitalization, patient to be seen 5 x/week to address the identified rehab impairments via gait training, therapeutic activities, therapeutic exercises and progress toward the following goals:    · Plan of Care Expires:  11/29/19    Subjective     Chief Complaint: N/A  Patient/Family Comments/goals: Pt reported feeling better today.   Pain/Comfort:  · Pain Rating 1: (L shoulder pain with ROM )      Objective:     Patient found HOB elevated with oxygen 3L, PureWick, peripheral IV, telemetry, bed alarm upon PT entry to room.     General Precautions: Standard, fall, respiratory, diabetic, vision impaired   Orthopedic Precautions:N/A   Braces: N/A    Functional Mobility:  Pt with increased alertness today, able to take some sidesteps with assistance.  Pt with no BUE jerky movement today with increased B  to RW.    · Bed Mobility:     · Rolling Left:  maximal assistance  · Scooting: maximal assistance  · Bridging: maximal assistance and of 2 persons with bed in trendelenburg; Pt able to  push with BLE.    · Supine to Sit: maximal assistance and of 2 persons with HOB elevated   · Sit to Supine: maximal assistance and of 2 persons  · Transfers:     · Sit to Stand:  maximal assistance and of 2 persons with rolling walker x 3 trials   · Gait: Pt ambulated 3-4 sidesteps x 3 trials with mod A using RW and 3L O2 NC.  Pt with forward flexed trunk, decreased weight shifting, decreased foot clearance, and decreased step length.  Pt easily fatigued.   · Balance: Pt with fair sit balance and fair- stand balance.       AM-PAC 6 CLICK MOBILITY  Turning over in bed (including adjusting bedclothes, sheets and blankets)?: 2  Sitting down on and standing up from a chair with arms (e.g., wheelchair, bedside commode, etc.): 2  Moving from lying on back to sitting on the side of the bed?: 2  Moving to and from a bed to a chair (including a wheelchair)?: 2  Need to walk in hospital room?: 2  Climbing 3-5 steps with a railing?: 1  Basic Mobility Total Score: 11       Therapeutic Activities and Exercises:  BLE seated therex 10 reps: hip flex, LAQ, and AP    PROM/AAROM LUE in all available planes and as tolerated; Pt limited by L shoulder pain    BUE seated therex 10 reps AAROM: shoulder flex and elbow flex/ext    AAROM/AROM trunk flex/ext and rotation x 10 reps seated EOB    Balance Training  Static Standing:  Patient performed static standing on level surface  using rolling walker with Moderate Assistance and maximal verbal cues for upright posture x 3 trials.       Patient left HOB elevated, BUE and BLE elevated on pillows with all lines intact and call button in reach.    GOALS:   Multidisciplinary Problems     Physical Therapy Goals     Not on file          Multidisciplinary Problems (Resolved)        Problem: Physical Therapy Goal    Goal Priority Disciplines Outcome Goal Variances Interventions   Physical Therapy Goal        PT, PT/OT progressing, ongoing     Description:  Goals to be met by: 11/29/19     Patient  will increase functional independence with mobility by performin. Supine to sit with Stand-by Assistance  2. Rolling to Left and Right with Stand-by Assistance  3. Sit to stand transfer with Stand-by Assistance using RW  4. Bed to chair transfer with Stand-by Assistance using Rolling Walker  5. Gait x50 feet with Stand-by Assistance using Rolling Walker and O2   6. Lower extremity exercise program 3 sets x10 reps per handout, with SBA                      Time Tracking:     PT Received On: 19  PT Start Time:      PT Stop Time: 1458  PT Total Time (min): 29 min     Billable Minutes: Therapeutic Activity 15 min co-tx with OT    Treatment Type: Treatment  PT/PTA: PT     PTA Visit Number: 0     Tisha GAUTAM Bah, PT   2019

## 2019-11-21 NOTE — PLAN OF CARE
Problem: Diabetes Comorbidity  Goal: Blood Glucose Level Within Desired Range  Outcome: Ongoing, Progressing  Intervention: Maintain Glycemic Control  Flowsheets (Taken 11/21/2019 4282)  Glycemic Management: blood glucose monitoring; oral hydration promoted; supplemental insulin given

## 2019-11-21 NOTE — PT/OT/SLP PROGRESS
Physical Therapy Treatment    Patient Name:  Kaci Whalen   MRN:  [id number]    Recommendations:     Discharge Recommendations:  nursing facility, skilled   Discharge Equipment Recommendations: none   Barriers to discharge home: Decreased mobility    Assessment:     Kaci Whalen is a 79 y.o. female admitted with a medical diagnosis of Acute on chronic respiratory failure with hypoxia.  She presents with the following impairments/functional limitations:  weakness, impaired endurance, impaired functional mobilty, gait instability, impaired balance, decreased safety awareness, decreased coordination, decreased upper extremity function, decreased lower extremity function, impaired cardiopulmonary response to activity.    Rehab Prognosis: Fair; patient would benefit from acute skilled PT services to address these deficits and reach maximum level of function.    Recent Surgery: * No surgery found *      Plan:     During this hospitalization, patient to be seen 5 x/week to address the identified rehab impairments via gait training, therapeutic activities, therapeutic exercises and progress toward the following goals:    · Plan of Care Expires:  11/29/19    Subjective     Chief Complaint: N/A  Patient/Family Comments/goals: Pt agreeable to therapy.   Pain/Comfort:  · Pain Rating 1: (L shoulder pain, however appeared less with ROM today)      Objective:     Patient found HOB elevated with oxygen 3L, PureWick, peripheral IV, telemetry, bed alarm upon PT entry to room.     General Precautions: Standard, fall, respiratory, diabetic, vision impaired   Orthopedic Precautions:N/A   Braces: N/A    Functional Mobility:  Pt with confusion and lethargy.    · Bed Mobility:     · Scooting: maximal assistance for anterior scooting  · Bridging: dependence and of 2 persons with bed in trendelenburg to reposition HOB   · Supine to Sit: dep of 2 persons with HOB elevated   · Sit to Supine: dependence and of 2 persons  · Transfers:      · Sit to Stand:  maximal assistance and of 2 persons with rolling walker x 3 trials; Pt with forward flexed posture, increased B  on RW today.  Pt with improved upright posture with max VC's.  1st trial: 5 sec, 2nd trial: 10 sec, and 3rd trial: 15 sec  · Gait: Pt unable to take steps at this time.  · Balance: Pt with fair static sit balance and poor static stand balance.     AM-PAC 6 CLICK MOBILITY  Turning over in bed (including adjusting bedclothes, sheets and blankets)?: 2  Sitting down on and standing up from a chair with arms (e.g., wheelchair, bedside commode, etc.): 2  Moving from lying on back to sitting on the side of the bed?: 2  Moving to and from a bed to a chair (including a wheelchair)?: 1  Need to walk in hospital room?: 1  Climbing 3-5 steps with a railing?: 1  Basic Mobility Total Score: 9       Therapeutic Activities and Exercises:  PROM LUE seated in all available planes  PROM BLE seated in all available planes  PROM trunk sidebending and rotation seated EOB    Balance Training  Static Standing:  Patient performed static standing on level surface  using rolling walker with Moderate Assistance and maximal verbal cues for upright posture x 3 trials.       Patient left HOB elevated, BUE and BLE elevated on pillows with all lines intact, call button in reach, bed alarm on and daughter and spouse present.    GOALS:   Multidisciplinary Problems     Physical Therapy Goals        Problem: Physical Therapy Goal    Goal Priority Disciplines Outcome Goal Variances Interventions   Physical Therapy Goal     PT, PT/OT Ongoing, Progressing     Description:  Goals to be met by: 19     Patient will increase functional independence with mobility by performin. Supine to sit with Stand-by Assistance  2. Rolling to Left and Right with Stand-by Assistance  3. Sit to stand transfer with Stand-by Assistance using RW  4. Bed to chair transfer with Stand-by Assistance using Rolling Walker  5. Gait x50  feet with Stand-by Assistance using Rolling Walker and O2   6. Lower extremity exercise program 3 sets x10 reps per handout, with SBA                      Time Tracking:     PT Received On: 11/20/19  PT Start Time: 1601     PT Stop Time: 1629  PT Total Time (min): 28 min     Billable Minutes: Therapeutic Activity 14 min co-tx with OT    Treatment Type: Treatment  PT/PTA: PT     PTA Visit Number: 0     Tisha Bah, PT   11/20/2019

## 2019-11-21 NOTE — NURSING
Report Recieved from off going nurse Valdemar RN. Patient is Awake and alert. Bed in lowest position, wheels locked, call light in reach. Sitting up in bed.

## 2019-11-21 NOTE — PLAN OF CARE
11/21/19 1513   Post-Acute Status   Post-Acute Authorization Placement  ([facility])   Post-Acute Placement Status Authorization Obtained   Discharge Delays (!) Patient Transportation

## 2019-11-21 NOTE — PLAN OF CARE
Problem: Physical Therapy Goal  Goal: Physical Therapy Goal  Description  Goals to be met by: 19     Patient will increase functional independence with mobility by performin. Supine to sit with Stand-by Assistance  2. Rolling to Left and Right with Stand-by Assistance  3. Sit to stand transfer with Stand-by Assistance using RW  4. Bed to chair transfer with Stand-by Assistance using Rolling Walker  5. Gait x50 feet with Stand-by Assistance using Rolling Walker and O2   6. Lower extremity exercise program 3 sets x10 reps per handout, with SBA      2019 1517 by Tisha Bah, PT  Outcome: Ongoing, Progressing  Pt able to take a few sidesteps along bedside with mod A using RW and 3L O2 NC.

## 2019-11-21 NOTE — NURSING
Bedside report given to NICHOLE Aldrich. Patient sitting up in bed Bipap in place. Easily aroused by verbal stimuli and and touch. NAD noted at this time. All safety precautions in place. Will continue to monitor.

## 2019-11-21 NOTE — DISCHARGE SUMMARY
[facility] Hospital Medicine  Discharge Summary      Patient Name: Kaci Whalen  MRN: [id number]  Admission Date: 11/14/2019  Hospital Length of Stay: 7 days  Discharge Date and Time:  11/21/2019 9:17 AM  Attending Physician: Mayelin Marshall MD   Discharging Provider: Mayelin Marshall MD  Primary Care Provider: Toby Hung MD      HPI:   78 y/o female presents to the ER via EMS complaining of gradually worsening shortness of breath and non productive cough for 2 days.  EMS reports an initial O2 sat of 77% on 2L O2.  The patient uses 2L of supplemental O2 at all times.  Patient apparently started complaining of congestion that did not improve with Mucinex and cough syrup.  Patient complains of severe shortness of breath with minimal exertion.  She can never lay flat to sleep.  Does complain of some lower extremity swelling, but states that it's actually much better than in the past.  She is compliant with her Lasix 40mg (MWF).  Shortness of breath worsened this morning and patient presented to ER.  Patient states that she also gets very anxious when she gets short of breath.  No other complaints.    * No surgery found *      Hospital Course:   78 y/o female presents to the ER via EMS complaining of gradually worsening shortness of breath and non productive cough for 2 days.  EMS reports an initial O2 sat of 77% on 2L O2.  The patient uses 2L of supplemental O2 at all times.  Patient apparently started complaining of congestion that did not improve with Mucinex and cough syrup.  Patient complains of severe shortness of breath with minimal exertion.  She can never lay flat to sleep.  Does complain of some lower extremity swelling, but states that it's actually much better than in the past.  She is compliant with her Lasix 40mg (MWF).  Shortness of breath worsened  and patient presented to ER.  Patient states that she also gets very anxious when she gets short of breath.patiemnt  was admitted for   Acute on chronic diastolic heart failure and was  started on IV Lasix. Some improvement with diuresis, but patient somewhat confused and lethargic.  ABG's show hypercapnia and started on Bipap.  Pulmonary consulted.respiratory status is improved,biapap switched to QHS,need out patient sleep study and  CPAP,was  Spiking low grade fevers.  UA with 3+ leukocytes and many bacteria.  Started on Rocephin.  Some dysphagia and ST consulted.  Very weak and fatigued.  PT/OT evaluation.recomending SNF,consulted SW,placed PPD.  Still has some fever,multiple organism on urine culture.resolved with IV Rocephin.  Her symptoms much improved,patient has been discharged to SNF.     Consults:   Consults (From admission, onward)        Status Ordering Provider     Inpatient consult to Pulmonology  Once     Provider:  Jose Stewart MD    Completed KRYSTA SHETTY     Inpatient consult to SNF  Once     Provider:  (Not yet assigned)    Acknowledged DOMINICK TORRES     Inpatient consult to Social Work  Once     Provider:  (Not yet assigned)    Completed KRYSTA SHETTY     Inpatient consult to Social Work  Once     Provider:  (Not yet assigned)    Completed DOMINICK TORRES     IP consult to case management  Once     Provider:  (Not yet assigned)    Acknowledged TRINI MARADIAGA          No new Assessment & Plan notes have been filed under this hospital service since the last note was generated.  Service: Hospital Medicine    Final Active Diagnoses:    Diagnosis Date Noted POA    PRINCIPAL PROBLEM:  Acute on chronic respiratory failure with hypoxia [J96.21] 09/09/2019 Yes    Urinary tract infection without hematuria [N39.0] 11/20/2019 Yes    Anemia of chronic disorder [D63.8] 11/14/2019 Yes     Chronic    Hyperkalemia [E87.5] 11/14/2019 Yes    Acute on chronic diastolic congestive heart failure [I50.33] 09/09/2019 Yes    CKD (chronic kidney disease), stage III [N18.3] 06/07/2017 Yes    Essential  hypertension [I10] 06/06/2017 Yes     Chronic    Morbid obesity [E66.01] 08/17/2016 Yes    Paroxysmal atrial fibrillation [I48.0] 06/10/2016 Yes    Metabolic alkalosis [E87.3] 05/28/2016 Yes    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    Specialties:  Internal Medicine, Wound Care  Why:  out patient services:  9:20AM follow up from the hospital. On waiting list to be seen earlier  Contact information:  [address]  [place] [address]  [phone]             Jose Stewart MD.    Specialties:  Pulmonary Disease, Sleep Medicine  Contact information:  [address]  [place] [address]  [phone]             [facility].    Specialties:  Nursing Home Agency, SNF Agency  Why:  SNF  Contact information:  [address]  [place] [address] [phone]             Toby Hung MD In 1 week.    Specialties:  Internal Medicine, Wound Care  Contact information:  [address]  [place] [address]  [phone]                 Patient Instructions:      BIPAP FOR HOME USE     Order Specific Question Answer Comments   Type: BiPap    BiPap setting (cmH20) Inspiratory: 12    BiPap setting (cmH20) Expiratory: 6    Length of need (1-99 months): 99    Humidification: Heated    Type of mask: FFM    BiPap supply refills: 12      Activity as tolerated       Significant Diagnostic Studies: Labs:   BMP:   Recent Labs   Lab 11/20/19  0339 11/21/19  0315   * 302*    135*   K 4.0 4.0    CL 91* 94*   CO2 35* 34*   BUN 31* 28*   CREATININE 1.3 1.2   CALCIUM 9.2 9.2   , CMP   Recent Labs   Lab 11/20/19  0339 11/21/19  0315    135*   K 4.0 4.0   CL 91* 94*   CO2 35* 34*   * 302*   BUN 31* 28*   CREATININE 1.3 1.2   CALCIUM 9.2 9.2   PROT 6.3 6.1   ALBUMIN 2.7* 2.4*   BILITOT 0.3 0.3   ALKPHOS 54* 56   AST 12 14   ALT 8* 10   ANIONGAP 10 7*   ESTGFRAFRICA 46* 50*   EGFRNONAA 40* 44*    and CBC   Recent Labs   Lab 11/20/19  0339 11/21/19  0315   WBC 6.75 10.94   HGB 9.1* 8.7*   HCT 29.6* 27.9*    258     Microbiology:   Blood Culture   Lab Results   Component Value Date    LABBLOO No growth after 5 days. 11/14/2019    and Urine Culture    Lab Results   Component Value Date    LABURIN KLEBSIELLA PNEUMONIAE  >100,000 cfu/ml   (A) 11/18/2019    LABURIN ESCHERICHIA COLI  > 100,000 cfu/ml   (A) 11/18/2019    LABURIN ENTEROCOCCUS FAECALIS  > 100,000 cfu/ml   (A) 11/18/2019     Radiology: X-Ray: CXR: X-Ray Chest 1 View (CXR):   Results for orders placed or performed during the hospital encounter of 11/14/19   X-Ray Chest 1 View    Narrative    EXAMINATION:  XR CHEST 1 VIEW    CLINICAL HISTORY:  Hypoxia;    TECHNIQUE:  Single frontal view of the chest was performed.    COMPARISON:  Chest x-ray from 11/14/2019    FINDINGS:  EKG leads overlie the chest obscuring detail.  Since the comparison exam, there is slight interval improved aeration of the lungs.  Suspected small right and possible trace left pleural effusion.  Continued follow-up is suggested.  Aortic atherosclerosis is noted.  Cardiomediastinal silhouette remains enlarged.  Osseous structures are unchanged      Impression    As above      Electronically signed by: Moise Salamanca MD  Date:    11/17/2019  Time:    15:09    and X-Ray Chest PA and Lateral (CXR): No results found for this visit on 11/14/19.    Pending Diagnostic Studies:     None         Medications:  Reconciled Home Medications:      Medication List      START taking  these medications    amoxicillin-clavulanate 875-125mg 875-125 mg per tablet  Commonly known as:  AUGMENTIN  Take 1 tablet by mouth 2 (two) times daily. for 7 days     polyethylene glycol 17 gram Pwpk  Commonly known as:  GLYCOLAX  Take 17 g by mouth 2 (two) times daily as needed.        CHANGE how you take these medications    amLODIPine 10 MG tablet  Commonly known as:  NORVASC  Take 1 tablet (10 mg total) by mouth once daily.  What changed:    · medication strength  · how much to take     desloratadine 5 mg tablet  Commonly known as:  CLARINEX  Take 1 tablet (5 mg total) by mouth once daily.  What changed:    · when to take this  · reasons to take this        CONTINUE taking these medications    divalproex 500 MG Tbec  Commonly known as:  DEPAKOTE  Take 1 tablet (500 mg total) by mouth every 12 (twelve) hours.     fluticasone propionate 50 mcg/actuation nasal spray  Commonly known as:  FLONASE  1 spray (50 mcg total) by Each Nare route 2 (two) times daily.     furosemide 40 MG tablet  Commonly known as:  LASIX  Take 1 tablet (40 mg total) by mouth once daily. One tab po daily x three days then as needed for LE swelling     gabapentin 100 MG capsule  Commonly known as:  NEURONTIN  Take 1 capsule (100 mg total) by mouth 3 (three) times daily.     hydroCHLOROthiazide 25 MG tablet  Commonly known as:  HYDRODIURIL  Take 25 mg by mouth once daily.     insulin aspart protamine-insulin aspart 100 unit/mL (70-30) Inpn pen  Commonly known as:  NovoLOG 70/30  Inject 21 Units into the skin 2 (two) times daily before meals. Sliding scale max daily dosing 50 units daily     losartan 100 MG tablet  Commonly known as:  COZAAR  Take 1 tablet (100 mg total) by mouth once daily.     metoprolol succinate 25 MG 24 hr tablet  Commonly known as:  TOPROL-XL  Take 1 tablet (25 mg total) by mouth once daily.     multivitamin capsule  Take 1 capsule by mouth once daily.     ranitidine 150 MG tablet  Commonly known as:  ZANTAC  Take 150 mg  "by mouth 2 (two) times daily.        STOP taking these medications    alcohol swabs Padm     blood glucose control, low Soln     ketoconazole 2 % shampoo  Commonly known as:  NIZORAL     lancets 28 gauge Misc  Commonly known as:  Lancets,Thin     melatonin 10 mg Tab     metFORMIN 500 MG tablet  Commonly known as:  GLUCOPHAGE     oxybutynin 5 MG Tab  Commonly known as:  DITROPAN     pen needle, diabetic 32 gauge x 5/32" Ndle  Commonly known as:  BD Ultra-Fine Gracie Pen Needle     QUEtiapine 50 MG tablet  Commonly known as:  SEROQUEL     True Metrix Glucose Test Strip Strp  Generic drug:  blood sugar diagnostic            Indwelling Lines/Drains at time of discharge:   Lines/Drains/Airways     Drain            Female External Urinary Catheter 11/14/19 1309 6 days                Time spent on the discharge of patient: over 30  minutes  Patient was seen and examined on the date of discharge and determined to be suitable for discharge.         Mayelin Marshall MD  Department of Hospital Medicine  [facility]  "

## 2019-11-21 NOTE — PT/OT/SLP PROGRESS
Occupational Therapy   Treatment    Name: Kaci Whalen  MRN: [id number]  Admitting Diagnosis:  Acute on chronic respiratory failure with hypoxia       Recommendations:     Discharge Recommendations: nursing facility, skilled  Discharge Equipment Recommendations:  none  Barriers to discharge:  None    Assessment:     Kaci Whalen is a 79 y.o. female with a medical diagnosis of Acute on chronic respiratory failure with hypoxia.  She presents with  independence for self-care and functional transfers. Performance deficits affecting function are weakness, impaired endurance, impaired self care skills, impaired functional mobilty, impaired balance, impaired cognition, decreased coordination, decreased upper extremity function, decreased safety awareness, pain, decreased ROM, impaired coordination, impaired fine motor, impaired cardiopulmonary response to activity, impaired joint extensibility.     Rehab Prognosis:  Good; patient would benefit from acute skilled OT services to address these deficits and reach maximum level of function.       Plan:     Patient to be seen 3 x/week to address the above listed problems via self-care/home management, therapeutic activities, therapeutic exercises  · Plan of Care Expires: 19  · Plan of Care Reviewed with: patient    Subjective     Pain/Comfort:  · Pain Rating 1: other (see comments)(L shoulder pain with ROM, but less than prior treatments)    Objective:     Communicated with: nurse prior to session.  Patient found supine with telemetry, peripheral IV, PureWick, oxygen upon OT entry to room.    General Precautions: Standard, fall, respiratory, vision impaired   Orthopedic Precautions:N/A   Braces: N/A     Occupational Performance:     Bed Mobility:    · Patient completed Rolling/Turning to Left with  maximal assistance  · Patient completed Rolling/Turning to Right with maximal assistance  · Patient completed Scooting/Bridging with maximal assistance  · Patient  completed Supine to Sit with maximal assistance  · Patient completed Sit to Supine with maximal assistance     Functional Mobility/Transfers:  · Patient completed Sit <> Stand Transfer with maximal assistance  with  rolling walker   · Functional Mobility: attempted x4 attempts, able to take a few side steps along the EOB x each stand     Activities of Daily Living:  · Feeding:  stand by assistance per daughter  · Upper Body Dressing: minimum assistance to jose back gown      [facility] 6 Click ADL: 10    Treatment & Education:  Therapeutic activity    Patient left supine with all lines intact, call button in reach and family presentEducation:      GOALS:   Multidisciplinary Problems     Occupational Therapy Goals     Not on file          Multidisciplinary Problems (Resolved)        Problem: Occupational Therapy Goal    Goal Priority Disciplines Outcome Interventions   Occupational Therapy Goal   (Resolved)     OT, PT/OT Met    Description:  Goals to be met by: 11/22/2019     Patient will increase functional independence with ADLs by performing:    Feeding with Set-up Assistance.  UE Dressing with Stand-by Assistance.  Grooming while seated with Stand-by Assistance.  Sitting at edge of bed x15 minutes with Stand-by Assistance.  Supine to sit with Stand-by Assistance.  Stand pivot transfers with Minimal Assistance.  Upper extremity exercise program per handout, with assistance as needed.                      Time Tracking:     OT Date of Treatment: 11/21/19  OT Start Time: 1430  OT Stop Time: 1459  OT Total Time (min): 29 min    Billable Minutes:Therapeutic Activity 14 minutes    BUSHRA Rodriguez, MS  11/21/2019

## 2019-11-21 NOTE — PROGRESS NOTES
Follow-up Information     Toby Hung MD On 12/13/2019.    Specialties:  Internal Medicine, Wound Care  Why:  out patient services:  9:20AM follow up from the hospital. On waiting list to be seen earlier  Contact information:  [address] [place] [address]  [phone]             [facility].    Specialties:  LTAC, Nursing Home Agency  Why:  Nursing Home  Contact information:  [address] [place] [address]  [phone]             Jose Stewart MD On 12/12/2019.    Specialties:  Pulmonary Disease, Sleep Medicine  Why:  Outpatient Services Pulmonology Follow-Up Thursday at 9:00 AM.  Contact information:  [address] [facility] [address]  [address]  [place] [address]  [phone]               PLEASE BRING TO ALL FOLLOW UP APPOINTMENTS:   1) A COPY YOUR DISCHARGE INSTRUCTIONS   2) ALL MEDICINES YOU ARE CURRENTLY TAKING IN THEIR ORIGINAL BOTTLES   3) IDENTIFICATION CARD   4) INSURANCE CARD    **PLEASE ARRIVE 15 MINUTES AHEAD OF SCHEDULED APPOINTMENT TIME   ++PLEASE CALL 24 HOURS IN ADVANCE IF YOU MUST RESCHEDULE YOUR APPOINTMENT DAY AND/OR TIME     [facility] CARE MANAGEMENT WRITTEN DISCHARGE INFORMATION    APPOINTMENTS AND RESOURCES TO HELP YOU MANAGE YOUR CARE AT HOME BASED ON YOUR PREFERENCES:  (If an appointment is not scheduled for you when you leave the hospital, call your doctor to schedule a follow up visit within a week)        Healthy Living Instructions to HELP MANAGE YOUR CARE AT HOME:  Things You are responsible for:  1.    Getting your prescriptions filled   2.    Taking your medications as directed, DO NOT MISS ANY DOSES!  3.    Following the diet and exercise recommended by your doctor  4.    Going to your follow-up doctor appointment. This is important because it allows the doctor to monitor your progress and determine if any changes need to made to your treatment plan.  5. If you have any questions about MANAGING YOUR CARE AT HOME Call the Nurse Care Line for 24/7  Assistance [phone]       Please answer any calls you may receive from [facility]. We want to continue to support you as you manage your healthcare needs. [facility] is happy to have the opportunity to serve you.      Thank you for choosing [facility] for your healthcare needs!  Your [facility] Case Management Team,    NICHOLE Link  Registered Nurse Transition Navigator  [phone]

## 2019-11-22 ENCOUNTER — DOCUMENTATION ONLY (OUTPATIENT)
Dept: PRIMARY CARE CLINIC | Facility: CLINIC | Age: 79
End: 2019-11-22

## 2019-11-22 ENCOUNTER — PATIENT OUTREACH (OUTPATIENT)
Dept: ADMINISTRATIVE | Facility: OTHER | Age: 79
End: 2019-11-22

## 2019-11-22 DIAGNOSIS — M25.511 RIGHT SHOULDER PAIN, UNSPECIFIED CHRONICITY: Primary | ICD-10-CM

## 2019-11-22 NOTE — PROGRESS NOTES
Pt left facility via wheelchair per PFC, NAD noted. Family present x2. Report called by previous nurse. Safety maintained.

## 2019-11-22 NOTE — PHYSICIAN QUERY
PT Name: Kaci Whalen  MR #: [id number]     CDS/: Aida Blackwell               Contact information:[email or web address]  This form is a permanent document in the medical record.     Query Date: November 22, 2019    Physician Query - Neurological Condition Clarification    By submitting this query, we are merely seeking further clarification of documentation to reflect the severity of illness of your patient. Please utilize your independent clinical judgment when addressing the question(s) below.    The Medical record reflects the following:     Indicators   Supporting Clinical Findings Location in Medical Record   x AMS, Confusion,  LOC, etc.  Patient with some confusion this morning.  Noted increasing CO2    Some improvement with diuresis, but patient somewhat confused and lethargic      PN 11/16       PN 11/17   x Acute / Chronic Illness Acute on chronic diastolic heart failure     hx of Atrial fibrillation, HTN, CHF, Chronic edema, HLD, Morbid obesity, Pulmonary hypertension mixed group 2 and 3, Sleep apnea and DM type II   [facility] 11/16    ED Provider Notes 11/14    Radiology Findings     x Electrolyte Imbalance Calcium = 8.6, 8.7, 9.1, 9.0, 9.2    Chloride = 91, 90, 92, 91, 94   Labs 11/16-11/21    Medication     x Treatment         ABG's show hypercapnia. Started on Bipap    Will also hold sedative medications (Seroquel, Gabapentin, Depakote).     PN 11/17     PN 11/18   x Other Low grade fevers. UA consistent with UTI    A&Ox4. Normal Speech    PH = 7.293  PCO2 = 93.8  PO2 = 77  HCO3 = 45.4  PN 11/18    ED Provider Notes 11/14    ABGs 11/16     Encephalopathy- is a general term for any diffuse disease of the brain that alters brain function or structure. Treatment of the cognitive dysfunction varies but is ultimately dependent on the treatment of the underlying condition.    Major Symptoms of Encephalopathy - Decreased level of consciousness, fluctuating alertness/concentration,  confusion, agitation, lethargy, somnolence, drowsiness, obtundation, stupor, or coma.         References: National Institutes of Healths (NIH) National [place] of Neurological Disorders and Strokes;  HCPro 2016; Advisory Board     Clinical Guidelines:   These guidelines will set system standards to assist providers in managing, documentation, and coding of encephalopathy. The intent of this document is to serve as a system guideline, not replace the providers clinical judgment:  Provider, please specify the diagnosis or diagnoses associated with above clinical findings.  [   ] Anoxic/Hypoxic Encephalopathy- Brain damage due to anoxia/hypoxia   [ x  ] Metabolic Encephalopathy - Due to electrolye imbalance, metabolic derangements, or infections processes, includes Septic Encephalopathy   [   ] Toxic Encephalopathy - Due to drugs, chemicals, or other toxic substances   [   ] Other Encephalopathy - Includes uremic encephalopathy   [   ] Unspecified Encephalopathy      [   ] Other Neurological Condition-  Includes Post-ictal altered mental status. (please specify condition): _________   [   ]  Clinically Undetermined     Please document in your progress notes daily for the duration of treatment until resolved, and include in your discharge summary.

## 2019-11-22 NOTE — PT/OT/SLP DISCHARGE
Physical Therapy Discharge Summary    Name: Kaci Whalen  MRN: [id number]   Principal Problem: Acute on chronic respiratory failure with hypoxia     Patient Discharged from acute Physical Therapy on 19.  Please refer to prior PT notes for functional status.     Assessment:     Patient appropriate for care in another setting.    Objective:     GOALS:   Multidisciplinary Problems     Physical Therapy Goals        Problem: Physical Therapy Goal    Goal Priority Disciplines Outcome Goal Variances Interventions   Physical Therapy Goal     PT, PT/OT Ongoing, Progressing     Description:  Goals to be met by: 19     Patient will increase functional independence with mobility by performin. Supine to sit with Stand-by Assistance  2. Rolling to Left and Right with Stand-by Assistance  3. Sit to stand transfer with Stand-by Assistance using RW  4. Bed to chair transfer with Stand-by Assistance using Rolling Walker  5. Gait x50 feet with Stand-by Assistance using Rolling Walker and O2   6. Lower extremity exercise program 3 sets x10 reps per handout, with SBA                       Reasons for Discontinuation of Therapy Services  Transfer to alternate level of care.      Plan:     Patient Discharged to: Skilled Nursing Facility.    Tisha Bah, PT  2019

## 2019-11-22 NOTE — NURSING
Contacted Located within [facility] lead in regards to ETA on discharge scheduled for 1600 with delay in transport. Lead stated they are en route to hospital and lucy be up by 1915

## 2019-11-23 NOTE — PROGRESS NOTES
[facility]   New Visit Progress Note   Recent Hospital Discharge  DOS 11/22/19     PRESENTING HISTORY     Chief Complaint/Reason for Admission:  Follow up Hospital Discharge   PCP: Toby Hung MD   Admission Date: 11/14/2019  Hospital Length of Stay: 7 days  Discharge Date and Time:  11/21/2019 9:17 AM  Attending Physician: Mayelin Marshall MD   Discharging Provider: Mayelin Marshall MD    History of Present Illness:  Ms. Kaci Whalen is a 79 y.o. female who presented to the ER via EMS complaining of gradually worsening shortness of breath and non productive cough for 2 days.  EMS reports an initial O2 sat of 77% on 2L O2.  The patient uses 2L of supplemental O2 at all times.  She can never lay flat to sleep.  Does complain of some lower extremity swelling, but states that it's actually much better than in the past.  She is compliant with her Lasix 40mg (MWF).    She was  started on IV Lasix. Some improvement with diuresis, but patient somewhat confused and lethargic.  ABG's show hypercapnia and started on Bipap.  Pulmonary consulted.respiratory status is improved,biapap switched to QHS,need out patient sleep study and  CPAP,was  Spiking low grade fevers.  UA with 3+ leukocytes and many bacteria.  Started on Rocephin.  Some dysphagia and ST consulted.   Her symptoms much improved,patient has been discharged to SNF.     ___________________________________________________________________    Today:  The resident was seen at [facility] for the first time. She is currently sitting up in the wheelchair unassisted. AAO to person, place and year. She is on 2L NC with breathing even and unlabored. LCTAB with +2 edema to BLE. Denies SOB, CP and pain. VSS. Per PT, resident needs max assist x 2 with transfers and ADLs. She is currently using BiPAP at bedtime.         Review of Systems  General ROS: negative for chills, fever  Psychological ROS: negative for hallucination, depression  or suicidal ideation  Ophthalmic ROS: negative for blurry vision  ENT ROS: negative for epistaxis, sore throat or rhinorrhea  Respiratory ROS: no cough, shortness of breath, or wheezing  Cardiovascular ROS: no chest pain or dyspnea on exertion  Gastrointestinal ROS: no abdominal pain, change in bowel habits, or black/ bloody stools  Genito-Urinary ROS: no dysuria  Musculoskeletal ROS: +weakness   Neurological ROS: no syncope or seizures; no ataxia  Dermatological ROS: negative for pruritis, rash and jaundice          PAST HISTORY:     Past Medical History:   Diagnosis Date    Abdominal pain     Anxiety     Atherosclerosis of aortic arch     noted on CXR 7/1/2015    Atrial fibrillation 05/2016    CHADS 4, on Xarelto    Benign hypertension     Bipolar disorder, most recent episode manic 8/29/2019    CHF (congestive heart failure)     Chronic constipation     Chronic diarrhea     Chronic edema     Depression     Dercum disease     Multiple painful lipomas    Diabetic retinopathy     Dysphagia     Gas pain     Glaucoma of both eyes     Hx of psychiatric care     previously amitriptyline, now jut on Trazodone 100mg QHS PRN insomnia    Hyperlipidemia with target LDL less than 100     Unable to tolerate statins    Immature cataract     Left sided numbness     per daughter    Tasia     no symptoms prior to this presentation    Morbid obesity     Nausea & vomiting     Neuropathy     On home oxygen therapy     Polyneuropathy     Primary osteoarthritis of both knees     Proteinuria     Psychiatric problem     history of depression    Pulmonary hypertension mixed group 2 and 3 1/18/2017    Requires assistance with activities of daily living (ADL)     Sleep apnea     Therapy     no history of therapy    Type II or unspecified type diabetes mellitus with neurological manifestations, uncontrolled(250.62)     Unspecified vitamin D deficiency     Unsteady gait     Uses roller walker        Past  Surgical History:   Procedure Laterality Date     tenotomy      flexors 2,3 L toes    CATARACT EXTRACTION W/  INTRAOCULAR LENS IMPLANT Bilateral     COLONOSCOPY N/A 5/4/2017    Procedure: COLONOSCOPY;  Surgeon: Suellen Wolf MD;  Location: [facility] ([address] [place] [address]);  Service: Endoscopy;  Laterality: N/A;  2nd floor due to pulm htn, possible gastroparesis. per Dr Wolf      ok to hold Xarelto 2 days prior to procedure per Dr Driss Dixon    diabetic retinopathy      right    HYSTERECTOMY      knee surgery x2      Left ankle and leg surgery secondary to a fall      rods & screws present    left arm fracture      Left cataract      PARTIAL HYSTERECTOMY      Secondary to bleeding    TOE FUSION      2,3 R    WRIST SURGERY Left 12/28/2012    pins & plate present       Family History   Problem Relation Age of Onset    No Known Problems Daughter     No Known Problems Father     Liver cancer Mother     Bone cancer Daughter     No Known Problems Sister     No Known Problems Brother     No Known Problems Maternal Aunt     No Known Problems Maternal Uncle     No Known Problems Paternal Aunt     No Known Problems Paternal Uncle     No Known Problems Maternal Grandmother     No Known Problems Maternal Grandfather     No Known Problems Paternal Grandmother     No Known Problems Paternal Grandfather     Amblyopia Neg Hx     Blindness Neg Hx     Cancer Neg Hx     Cataracts Neg Hx     Diabetes Neg Hx     Glaucoma Neg Hx     Hypertension Neg Hx     Macular degeneration Neg Hx     Retinal detachment Neg Hx     Strabismus Neg Hx     Stroke Neg Hx     Thyroid disease Neg Hx     Celiac disease Neg Hx     Cirrhosis Neg Hx     Colon cancer Neg Hx     Colon polyps Neg Hx     Crohn's disease Neg Hx     Cystic fibrosis Neg Hx     Esophageal cancer Neg Hx     Hemochromatosis Neg Hx     Inflammatory bowel disease Neg Hx     Irritable bowel syndrome Neg Hx     Liver disease Neg Hx     Rectal  cancer Neg Hx     Stomach cancer Neg Hx     Ulcerative colitis Neg Hx     Montez's disease Neg Hx     Alcohol abuse Neg Hx     Bipolar disorder Neg Hx     Dementia Neg Hx     Depression Neg Hx     Drug abuse Neg Hx     Suicide Neg Hx     Schizophrenia Neg Hx          MEDICATIONS & ALLERGIES:     Current Outpatient Medications on File Prior to Visit   Medication Sig Dispense Refill    amLODIPine (NORVASC) 10 MG tablet Take 1 tablet (10 mg total) by mouth once daily. 30 tablet 11    amoxicillin-clavulanate 875-125mg (AUGMENTIN) 875-125 mg per tablet Take 1 tablet by mouth 2 (two) times daily. for 7 days 14 tablet 0    desloratadine (CLARINEX) 5 mg tablet Take 1 tablet (5 mg total) by mouth once daily. (Patient taking differently: Take 5 mg by mouth as needed. ) 90 tablet 1    divalproex (DEPAKOTE) 500 MG TbEC Take 1 tablet (500 mg total) by mouth every 12 (twelve) hours. 60 tablet 3    fluticasone (FLONASE) 50 mcg/actuation nasal spray 1 spray (50 mcg total) by Each Nare route 2 (two) times daily. 16 g 3    furosemide (LASIX) 40 MG tablet Take 1 tablet (40 mg total) by mouth once daily. One tab po daily x three days then as needed for LE swelling 90 tablet 1    gabapentin (NEURONTIN) 100 MG capsule Take 1 capsule (100 mg total) by mouth 3 (three) times daily. 90 capsule 5    hydroCHLOROthiazide (HYDRODIURIL) 25 MG tablet Take 25 mg by mouth once daily.  3    insulin aspart protamine-insulin aspart (NOVOLOG 70/30) 100 unit/mL (70-30) InPn pen Inject 21 Units into the skin 2 (two) times daily before meals. Sliding scale max daily dosing 50 units daily 15 mL 2    losartan (COZAAR) 100 MG tablet Take 1 tablet (100 mg total) by mouth once daily. 90 tablet 3    metoprolol succinate (TOPROL-XL) 25 MG 24 hr tablet Take 1 tablet (25 mg total) by mouth once daily. 90 tablet 2    multivitamin capsule Take 1 capsule by mouth once daily.      polyethylene glycol (GLYCOLAX) 17 gram PwPk Take 17 g by mouth 2  (two) times daily as needed.  0    ranitidine (ZANTAC) 150 MG tablet Take 150 mg by mouth 2 (two) times daily.  1     Current Facility-Administered Medications on File Prior to Visit   Medication Dose Route Frequency Provider Last Rate Last Dose    [DISCONTINUED] acetaminophen tablet 650 mg  650 mg Oral Q4H PRN Maximilian Moreira MD   650 mg at 11/20/19 0828    [DISCONTINUED] amLODIPine tablet 10 mg  10 mg Oral Daily Mayelin Marshall MD   10 mg at 11/21/19 0819    [DISCONTINUED] cefTRIAXone (ROCEPHIN) 1 g in dextrose 5 % 50 mL IVPB  1 g Intravenous Q24H Maximilian Moreira MD   1 g at 11/21/19 1408    [DISCONTINUED] cloNIDine tablet 0.1 mg  0.1 mg Oral Q8H PRN Maximilian Moreira MD   0.1 mg at 11/20/19 2039    [DISCONTINUED] dextrose 50% injection 12.5 g  12.5 g Intravenous PRN Renata Lepe MD        [DISCONTINUED] dextrose 50% injection 25 g  25 g Intravenous PRN Renata Lepe MD        [DISCONTINUED] divalproex EC tablet 500 mg  500 mg Oral Q12H Mayelin Marshall MD   500 mg at 11/21/19 0818    [DISCONTINUED] famotidine tablet 20 mg  20 mg Oral Daily Rosalio Villalobos MD   20 mg at 11/21/19 0819    [DISCONTINUED] fluticasone propionate 50 mcg/actuation nasal spray 50 mcg  1 spray Each Nostril BID Maximilian Moreira MD   50 mcg at 11/21/19 0819    [DISCONTINUED] furosemide tablet 40 mg  40 mg Oral Daily Maximilian Moreira MD   40 mg at 11/21/19 0819    [DISCONTINUED] glucagon (human recombinant) injection 1 mg  1 mg Intramuscular PRN Renata Lepe MD        [DISCONTINUED] glucose chewable tablet 16 g  16 g Oral PRN Renata Lepe MD        [DISCONTINUED] glucose chewable tablet 24 g  24 g Oral PRN Renata Lepe MD        [DISCONTINUED] heparin (porcine) injection 5,000 Units  5,000 Units Subcutaneous Q8H Rosalio Villalobos MD   5,000 Units at 11/21/19 1408    [DISCONTINUED] insulin aspart U-100 pen 0-5 Units  0-5 Units Subcutaneous PRN Renata Lepe MD   4 Units at 11/21/19 1129    [DISCONTINUED]  insulin aspart U-100 pen 7 Units  7 Units Subcutaneous TIDWM Mayelin Marshall MD   7 Units at 11/21/19 1129    [DISCONTINUED] insulin detemir U-100 pen 20 Units  20 Units Subcutaneous Daily Mayelin Marshall MD   20 Units at 11/21/19 0823    [DISCONTINUED] losartan tablet 100 mg  100 mg Oral Daily Maximilian Moreira MD   100 mg at 11/21/19 0823    [DISCONTINUED] metoprolol succinate (TOPROL-XL) 24 hr tablet 25 mg  25 mg Oral Daily Maximilian Moreira MD   25 mg at 11/21/19 0819    [DISCONTINUED] ondansetron injection 8 mg  8 mg Intravenous Q6H PRN Maximilian Moreira MD        [DISCONTINUED] oxybutynin tablet 5 mg  5 mg Oral Daily Maximilian Moreira MD   5 mg at 11/21/19 0818    [DISCONTINUED] polyethylene glycol packet 17 g  17 g Oral BID PRN Maximilian Moreira MD        [DISCONTINUED] sodium chloride 0.9% flush 10 mL  10 mL Intravenous PRN Rosalio Villalobos MD            Review of patient's allergies indicates:   Allergen Reactions    Tramadol Other (See Comments)     Interaction-induced delirium with s/s of estrellita.    Ace inhibitors      Other reaction(s): cough      Fluorescein Itching     Other reaction(s): Itching    Iodine      Other reaction(s): Itching    Pravastatin Other (See Comments)     Other reaction(s): mouth tingling/numbness    Simvastatin      Other reaction(s): foot swelling         OBJECTIVE:     Vital Signs:  /61, pulse 69, resp 18, SpO2 96% 2L NC, temp 99  Wt Readings from Last 1 Encounters:   11/21/19 0441 97.3 kg (214 lb 8.1 oz)   11/20/19 0538 98.3 kg (216 lb 11.4 oz)   11/19/19 0340 99.1 kg (218 lb 7.6 oz)   11/18/19 0413 98.5 kg (217 lb 2.5 oz)   11/17/19 0454 98.3 kg (216 lb 11.4 oz)   11/14/19 1107 98.9 kg (218 lb)     There is no height or weight on file to calculate BMI.        Physical Exam:  General appearance: Obese; alert, cooperative, no distress  Constitutional: She is oriented to person, place, and time. No distress.  Head: Normocephalic and atraumatic.    Eyes: Conjunctivae are normal. Right eye exhibits no discharge. Left eye exhibits no discharge.   Neck: Neck supple. No tracheal deviation present.   Cardiovascular: Normal rate and regular rhythm.   Pulmonary/Chest: Normal breathing effort. Clear to auscultation. Diminished breath sounds.   Abdominal: Soft. Bowel sounds are normal.   Musculoskeletal: She exhibits +2 edema to BLE  Neurological: She is alert and oriented to person, place, and time.   Skin: Skin is warm and dry. She is not diaphoretic.      Laboratory  Lab Results   Component Value Date    WBC 10.94 11/21/2019    HGB 8.7 (L) 11/21/2019    HCT 27.9 (L) 11/21/2019    MCV 96 11/21/2019     11/21/2019     BMP  Lab Results   Component Value Date     (L) 11/21/2019    K 4.0 11/21/2019    CL 94 (L) 11/21/2019    CO2 34 (H) 11/21/2019    BUN 28 (H) 11/21/2019    CREATININE 1.2 11/21/2019    CALCIUM 9.2 11/21/2019    ANIONGAP 7 (L) 11/21/2019    ESTGFRAFRICA 50 (A) 11/21/2019    EGFRNONAA 44 (A) 11/21/2019     Lab Results   Component Value Date    ALT 10 11/21/2019    AST 14 11/21/2019    ALKPHOS 56 11/21/2019    BILITOT 0.3 11/21/2019     Lab Results   Component Value Date    INR 1.0 11/14/2019    INR 1.0 09/09/2019    INR 0.9 07/29/2011     Lab Results   Component Value Date    HGBA1C 6.9 (H) 08/30/2019           ASSESSMENT & PLAN:     Acute on chronic respiratory failure with hypoxia  Debility  Respiratory failure secondary to CHF exacerbation.  - stable. Continue to monitor   - Continue with PT/OT        Edema   - on Lasix 40 mg daily    Urinary tract infection without hematuria   -current on Augmentin BID x 7 days, ends 11/29     Hyperkalemia  - order BMP on 11/26        Anemia of chronic disorder  H/H similar to previous labs.  No evidence of bleeding.       CKD (chronic kidney disease), stage III  Avoid nephrotoxic medications.        Essential hypertension  - on Toprol, losartan, HCTZ, lasix, and amlodipine       Morbid obesity  Body  mass index is 44.03 kg/m².           Paroxysmal atrial fibrillation  Currently in sinus rhythm.  Continue B blocker.        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, with long-term current use of insulin  Neuropathy  - on insulin sliding scale and Novolog 70/30 21 units BID before meals  - gabapentin 100 mg TID       Gerd  - on ranitidine 150 mg BID       Scheduled Follow-up :  Future Appointments   Date Time Provider Department Center   11/25/2019  9:30 AM [facility] XR1 [facility] X[facility]   11/27/2019  8:15 AM PULMONARY FUNCTION [facility] PULAB [facility]   11/27/2019  8:30 AM PULMONARY FUNCTION [facility] PULAB [facility]   11/27/2019  8:45 AM [facility]   12/12/2019  8:15 AM Rozina Buck MD [facility] HAND [facility]   12/12/2019  9:00 AM Jose Stewart MD [facility]   12/13/2019  9:20 AM Toby Hung MD [facility]   12/24/2019  2:45 PM Anisha Miller [facility] POD [place]   1/15/2020 11:00 AM Solis Rosenbaum Jr., MD [facility] PSYCH [facility]   1/31/2020 10:00 AM LAB, NORBERTO [facility] LAB [place]   2/5/2020 11:00 AM Toby Hung MD [facility]   2/11/2020  3:00 PM Jose Stewart MD [facility]   2/12/2020 10:20 AM Malou Stephens DO [facility] NEURO [facility]       Post Visit Medication List:     Medication List           Accurate as of November 22, 2019  9:06 PM. If you have any questions, ask your nurse or doctor.               CHANGE how you take these medications    desloratadine 5 mg tablet  Commonly known as:  CLARINEX  Take 1 tablet (5 mg total) by mouth once daily.  What changed:    · when to take this  · reasons to take this        CONTINUE taking these medications    amLODIPine 10 MG tablet  Commonly known as:  NORVASC  Take 1 tablet (10 mg total) by mouth once daily.     amoxicillin-clavulanate 875-125mg 875-125 mg per  tablet  Commonly known as:  AUGMENTIN  Take 1 tablet by mouth 2 (two) times daily. for 7 days     divalproex 500 MG Tbec  Commonly known as:  DEPAKOTE  Take 1 tablet (500 mg total) by mouth every 12 (twelve) hours.     fluticasone propionate 50 mcg/actuation nasal spray  Commonly known as:  FLONASE  1 spray (50 mcg total) by Each Nare route 2 (two) times daily.     furosemide 40 MG tablet  Commonly known as:  LASIX  Take 1 tablet (40 mg total) by mouth once daily. One tab po daily x three days then as needed for LE swelling     gabapentin 100 MG capsule  Commonly known as:  NEURONTIN  Take 1 capsule (100 mg total) by mouth 3 (three) times daily.     hydroCHLOROthiazide 25 MG tablet  Commonly known as:  HYDRODIURIL     insulin aspart protamine-insulin aspart 100 unit/mL (70-30) Inpn pen  Commonly known as:  NovoLOG 70/30  Inject 21 Units into the skin 2 (two) times daily before meals. Sliding scale max daily dosing 50 units daily     losartan 100 MG tablet  Commonly known as:  COZAAR  Take 1 tablet (100 mg total) by mouth once daily.     metoprolol succinate 25 MG 24 hr tablet  Commonly known as:  TOPROL-XL  Take 1 tablet (25 mg total) by mouth once daily.     multivitamin capsule     polyethylene glycol 17 gram Pwpk  Commonly known as:  GLYCOLAX  Take 17 g by mouth 2 (two) times daily as needed.     ranitidine 150 MG tablet  Commonly known as:  ZANTAC          Total time of the visit 70 min 2:30 pm - 3:40 pm  Non physical exam/ non charting time: 40 minutes   Description of non physical exam/non charting time:  Extensive chart review completed including all consultation notes.  All pertinent laboratory and radiographical images reviewed.    Signing Physician:  ALETA Palumbo

## 2019-11-25 ENCOUNTER — PATIENT MESSAGE (OUTPATIENT)
Dept: FAMILY MEDICINE | Facility: CLINIC | Age: 79
End: 2019-11-25

## 2019-11-25 ENCOUNTER — HOSPITAL ENCOUNTER (INPATIENT)
Facility: HOSPITAL | Age: 79
LOS: 1 days | Discharge: HOSPICE | DRG: 682 | End: 2019-11-26
Attending: EMERGENCY MEDICINE | Admitting: HOSPITALIST
Payer: MEDICARE

## 2019-11-25 DIAGNOSIS — I50.9 CONGESTIVE HEART FAILURE, UNSPECIFIED HF CHRONICITY, UNSPECIFIED HEART FAILURE TYPE: ICD-10-CM

## 2019-11-25 DIAGNOSIS — N17.9 ACUTE RENAL FAILURE SUPERIMPOSED ON STAGE 3 CHRONIC KIDNEY DISEASE, UNSPECIFIED ACUTE RENAL FAILURE TYPE: Primary | ICD-10-CM

## 2019-11-25 DIAGNOSIS — N18.3 ACUTE RENAL FAILURE SUPERIMPOSED ON STAGE 3 CHRONIC KIDNEY DISEASE, UNSPECIFIED ACUTE RENAL FAILURE TYPE: Primary | ICD-10-CM

## 2019-11-25 DIAGNOSIS — I63.9 CEREBROVASCULAR ACCIDENT (CVA), UNSPECIFIED MECHANISM: ICD-10-CM

## 2019-11-25 DIAGNOSIS — I95.89 OTHER SPECIFIED HYPOTENSION: ICD-10-CM

## 2019-11-25 DIAGNOSIS — A41.9 SEPSIS, DUE TO UNSPECIFIED ORGANISM, UNSPECIFIED WHETHER ACUTE ORGAN DYSFUNCTION PRESENT: ICD-10-CM

## 2019-11-25 PROBLEM — J96.22 ACUTE ON CHRONIC RESPIRATORY FAILURE WITH HYPOXIA AND HYPERCAPNIA: Status: ACTIVE | Noted: 2019-11-25

## 2019-11-25 PROBLEM — G93.1 ACUTE ANOXIC ENCEPHALOPATHY: Status: ACTIVE | Noted: 2019-11-25

## 2019-11-25 PROBLEM — Z71.89 GOALS OF CARE, COUNSELING/DISCUSSION: Status: ACTIVE | Noted: 2019-11-25

## 2019-11-25 PROBLEM — N18.30 ACUTE RENAL FAILURE SUPERIMPOSED ON STAGE 3 CHRONIC KIDNEY DISEASE: Status: ACTIVE | Noted: 2019-11-25

## 2019-11-25 PROBLEM — J96.21 ACUTE ON CHRONIC RESPIRATORY FAILURE WITH HYPOXIA AND HYPERCAPNIA: Status: ACTIVE | Noted: 2019-11-25

## 2019-11-25 PROBLEM — I50.32 CHRONIC DIASTOLIC CONGESTIVE HEART FAILURE: Status: ACTIVE | Noted: 2019-11-25

## 2019-11-25 PROBLEM — J96.11 CHRONIC RESPIRATORY FAILURE WITH HYPOXIA AND HYPERCAPNIA: Status: ACTIVE | Noted: 2019-11-25

## 2019-11-25 PROBLEM — J96.12 CHRONIC RESPIRATORY FAILURE WITH HYPOXIA AND HYPERCAPNIA: Status: ACTIVE | Noted: 2019-11-25

## 2019-11-25 LAB
ALBUMIN SERPL BCP-MCNC: 2.3 G/DL (ref 3.5–5.2)
ALLENS TEST: ABNORMAL
ALLENS TEST: ABNORMAL
ALP SERPL-CCNC: 90 U/L (ref 55–135)
ALT SERPL W/O P-5'-P-CCNC: 19 U/L (ref 10–44)
AMMONIA PLAS-SCNC: 33 UMOL/L (ref 10–50)
AMORPH CRY URNS QL MICRO: NORMAL
ANION GAP SERPL CALC-SCNC: 10 MMOL/L (ref 8–16)
AST SERPL-CCNC: 27 U/L (ref 10–40)
BACTERIA #/AREA URNS HPF: NORMAL /HPF
BASOPHILS # BLD AUTO: 0.05 K/UL (ref 0–0.2)
BASOPHILS NFR BLD: 0.2 % (ref 0–1.9)
BILIRUB SERPL-MCNC: 0.4 MG/DL (ref 0.1–1)
BILIRUB UR QL STRIP: NEGATIVE
BNP SERPL-MCNC: 707 PG/ML (ref 0–99)
BUN SERPL-MCNC: 72 MG/DL (ref 8–23)
CALCIUM SERPL-MCNC: 8.9 MG/DL (ref 8.7–10.5)
CHLORIDE SERPL-SCNC: 85 MMOL/L (ref 95–110)
CHOLEST SERPL-MCNC: 145 MG/DL (ref 120–199)
CHOLEST/HDLC SERPL: 7.6 {RATIO} (ref 2–5)
CLARITY UR: CLEAR
CO2 SERPL-SCNC: 31 MMOL/L (ref 23–29)
COLOR UR: YELLOW
CREAT SERPL-MCNC: 3 MG/DL (ref 0.5–1.4)
CTP QC/QA: YES
DELSYS: ABNORMAL
DELSYS: ABNORMAL
DIFFERENTIAL METHOD: ABNORMAL
EOSINOPHIL # BLD AUTO: 0.1 K/UL (ref 0–0.5)
EOSINOPHIL NFR BLD: 0.4 % (ref 0–8)
EP: 8
ERYTHROCYTE [DISTWIDTH] IN BLOOD BY AUTOMATED COUNT: 13.6 % (ref 11.5–14.5)
ERYTHROCYTE [SEDIMENTATION RATE] IN BLOOD BY WESTERGREN METHOD: 8 MM/H
EST. GFR  (AFRICAN AMERICAN): 17 ML/MIN/1.73 M^2
EST. GFR  (NON AFRICAN AMERICAN): 14 ML/MIN/1.73 M^2
FIO2: 40
FLOW: 4
GLUCOSE SERPL-MCNC: 143 MG/DL (ref 70–110)
GLUCOSE SERPL-MCNC: 152 MG/DL (ref 70–110)
GLUCOSE UR QL STRIP: ABNORMAL
HCO3 UR-SCNC: 27.2 MMOL/L (ref 24–28)
HCO3 UR-SCNC: 31.8 MMOL/L (ref 24–28)
HCT VFR BLD AUTO: 29.4 % (ref 37–48.5)
HDLC SERPL-MCNC: 19 MG/DL (ref 40–75)
HDLC SERPL: 13.1 % (ref 20–50)
HGB BLD-MCNC: 9 G/DL (ref 12–16)
HGB UR QL STRIP: NEGATIVE
HYALINE CASTS #/AREA URNS LPF: 0 /LPF
IMM GRANULOCYTES # BLD AUTO: 0.47 K/UL (ref 0–0.04)
IMM GRANULOCYTES NFR BLD AUTO: 2.1 % (ref 0–0.5)
INR PPP: 1 (ref 0.8–1.2)
IP: 15
KETONES UR QL STRIP: NEGATIVE
LACTATE SERPL-SCNC: 1 MMOL/L (ref 0.5–2.2)
LDLC SERPL CALC-MCNC: 72 MG/DL (ref 63–159)
LEUKOCYTE ESTERASE UR QL STRIP: ABNORMAL
LYMPHOCYTES # BLD AUTO: 0.5 K/UL (ref 1–4.8)
LYMPHOCYTES NFR BLD: 2.2 % (ref 18–48)
MCH RBC QN AUTO: 30.1 PG (ref 27–31)
MCHC RBC AUTO-ENTMCNC: 30.6 G/DL (ref 32–36)
MCV RBC AUTO: 98 FL (ref 82–98)
MICROSCOPIC COMMENT: NORMAL
MIN VOL: 5.8
MODE: ABNORMAL
MODE: ABNORMAL
MONOCYTES # BLD AUTO: 2.5 K/UL (ref 0.3–1)
MONOCYTES NFR BLD: 11.3 % (ref 4–15)
NEUTROPHILS # BLD AUTO: 18.3 K/UL (ref 1.8–7.7)
NEUTROPHILS NFR BLD: 83.8 % (ref 38–73)
NITRITE UR QL STRIP: NEGATIVE
NONHDLC SERPL-MCNC: 126 MG/DL
NRBC BLD-RTO: 0 /100 WBC
PCO2 BLDA: 41.7 MMHG (ref 35–45)
PCO2 BLDA: 80.8 MMHG (ref 35–45)
PH SMN: 7.2 [PH] (ref 7.35–7.45)
PH SMN: 7.42 [PH] (ref 7.35–7.45)
PH UR STRIP: 5 [PH] (ref 5–8)
PLATELET # BLD AUTO: 426 K/UL (ref 150–350)
PLATELET BLD QL SMEAR: ABNORMAL
PMV BLD AUTO: 9.9 FL (ref 9.2–12.9)
PO2 BLDA: 20 MMHG (ref 40–60)
PO2 BLDA: 36 MMHG (ref 80–100)
POC BE: 2 MMOL/L
POC BE: 2 MMOL/L
POC MOLECULAR INFLUENZA A AGN: NEGATIVE
POC MOLECULAR INFLUENZA B AGN: NEGATIVE
POC SATURATED O2: 21 % (ref 95–100)
POC SATURATED O2: 71 % (ref 95–100)
POC TCO2: 28 MMOL/L (ref 23–27)
POC TCO2: 34 MMOL/L (ref 24–29)
POCT GLUCOSE: 152 MG/DL (ref 70–110)
POCT GLUCOSE: 163 MG/DL (ref 70–110)
POTASSIUM SERPL-SCNC: 5.5 MMOL/L (ref 3.5–5.1)
PROT SERPL-MCNC: 6.3 G/DL (ref 6–8.4)
PROT UR QL STRIP: ABNORMAL
PROTHROMBIN TIME: 10.4 SEC (ref 9–12.5)
RBC # BLD AUTO: 2.99 M/UL (ref 4–5.4)
RBC #/AREA URNS HPF: 0 /HPF (ref 0–4)
SAMPLE: ABNORMAL
SAMPLE: ABNORMAL
SITE: ABNORMAL
SITE: ABNORMAL
SODIUM SERPL-SCNC: 126 MMOL/L (ref 136–145)
SP GR UR STRIP: 1.01 (ref 1–1.03)
SP02: 100
SPONT RATE: 20
SQUAMOUS #/AREA URNS HPF: 0 /HPF
TRIGL SERPL-MCNC: 270 MG/DL (ref 30–150)
TSH SERPL DL<=0.005 MIU/L-ACNC: 2.39 UIU/ML (ref 0.4–4)
URN SPEC COLLECT METH UR: ABNORMAL
UROBILINOGEN UR STRIP-ACNC: NEGATIVE EU/DL
VALPROATE SERPL-MCNC: 36.5 UG/ML (ref 50–100)
WBC # BLD AUTO: 21.9 K/UL (ref 3.9–12.7)
WBC #/AREA URNS HPF: 2 /HPF (ref 0–5)

## 2019-11-25 PROCEDURE — 96375 TX/PRO/DX INJ NEW DRUG ADDON: CPT | Mod: 59,HCNC

## 2019-11-25 PROCEDURE — 99900035 HC TECH TIME PER 15 MIN (STAT): Mod: HCNC

## 2019-11-25 PROCEDURE — 82140 ASSAY OF AMMONIA: CPT | Mod: HCNC

## 2019-11-25 PROCEDURE — 27000190 HC CPAP FULL FACE MASK W/VALVE: Mod: HCNC

## 2019-11-25 PROCEDURE — 82803 BLOOD GASES ANY COMBINATION: CPT | Mod: HCNC

## 2019-11-25 PROCEDURE — 81000 URINALYSIS NONAUTO W/SCOPE: CPT | Mod: HCNC

## 2019-11-25 PROCEDURE — 82800 BLOOD PH: CPT | Mod: HCNC

## 2019-11-25 PROCEDURE — 80053 COMPREHEN METABOLIC PANEL: CPT | Mod: HCNC

## 2019-11-25 PROCEDURE — 94761 N-INVAS EAR/PLS OXIMETRY MLT: CPT | Mod: HCNC

## 2019-11-25 PROCEDURE — 63600175 PHARM REV CODE 636 W HCPCS: Mod: HCNC | Performed by: EMERGENCY MEDICINE

## 2019-11-25 PROCEDURE — 80061 LIPID PANEL: CPT | Mod: HCNC

## 2019-11-25 PROCEDURE — 96365 THER/PROPH/DIAG IV INF INIT: CPT | Mod: HCNC

## 2019-11-25 PROCEDURE — 85610 PROTHROMBIN TIME: CPT | Mod: HCNC

## 2019-11-25 PROCEDURE — 85025 COMPLETE CBC W/AUTO DIFF WBC: CPT | Mod: HCNC

## 2019-11-25 PROCEDURE — 21400001 HC TELEMETRY ROOM: Mod: HCNC

## 2019-11-25 PROCEDURE — 63600175 PHARM REV CODE 636 W HCPCS: Mod: HCNC | Performed by: HOSPITALIST

## 2019-11-25 PROCEDURE — 25000242 PHARM REV CODE 250 ALT 637 W/ HCPCS: Mod: HCNC | Performed by: HOSPITALIST

## 2019-11-25 PROCEDURE — 82962 GLUCOSE BLOOD TEST: CPT | Mod: HCNC

## 2019-11-25 PROCEDURE — 94640 AIRWAY INHALATION TREATMENT: CPT | Mod: HCNC

## 2019-11-25 PROCEDURE — 87040 BLOOD CULTURE FOR BACTERIA: CPT | Mod: 59,HCNC

## 2019-11-25 PROCEDURE — 84443 ASSAY THYROID STIM HORMONE: CPT | Mod: HCNC

## 2019-11-25 PROCEDURE — 36600 WITHDRAWAL OF ARTERIAL BLOOD: CPT | Mod: HCNC

## 2019-11-25 PROCEDURE — 87502 INFLUENZA DNA AMP PROBE: CPT | Mod: HCNC

## 2019-11-25 PROCEDURE — 96361 HYDRATE IV INFUSION ADD-ON: CPT | Mod: HCNC

## 2019-11-25 PROCEDURE — 99285 EMERGENCY DEPT VISIT HI MDM: CPT | Mod: 25,HCNC

## 2019-11-25 PROCEDURE — 80164 ASSAY DIPROPYLACETIC ACD TOT: CPT | Mod: HCNC

## 2019-11-25 PROCEDURE — 83880 ASSAY OF NATRIURETIC PEPTIDE: CPT | Mod: HCNC

## 2019-11-25 PROCEDURE — 25000003 PHARM REV CODE 250: Mod: HCNC | Performed by: HOSPITALIST

## 2019-11-25 PROCEDURE — 83605 ASSAY OF LACTIC ACID: CPT | Mod: HCNC

## 2019-11-25 RX ORDER — IBUPROFEN 200 MG
16 TABLET ORAL
Status: DISCONTINUED | OUTPATIENT
Start: 2019-11-25 | End: 2019-11-26 | Stop reason: HOSPADM

## 2019-11-25 RX ORDER — INSULIN ASPART 100 [IU]/ML
0-5 INJECTION, SOLUTION INTRAVENOUS; SUBCUTANEOUS
Status: DISCONTINUED | OUTPATIENT
Start: 2019-11-25 | End: 2019-11-26 | Stop reason: HOSPADM

## 2019-11-25 RX ORDER — HEPARIN SODIUM 5000 [USP'U]/ML
5000 INJECTION, SOLUTION INTRAVENOUS; SUBCUTANEOUS EVERY 8 HOURS
Status: DISCONTINUED | OUTPATIENT
Start: 2019-11-25 | End: 2019-11-26 | Stop reason: HOSPADM

## 2019-11-25 RX ORDER — IBUPROFEN 200 MG
24 TABLET ORAL
Status: DISCONTINUED | OUTPATIENT
Start: 2019-11-25 | End: 2019-11-26 | Stop reason: HOSPADM

## 2019-11-25 RX ORDER — GLUCAGON 1 MG
1 KIT INJECTION
Status: DISCONTINUED | OUTPATIENT
Start: 2019-11-25 | End: 2019-11-26 | Stop reason: HOSPADM

## 2019-11-25 RX ORDER — SODIUM CHLORIDE 9 MG/ML
1000 INJECTION, SOLUTION INTRAVENOUS
Status: COMPLETED | OUTPATIENT
Start: 2019-11-25 | End: 2019-11-25

## 2019-11-25 RX ORDER — SODIUM CHLORIDE 0.9 % (FLUSH) 0.9 %
10 SYRINGE (ML) INJECTION
Status: DISCONTINUED | OUTPATIENT
Start: 2019-11-25 | End: 2019-11-26 | Stop reason: HOSPADM

## 2019-11-25 RX ORDER — NALOXONE HCL 0.4 MG/ML
0.4 VIAL (ML) INJECTION
Status: COMPLETED | OUTPATIENT
Start: 2019-11-25 | End: 2019-11-25

## 2019-11-25 RX ORDER — ALBUTEROL SULFATE 2.5 MG/.5ML
10 SOLUTION RESPIRATORY (INHALATION) ONCE
Status: COMPLETED | OUTPATIENT
Start: 2019-11-25 | End: 2019-11-25

## 2019-11-25 RX ADMIN — HEPARIN SODIUM 5000 UNITS: 5000 INJECTION, SOLUTION INTRAVENOUS; SUBCUTANEOUS at 09:11

## 2019-11-25 RX ADMIN — VANCOMYCIN HYDROCHLORIDE 1500 MG: 1.5 INJECTION, POWDER, LYOPHILIZED, FOR SOLUTION INTRAVENOUS at 01:11

## 2019-11-25 RX ADMIN — ALBUTEROL SULFATE 10 MG: 2.5 SOLUTION RESPIRATORY (INHALATION) at 05:11

## 2019-11-25 RX ADMIN — PIPERACILLIN AND TAZOBACTAM 4.5 G: 4; .5 INJECTION, POWDER, FOR SOLUTION INTRAVENOUS at 11:11

## 2019-11-25 RX ADMIN — NALOXONE HYDROCHLORIDE 0.4 MG: 0.4 INJECTION, SOLUTION INTRAMUSCULAR; INTRAVENOUS; SUBCUTANEOUS at 10:11

## 2019-11-25 RX ADMIN — SODIUM CHLORIDE 1000 ML: 0.9 INJECTION, SOLUTION INTRAVENOUS at 11:11

## 2019-11-25 RX ADMIN — SODIUM CHLORIDE 500 ML: 0.9 INJECTION, SOLUTION INTRAVENOUS at 12:11

## 2019-11-25 RX ADMIN — PIPERACILLIN AND TAZOBACTAM 4.5 G: 4; .5 INJECTION, POWDER, LYOPHILIZED, FOR SOLUTION INTRAVENOUS; PARENTERAL at 12:11

## 2019-11-25 RX ADMIN — SODIUM BICARBONATE: 84 INJECTION, SOLUTION INTRAVENOUS at 06:11

## 2019-11-25 NOTE — ASSESSMENT & PLAN NOTE
Will monitor,chesat  Ray same as before.not on ACE or ARB duo to ARF,not on BB duo to hypotension.

## 2019-11-25 NOTE — ASSESSMENT & PLAN NOTE
Started on gentle bicarb drip,consulted nephrology,check bladder scan,consern for ATN,duo to hypotension at arrival.not a HD candidate.

## 2019-11-25 NOTE — ED PROVIDER NOTES
EM PHYSICIAN NOTE    HPI  This patient presents with a complaint of   Chief Complaint   Patient presents with    Shortness of Breath     EMS reports pt has increased WOB this morning with decreased LOC and spo2. pt intitiall 80% on RA, placed on 15L NRB and at 100% at this time       HPI:  This is a 79-year-old woman who was brought to the emergency department by EMS from a nursing home.  Family members report that the patient had a decreased level of consciousness and increasing work of breathing.  EMS reports that the O2 sats were 80% on room air.  Family members report that the patient had been admitted to our facility a week ago due to respiratory distress and CHF exacerbation.  She was discharged to the skilled nursing facility for physical therapy and rehab.  She had been in the skilled nursing facility for approximately for 4 days now.  Over the weekend she had an episode of decreased level of consciousness and lethargy and the family member was told that it was due to the Norco she was taking.  Patient was last seen normal by the family members at 8:00 p.m. yesterday and she was told that the patient was lethargic and having difficulty breathing this morning.  Yesterday she had been eating well and interacting with her family members.  She had not been able to ambulate due to generalized weakness. She has chronic weakness of the left arm due to chronic left shoulder pain.  She also has a history of left sided Bell's Palsy.  She has a history of atrial fib but no longer takes Plavix due to frequent falls.    REVIEW of PMH, SOC History and Family History:  Past Medical History:   Diagnosis Date    Abdominal pain     Anxiety     Atherosclerosis of aortic arch     noted on CXR 7/1/2015    Atrial fibrillation 05/2016    CHADS 4, on Xarelto    Benign hypertension     Bipolar disorder, most recent episode manic 8/29/2019    CHF (congestive heart failure)     Chronic constipation     Chronic diarrhea      Chronic edema     Depression     Dercum disease     Multiple painful lipomas    Diabetic retinopathy     Dysphagia     Gas pain     Glaucoma of both eyes     Hx of psychiatric care     previously amitriptyline, now jut on Trazodone 100mg QHS PRN insomnia    Hyperlipidemia with target LDL less than 100     Unable to tolerate statins    Immature cataract     Left sided numbness     per daughter    Tasia     no symptoms prior to this presentation    Morbid obesity     Nausea & vomiting     Neuropathy     On home oxygen therapy     Polyneuropathy     Primary osteoarthritis of both knees     Proteinuria     Psychiatric problem     history of depression    Pulmonary hypertension mixed group 2 and 3 1/18/2017    Requires assistance with activities of daily living (ADL)     Sleep apnea     Therapy     no history of therapy    Type II or unspecified type diabetes mellitus with neurological manifestations, uncontrolled(250.62)     Unspecified vitamin D deficiency     Unsteady gait     Uses roller walker      Past Surgical History:   Procedure Laterality Date     tenotomy      flexors 2,3 L toes    CATARACT EXTRACTION W/  INTRAOCULAR LENS IMPLANT Bilateral     COLONOSCOPY N/A 5/4/2017    Procedure: COLONOSCOPY;  Surgeon: Suellen Wolf MD;  Location: [facility] ([address] [place] [address]);  Service: Endoscopy;  Laterality: N/A;  2nd floor due to pulm htn, possible gastroparesis. per Dr Wolf      ok to hold Xarelto 2 days prior to procedure per Dr Driss Dixon    diabetic retinopathy      right    HYSTERECTOMY      knee surgery x2      Left ankle and leg surgery secondary to a fall      rods & screws present    left arm fracture      Left cataract      PARTIAL HYSTERECTOMY      Secondary to bleeding    TOE FUSION      2,3 R    WRIST SURGERY Left 12/28/2012    pins & plate present     Social History     Socioeconomic History    Marital status:      Spouse name: Not on file    Number of  children: 3    Years of education: 14    Highest education level: Not on file   Occupational History    Occupation: housewife   Social Needs    Financial resource strain: Not on file    Food insecurity:     Worry: Not on file     Inability: Not on file    Transportation needs:     Medical: Not on file     Non-medical: Not on file   Tobacco Use    Smoking status: Never Smoker    Smokeless tobacco: Never Used   Substance and Sexual Activity    Alcohol use: No    Drug use: No    Sexual activity: Yes     Partners: Male   Lifestyle    Physical activity:     Days per week: Not on file     Minutes per session: Not on file    Stress: Not on file   Relationships    Social connections:     Talks on phone: Not on file     Gets together: Not on file     Attends Church service: Not on file     Active member of club or organization: Not on file     Attends meetings of clubs or organizations: Not on file     Relationship status: Not on file   Other Topics Concern    Patient feels they ought to cut down on drinking/drug use Not Asked    Patient annoyed by others criticizing their drinking/drug use Not Asked    Patient has felt bad or guilty about drinking/drug use Not Asked    Patient has had a drink/used drugs as an eye opener in the AM Not Asked   Social History Narrative    Pt was the youngest of 3 boys and 2 girls.  Her father  when she was 3 months old.  She was raised by her mother, m, and an uncle.  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  They had 2 daughters and 1 son together, plus 5 stepchildren by her .  They live together in [place] in their own home, with no pets.  Hobbies include gardening and crafts.  Pt attends a variety of organized Church services.            One daughter is  from bone cancer.    One son  2014 after surgery for MIGUEL     Patient Active Problem List   Diagnosis    Peripheral  polyneuropathy    Primary osteoarthritis of both knees    Chronic edema    Dercum disease    Glaucoma of both eyes    Hyperlipidemia with target LDL less than 100    Vitamin D deficiency    Uncontrolled type 2 diabetes mellitus with proteinuria or microalbuminuria    Diabetic macular edema, left eye    Epiretinal membrane, left eye    Hypertensive retinopathy of both eyes    Posterior vitreous detachment, right eye    Long-term insulin use    Type 2 diabetes mellitus with microalbuminuria, with long-term current use of insulin    Atherosclerosis of aortic arch    Controlled type 2 diabetes mellitus with both eyes affected by proliferative retinopathy and macular edema, with long-term current use of insulin    Metabolic alkalosis    Hypoxia    Dyspnea    Paroxysmal atrial fibrillation    MIGUEL (obstructive sleep apnea) unable to afford CPAP    Morbid obesity    Long term (current) use of anticoagulants    Chronic pulmonary heart disease    Pulmonary hypertension mixed group 2 and 3    LAUREN (acute kidney injury)    Essential hypertension    CKD (chronic kidney disease), stage III    Asymptomatic bacteriuria    (HFpEF) heart failure with preserved ejection fraction    Stable proliferative diabetic retinopathy of both eyes associated with type 2 diabetes mellitus    Pseudophakia    Refractive error    Dry eye syndrome of both eyes    Neuropathy    Weakness    Falls frequently    Altered mental status    Bipolar disorder, most recent episode manic    Acute on chronic diastolic congestive heart failure    Acute on chronic respiratory failure with hypoxia    CKD stage 3 due to type 2 diabetes mellitus    Obesity, Class III, BMI 40-49.9 (morbid obesity)    Anemia of chronic disorder    Hyperkalemia    Urinary tract infection without hematuria     No current facility-administered medications for this encounter.      Current Outpatient Medications   Medication Sig Dispense Refill     amLODIPine (NORVASC) 10 MG tablet Take 1 tablet (10 mg total) by mouth once daily. 30 tablet 11    amoxicillin-clavulanate 875-125mg (AUGMENTIN) 875-125 mg per tablet Take 1 tablet by mouth 2 (two) times daily. for 7 days 14 tablet 0    desloratadine (CLARINEX) 5 mg tablet Take 1 tablet (5 mg total) by mouth once daily. (Patient taking differently: Take 5 mg by mouth as needed. ) 90 tablet 1    divalproex (DEPAKOTE) 500 MG TbEC Take 1 tablet (500 mg total) by mouth every 12 (twelve) hours. 60 tablet 3    fluticasone (FLONASE) 50 mcg/actuation nasal spray 1 spray (50 mcg total) by Each Nare route 2 (two) times daily. 16 g 3    furosemide (LASIX) 40 MG tablet Take 1 tablet (40 mg total) by mouth once daily. One tab po daily x three days then as needed for LE swelling 90 tablet 1    gabapentin (NEURONTIN) 100 MG capsule Take 1 capsule (100 mg total) by mouth 3 (three) times daily. 90 capsule 5    hydroCHLOROthiazide (HYDRODIURIL) 25 MG tablet Take 25 mg by mouth once daily.  3    insulin aspart protamine-insulin aspart (NOVOLOG 70/30) 100 unit/mL (70-30) InPn pen Inject 21 Units into the skin 2 (two) times daily before meals. Sliding scale max daily dosing 50 units daily 15 mL 2    losartan (COZAAR) 100 MG tablet Take 1 tablet (100 mg total) by mouth once daily. 90 tablet 3    metoprolol succinate (TOPROL-XL) 25 MG 24 hr tablet Take 1 tablet (25 mg total) by mouth once daily. 90 tablet 2    multivitamin capsule Take 1 capsule by mouth once daily.      polyethylene glycol (GLYCOLAX) 17 gram PwPk Take 17 g by mouth 2 (two) times daily as needed.  0    ranitidine (ZANTAC) 150 MG tablet Take 150 mg by mouth 2 (two) times daily.  1       Review of patient's allergies indicates:   Allergen Reactions    Tramadol Other (See Comments)     Interaction-induced delirium with s/s of estrellita.    Ace inhibitors      Other reaction(s): cough      Fluorescein Itching     Other reaction(s): Itching    Iodine      Other  reaction(s): Itching    Pravastatin Other (See Comments)     Other reaction(s): mouth tingling/numbness    Simvastatin      Other reaction(s): foot swelling         Immunization History   Administered Date(s) Administered    Influenza 11/05/2002, 10/13/2008, 10/02/2009, 09/28/2013    Influenza - High Dose - PF (65 years and older) 10/07/2010, 09/08/2011, 10/05/2012, 09/30/2014, 09/18/2015, 09/09/2016, 10/30/2017, 09/20/2018, 09/20/2019    Influenza Split 09/28/2013    PPD Test 11/19/2019    Pneumococcal Conjugate - 13 Valent 12/18/2015    Pneumococcal Polysaccharide - 23 Valent 10/08/2007, 10/05/2012       Family History   Problem Relation Age of Onset    No Known Problems Daughter     No Known Problems Father     Liver cancer Mother     Bone cancer Daughter     No Known Problems Sister     No Known Problems Brother     No Known Problems Maternal Aunt     No Known Problems Maternal Uncle     No Known Problems Paternal Aunt     No Known Problems Paternal Uncle     No Known Problems Maternal Grandmother     No Known Problems Maternal Grandfather     No Known Problems Paternal Grandmother     No Known Problems Paternal Grandfather     Amblyopia Neg Hx     Blindness Neg Hx     Cancer Neg Hx     Cataracts Neg Hx     Diabetes Neg Hx     Glaucoma Neg Hx     Hypertension Neg Hx     Macular degeneration Neg Hx     Retinal detachment Neg Hx     Strabismus Neg Hx     Stroke Neg Hx     Thyroid disease Neg Hx     Celiac disease Neg Hx     Cirrhosis Neg Hx     Colon cancer Neg Hx     Colon polyps Neg Hx     Crohn's disease Neg Hx     Cystic fibrosis Neg Hx     Esophageal cancer Neg Hx     Hemochromatosis Neg Hx     Inflammatory bowel disease Neg Hx     Irritable bowel syndrome Neg Hx     Liver disease Neg Hx     Rectal cancer Neg Hx     Stomach cancer Neg Hx     Ulcerative colitis Neg Hx     Montez's disease Neg Hx     Alcohol abuse Neg Hx     Bipolar disorder Neg Hx      "Dementia Neg Hx     Depression Neg Hx     Drug abuse Neg Hx     Suicide Neg Hx     Schizophrenia Neg Hx        REVIEW of SYSTEMS  Source:  Family member  The nurse's notes and triage vital signs were reviewed.  GENERAL/CONSTITUTIONAL: There is no report of fever, unexplained weight loss.  CARDIOVASCULAR: There is no report of chest pain   RESPIRATORY: There is no report of cough or SOB  GASTROINTESTINAL: There is no report of nausea, vomiting, diarrhea  MUSCULOSKELETAL:  Left shoulder pain for, chronic  SKIN AND BREASTS: There is no report of easy bruising, skin redness, skin rash.  HEMATOLOGIC/LYMPHATIC: There is no report of anemia, bleeding or clotting defects. There is no report of anticoagulant use.  The remainder of the ROS is negative.    PHYSICAL EXAMINATION    ED Triage Vitals [11/25/19 0940]   Enc Vitals Group      BP (!) 105/50      Pulse 95      Resp (!) 22      Temp       Temp src Oral      SpO2 100 %      Weight 220 lb      Height 5' 2"      Head Circumference       Peak Flow       Pain Score       Pain Loc       Pain Edu?       Excl. in GC?      Vital signs and Pulse Ox reviewed in clinical context. Abnormalities noted: Tachycardia, irreg  Pt's level of consciousness is lethargic, and the patient is in moderate distress.  Skin: warm, pale-pink and dry  Mucosa:moist  Head and Neck: WNL  Cardiac exam: irreg irreg  Pulmonary exam:  Rales posteriorly.  Patient 100% on the non-rebreather but respirations are shallow  Abd Exam: soft nontender  Musculoskeletal:  No deformity   Neurologic: GCS 8. Pupils pinpoint bilat.  EOMi spont.  Able to weakly move all extremities but left  strength is weaker than right.  Mild left facial droop.       Medical decision making: History obtained from family and Nursing notes reviewed and incorporated  Impression:  AMS: Rule out inadvertent opiate intoxication, AFib of unknown duration, hypoxia responded to 100% face mask  Plan: narcan and reassess; labs, imaging, " "continue supplementary O2 and monitor end-tidal CO2  Micelle BREANNA Randall MD, 10:32 AM 11/25/2019    This note was created using Dictation Software.  This program may occasionally misinterpret certain words and phrases.      ED Course as of Nov 25 1250   Mon Nov 25, 2019   1008 My independent interpretation of the EKG is atrial fibrillation with poor R-wave progression.  There is no evidence of acute myocardial infarction.  The past EKG was normal sinus rhythm.   EKG 12-lead [MH]   1030 No response to narcan.  Pupils remain pinpoint and patient remains lethargic.  Will stroke activate for wake up stroke.    [MH]   1104 DW Tele-Stroke: rec admit for further imaging.    [MH]   1122 Leukocytosis and anemia noted on CBC   WBC(!): 21.90 [MH]   1123 CXR: There is patchy opacification of bilateral lung bases similar to prior exam.    [MH]   1123 Sodium is low at 126.  Potassium is elevated at 5.5.    [MH]   1123 Sodium is low at 126.  Potassium is elevated at 5.5.  CO2 is elevated at 31 and creatinine and BUN are elevated at 72 and 3.    [MH]   1135 Lactate normal    [MH]      ED Course User Index  [MH] Micelle BREANNA Randall MD       Micelle BREANNA Randall MD  11/25/19 1041         Sepsis Assessment Note: 11:32 am    ED Triage Vitals [11/25/19 0940]   Enc Vitals Group      BP (!) 105/50      Pulse 95      Resp (!) 22      Temp       Temp src Oral      SpO2 100 %      Weight 220 lb      Height 5' 2"      Head Circumference       Peak Flow       Pain Score       Pain Loc       Pain Edu?       Excl. in GC?        Repeat sepsis examination:  Mental status  lethargic  Skin  warm  Capillary refill normal  Cardiac   irreg  Pulmonary  positive respiratory findings: rales: bibasilar    MDM: At this time, the patient meets the following SIRS/Sepsis criteria:  Heart rate > 90, WBC >12,000, < 4000, or > 10% bands and Cr > 2.0 or Urine output < 0.5cc/kg/hr  Suspected Source: Pulmonary    Impression:  SBP <90 and I will provide small NS bolus " and reassess    Plan: Repeat lactate within 6 hr of initial lactate, Broad spectrum antibiotics within 3 hr of suspicion of sepsis, Blood cultures before antibiotics / within 3 hr of suspicion of sepsis and Fluid bolus modified due to patient's pre-existing cardiopulmonary state  Hannah Randall MD, 11:32 AM 11/25/2019         Hannah Randall MD  11/25/19 1135    12:51 PM  Discussed with the family at length the findings of my workup.  Daughter reports that the patient has previously stated that she wants to remain comfortable only: no chest compressions, central lines or intubation.  I am consulting the ICU team due to intensity of care.    2:18 PM  Discussed with ICU; will admit to floor for conservative care and palliative consult. Pt was admitted by Dr. TORRES last week.         Hannah Randall MD  11/25/19 1253       Hannah Randall MD  11/25/19 1258      2:31 PM  The medical management of Kaci Whalen has been transferred to the admitting team. Total critical care time provided by me was: 70 minutes and included Continuous in-person monitoring of patient including vital signs, neurological status, NIHSS or ICP monitoring with ongoing treatment changes based on patient need  Coordination of care with other physicians  Review and interpretation of radiologic studies with re-assessment of plan of care  Review and interpretation of laboratory studies with re-assessment of plan of care  Review of diagnosis, treatment options and prognosis with patient  Review of diagnosis, treatment options and prognosis with family/caregiver.  At this time, the patient's condition is unchanged, and this was relayed to the accepting team.  Please see notes from the admitting team for continued care.  Hannah Randall 2:31 PM             Hannah Randall MD  11/25/19 1358       Hannah Randall MD  11/25/19 1419       Hannah Randall MD  11/25/19 1431

## 2019-11-25 NOTE — SUBJECTIVE & OBJECTIVE
Past Medical History:   Diagnosis Date    Abdominal pain     Anxiety     Atherosclerosis of aortic arch     noted on CXR 7/1/2015    Atrial fibrillation 05/2016    CHADS 4, on Xarelto    Benign hypertension     Bipolar disorder, most recent episode manic 8/29/2019    Cerebrovascular accident (CVA) 11/25/2019    CHF (congestive heart failure)     Chronic constipation     Chronic diarrhea     Chronic edema     Depression     Dercum disease     Multiple painful lipomas    Diabetic retinopathy     Dysphagia     Gas pain     Glaucoma of both eyes     Hx of psychiatric care     previously amitriptyline, now jut on Trazodone 100mg QHS PRN insomnia    Hyperlipidemia with target LDL less than 100     Unable to tolerate statins    Immature cataract     Left sided numbness     per daughter    Tasia     no symptoms prior to this presentation    Morbid obesity     Nausea & vomiting     Neuropathy     On home oxygen therapy     Polyneuropathy     Primary osteoarthritis of both knees     Proteinuria     Psychiatric problem     history of depression    Pulmonary hypertension mixed group 2 and 3 1/18/2017    Requires assistance with activities of daily living (ADL)     Sleep apnea     Therapy     no history of therapy    Type II or unspecified type diabetes mellitus with neurological manifestations, uncontrolled(250.62)     Unspecified vitamin D deficiency     Unsteady gait     Uses roller walker        Past Surgical History:   Procedure Laterality Date     tenotomy      flexors 2,3 L toes    CATARACT EXTRACTION W/  INTRAOCULAR LENS IMPLANT Bilateral     COLONOSCOPY N/A 5/4/2017    Procedure: COLONOSCOPY;  Surgeon: Suellen Wolf MD;  Location: [facility] ENDO (2ND FLR);  Service: Endoscopy;  Laterality: N/A;  2nd floor due to pulm htn, possible gastroparesis. per Dr Wolf      ok to hold Xarelto 2 days prior to procedure per Dr Driss Dixon    diabetic retinopathy      right    HYSTERECTOMY       knee surgery x2      Left ankle and leg surgery secondary to a fall      rods & screws present    left arm fracture      Left cataract      PARTIAL HYSTERECTOMY      Secondary to bleeding    TOE FUSION      2,3 R    WRIST SURGERY Left 12/28/2012    pins & plate present       Review of patient's allergies indicates:   Allergen Reactions    Tramadol Other (See Comments)     Interaction-induced delirium with s/s of estrellita.    Ace inhibitors      Other reaction(s): cough      Fluorescein Itching     Other reaction(s): Itching    Iodine      Other reaction(s): Itching    Pravastatin Other (See Comments)     Other reaction(s): mouth tingling/numbness    Simvastatin      Other reaction(s): foot swelling         No current facility-administered medications on file prior to encounter.      Current Outpatient Medications on File Prior to Encounter   Medication Sig    amLODIPine (NORVASC) 10 MG tablet Take 1 tablet (10 mg total) by mouth once daily.    amoxicillin-clavulanate 875-125mg (AUGMENTIN) 875-125 mg per tablet Take 1 tablet by mouth 2 (two) times daily. for 7 days    desloratadine (CLARINEX) 5 mg tablet Take 1 tablet (5 mg total) by mouth once daily. (Patient taking differently: Take 5 mg by mouth as needed. )    divalproex (DEPAKOTE) 500 MG TbEC Take 1 tablet (500 mg total) by mouth every 12 (twelve) hours.    fluticasone (FLONASE) 50 mcg/actuation nasal spray 1 spray (50 mcg total) by Each Nare route 2 (two) times daily.    furosemide (LASIX) 40 MG tablet Take 1 tablet (40 mg total) by mouth once daily. One tab po daily x three days then as needed for LE swelling    gabapentin (NEURONTIN) 100 MG capsule Take 1 capsule (100 mg total) by mouth 3 (three) times daily.    hydroCHLOROthiazide (HYDRODIURIL) 25 MG tablet Take 25 mg by mouth once daily.    insulin aspart protamine-insulin aspart (NOVOLOG 70/30) 100 unit/mL (70-30) InPn pen Inject 21 Units into the skin 2 (two) times daily before  meals. Sliding scale max daily dosing 50 units daily    losartan (COZAAR) 100 MG tablet Take 1 tablet (100 mg total) by mouth once daily.    metoprolol succinate (TOPROL-XL) 25 MG 24 hr tablet Take 1 tablet (25 mg total) by mouth once daily.    multivitamin capsule Take 1 capsule by mouth once daily.    polyethylene glycol (GLYCOLAX) 17 gram PwPk Take 17 g by mouth 2 (two) times daily as needed.    ranitidine (ZANTAC) 150 MG tablet Take 150 mg by mouth 2 (two) times daily.     Family History     Problem Relation (Age of Onset)    Bone cancer Daughter    Liver cancer Mother    No Known Problems Daughter, Father, Sister, Brother, Maternal Aunt, Maternal Uncle, Paternal Aunt, Paternal Uncle, Maternal Grandmother, Maternal Grandfather, Paternal Grandmother, Paternal Grandfather        Tobacco Use    Smoking status: Never Smoker    Smokeless tobacco: Never Used   Substance and Sexual Activity    Alcohol use: No    Drug use: No    Sexual activity: Yes     Partners: Male     Review of Systems,not able be done,  Objective:     Vital Signs (Most Recent):  Temp: 98.4 °F (36.9 °C) (11/25/19 1523)  Pulse: 73 (11/25/19 1523)  Resp: 20 (11/25/19 1523)  BP: (!) 98/52 (11/25/19 1523)  SpO2: 100 % (11/25/19 1523) Vital Signs (24h Range):  Temp:  [98 °F (36.7 °C)-98.5 °F (36.9 °C)] 98.4 °F (36.9 °C)  Pulse:  [] 73  Resp:  [17-32] 20  SpO2:  [67 %-100 %] 100 %  BP: ()/(38-69) 98/52     Weight: 99.8 kg (220 lb)  Body mass index is 40.24 kg/m².    Physical Exam   Constitutional: No distress.   HENT:   Head: Atraumatic.   Eyes: EOM are normal.   Neck: Neck supple.   Cardiovascular: Normal rate and regular rhythm.   Pulmonary/Chest: Effort normal.   Diminished basal,   Abdominal: Soft. Bowel sounds are normal.   Musculoskeletal: Normal range of motion. She exhibits edema.   Neurological:   Not able be done.   Skin: Skin is warm and dry.   Psychiatric:   Not able be done         CRANIAL NERVES     CN III, IV, VI    Extraocular motions are normal.        Significant Labs:   ABGs:   Recent Labs   Lab 11/25/19  1341   PH 7.204*   PCO2 80.8*   HCO3 31.8*   POCSATURATED 21*   BE 2     BMP:   Recent Labs   Lab 11/25/19  1028   *   *   K 5.5*   CL 85*   CO2 31*   BUN 72*   CREATININE 3.0*   CALCIUM 8.9     CBC:   Recent Labs   Lab 11/25/19  1028   WBC 21.90*   HGB 9.0*   HCT 29.4*   *     CMP:   Recent Labs   Lab 11/25/19  1028   *   K 5.5*   CL 85*   CO2 31*   *   BUN 72*   CREATININE 3.0*   CALCIUM 8.9   PROT 6.3   ALBUMIN 2.3*   BILITOT 0.4   ALKPHOS 90   AST 27   ALT 19   ANIONGAP 10   EGFRNONAA 14*       Significant Imaging: reviewed.

## 2019-11-25 NOTE — H&P
[facility] Hospital Medicine  History & Physical    Patient Name: Kaci Whalen  MRN: [id number]  Admission Date: 11/25/2019  Attending Physician: Mayelin Marshall MD   Primary Care Provider: Toby Hung MD         Patient information was obtained from patient, past medical records and ER records.     Subjective:     Principal Problem:Acute renal failure superimposed on stage 3 chronic kidney disease    Chief Complaint:   Chief Complaint   Patient presents with    Shortness of Breath     EMS reports pt has increased WOB this morning with decreased LOC and spo2. pt intitiall 80% on RA, placed on 15L NRB and at 100% at this time        HPI: 79-year-old woman with PMH of diastolic CHF,chronic respiratory failure,HTN,DM,CKD 3,anemia,debility,MIGUEL,obesity,bipolar disorder,,who was brought to the emergency department by EMS from [facility],.  Family members report that the patient had a decreased level of consciousness and increasing work of breathing.  EMS reports that the O2 sats were 80% on room air.  Patient was admitted to our facility a week ago due to respiratory distress and CHF exacerbation.  She was discharged to the skilled nursing facility for physical therapy and rehab.  She had been in the skilled nursing facility for approximately for 4 days now.  Over the weekend she had an episode of decreased level of consciousness and lethargy and the family member was told that it was due to the Norco she was taking.  Patient was last seen normal by the family members at 8:00 p.m. yesterday and she was told that the patient was lethargic and having difficulty breathing this morning.  Yesterday she had been eating well and interacting with her family members.  She had not been able to ambulate due to generalized weakness. She has chronic weakness of the left arm due to chronic left shoulder pain.  She also has a history of left sided Bell's Palsy.  She has a history of atrial fib but no longer  takes OAC, due to frequent falls,she has ARF on CKD 3 ,has leucocytosis,blood culture has been done and she has jayson srarted on IV Abx,head CT show no acute process,ABG is consistent  with A/C hypoxic,hypercapn respiratory failure,started patient on bipap.she is lethargic not able do ROS.    Past Medical History:   Diagnosis Date    Abdominal pain     Anxiety     Atherosclerosis of aortic arch     noted on CXR 7/1/2015    Atrial fibrillation 05/2016    CHADS 4, on Xarelto    Benign hypertension     Bipolar disorder, most recent episode manic 8/29/2019    Cerebrovascular accident (CVA) 11/25/2019    CHF (congestive heart failure)     Chronic constipation     Chronic diarrhea     Chronic edema     Depression     Dercum disease     Multiple painful lipomas    Diabetic retinopathy     Dysphagia     Gas pain     Glaucoma of both eyes     Hx of psychiatric care     previously amitriptyline, now jut on Trazodone 100mg QHS PRN insomnia    Hyperlipidemia with target LDL less than 100     Unable to tolerate statins    Immature cataract     Left sided numbness     per daughter    Tasia     no symptoms prior to this presentation    Morbid obesity     Nausea & vomiting     Neuropathy     On home oxygen therapy     Polyneuropathy     Primary osteoarthritis of both knees     Proteinuria     Psychiatric problem     history of depression    Pulmonary hypertension mixed group 2 and 3 1/18/2017    Requires assistance with activities of daily living (ADL)     Sleep apnea     Therapy     no history of therapy    Type II or unspecified type diabetes mellitus with neurological manifestations, uncontrolled(250.62)     Unspecified vitamin D deficiency     Unsteady gait     Uses roller walker        Past Surgical History:   Procedure Laterality Date     tenotomy      flexors 2,3 L toes    CATARACT EXTRACTION W/  INTRAOCULAR LENS IMPLANT Bilateral     COLONOSCOPY N/A 5/4/2017    Procedure: COLONOSCOPY;   Surgeon: Suellen Wolf MD;  Location: [facility] (2ND [facility]);  Service: Endoscopy;  Laterality: N/A;  2nd floor due to pulm htn, possible gastroparesis. per Dr Wolf      ok to hold Xarelto 2 days prior to procedure per Dr Driss Dixon    diabetic retinopathy      right    HYSTERECTOMY      knee surgery x2      Left ankle and leg surgery secondary to a fall      rods & screws present    left arm fracture      Left cataract      PARTIAL HYSTERECTOMY      Secondary to bleeding    TOE FUSION      2,3 R    WRIST SURGERY Left 12/28/2012    pins & plate present       Review of patient's allergies indicates:   Allergen Reactions    Tramadol Other (See Comments)     Interaction-induced delirium with s/s of estrellita.    Ace inhibitors      Other reaction(s): cough      Fluorescein Itching     Other reaction(s): Itching    Iodine      Other reaction(s): Itching    Pravastatin Other (See Comments)     Other reaction(s): mouth tingling/numbness    Simvastatin      Other reaction(s): foot swelling         No current facility-administered medications on file prior to encounter.      Current Outpatient Medications on File Prior to Encounter   Medication Sig    amLODIPine (NORVASC) 10 MG tablet Take 1 tablet (10 mg total) by mouth once daily.    amoxicillin-clavulanate 875-125mg (AUGMENTIN) 875-125 mg per tablet Take 1 tablet by mouth 2 (two) times daily. for 7 days    desloratadine (CLARINEX) 5 mg tablet Take 1 tablet (5 mg total) by mouth once daily. (Patient taking differently: Take 5 mg by mouth as needed. )    divalproex (DEPAKOTE) 500 MG TbEC Take 1 tablet (500 mg total) by mouth every 12 (twelve) hours.    fluticasone (FLONASE) 50 mcg/actuation nasal spray 1 spray (50 mcg total) by Each Nare route 2 (two) times daily.    furosemide (LASIX) 40 MG tablet Take 1 tablet (40 mg total) by mouth once daily. One tab po daily x three days then as needed for LE swelling    gabapentin (NEURONTIN) 100 MG capsule  Take 1 capsule (100 mg total) by mouth 3 (three) times daily.    hydroCHLOROthiazide (HYDRODIURIL) 25 MG tablet Take 25 mg by mouth once daily.    insulin aspart protamine-insulin aspart (NOVOLOG 70/30) 100 unit/mL (70-30) InPn pen Inject 21 Units into the skin 2 (two) times daily before meals. Sliding scale max daily dosing 50 units daily    losartan (COZAAR) 100 MG tablet Take 1 tablet (100 mg total) by mouth once daily.    metoprolol succinate (TOPROL-XL) 25 MG 24 hr tablet Take 1 tablet (25 mg total) by mouth once daily.    multivitamin capsule Take 1 capsule by mouth once daily.    polyethylene glycol (GLYCOLAX) 17 gram PwPk Take 17 g by mouth 2 (two) times daily as needed.    ranitidine (ZANTAC) 150 MG tablet Take 150 mg by mouth 2 (two) times daily.     Family History     Problem Relation (Age of Onset)    Bone cancer Daughter    Liver cancer Mother    No Known Problems Daughter, Father, Sister, Brother, Maternal Aunt, Maternal Uncle, Paternal Aunt, Paternal Uncle, Maternal Grandmother, Maternal Grandfather, Paternal Grandmother, Paternal Grandfather        Tobacco Use    Smoking status: Never Smoker    Smokeless tobacco: Never Used   Substance and Sexual Activity    Alcohol use: No    Drug use: No    Sexual activity: Yes     Partners: Male     Review of Systems,not able be done,  Objective:     Vital Signs (Most Recent):  Temp: 98.4 °F (36.9 °C) (11/25/19 1523)  Pulse: 73 (11/25/19 1523)  Resp: 20 (11/25/19 1523)  BP: (!) 98/52 (11/25/19 1523)  SpO2: 100 % (11/25/19 1523) Vital Signs (24h Range):  Temp:  [98 °F (36.7 °C)-98.5 °F (36.9 °C)] 98.4 °F (36.9 °C)  Pulse:  [] 73  Resp:  [17-32] 20  SpO2:  [67 %-100 %] 100 %  BP: ()/(38-69) 98/52     Weight: 99.8 kg (220 lb)  Body mass index is 40.24 kg/m².    Physical Exam   Constitutional: No distress.   HENT:   Head: Atraumatic.   Eyes: EOM are normal.   Neck: Neck supple.   Cardiovascular: Normal rate and regular rhythm.    Pulmonary/Chest: Effort normal.   Diminished basal,   Abdominal: Soft. Bowel sounds are normal.   Musculoskeletal: Normal range of motion. She exhibits edema.   Neurological:   Not able be done.   Skin: Skin is warm and dry.   Psychiatric:   Not able be done         CRANIAL NERVES     CN III, IV, VI   Extraocular motions are normal.        Significant Labs:   ABGs:   Recent Labs   Lab 11/25/19  1341   PH 7.204*   PCO2 80.8*   HCO3 31.8*   POCSATURATED 21*   BE 2     BMP:   Recent Labs   Lab 11/25/19  1028   *   *   K 5.5*   CL 85*   CO2 31*   BUN 72*   CREATININE 3.0*   CALCIUM 8.9     CBC:   Recent Labs   Lab 11/25/19  1028   WBC 21.90*   HGB 9.0*   HCT 29.4*   *     CMP:   Recent Labs   Lab 11/25/19  1028   *   K 5.5*   CL 85*   CO2 31*   *   BUN 72*   CREATININE 3.0*   CALCIUM 8.9   PROT 6.3   ALBUMIN 2.3*   BILITOT 0.4   ALKPHOS 90   AST 27   ALT 19   ANIONGAP 10   EGFRNONAA 14*       Significant Imaging: reviewed.    Assessment/Plan:     * Acute renal failure superimposed on stage 3 chronic kidney disease  Started on gentle bicarb drip,consulted nephrology,check bladder scan,consern for ATN,duo to hypotension at arrival.not a HD candidate.      Acute on chronic respiratory failure with hypoxia and hypercapnia  likely duo to MIGUEL,OHS,started on bipap,recjeck ABG,will monitor,she is DNR.      Acute anoxic encephalopathy  likley duo to CO 2 retention and hypoxia,will continue monitor.      Goals of care, counseling/discussion  Family interested in Hospice care,consulted palliative care.      Chronic respiratory failure with hypoxia and hypercapnia  On home oxygen.      Chronic diastolic congestive heart failure  Will monitor,chesat  Ray same as before.not on ACE or ARB duo to ARF,not on BB duo to hypotension.      Hyperkalemia  Will try albuterol,      Obesity, Class III, BMI 40-49.9 (morbid obesity)  Weight lose as out patient.      CKD (chronic kidney disease), stage III  Will  monitor.      Essential hypertension  Hold BP med's at this time duo to hypotension.      MIGUEL (obstructive sleep apnea) unable to afford CPAP  Started on bipap.      Paroxysmal atrial fibrillation  Rate controlled,not on OAC duo to fall.      Hyperlipidemia with target LDL less than 100          VTE Risk Mitigation (From admission, onward)         Ordered     heparin (porcine) injection 5,000 Units  Every 8 hours      11/25/19 1556     IP VTE HIGH RISK PATIENT  Once      11/25/19 1552     Place EDE hose  Until discontinued      11/25/19 1552                   Mayelin Marshall MD  Department of Hospital Medicine   [facility]

## 2019-11-25 NOTE — HPI
79-year-old woman with PMH of diastolic CHF,chronic respiratory failure,HTN,DM,CKD 3,anemia,debility,MIGUEL,obesity,bipolar disorder,,who was brought to the emergency department by EMS from SNF,.  Family members report that the patient had a decreased level of consciousness and increasing work of breathing.  EMS reports that the O2 sats were 80% on room air.  Patient was admitted to our facility a week ago due to respiratory distress and CHF exacerbation.  She was discharged to the skilled nursing facility for physical therapy and rehab.  She had been in the skilled nursing facility for approximately for 4 days now.  Over the weekend she had an episode of decreased level of consciousness and lethargy and the family member was told that it was due to the Norco she was taking.  Patient was last seen normal by the family members at 8:00 p.m. yesterday and she was told that the patient was lethargic and having difficulty breathing this morning.  Yesterday she had been eating well and interacting with her family members.  She had not been able to ambulate due to generalized weakness. She has chronic weakness of the left arm due to chronic left shoulder pain.  She also has a history of left sided Bell's Palsy.  She has a history of atrial fib but no longer takes OAC, due to frequent falls,she has ARF on CKD 3 ,has leucocytosis,blood culture has been done and she has jayson srarted on IV Abx,head CT show no acute process,ABG is consistent  with A/C hypoxic,hypercapn respiratory failure,started patient on bipap.she is lethargic not able do ROS.

## 2019-11-25 NOTE — PROGRESS NOTES
Results for MANUEL HORAN (MRN [id number]) as of 11/25/2019 13:58   Ref. Range 11/25/2019 13:41   POC PH Latest Ref Range: 7.35 - 7.45  7.204 (L)   POC PCO2 Latest Ref Range: 35 - 45 mmHg 80.8 (H)   POC PO2 Latest Ref Range: 40 - 60 mmHg 20 (LL)   POC BE Latest Ref Range: -2 to 2 mmol/L 2   POC HCO3 Latest Ref Range: 24 - 28 mmol/L 31.8 (H)   POC SATURATED O2 Latest Ref Range: 95 - 100 % 21 (L)   POC TCO2 Latest Ref Range: 24 - 29 mmol/L 34 (H)   Flow Unknown 4   Sample Unknown VENOUS   DelSys Unknown Nasal Can   Allens Test Unknown N/A   Site Unknown Other   Mode Unknown SPONT

## 2019-11-26 VITALS
HEIGHT: 62 IN | TEMPERATURE: 98 F | WEIGHT: 224.88 LBS | OXYGEN SATURATION: 100 % | BODY MASS INDEX: 41.38 KG/M2 | DIASTOLIC BLOOD PRESSURE: 57 MMHG | RESPIRATION RATE: 19 BRPM | HEART RATE: 98 BPM | SYSTOLIC BLOOD PRESSURE: 113 MMHG

## 2019-11-26 LAB
ALBUMIN SERPL BCP-MCNC: 2 G/DL (ref 3.5–5.2)
ALP SERPL-CCNC: 84 U/L (ref 55–135)
ALT SERPL W/O P-5'-P-CCNC: 18 U/L (ref 10–44)
ANION GAP SERPL CALC-SCNC: 11 MMOL/L (ref 8–16)
AST SERPL-CCNC: 22 U/L (ref 10–40)
BASOPHILS # BLD AUTO: 0.04 K/UL (ref 0–0.2)
BASOPHILS NFR BLD: 0.2 % (ref 0–1.9)
BILIRUB SERPL-MCNC: 0.4 MG/DL (ref 0.1–1)
BUN SERPL-MCNC: 74 MG/DL (ref 8–23)
CALCIUM SERPL-MCNC: 8.3 MG/DL (ref 8.7–10.5)
CHLORIDE SERPL-SCNC: 86 MMOL/L (ref 95–110)
CO2 SERPL-SCNC: 26 MMOL/L (ref 23–29)
CREAT SERPL-MCNC: 3.1 MG/DL (ref 0.5–1.4)
DIFFERENTIAL METHOD: ABNORMAL
EOSINOPHIL # BLD AUTO: 0.1 K/UL (ref 0–0.5)
EOSINOPHIL NFR BLD: 0.5 % (ref 0–8)
ERYTHROCYTE [DISTWIDTH] IN BLOOD BY AUTOMATED COUNT: 13.4 % (ref 11.5–14.5)
EST. GFR  (AFRICAN AMERICAN): 16 ML/MIN/1.73 M^2
EST. GFR  (NON AFRICAN AMERICAN): 14 ML/MIN/1.73 M^2
GLUCOSE SERPL-MCNC: 141 MG/DL (ref 70–110)
HCT VFR BLD AUTO: 25.5 % (ref 37–48.5)
HGB BLD-MCNC: 7.8 G/DL (ref 12–16)
IMM GRANULOCYTES # BLD AUTO: 0.35 K/UL (ref 0–0.04)
IMM GRANULOCYTES NFR BLD AUTO: 1.5 % (ref 0–0.5)
LYMPHOCYTES # BLD AUTO: 0.4 K/UL (ref 1–4.8)
LYMPHOCYTES NFR BLD: 1.6 % (ref 18–48)
MCH RBC QN AUTO: 29.5 PG (ref 27–31)
MCHC RBC AUTO-ENTMCNC: 30.6 G/DL (ref 32–36)
MCV RBC AUTO: 97 FL (ref 82–98)
MONOCYTES # BLD AUTO: 2.9 K/UL (ref 0.3–1)
MONOCYTES NFR BLD: 12.5 % (ref 4–15)
NEUTROPHILS # BLD AUTO: 19.5 K/UL (ref 1.8–7.7)
NEUTROPHILS NFR BLD: 83.7 % (ref 38–73)
NRBC BLD-RTO: 0 /100 WBC
PLATELET # BLD AUTO: 409 K/UL (ref 150–350)
PMV BLD AUTO: 9.7 FL (ref 9.2–12.9)
POCT GLUCOSE: 150 MG/DL (ref 70–110)
POCT GLUCOSE: 157 MG/DL (ref 70–110)
POTASSIUM SERPL-SCNC: 5.5 MMOL/L (ref 3.5–5.1)
PROT SERPL-MCNC: 5.5 G/DL (ref 6–8.4)
RBC # BLD AUTO: 2.64 M/UL (ref 4–5.4)
SODIUM SERPL-SCNC: 123 MMOL/L (ref 136–145)
WBC # BLD AUTO: 23.28 K/UL (ref 3.9–12.7)

## 2019-11-26 PROCEDURE — 99291 PR CRITICAL CARE, E/M 30-74 MINUTES: ICD-10-PCS | Mod: HCNC,,, | Performed by: INTERNAL MEDICINE

## 2019-11-26 PROCEDURE — 99291 CRITICAL CARE FIRST HOUR: CPT | Mod: HCNC,,, | Performed by: INTERNAL MEDICINE

## 2019-11-26 PROCEDURE — 63600175 PHARM REV CODE 636 W HCPCS: Mod: HCNC | Performed by: HOSPITALIST

## 2019-11-26 PROCEDURE — 27100171 HC OXYGEN HIGH FLOW UP TO 24 HOURS: Mod: HCNC

## 2019-11-26 PROCEDURE — 27100092 HC HIGH FLOW DELIVERY CANNULA: Mod: HCNC

## 2019-11-26 PROCEDURE — 94761 N-INVAS EAR/PLS OXIMETRY MLT: CPT | Mod: HCNC

## 2019-11-26 PROCEDURE — 36415 COLL VENOUS BLD VENIPUNCTURE: CPT | Mod: HCNC

## 2019-11-26 PROCEDURE — 99900035 HC TECH TIME PER 15 MIN (STAT): Mod: HCNC

## 2019-11-26 PROCEDURE — 85025 COMPLETE CBC W/AUTO DIFF WBC: CPT | Mod: HCNC

## 2019-11-26 PROCEDURE — 94660 CPAP INITIATION&MGMT: CPT | Mod: HCNC

## 2019-11-26 PROCEDURE — 80053 COMPREHEN METABOLIC PANEL: CPT | Mod: HCNC

## 2019-11-26 RX ORDER — FUROSEMIDE 10 MG/ML
40 INJECTION INTRAMUSCULAR; INTRAVENOUS 2 TIMES DAILY
Status: DISCONTINUED | OUTPATIENT
Start: 2019-11-26 | End: 2019-11-26 | Stop reason: HOSPADM

## 2019-11-26 RX ORDER — ALBUTEROL SULFATE 2.5 MG/.5ML
10 SOLUTION RESPIRATORY (INHALATION) ONCE
Status: DISCONTINUED | OUTPATIENT
Start: 2019-11-26 | End: 2019-11-26 | Stop reason: HOSPADM

## 2019-11-26 RX ORDER — LORAZEPAM 2 MG/ML
0.5 INJECTION INTRAMUSCULAR EVERY 30 MIN PRN
Status: DISCONTINUED | OUTPATIENT
Start: 2019-11-26 | End: 2019-11-26 | Stop reason: HOSPADM

## 2019-11-26 RX ORDER — MORPHINE SULFATE 10 MG/ML
2 INJECTION INTRAMUSCULAR; INTRAVENOUS; SUBCUTANEOUS
Status: DISCONTINUED | OUTPATIENT
Start: 2019-11-26 | End: 2019-11-26 | Stop reason: HOSPADM

## 2019-11-26 RX ORDER — FUROSEMIDE 10 MG/ML
40 INJECTION INTRAMUSCULAR; INTRAVENOUS ONCE
Status: COMPLETED | OUTPATIENT
Start: 2019-11-26 | End: 2019-11-26

## 2019-11-26 RX ADMIN — FUROSEMIDE 40 MG: 10 INJECTION, SOLUTION INTRAVENOUS at 11:11

## 2019-11-26 RX ADMIN — HEPARIN SODIUM 5000 UNITS: 5000 INJECTION, SOLUTION INTRAVENOUS; SUBCUTANEOUS at 05:11

## 2019-11-26 RX ADMIN — MORPHINE SULFATE 2 MG: 10 INJECTION INTRAVENOUS at 02:11

## 2019-11-26 NOTE — CONSULTS
Spoke with MD and patient's family have made a decision for hospice care and hospice rep already in room. I will not see

## 2019-11-26 NOTE — NURSING
Report called to [facility]. Patient will be leaving for Hospice at 1430.Family at bedside aware of arrangements.

## 2019-11-26 NOTE — ASSESSMENT & PLAN NOTE
"Most likely from volume overload.  Per daughter, lasix was scaled back due to arthritis and difficulty with going to bathroom.  Daughter stated that mother no longer wish hospitalization and is ready to "move on."   is at bedside and is in agreement with his daughter.  Spoken with Dr. Marshall and recommended lasix as tolerated and to transition patient to hospice for comfort care.  Family seems to be agreeable to [facility].    "

## 2019-11-26 NOTE — NURSING
EMS arrived.Patient placed on non re breather IV access removed tele removed.patien to off unit on stretcher discharged to hospice

## 2019-11-26 NOTE — PROGRESS NOTES
[facility] Hospital Medicine  Progress Note    Patient Name: Kaci Whalen  MRN: [id number]  Patient Class: IP- Inpatient   Admission Date: 11/25/2019  Length of Stay: 1 days  Attending Physician: Mayelin Marshall MD  Primary Care Provider: Toby Hung MD        Subjective:     Principal Problem:Acute renal failure superimposed on stage 3 chronic kidney disease        HPI:  79-year-old woman with PMH of diastolic CHF,chronic respiratory failure,HTN,DM,CKD 3,anemia,debility,MIGUEL,obesity,bipolar disorder,,who was brought to the emergency department by EMS from [facility],.  Family members report that the patient had a decreased level of consciousness and increasing work of breathing.  EMS reports that the O2 sats were 80% on room air.  Patient was admitted to our facility a week ago due to respiratory distress and CHF exacerbation.  She was discharged to the skilled nursing facility for physical therapy and rehab.  She had been in the skilled nursing facility for approximately for 4 days now.  Over the weekend she had an episode of decreased level of consciousness and lethargy and the family member was told that it was due to the Norco she was taking.  Patient was last seen normal by the family members at 8:00 p.m. yesterday and she was told that the patient was lethargic and having difficulty breathing this morning.  Yesterday she had been eating well and interacting with her family members.  She had not been able to ambulate due to generalized weakness. She has chronic weakness of the left arm due to chronic left shoulder pain.  She also has a history of left sided Bell's Palsy.  She has a history of atrial fib but no longer takes OAC, due to frequent falls,she has ARF on CKD 3 ,has leucocytosis,blood culture has been done and she has jayson srarted on IV Abx,head CT show no acute process,ABG is consistent  with A/C hypoxic,hypercapn respiratory failure,started patient on bipap.she is lethargic  not able do ROS.    Overview/Hospital Course:  79-year-old woman with PMH of diastolic CHF,chronic respiratory failure,HTN,DM,CKD 3,anemia,debility,MIGUEL,obesity,bipolar disorder,,who was brought to the emergency department by EMS from [facility],.  Family members report that the patient had a decreased level of consciousness and increasing work of breathing.  EMS reports that the O2 sats were 80% on room air.  Patient was admitted to our facility a week ago due to respiratory distress and CHF exacerbation.  She was discharged to the skilled nursing facility for physical therapy and rehab.  She had been in the skilled nursing facility for approximately for 4 days now.  Over the weekend she had an episode of decreased level of consciousness and lethargy and the family member was told that it was due to the Norco she was taking.  Patient was last seen normal by the family members at 8:00 p.m. yesterday and she was told that the patient was lethargic and having difficulty breathing this morning.  Yesterday she had been eating well and interacting with her family members.  She had not been able to ambulate due to generalized weakness. She has chronic weakness of the left arm due to chronic left shoulder pain.  She also has a history of left sided Bell's Palsy.  She has a history of atrial fib but no longer takes OAC, due to frequent falls,she has ARF on CKD 3 ,has leucocytosis,blood culture has been done and she has jayson srarted on IV Abx,head CT show no acute process,ABG is consistent  with A/C hypoxic,hypercapnic  respiratory failure,started patient on bipap.she is lethargic not able do ROS.  ABG show some improvement,but still hypoxic off bipap ,consulted pulmonloogy,  Started on bicarb drip ,ARF did not improved,consulted nephrology.    Past Medical History:   Diagnosis Date    Abdominal pain     Anxiety     Atherosclerosis of aortic arch     noted on CXR 7/1/2015    Atrial fibrillation 05/2016    CHADS 4, on Xarelto     Benign hypertension     Bipolar disorder, most recent episode manic 8/29/2019    Cerebrovascular accident (CVA) 11/25/2019    CHF (congestive heart failure)     Chronic constipation     Chronic diarrhea     Chronic edema     Depression     Dercum disease     Multiple painful lipomas    Diabetic retinopathy     Dysphagia     Gas pain     Glaucoma of both eyes     Hx of psychiatric care     previously amitriptyline, now jut on Trazodone 100mg QHS PRN insomnia    Hyperlipidemia with target LDL less than 100     Unable to tolerate statins    Immature cataract     Left sided numbness     per daughter    Tasia     no symptoms prior to this presentation    Morbid obesity     Nausea & vomiting     Neuropathy     On home oxygen therapy     Polyneuropathy     Primary osteoarthritis of both knees     Proteinuria     Psychiatric problem     history of depression    Pulmonary hypertension mixed group 2 and 3 1/18/2017    Requires assistance with activities of daily living (ADL)     Sleep apnea     Therapy     no history of therapy    Type II or unspecified type diabetes mellitus with neurological manifestations, uncontrolled(250.62)     Unspecified vitamin D deficiency     Unsteady gait     Uses roller walker        Past Surgical History:   Procedure Laterality Date     tenotomy      flexors 2,3 L toes    CATARACT EXTRACTION W/  INTRAOCULAR LENS IMPLANT Bilateral     COLONOSCOPY N/A 5/4/2017    Procedure: COLONOSCOPY;  Surgeon: Suellen Wolf MD;  [facility] ([address] [place] [address]);  Service: Endoscopy;  Laterality: N/A;  2nd floor due to pulm htn, possible gastroparesis. per Dr Wolf      ok to hold Xarelto 2 days prior to procedure per Dr Driss Dixon    diabetic retinopathy      right    HYSTERECTOMY      knee surgery x2      Left ankle and leg surgery secondary to a fall      rods & screws present    left arm fracture      Left cataract      PARTIAL HYSTERECTOMY      Secondary to  bleeding    TOE FUSION      2,3 R    WRIST SURGERY Left 12/28/2012    pins & plate present       Review of patient's allergies indicates:   Allergen Reactions    Tramadol Other (See Comments)     Interaction-induced delirium with s/s of estrellita.    Ace inhibitors      Other reaction(s): cough      Fluorescein Itching     Other reaction(s): Itching    Iodine      Other reaction(s): Itching    Pravastatin Other (See Comments)     Other reaction(s): mouth tingling/numbness    Simvastatin      Other reaction(s): foot swelling         No current facility-administered medications on file prior to encounter.      Current Outpatient Medications on File Prior to Encounter   Medication Sig    amLODIPine (NORVASC) 10 MG tablet Take 1 tablet (10 mg total) by mouth once daily.    amoxicillin-clavulanate 875-125mg (AUGMENTIN) 875-125 mg per tablet Take 1 tablet by mouth 2 (two) times daily. for 7 days    desloratadine (CLARINEX) 5 mg tablet Take 1 tablet (5 mg total) by mouth once daily. (Patient taking differently: Take 5 mg by mouth as needed. )    divalproex (DEPAKOTE) 500 MG TbEC Take 1 tablet (500 mg total) by mouth every 12 (twelve) hours.    fluticasone (FLONASE) 50 mcg/actuation nasal spray 1 spray (50 mcg total) by Each Nare route 2 (two) times daily.    furosemide (LASIX) 40 MG tablet Take 1 tablet (40 mg total) by mouth once daily. One tab po daily x three days then as needed for LE swelling    gabapentin (NEURONTIN) 100 MG capsule Take 1 capsule (100 mg total) by mouth 3 (three) times daily.    hydroCHLOROthiazide (HYDRODIURIL) 25 MG tablet Take 25 mg by mouth once daily.    insulin aspart protamine-insulin aspart (NOVOLOG 70/30) 100 unit/mL (70-30) InPn pen Inject 21 Units into the skin 2 (two) times daily before meals. Sliding scale max daily dosing 50 units daily    losartan (COZAAR) 100 MG tablet Take 1 tablet (100 mg total) by mouth once daily.    metoprolol succinate (TOPROL-XL) 25 MG 24 hr  tablet Take 1 tablet (25 mg total) by mouth once daily.    multivitamin capsule Take 1 capsule by mouth once daily.    polyethylene glycol (GLYCOLAX) 17 gram PwPk Take 17 g by mouth 2 (two) times daily as needed.    ranitidine (ZANTAC) 150 MG tablet Take 150 mg by mouth 2 (two) times daily.     Family History     Problem Relation (Age of Onset)    Bone cancer Daughter    Liver cancer Mother    No Known Problems Daughter, Father, Sister, Brother, Maternal Aunt, Maternal Uncle, Paternal Aunt, Paternal Uncle, Maternal Grandmother, Maternal Grandfather, Paternal Grandmother, Paternal Grandfather        Tobacco Use    Smoking status: Never Smoker    Smokeless tobacco: Never Used   Substance and Sexual Activity    Alcohol use: No    Drug use: No    Sexual activity: Yes     Partners: Male     Review of Systems,not able be done,  Objective:     Vital Signs (Most Recent):  Temp: 97.9 °F (36.6 °C) (11/26/19 0756)  Pulse: 98 (11/26/19 0756)  Resp: 19 (11/26/19 0858)  BP: (!) 91/58 (11/26/19 0756)  SpO2: 98 % (11/26/19 0858) Vital Signs (24h Range):  Temp:  [95.9 °F (35.5 °C)-98.5 °F (36.9 °C)] 97.9 °F (36.6 °C)  Pulse:  [] 98  Resp:  [16-32] 19  SpO2:  [67 %-100 %] 98 %  BP: ()/(38-69) 91/58     Weight: 102 kg (224 lb 13.9 oz)  Body mass index is 41.13 kg/m².    Physical Exam   Constitutional: No distress.   HENT:   Head: Atraumatic.   Eyes: EOM are normal.   Neck: Neck supple.   Cardiovascular: Normal rate and regular rhythm.   Pulmonary/Chest: Effort normal.   Diminished basal,   Abdominal: Soft. Bowel sounds are normal.   Musculoskeletal: Normal range of motion. She exhibits edema.   Neurological:   Not able be done.   Skin: Skin is warm and dry.   Psychiatric:   Not able be done         CRANIAL NERVES     CN III, IV, VI   Extraocular motions are normal.        Significant Labs:   ABGs:   Recent Labs   Lab 11/25/19  1741   PH 7.422   PCO2 41.7   HCO3 27.2   POCSATURATED 71*   BE 2     BMP:   Recent Labs    Lab 11/26/19  0512   *   *   K 5.5*   CL 86*   CO2 26   BUN 74*   CREATININE 3.1*   CALCIUM 8.3*     CBC:   Recent Labs   Lab 11/25/19  1028 11/26/19  0512   WBC 21.90* 23.28*   HGB 9.0* 7.8*   HCT 29.4* 25.5*   * 409*     CMP:   Recent Labs   Lab 11/25/19  1028 11/26/19  0512   * 123*   K 5.5* 5.5*   CL 85* 86*   CO2 31* 26   * 141*   BUN 72* 74*   CREATININE 3.0* 3.1*   CALCIUM 8.9 8.3*   PROT 6.3 5.5*   ALBUMIN 2.3* 2.0*   BILITOT 0.4 0.4   ALKPHOS 90 84   AST 27 22   ALT 19 18   ANIONGAP 10 11   EGFRNONAA 14* 14*       Significant Imaging: reviewed.      Assessment/Plan:      * Acute renal failure superimposed on stage 3 chronic kidney disease  Started on gentle bicarb drip,consulted nephrology,check bladder scan,consern for ATN,duo to hypotension at arrival.not a HD candidate,  Started on bicarb drip ,ARF did not improved,consulted nephrology.      Acute on chronic respiratory failure with hypoxia and hypercapnia  likely duo to MIGUEL,OHS,started on bipap,recjeck ABG,will monitor,she is DNR.ABG show some improvement,but still hypoxic off bipap ,consulted pulmonloogy,        Acute anoxic encephalopathy  likley duo to CO 2 retention and hypoxia,will continue monitor.      Goals of care, counseling/discussion  Family interested in Hospice care,consulted palliative care.      Chronic respiratory failure with hypoxia and hypercapnia  On home oxygen.      Chronic diastolic congestive heart failure  Will monitor,chesat  Ray same as before.not on ACE or ARB duo to ARF,not on BB duo to hypotension.      Hyperkalemia  Will try albuterol,      Obesity, Class III, BMI 40-49.9 (morbid obesity)  Weight lose as out patient.      CKD (chronic kidney disease), stage III  Will monitor.      Essential hypertension  Hold BP med's at this time duo to hypotension.      MIGUEL (obstructive sleep apnea) unable to afford CPAP  Started on bipap.      Paroxysmal atrial fibrillation  Rate controlled,not on OAC  duo to fall.      Hyperlipidemia with target LDL less than 100          VTE Risk Mitigation (From admission, onward)         Ordered     heparin (porcine) injection 5,000 Units  Every 8 hours      11/25/19 1556     IP VTE HIGH RISK PATIENT  Once      11/25/19 1552     Place EDE hose  Until discontinued      11/25/19 1552                      Mayelin Marshall MD  Department of Hospital Medicine   [facility]

## 2019-11-26 NOTE — NURSING
Patient's MEW score 5. OC notified.  notified of score. Patient on comfort measure, will continue to monitor.

## 2019-11-26 NOTE — ED TRIAGE NOTES
Pt arrived to ED via EMS from [facility] for decreased level of consciousness and decreased O2 saturation. Nursing home staff reports patient more lethargic than usual upon awakening today with 02 saturation of 67 on RA. On arrival pt is drowsy, but arouses to voice. She is oriented to self only at this time. She is breathing 22 times per min with 100 %  O2 saturation on non-rebreather at 15 L

## 2019-11-26 NOTE — NURSING
Dr. Angulo informed that bladder scan showed 565 cc. Order to I/O >300 cc. Order changed to >600 per Dr. Angulo. No discomfort noted.

## 2019-11-26 NOTE — CONSULTS
Jackie Mcknight contacted  and informed that family decided on [facility]. Jackie gave  call report.

## 2019-11-26 NOTE — DISCHARGE SUMMARY
[facility] Hospital Medicine  Discharge Summary      Patient Name: Kaci Whalen  MRN: [id number]  Admission Date: 11/25/2019  Hospital Length of Stay: 1 days  Discharge Date and Time:  11/26/2019 11:44 AM  Attending Physician: Mayelin Marshall MD   Discharging Provider: Mayelin Marshall MD  Primary Care Provider: Toby Hung MD      HPI:   79-year-old woman with PMH of diastolic CHF,chronic respiratory failure,HTN,DM,CKD 3,anemia,debility,MIGUEL,obesity,bipolar disorder,,who was brought to the emergency department by EMS from [facility],.  Family members report that the patient had a decreased level of consciousness and increasing work of breathing.  EMS reports that the O2 sats were 80% on room air.  Patient was admitted to our facility a week ago due to respiratory distress and CHF exacerbation.  She was discharged to the skilled nursing facility for physical therapy and rehab.  She had been in the skilled nursing facility for approximately for 4 days now.  Over the weekend she had an episode of decreased level of consciousness and lethargy and the family member was told that it was due to the Norco she was taking.  Patient was last seen normal by the family members at 8:00 p.m. yesterday and she was told that the patient was lethargic and having difficulty breathing this morning.  Yesterday she had been eating well and interacting with her family members.  She had not been able to ambulate due to generalized weakness. She has chronic weakness of the left arm due to chronic left shoulder pain.  She also has a history of left sided Bell's Palsy.  She has a history of atrial fib but no longer takes OAC, due to frequent falls,she has ARF on CKD 3 ,has leucocytosis,blood culture has been done and she has jayson srarted on IV Abx,head CT show no acute process,ABG is consistent  with A/C hypoxic,hypercapn respiratory failure,started patient on bipap.she is lethargic not able do ROS.    * No  surgery found *      Hospital Course:   79-year-old woman with PMH of diastolic CHF,chronic respiratory failure,HTN,DM,CKD 3,anemia,debility,MIGUEL,obesity,bipolar disorder,,who was brought to the emergency department by EMS from [facility],.  Family members report that the patient had a decreased level of consciousness and increasing work of breathing.  EMS reports that the O2 sats were 80% on room air.  Patient was admitted to our facility a week ago due to respiratory distress and CHF exacerbation.  She was discharged to the skilled nursing facility for physical therapy and rehab.  She had been in the skilled nursing facility for approximately for 4 days now.  Over the weekend she had an episode of decreased level of consciousness and lethargy and the family member was told that it was due to the Norco she was taking.  Patient was last seen normal by the family members at 8:00 p.m. Day before admission, and she was told that the patient was lethargic and having difficulty breathing . she had been eating well and interacting with her family members. She had not been able to ambulate due to generalized weakness. She has chronic weakness of the left arm due to chronic left shoulder pain.  She also has a history of left sided Bell's Palsy.  She has a history of atrial fib but no longer takes OAC, due to frequent falls,she had  ARF on CKD 3 ,has leucocytosis,blood culture was  done and she was started on IV Abx,head CT show no acute process,ABG is consistent  with A/C hypoxic,hypercapnic  respiratory failure,started patient on bipap.she was lethargic,was  not able do ROS.  ABG show some improvement,,consulted pulmonloogy,  Started on bicarb drip ,ARF did not improved,consulted nephrology.  Daughter was consulted extensively critical condition of patient,she was already DNR,family are agree with comfort care,patient also never wished aggressive treatments,want be on comfort care,pallaitive  care was consulted,Hospice was  consulted,patient was transferred to inpatient Hospice.     Consults:   Consults (From admission, onward)        Status Ordering Provider     Inpatient consult to Nephrology  Once     Provider:  Blas Patel MD    Acknowledged MELISSA ABDOLAALESSANDRA     Inpatient consult to Palliative Care  Once     Provider:  Flower Urbina NP    Completed AKHONDZADEH, ABDOLAZIM     Inpatient consult to Pulmonology  Once     Provider:  Jose Stewart MD    Completed MELISSA ABDOLAALESSANDRA     Inpatient consult to Social Work  Once     Provider:  (Not yet assigned)    Acknowledged MELISSA ABDOLAALESSANDRA     Inpatient consult to Spiritual Care  Once     Provider:  (Not yet assigned)    Acknowledged DOMINICK TORRES     Inpatient consult to Telemedicine-Stroke  Once     Provider:  (Not yet assigned)    Acknowledged JAIDEN DEWEY          No new Assessment & Plan notes have been filed under this hospital service since the last note was generated.  Service: Hospital Medicine    Final Active Diagnoses:    Diagnosis Date Noted POA    PRINCIPAL PROBLEM:  Acute renal failure superimposed on stage 3 chronic kidney disease [N17.9, N18.3] 11/25/2019 Yes    Acute on chronic respiratory failure with hypoxia and hypercapnia [J96.21, J96.22] 11/25/2019 Yes    Chronic diastolic congestive heart failure [I50.32] 11/25/2019 Yes    Chronic respiratory failure with hypoxia and hypercapnia [J96.11, J96.12] 11/25/2019 Yes    Goals of care, counseling/discussion [Z71.89] 11/25/2019 Not Applicable    Acute anoxic encephalopathy [G93.1] 11/25/2019 Yes    Hyperkalemia [E87.5] 11/14/2019 Yes    Obesity, Class III, BMI 40-49.9 (morbid obesity) [E66.01] 09/09/2019 Yes     Chronic    CKD (chronic kidney disease), stage III [N18.3] 06/07/2017 Yes    Essential hypertension [I10] 06/06/2017 Yes     Chronic    Pulmonary hypertension mixed group 2 and 3 [I27.20] 01/18/2017 Yes    MIGUEL (obstructive sleep apnea) unable to afford  CPAP [G47.33]  Yes     Chronic    Paroxysmal atrial fibrillation [I48.0] 06/10/2016 Yes    Hyperlipidemia with target LDL less than 100 [E78.5]  Yes      Problems Resolved During this Admission:       Discharged Condition: stable    Disposition: Hospice/Medical Facility    Follow Up:  Follow-up Information     Toby Hung MD In 1 week.    Specialties:  Internal Medicine, Wound Care  Contact information:  [place] [address] [phone]                 Patient Instructions:      Activity as tolerated       Significant Diagnostic Studies: Labs:   BMP:   Recent Labs   Lab 11/25/19  1028 11/26/19  0512   * 141*   * 123*   K 5.5* 5.5*   CL 85* 86*   CO2 31* 26   BUN 72* 74*   CREATININE 3.0* 3.1*   CALCIUM 8.9 8.3*   , CMP   Recent Labs   Lab 11/25/19  1028 11/26/19  0512   * 123*   K 5.5* 5.5*   CL 85* 86*   CO2 31* 26   * 141*   BUN 72* 74*   CREATININE 3.0* 3.1*   CALCIUM 8.9 8.3*   PROT 6.3 5.5*   ALBUMIN 2.3* 2.0*   BILITOT 0.4 0.4   ALKPHOS 90 84   AST 27 22   ALT 19 18   ANIONGAP 10 11   ESTGFRAFRICA 17* 16*   EGFRNONAA 14* 14*    and CBC   Recent Labs   Lab 11/25/19  1028 11/26/19  0512   WBC 21.90* 23.28*   HGB 9.0* 7.8*   HCT 29.4* 25.5*   * 409*     Microbiology:   Blood Culture   Lab Results   Component Value Date    LABBLOO No Growth to date 11/25/2019     Radiology: X-Ray: CXR: X-Ray Chest 1 View (CXR): No results found for this visit on 11/25/19. and X-Ray Chest PA and Lateral (CXR): No results found for this visit on 11/25/19.    Pending Diagnostic Studies:     None         Medications:  Reconciled Home Medications:      Medication List      CHANGE how you take these medications    desloratadine 5 mg tablet  Commonly known as:  CLARINEX  Take 1 tablet (5 mg total) by mouth once daily.  What changed:    · when to take this  · reasons to take this        CONTINUE taking these medications    amoxicillin-clavulanate 875-125mg 875-125 mg per  tablet  Commonly known as:  AUGMENTIN  Take 1 tablet by mouth 2 (two) times daily. for 7 days     divalproex 500 MG Tbec  Commonly known as:  DEPAKOTE  Take 1 tablet (500 mg total) by mouth every 12 (twelve) hours.     fluticasone propionate 50 mcg/actuation nasal spray  Commonly known as:  FLONASE  1 spray (50 mcg total) by Each Nare route 2 (two) times daily.     furosemide 40 MG tablet  Commonly known as:  LASIX  Take 1 tablet (40 mg total) by mouth once daily. One tab po daily x three days then as needed for LE swelling     gabapentin 100 MG capsule  Commonly known as:  NEURONTIN  Take 1 capsule (100 mg total) by mouth 3 (three) times daily.     hydroCHLOROthiazide 25 MG tablet  Commonly known as:  HYDRODIURIL  Take 25 mg by mouth once daily.     multivitamin capsule  Take 1 capsule by mouth once daily.     polyethylene glycol 17 gram Pwpk  Commonly known as:  GLYCOLAX  Take 17 g by mouth 2 (two) times daily as needed.     ranitidine 150 MG tablet  Commonly known as:  ZANTAC  Take 150 mg by mouth 2 (two) times daily.        STOP taking these medications    amLODIPine 10 MG tablet  Commonly known as:  NORVASC     insulin aspart protamine-insulin aspart 100 unit/mL (70-30) Inpn pen  Commonly known as:  NovoLOG 70/30     losartan 100 MG tablet  Commonly known as:  COZAAR     metoprolol succinate 25 MG 24 hr tablet  Commonly known as:  TOPROL-XL            Indwelling Lines/Drains at time of discharge:   Lines/Drains/Airways     None                 Time spent on the discharge of patient: over 30  minutes  Patient was seen and examined on the date of discharge and determined to be suitable for discharge.         Mayelin Marshall MD  Department of Hospital Medicine  [facility]

## 2019-11-26 NOTE — CARE UPDATE
Results for MANUEL HORAN (MRN [id number]) as of 11/25/2019 18:09   Ref. Range 11/25/2019 17:41   POC PH Latest Ref Range: 7.35 - 7.45  7.422   POC PCO2 Latest Ref Range: 35 - 45 mmHg 41.7   POC PO2 Latest Ref Range: 80 - 100 mmHg 36 (LL)   POC BE Latest Ref Range: -2 to 2 mmol/L 2   POC HCO3 Latest Ref Range: 24 - 28 mmol/L 27.2   POC SATURATED O2 Latest Ref Range: 95 - 100 % 71 (L)   POC TCO2 Latest Ref Range: 23 - 27 mmol/L 28 (H)   FiO2 Unknown 40   Sample Unknown ARTERIAL   DelSys Unknown CPAP/BiPAP   Allens Test Unknown Pass   Site Unknown LR   Mode Unknown BiPAP   Rate Unknown 8   EP Unknown 8   IP Unknown 15   Min Vol Unknown 5.8   Spont Rate Unknown 20   Results verified and read back by Dr. Breaux. Pulse Ox SPO2 reads 71%. FIO2 increased to 100%. Current SPO2 reads 86%. Dr. Breaux, charge nurse and pts RN all made aware. Will continue to monitor. MIMI Stewart, RRT

## 2019-11-26 NOTE — CONSULTS
[facility] [place]  Pulmonology  Consult Note    Patient Name: Kaci Whalen  MRN: [id number]  Admission Date: 11/25/2019  Hospital Length of Stay: 1 days  Code Status: DNR  Attending Physician: Mayelin Marshall MD  Primary Care Provider: Toby Hung MD   Principal Problem: Acute renal failure superimposed on stage 3 chronic kidney disease    Inpatient consult to Pulmonology  Consult performed by: Jose Stewart MD  Consult ordered by: Mayelin Marshall MD        Subjective:     HPI:  Patient is 79 y.o. female  has a past medical history of Abdominal pain, Anxiety, Atherosclerosis of aortic arch, Atrial fibrillation (05/2016), Benign hypertension, Bipolar disorder, most recent episode manic (8/29/2019), Cerebrovascular accident (CVA) (11/25/2019), CHF (congestive heart failure), Chronic constipation, Chronic diarrhea, Chronic edema, Depression, Dercum disease, Diabetic retinopathy, Dysphagia, Gas pain, Glaucoma of both eyes, psychiatric care, Hyperlipidemia with target LDL less than 100, Immature cataract, Left sided numbness, Tasia, Morbid obesity, Nausea & vomiting, Neuropathy, On home oxygen therapy, Polyneuropathy, Primary osteoarthritis of both knees, Proteinuria, Psychiatric problem, Pulmonary hypertension mixed group 2 and 3 (1/18/2017), Requires assistance with activities of daily living (ADL), Sleep apnea, Therapy, Type II or unspecified type diabetes mellitus with neurological manifestations, uncontrolled(250.62), Unspecified vitamin D deficiency, Unsteady gait, and Uses roller walker. presented to [facility] on 11/25/19 with increased wob and sob.  Initial ABG with acute on chronic hypercapnic respiratory failure (7.2/80/20/32).  Patient was placed on BPAP and pulmononary was consulted for further inputs.  Repeat BBG compensated 7.4/41/38/27.   +leukocytosis and patient was placed on broad spectrum antibiotics.     During my initial evaluation, patient is minimally  responsive to sternum rub.  Most of history is per chart review and my conversation with daughter, Rozina.  Per daughter, patient with multiple hospitalizations over past 2 months.  Rozina also stated that it is patient's wish not to be intubated or resuscitate in the case of cardiopulmonary arrest.      Past Medical History:   Diagnosis Date    Abdominal pain     Anxiety     Atherosclerosis of aortic arch     noted on CXR 7/1/2015    Atrial fibrillation 05/2016    CHADS 4, on Xarelto    Benign hypertension     Bipolar disorder, most recent episode manic 8/29/2019    Cerebrovascular accident (CVA) 11/25/2019    CHF (congestive heart failure)     Chronic constipation     Chronic diarrhea     Chronic edema     Depression     Dercum disease     Multiple painful lipomas    Diabetic retinopathy     Dysphagia     Gas pain     Glaucoma of both eyes     Hx of psychiatric care     previously amitriptyline, now jut on Trazodone 100mg QHS PRN insomnia    Hyperlipidemia with target LDL less than 100     Unable to tolerate statins    Immature cataract     Left sided numbness     per daughter    Tasia     no symptoms prior to this presentation    Morbid obesity     Nausea & vomiting     Neuropathy     On home oxygen therapy     Polyneuropathy     Primary osteoarthritis of both knees     Proteinuria     Psychiatric problem     history of depression    Pulmonary hypertension mixed group 2 and 3 1/18/2017    Requires assistance with activities of daily living (ADL)     Sleep apnea     Therapy     no history of therapy    Type II or unspecified type diabetes mellitus with neurological manifestations, uncontrolled(250.62)     Unspecified vitamin D deficiency     Unsteady gait     Uses roller walker        Past Surgical History:   Procedure Laterality Date     tenotomy      flexors 2,3 L toes    CATARACT EXTRACTION W/  INTRAOCULAR LENS IMPLANT Bilateral     COLONOSCOPY N/A 5/4/2017    Procedure:  COLONOSCOPY;  Surgeon: Suellen Wolf MD;  Location: [facility] (2ND FLR);  Service: Endoscopy;  Laterality: N/A;  2nd floor due to pulm htn, possible gastroparesis. per Dr Wolf      ok to hold Xarelto 2 days prior to procedure per Dr Driss Dixon    diabetic retinopathy      right    HYSTERECTOMY      knee surgery x2      Left ankle and leg surgery secondary to a fall      rods & screws present    left arm fracture      Left cataract      PARTIAL HYSTERECTOMY      Secondary to bleeding    TOE FUSION      2,3 R    WRIST SURGERY Left 12/28/2012    pins & plate present       Review of patient's allergies indicates:   Allergen Reactions    Tramadol Other (See Comments)     Interaction-induced delirium with s/s of estrellita.    Ace inhibitors      Other reaction(s): cough      Fluorescein Itching     Other reaction(s): Itching    Iodine      Other reaction(s): Itching    Pravastatin Other (See Comments)     Other reaction(s): mouth tingling/numbness    Simvastatin      Other reaction(s): foot swelling         Family History     Problem Relation (Age of Onset)    Bone cancer Daughter    Liver cancer Mother    No Known Problems Daughter, Father, Sister, Brother, Maternal Aunt, Maternal Uncle, Paternal Aunt, Paternal Uncle, Maternal Grandmother, Maternal Grandfather, Paternal Grandmother, Paternal Grandfather        Tobacco Use    Smoking status: Never Smoker    Smokeless tobacco: Never Used   Substance and Sexual Activity    Alcohol use: No    Drug use: No    Sexual activity: Yes     Partners: Male         Review of Systems   Unable to perform ROS: Mental status change     Objective:     Vital Signs (Most Recent):  Temp: 97.9 °F (36.6 °C) (11/26/19 0756)  Pulse: 98 (11/26/19 0756)  Resp: 19 (11/26/19 0858)  BP: (!) 91/58 (11/26/19 0756)  SpO2: 98 % (11/26/19 0858) Vital Signs (24h Range):  Temp:  [95.9 °F (35.5 °C)-98.5 °F (36.9 °C)] 97.9 °F (36.6 °C)  Pulse:  [] 98  Resp:  [16-32] 19  SpO2:   [67 %-100 %] 98 %  BP: ()/(38-69) 91/58     Weight: 102 kg (224 lb 13.9 oz)  Body mass index is 41.13 kg/m².      Intake/Output Summary (Last 24 hours) at 11/26/2019 1027  Last data filed at 11/26/2019 0600  Gross per 24 hour   Intake 2289.17 ml   Output 100 ml   Net 2189.17 ml       Physical Exam   Constitutional: No distress.   HENT:   Head: Atraumatic.   Eyes: EOM are normal.   Neck: Neck supple.   Cardiovascular: Normal rate and regular rhythm.   Pulmonary/Chest: Effort normal.   Diminished basal,   Abdominal: Soft. Bowel sounds are normal.   Musculoskeletal: Normal range of motion. She exhibits edema.   Neurological:   Minimally responsive.     Skin: Skin is warm and dry.   Psychiatric:   Not able be done       Vents:  Oxygen Concentration (%): 80 (11/26/19 0858)    Lines/Drains/Airways     Peripheral Intravenous Line                 Peripheral IV - Single Lumen 11/25/19 1019 20 G Right Antecubital 1 day                Significant Labs:    CBC/Anemia Profile:  Recent Labs   Lab 11/25/19  1028 11/26/19  0512   WBC 21.90* 23.28*   HGB 9.0* 7.8*   HCT 29.4* 25.5*   * 409*   MCV 98 97   RDW 13.6 13.4        Chemistries:  Recent Labs   Lab 11/25/19  1028 11/26/19  0512   * 123*   K 5.5* 5.5*   CL 85* 86*   CO2 31* 26   BUN 72* 74*   CREATININE 3.0* 3.1*   CALCIUM 8.9 8.3*   ALBUMIN 2.3* 2.0*   PROT 6.3 5.5*   BILITOT 0.4 0.4   ALKPHOS 90 84   ALT 19 18   AST 27 22     RHC 1/18/2017  AOPRES: 135/68 (91)  AOSAT: 100  FICKCI: 2.76  FICKCO: 5.18  PAPRES: 60/25 (37)  PASAT: 68  PVR: 1.93  PWPRES: 23/42 (27)  Unable to draw blood for saturation but confirmed on fluoroscopy  RAPRES: 16/16 (14)  RVPRES: 60/15, 15  SVR: 14.88/1192    Echo 9/10/19  · Normal left ventricular systolic function. The estimated ejection fraction is 55%  · Concentric left ventricular hypertrophy.  · Grade II (moderate) left ventricular diastolic dysfunction consistent with pseudonormalization.  · Moderate left atrial  "enlargement.  · Normal right ventricular systolic function.     Pulmonary Functions Testing Results: 6/29/16 ratio 75%; fvc 47%; fev 40%; tlc 69%    ABGs:   Recent Labs   Lab 11/25/19  1741   PH 7.422   PCO2 41.7   HCO3 27.2   POCSATURATED 71*   BE 2     Cardiac Markers: No results for input(s): CKMB, TROPONINT, MYOGLOBIN in the last 48 hours.  BNP  Recent Labs   Lab 11/25/19  1028   *       Significant Imaging:   CXR: I have reviewed all pertinent results/findings within the past 24 hours and my personal findings are:  11/25/19 worsening right effusion.      Assessment/Plan:     Acute on chronic respiratory failure with hypoxia and hypercapnia  Most likely from volume overload.  Per daughter, lasix was scaled back due to arthritis and difficulty with going to bathroom.  Daughter stated that mother no longer wish hospitalization and is ready to "move on."   is at bedside and is in agreement with his daughter.  Spoken with Dr. Marshall and recommended lasix as tolerated and to transition patient to hospice for comfort care.  Family seems to be agreeable to [facility].            Thank you for your consult. I will follow-up with patient. Please contact us if you have any additional questions.     Jose Stewart MD  Pulmonology  [facility]    Critical Care Time: 35  minutes  Critical secondary to respiratory failure     Critical care was time spent personally by me on the following activities: development of treatment plan with patient or surrogate and bedside caregivers, discussions with consultants, evaluation of patient's response to treatment, examination of patient, ordering and performing treatments and interventions, ordering and review of laboratory studies, ordering and review of radiographic studies, pulse oximetry, re-evaluation of patient's condition.  This critical care time did not overlap with that of any other provider or involve time for any procedures.     "

## 2019-11-26 NOTE — PLAN OF CARE
VIRAL contacted [facility] and informed that patient was ready for discharge from case management standpoint. VIRAL gave call report as well and informed that envelope is in patient's box.     ADT 30 order placed for Transportation.  Requested  time: 2:30 pm (Stretcher)  If transportation does not arrive at ETA time nurse will be instructed to follow protocol for transportation below:   How can I get in touch directly with dispatch, if needed?                 Non-emergent dispatch: [phone]      If transportation does not arrive at requested time please call the above Non Emergent Dispatcher.  If issue not resolved please escalate to your charge nurse for further instructions.       11/26/19 1220   Final Note   Assessment Type Final Discharge Note   Anticipated Discharge Disposition [facility]   What phone number can be called within the next 1-3 days to see how you are doing after discharge? [phone]   Hospital Follow Up  Appt(s) scheduled? No   Discharge plans and expectations educations in teach back method with documentation complete? No   Right Care Referral Info   Post Acute Recommendation Other   Referral Type Hospice   Facility Name [place] [place]

## 2019-11-26 NOTE — SUBJECTIVE & OBJECTIVE
Past Medical History:   Diagnosis Date    Abdominal pain     Anxiety     Atherosclerosis of aortic arch     noted on CXR 7/1/2015    Atrial fibrillation 05/2016    CHADS 4, on Xarelto    Benign hypertension     Bipolar disorder, most recent episode manic 8/29/2019    Cerebrovascular accident (CVA) 11/25/2019    CHF (congestive heart failure)     Chronic constipation     Chronic diarrhea     Chronic edema     Depression     Dercum disease     Multiple painful lipomas    Diabetic retinopathy     Dysphagia     Gas pain     Glaucoma of both eyes     Hx of psychiatric care     previously amitriptyline, now jut on Trazodone 100mg QHS PRN insomnia    Hyperlipidemia with target LDL less than 100     Unable to tolerate statins    Immature cataract     Left sided numbness     per daughter    Tasia     no symptoms prior to this presentation    Morbid obesity     Nausea & vomiting     Neuropathy     On home oxygen therapy     Polyneuropathy     Primary osteoarthritis of both knees     Proteinuria     Psychiatric problem     history of depression    Pulmonary hypertension mixed group 2 and 3 1/18/2017    Requires assistance with activities of daily living (ADL)     Sleep apnea     Therapy     no history of therapy    Type II or unspecified type diabetes mellitus with neurological manifestations, 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 per Dr Wolf      ok to hold Xarelto 2 days prior to procedure per Dr Driss Dixon    diabetic retinopathy      right    HYSTERECTOMY       knee surgery x2      Left ankle and leg surgery secondary to a fall      rods & screws present    left arm fracture      Left cataract      PARTIAL HYSTERECTOMY      Secondary to bleeding    TOE FUSION      2,3 R    WRIST SURGERY Left 12/28/2012    pins & plate present       Review of patient's allergies indicates:   Allergen Reactions    Tramadol Other (See Comments)     Interaction-induced delirium with s/s of estrellita.    Ace inhibitors      Other reaction(s): cough      Fluorescein Itching     Other reaction(s): Itching    Iodine      Other reaction(s): Itching    Pravastatin Other (See Comments)     Other reaction(s): mouth tingling/numbness    Simvastatin      Other reaction(s): foot swelling         No current facility-administered medications on file prior to encounter.      Current Outpatient Medications on File Prior to Encounter   Medication Sig    amLODIPine (NORVASC) 10 MG tablet Take 1 tablet (10 mg total) by mouth once daily.    amoxicillin-clavulanate 875-125mg (AUGMENTIN) 875-125 mg per tablet Take 1 tablet by mouth 2 (two) times daily. for 7 days    desloratadine (CLARINEX) 5 mg tablet Take 1 tablet (5 mg total) by mouth once daily. (Patient taking differently: Take 5 mg by mouth as needed. )    divalproex (DEPAKOTE) 500 MG TbEC Take 1 tablet (500 mg total) by mouth every 12 (twelve) hours.    fluticasone (FLONASE) 50 mcg/actuation nasal spray 1 spray (50 mcg total) by Each Nare route 2 (two) times daily.    furosemide (LASIX) 40 MG tablet Take 1 tablet (40 mg total) by mouth once daily. One tab po daily x three days then as needed for LE swelling    gabapentin (NEURONTIN) 100 MG capsule Take 1 capsule (100 mg total) by mouth 3 (three) times daily.    hydroCHLOROthiazide (HYDRODIURIL) 25 MG tablet Take 25 mg by mouth once daily.    insulin aspart protamine-insulin aspart (NOVOLOG 70/30) 100 unit/mL (70-30) InPn pen Inject 21 Units into the skin 2 (two) times daily before  meals. Sliding scale max daily dosing 50 units daily    losartan (COZAAR) 100 MG tablet Take 1 tablet (100 mg total) by mouth once daily.    metoprolol succinate (TOPROL-XL) 25 MG 24 hr tablet Take 1 tablet (25 mg total) by mouth once daily.    multivitamin capsule Take 1 capsule by mouth once daily.    polyethylene glycol (GLYCOLAX) 17 gram PwPk Take 17 g by mouth 2 (two) times daily as needed.    ranitidine (ZANTAC) 150 MG tablet Take 150 mg by mouth 2 (two) times daily.     Family History     Problem Relation (Age of Onset)    Bone cancer Daughter    Liver cancer Mother    No Known Problems Daughter, Father, Sister, Brother, Maternal Aunt, Maternal Uncle, Paternal Aunt, Paternal Uncle, Maternal Grandmother, Maternal Grandfather, Paternal Grandmother, Paternal Grandfather        Tobacco Use    Smoking status: Never Smoker    Smokeless tobacco: Never Used   Substance and Sexual Activity    Alcohol use: No    Drug use: No    Sexual activity: Yes     Partners: Male     Review of Systems,not able be done,  Objective:     Vital Signs (Most Recent):  Temp: 97.9 °F (36.6 °C) (11/26/19 0756)  Pulse: 98 (11/26/19 0756)  Resp: 19 (11/26/19 0858)  BP: (!) 91/58 (11/26/19 0756)  SpO2: 98 % (11/26/19 0858) Vital Signs (24h Range):  Temp:  [95.9 °F (35.5 °C)-98.5 °F (36.9 °C)] 97.9 °F (36.6 °C)  Pulse:  [] 98  Resp:  [16-32] 19  SpO2:  [67 %-100 %] 98 %  BP: ()/(38-69) 91/58     Weight: 102 kg (224 lb 13.9 oz)  Body mass index is 41.13 kg/m².    Physical Exam   Constitutional: No distress.   HENT:   Head: Atraumatic.   Eyes: EOM are normal.   Neck: Neck supple.   Cardiovascular: Normal rate and regular rhythm.   Pulmonary/Chest: Effort normal.   Diminished basal,   Abdominal: Soft. Bowel sounds are normal.   Musculoskeletal: Normal range of motion. She exhibits edema.   Neurological:   Not able be done.   Skin: Skin is warm and dry.   Psychiatric:   Not able be done         CRANIAL NERVES     CN III, IV,  VI   Extraocular motions are normal.        Significant Labs:   ABGs:   Recent Labs   Lab 11/25/19  1741   PH 7.422   PCO2 41.7   HCO3 27.2   POCSATURATED 71*   BE 2     BMP:   Recent Labs   Lab 11/26/19  0512   *   *   K 5.5*   CL 86*   CO2 26   BUN 74*   CREATININE 3.1*   CALCIUM 8.3*     CBC:   Recent Labs   Lab 11/25/19  1028 11/26/19  0512   WBC 21.90* 23.28*   HGB 9.0* 7.8*   HCT 29.4* 25.5*   * 409*     CMP:   Recent Labs   Lab 11/25/19  1028 11/26/19  0512   * 123*   K 5.5* 5.5*   CL 85* 86*   CO2 31* 26   * 141*   BUN 72* 74*   CREATININE 3.0* 3.1*   CALCIUM 8.9 8.3*   PROT 6.3 5.5*   ALBUMIN 2.3* 2.0*   BILITOT 0.4 0.4   ALKPHOS 90 84   AST 27 22   ALT 19 18   ANIONGAP 10 11   EGFRNONAA 14* 14*       Significant Imaging: reviewed.

## 2019-11-26 NOTE — SUBJECTIVE & OBJECTIVE
Past Medical History:   Diagnosis Date    Abdominal pain     Anxiety     Atherosclerosis of aortic arch     noted on CXR 7/1/2015    Atrial fibrillation 05/2016    CHADS 4, on Xarelto    Benign hypertension     Bipolar disorder, most recent episode manic 8/29/2019    Cerebrovascular accident (CVA) 11/25/2019    CHF (congestive heart failure)     Chronic constipation     Chronic diarrhea     Chronic edema     Depression     Dercum disease     Multiple painful lipomas    Diabetic retinopathy     Dysphagia     Gas pain     Glaucoma of both eyes     Hx of psychiatric care     previously amitriptyline, now jut on Trazodone 100mg QHS PRN insomnia    Hyperlipidemia with target LDL less than 100     Unable to tolerate statins    Immature cataract     Left sided numbness     per daughter    Tasia     no symptoms prior to this presentation    Morbid obesity     Nausea & vomiting     Neuropathy     On home oxygen therapy     Polyneuropathy     Primary osteoarthritis of both knees     Proteinuria     Psychiatric problem     history of depression    Pulmonary hypertension mixed group 2 and 3 1/18/2017    Requires assistance with activities of daily living (ADL)     Sleep apnea     Therapy     no history of therapy    Type II or unspecified type diabetes mellitus with neurological manifestations, uncontrolled(250.62)     Unspecified vitamin D deficiency     Unsteady gait     Uses roller walker        Past Surgical History:   Procedure Laterality Date     tenotomy      flexors 2,3 L toes    CATARACT EXTRACTION W/  INTRAOCULAR LENS IMPLANT Bilateral     COLONOSCOPY N/A 5/4/2017    Procedure: COLONOSCOPY;  Surgeon: Suellen Wolf MD;  Location: [facility] END[facility] (2ND FLR);  Service: Endoscopy;  Laterality: N/A;  2nd floor due to pulm htn, possible gastroparesis. per Dr Wolf      ok to hold Xarelto 2 days prior to procedure per Dr Driss Dixon    diabetic retinopathy      right    HYSTERECTOMY       knee surgery x2      Left ankle and leg surgery secondary to a fall      rods & screws present    left arm fracture      Left cataract      PARTIAL HYSTERECTOMY      Secondary to bleeding    TOE FUSION      2,3 R    WRIST SURGERY Left 12/28/2012    pins & plate present       Review of patient's allergies indicates:   Allergen Reactions    Tramadol Other (See Comments)     Interaction-induced delirium with s/s of estrellita.    Ace inhibitors      Other reaction(s): cough      Fluorescein Itching     Other reaction(s): Itching    Iodine      Other reaction(s): Itching    Pravastatin Other (See Comments)     Other reaction(s): mouth tingling/numbness    Simvastatin      Other reaction(s): foot swelling         Family History     Problem Relation (Age of Onset)    Bone cancer Daughter    Liver cancer Mother    No Known Problems Daughter, Father, Sister, Brother, Maternal Aunt, Maternal Uncle, Paternal Aunt, Paternal Uncle, Maternal Grandmother, Maternal Grandfather, Paternal Grandmother, Paternal Grandfather        Tobacco Use    Smoking status: Never Smoker    Smokeless tobacco: Never Used   Substance and Sexual Activity    Alcohol use: No    Drug use: No    Sexual activity: Yes     Partners: Male         Review of Systems   Unable to perform ROS: Mental status change     Objective:     Vital Signs (Most Recent):  Temp: 97.9 °F (36.6 °C) (11/26/19 0756)  Pulse: 98 (11/26/19 0756)  Resp: 19 (11/26/19 0858)  BP: (!) 91/58 (11/26/19 0756)  SpO2: 98 % (11/26/19 0858) Vital Signs (24h Range):  Temp:  [95.9 °F (35.5 °C)-98.5 °F (36.9 °C)] 97.9 °F (36.6 °C)  Pulse:  [] 98  Resp:  [16-32] 19  SpO2:  [67 %-100 %] 98 %  BP: ()/(38-69) 91/58     Weight: 102 kg (224 lb 13.9 oz)  Body mass index is 41.13 kg/m².      Intake/Output Summary (Last 24 hours) at 11/26/2019 1027  Last data filed at 11/26/2019 0600  Gross per 24 hour   Intake 2289.17 ml   Output 100 ml   Net 2189.17 ml       Physical Exam    Constitutional: No distress.   HENT:   Head: Atraumatic.   Eyes: EOM are normal.   Neck: Neck supple.   Cardiovascular: Normal rate and regular rhythm.   Pulmonary/Chest: Effort normal.   Diminished basal,   Abdominal: Soft. Bowel sounds are normal.   Musculoskeletal: Normal range of motion. She exhibits edema.   Neurological:   Minimally responsive.     Skin: Skin is warm and dry.   Psychiatric:   Not able be done       Vents:  Oxygen Concentration (%): 80 (11/26/19 0858)    Lines/Drains/Airways     Peripheral Intravenous Line                 Peripheral IV - Single Lumen 11/25/19 1019 20 G Right Antecubital 1 day                Significant Labs:    CBC/Anemia Profile:  Recent Labs   Lab 11/25/19  1028 11/26/19  0512   WBC 21.90* 23.28*   HGB 9.0* 7.8*   HCT 29.4* 25.5*   * 409*   MCV 98 97   RDW 13.6 13.4        Chemistries:  Recent Labs   Lab 11/25/19  1028 11/26/19  0512   * 123*   K 5.5* 5.5*   CL 85* 86*   CO2 31* 26   BUN 72* 74*   CREATININE 3.0* 3.1*   CALCIUM 8.9 8.3*   ALBUMIN 2.3* 2.0*   PROT 6.3 5.5*   BILITOT 0.4 0.4   ALKPHOS 90 84   ALT 19 18   AST 27 22     RHC 1/18/2017  AOPRES: 135/68 (91)  AOSAT: 100  FICKCI: 2.76  FICKCO: 5.18  PAPRES: 60/25 (37)  PASAT: 68  PVR: 1.93  PWPRES: 23/42 (27)  Unable to draw blood for saturation but confirmed on fluoroscopy  RAPRES: 16/16 (14)  RVPRES: 60/15, 15  SVR: 14.88/1192    Echo 9/10/19  · Normal left ventricular systolic function. The estimated ejection fraction is 55%  · Concentric left ventricular hypertrophy.  · Grade II (moderate) left ventricular diastolic dysfunction consistent with pseudonormalization.  · Moderate left atrial enlargement.  · Normal right ventricular systolic function.     Pulmonary Functions Testing Results: 6/29/16 ratio 75%; fvc 47%; fev 40%; tlc 69%    ABGs:   Recent Labs   Lab 11/25/19  1741   PH 7.422   PCO2 41.7   HCO3 27.2   POCSATURATED 71*   BE 2     Cardiac Markers: No results for input(s): CKMB, TROPONINT,  MYOGLOBIN in the last 48 hours.  BNP  Recent Labs   Lab 11/25/19  1028   *       Significant Imaging:   CXR: I have reviewed all pertinent results/findings within the past 24 hours and my personal findings are:  11/25/19 worsening right effusion.

## 2019-11-26 NOTE — PLAN OF CARE
[facility]  Department of Hospital Medicine  [address] [place] [address]  [phone] after hours  [phone] fax    HOSPICE  ORDERS    11/26/2019    Admit to Hospice:   Inpatient Service      Diagnoses:   Active Hospital Problems    Diagnosis  POA    *Acute renal failure superimposed on stage 3 chronic kidney disease [N17.9, N18.3]  Yes     Priority: 1 - High    Acute on chronic respiratory failure with hypoxia and hypercapnia [J96.21, J96.22]  Yes     Priority: 2     Chronic diastolic congestive heart failure [I50.32]  Yes    Chronic respiratory failure with hypoxia and hypercapnia [J96.11, J96.12]  Yes    Goals of care, counseling/discussion [Z71.89]  Not Applicable    Acute anoxic encephalopathy [G93.1]  Yes    Hyperkalemia [E87.5]  Yes    Obesity, Class III, BMI 40-49.9 (morbid obesity) [E66.01]  Yes     Chronic    CKD (chronic kidney disease), stage III [N18.3]  Yes    Essential hypertension [I10]  Yes     Chronic    Pulmonary hypertension mixed group 2 and 3 [I27.20]  Yes    MIGUEL (obstructive sleep apnea) unable to afford CPAP [G47.33]  Yes     Chronic    Paroxysmal atrial fibrillation [I48.0]  Yes    Hyperlipidemia with target LDL less than 100 [E78.5]  Yes     Unable to tolerate some statins        Resolved Hospital Problems   No resolved problems to display.       Hospice Qualifying Diagnoses: ARF,respiratory failure          Patient has a life expectancy < 6 months due to:    Vital Signs: Routine per Hospice Protocol.    Code Status: DNR     Allergies:   Review of patient's allergies indicates:   Allergen Reactions    Tramadol Other (See Comments)     Interaction-induced delirium with s/s of estrellita.    Ace inhibitors      Other reaction(s): cough      Fluorescein Itching     Other reaction(s): Itching    Iodine      Other reaction(s): Itching    Pravastatin Other (See Comments)     Other reaction(s): mouth tingling/numbness     Simvastatin      Other reaction(s): foot swelling         Diet: pleasure diet     Activities: As tolerated    Nursing: Per Hospice Routine.         Porter Care: Empty Porter bag Q shift and PRN.  Change Porter every month.    Routine Skin for Bedridden Patients: Apply moisture barrier cream to all skin folds and   wet areas in perineal area daily and after baths and all bowel movements.    -     Oxygen: on NRM     Other Miscellaneous Care: per Hospice       Medications:      Kaci Whalen   Home Medication Instructions RUBÉN:[id number]    Printed on:11/26/19 2385   Medication Information                      amoxicillin-clavulanate 875-125mg (AUGMENTIN) 875-125 mg per tablet  Take 1 tablet by mouth 2 (two) times daily. for 7 days             desloratadine (CLARINEX) 5 mg tablet  Take 1 tablet (5 mg total) by mouth once daily.             divalproex (DEPAKOTE) 500 MG TbEC  Take 1 tablet (500 mg total) by mouth every 12 (twelve) hours.             fluticasone (FLONASE) 50 mcg/actuation nasal spray  1 spray (50 mcg total) by Each Nare route 2 (two) times daily.             furosemide (LASIX) 40 MG tablet  Take 1 tablet (40 mg total) by mouth once daily.              gabapentin (NEURONTIN) 100 MG capsule  Take 1 capsule (100 mg total) by mouth 3 (three) times daily.                          multivitamin capsule  Take 1 capsule by mouth once daily.             polyethylene glycol (GLYCOLAX) 17 gram PwPk  Take 17 g by mouth 2 (two) times daily as needed.for constipation              ranitidine (ZANTAC) 150 MG tablet  Take 150 mg by mouth 2 (two) times daily.                         Future Orders:  Hospice Medical Director may dictate new orders for comfortable care measures & sign death certificate.        _________________________________  Mayelin Marshall MD  11/26/2019

## 2019-11-26 NOTE — ASSESSMENT & PLAN NOTE
Started on gentle bicarb drip,consulted nephrology,check bladder scan,consern for ATN,duo to hypotension at arrival.not a HD candidate,  Started on bicarb drip ,ARF did not improved,consulted nephrology.

## 2019-11-26 NOTE — HPI
Patient is 79 y.o. female  has a past medical history of Abdominal pain, Anxiety, Atherosclerosis of aortic arch, Atrial fibrillation (05/2016), Benign hypertension, Bipolar disorder, most recent episode manic (8/29/2019), Cerebrovascular accident (CVA) (11/25/2019), CHF (congestive heart failure), Chronic constipation, Chronic diarrhea, Chronic edema, Depression, Dercum disease, Diabetic retinopathy, Dysphagia, Gas pain, Glaucoma of both eyes, psychiatric care, Hyperlipidemia with target LDL less than 100, Immature cataract, Left sided numbness, Tasia, Morbid obesity, Nausea & vomiting, Neuropathy, On home oxygen therapy, Polyneuropathy, Primary osteoarthritis of both knees, Proteinuria, Psychiatric problem, Pulmonary hypertension mixed group 2 and 3 (1/18/2017), Requires assistance with activities of daily living (ADL), Sleep apnea, Therapy, Type II or unspecified type diabetes mellitus with neurological manifestations, uncontrolled(250.62), Unspecified vitamin D deficiency, Unsteady gait, and Uses roller walker. presented to [facility] on 11/25/19 with increased wob and sob.  Initial ABG with acute on chronic hypercapnic respiratory failure (7.2/80/20/32).  Patient was placed on BPAP and pulmononary was consulted for further inputs.  Repeat BBG compensated 7.4/41/38/27.   +leukocytosis and patient was placed on broad spectrum antibiotics.     During my initial evaluation, patient is minimally responsive to sternum rub.  Most of history is per chart review and my conversation with daughter, Rozina.  Per daughter, patient with multiple hospitalizations over past 2 months.  Rozina also stated that it is patient's wish not to be intubated or resuscitate in the case of cardiopulmonary arrest.

## 2019-11-26 NOTE — HOSPITAL COURSE
79-year-old woman with PMH of diastolic CHF,chronic respiratory failure,HTN,DM,CKD 3,anemia,debility,MIGUEL,obesity,bipolar disorder,,who was brought to the emergency department by EMS from SNF,.  Family members report that the patient had a decreased level of consciousness and increasing work of breathing.  EMS reports that the O2 sats were 80% on room air.  Patient was admitted to our facility a week ago due to respiratory distress and CHF exacerbation.  She was discharged to the skilled nursing facility for physical therapy and rehab.  She had been in the skilled nursing facility for approximately for 4 days now.  Over the weekend she had an episode of decreased level of consciousness and lethargy and the family member was told that it was due to the Norco she was taking.  Patient was last seen normal by the family members at 8:00 p.m. Day before admission, and she was told that the patient was lethargic and having difficulty breathing . she had been eating well and interacting with her family members. She had not been able to ambulate due to generalized weakness. She has chronic weakness of the left arm due to chronic left shoulder pain.  She also has a history of left sided Bell's Palsy.  She has a history of atrial fib but no longer takes OAC, due to frequent falls,she had  ARF on CKD 3 ,has leucocytosis,blood culture was  done and she was started on IV Abx,head CT show no acute process,ABG is consistent  with A/C hypoxic,hypercapnic  respiratory failure,started patient on bipap.she was lethargic,was  not able do ROS.  ABG show some improvement,,consulted pulmonloogy,  Started on bicarb drip ,ARF did not improved,consulted nephrology.  Daughter was consulted extensively critical condition of patient,she was already DNR,family are agree with comfort care,patient also never wished aggressive treatments,want be on comfort care,pallaitive  care was consulted,Hospice was consulted,patient was transferred to inpatient  Hospice.

## 2019-11-26 NOTE — ASSESSMENT & PLAN NOTE
likely duo to MIGUEL,OHS,started on bipap,recjeck ABG,will monitor,she is DNR.ABG show some improvement,but still hypoxic off bipap ,consulted pulmonloogy,

## 2019-11-26 NOTE — NURSING
Patient arrived on unit on stretcher lethargic on 4 liters of oxygen. /'51 88 4 liters 97.6 HR 88  R 22. Present on admit is 2 half dollar size DTIs. Around DTIs area red un blanchable.  Dressing applied patient postioned of buttocks on pillow bed low call light in reach . Family at bedside.

## 2019-11-26 NOTE — NURSING
Sepsis Alert Noted. M.D. Notified by Nurse. Spoke with daughter Liliam and Son Bruno in regards to DNR status. Stated their mother requested palliative and I reassured them she was in good hands and we would keep her as comfortable as possible. Nurse made aware and will continue to monitor and communicate during BSH.

## 2019-11-26 NOTE — CONSULTS
WOC consult triggered by WOGs for DTI buttocks  A 79 year old female admitted 11/25/19 from SNF with acute renal failure superimposed on Stage 3 CKD; acute on chronic respiratory failure with hypoxia and hypercapnia; acute anoxic encephalopathy; chronic diastolic congestive heart failure; hyperkalemia; obesity; CKD Stage 3; essential hypertension; MIGUEL; paroxysmal atrial fibrillation; hyperlipidemia  PMH: Abdominal pain; anxiety; atherosclerosis of aortic arch; Afib; benign hypertension; bipolar disorder; CVA; CHF; chronic edema; depression; diabetic retinopathy; dysphagia; glaucoma; HLD; morbid obesity; neuropathy; osteoarthritis of both knees; sleep apnea; pulmonary hypertension; DM II; unsteady  Gait  11/26 WBC 23.28 Hgb 7.8 Hct 25.5 Alb 2.0  Noted patient is transferring to [facility] today.

## 2019-11-26 NOTE — PLAN OF CARE
Problem: Adult Inpatient Plan of Care  Goal: Plan of Care Review  Outcome: Ongoing, Progressing     Problem: Fall Injury Risk  Goal: Absence of Fall and Fall-Related Injury  Outcome: Ongoing, Progressing     Problem: Skin Injury Risk Increased  Goal: Skin Health and Integrity  Outcome: Ongoing, Progressing     Problem: Diabetes Comorbidity  Goal: Blood Glucose Level Within Desired Range  Outcome: Ongoing, Progressing

## 2019-11-30 LAB
BACTERIA BLD CULT: NORMAL
BACTERIA BLD CULT: NORMAL

## 2019-12-09 ENCOUNTER — PATIENT MESSAGE (OUTPATIENT)
Dept: FAMILY MEDICINE | Facility: CLINIC | Age: 79
End: 2019-12-09

## 2020-10-24 NOTE — PROGRESS NOTES
Subjective:       Patient ID: Kaci Whalen is a 78 y.o. female.    Chief Complaint: Diabetic Eye Exam    HPI     78 y.o. Female is here for DM Check. Denies eye pain and flashes. H/o   floaters bilateral. No itching, burning or tearing. Blurred vision up   close with correction. No problems with glare.     Eye Meds: Refresh every 3 to 4 hours.     Last edited by ANA M Del Rosario on 4/4/2019  1:49 PM. (History)             Assessment:       1. Stable proliferative diabetic retinopathy of both eyes associated with type 2 diabetes mellitus    2. Dry eye syndrome of both eyes    3. Epiretinal membrane, left eye    4. Hypertensive retinopathy of both eyes    5. Posterior vitreous detachment, right eye    6. Refractive error    7. Pseudophakia        Plan:       PDR OU s/pPRP OU-Stable OU. No CSME.  JUANCARLOS-Needs more AT's.  ERM OS-Stable.  HTN retinopathy OU-Mild, stable OU.  PVD OD-Stable.  RE-Pt wants MRx.        Control DM & HTN.  Trial of Systane Complete.  Give CRx.     RTC 1 yr.   Troponin level elevated

## 2022-08-26 NOTE — PROGRESS NOTES
[facility] Hospital Medicine  Progress Note    Patient Name: Kaci Whaeln  MRN: [id number]  Patient Class: IP- Inpatient   Admission Date: 11/14/2019  Length of Stay: 5 days  Attending Physician: Mayelin Marshall MD  Primary Care Provider: Toby Hung MD        Subjective:     Principal Problem:Acute on chronic respiratory failure with hypoxia        HPI:  80 y/o female presents to the ER via EMS complaining of gradually worsening shortness of breath and non productive cough for 2 days.  EMS reports an initial O2 sat of 77% on 2L O2.  The patient uses 2L of supplemental O2 at all times.  Patient apparently started complaining of congestion that did not improve with Mucinex and cough syrup.  Patient complains of severe shortness of breath with minimal exertion.  She can never lay flat to sleep.  Does complain of some lower extremity swelling, but states that it's actually much better than in the past.  She is compliant with her Lasix 40mg (MWF).  Shortness of breath worsened this morning and patient presented to ER.  Patient states that she also gets very anxious when she gets short of breath.  No other complaints.    Overview/Hospital Course:  80 y/o female presented with worsening shortness of breath.  Acute on chronic diastolic heart failure and started on IV Lasix. Some improvement with diuresis, but patient somewhat confused and lethargic.  ABG's show hypercapnia and started on Bipap.  Pulmonary consulted.  Spiking low grade fevers.  UA with 3+ leukocytes and many bacteria.  Started on Rocephin.  Some dysphagia and ST consulted.  Very weak and fatigued.  PT/OT evaluation.[id number] SNF,consulted SW,placed PPD.  Alert time 3 today.    Interval History: Weak and fatigued.    Review of Systems   HENT: Negative for ear discharge and ear pain.    Eyes: Negative for pain and itching.   Endocrine: Negative for polyphagia and polyuria.   Neurological: Negative for seizures and syncope.      Objective:     Vital Signs (Most Recent):  Temp: 97.5 °F (36.4 °C) (11/19/19 0732)  Pulse: 63 (11/19/19 0732)  Resp: 17 (11/19/19 0732)  BP: (!) 160/71 (11/19/19 0732)  SpO2: 96 % (11/19/19 0732) Vital Signs (24h Range):  Temp:  [97.5 °F (36.4 °C)-99.5 °F (37.5 °C)] 97.5 °F (36.4 °C)  Pulse:  [63-83] 63  Resp:  [17-22] 17  SpO2:  [93 %-100 %] 96 %  BP: (120-160)/(56-94) 160/71     Weight: 99.1 kg (218 lb 7.6 oz)  Body mass index is 44.13 kg/m².    Intake/Output Summary (Last 24 hours) at 11/19/2019 1035  Last data filed at 11/19/2019 0100  Gross per 24 hour   Intake --   Output 800 ml   Net -800 ml      Physical Exam   Constitutional: She is oriented to person, place, and time. No distress.   HENT:   Head: Normocephalic and atraumatic.   Eyes: Conjunctivae are normal. Right eye exhibits no discharge. Left eye exhibits no discharge.   Neck: Neck supple. No tracheal deviation present.   Cardiovascular: Normal rate and regular rhythm.   Pulmonary/Chest:   Slight tachypnea  Coarse BS bilaterally   Abdominal: Soft. Bowel sounds are normal.   Musculoskeletal: She exhibits edema.   Neurological: She is alert and oriented to person, place, and time.   Skin: Skin is warm and dry. She is not diaphoretic.       Significant Labs:   BMP:   Recent Labs   Lab 11/19/19 0528   *      K 3.8   CL 92*   CO2 42*   BUN 34*   CREATININE 1.3   CALCIUM 9.0     CBC:   Recent Labs   Lab 11/18/19  0449 11/19/19 0528   WBC 7.17 5.84   HGB 9.3* 8.4*   HCT 31.0* 27.6*    209       Significant Imaging: I have reviewed all pertinent imaging results/findings within the past 24 hours.      Assessment/Plan:      * Acute on chronic respiratory failure with hypoxia  Patient noted to be hypoxic and in respiratory distress.  Respiratory failure secondary to CHF exacerbation.  Symptoms improved with IV Lasix.  Change Lasix to oral.  Will give dose of Acetazolamide.  Patient with some confusion and lethargy.    ABG's show  hypercapnia.  Started on Bipap.  Appreciate Pulmonary input. SW consult to see if patient could get home Bipap upon discharge.  Will also hold sedative medications (Seroquel, Gabapentin, Depakote).  Low grade fevers.  UA consistent with UTI.  Will start Rocephin.no growth yet.        Hyperkalemia  Should improve with diuresis.  Repeat labs in Am.      Anemia of chronic disorder  H/H similar to previous labs.  No evidence of bleeding.    Acute on chronic diastolic congestive heart failure  As above.      CKD (chronic kidney disease), stage III  Creat around baseline.  Monitor renal function while on diuresis.  Avoid nephrotoxic medications.      Essential hypertension  Resume home medications with parameters.  Low Na diet.      Morbid obesity  Body mass index is 44.03 kg/m².        Paroxysmal atrial fibrillation  Currently in sinus rhythm.  Continue B blocker.  In and out of Afib this morning, but rate controlled.      Metabolic alkalosis  Worsening CO2 probably secondary to diuresis.      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  Diabetic diet and insulin sliding scale.      Hyperlipidemia with target LDL less than 100          VTE Risk Mitigation (From admission, onward)         Ordered     heparin (porcine) injection 5,000 Units  Every 8 hours      11/14/19 1623     IP VTE HIGH RISK PATIENT  Once      11/14/19 1623     Place sequential compression device  Until discontinued      11/14/19 1623     Place EDE hose  Until discontinued      11/14/19 1623                      Mayelin Marshall MD  Department of Hospital Medicine   [facility]   fever, weakness, SOB, dizziness x 2 days. covid at home test neg